# Patient Record
Sex: FEMALE | Race: WHITE | NOT HISPANIC OR LATINO | Employment: OTHER | URBAN - METROPOLITAN AREA
[De-identification: names, ages, dates, MRNs, and addresses within clinical notes are randomized per-mention and may not be internally consistent; named-entity substitution may affect disease eponyms.]

---

## 2017-01-19 ENCOUNTER — PATIENT MESSAGE (OUTPATIENT)
Dept: INTERNAL MEDICINE | Facility: CLINIC | Age: 62
End: 2017-01-19

## 2017-01-19 DIAGNOSIS — E78.5 HYPERLIPIDEMIA, UNSPECIFIED HYPERLIPIDEMIA TYPE: Primary | ICD-10-CM

## 2017-01-20 NOTE — TELEPHONE ENCOUNTER
Help her get the Cardiac calcium CT st [place]. Please double check that this is the [facility] $100 scan I usually order. I am having trouble with Epic at this meeting. Message me if not right.

## 2017-01-24 ENCOUNTER — HOSPITAL ENCOUNTER (OUTPATIENT)
Dept: RADIOLOGY | Facility: HOSPITAL | Age: 62
Discharge: HOME | End: 2017-01-24
Attending: INTERNAL MEDICINE
Payer: COMMERCIAL

## 2017-01-24 ENCOUNTER — HOSPITAL ENCOUNTER (OUTPATIENT)
Dept: RADIOLOGY | Facility: HOSPITAL | Age: 62
Discharge: HOME | End: 2017-01-24
Attending: INTERNAL MEDICINE

## 2017-01-24 ENCOUNTER — TELEPHONE (OUTPATIENT)
Dept: INTERNAL MEDICINE | Facility: CLINIC | Age: 62
End: 2017-01-24

## 2017-01-24 ENCOUNTER — PATIENT MESSAGE (OUTPATIENT)
Dept: INTERNAL MEDICINE | Facility: CLINIC | Age: 62
End: 2017-01-24

## 2017-01-24 DIAGNOSIS — E78.5 HYPERLIPIDEMIA, UNSPECIFIED HYPERLIPIDEMIA TYPE: Primary | ICD-10-CM

## 2017-01-24 DIAGNOSIS — E78.5 HYPERLIPIDEMIA, UNSPECIFIED HYPERLIPIDEMIA TYPE: ICD-10-CM

## 2017-01-24 DIAGNOSIS — Z82.49 FAMILY HISTORY OF EARLY CAD: ICD-10-CM

## 2017-01-24 PROCEDURE — 75571 CT HRT W/O DYE W/CA TEST: CPT | Mod: 26,,, | Performed by: RADIOLOGY

## 2017-01-24 PROCEDURE — 75571 CT HRT W/O DYE W/CA TEST: CPT | Mod: TC

## 2017-03-01 RX ORDER — TRAMADOL HYDROCHLORIDE 50 MG/1
TABLET ORAL
Qty: 120 TABLET | Refills: 0 | Status: SHIPPED | OUTPATIENT
Start: 2017-03-01 | End: 2017-06-20 | Stop reason: SDUPTHER

## 2017-03-13 ENCOUNTER — TELEPHONE (OUTPATIENT)
Dept: INTERNAL MEDICINE | Facility: CLINIC | Age: 62
End: 2017-03-13

## 2017-03-13 DIAGNOSIS — Z00.00 ROUTINE MEDICAL EXAM: Primary | ICD-10-CM

## 2017-03-13 NOTE — TELEPHONE ENCOUNTER
----- Message from Yoly Hightower sent at 3/13/2017  3:10 PM CDT -----  Doctor appointment and lab have been scheduled.  Please link lab orders to the lab appointment.  Date of doctor appointment:  5-5  Physical or EP:  physical  Date of lab appointment:  4-28  Comments:

## 2017-03-14 ENCOUNTER — OFFICE VISIT (OUTPATIENT)
Dept: PHYSICAL MEDICINE AND REHAB | Facility: CLINIC | Age: 62
End: 2017-03-14
Payer: COMMERCIAL

## 2017-03-14 VITALS
DIASTOLIC BLOOD PRESSURE: 87 MMHG | HEART RATE: 79 BPM | WEIGHT: 121.25 LBS | SYSTOLIC BLOOD PRESSURE: 138 MMHG | HEIGHT: 64 IN | BODY MASS INDEX: 20.7 KG/M2

## 2017-03-14 DIAGNOSIS — M19.041 PRIMARY OSTEOARTHRITIS OF BOTH HANDS: ICD-10-CM

## 2017-03-14 DIAGNOSIS — M47.812 SPONDYLOSIS OF CERVICAL REGION WITHOUT MYELOPATHY OR RADICULOPATHY: ICD-10-CM

## 2017-03-14 DIAGNOSIS — G89.29 CHRONIC MIDLINE LOW BACK PAIN WITH BILATERAL SCIATICA: Primary | ICD-10-CM

## 2017-03-14 DIAGNOSIS — M54.42 CHRONIC MIDLINE LOW BACK PAIN WITH BILATERAL SCIATICA: Primary | ICD-10-CM

## 2017-03-14 DIAGNOSIS — M54.2 CHRONIC NECK PAIN: ICD-10-CM

## 2017-03-14 DIAGNOSIS — M54.41 CHRONIC MIDLINE LOW BACK PAIN WITH BILATERAL SCIATICA: Primary | ICD-10-CM

## 2017-03-14 DIAGNOSIS — M54.16 BILATERAL LUMBAR RADICULOPATHY: ICD-10-CM

## 2017-03-14 DIAGNOSIS — M19.042 PRIMARY OSTEOARTHRITIS OF BOTH HANDS: ICD-10-CM

## 2017-03-14 DIAGNOSIS — M47.26 OSTEOARTHRITIS OF SPINE WITH RADICULOPATHY, LUMBAR REGION: ICD-10-CM

## 2017-03-14 DIAGNOSIS — G89.29 CHRONIC NECK PAIN: ICD-10-CM

## 2017-03-14 PROCEDURE — 99213 OFFICE O/P EST LOW 20 MIN: CPT | Mod: S$GLB,,, | Performed by: PHYSICAL MEDICINE & REHABILITATION

## 2017-03-14 PROCEDURE — 3079F DIAST BP 80-89 MM HG: CPT | Mod: S$GLB,,, | Performed by: PHYSICAL MEDICINE & REHABILITATION

## 2017-03-14 PROCEDURE — 99999 PR PBB SHADOW E&M-EST. PATIENT-LVL III: CPT | Mod: PBBFAC,,, | Performed by: PHYSICAL MEDICINE & REHABILITATION

## 2017-03-14 PROCEDURE — 1160F RVW MEDS BY RX/DR IN RCRD: CPT | Mod: S$GLB,,, | Performed by: PHYSICAL MEDICINE & REHABILITATION

## 2017-03-14 PROCEDURE — 3075F SYST BP GE 130 - 139MM HG: CPT | Mod: S$GLB,,, | Performed by: PHYSICAL MEDICINE & REHABILITATION

## 2017-03-14 RX ORDER — GABAPENTIN 300 MG/1
300 CAPSULE ORAL 3 TIMES DAILY
Qty: 270 CAPSULE | Refills: 1 | Status: SHIPPED | OUTPATIENT
Start: 2017-03-14 | End: 2017-09-19 | Stop reason: SDUPTHER

## 2017-03-14 NOTE — PROGRESS NOTES
Subjective:       Patient ID: Jill Marquez is a 61 y.o. female.    Chief Complaint: No chief complaint on file.    HPI    HISTORY OF PRESENT ILLNESS:  Mrs. Marquez is a 61-year-old white female who is   followed up in the Physical Medicine Clinic for chronic low back pain with   bilateral lumbar radiculopathy, chronic neck pain, OA of the hands and right   carpal tunnel syndrome.  Her last visit to the clinic was on 11/15/2016.  She   was maintained on meloxicam, gabapentin, p.r.n. tramadol and p.r.n. metaxalone.    She was referred to the Hand Clinic due to severe OA of the hands.    The patient is coming today to the clinic for followup.  She did not go to the   Hand Clinic due to death of her brother.  She is coming today to the clinic for   followup.  Her neck pain has been stable.  It is an intermittent aching pain in   the cervical spine.  It is usually localized.  It is aggravated by excess   activity and better with rest.  Her maximum pain is 5/10 and minimum 2-3/10.    Today, it is 2-3/10.  The patient complains of intermittent mild right hand   numbness, likely related to carpal tunnel syndrome.  She has been using the   carpal tunnel splint consistently.  She continues to complain of severe pain in   her interphalangeal areas, mostly the index finger bilaterally.    Her back pain has been under good control.  It is an intermittent mild aching   pain in the lumbar spine and across her back.  The pain radiates to both   buttocks and to both knees with throbbing sensations.  Her pain is worse with   activity but can also be problematic at night when she goes to bed.  It is   better with her medications.  Her maximum pain is 3-4/10 and minimum 1-2/10.    Today, it is 1-2/10.  The patient denies any lower extremity weakness.  She   denies any leg numbness.  She denies any bowel or bladder incontinence.    She is currently taking gabapentin 300 mg p.o. twice per day.  She has not taken   meloxicam for a few  weeks ago because she was concerned about the side effects.    She takes tramadol 50 mg p.r.n., usually one tablet at bedtime.  She takes   metaxalone 800 mg p.r.n., usually twice per day.  She is generally staying   active.      MS/HN  dd: 03/14/2017 09:33:49 (CDT)  td: 03/15/2017 05:23:19 (CDT)  Doc ID   #[id number]  Job ID #[id number]    CC:       Review of Systems   Constitutional: Positive for fatigue.   Eyes: Negative for visual disturbance.   Respiratory: Negative for shortness of breath.    Cardiovascular: Negative for chest pain.   Gastrointestinal: Negative for blood in stool, constipation, nausea and vomiting.   Genitourinary: Negative for difficulty urinating.   Musculoskeletal: Positive for arthralgias (hands), back pain and neck pain. Negative for gait problem.   Skin: Negative for rash.   Neurological: Negative for dizziness and headaches.   Psychiatric/Behavioral: Positive for sleep disturbance. Negative for behavioral problems.       Objective:      Physical Exam   Constitutional: She appears well-developed and well-nourished. No distress.   HENT:   Head: Normocephalic and atraumatic.   Neck:   Decreased ROM.  -ve tenderness.     Pulmonary/Chest: Breath sounds normal.   Musculoskeletal:   BUE:  ROM:full.  +ve severe Heberden's & Lizzy's nodes.  Strength:    RUE: 5/5 at shoulder abduction, elbow flexion, wrist extension, elbow extension, hand .   LUE: 5/5 at shoulder abduction, elbow flexion, wrist extension, elbow extension, hand .  Sensation to pinprick:   RUE: intact.   LUE: intact.  DTR:    RUE: +2 biceps, +1 triceps.   LUE:  +2 biceps, +1 triceps.      BLE:  ROM:full.  Mild bilateral knee crepitus.   Strength:      RLE: 5/5 at hip flexion, knee extension, ankle DF/PF.     LLE:  5/5 at hip flexion, knee extension, ankle DF/PF.  Sensation to pinprick:     RLE: intact.      LLE: intact.   SLR (sitting):      RLE: -ve.      LLE: -ve.     -ve tenderness over lumbar spine.     Neurological: She is  alert.   Skin: Skin is warm. No rash noted.   Psychiatric: She has a normal mood and affect.   Vitals reviewed.            Assessment:       1. Chronic midline low back pain with bilateral sciatica    2. Osteoarthritis of spine with radiculopathy, lumbar region    3. Bilateral lumbar radiculopathy    4. Chronic neck pain    5. Spondylosis of cervical region without myelopathy or radiculopathy    6. Primary osteoarthritis of both hands        Plan:     - Restart meloxicam (MOBIC) 7.5 MG tablet; Take 1 tablet (7.5 mg total) by mouth once daily.   - Increase gabapentin (NEURONTIN) 300 MG capsule; Take 1 capsule (300 mg total) by mouth 3 (three) times daily.  - Continue metaxalone (SKELAXIN) 800 MG tablet; Take 1 tablet (800 mg total) by mouth twice daily as needed.  - Continue tramadol 50 mg po daily prn.  - The patient is to call the Hand Clinic (at [facility]) for help with hand pain and possible steroid injections.  - Return in about 4 months (around 3/15/2017).

## 2017-03-28 ENCOUNTER — PATIENT MESSAGE (OUTPATIENT)
Dept: UROGYNECOLOGY | Facility: CLINIC | Age: 62
End: 2017-03-28

## 2017-03-28 DIAGNOSIS — Z12.31 ENCOUNTER FOR SCREENING MAMMOGRAM FOR BREAST CANCER: Primary | ICD-10-CM

## 2017-03-28 NOTE — TELEPHONE ENCOUNTER
Spoke to patient in regards to request for mammogram orders. Patient scheduled for annual mammogram on Monday 4/10 for 9:40am at [facility]. Patient verbalized all understanding. No further questions asked.

## 2017-04-10 ENCOUNTER — HOSPITAL ENCOUNTER (OUTPATIENT)
Dept: RADIOLOGY | Facility: HOSPITAL | Age: 62
Discharge: HOME | End: 2017-04-10
Attending: NURSE PRACTITIONER
Payer: COMMERCIAL

## 2017-04-10 ENCOUNTER — PATIENT MESSAGE (OUTPATIENT)
Dept: UROGYNECOLOGY | Facility: CLINIC | Age: 62
End: 2017-04-10

## 2017-04-10 DIAGNOSIS — Z12.31 ENCOUNTER FOR SCREENING MAMMOGRAM FOR BREAST CANCER: ICD-10-CM

## 2017-04-10 PROCEDURE — 77067 SCR MAMMO BI INCL CAD: CPT | Mod: TC

## 2017-04-10 PROCEDURE — 77063 BREAST TOMOSYNTHESIS BI: CPT | Mod: 26,,, | Performed by: RADIOLOGY

## 2017-04-10 PROCEDURE — 77067 SCR MAMMO BI INCL CAD: CPT | Mod: 26,,, | Performed by: RADIOLOGY

## 2017-04-17 ENCOUNTER — PATIENT MESSAGE (OUTPATIENT)
Dept: PHYSICAL MEDICINE AND REHAB | Facility: CLINIC | Age: 62
End: 2017-04-17

## 2017-04-17 DIAGNOSIS — G89.29 CHRONIC NECK PAIN: ICD-10-CM

## 2017-04-17 DIAGNOSIS — M54.42 CHRONIC MIDLINE LOW BACK PAIN WITH BILATERAL SCIATICA: ICD-10-CM

## 2017-04-17 DIAGNOSIS — M54.2 CHRONIC NECK PAIN: ICD-10-CM

## 2017-04-17 DIAGNOSIS — G89.29 CHRONIC MIDLINE LOW BACK PAIN WITH BILATERAL SCIATICA: ICD-10-CM

## 2017-04-17 DIAGNOSIS — M54.41 CHRONIC MIDLINE LOW BACK PAIN WITH BILATERAL SCIATICA: ICD-10-CM

## 2017-04-17 RX ORDER — METAXALONE 800 MG/1
800 TABLET ORAL 3 TIMES DAILY PRN
Qty: 90 TABLET | Refills: 2 | Status: SHIPPED | OUTPATIENT
Start: 2017-04-17 | End: 2017-08-29 | Stop reason: SDUPTHER

## 2017-04-17 NOTE — TELEPHONE ENCOUNTER
03/14/17 last office visit  11/15/17 last Rx refill  07/18/17 RTC     Jill Tapia MD 54 minutes ago (7:38 AM)         Please send refill for Metaxalone 800 MG   TO On license of [facility]. [phone].     I ran out over the weekend.     Thank you,   Jill Marquez

## 2017-04-26 RX ORDER — AMLODIPINE BESYLATE 10 MG/1
TABLET ORAL
Qty: 90 TABLET | Refills: 1 | Status: SHIPPED | OUTPATIENT
Start: 2017-04-26 | End: 2017-10-23 | Stop reason: SDUPTHER

## 2017-04-28 ENCOUNTER — LAB VISIT (OUTPATIENT)
Dept: LAB | Facility: HOSPITAL | Age: 62
End: 2017-04-28
Attending: INTERNAL MEDICINE
Payer: COMMERCIAL

## 2017-04-28 DIAGNOSIS — Z00.00 ROUTINE MEDICAL EXAM: ICD-10-CM

## 2017-04-28 LAB
25(OH)D3+25(OH)D2 SERPL-MCNC: 112 NG/ML
ALBUMIN SERPL BCP-MCNC: 4.1 G/DL
ALP SERPL-CCNC: 42 U/L
ALT SERPL W/O P-5'-P-CCNC: 24 U/L
ANION GAP SERPL CALC-SCNC: 9 MMOL/L
AST SERPL-CCNC: 34 U/L
BASOPHILS # BLD AUTO: 0.02 K/UL
BASOPHILS NFR BLD: 0.5 %
BILIRUB SERPL-MCNC: 0.7 MG/DL
BUN SERPL-MCNC: 22 MG/DL
CALCIUM SERPL-MCNC: 10 MG/DL
CHLORIDE SERPL-SCNC: 101 MMOL/L
CHOLEST/HDLC SERPL: 2.6 {RATIO}
CO2 SERPL-SCNC: 29 MMOL/L
CREAT SERPL-MCNC: 0.7 MG/DL
DIFFERENTIAL METHOD: ABNORMAL
EOSINOPHIL # BLD AUTO: 0.1 K/UL
EOSINOPHIL NFR BLD: 2.2 %
ERYTHROCYTE [DISTWIDTH] IN BLOOD BY AUTOMATED COUNT: 12.8 %
EST. GFR  (AFRICAN AMERICAN): >60 ML/MIN/1.73 M^2
EST. GFR  (NON AFRICAN AMERICAN): >60 ML/MIN/1.73 M^2
ESTIMATED AVG GLUCOSE: 114 MG/DL
GLUCOSE SERPL-MCNC: 87 MG/DL
HBA1C MFR BLD HPLC: 5.6 %
HCT VFR BLD AUTO: 38.9 %
HDL/CHOLESTEROL RATIO: 38.8 %
HDLC SERPL-MCNC: 227 MG/DL
HDLC SERPL-MCNC: 88 MG/DL
HGB BLD-MCNC: 13.1 G/DL
LDLC SERPL CALC-MCNC: 127.2 MG/DL
LYMPHOCYTES # BLD AUTO: 1.7 K/UL
LYMPHOCYTES NFR BLD: 42.6 %
MCH RBC QN AUTO: 32.7 PG
MCHC RBC AUTO-ENTMCNC: 33.7 %
MCV RBC AUTO: 97 FL
MONOCYTES # BLD AUTO: 0.5 K/UL
MONOCYTES NFR BLD: 12.3 %
NEUTROPHILS # BLD AUTO: 1.7 K/UL
NEUTROPHILS NFR BLD: 42.2 %
NONHDLC SERPL-MCNC: 139 MG/DL
PLATELET # BLD AUTO: 196 K/UL
PMV BLD AUTO: 10.4 FL
POTASSIUM SERPL-SCNC: 4.1 MMOL/L
PROT SERPL-MCNC: 7 G/DL
RBC # BLD AUTO: 4.01 M/UL
SODIUM SERPL-SCNC: 139 MMOL/L
TRIGL SERPL-MCNC: 59 MG/DL
TSH SERPL DL<=0.005 MIU/L-ACNC: 3.34 UIU/ML
WBC # BLD AUTO: 4.06 K/UL

## 2017-04-28 PROCEDURE — 80061 LIPID PANEL: CPT

## 2017-04-28 PROCEDURE — 85025 COMPLETE CBC W/AUTO DIFF WBC: CPT

## 2017-04-28 PROCEDURE — 84443 ASSAY THYROID STIM HORMONE: CPT

## 2017-04-28 PROCEDURE — 36415 COLL VENOUS BLD VENIPUNCTURE: CPT

## 2017-04-28 PROCEDURE — 83036 HEMOGLOBIN GLYCOSYLATED A1C: CPT

## 2017-04-28 PROCEDURE — 82306 VITAMIN D 25 HYDROXY: CPT

## 2017-04-28 PROCEDURE — 80053 COMPREHEN METABOLIC PANEL: CPT

## 2017-05-02 ENCOUNTER — OFFICE VISIT (OUTPATIENT)
Dept: UROGYNECOLOGY | Facility: CLINIC | Age: 62
End: 2017-05-02
Payer: COMMERCIAL

## 2017-05-02 VITALS
WEIGHT: 125 LBS | HEIGHT: 63 IN | SYSTOLIC BLOOD PRESSURE: 110 MMHG | BODY MASS INDEX: 22.15 KG/M2 | DIASTOLIC BLOOD PRESSURE: 70 MMHG

## 2017-05-02 DIAGNOSIS — L02.92 FURUNCLE: Primary | ICD-10-CM

## 2017-05-02 PROCEDURE — 99212 OFFICE O/P EST SF 10 MIN: CPT | Mod: S$GLB,,, | Performed by: NURSE PRACTITIONER

## 2017-05-02 PROCEDURE — 99999 PR PBB SHADOW E&M-EST. PATIENT-LVL III: CPT | Mod: PBBFAC,,, | Performed by: NURSE PRACTITIONER

## 2017-05-02 RX ORDER — PRAVASTATIN SODIUM 40 MG/1
TABLET ORAL
Refills: 0 | COMMUNITY
Start: 2017-04-19 | End: 2017-05-02

## 2017-05-02 RX ORDER — LOSARTAN POTASSIUM AND HYDROCHLOROTHIAZIDE 12.5; 5 MG/1; MG/1
TABLET ORAL
Refills: 2 | COMMUNITY
Start: 2017-03-06 | End: 2017-05-02

## 2017-05-02 RX ORDER — HYDROCODONE BITARTRATE AND ACETAMINOPHEN 7.5; 325 MG/1; MG/1
TABLET ORAL
Refills: 0 | COMMUNITY
Start: 2017-03-21 | End: 2017-05-02

## 2017-05-02 RX ORDER — DIPHENOXYLATE HYDROCHLORIDE AND ATROPINE SULFATE 2.5; .025 MG/1; MG/1
TABLET ORAL
Refills: 3 | COMMUNITY
Start: 2017-04-12 | End: 2017-05-02

## 2017-05-02 RX ORDER — METOPROLOL TARTRATE 25 MG/1
TABLET, FILM COATED ORAL
Refills: 3 | COMMUNITY
Start: 2017-03-21 | End: 2017-05-02

## 2017-05-02 RX ORDER — MELOXICAM 15 MG/1
TABLET ORAL
Refills: 1 | COMMUNITY
Start: 2017-02-14 | End: 2017-07-18 | Stop reason: SINTOL

## 2017-05-02 RX ORDER — GABAPENTIN 600 MG/1
TABLET ORAL
Refills: 6 | COMMUNITY
Start: 2017-04-03 | End: 2017-05-02 | Stop reason: DRUGHIGH

## 2017-05-05 ENCOUNTER — OFFICE VISIT (OUTPATIENT)
Dept: INTERNAL MEDICINE | Facility: CLINIC | Age: 62
End: 2017-05-05
Payer: COMMERCIAL

## 2017-05-05 VITALS
HEIGHT: 64 IN | DIASTOLIC BLOOD PRESSURE: 64 MMHG | BODY MASS INDEX: 21.3 KG/M2 | WEIGHT: 124.75 LBS | SYSTOLIC BLOOD PRESSURE: 112 MMHG

## 2017-05-05 DIAGNOSIS — Z00.00 ROUTINE PHYSICAL EXAMINATION: Primary | ICD-10-CM

## 2017-05-05 DIAGNOSIS — E78.89 ELEVATED HDL: ICD-10-CM

## 2017-05-05 DIAGNOSIS — I10 ESSENTIAL HYPERTENSION: ICD-10-CM

## 2017-05-05 DIAGNOSIS — M54.41 CHRONIC MIDLINE LOW BACK PAIN WITH BILATERAL SCIATICA: ICD-10-CM

## 2017-05-05 DIAGNOSIS — M54.42 CHRONIC MIDLINE LOW BACK PAIN WITH BILATERAL SCIATICA: ICD-10-CM

## 2017-05-05 DIAGNOSIS — E03.9 HYPOTHYROIDISM, UNSPECIFIED TYPE: ICD-10-CM

## 2017-05-05 DIAGNOSIS — M54.16 LUMBAR RADICULOPATHY: ICD-10-CM

## 2017-05-05 DIAGNOSIS — M19.90 ARTHRITIS: ICD-10-CM

## 2017-05-05 DIAGNOSIS — E78.5 HYPERLIPIDEMIA, UNSPECIFIED HYPERLIPIDEMIA TYPE: ICD-10-CM

## 2017-05-05 DIAGNOSIS — G89.29 CHRONIC MIDLINE LOW BACK PAIN WITH BILATERAL SCIATICA: ICD-10-CM

## 2017-05-05 PROCEDURE — 99396 PREV VISIT EST AGE 40-64: CPT | Mod: S$GLB,,, | Performed by: INTERNAL MEDICINE

## 2017-05-05 PROCEDURE — 3078F DIAST BP <80 MM HG: CPT | Mod: S$GLB,,, | Performed by: INTERNAL MEDICINE

## 2017-05-05 PROCEDURE — 3074F SYST BP LT 130 MM HG: CPT | Mod: S$GLB,,, | Performed by: INTERNAL MEDICINE

## 2017-05-05 PROCEDURE — 99999 PR PBB SHADOW E&M-EST. PATIENT-LVL III: CPT | Mod: PBBFAC,,, | Performed by: INTERNAL MEDICINE

## 2017-05-05 NOTE — PROGRESS NOTES
Subjective:       Patient ID: Jill Marquez is a 61 y.o. female.    Chief Complaint: Annual Exam    HPI Comments: Patient here for annual exam.  She says she's feeling very well.  She exercises regularly and has no chest pain shortness of breath fevers or chills.  She had a cardiac calcium scan earlier this year I am score was 0.  We discussed this at length and I am not worried about heart disease even though her total cholesterol is a little high, her HDL is above normal.  I did ask her to reduce the dose or frequency of her vitamin D supplementation since she is above normal     Review of Systems   Constitutional: Negative for chills, fatigue, fever and unexpected weight change.   HENT: Negative for sore throat.    Eyes: Negative for pain and visual disturbance.   Respiratory: Negative for apnea, cough, chest tightness and shortness of breath.    Cardiovascular: Negative for chest pain and leg swelling.   Gastrointestinal: Negative for abdominal pain, constipation, diarrhea, nausea and vomiting.   Genitourinary: Negative for frequency, hematuria and menstrual problem.   Musculoskeletal: Negative for arthralgias, joint swelling, neck pain and neck stiffness.   Skin: Negative for rash and wound.   Neurological: Negative for dizziness and headaches.   Hematological: Negative for adenopathy.   Psychiatric/Behavioral: Negative for dysphoric mood. The patient is not nervous/anxious.        Objective:      Physical Exam   Constitutional: She is oriented to person, place, and time. She appears well-developed and well-nourished.   HENT:   Head: Normocephalic and atraumatic.   Right Ear: Tympanic membrane, external ear and ear canal normal.   Left Ear: Tympanic membrane, external ear and ear canal normal.   Nose: Nose normal.   Mouth/Throat: Oropharynx is clear and moist and mucous membranes are normal. No oropharyngeal exudate.   Eyes: Conjunctivae and EOM are normal. Pupils are equal, round, and reactive to light.  Right eye exhibits no discharge. Left eye exhibits no discharge.   Neck: Normal range of motion. Neck supple. No JVD present. No thyromegaly present.   Cardiovascular: Normal rate, regular rhythm, normal heart sounds and intact distal pulses.    No murmur heard.  Pulmonary/Chest: Effort normal and breath sounds normal. No respiratory distress. She has no wheezes. She has no rales.   Abdominal: Soft. Bowel sounds are normal. She exhibits no mass. There is no tenderness.   Musculoskeletal: Normal range of motion. She exhibits no edema or tenderness.   Lymphadenopathy:     She has no cervical adenopathy.        Right: No supraclavicular adenopathy present.        Left: No supraclavicular adenopathy present.   Neurological: She is alert and oriented to person, place, and time. She has normal reflexes. No cranial nerve deficit.   Skin: No rash noted.   Psychiatric: She has a normal mood and affect. She does not exhibit a depressed mood.       Assessment:       1. Routine physical examination    2. Chronic midline low back pain with bilateral sciatica    3. Lumbar radiculopathy    4. Essential hypertension    5. Hypothyroidism, unspecified type    6. Elevated HDL    7. Hyperlipidemia, unspecified hyperlipidemia type    8. Arthritis        Plan:       Jill was seen today for annual exam.    Diagnoses and all orders for this visit:    Routine physical examination    Chronic midline low back pain with bilateral sciatica    Lumbar radiculopathy    Essential hypertension    Hypothyroidism, unspecified type    Elevated HDL    Hyperlipidemia, unspecified hyperlipidemia type    Arthritis     continue current plan.  Follow-up annually

## 2017-05-19 NOTE — PROGRESS NOTES
Urogyn follow up    [facility]  [address]  [place] [address]    Jill Marquez  [id number]  1955      Jill Marquez is a 61 y.o. here for a urogyn follow-12 month post surgery..    10/19/15  Title of Operation:  1) Robotic-assisted laparoscopic lysis of adhesions  2) Robotic-assisted laparoscopic bilateral salpingo-oophorectomy  3) Robotic-assisted laparoscopic sacral colpopexy with polypropylene mesh  4) Placement of retropubic tension-free midurethral sling, RetroARC (AMS)  5) Cystourethroscopy    Indications for Surgery:  1) UI: (+) RICK > (+) UUI - mornings only. (--) pads:Changes underwear. May wear pads if on a trip or away from home for an extended time. Daytime frequency: Q 3-4 hours. Nocturia: No: (--) dysuria, (--) hematuria, (--) frequent UTIs. (--) complete bladder emptying. Leans forward to void. Has some urgency in the morning.  --SUDS 10/6/15:  3. URETHRAL FUNCTION/STORAGE PHASE:  a. WITH prolapse reduction:  · CLPP (150 mL): Negative at 162 cm H20  · VLPP (150 mL): Negative at 165 cm H20  · CLPP (300 mL): Negative at 150 cm H20  · VLPP (300 mL): Negative at 147 cm H20  · CLPP (409 mL): Negative at 148 cm H20  · VLPP (409 mL): Negative at 171 cm H20  · CLPP (MAX ): Negative at 155 cm H20  · VLPP (MAX): Negative at 145 cm H20  · POS CLPP and VLPP after removal of pves catheter.  These findings are consistent with Positive urodynamic stress incontinence only at max capacity with POP reduction and removal of pves catheter.  Assessment:  UF prolonged but normal. PF prolonged but normal. Compliance normal. Max capacity 600 mL. DO (--). RICK (+).   --cysto 10/6/15: Normal with slight decrease coaptation and minimal trabeculations.  2) POP: Present. below introitus. Symptoms:(--) (--) vaginal bleeding. (--) vaginal discharge. (+) sexually active. (--) dyspareunia. (+) Vaginal dryness. (--) vaginal estrogen use.   --POP-Q- per Dr. Ballesteros: Aa +3; Ba +3; C -4; Ap -2; Bp  -2--standing; Genital hiatus 2 cm, perineal body 1cm total vaginal length 9cm.   3) BM: (+) constipation/straining. (--) chronic diarrhea. (--) hematochezia. (--) fecal incontinence. (--) fecal smearing/urgency. (--) incomplete evacuation. Using metamucil 2 capsules twice daily and correctol twice weekly.    Preoperative Diagnosis:  1) Cystocele  2) Vaginal vault prolapse  3) Mixed urinary incontinence  4) Vaginal atrophy  5) Urodynamic stress incontinence    Postoperative Diagnosis:  1) Cystocele  2) Vaginal vault prolapse  3) Mixed urinary incontinence  4) Vaginal atrophy  5) Urodynamic stress incontinence    Issues include:  Patient Active Problem List   Diagnosis    Hypothyroid    HTN (hypertension)    Displacement of thoracic intervertebral disc without myelopathy    Hyperlipidemia    Lumbar spondylosis    Carpal tunnel syndrome    Lumbar radiculopathy    Sicca    Fatigue    Myalgia and myositis    Osteoarthritis of both hands    Leg pain, bilateral    Bilateral hand pain    Hepatitis B core antibody positive (negative viral load; suspect false positive)    Elevated CPK    Osteopenia    Cystocele    Atrophic vaginitis    Chronic constipation    Screening for colon cancer    Chronic low back pain       Last pap: 12/2005- normal- patient is post hyst  Last mammogram: 03/2016 normal  Colonoscopy: 01/25/2016 one polyp- normal- repeat 2026  DEXA: 10/15:  Elevated BMD of the lumbar spine with significant decreases of 5.3% in the hip BMD and 2% in the lumbar BMD, respectively, compared with the prior study, but different machines.   RECOMMENDATIONS:  1. Adequate calcium and Vitamin D, 1200 mg/day and 600 units/day, respectively.  2. Consider lumbar x-rays given the Z-score of 2.4.  3. Repeat BMD in 2 years.      Current Outpatient Prescriptions   Medication Sig    amlodipine (NORVASC) 10 MG tablet TAKE ONE TABLET BY MOUTH EVERY DAY    cetirizine (ZYRTEC) 10 MG tablet     cholecalciferol,  "vitamin D3, 1,000 unit Chew Take by mouth.    fish oil-omega-3 fatty acids 300-1,000 mg capsule Take 2 g by mouth once daily.    fluticasone (FLONASE) 50 mcg/actuation nasal spray SPRAY TWICE IN EACH NOSTRIL DAILY    gabapentin (NEURONTIN) 300 MG capsule Take 1 capsule (300 mg total) by mouth 3 (three) times daily.    ibuprofen (ADVIL,MOTRIN) 800 MG tablet TK 1 T PO  Q 8 H PRN P.    L. ACIDOPHILUS/BIFID. ANIMALIS (ONE-A-DAY TRUBIOTICS ORAL) Take by mouth.    levothyroxine (SYNTHROID) 75 MCG tablet Take 1 tablet (75 mcg total) by mouth once daily.    meloxicam (MOBIC) 15 MG tablet     metaxalone (SKELAXIN) 800 MG tablet Take 1 tablet (800 mg total) by mouth 3 (three) times daily as needed for Pain.    PREMARIN vaginal cream APPLY 0.5 GRAMS WITH APPLICATOR OR DIME-SIZED AMOUNT TO VAGINA TWICE WEEKLY    PSYLLIUM SEED, WITH SUGAR, (METAMUCIL ORAL) Take by mouth.    tramadol (ULTRAM) 50 mg tablet TAKE ONE TABLET BY MOUTH EVERY SIX HOURS AS NEEDED     No current facility-administered medications for this visit.        ROS:  Feeling well.   No dyspnea or chest pain on exertion.    No abdominal pain, change in bowel habits, black or bloody stools.  Taking colace and metamucil daily  No urinary tract symptoms.   GYN ROS: no breast pain or new or enlarging lumps on self exam, no vaginal bleeding. Complains of "Bump" on labia  No neurological complaints.    OBJECTIVE:   The patient appears well, alert, oriented x 3, in no distress.  Visit Vitals    Ht 5' 3" (1.6 m)    Wt 56.2 kg (123 lb 14.4 oz)    BMI 21.95 kg/m2     ENT normal.  Neck supple. No adenopathy or thyromegaly. MARYAN. .   Abdomen soft without tenderness, guarding, mass or organomegaly.   Extremities show no edema, normal peripheral pulses.   Neurological is normal, no focal findings.    PELVIC EXAM:   VULVA: normal appearing vulva with no masses, tenderness or lesions, furuncle noted on R labia majora      Rest of pelvic deferred    Impression  1. " Ken       We reviewed the above issues and discussed options for short-term versus long-term management of her problems.   Plan:      1.  Always get help lifting 50# or more.     2. Vaginal health:  Vaginal atrophy (dryness):  Use 0.5 gram of estrogen cream in vagina  twice a week    3. H/o constipation:  Continue to avoid straining.  Continue stool softeners/fiber.          4. mammogram is due 04/2017-- please call to schedule   5. RTC 10/2017 for annual.     20 minutes were spent in face to face time with this patient  90 % of this time was spent in counseling and/or coordination of care    STACY Thomson-BC [facility]  Division of Female Pelvic Medicine and Reconstructive Surgery  Department of Obstetrics & Gynecology

## 2017-06-20 RX ORDER — TRAMADOL HYDROCHLORIDE 50 MG/1
TABLET ORAL
Qty: 120 TABLET | Refills: 0 | Status: SHIPPED | OUTPATIENT
Start: 2017-06-20 | End: 2017-10-17 | Stop reason: SDUPTHER

## 2017-07-18 ENCOUNTER — OFFICE VISIT (OUTPATIENT)
Dept: PHYSICAL MEDICINE AND REHAB | Facility: CLINIC | Age: 62
End: 2017-07-18
Payer: COMMERCIAL

## 2017-07-18 VITALS
DIASTOLIC BLOOD PRESSURE: 87 MMHG | SYSTOLIC BLOOD PRESSURE: 134 MMHG | HEIGHT: 64 IN | HEART RATE: 83 BPM | BODY MASS INDEX: 21.07 KG/M2 | WEIGHT: 123.44 LBS

## 2017-07-18 DIAGNOSIS — M54.42 CHRONIC MIDLINE LOW BACK PAIN WITH BILATERAL SCIATICA: Primary | ICD-10-CM

## 2017-07-18 DIAGNOSIS — M54.41 CHRONIC MIDLINE LOW BACK PAIN WITH BILATERAL SCIATICA: Primary | ICD-10-CM

## 2017-07-18 DIAGNOSIS — M54.2 CHRONIC NECK PAIN: ICD-10-CM

## 2017-07-18 DIAGNOSIS — M54.16 BILATERAL LUMBAR RADICULOPATHY: ICD-10-CM

## 2017-07-18 DIAGNOSIS — M19.041 PRIMARY OSTEOARTHRITIS OF BOTH HANDS: ICD-10-CM

## 2017-07-18 DIAGNOSIS — M19.042 PRIMARY OSTEOARTHRITIS OF BOTH HANDS: ICD-10-CM

## 2017-07-18 DIAGNOSIS — G89.29 CHRONIC NECK PAIN: ICD-10-CM

## 2017-07-18 DIAGNOSIS — M47.812 SPONDYLOSIS OF CERVICAL REGION WITHOUT MYELOPATHY OR RADICULOPATHY: ICD-10-CM

## 2017-07-18 DIAGNOSIS — M47.26 OSTEOARTHRITIS OF SPINE WITH RADICULOPATHY, LUMBAR REGION: ICD-10-CM

## 2017-07-18 DIAGNOSIS — G89.29 CHRONIC MIDLINE LOW BACK PAIN WITH BILATERAL SCIATICA: Primary | ICD-10-CM

## 2017-07-18 DIAGNOSIS — G56.01 CARPAL TUNNEL SYNDROME, RIGHT: ICD-10-CM

## 2017-07-18 PROCEDURE — 99214 OFFICE O/P EST MOD 30 MIN: CPT | Mod: S$GLB,,, | Performed by: PHYSICAL MEDICINE & REHABILITATION

## 2017-07-18 PROCEDURE — 99999 PR PBB SHADOW E&M-EST. PATIENT-LVL III: CPT | Mod: PBBFAC,,, | Performed by: PHYSICAL MEDICINE & REHABILITATION

## 2017-07-18 RX ORDER — DICLOFENAC POTASSIUM 50 MG/1
50 TABLET, FILM COATED ORAL 3 TIMES DAILY
Qty: 90 TABLET | Refills: 2 | Status: SHIPPED | OUTPATIENT
Start: 2017-07-18 | End: 2018-05-29

## 2017-07-18 NOTE — PROGRESS NOTES
Subjective:       Patient ID: Jill Marquez is a 61 y.o. female.    Chief Complaint: No chief complaint on file.    HPI    HISTORY OF PRESENT ILLNESS:  Mrs. Marquez is a 61-year-old white female with   osteoarthritis who is followed up in the Physical Medicine Clinic for chronic   low back pain with bilateral lumbar radiculopathy, chronic neck pain, OA of the   hands and right carpal tunnel syndrome.  Her last visit to the clinic was on   03/14/2017.  She was maintained on meloxicam, gabapentin, p.r.n. tramadol and   p.r.n. metaxalone.    The patient comes today to the clinic for followup.  Her back pain has been   under good control until about a week ago.  She had to help her sick brother   transfer, which aggravated her pain to some extent.  Her pain is an intermittent   aching sensation in the lumbar spine and across her back.  She continues to   have occasional shooting pain to both knees with throbbing sensations.  Her pain   is worse with activity and lifting.  It is better with rest.  Her maximum pain   is 5-6/10 and minimum 1-2/10.  Today, it is 3-4/10.  The patient denies any   lower extremity weakness.  She denies any bowel or bladder incontinence.    Her neck pain has been stable.  It is an intermittent aching pain in the   cervical spine.  She denies any radiation to her arms.  It is worse with excess   neck movement and better with rest.  Her maximum pain is 5-6/10 and minimum   3-4/10.  Today, it is 3-4/10.  The patient denies any upper extremity weakness.    She continues to complain of right hand numbness due to carpal tunnel syndrome.    She has not been wearing her carpal tunnel splint consistently.  She has   occasional occipital headaches associated with her neck pain.    She is currently taking meloxicam 15 mg p.o. daily.  She reports occasional leg   rash when she uses it.  She takes gabapentin 300 mg p.o. twice and infrequently   three times per day.  She takes tramadol 50 mg p.r.n., usually  one tablet at   bedtime.  She takes metaxalone 800 mg as needed, usually twice per day.  She has   been exercising, but not regularly.      MS/IN  dd: 07/18/2017 10:21:35 (CDT)  td: 07/18/2017 22:19:13 (CDT)  Doc ID   #[id number]  Job ID #[id number]    CC:         Review of Systems   Constitutional: Negative for fatigue.   Eyes: Negative for visual disturbance.   Respiratory: Negative for shortness of breath.    Cardiovascular: Negative for chest pain.   Gastrointestinal: Positive for constipation. Negative for blood in stool, nausea and vomiting.   Genitourinary: Negative for difficulty urinating.   Musculoskeletal: Positive for arthralgias (hands), back pain and neck pain. Negative for gait problem.   Skin: Negative for rash.   Neurological: Positive for headaches. Negative for dizziness.   Psychiatric/Behavioral: Negative for behavioral problems and sleep disturbance.       Objective:      Physical Exam   Constitutional: She appears well-developed and well-nourished. No distress.   HENT:   Head: Normocephalic and atraumatic.   Neck:   Decreased ROM.  -ve tenderness.     Pulmonary/Chest: Breath sounds normal.   Musculoskeletal:   BUE:  ROM:full.  +ve severe Heberden's & Lizzy's nodes.  Strength:    RUE: 5/5 at shoulder abduction, elbow flexion, wrist extension, elbow extension, hand .   LUE: 5/5 at shoulder abduction, elbow flexion, wrist extension, elbow extension, hand .  Sensation to pinprick:   RUE: mild decrease digit 1.   LUE: intact.  DTR:    RUE: +2 biceps, +1 triceps.   LUE:  +2 biceps, +1 triceps.      BLE:  ROM:full.  Mild bilateral knee crepitus.   Strength:      RLE: 5/5 at hip flexion, knee extension, ankle DF/PF.     LLE:  5/5 at hip flexion, knee extension, ankle DF/PF.  Sensation to pinprick:     RLE: intact.      LLE: intact.   SLR (sitting):      RLE: -ve.      LLE: -ve.     -ve tenderness over lumbar spine.     Neurological: She is alert.   Skin: Skin is warm. No rash noted.   Psychiatric:  She has a normal mood and affect.   Vitals reviewed.            Assessment:       1. Chronic midline low back pain with bilateral sciatica    2. Osteoarthritis of spine with radiculopathy, lumbar region    3. Bilateral lumbar radiculopathy    4. Chronic neck pain    5. Spondylosis of cervical region without myelopathy or radiculopathy    6. Primary osteoarthritis of both hands    7. Carpal tunnel syndrome, right        Plan:     - Discontinue meloxicam (due to reported skin rash in legs).  - Start diclofenac (CATAFLAM) 50 MG tablet; Take 1 tablet (50 mg total) by mouth 3 (three) times daily. If arthritis pain is mild, may take one tablet twice daily.  - Increase gabapentin (NEURONTIN) 300 MG capsule; Take 1 capsule (300 mg total) by mouth 3 (three) times daily.  - Continue metaxalone (SKELAXIN) 800 MG tablet; Take 1 tablet (800 mg total) by mouth twice daily as needed.  - Continue tramadol 50 mg po daily prn.  - She was encouraged to wear Rt Carpal Tunnel Splint consistently every night. If no relief, she may follow up with the Hand Clinic (at [facility]).  - Regular home exercise program was encouraged.  - Return in about 4 months (around 11/18/2017).

## 2017-08-19 ENCOUNTER — PATIENT MESSAGE (OUTPATIENT)
Dept: INTERNAL MEDICINE | Facility: CLINIC | Age: 62
End: 2017-08-19

## 2017-08-19 DIAGNOSIS — H92.09 EAR PAIN, UNSPECIFIED LATERALITY: Primary | ICD-10-CM

## 2017-08-22 ENCOUNTER — OFFICE VISIT (OUTPATIENT)
Dept: OTOLARYNGOLOGY | Facility: CLINIC | Age: 62
End: 2017-08-22
Payer: COMMERCIAL

## 2017-08-22 ENCOUNTER — CLINICAL SUPPORT (OUTPATIENT)
Dept: AUDIOLOGY | Facility: CLINIC | Age: 62
End: 2017-08-22
Payer: COMMERCIAL

## 2017-08-22 VITALS
BODY MASS INDEX: 21.53 KG/M2 | DIASTOLIC BLOOD PRESSURE: 87 MMHG | HEART RATE: 77 BPM | WEIGHT: 123.44 LBS | SYSTOLIC BLOOD PRESSURE: 135 MMHG

## 2017-08-22 DIAGNOSIS — Z87.09 H/O SINUSITIS: ICD-10-CM

## 2017-08-22 DIAGNOSIS — Z01.10 ENCOUNTER FOR HEARING EVALUATION: ICD-10-CM

## 2017-08-22 DIAGNOSIS — Z01.10 ENCOUNTER FOR HEARING EXAMINATION WITHOUT ABNORMAL FINDINGS: Primary | ICD-10-CM

## 2017-08-22 DIAGNOSIS — J30.2 SEASONAL AND PERENNIAL ALLERGIC RHINITIS: ICD-10-CM

## 2017-08-22 DIAGNOSIS — H93.8X2: Primary | ICD-10-CM

## 2017-08-22 DIAGNOSIS — J30.89 SEASONAL AND PERENNIAL ALLERGIC RHINITIS: ICD-10-CM

## 2017-08-22 PROCEDURE — 3008F BODY MASS INDEX DOCD: CPT | Mod: S$GLB,,, | Performed by: NURSE PRACTITIONER

## 2017-08-22 PROCEDURE — 3079F DIAST BP 80-89 MM HG: CPT | Mod: S$GLB,,, | Performed by: NURSE PRACTITIONER

## 2017-08-22 PROCEDURE — 99999 PR PBB SHADOW E&M-EST. PATIENT-LVL I: CPT | Mod: PBBFAC,,,

## 2017-08-22 PROCEDURE — 3075F SYST BP GE 130 - 139MM HG: CPT | Mod: S$GLB,,, | Performed by: NURSE PRACTITIONER

## 2017-08-22 PROCEDURE — 99999 PR PBB SHADOW E&M-EST. PATIENT-LVL IV: CPT | Mod: PBBFAC,,, | Performed by: NURSE PRACTITIONER

## 2017-08-22 PROCEDURE — 92557 COMPREHENSIVE HEARING TEST: CPT | Mod: S$GLB,,, | Performed by: AUDIOLOGIST

## 2017-08-22 PROCEDURE — 99203 OFFICE O/P NEW LOW 30 MIN: CPT | Mod: S$GLB,,, | Performed by: NURSE PRACTITIONER

## 2017-08-22 PROCEDURE — 92567 TYMPANOMETRY: CPT | Mod: S$GLB,,, | Performed by: AUDIOLOGIST

## 2017-08-22 RX ORDER — AZELASTINE 1 MG/ML
1 SPRAY, METERED NASAL 2 TIMES DAILY
Qty: 30 ML | Refills: 2 | Status: SHIPPED | OUTPATIENT
Start: 2017-08-22 | End: 2019-01-17 | Stop reason: SDUPTHER

## 2017-08-22 RX ORDER — DIPHENOXYLATE HYDROCHLORIDE AND ATROPINE SULFATE 2.5; .025 MG/1; MG/1
1 TABLET ORAL 4 TIMES DAILY PRN
Refills: 3 | COMMUNITY
Start: 2017-07-10 | End: 2018-02-20

## 2017-08-22 RX ORDER — ALPRAZOLAM 1 MG/1
1 TABLET ORAL 2 TIMES DAILY
Refills: 1 | COMMUNITY
Start: 2017-06-28 | End: 2018-02-20

## 2017-08-22 RX ORDER — PRAVASTATIN SODIUM 40 MG/1
40 TABLET ORAL DAILY
Refills: 1 | COMMUNITY
Start: 2017-07-17 | End: 2018-02-20

## 2017-08-22 RX ORDER — GABAPENTIN 600 MG/1
600 TABLET ORAL 3 TIMES DAILY
Refills: 11 | COMMUNITY
Start: 2017-07-05 | End: 2017-10-31 | Stop reason: DRUGHIGH

## 2017-08-22 RX ORDER — HYDROCODONE BITARTRATE AND ACETAMINOPHEN 7.5; 325 MG/1; MG/1
1 TABLET ORAL EVERY 6 HOURS PRN
Refills: 0 | COMMUNITY
Start: 2017-06-26 | End: 2018-05-29

## 2017-08-22 RX ORDER — METOPROLOL TARTRATE 25 MG/1
TABLET, FILM COATED ORAL
Refills: 0 | COMMUNITY
Start: 2017-05-30 | End: 2017-10-31

## 2017-08-22 RX ORDER — LOSARTAN POTASSIUM AND HYDROCHLOROTHIAZIDE 12.5; 5 MG/1; MG/1
TABLET ORAL
Refills: 2 | COMMUNITY
Start: 2017-06-05 | End: 2017-10-31

## 2017-08-22 RX ORDER — ESCITALOPRAM OXALATE 20 MG/1
TABLET ORAL
Refills: 11 | COMMUNITY
Start: 2017-05-22 | End: 2018-02-20

## 2017-08-22 NOTE — LETTER
August 22, 2017      Dain Smith MD  [address]  [facility] [address]           [facility]hin[facility]  [address]  [facility] [address]  Phone: [phone]  Fax: [phone]          Patient: Jill Marquez   MR Number: [id number]   YOB: 1955   Date of Visit: 8/22/2017       Dear Dr. Dain Smith:    Thank you for referring Jill Marquez to me for evaluation. Attached you will find relevant portions of my assessment and plan of care.    If you have questions, please do not hesitate to call me. I look forward to following Jill Marquez along with you.    Sincerely,    Ja Van, MERLENE    Enclosure  CC:  No Recipients    If you would like to receive this communication electronically, please contact [email or web address] or [phone] to request more information on [employer] access.    For providers and/or their staff who would like to refer a patient to [facility], please contact us through our one-stop-shop provider referral line, [facility], at [phone].    If you feel you have received this communication in error or would no longer like to receive these types of communications, please e-mail [email or web address]

## 2017-08-22 NOTE — PROGRESS NOTES
"Subjective:       Patient ID: Jill Marquez is a 61 y.o. female.    Chief Complaint: Ear Fullness and Otalgia    HPI     Jill Marquez is a 60 y/o CF who presents with a one week h/o of L ear congestion. Associated symptoms include "muffled" hearing and musical sound to L ear. Symptoms are improving. Treatments tried include Sudafed with moderate relief of symptoms. She denies otologic surgery, loud noise exposure, otalgia, otorrhea, dizziness, or vertigo.  No recent airplane flights. No recent swimming activity. She has a h/o HTN, Hyperlipidemia, and Hypothyroidism.    Past Medical History: Patient has a past medical history of DJD (degenerative joint disease) of lumbar spine (3/10/2014); HTN (hypertension) (7/23/2012); Hyperlipidemia; and Hypothyroid (7/23/2012).    Past Surgical History: Patient has a past surgical history that includes Tonsillectomy; Hysterectomy (2004); eyelid surgery; Eye surgery; Cosmetic surgery; Tonsillectomy; and Colonoscopy (N/A, 1/25/2016).    Social History: Patient reports that she has never smoked. She has never used smokeless tobacco. She reports that she drinks alcohol. She reports that she does not use drugs.    Family History: family history includes Cancer in her brother and brother; Cataracts in her brother, brother, and brother; Diabetes in her brother, brother, and brother; Lupus in her other; No Known Problems in her father, maternal aunt, maternal grandfather, maternal grandmother, maternal uncle, mother, paternal aunt, paternal grandfather, paternal grandmother, paternal uncle, and sister; Retinal detachment in her brother.    Medications:   Current Outpatient Prescriptions   Medication Sig    alprazolam (XANAX) 1 MG tablet Take 1 mg by mouth 2 (two) times daily.    amlodipine (NORVASC) 10 MG tablet TAKE ONE TABLET BY MOUTH EVERY DAY    azelastine (ASTELIN) 137 mcg (0.1 %) nasal spray 1 spray (137 mcg total) by Nasal route 2 (two) times daily.    azithromycin " (Z-NORAH) 250 MG tablet Take 2 tablets by mouth on day 1; Take 1 tablet by mouth on days 2-5    cetirizine (ZYRTEC) 10 MG tablet     cholecalciferol, vitamin D3, 1,000 unit Chew Take by mouth.    diclofenac (CATAFLAM) 50 MG tablet Take 1 tablet (50 mg total) by mouth 3 (three) times daily. If arthritis pain is mild, may take one tablet twice daily.    diphenoxylate-atropine 2.5-0.025 mg (LOMOTIL) 2.5-0.025 mg per tablet Take 1 tablet by mouth 4 (four) times daily as needed.    escitalopram oxalate (LEXAPRO) 20 MG tablet     fish oil-omega-3 fatty acids 300-1,000 mg capsule Take 2 g by mouth once daily.    fluticasone (FLONASE) 50 mcg/actuation nasal spray SPRAY TWICE IN EACH NOSTRIL DAILY    gabapentin (NEURONTIN) 300 MG capsule Take 1 capsule (300 mg total) by mouth 3 (three) times daily.    gabapentin (NEURONTIN) 600 MG tablet Take 600 mg by mouth 3 (three) times daily.    hydrocodone-acetaminophen 7.5-325mg (NORCO) 7.5-325 mg per tablet Take 1 tablet by mouth every 6 (six) hours as needed.    ibuprofen (ADVIL,MOTRIN) 800 MG tablet TK 1 T PO  Q 8 H PRN P.    L. ACIDOPHILUS/BIFID. ANIMALIS (ONE-A-DAY TRUBIOTICS ORAL) Take by mouth.    levothyroxine (SYNTHROID) 75 MCG tablet Take 1 tablet (75 mcg total) by mouth once daily.    losartan-hydrochlorothiazide 50-12.5 mg (HYZAAR) 50-12.5 mg per tablet     metaxalone (SKELAXIN) 800 MG tablet TAKE 1 TABLET (800 MG TOTAL) BY MOUTH 3 (THREE) TIMES DAILY AS NEEDED FOR PAIN.    metoprolol tartrate (LOPRESSOR) 25 MG tablet     pravastatin (PRAVACHOL) 40 MG tablet Take 40 mg by mouth once daily.    PREMARIN vaginal cream APPLY 0.5 GRAMS WITH APPLICATOR OR DIME-SIZED AMOUNT TO VAGINA TWICE WEEKLY    PSYLLIUM SEED, WITH SUGAR, (METAMUCIL ORAL) Take by mouth.    tramadol (ULTRAM) 50 mg tablet TAKE ONE TABLET BY MOUTH EVERY SIX HOURS AS NEEDED     No current facility-administered medications for this visit.        Allergies: Patient is allergic to no known  "allergies.    Review of Systems   Constitutional: Negative for activity change, appetite change, chills, diaphoresis, fatigue, fever and unexpected weight change.   HENT: Positive for postnasal drip, sinus pressure and tinnitus ("musical" sound in ear). Negative for congestion, dental problem, ear discharge, ear pain, facial swelling, hearing loss, nosebleeds, rhinorrhea, sneezing, sore throat, trouble swallowing and voice change.         L ear feels "congested" and hearing is "muffled."   Eyes: Negative for pain and visual disturbance.   Respiratory: Negative for cough, chest tightness, shortness of breath, wheezing and stridor.    Cardiovascular: Negative for chest pain.   Musculoskeletal: Negative for gait problem and neck pain.   Skin: Negative for color change and rash.   Allergic/Immunologic: Negative for environmental allergies.   Neurological: Negative for dizziness, seizures, syncope, facial asymmetry, speech difficulty, weakness, light-headedness, numbness and headaches.   Psychiatric/Behavioral: Negative for agitation and confusion. The patient is not nervous/anxious.        Objective:       /87 (BP Location: Right arm, Patient Position: Sitting, BP Method: Medium (Automatic))   Pulse 77   Wt 56 kg (123 lb 7.3 oz)   BMI 21.53 kg/m²     Physical Exam   Constitutional: She is oriented to person, place, and time. She appears well-developed and well-nourished.   HENT:   Head: Normocephalic and atraumatic. Not macrocephalic and not microcephalic. Head is without raccoon's eyes, without Barr's sign, without abrasion, without contusion, without laceration, without right periorbital erythema and without left periorbital erythema. Hair is normal.   Right Ear: Tympanic membrane, external ear and ear canal normal. No lacerations. No drainage, swelling or tenderness. No foreign bodies. No mastoid tenderness. Tympanic membrane is not injected, not scarred, not perforated, not erythematous, not retracted and " not bulging. Tympanic membrane mobility is normal. No middle ear effusion. No hemotympanum. No decreased hearing is noted.   Left Ear: Tympanic membrane, external ear and ear canal normal. No lacerations. No drainage, swelling or tenderness. No foreign bodies. No mastoid tenderness. Tympanic membrane is not injected, not scarred, not perforated, not erythematous, not retracted and not bulging. Tympanic membrane mobility is normal.  No middle ear effusion. No hemotympanum. No decreased hearing is noted.   Ears:    Nose: Nose normal. No mucosal edema, rhinorrhea, nose lacerations, sinus tenderness, nasal deformity or nasal septal hematoma. No epistaxis.  No foreign bodies. Right sinus exhibits no maxillary sinus tenderness and no frontal sinus tenderness. Left sinus exhibits no maxillary sinus tenderness and no frontal sinus tenderness.   Mouth/Throat: Uvula is midline, oropharynx is clear and moist and mucous membranes are normal.   No AOM or OE.  Facial Nerve Intact.   Eyes: Conjunctivae, EOM and lids are normal. Pupils are equal, round, and reactive to light.   Neck: Trachea normal and normal range of motion. Neck supple. No spinous process tenderness and no muscular tenderness present. No neck rigidity. No edema, no erythema and normal range of motion present. No thyroid mass and no thyromegaly present.   Pulmonary/Chest: Effort normal.   Abdominal: Soft.   Musculoskeletal: Normal range of motion.   Lymphadenopathy:        Head (right side): No submental, no submandibular, no tonsillar, no preauricular and no posterior auricular adenopathy present.        Head (left side): No submental, no submandibular, no tonsillar, no preauricular, no posterior auricular and no occipital adenopathy present.     She has no cervical adenopathy.   Neurological: She is alert and oriented to person, place, and time. No cranial nerve deficit or sensory deficit.   Skin: Skin is warm and dry.   Psychiatric: She has a normal mood and  affect. Her behavior is normal. Judgment and thought content normal.   Nursing note and vitals reviewed.      As a result of this patients history and examination findings, a comprehensive audiogram was ordered to determine the level of hearing/hearing loss.        Assessment:       1. Congested ear, left    2. Encounter for hearing evaluation    3. Seasonal and perennial allergic rhinitis    4. H/O sinusitis        Plan:       Medrol dose pack.  Z-pack.  Audiogram Reviewed: WNL. Type A AU tympanogram.  Hearing conservation strongly recommended.  Trial of amplification is not currently indicated.  Re-check of hearing after 65 years of age or prn sooner if symptoms worsen.  Fernando Med Sinus Rinse daily; distilled water only.  OTC Flonase daily (spray laterally).  Astelin daily (spray laterally).  F/U with PCP as per schedule.  RTC prn.

## 2017-08-22 NOTE — PATIENT INSTRUCTIONS
Medrol dose pack.  Z-pack.  Audiogram Reviewed: WNL. Type A AU tympanogram.  Hearing conservation strongly recommended.  Trial of amplification is not currently indicated.  Re-check of hearing after 65 years of age or prn sooner if symptoms worsen.  Fernando Med Sinus Rinse daily; distilled water only.  OTC Flonase daily (spray laterally).  Astelin daily (spray laterally).  F/U with PCP as per schedule.  RTC prn.

## 2017-08-29 ENCOUNTER — TELEPHONE (OUTPATIENT)
Dept: OTOLARYNGOLOGY | Facility: CLINIC | Age: 62
End: 2017-08-29

## 2017-08-29 DIAGNOSIS — M54.42 CHRONIC MIDLINE LOW BACK PAIN WITH BILATERAL SCIATICA: ICD-10-CM

## 2017-08-29 DIAGNOSIS — M54.2 CHRONIC NECK PAIN: ICD-10-CM

## 2017-08-29 DIAGNOSIS — M54.41 CHRONIC MIDLINE LOW BACK PAIN WITH BILATERAL SCIATICA: ICD-10-CM

## 2017-08-29 DIAGNOSIS — G89.29 CHRONIC NECK PAIN: ICD-10-CM

## 2017-08-29 DIAGNOSIS — G89.29 CHRONIC MIDLINE LOW BACK PAIN WITH BILATERAL SCIATICA: ICD-10-CM

## 2017-08-29 RX ORDER — METAXALONE 800 MG/1
800 TABLET ORAL 3 TIMES DAILY PRN
Qty: 90 TABLET | Refills: 2 | Status: SHIPPED | OUTPATIENT
Start: 2017-08-29 | End: 2017-09-12 | Stop reason: SDUPTHER

## 2017-08-29 RX ORDER — AZITHROMYCIN 250 MG/1
TABLET, FILM COATED ORAL
Qty: 6 TABLET | Refills: 0 | Status: SHIPPED | OUTPATIENT
Start: 2017-08-29 | End: 2017-09-03

## 2017-08-29 NOTE — TELEPHONE ENCOUNTER
No pain ,ears still clogged , she still  has music sound in her ears , she just wanted to informed us of her condition.

## 2017-08-29 NOTE — TELEPHONE ENCOUNTER
----- Message from Sayra Solomon sent at 8/29/2017 10:12 AM CDT -----  Call patient about still having an ear issue. Call .

## 2017-08-30 ENCOUNTER — PATIENT MESSAGE (OUTPATIENT)
Dept: PHYSICAL MEDICINE AND REHAB | Facility: CLINIC | Age: 62
End: 2017-08-30

## 2017-08-30 DIAGNOSIS — G56.01 CARPAL TUNNEL SYNDROME, RIGHT: ICD-10-CM

## 2017-08-30 DIAGNOSIS — M47.26 OSTEOARTHRITIS OF SPINE WITH RADICULOPATHY, LUMBAR REGION: ICD-10-CM

## 2017-08-30 DIAGNOSIS — M54.42 CHRONIC MIDLINE LOW BACK PAIN WITH BILATERAL SCIATICA: Primary | ICD-10-CM

## 2017-08-30 DIAGNOSIS — G89.29 CHRONIC MIDLINE LOW BACK PAIN WITH BILATERAL SCIATICA: Primary | ICD-10-CM

## 2017-08-30 DIAGNOSIS — M54.2 CHRONIC NECK PAIN: ICD-10-CM

## 2017-08-30 DIAGNOSIS — M54.41 CHRONIC MIDLINE LOW BACK PAIN WITH BILATERAL SCIATICA: Primary | ICD-10-CM

## 2017-08-30 DIAGNOSIS — M19.042 PRIMARY OSTEOARTHRITIS OF BOTH HANDS: ICD-10-CM

## 2017-08-30 DIAGNOSIS — M47.812 SPONDYLOSIS OF CERVICAL REGION WITHOUT MYELOPATHY OR RADICULOPATHY: ICD-10-CM

## 2017-08-30 DIAGNOSIS — G89.29 CHRONIC NECK PAIN: ICD-10-CM

## 2017-08-30 DIAGNOSIS — M19.041 PRIMARY OSTEOARTHRITIS OF BOTH HANDS: ICD-10-CM

## 2017-08-30 DIAGNOSIS — M54.16 BILATERAL LUMBAR RADICULOPATHY: ICD-10-CM

## 2017-09-12 DIAGNOSIS — M54.42 CHRONIC MIDLINE LOW BACK PAIN WITH BILATERAL SCIATICA: ICD-10-CM

## 2017-09-12 DIAGNOSIS — M54.2 CHRONIC NECK PAIN: ICD-10-CM

## 2017-09-12 DIAGNOSIS — M54.41 CHRONIC MIDLINE LOW BACK PAIN WITH BILATERAL SCIATICA: ICD-10-CM

## 2017-09-12 DIAGNOSIS — G89.29 CHRONIC MIDLINE LOW BACK PAIN WITH BILATERAL SCIATICA: ICD-10-CM

## 2017-09-12 DIAGNOSIS — G89.29 CHRONIC NECK PAIN: ICD-10-CM

## 2017-09-12 RX ORDER — METAXALONE 800 MG/1
800 TABLET ORAL 3 TIMES DAILY PRN
Qty: 90 TABLET | Refills: 2 | Status: SHIPPED | OUTPATIENT
Start: 2017-09-12 | End: 2018-02-27 | Stop reason: SDUPTHER

## 2017-09-19 DIAGNOSIS — M54.16 BILATERAL LUMBAR RADICULOPATHY: ICD-10-CM

## 2017-09-19 RX ORDER — GABAPENTIN 300 MG/1
300 CAPSULE ORAL 3 TIMES DAILY
Qty: 270 CAPSULE | Refills: 1 | Status: SHIPPED | OUTPATIENT
Start: 2017-09-19 | End: 2018-03-21 | Stop reason: SDUPTHER

## 2017-10-17 RX ORDER — TRAMADOL HYDROCHLORIDE 50 MG/1
TABLET ORAL
Qty: 120 TABLET | Refills: 0 | Status: SHIPPED | OUTPATIENT
Start: 2017-10-17 | End: 2018-02-09 | Stop reason: SDUPTHER

## 2017-10-23 RX ORDER — AMLODIPINE BESYLATE 10 MG/1
TABLET ORAL
Qty: 90 TABLET | Refills: 1 | Status: SHIPPED | OUTPATIENT
Start: 2017-10-23 | End: 2018-04-24 | Stop reason: SDUPTHER

## 2017-10-26 ENCOUNTER — PATIENT MESSAGE (OUTPATIENT)
Dept: INTERNAL MEDICINE | Facility: CLINIC | Age: 62
End: 2017-10-26

## 2017-10-26 RX ORDER — LEVOTHYROXINE SODIUM 75 UG/1
TABLET ORAL
Qty: 90 TABLET | Refills: 0 | OUTPATIENT
Start: 2017-10-26

## 2017-10-26 RX ORDER — LEVOTHYROXINE SODIUM 75 UG/1
75 TABLET ORAL DAILY
Qty: 4 TABLET | Refills: 0 | Status: SHIPPED | OUTPATIENT
Start: 2017-10-26 | End: 2018-10-26

## 2017-10-26 RX ORDER — LEVOTHYROXINE SODIUM 75 UG/1
75 TABLET ORAL DAILY
Qty: 90 TABLET | Refills: 3 | OUTPATIENT
Start: 2017-10-26

## 2017-10-31 ENCOUNTER — OFFICE VISIT (OUTPATIENT)
Dept: UROGYNECOLOGY | Facility: CLINIC | Age: 62
End: 2017-10-31
Payer: COMMERCIAL

## 2017-10-31 VITALS
DIASTOLIC BLOOD PRESSURE: 90 MMHG | HEIGHT: 63 IN | WEIGHT: 127.19 LBS | BODY MASS INDEX: 22.54 KG/M2 | SYSTOLIC BLOOD PRESSURE: 130 MMHG

## 2017-10-31 DIAGNOSIS — M54.9 BACK PAIN, UNSPECIFIED BACK LOCATION, UNSPECIFIED BACK PAIN LATERALITY, UNSPECIFIED CHRONICITY: ICD-10-CM

## 2017-10-31 DIAGNOSIS — M94.9 DISORDER OF BONE AND ARTICULAR CARTILAGE: ICD-10-CM

## 2017-10-31 DIAGNOSIS — N95.2 ATROPHIC VAGINITIS: ICD-10-CM

## 2017-10-31 DIAGNOSIS — M89.9 DISORDER OF BONE AND ARTICULAR CARTILAGE: ICD-10-CM

## 2017-10-31 DIAGNOSIS — Z01.419 WELL WOMAN EXAM: Primary | ICD-10-CM

## 2017-10-31 DIAGNOSIS — N95.2 VAGINAL ATROPHY: ICD-10-CM

## 2017-10-31 DIAGNOSIS — Z87.19 HX OF CONSTIPATION: ICD-10-CM

## 2017-10-31 PROCEDURE — 99999 PR PBB SHADOW E&M-EST. PATIENT-LVL IV: CPT | Mod: PBBFAC,,, | Performed by: NURSE PRACTITIONER

## 2017-10-31 PROCEDURE — 99396 PREV VISIT EST AGE 40-64: CPT | Mod: S$GLB,,, | Performed by: NURSE PRACTITIONER

## 2017-10-31 RX ORDER — IBUPROFEN 800 MG/1
800 TABLET ORAL EVERY 8 HOURS PRN
Qty: 30 TABLET | Refills: 1 | Status: SHIPPED | OUTPATIENT
Start: 2017-10-31 | End: 2018-05-29

## 2017-10-31 NOTE — PROGRESS NOTES
Urogyn follow up    [facility] - GYNECOLOGY  [address]  [place] [address]    Jill Marquez  [id number]  1955      Jill Marquez is a 62 y.o. here for an annual exam    10/19/15  Title of Operation:  1) Robotic-assisted laparoscopic lysis of adhesions  2) Robotic-assisted laparoscopic bilateral salpingo-oophorectomy  3) Robotic-assisted laparoscopic sacral colpopexy with polypropylene mesh  4) Placement of retropubic tension-free midurethral sling, RetroARC (AMS)  5) Cystourethroscopy    Indications for Surgery:  1) UI: (+) RICK > (+) UUI - mornings only. (--) pads:Changes underwear. May wear pads if on a trip or away from home for an extended time. Daytime frequency: Q 3-4 hours. Nocturia: No: (--) dysuria, (--) hematuria, (--) frequent UTIs. (--) complete bladder emptying. Leans forward to void. Has some urgency in the morning.  --SUDS 10/6/15:  3. URETHRAL FUNCTION/STORAGE PHASE:  a. WITH prolapse reduction:  · CLPP (150 mL): Negative at 162 cm H20  · VLPP (150 mL): Negative at 165 cm H20  · CLPP (300 mL): Negative at 150 cm H20  · VLPP (300 mL): Negative at 147 cm H20  · CLPP (409 mL): Negative at 148 cm H20  · VLPP (409 mL): Negative at 171 cm H20  · CLPP (MAX ): Negative at 155 cm H20  · VLPP (MAX): Negative at 145 cm H20  · POS CLPP and VLPP after removal of pves catheter.  These findings are consistent with Positive urodynamic stress incontinence only at max capacity with POP reduction and removal of pves catheter.  Assessment:  UF prolonged but normal. PF prolonged but normal. Compliance normal. Max capacity 600 mL. DO (--). RICK (+).   --cysto 10/6/15: Normal with slight decrease coaptation and minimal trabeculations.  2) POP: Present. below introitus. Symptoms:(--) (--) vaginal bleeding. (--) vaginal discharge. (+) sexually active. (--) dyspareunia. (+) Vaginal dryness. (--) vaginal estrogen use.   --POP-Q- per Dr. Ballesteros: Aa +3; Ba +3; C -4; Ap -2; Bp -2--standing; Genital hiatus 2 cm,  perineal body 1cm total vaginal length 9cm.   3) BM: (+) constipation/straining. (--) chronic diarrhea. (--) hematochezia. (--) fecal incontinence. (--) fecal smearing/urgency. (--) incomplete evacuation. Using metamucil 2 capsules twice daily and correctol twice weekly.    Preoperative Diagnosis:  1) Cystocele  2) Vaginal vault prolapse  3) Mixed urinary incontinence  4) Vaginal atrophy  5) Urodynamic stress incontinence    Postoperative Diagnosis:  1) Cystocele  2) Vaginal vault prolapse  3) Mixed urinary incontinence  4) Vaginal atrophy  5) Urodynamic stress incontinence    Issues include:  Patient Active Problem List   Diagnosis    Hypothyroid    HTN (hypertension)    Displacement of thoracic intervertebral disc without myelopathy    Hyperlipidemia    Lumbar spondylosis    Carpal tunnel syndrome    Lumbar radiculopathy    Sicca    Fatigue    Myalgia and myositis    Osteoarthritis of both hands    Leg pain, bilateral    Bilateral hand pain    Hepatitis B core antibody positive (negative viral load; suspect false positive)    Elevated CPK    Osteopenia    Cystocele    Atrophic vaginitis    Chronic constipation    Screening for colon cancer    Chronic low back pain       Last pap: 12/2005- normal- patient is post hyst  Last mammogram: 04/2017 normal  Colonoscopy: 01/25/2016 one polyp- normal- repeat 2026  DEXA: 10/15:  Elevated BMD of the lumbar spine with significant decreases of 5.3% in the hip BMD and 2% in the lumbar BMD, respectively, compared with the prior study, but different machines.   RECOMMENDATIONS:  1. Adequate calcium and Vitamin D, 1200 mg/day and 600 units/day, respectively.  2. Consider lumbar x-rays given the Z-score of 2.4.  3. Repeat BMD in 2 years.      Current Outpatient Prescriptions   Medication Sig    alprazolam (XANAX) 1 MG tablet Take 1 mg by mouth 2 (two) times daily.    amlodipine (NORVASC) 10 MG tablet TAKE ONE TABLET BY MOUTH EVERY DAY    azelastine (ASTELIN)  137 mcg (0.1 %) nasal spray 1 spray (137 mcg total) by Nasal route 2 (two) times daily.    cetirizine (ZYRTEC) 10 MG tablet     cholecalciferol, vitamin D3, 1,000 unit Chew Take by mouth.    conjugated estrogens (PREMARIN) vaginal cream APPLY 0.5 GRAMS WITH APPLICATOR OR DIME-SIZED AMOUNT TO VAGINA TWICE WEEKLY    diphenoxylate-atropine 2.5-0.025 mg (LOMOTIL) 2.5-0.025 mg per tablet Take 1 tablet by mouth 4 (four) times daily as needed.    escitalopram oxalate (LEXAPRO) 20 MG tablet     fish oil-omega-3 fatty acids 300-1,000 mg capsule Take 2 g by mouth once daily.    fluticasone (FLONASE) 50 mcg/actuation nasal spray SPRAY TWICE IN EACH NOSTRIL DAILY    gabapentin (NEURONTIN) 300 MG capsule TAKE 1 CAPSULE (300 MG TOTAL) BY MOUTH 3 (THREE) TIMES DAILY.    hydrocodone-acetaminophen 7.5-325mg (NORCO) 7.5-325 mg per tablet Take 1 tablet by mouth every 6 (six) hours as needed.    ibuprofen (ADVIL,MOTRIN) 800 MG tablet Take 1 tablet (800 mg total) by mouth every 8 (eight) hours as needed for Pain.    L. ACIDOPHILUS/BIFID. ANIMALIS (ONE-A-DAY TRUBIOTICS ORAL) Take by mouth.    levothyroxine (SYNTHROID) 75 MCG tablet Take 1 tablet (75 mcg total) by mouth once daily.    levothyroxine (SYNTHROID) 75 MCG tablet Take 1 tablet (75 mcg total) by mouth once daily.    pravastatin (PRAVACHOL) 40 MG tablet Take 40 mg by mouth once daily.    PSYLLIUM SEED, WITH SUGAR, (METAMUCIL ORAL) Take by mouth.    tramadol (ULTRAM) 50 mg tablet TAKE ONE TABLET BY MOUTH EVERY SIX HOURS AS NEEDED    diclofenac (CATAFLAM) 50 MG tablet Take 1 tablet (50 mg total) by mouth 3 (three) times daily. If arthritis pain is mild, may take one tablet twice daily.    metaxalone (SKELAXIN) 800 MG tablet Take 1 tablet (800 mg total) by mouth 3 (three) times daily as needed for Pain.     No current facility-administered medications for this visit.        ROS:  Feeling well.   No dyspnea or chest pain on exertion.    No abdominal pain, change in  "bowel habits, black or bloody stools.  Taking colace and metamucil daily  No urinary tract symptoms.   GYN ROS: no breast pain or new or enlarging lumps on self exam, no vaginal bleeding.   No neurological complaints.    OBJECTIVE:   The patient appears well, alert, oriented x 3, in no distress.  Visit Vitals    Ht 5' 3" (1.6 m)    Wt 56.2 kg (123 lb 14.4 oz)    BMI 21.95 kg/m2     ENT normal.  Neck supple. No adenopathy or thyromegaly. MARYAN. .   Abdomen soft without tenderness, guarding, mass or organomegaly.   Extremities show no edema, normal peripheral pulses.   Neurological is normal, no focal findings.    PELVIC EXAM:   VULVA: normal appearing vulva with no masses, tenderness or lesions,   VAGINA: normal appearing vagina with normal color and discharge, no lesions, atrophic,  CERVIX: surgically absent,   UTERUS: absent,   ADNEXA: no masses,   RECTAL: normal rectal, no masses            Impression  1. Well woman exam     2. Disorder of bone and articular cartilage  DXA Bone Density Spine And Hip   3. Atrophic vaginitis  conjugated estrogens (PREMARIN) vaginal cream   4. Back pain, unspecified back location, unspecified back pain laterality, unspecified chronicity  ibuprofen (ADVIL,MOTRIN) 800 MG tablet   5. Vaginal atrophy     6. Hx of constipation       We reviewed the above issues and discussed options for short-term versus long-term management of her problems.   Plan:      1.  Always get help lifting 50# or more.     2. Vaginal health:  Vaginal atrophy (dryness):  Use 0.5 gram of estrogen cream in vagina  twice a week    3. H/o constipation:  Continue to avoid straining.  Continue stool softeners/fiber.          4. mammogram is due 04/2018-- please call to schedule   5. RTC 10/2018 for f/u       20 minutes were spent in face to face time with this patient  90 % of this time was spent in counseling and/or coordination of care    STACY Thomson-BC [facility]  Division of Female Pelvic " Medicine and Reconstructive Surgery  Department of Obstetrics & Gynecology

## 2017-10-31 NOTE — PATIENT INSTRUCTIONS
1.  Always get help lifting 50# or more.     2. Vaginal health:  Vaginal atrophy (dryness):  Use 0.5 gram of estrogen cream in vagina  twice a week    3. H/o constipation:  Continue to avoid straining.  Continue stool softeners/fiber.          4. mammogram is due 04/2018-- please call to schedule   5. RTC 10/2018 for f/u

## 2017-11-02 ENCOUNTER — OFFICE VISIT (OUTPATIENT)
Dept: OPTOMETRY | Facility: CLINIC | Age: 62
End: 2017-11-02
Payer: COMMERCIAL

## 2017-11-02 ENCOUNTER — HOSPITAL ENCOUNTER (OUTPATIENT)
Dept: RADIOLOGY | Facility: CLINIC | Age: 62
Discharge: HOME | End: 2017-11-02
Attending: NURSE PRACTITIONER
Payer: COMMERCIAL

## 2017-11-02 DIAGNOSIS — M94.9 DISORDER OF BONE AND ARTICULAR CARTILAGE: ICD-10-CM

## 2017-11-02 DIAGNOSIS — M89.9 DISORDER OF BONE AND ARTICULAR CARTILAGE: ICD-10-CM

## 2017-11-02 DIAGNOSIS — H25.13 NUCLEAR SCLEROSIS, BILATERAL: Primary | ICD-10-CM

## 2017-11-02 DIAGNOSIS — Z13.5 GLAUCOMA SCREENING: ICD-10-CM

## 2017-11-02 DIAGNOSIS — H02.88B MEIBOMIAN GLAND DYSFUNCTION (MGD), BILATERAL, BOTH UPPER AND LOWER LIDS: ICD-10-CM

## 2017-11-02 DIAGNOSIS — H02.88A MEIBOMIAN GLAND DYSFUNCTION (MGD), BILATERAL, BOTH UPPER AND LOWER LIDS: ICD-10-CM

## 2017-11-02 PROCEDURE — 92014 COMPRE OPH EXAM EST PT 1/>: CPT | Mod: S$GLB,,, | Performed by: OPTOMETRIST

## 2017-11-02 PROCEDURE — 77080 DXA BONE DENSITY AXIAL: CPT | Mod: TC

## 2017-11-02 PROCEDURE — 77080 DXA BONE DENSITY AXIAL: CPT | Mod: 26,,, | Performed by: INTERNAL MEDICINE

## 2017-11-02 PROCEDURE — 99999 PR PBB SHADOW E&M-EST. PATIENT-LVL II: CPT | Mod: PBBFAC,,, | Performed by: OPTOMETRIST

## 2017-11-02 NOTE — PROGRESS NOTES
HPI     62 year old female   Hx of RCE no difficulties since last visit  Systane BID with relief  Pt states that she is doing well   Only wearing OTC readers      Last edited by Nicanor Hess, OD on 11/2/2017  9:20 AM. (History)            Assessment /Plan     For exam results, see Encounter Report.    Nuclear sclerosis, bilateral  -Educated patient on presence of cataracts at today's exam, monitor at annual dilated fundus exam. 8+ years surgical estimate.    Glaucoma screening  -Monitor with annual eye exam and IOP check    Meibomian gland dysfunction (MGD), bilateral, both upper and lower lids  -Sysatne Balance PRN  -Reviewed s/sx of RCE      RTC 1 yr

## 2017-11-07 RX ORDER — LEVOTHYROXINE SODIUM 75 UG/1
75 TABLET ORAL DAILY
Qty: 90 TABLET | Refills: 3 | Status: SHIPPED | OUTPATIENT
Start: 2017-11-07 | End: 2018-02-20 | Stop reason: SDUPTHER

## 2017-11-08 RX ORDER — FLUTICASONE PROPIONATE 50 MCG
2 SPRAY, SUSPENSION (ML) NASAL DAILY
Qty: 48 G | Refills: 2 | Status: SHIPPED | OUTPATIENT
Start: 2017-11-08 | End: 2020-01-31

## 2017-11-10 ENCOUNTER — PATIENT MESSAGE (OUTPATIENT)
Dept: ADMINISTRATIVE | Facility: OTHER | Age: 62
End: 2017-11-10

## 2017-11-10 ENCOUNTER — PATIENT MESSAGE (OUTPATIENT)
Dept: UROGYNECOLOGY | Facility: CLINIC | Age: 62
End: 2017-11-10

## 2017-11-17 ENCOUNTER — OFFICE VISIT (OUTPATIENT)
Dept: PHYSICAL MEDICINE AND REHAB | Facility: CLINIC | Age: 62
End: 2017-11-17
Payer: COMMERCIAL

## 2017-11-17 VITALS
HEART RATE: 75 BPM | DIASTOLIC BLOOD PRESSURE: 86 MMHG | SYSTOLIC BLOOD PRESSURE: 140 MMHG | WEIGHT: 125.69 LBS | HEIGHT: 64 IN | BODY MASS INDEX: 21.46 KG/M2

## 2017-11-17 DIAGNOSIS — G89.29 CHRONIC MIDLINE LOW BACK PAIN WITH BILATERAL SCIATICA: Primary | ICD-10-CM

## 2017-11-17 DIAGNOSIS — M54.42 CHRONIC MIDLINE LOW BACK PAIN WITH BILATERAL SCIATICA: Primary | ICD-10-CM

## 2017-11-17 DIAGNOSIS — M47.26 OSTEOARTHRITIS OF SPINE WITH RADICULOPATHY, LUMBAR REGION: ICD-10-CM

## 2017-11-17 DIAGNOSIS — M54.2 CHRONIC NECK PAIN: ICD-10-CM

## 2017-11-17 DIAGNOSIS — M54.16 BILATERAL LUMBAR RADICULOPATHY: ICD-10-CM

## 2017-11-17 DIAGNOSIS — M19.041 PRIMARY OSTEOARTHRITIS OF BOTH HANDS: ICD-10-CM

## 2017-11-17 DIAGNOSIS — M54.41 CHRONIC MIDLINE LOW BACK PAIN WITH BILATERAL SCIATICA: Primary | ICD-10-CM

## 2017-11-17 DIAGNOSIS — G89.29 CHRONIC NECK PAIN: ICD-10-CM

## 2017-11-17 DIAGNOSIS — M47.812 SPONDYLOSIS OF CERVICAL REGION WITHOUT MYELOPATHY OR RADICULOPATHY: ICD-10-CM

## 2017-11-17 DIAGNOSIS — M19.042 PRIMARY OSTEOARTHRITIS OF BOTH HANDS: ICD-10-CM

## 2017-11-17 DIAGNOSIS — G56.01 CARPAL TUNNEL SYNDROME, RIGHT: ICD-10-CM

## 2017-11-17 PROCEDURE — 99999 PR PBB SHADOW E&M-EST. PATIENT-LVL III: CPT | Mod: PBBFAC,,, | Performed by: PHYSICAL MEDICINE & REHABILITATION

## 2017-11-17 PROCEDURE — 99214 OFFICE O/P EST MOD 30 MIN: CPT | Mod: S$GLB,,, | Performed by: PHYSICAL MEDICINE & REHABILITATION

## 2017-11-17 NOTE — PROGRESS NOTES
Subjective:       Patient ID: Jill Marquez is a 62 y.o. female.    Chief Complaint: No chief complaint on file.    HPI    HISTORY OF PRESENT ILLNESS:  Mrs. Marquez is a 62-year-old white female with   osteoarthritis who is followed up in the [facility] Medicine Clinic for chronic   low back pain with lumbar radiculopathy, chronic neck pain, OA of the hands, and   right carpal tunnel syndrome.  Her last visit was on 07/18/2017.  She was   maintained on diclofenac, gabapentin, p.r.n. metaxalone, and p.r.n. tramadol.    The patient is coming today to the clinic for followup.  She has been getting   physical therapy at [place] for about two months.  She reports improvement   in her back pain and neck pain.    Her back pain is an intermittent aching pain in the low lumbar spine and across   her back.  The pain shoots to both buttocks, but she denies any radiation to her   lower extremities like she used to.  Her pain is worse with excess activity and   better with rest.  Her maximum pain is 5-6/10 and minimum 3-4/10.  Today, it is   3-4/10.  The patient denies any lower extremity weakness or numbness.  She   denies any bowel or bladder incontinence.    Her neck pain has also been better.  It is a mild intermittent, localized aching   pain.  Her pain score usually is 1-2/10.  She used to have headaches associated   with her neck pain, but these have subsided.  She continues to complain of   intermittent right hand numbness.  She has been wearing the carpal tunnel brace   at night.    She is currently taking diclofenac 50 mg, usually once or twice per day couple   of times per week.  She takes gabapentin 300 mg capsules, one in the morning and   two at bedtime.  She takes tramadol 50 mg p.r.n., usually one tablet at   bedtime.  She takes metaxalone 800 mg p.r.n., usually one in the morning and one   in the evening.  As noted above, she is getting physical therapy, but is also   doing a home exercise program.      MS/HN   dd: 11/17/2017 09:49:34 (CST)  td: 11/18/2017 01:37:34 (CST)  Doc ID   #[id number]  Job ID #[id number]    CC:         Review of Systems   Constitutional: Negative for fatigue.   Eyes: Negative for visual disturbance.   Respiratory: Negative for shortness of breath.    Cardiovascular: Negative for chest pain.   Gastrointestinal: Positive for constipation. Negative for blood in stool, nausea and vomiting.   Genitourinary: Negative for difficulty urinating.   Musculoskeletal: Positive for arthralgias (hands), back pain and neck pain. Negative for gait problem.   Skin: Negative for rash.   Neurological: Negative for dizziness and headaches.   Psychiatric/Behavioral: Negative for behavioral problems and sleep disturbance.       Objective:      Physical Exam   Constitutional: She appears well-developed and well-nourished. No distress.   HENT:   Head: Normocephalic and atraumatic.   Neck:   Decreased ROM.  -ve tenderness.     Pulmonary/Chest: Breath sounds normal.   Musculoskeletal:   BUE:  ROM:full.  +ve severe Heberden's & Lizzy's nodes.  Strength:    RUE: 5/5 at shoulder abduction, elbow flexion, wrist extension, elbow extension, hand .   LUE: 5/5 at shoulder abduction, elbow flexion, wrist extension, elbow extension, hand .  Sensation to pinprick:   RUE: intact.   LUE: intact.      BLE:  ROM:full.  Mild bilateral knee crepitus.   Strength:      RLE: 5/5 at hip flexion, knee extension, ankle DF/PF.     LLE:  5/5 at hip flexion, knee extension, ankle DF/PF.  Sensation to pinprick:     RLE: intact.      LLE: intact.   SLR (sitting):      RLE: -ve.      LLE: -ve.     -ve tenderness over lumbar spine.     Neurological: She is alert.   Skin: Skin is warm. No rash noted.   Psychiatric: She has a normal mood and affect.   Vitals reviewed.            Assessment:       1. Chronic midline low back pain with bilateral sciatica    2. Osteoarthritis of spine with radiculopathy, lumbar region    3. Bilateral lumbar radiculopathy     4. Chronic neck pain    5. Spondylosis of cervical region without myelopathy or radiculopathy    6. Primary osteoarthritis of both hands    7. Carpal tunnel syndrome, right        Plan:     - Continue diclofenac (CATAFLAM) 50 MG tablet; Take 1 tablet (50 mg total) by mouth once or twice daily.  - Continue gabapentin (NEURONTIN) 300 MG capsule; Take 1 capsule (300 mg total) by mouth, 1 qam, 2 qhs.  - Continue metaxalone (SKELAXIN) 800 MG tablet; Take 1 tablet (800 mg total) by mouth twice daily as needed.  - Continue tramadol 50 mg po daily prn.  - Continue wearing Rt Carpal Tunnel Splint consistently every night.   - Regular home exercise program was encouraged.  - Return in about 6 months (around 5/17/2018).     This was a 25 minute visit, more than 50% of which was spent counseling the patient about the diagnosis and the treatment plan including medication adjustment and exrecise.

## 2017-11-21 ENCOUNTER — PATIENT MESSAGE (OUTPATIENT)
Dept: UROGYNECOLOGY | Facility: CLINIC | Age: 62
End: 2017-11-21

## 2018-02-09 ENCOUNTER — PATIENT MESSAGE (OUTPATIENT)
Dept: PHYSICAL MEDICINE AND REHAB | Facility: CLINIC | Age: 63
End: 2018-02-09

## 2018-02-09 RX ORDER — TRAMADOL HYDROCHLORIDE 50 MG/1
TABLET ORAL
Qty: 120 TABLET | Refills: 0 | Status: SHIPPED | OUTPATIENT
Start: 2018-02-09 | End: 2018-05-29 | Stop reason: SDUPTHER

## 2018-02-12 ENCOUNTER — PATIENT MESSAGE (OUTPATIENT)
Dept: ORTHOPEDICS | Facility: CLINIC | Age: 63
End: 2018-02-12

## 2018-02-20 ENCOUNTER — OFFICE VISIT (OUTPATIENT)
Dept: PHYSICAL MEDICINE AND REHAB | Facility: CLINIC | Age: 63
End: 2018-02-20
Payer: COMMERCIAL

## 2018-02-20 VITALS
HEART RATE: 70 BPM | WEIGHT: 125 LBS | SYSTOLIC BLOOD PRESSURE: 146 MMHG | HEIGHT: 64 IN | DIASTOLIC BLOOD PRESSURE: 90 MMHG | RESPIRATION RATE: 14 BRPM | BODY MASS INDEX: 21.34 KG/M2

## 2018-02-20 DIAGNOSIS — M19.041 PRIMARY OSTEOARTHRITIS OF BOTH HANDS: ICD-10-CM

## 2018-02-20 DIAGNOSIS — M65.9 TENOSYNOVITIS OF THUMB: Primary | ICD-10-CM

## 2018-02-20 DIAGNOSIS — M19.042 PRIMARY OSTEOARTHRITIS OF BOTH HANDS: ICD-10-CM

## 2018-02-20 PROCEDURE — 3008F BODY MASS INDEX DOCD: CPT | Mod: S$GLB,,, | Performed by: PHYSICAL MEDICINE & REHABILITATION

## 2018-02-20 PROCEDURE — 99999 PR PBB SHADOW E&M-EST. PATIENT-LVL III: CPT | Mod: PBBFAC,,, | Performed by: PHYSICAL MEDICINE & REHABILITATION

## 2018-02-20 PROCEDURE — 99214 OFFICE O/P EST MOD 30 MIN: CPT | Mod: S$GLB,,, | Performed by: PHYSICAL MEDICINE & REHABILITATION

## 2018-02-20 NOTE — PROGRESS NOTES
PM&R NEW PATIENT HISTORY & PHYSICAL :    Referring Physician:    Chief Complaint   Patient presents with    Hand Pain     bilateral       HPI: This is a 62 y.o.  female being seen in clinic today for evaluation of chronic bilateral hand pain due to arthritis.  With increased usage, she has achy pain and throbbing at the base of her thumbs.  She feels some weakness and limited ROM.  Ice, heat, topical agents, and rest have provided some relief.  She denies numbness/tingling     History obtained from patient    Functional History:  Walking: Not limited  Transfers: Independent  Assistive devices: No  Power mobility: No  Falls: None     Needs help with:  Nothing - all ADLS normal    Cooking   Cleaning  Bathing   Dressing   Toileting     Past family, medical, social, and surgical history reviewed in chart    Review of Systems:     General- denies lethargy, weight change, fever, chills  Head/neck- denies swallowing difficulties  ENT- denies hearing changes  Cardiovascular-denies chest pain  Pulmonary- denies shortness of breath  GI- denies constipation or bowel incontinence  - denies bladder incontinence  Skin- denies wounds or rashes  Musculoskeletal- denies weakness, +pain  Neurologic- denies numbness and tingling  Psychiatric- denies depressive or psychotic features, denies anxiety  Lymphatic-denies swelling  Endocrine- denies hypoglycemic symptoms/DM history  All other pertinent systems negative     Physical Examination:  General: Well developed, well nourished female, NAD  HEENT:NCAT EOMI bilaterally   Pulmonary:Normal respirations    Spinal Examination: CERVICAL  Active ROM is within normal limits.  Inspection: No deformity of spinal alignment.  Palpation: No vertebral tenderness to percussion.    Spurling test:     Spinal Examination: LUMBAR or THORACIC  Active ROM is within normal limits.  Inspection: No deformity of spinal alignment.      Musculoskeletal Tests:    Elbow compression (ulnar): neg  Tinels at  wrist: neg  Phalen: neg    Bilateral Upper and Lower Extremities:  Pulses are 2+ at radial, bilaterally.  Shoulder/Elbow/Wrist/Hand ROM wnl except limited at bilateral hands, arthritic deformities noted, ttp at 1st mcp jts, +finkelsteins bilaterally  Hip/Knee/Ankle ROM   Bilateral Extremities show normal capillary refill.  No signs of cyanosis, rubor, edema, skin changes, or dysvascular changes of appendages.  Nails appear intact.    Neurological Exam:  Cranial Nerves:  II-XII grossly intact    Manual Muscle Testing: (Motor 5=normal)    RIGHT Upper extremity: Shoulder abduction 5/5, Biceps 5/5, Triceps 5/5, Wrist extension 5/5, Abductor pollicis brevis 4/5, Ulnar hand intrinsics 4/5,  LEFT Upper extremity: Shoulder abduction 5/5, Biceps 5/5, Triceps 5/5, Wrist extension 5/5, Abductor pollicis brevis 4/5, Ulnar hand intrinsics 4/5,  No focal atrophy is noted of either upper or lower extremity.    Bilateral Reflexes:1+bic tric   Choe's response is absent bilaterally.    Sensation: tested to light touch  - intact in arms  Gait: Narrow base and good arm swing.      IMPRESSION/PLAN: This is a 62 y.o.  female with bilateral hand DJD, tenosynovitis     1. Thumb spica splints  2. Topical compound -PAP  3. Handouts on hand exercise, stretch provided  4. Ice/heat modalities     Vanessa Millard M.D.  Physical Medicine and Rehab

## 2018-02-20 NOTE — PATIENT INSTRUCTIONS
Treating De Quervain Tenosynovitis  The goal of your treatment is to relieve your pain and allow you to use your thumb again. Treatment will depend on how severe the pain is.  Nonsurgical Treatment  Just taking a break from the activities that caused your pain may be enough. Your healthcare provider may also have you take oral nonsteroidal, anti-inflammatory medicine (NSAIDs), such as ibuprofen, or wear a splint for a few weeks to rest the thumb and wrist. To reduce the swelling, your healthcare provider may inject an anti-inflammatory medicine, such as cortisone, around the tendons. You may have more pain at first, but in a few days your thumb should feel better.    Surgery  If other kinds of treatment dont relieve your pain, or if the pain is severe, your healthcare provider may recommend surgery. The sheath that surrounds the tendons is released so the tendons can move more easily. This helps reduce the inflammation, and allows you to straighten your thumb without pain. Usually, surgery takes a few minutes and is done with local anesthetic, so you can go home the same day. You will probably have a splint or dressing on your wrist for a few days while the tissue heals. Your healthcare provider will discuss the risks and possible complications of surgery with you.  Date Last Reviewed: 9/27/2015  © 1401-1321 [employer]. [address] [place] [address], [place] [address]. All rights reserved. This information is not intended as a substitute for professional medical care. Always follow your healthcare professional's instructions.        Understanding De Quervain Tenosynovitis    De Quervain tenosynovitis is a condition that can cause wrist and thumb pain. Tendons connect muscles in your wrist and forearm to the bones in your thumb. The tendons have a protective cover (sheath). The sheaths lining makes a fluid that lets the tendons slide easily when you straighten your wrist and thumb. If any of these tendons  are irritated or injured, they can become swollen and inflamed. This is called de Quervain tenosynovitis.  How to say it  [id number] ten-[id number]-sin-oh-VY-tis   What causes de Quervain tenosynovitis?  This condition is most often caused from overuse. For example, making the same wrist motions over and over can irritate the tendons. This includes doing things like unscrewing jar lids or grasping a tool. Activities such as typing, playing racquet sports, knitting, and texting can also lead to the condition.  Symptoms of de Quervain tenosynovitis  You may have pain, soreness, redness, and swelling along the side of your wrist and the base of your thumb. You may feel pain when you pinch or grasp things, turn or touch your wrist, or make a fist. Your thumb may catch or make a crackling sound when you move it.  Treatment for de Quervain tenosynovitis  Treatments may include:  · Resting the wrist and thumb. This involves limiting movements that make your symptoms worse. You also may need to avoid certain hobbies, sports, and types of work for a time.  · Cold packs. These help reduce pain and swelling.  · Prescription or over-the-counter pain medicines. These help relieve pain and swelling.  · Splint or brace. This helps keep the thumb and wrist from moving and gives time for your tendons to heal.  · Exercises or physical therapy. These help stretch, strengthen, and improve the range of motion in your wrist and thumb.  · Shots of medicine into the area around the tendon. These may help relieve symptoms for a time.  · Surgery. You may need surgery if other treatments dont relieve symptoms. During surgery, the surgeon releases the sheath that surrounds the tendons so the tendons can move more easily.  Possible complications of de Quervain tenosynovitis  Without proper care and treatment, healing may take longer than normal. Also, symptoms may continue or get worse. Over time, the problem may become long-term (chronic). This can  make it hard to use your wrist and thumb for normal activities.  When to call your healthcare provider  Call your healthcare provider right away if you have any of these:  · Fever of 100.4°F (38°C) or higher, or as directed  · Symptoms that dont get better with treatment, or get worse  · Pain, numbness, or coldness in the hand  · New symptoms   Date Last Reviewed: 3/10/2016  © 2860-7387 [employer]. [address] [place] [address], [place] [address]. All rights reserved. This information is not intended as a substitute for professional medical care. Always follow your healthcare professional's instructions.        Understanding Carpometacarpal Osteoarthritis    The base of the thumb where it meets the hand is called the carpometacarpal (CMC) joint. This joint allows the thumb to move freely in many directions. It also provides strength so the hand can grasp and .  A smooth tissue called cartilage lines and cushions the bones of the CMC joint. Using the thumb puts stress on the joint. Over time, this can lead to the breakdown of the cartilage in the joint. This is known as osteoarthritis. With this condition, bones of the joint may be exposed and rub together. They may become irritated and rough. This keeps the joint from moving smoothly and can lead to pain.     How to say it  SOHAM-kristine-met-alber-SOHAM-courtney      What causes CMC joint osteoarthritis?    This type of osteoarthritis is mainly caused by years of using the hand and thumb. The condition may be more likely if you:  · Regularly do things that put great stress on the thumb joint  · Have had previous thumb injuries  · Have weakened or loose structures in the thumb  · Are a woman who is past menopause  Symptoms of CMC joint osteoarthritis  Symptoms commonly include:  · Thumb pain. It may get worse with pinching or gripping.  · Thumb weakness  · Sounds of grinding or popping in the thumb joint  · Base of the thumb is enlarged   Treatment for CMC joint  osteoarthritis  Osteoarthritis is a long-term (chronic) condition. Treatment focuses on managing symptoms. It may include:  · Taking prescription or over-the-counter pain medicines to help reduce pain and swelling  · Using a brace to rest and support the thumb joint  · Using hot or cold therapy to help relieve pain  · Learning and practicing ways to reduce stress on the thumb joint  · Using devices that help protect the joint. These include jar openers, pen , and spring-action scissors.  · Following a plan of physical therapy and exercises. This will help improve the flexibility and strength of the hand and thumb.  · Getting shots of medicine into the joint to help relieve symptoms for a time  If these treatments dont do enough to relieve severe pain, you may need surgery. There are several different types of surgery. In general, the goal is to relieve pain and help you to be able to use the hand.     When to call your healthcare provider  Call your healthcare provider right away if you have any of these:  · Fever of 100.4°F (38°C) or higher, or as directed  · Symptoms that dont get better, or get worse  · New symptoms   Date Last Reviewed: 3/10/2016  © 8657-1467 [employer]. [address] [place] [address]. All rights reserved. This information is not intended as a substitute for professional medical care. Always follow your healthcare professional's instructions.        Hand and Wrist Exercises: Forearm Roll  This exercise is designed to stretch and strengthen your hands and wrists. Before beginning, read through all the instructions. While exercising, breathe normally. If you feel any pain, stop the exercise. If pain persists, inform your healthcare provider.    · Grasp a hammer or hand weight in your _____ hand. Place your wrist, palm down, over the end of your knee or on the edge of a table.  · Keeping your forearm against your thigh or a table, rotate your hand until your palm is  up. Hold for _____ seconds. Then return to starting position.  · Repeat _____ times. Do _____ sets a day.  Date Last Reviewed: 8/16/2015 © 2000-2017 [employer]. [address] [place] [address]. All rights reserved. This information is not intended as a substitute for professional medical care. Always follow your healthcare professional's instructions.        Hand and Wrist Exercises: Wrist Flexion    This exercise is designed to stretch your hands and wrists. Before beginning, read through all the instructions. While exercising, breathe normally. If you feel any pain, stop the exercise. If pain persists, inform your healthcare provider.  · Hold your _____ hand in front of you with your palm down and elbow bent.  · Grasp the back of that hand with your other hand. Pull back so your fingers point down as you straighten your arm. Feel a stretch in your forearm and wrist. Hold for _____ seconds. Then relax.  · Repeat _____ times. Do _____ sets a day.  Date Last Reviewed: 8/16/2015 © 2000-2017 [employer]. [address] [place] [address]. All rights reserved. This information is not intended as a substitute for professional medical care. Always follow your healthcare professional's instructions.        Hand and Wrist Exercises: Finger  and Release      This exercise is designed to stretch and strengthen your hands and wrists. Before beginning, read through all the instructions. While exercising, breathe normally. If you feel any pain, stop the exercise. If pain persists, inform your healthcare provider.  · With your _____ hand, make a tight fist. (Or you can grasp a sponge or ball.) Hold for _____ seconds. Then relax.  · Spread your fingers as far apart as possible. Hold for _____ seconds. Then relax.  · Repeat _____ times. Do _____ sets a day.  Date Last Reviewed: 9/9/2015 © 2000-2017 [employer]. [address] [place] [address]. All rights  reserved. This information is not intended as a substitute for professional medical care. Always follow your healthcare professional's instructions.

## 2018-02-21 ENCOUNTER — PATIENT MESSAGE (OUTPATIENT)
Dept: INTERNAL MEDICINE | Facility: CLINIC | Age: 63
End: 2018-02-21

## 2018-02-21 DIAGNOSIS — M19.90 ARTHRITIS: Primary | ICD-10-CM

## 2018-02-23 ENCOUNTER — PATIENT MESSAGE (OUTPATIENT)
Dept: PHYSICAL MEDICINE AND REHAB | Facility: CLINIC | Age: 63
End: 2018-02-23

## 2018-02-27 DIAGNOSIS — M54.41 CHRONIC MIDLINE LOW BACK PAIN WITH BILATERAL SCIATICA: ICD-10-CM

## 2018-02-27 DIAGNOSIS — M54.42 CHRONIC MIDLINE LOW BACK PAIN WITH BILATERAL SCIATICA: ICD-10-CM

## 2018-02-27 DIAGNOSIS — M54.2 CHRONIC NECK PAIN: ICD-10-CM

## 2018-02-27 DIAGNOSIS — G89.29 CHRONIC NECK PAIN: ICD-10-CM

## 2018-02-27 DIAGNOSIS — G89.29 CHRONIC MIDLINE LOW BACK PAIN WITH BILATERAL SCIATICA: ICD-10-CM

## 2018-02-27 RX ORDER — METAXALONE 800 MG/1
800 TABLET ORAL 3 TIMES DAILY PRN
Qty: 90 TABLET | Refills: 2 | Status: SHIPPED | OUTPATIENT
Start: 2018-02-27 | End: 2018-05-29

## 2018-03-05 ENCOUNTER — HOSPITAL ENCOUNTER (OUTPATIENT)
Dept: RADIOLOGY | Facility: HOSPITAL | Age: 63
Discharge: HOME | End: 2018-03-05
Attending: ORTHOPAEDIC SURGERY
Payer: COMMERCIAL

## 2018-03-05 ENCOUNTER — OFFICE VISIT (OUTPATIENT)
Dept: ORTHOPEDICS | Facility: CLINIC | Age: 63
End: 2018-03-05
Payer: COMMERCIAL

## 2018-03-05 VITALS — HEIGHT: 64 IN | BODY MASS INDEX: 21.34 KG/M2 | WEIGHT: 125 LBS

## 2018-03-05 DIAGNOSIS — M79.642 PAIN IN BOTH HANDS: ICD-10-CM

## 2018-03-05 DIAGNOSIS — M79.641 PAIN IN BOTH HANDS: ICD-10-CM

## 2018-03-05 DIAGNOSIS — M79.641 PAIN IN BOTH HANDS: Primary | ICD-10-CM

## 2018-03-05 DIAGNOSIS — M79.642 PAIN IN BOTH HANDS: Primary | ICD-10-CM

## 2018-03-05 PROCEDURE — 73130 X-RAY EXAM OF HAND: CPT | Mod: 26,50,, | Performed by: RADIOLOGY

## 2018-03-05 PROCEDURE — 99203 OFFICE O/P NEW LOW 30 MIN: CPT | Mod: 25,S$GLB,, | Performed by: ORTHOPAEDIC SURGERY

## 2018-03-05 PROCEDURE — 99999 PR PBB SHADOW E&M-EST. PATIENT-LVL III: CPT | Mod: PBBFAC,,, | Performed by: ORTHOPAEDIC SURGERY

## 2018-03-05 PROCEDURE — 20600 DRAIN/INJ JOINT/BURSA W/O US: CPT | Mod: 50,S$GLB,, | Performed by: ORTHOPAEDIC SURGERY

## 2018-03-05 PROCEDURE — 73130 X-RAY EXAM OF HAND: CPT | Mod: 50,TC,FY

## 2018-03-05 RX ORDER — TRIAMCINOLONE ACETONIDE 40 MG/ML
40 INJECTION, SUSPENSION INTRA-ARTICULAR; INTRAMUSCULAR
Status: COMPLETED | OUTPATIENT
Start: 2018-03-05 | End: 2018-03-05

## 2018-03-05 RX ADMIN — TRIAMCINOLONE ACETONIDE 40 MG: 40 INJECTION, SUSPENSION INTRA-ARTICULAR; INTRAMUSCULAR at 02:03

## 2018-03-05 NOTE — PROGRESS NOTES
HISTORY OF PRESENT ILLNESS:  A 62-year-old female presents for evaluation of   bilateral hand symptoms.  She has had worsening problems in both hands for the   past several years, right hand worse than left.  She did try a brace, but it   really just aggravated her symptoms, did not help much.  No numbness or tingling   reported.  No trauma reported.    PAST MEDICAL HISTORY:  Significant for arthritis of the lumbar spine,   hypertension, hyperlipidemia and hypothyroidism.    PAST SURGICAL HISTORY:  Includes hysterectomy, eye surgery and tonsillectomy.    FAMILY HISTORY:  Positive for cancer, cataracts, diabetes.    SOCIAL HISTORY:  The patient does not smoke.  Drinks occasionally.    REVIEW OF SYSTEMS:  Negative fever, chills, rashes.    CURRENT MEDICATIONS:  Reviewed on chart.    ALLERGIES:  None.    PHYSICAL EXAMINATION:  GENERAL:  Well-developed, well-nourished female in no acute distress, alert and   oriented x3.  MUSCULOSKELETAL:  Examination of the upper extremities significant for hand,   demonstrating arthritic deformities both hands with prominence at the base of   each thumb CMC joint, right worse than left.  Some tenderness.  Limited range of   motion at the CMC.  Positive grind test.  Mild crepitation on the right.    Sensation intact in all digits.  She does have deformities of the index DIP as   well.   strength decreased.  Sensation intact.  Tinel sign negative.    X-RAYS:  AP and lateral, bilateral hands, demonstrates arthritic deformities   notably at the CMC joint bilaterally, right worse than left and also involving   the DIP joints of all digits.    IMPRESSION:  Bilateral hand osteoarthritis.    PLAN:  I explained the nature of the problem to the patient.  We discussed   options for treatment including injection versus surgery.    The patient would like to try an injection today at the base of each thumb.    After pause for timeout, she identified each thumb injected CMC joint   bilaterally  with combination of Kenalog 20 mg, 0.5 mL Xylocaine, sterile   technique, which she tolerated well.    I have also recommended she continue anti-inflammatory medication by mouth and   follow up in 1-2 months for recheck.  If symptoms persist, then I would   recommend that she consider surgical treatment for CMC arthroplasty.      GURPREET/KATIE  dd: 03/05/2018 14:07:56 (CST)  td: 03/06/2018 07:03:34 (CST)  Doc ID   #[id number]  Job ID #[id number]    CC:

## 2018-03-05 NOTE — LETTER
March 5, 2018        Dain Smith MD  [address]  [facility] [address]             [facility]  [address] [place] [address] [address]  [facility] [address]  Phone: [phone]   Patient: Jill Marquez   MR Number: [id number]   YOB: 1955   Date of Visit: 3/5/2018       Dear Dr. Smith:    Thank you for referring Jill Marquez to me for evaluation. Below are the relevant portions of my assessment and plan of care.            If you have questions, please do not hesitate to call me. I look forward to following Jill along with you.    Sincerely,      Jameson Quintero Jr., MD           CC  No Recipients

## 2018-03-06 ENCOUNTER — TELEPHONE (OUTPATIENT)
Dept: RHEUMATOLOGY | Facility: CLINIC | Age: 63
End: 2018-03-06

## 2018-03-13 ENCOUNTER — PATIENT MESSAGE (OUTPATIENT)
Dept: UROGYNECOLOGY | Facility: CLINIC | Age: 63
End: 2018-03-13

## 2018-03-13 ENCOUNTER — TELEPHONE (OUTPATIENT)
Dept: INTERNAL MEDICINE | Facility: CLINIC | Age: 63
End: 2018-03-13

## 2018-03-13 DIAGNOSIS — K59.09 CHRONIC CONSTIPATION: ICD-10-CM

## 2018-03-13 DIAGNOSIS — E55.9 VITAMIN D DEFICIENCY: ICD-10-CM

## 2018-03-13 DIAGNOSIS — Z12.31 VISIT FOR SCREENING MAMMOGRAM: Primary | ICD-10-CM

## 2018-03-13 DIAGNOSIS — E78.5 HYPERLIPIDEMIA, UNSPECIFIED HYPERLIPIDEMIA TYPE: ICD-10-CM

## 2018-03-13 DIAGNOSIS — I10 ESSENTIAL HYPERTENSION: ICD-10-CM

## 2018-03-13 DIAGNOSIS — M85.80 OSTEOPENIA, UNSPECIFIED LOCATION: Primary | ICD-10-CM

## 2018-03-13 NOTE — TELEPHONE ENCOUNTER
----- Message from Celestino Campbell sent at 3/13/2018  8:53 AM CDT -----  The annual physical is on 5/7/18, lab orders needed.    Thank you

## 2018-03-21 DIAGNOSIS — M54.16 BILATERAL LUMBAR RADICULOPATHY: ICD-10-CM

## 2018-03-21 RX ORDER — GABAPENTIN 300 MG/1
300 CAPSULE ORAL 3 TIMES DAILY
Qty: 270 CAPSULE | Refills: 1 | Status: SHIPPED | OUTPATIENT
Start: 2018-03-21 | End: 2018-05-29 | Stop reason: SDUPTHER

## 2018-04-04 ENCOUNTER — PATIENT MESSAGE (OUTPATIENT)
Dept: ORTHOPEDICS | Facility: CLINIC | Age: 63
End: 2018-04-04

## 2018-04-12 ENCOUNTER — HOSPITAL ENCOUNTER (OUTPATIENT)
Dept: RADIOLOGY | Facility: HOSPITAL | Age: 63
Discharge: HOME | End: 2018-04-12
Attending: NURSE PRACTITIONER
Payer: COMMERCIAL

## 2018-04-12 VITALS — WEIGHT: 125 LBS | HEIGHT: 63 IN | BODY MASS INDEX: 22.15 KG/M2

## 2018-04-12 DIAGNOSIS — Z12.31 VISIT FOR SCREENING MAMMOGRAM: ICD-10-CM

## 2018-04-12 PROCEDURE — 77067 SCR MAMMO BI INCL CAD: CPT | Mod: 26,,, | Performed by: RADIOLOGY

## 2018-04-12 PROCEDURE — 77067 SCR MAMMO BI INCL CAD: CPT | Mod: TC,PO

## 2018-04-12 PROCEDURE — 77063 BREAST TOMOSYNTHESIS BI: CPT | Mod: 26,,, | Performed by: RADIOLOGY

## 2018-04-24 RX ORDER — AMLODIPINE BESYLATE 10 MG/1
TABLET ORAL
Qty: 90 TABLET | Refills: 1 | Status: SHIPPED | OUTPATIENT
Start: 2018-04-24 | End: 2018-10-22 | Stop reason: SDUPTHER

## 2018-05-01 ENCOUNTER — PATIENT MESSAGE (OUTPATIENT)
Dept: UROGYNECOLOGY | Facility: CLINIC | Age: 63
End: 2018-05-01

## 2018-05-03 ENCOUNTER — LAB VISIT (OUTPATIENT)
Dept: LAB | Facility: HOSPITAL | Age: 63
End: 2018-05-03
Payer: COMMERCIAL

## 2018-05-03 DIAGNOSIS — I10 ESSENTIAL HYPERTENSION: ICD-10-CM

## 2018-05-03 DIAGNOSIS — E55.9 VITAMIN D DEFICIENCY: ICD-10-CM

## 2018-05-03 DIAGNOSIS — K59.09 CHRONIC CONSTIPATION: ICD-10-CM

## 2018-05-03 DIAGNOSIS — E78.5 HYPERLIPIDEMIA, UNSPECIFIED HYPERLIPIDEMIA TYPE: ICD-10-CM

## 2018-05-03 DIAGNOSIS — M85.80 OSTEOPENIA, UNSPECIFIED LOCATION: ICD-10-CM

## 2018-05-03 LAB
25(OH)D3+25(OH)D2 SERPL-MCNC: 50 NG/ML
ANION GAP SERPL CALC-SCNC: 10 MMOL/L
BUN SERPL-MCNC: 19 MG/DL
CALCIUM SERPL-MCNC: 10.3 MG/DL
CHLORIDE SERPL-SCNC: 105 MMOL/L
CHOLEST SERPL-MCNC: 242 MG/DL
CHOLEST/HDLC SERPL: 3 {RATIO}
CO2 SERPL-SCNC: 27 MMOL/L
CREAT SERPL-MCNC: 0.7 MG/DL
EST. GFR  (AFRICAN AMERICAN): >60 ML/MIN/1.73 M^2
EST. GFR  (NON AFRICAN AMERICAN): >60 ML/MIN/1.73 M^2
GLUCOSE SERPL-MCNC: 92 MG/DL
HDLC SERPL-MCNC: 82 MG/DL
HDLC SERPL: 33.9 %
LDLC SERPL CALC-MCNC: 143.2 MG/DL
NONHDLC SERPL-MCNC: 160 MG/DL
POTASSIUM SERPL-SCNC: 4.9 MMOL/L
SODIUM SERPL-SCNC: 142 MMOL/L
TRIGL SERPL-MCNC: 84 MG/DL
TSH SERPL DL<=0.005 MIU/L-ACNC: 2.25 UIU/ML

## 2018-05-03 PROCEDURE — 80048 BASIC METABOLIC PNL TOTAL CA: CPT

## 2018-05-03 PROCEDURE — 36415 COLL VENOUS BLD VENIPUNCTURE: CPT

## 2018-05-03 PROCEDURE — 84443 ASSAY THYROID STIM HORMONE: CPT

## 2018-05-03 PROCEDURE — 80061 LIPID PANEL: CPT

## 2018-05-03 PROCEDURE — 82306 VITAMIN D 25 HYDROXY: CPT

## 2018-05-07 ENCOUNTER — OFFICE VISIT (OUTPATIENT)
Dept: INTERNAL MEDICINE | Facility: CLINIC | Age: 63
End: 2018-05-07
Payer: COMMERCIAL

## 2018-05-07 VITALS
HEIGHT: 63 IN | HEART RATE: 65 BPM | BODY MASS INDEX: 22.23 KG/M2 | WEIGHT: 125.44 LBS | OXYGEN SATURATION: 98 % | SYSTOLIC BLOOD PRESSURE: 132 MMHG | DIASTOLIC BLOOD PRESSURE: 88 MMHG

## 2018-05-07 DIAGNOSIS — E78.5 HYPERLIPIDEMIA, UNSPECIFIED HYPERLIPIDEMIA TYPE: ICD-10-CM

## 2018-05-07 DIAGNOSIS — Z00.00 ROUTINE PHYSICAL EXAMINATION: Primary | ICD-10-CM

## 2018-05-07 DIAGNOSIS — I10 ESSENTIAL HYPERTENSION: ICD-10-CM

## 2018-05-07 DIAGNOSIS — E55.9 VITAMIN D DEFICIENCY: ICD-10-CM

## 2018-05-07 DIAGNOSIS — M18.11 ARTHRITIS OF CARPOMETACARPAL (CMC) JOINT OF RIGHT THUMB: ICD-10-CM

## 2018-05-07 DIAGNOSIS — E03.9 HYPOTHYROIDISM, UNSPECIFIED TYPE: ICD-10-CM

## 2018-05-07 DIAGNOSIS — M85.80 OSTEOPENIA, UNSPECIFIED LOCATION: ICD-10-CM

## 2018-05-07 DIAGNOSIS — M54.16 LUMBAR RADICULOPATHY: ICD-10-CM

## 2018-05-07 DIAGNOSIS — K59.09 CHRONIC CONSTIPATION: ICD-10-CM

## 2018-05-07 PROCEDURE — 99999 PR PBB SHADOW E&M-EST. PATIENT-LVL IV: CPT | Mod: PBBFAC,,, | Performed by: INTERNAL MEDICINE

## 2018-05-07 PROCEDURE — 99396 PREV VISIT EST AGE 40-64: CPT | Mod: S$GLB,,, | Performed by: INTERNAL MEDICINE

## 2018-05-07 PROCEDURE — 3079F DIAST BP 80-89 MM HG: CPT | Mod: CPTII,S$GLB,, | Performed by: INTERNAL MEDICINE

## 2018-05-07 PROCEDURE — 3075F SYST BP GE 130 - 139MM HG: CPT | Mod: CPTII,S$GLB,, | Performed by: INTERNAL MEDICINE

## 2018-05-07 NOTE — PROGRESS NOTES
Subjective:       Patient ID: Jill Marquez is a 62 y.o. female.    Chief Complaint: Annual Exam    HPI:  Patient here for annual exam.  She received some injections to the right for arthritis.  Both give her troubles.  She denies any numbness or tingling.  She had 1 borderline sugar and has a history of elevated sugar and her family but she has not been diagnosed with diabetes.  She denies any chest pain shortness of breath fevers or chills.  Labs were reviewed.  Cholesterol up slightly but HDL remains above.      Review of Systems   Constitutional: Negative for chills, fatigue, fever and unexpected weight change.   HENT: Negative for nosebleeds, sinus pain and sore throat.    Eyes: Negative for pain and visual disturbance.   Respiratory: Negative for apnea, cough, chest tightness and shortness of breath.    Cardiovascular: Negative for chest pain and leg swelling.   Gastrointestinal: Negative for abdominal pain, constipation, diarrhea, nausea and vomiting.   Genitourinary: Negative for frequency, hematuria and menstrual problem.   Musculoskeletal: Negative for arthralgias, joint swelling, neck pain and neck stiffness.   Skin: Negative for rash and wound.   Neurological: Negative for dizziness and headaches.   Hematological: Negative for adenopathy.   Psychiatric/Behavioral: Negative for dysphoric mood. The patient is not nervous/anxious.        Objective:      Physical Exam   Constitutional: She is oriented to person, place, and time. She appears well-developed and well-nourished.   HENT:   Head: Normocephalic and atraumatic.   Right Ear: Tympanic membrane, external ear and ear canal normal.   Left Ear: Tympanic membrane, external ear and ear canal normal.   Nose: Nose normal.   Mouth/Throat: Oropharynx is clear and moist and mucous membranes are normal. No oropharyngeal exudate.   Eyes: Conjunctivae and EOM are normal. Pupils are equal, round, and reactive to light. Right eye exhibits no discharge. Left eye  exhibits no discharge.   Neck: Normal range of motion. Neck supple. No JVD present. No thyromegaly present.   Cardiovascular: Normal rate, regular rhythm, normal heart sounds and intact distal pulses.    No murmur heard.  Pulmonary/Chest: Effort normal and breath sounds normal. No respiratory distress. She has no wheezes. She has no rales.   Abdominal: Soft. Bowel sounds are normal. She exhibits no mass. There is no tenderness.   Musculoskeletal: Normal range of motion. She exhibits deformity (Several fingers on both hands). She exhibits no edema or tenderness.   Lymphadenopathy:     She has no cervical adenopathy.        Right: No supraclavicular adenopathy present.        Left: No supraclavicular adenopathy present.   Neurological: She is alert and oriented to person, place, and time. She has normal reflexes. No cranial nerve deficit.   Skin: No rash noted.   Psychiatric: She has a normal mood and affect. She does not exhibit a depressed mood.       Assessment:       1. Routine physical examination    2. Essential hypertension    3. Hypothyroidism, unspecified type    4. Chronic constipation    5. Hyperlipidemia, unspecified hyperlipidemia type    6. Lumbar radiculopathy    7. Arthritis of carpometacarpal (CMC) joint of right thumb        Plan:       Jill was seen today for annual exam.    Diagnoses and all orders for this visit:    Routine physical examination  -     TSH; Future  -     Comprehensive metabolic panel; Future  -     Hemoglobin A1c; Future  -     Lipid panel; Future  -     Vitamin D; Future    Essential hypertension  -     TSH; Future  -     Comprehensive metabolic panel; Future  -     Hemoglobin A1c; Future  -     Lipid panel; Future  -     Vitamin D; Future    Hypothyroidism, unspecified type  -     TSH; Future  -     Comprehensive metabolic panel; Future  -     Hemoglobin A1c; Future  -     Lipid panel; Future  -     Vitamin D; Future    Chronic constipation  -     TSH; Future  -      Comprehensive metabolic panel; Future  -     Hemoglobin A1c; Future  -     Lipid panel; Future  -     Vitamin D; Future    Hyperlipidemia, unspecified hyperlipidemia type  -     TSH; Future  -     Comprehensive metabolic panel; Future  -     Hemoglobin A1c; Future  -     Lipid panel; Future  -     Vitamin D; Future    Lumbar radiculopathy  -     TSH; Future  -     Comprehensive metabolic panel; Future  -     Hemoglobin A1c; Future  -     Lipid panel; Future  -     Vitamin D; Future    Arthritis of carpometacarpal (CMC) joint of right thumb  -     TSH; Future  -     Comprehensive metabolic panel; Future  -     Hemoglobin A1c; Future  -     Lipid panel; Future  -     Vitamin D; Future    Osteopenia, unspecified location  -     TSH; Future  -     Comprehensive metabolic panel; Future  -     Hemoglobin A1c; Future  -     Lipid panel; Future  -     Vitamin D; Future    Vitamin D deficiency  -     TSH; Future  -     Comprehensive metabolic panel; Future  -     Hemoglobin A1c; Future  -     Lipid panel; Future  -     Vitamin D; Future        follow-up annually with labs

## 2018-05-29 ENCOUNTER — OFFICE VISIT (OUTPATIENT)
Dept: PHYSICAL MEDICINE AND REHAB | Facility: CLINIC | Age: 63
End: 2018-05-29
Payer: COMMERCIAL

## 2018-05-29 ENCOUNTER — OFFICE VISIT (OUTPATIENT)
Dept: RHEUMATOLOGY | Facility: CLINIC | Age: 63
End: 2018-05-29
Payer: COMMERCIAL

## 2018-05-29 VITALS
BODY MASS INDEX: 21.11 KG/M2 | SYSTOLIC BLOOD PRESSURE: 145 MMHG | DIASTOLIC BLOOD PRESSURE: 90 MMHG | HEIGHT: 65 IN | HEART RATE: 84 BPM | WEIGHT: 126.69 LBS

## 2018-05-29 VITALS
SYSTOLIC BLOOD PRESSURE: 139 MMHG | BODY MASS INDEX: 21.67 KG/M2 | DIASTOLIC BLOOD PRESSURE: 85 MMHG | HEART RATE: 70 BPM | WEIGHT: 126.94 LBS | HEIGHT: 64 IN

## 2018-05-29 DIAGNOSIS — M54.42 CHRONIC MIDLINE LOW BACK PAIN WITH BILATERAL SCIATICA: Primary | ICD-10-CM

## 2018-05-29 DIAGNOSIS — M54.41 CHRONIC MIDLINE LOW BACK PAIN WITH BILATERAL SCIATICA: Primary | ICD-10-CM

## 2018-05-29 DIAGNOSIS — G56.01 CARPAL TUNNEL SYNDROME, RIGHT: ICD-10-CM

## 2018-05-29 DIAGNOSIS — M18.0 PRIMARY OSTEOARTHRITIS OF BOTH FIRST CARPOMETACARPAL JOINTS: ICD-10-CM

## 2018-05-29 DIAGNOSIS — R74.8 ELEVATED CPK: ICD-10-CM

## 2018-05-29 DIAGNOSIS — M19.041 PRIMARY OSTEOARTHRITIS OF BOTH HANDS: ICD-10-CM

## 2018-05-29 DIAGNOSIS — M85.80 OSTEOPENIA, UNSPECIFIED LOCATION: ICD-10-CM

## 2018-05-29 DIAGNOSIS — M19.042 PRIMARY OSTEOARTHRITIS OF BOTH HANDS: ICD-10-CM

## 2018-05-29 DIAGNOSIS — G89.29 CHRONIC NECK PAIN: ICD-10-CM

## 2018-05-29 DIAGNOSIS — M13.0 POLYARTHRITIS: Primary | ICD-10-CM

## 2018-05-29 DIAGNOSIS — G89.29 CHRONIC MIDLINE LOW BACK PAIN WITH BILATERAL SCIATICA: Primary | ICD-10-CM

## 2018-05-29 DIAGNOSIS — M15.4 EROSIVE OSTEOARTHRITIS OF BOTH HANDS: ICD-10-CM

## 2018-05-29 DIAGNOSIS — M35.00 SICCA, UNSPECIFIED TYPE: ICD-10-CM

## 2018-05-29 DIAGNOSIS — M47.812 SPONDYLOSIS OF CERVICAL REGION WITHOUT MYELOPATHY OR RADICULOPATHY: ICD-10-CM

## 2018-05-29 DIAGNOSIS — M54.16 BILATERAL LUMBAR RADICULOPATHY: ICD-10-CM

## 2018-05-29 DIAGNOSIS — M54.2 CHRONIC NECK PAIN: ICD-10-CM

## 2018-05-29 DIAGNOSIS — M47.26 OSTEOARTHRITIS OF SPINE WITH RADICULOPATHY, LUMBAR REGION: ICD-10-CM

## 2018-05-29 PROBLEM — M18.9 CMC ARTHRITIS, THUMB, DEGENERATIVE: Status: ACTIVE | Noted: 2018-05-29

## 2018-05-29 PROCEDURE — 3075F SYST BP GE 130 - 139MM HG: CPT | Mod: CPTII,S$GLB,, | Performed by: PHYSICAL MEDICINE & REHABILITATION

## 2018-05-29 PROCEDURE — 3008F BODY MASS INDEX DOCD: CPT | Mod: CPTII,S$GLB,, | Performed by: INTERNAL MEDICINE

## 2018-05-29 PROCEDURE — 3074F SYST BP LT 130 MM HG: CPT | Mod: CPTII,S$GLB,, | Performed by: INTERNAL MEDICINE

## 2018-05-29 PROCEDURE — 99999 PR PBB SHADOW E&M-EST. PATIENT-LVL IV: CPT | Mod: PBBFAC,,, | Performed by: INTERNAL MEDICINE

## 2018-05-29 PROCEDURE — 99214 OFFICE O/P EST MOD 30 MIN: CPT | Mod: S$GLB,,, | Performed by: PHYSICAL MEDICINE & REHABILITATION

## 2018-05-29 PROCEDURE — 3079F DIAST BP 80-89 MM HG: CPT | Mod: CPTII,S$GLB,, | Performed by: PHYSICAL MEDICINE & REHABILITATION

## 2018-05-29 PROCEDURE — 99215 OFFICE O/P EST HI 40 MIN: CPT | Mod: S$GLB,,, | Performed by: INTERNAL MEDICINE

## 2018-05-29 PROCEDURE — 3008F BODY MASS INDEX DOCD: CPT | Mod: CPTII,S$GLB,, | Performed by: PHYSICAL MEDICINE & REHABILITATION

## 2018-05-29 PROCEDURE — 3078F DIAST BP <80 MM HG: CPT | Mod: CPTII,S$GLB,, | Performed by: INTERNAL MEDICINE

## 2018-05-29 PROCEDURE — 99999 PR PBB SHADOW E&M-EST. PATIENT-LVL III: CPT | Mod: PBBFAC,,, | Performed by: PHYSICAL MEDICINE & REHABILITATION

## 2018-05-29 RX ORDER — GABAPENTIN 300 MG/1
300 CAPSULE ORAL NIGHTLY
Start: 2018-05-29 | End: 2019-04-23 | Stop reason: SDUPTHER

## 2018-05-29 RX ORDER — TRAMADOL HYDROCHLORIDE 50 MG/1
50 TABLET ORAL 3 TIMES DAILY PRN
Qty: 90 TABLET | Refills: 0 | Status: SHIPPED | OUTPATIENT
Start: 2018-05-29 | End: 2018-08-01 | Stop reason: SDUPTHER

## 2018-05-29 RX ORDER — DICLOFENAC SODIUM 10 MG/G
2 GEL TOPICAL 3 TIMES DAILY
Qty: 3 TUBE | Refills: 2 | Status: SHIPPED | OUTPATIENT
Start: 2018-05-29 | End: 2018-10-16

## 2018-05-29 ASSESSMENT — ROUTINE ASSESSMENT OF PATIENT INDEX DATA (RAPID3)
PAIN SCORE: 4.5
PSYCHOLOGICAL DISTRESS SCORE: 3.3
AM STIFFNESS SCORE: 1, YES
FATIGUE SCORE: .5
PATIENT GLOBAL ASSESSMENT SCORE: 2.5
TOTAL RAPID3 SCORE: 2.44
WHEN YOU AWAKENED IN THE MORNING OVER THE LAST WEEK, PLEASE INDICATE THE AMOUNT OF TIME IT TAKES UNTIL YOU ARE AS LIMBER AS YOU WILL BE FOR THE DAY: 3 MINS
MDHAQ FUNCTION SCORE: .1

## 2018-05-29 NOTE — PROGRESS NOTES
"Subjective:       Patient ID: Jill Marquez is a 62 y.o. female.    Chief Complaint:   HPI has severe bilateral thumb cmc OA, both injected 5 wks ago by Dr. Quintero which helped. Has Rx for diclofenac gel by Dr. Tapia hasn't yet started. Also has neck pain with radiation up to occiput. Dr. Tapia obtained x-rays, flares up from time to time. Headaches neck and occiput. Worse lying down and better with movement. Attended PT last year for neck BR Therapy x 3 months, helped. + dry eyes and mouth( using AT, and drinks lots of water) saw ARCELIA Hess November last year: no Rx drops or plugs  Review of Systems   Constitutional: Positive for fatigue. Negative for appetite change, fever and unexpected weight change.   HENT: Negative for mouth sores.         Dry mouth   Eyes: Negative for visual disturbance.        Dry   Respiratory: Negative for cough, shortness of breath and wheezing.    Cardiovascular: Negative for chest pain and palpitations.   Gastrointestinal: Positive for constipation. Negative for abdominal pain, anal bleeding, blood in stool, diarrhea, nausea and vomiting.   Genitourinary: Negative for dysuria, frequency and urgency.   Musculoskeletal: Negative for arthralgias, back pain, gait problem, joint swelling, myalgias, neck pain and neck stiffness.   Skin: Negative for rash.   Neurological: Positive for headaches. Negative for weakness and numbness.   Hematological: Negative for adenopathy. Does not bruise/bleed easily.   Psychiatric/Behavioral: Negative for sleep disturbance. The patient is not nervous/anxious.          Objective:   BP (!) 145/90   Pulse 84   Ht 5' 4.8" (1.646 m)   Wt 57.5 kg (126 lb 11.2 oz)   BMI 21.21 kg/m²      Physical Exam   Constitutional: She is oriented to person, place, and time and well-developed, well-nourished, and in no distress.   HENT:   Head: Normocephalic and atraumatic.   Mouth/Throat: Oropharynx is clear and moist.   Eyes: Conjunctivae and EOM are normal.   Neck: " Normal range of motion. Neck supple. No thyromegaly present.   Cardiovascular: Normal rate, regular rhythm, normal heart sounds and intact distal pulses.  Exam reveals no gallop and no friction rub.    No murmur heard.  Pulmonary/Chest: Breath sounds normal. She has no wheezes. She has no rales. She exhibits no tenderness.   Abdominal: Soft. She exhibits no distension and no mass. There is no tenderness.       Right Side Rheumatological Exam     Examination finds the shoulder, elbow, wrist, knee, 1st MCP, 3rd PIP, 4th PIP, 4th MCP, 5th PIP and 5th MCP normal.    The patient is tender to palpation of the 2nd PIP and 1st CMC    She has swelling of the 2nd PIP, 2nd MCP and 3rd MCP    The patient has an enlarged 1st PIP, 2nd PIP, 2nd MCP, 3rd MCP and 1st CMC    Left Side Rheumatological Exam     Examination finds the shoulder, elbow, wrist, knee, 1st MCP, 3rd PIP, 4th PIP, 4th MCP, 5th PIP and 5th MCP normal.    The patient is tender to palpation of the 2nd PIP and 1st CMC.    She has swelling of the 2nd PIP, 2nd MCP and 3rd MCP    The patient has an enlarged 1st PIP, 2nd PIP, 2nd MCP, 3rd MCP and 1st CMC.      Lymphadenopathy:     She has no cervical adenopathy.   Neurological: She is alert and oriented to person, place, and time. She displays normal reflexes. Gait normal.   Nl motor strength UE and LE bilateral   Skin:     Pitting right index fingernail   Musculoskeletal: She exhibits no edema.           Assessment/Plan         Problem List Items Addressed This Visit     Sicca    Overview     Results for PANKAJ CARTER (MRN [id number]) as of 5/29/2018 11:02   Ref. Range 6/26/2015 11:10   Anti-SSA Antibody Latest Ref Range: 0.00 - 19.99 EU 3.90   Anti-SSA Interpretation Latest Ref Range: Negative  Negative   CCP Antibodies Latest Ref Range: <5.0 U/mL <0.5   Rheumatoid Factor Latest Ref Range: 0.0 - 15.0 IU/mL 10.0   Results for PANKAJ CARTER (MRN [id number]) as of 5/29/2018 11:02   Ref. Range 6/26/2015 11:10   SAMIR  Screen Latest Ref Range: Negative <1:160  Negative <1:160            Relevant Orders    Sjogrens syndrome-B extractable nuclear antibody    Rheumatoid factor    Cyclic citrul peptide antibody, IgG    Ferritin    Iron and TIBC    CK    Aldolase    Erosive osteoarthritis of both hands    Current Assessment & Plan     Non-tender synovitis and bony enlargement and JSN on x-ray 2,3  mcps bilateral  Erosive OA v psoriatic arthritis index dips bilat  CASPAR: 2-3 ? PsA    Proceed with  Diclofenac gel  Ref to OT  Consider trial of hydroxychloroquine for erosive   OA  R/o CPDD, check Fe/TIBC, ferritin  Consider trial of methotrexate for ? PsA         Relevant Orders    Ambulatory Referral to Physical/Occupational Therapy    Elevated CPK    Current Assessment & Plan     Recheck ck aldolase         RESOLVED: Osteopenia    Overview     : 1955 ORDERING PHYSICIAN: CELESTINA Gibson LOCATION: [facility]    HISTORY: 61 y/o female with a hx of fractured left elbow at 48 y/o. She had a hysterectomy at 48 y/o. She is taking Calcium and Vit D supplements. She exercises daily and does not smoke.    TECHNIQUE: Bone Mineral Density performed using Hologic Horizon A (S/N [id number]) reveals good positioning of lumbar spine and hip.    BONE MINERAL DENSITY RESULTS:  Lumbar Spine: Lumbar bone mineral density L1-L4 is 1.155g/cm2, which is a t-score of 1.0. The z-score is 2.6.    Total Hip: The total hip bone mineral density is 0.868g/cm2.  The t-score is -0.6, and the z-score is 0.5.  Femoral neck BMD is 0.759g/cm2 and the t-score is -0.8.      COMPARISONS:  Date Location BMD T-score  10/06/15 L-spine 1.144 0.9  Total Hip 0.885 -0.5   Impression      Elevated BMD of the lumbar spine.  No significant change since prior study.  Adult scoliosis associated with increased lumbar spine BMD.      Recommendations:  1) Adequate calcium and Vitamin D therapy  2) Appropriate exercise  3) Consider repeat BMD in 4 years    EXPLANATION OF  RESULTS:  The t-score compares this results to the bone density of a 25 year old of the same gender. The z-score compares this result to the average bone density to people of the same age and gender. The amounts indicate the number of standard deviations above or below the mean.  * Osteoporosis is generally defined as having a t-score between less than -2.5.  * Osteopenia is generally defined as having a t-score between -1 and -2.5.  * The normal range is generally defined as having a t-score between -1 to 1.      Electronically signed by: CAROLINE RESTREPO MD  Date: 11/15/17  Time: 18:08     Encounter     View Encounter          Signed by     Signed Credentials Date/Time  Phone Pager   CAROLINE RESTREPO                   CMC arthritis, thumb, degenerative    Overview     S/p injections by Dr. Quintero 3/5/18 helping         Relevant Orders    Ambulatory Referral to Physical/Occupational Therapy      Other Visit Diagnoses     Polyarthritis    -  Primary    Relevant Orders    Sjogrens syndrome-B extractable nuclear antibody    Rheumatoid factor    Cyclic citrul peptide antibody, IgG    Ferritin    Iron and TIBC    CK    Aldolase    Ambulatory Referral to Physical/Occupational Therapy

## 2018-05-29 NOTE — ASSESSMENT & PLAN NOTE
Non-tender synovitis and bony enlargement and JSN on x-ray 2,3  mcps bilateral  Erosive OA v psoriatic arthritis index dips bilat  CASPAR: 2-3 ? PsA    Proceed with  Diclofenac gel  Ref to OT  Consider trial of hydroxychloroquine for erosive   OA  R/o CPDD, check Fe/TIBC, ferritin  Consider trial of methotrexate for ? PsA

## 2018-05-29 NOTE — PROGRESS NOTES
Subjective:       Patient ID: Jill Marquez is a 62 y.o. female.    Chief Complaint: No chief complaint on file.    HPI    HISTORY OF PRESENT ILLNESS:  Mrs. Marquze is a 62-year-old white female with   diffuse osteoarthritis who is followed up in the [facility] for   chronic low back pain with history of lumbar radiculopathy, chronic neck pain,   OA of the hands and right carpal tunnel syndrome.  Her last visit to the clinic   was on 11/17/2017.  She was maintained on diclofenac, gabapentin, p.r.n.   metaxalone and p.r.n. tramadol.    The patient is coming today to the clinic for followup.  She has been medically   stable.  Her back pain is under fair control.  It is an intermittent aching pain   in the lumbar spine and across her back.  She has infrequent radiation to the   left foot with sharp sensations.  Her maximum pain is 4-5/10 and minimum 3/10.    Today, it is 3/10.  The patient denies any lower extremity weakness.  She denies   any bowel or bladder incontinence.    Her neck pain has been stable.  It is an intermittent aching pain in the   cervical spine, mostly the upper region.  She denies any radiation to her hands.    She does have, however, occasional radiation to the occipital region with   headaches.  Her pain is worse with excess neck movement.  Her maximum pain is   5/10 and minimum 3/10.  Today, it is 3/10.    She continues to complain of bilateral hand pain due to osteoarthritis.  She is   seen by Orthopedic Surgery and recently received cortisone injections into the   first carpometacarpal joint with some relief.  Her hand numbness due to carpal   tunnel syndrome has been under good control.  She has been wearing the right   carpal tunnel splint at night, but not consistently.    The patient was on diclofenac, but did not start taking it since last visit for   fear of potential side effect.  She lowered her gabapentin dose to 300 mg at   bedtime.  She did not notice any increase in  her radicular symptoms.  She takes   ibuprofen 800 mg p.r.n. infrequently for headaches.  She uses tramadol 50 mg   p.r.n., usually one tablet at bedtime.      MS/HN  dd: 05/29/2018 09:19:04 (CDT)  td: 05/29/2018 21:53:09 (CDT)  Doc ID   #[id number]  Job ID #[id number]    CC:          Review of Systems   Constitutional: Negative for fatigue.   Eyes: Negative for visual disturbance.   Respiratory: Negative for shortness of breath.    Cardiovascular: Negative for chest pain.   Gastrointestinal: Positive for constipation. Negative for blood in stool, nausea and vomiting.   Genitourinary: Negative for difficulty urinating.   Musculoskeletal: Positive for arthralgias (hands), back pain and neck pain. Negative for gait problem.   Skin: Negative for rash.   Neurological: Negative for dizziness and headaches.   Psychiatric/Behavioral: Positive for sleep disturbance. Negative for behavioral problems.       Objective:      Physical Exam   Constitutional: She appears well-developed and well-nourished. No distress.   HENT:   Head: Normocephalic and atraumatic.   Neck:   Decreased ROM.  -ve tenderness.     Pulmonary/Chest: Breath sounds normal.   Musculoskeletal:   BUE:  ROM:full.  +ve severe Heberden's & Lizzy's nodes.  Strength:    RUE: 5/5 at shoulder abduction, elbow flexion, wrist extension, elbow extension, hand .   LUE: 5/5 at shoulder abduction, elbow flexion, wrist extension, elbow extension, hand .  Sensation to pinprick:   RUE: intact.   LUE: intact.      BLE:  ROM:full.  Mild bilateral knee crepitus.   Strength:      RLE: 5/5 at hip flexion, knee extension, ankle DF/PF.     LLE:  5/5 at hip flexion, knee extension, ankle DF/PF.  Sensation to pinprick:     RLE: intact.      LLE: intact.   SLR (sitting):      RLE: -ve.      LLE: -ve.     -ve tenderness over lumbar spine.     Neurological: She is alert.   Skin: Skin is warm. No rash noted.   Psychiatric: She has a normal mood and affect.   Vitals reviewed.             Assessment:       1. Chronic midline low back pain with bilateral sciatica    2. Osteoarthritis of spine with radiculopathy, lumbar region    3. Chronic neck pain    4. Spondylosis of cervical region without myelopathy or radiculopathy    5. Primary osteoarthritis of both hands    6. Carpal tunnel syndrome, right        Plan:     - Restart diclofenac (CATAFLAM) 50 MG tablet; Take 1 tablet (50 mg total) by mouth once or twice daily.  - Start diclofenac sodium 1 % Gel; Apply 2 g topically 3 (three) times daily.  - Continue gabapentin (NEURONTIN) 300 MG capsule; Take 1 capsule (300 mg total) by mouth every evening.  - Continue tramadol 50 mg po daily prn.  - Continue wearing Rt Carpal Tunnel Splint every night.   - Regular home exercise program was encouraged.  - Follow-up in about 6 months (around 11/29/2018).     This was a 25 minute visit, more than 50% of which was spent counseling the patient about the diagnosis and the treatment plan.

## 2018-05-30 ENCOUNTER — PATIENT MESSAGE (OUTPATIENT)
Dept: RHEUMATOLOGY | Facility: CLINIC | Age: 63
End: 2018-05-30

## 2018-07-12 ENCOUNTER — OFFICE VISIT (OUTPATIENT)
Dept: ORTHOPEDICS | Facility: CLINIC | Age: 63
End: 2018-07-12
Payer: COMMERCIAL

## 2018-07-12 VITALS — HEIGHT: 64 IN | BODY MASS INDEX: 21.51 KG/M2 | WEIGHT: 126 LBS

## 2018-07-12 DIAGNOSIS — M67.449 MUCOUS CYST OF FINGER: ICD-10-CM

## 2018-07-12 PROCEDURE — 99999 PR PBB SHADOW E&M-EST. PATIENT-LVL III: CPT | Mod: PBBFAC,,, | Performed by: ORTHOPAEDIC SURGERY

## 2018-07-12 PROCEDURE — 99213 OFFICE O/P EST LOW 20 MIN: CPT | Mod: S$GLB,,, | Performed by: ORTHOPAEDIC SURGERY

## 2018-07-12 PROCEDURE — 3008F BODY MASS INDEX DOCD: CPT | Mod: CPTII,S$GLB,, | Performed by: ORTHOPAEDIC SURGERY

## 2018-07-12 NOTE — PROGRESS NOTES
HISTORY OF PRESENT ILLNESS:  Ms. Marquez seen previously for arthritis of the   fingers, has noticed a small cyst on her right ring finger recently.  She   actually was referred back from the dermatologist to me.  The cyst initially was   large and now it has gone down a bit, but she does have a little bit of a nail   deformity that she has noticed.    No history of infection.    PHYSICAL EXAMINATION:  RIGHT HAND:  There is a small resolving mucous cyst at the base of the nail,   right ring finger, a little bit tender to touch, slight nail ridging noted.  No   evidence of infection or swelling.    IMPRESSION:  Mucous cyst, right ring finger.    PLAN:  I explained the nature of the problem to the patient.  Fortunately, this   seems to be going away, so I have just recommended observation for now.  I would   like her to keep it clean with regular soap and water and Neosporin to the base   of the nail.  Follow up in 1-2 months or as needed.  If it gets larger again,   then we may recommend surgical excision.      ANJALI  dd: 07/12/2018 08:39:17 (CDT)  td: 07/13/2018 05:01:37 (MALCOLM)  Doc ID   #[id number]  Job ID #[id number]    CC:

## 2018-08-01 RX ORDER — TRAMADOL HYDROCHLORIDE 50 MG/1
50 TABLET ORAL 3 TIMES DAILY PRN
Qty: 90 TABLET | Refills: 1 | Status: SHIPPED | OUTPATIENT
Start: 2018-08-01 | End: 2019-03-04 | Stop reason: SDUPTHER

## 2018-09-13 ENCOUNTER — OFFICE VISIT (OUTPATIENT)
Dept: ORTHOPEDICS | Facility: CLINIC | Age: 63
End: 2018-09-13
Payer: COMMERCIAL

## 2018-09-13 VITALS — HEIGHT: 64 IN | WEIGHT: 125 LBS | BODY MASS INDEX: 21.34 KG/M2

## 2018-09-13 DIAGNOSIS — M18.11 ARTHRITIS OF CARPOMETACARPAL (CMC) JOINT OF RIGHT THUMB: Primary | ICD-10-CM

## 2018-09-13 PROCEDURE — 99999 PR PBB SHADOW E&M-EST. PATIENT-LVL III: CPT | Mod: PBBFAC,,, | Performed by: ORTHOPAEDIC SURGERY

## 2018-09-13 PROCEDURE — 99213 OFFICE O/P EST LOW 20 MIN: CPT | Mod: 25,S$GLB,, | Performed by: ORTHOPAEDIC SURGERY

## 2018-09-13 PROCEDURE — 20600 DRAIN/INJ JOINT/BURSA W/O US: CPT | Mod: 50,S$GLB,, | Performed by: ORTHOPAEDIC SURGERY

## 2018-09-13 PROCEDURE — 3008F BODY MASS INDEX DOCD: CPT | Mod: CPTII,S$GLB,, | Performed by: ORTHOPAEDIC SURGERY

## 2018-09-13 RX ORDER — TRIAMCINOLONE ACETONIDE 40 MG/ML
40 INJECTION, SUSPENSION INTRA-ARTICULAR; INTRAMUSCULAR
Status: COMPLETED | OUTPATIENT
Start: 2018-09-13 | End: 2018-09-13

## 2018-09-13 RX ADMIN — TRIAMCINOLONE ACETONIDE 40 MG: 40 INJECTION, SUSPENSION INTRA-ARTICULAR; INTRAMUSCULAR at 09:09

## 2018-09-13 NOTE — PROGRESS NOTES
HISTORY OF PRESENT ILLNESS:  Ms. Marquez in followup of bilateral thumb   arthritis, having a flare-up.  Previously, we had her scheduled for surgery, but   she decided to hold off surgery and would just like to try the injections   today.  She does have pain at the base of each thumb.  Symptoms are worse with   use.  No numbness or tingling reported.  The previous mucous cyst that she had   has resolved.    PHYSICAL EXAMINATION:  Both hands have arthritic deformity at the base of each   thumb, tenderness at the CMC joint bilaterally.  Range of motion CMC decreased   secondary to pain and stiffness.  She has a positive grind test bilateral.     strength slightly decreased in both hands.  Tinel sign negative.    IMPRESSION:  Bilateral thumb CMC arthritis, severe.    PLAN:  After pause for timeout, she identified each thumb base, injected CMC   joints bilaterally with combination of Kenalog 20 mg, 0.5 mL Xylocaine, sterile   technique.  She tolerated the procedure well without complications.    Recommend she continue anti-inflammatory medication by mouth and thumb wraps as   needed and follow up p.r.n.  If symptoms persist, then I would recommend that   she consider surgical treatment as previously discussed.      ANJALI  dd: 09/13/2018 09:05:50 (CDT)  td: 09/13/2018 17:23:30 (CDT)  Doc ID   #[id number]  Job ID #[id number]    CC:

## 2018-10-03 ENCOUNTER — PATIENT MESSAGE (OUTPATIENT)
Dept: INTERNAL MEDICINE | Facility: CLINIC | Age: 63
End: 2018-10-03

## 2018-10-16 ENCOUNTER — PATIENT MESSAGE (OUTPATIENT)
Dept: RHEUMATOLOGY | Facility: CLINIC | Age: 63
End: 2018-10-16

## 2018-10-16 ENCOUNTER — HOSPITAL ENCOUNTER (OUTPATIENT)
Dept: RADIOLOGY | Facility: HOSPITAL | Age: 63
Discharge: HOME | End: 2018-10-16
Attending: INTERNAL MEDICINE
Payer: COMMERCIAL

## 2018-10-16 ENCOUNTER — TELEPHONE (OUTPATIENT)
Dept: RHEUMATOLOGY | Facility: CLINIC | Age: 63
End: 2018-10-16

## 2018-10-16 ENCOUNTER — OFFICE VISIT (OUTPATIENT)
Dept: RHEUMATOLOGY | Facility: CLINIC | Age: 63
End: 2018-10-16
Payer: COMMERCIAL

## 2018-10-16 VITALS
BODY MASS INDEX: 21.01 KG/M2 | WEIGHT: 126.13 LBS | HEIGHT: 65 IN | SYSTOLIC BLOOD PRESSURE: 130 MMHG | DIASTOLIC BLOOD PRESSURE: 85 MMHG | HEART RATE: 81 BPM

## 2018-10-16 DIAGNOSIS — R74.8 ABNORMAL CK: Primary | ICD-10-CM

## 2018-10-16 DIAGNOSIS — M47.819 SPONDYLOARTHRITIS: ICD-10-CM

## 2018-10-16 DIAGNOSIS — M47.819 PERIPHERAL SPONDYLOARTHRITIS: ICD-10-CM

## 2018-10-16 DIAGNOSIS — R74.8 ELEVATED CPK: ICD-10-CM

## 2018-10-16 DIAGNOSIS — M18.0 PRIMARY OSTEOARTHRITIS OF BOTH FIRST CARPOMETACARPAL JOINTS: ICD-10-CM

## 2018-10-16 DIAGNOSIS — M15.9 PRIMARY OSTEOARTHRITIS INVOLVING MULTIPLE JOINTS: ICD-10-CM

## 2018-10-16 DIAGNOSIS — R74.8 ABNORMAL CK: ICD-10-CM

## 2018-10-16 PROCEDURE — 90670 PCV13 VACCINE IM: CPT | Mod: S$GLB,,, | Performed by: INTERNAL MEDICINE

## 2018-10-16 PROCEDURE — 71046 X-RAY EXAM CHEST 2 VIEWS: CPT | Mod: 26,,, | Performed by: RADIOLOGY

## 2018-10-16 PROCEDURE — 3079F DIAST BP 80-89 MM HG: CPT | Mod: CPTII,S$GLB,, | Performed by: INTERNAL MEDICINE

## 2018-10-16 PROCEDURE — 3008F BODY MASS INDEX DOCD: CPT | Mod: CPTII,S$GLB,, | Performed by: INTERNAL MEDICINE

## 2018-10-16 PROCEDURE — 3075F SYST BP GE 130 - 139MM HG: CPT | Mod: CPTII,S$GLB,, | Performed by: INTERNAL MEDICINE

## 2018-10-16 PROCEDURE — 90471 IMMUNIZATION ADMIN: CPT | Mod: S$GLB,,, | Performed by: INTERNAL MEDICINE

## 2018-10-16 PROCEDURE — 71046 X-RAY EXAM CHEST 2 VIEWS: CPT | Mod: TC,FY

## 2018-10-16 PROCEDURE — 99999 PR PBB SHADOW E&M-EST. PATIENT-LVL IV: CPT | Mod: PBBFAC,,, | Performed by: INTERNAL MEDICINE

## 2018-10-16 PROCEDURE — 99214 OFFICE O/P EST MOD 30 MIN: CPT | Mod: 25,S$GLB,, | Performed by: INTERNAL MEDICINE

## 2018-10-16 RX ORDER — DICLOFENAC SODIUM 10 MG/G
2 GEL TOPICAL 4 TIMES DAILY PRN
Qty: 3 TUBE | Refills: 3 | Status: SHIPPED | OUTPATIENT
Start: 2018-10-16 | End: 2018-11-16

## 2018-10-16 ASSESSMENT — ROUTINE ASSESSMENT OF PATIENT INDEX DATA (RAPID3)
PATIENT GLOBAL ASSESSMENT SCORE: 2
TOTAL RAPID3 SCORE: 1.5
WHEN YOU AWAKENED IN THE MORNING OVER THE LAST WEEK, PLEASE INDICATE THE AMOUNT OF TIME IT TAKES UNTIL YOU ARE AS LIMBER AS YOU WILL BE FOR THE DAY: 5 MINS
MDHAQ FUNCTION SCORE: 0
AM STIFFNESS SCORE: 1, YES
PAIN SCORE: 2.5
FATIGUE SCORE: 4
PSYCHOLOGICAL DISTRESS SCORE: 2.2

## 2018-10-16 ASSESSMENT — DISEASE ACTIVITY SCORE (DAS28)
SWOLLEN_JOINTS_COUNT: 3
TENDER_JOINTS_COUNT: 0
GLOBAL_HEALTH_SCORE: 20

## 2018-10-16 NOTE — PROGRESS NOTES
"Subjective:       Patient ID: Jill Marquez is a 63 y.o. female.    Chief Complaint: Erosive OA v. PsA; elevated CK  HPI only 5 min am stiffness. Dr. Quintero injected both 1st cmcs one month ago with improvement. Not using thumb braces. Other joints fine. No muscle weakness or myalgia. Intermttent low back pain.No psoriasis , personal or familial. Is eating red yeast rice.  Review of Systems   Constitutional: Negative for appetite change, fatigue, fever and unexpected weight change.   HENT: Negative for mouth sores.         Dry mouth only at night   Eyes: Negative for visual disturbance.   Respiratory: Negative for cough, shortness of breath and wheezing.    Cardiovascular: Negative for chest pain and palpitations.   Gastrointestinal: Positive for constipation. Negative for abdominal pain, anal bleeding, blood in stool, diarrhea, nausea and vomiting.   Genitourinary: Negative for dysuria, frequency and urgency.   Musculoskeletal: Negative for arthralgias, back pain, gait problem, joint swelling, myalgias, neck pain and neck stiffness.   Skin: Negative for rash.   Neurological: Positive for headaches (sometimes). Negative for weakness and numbness.   Hematological: Negative for adenopathy. Does not bruise/bleed easily.   Psychiatric/Behavioral: Negative for sleep disturbance. The patient is not nervous/anxious.          Objective:   /85   Pulse 81   Ht 5' 4.8" (1.646 m)   Wt 57.2 kg (126 lb 1.6 oz)   BMI 21.11 kg/m²      Physical Exam   Pulmonary/Chest: No respiratory distress. She has no wheezes. She exhibits no tenderness.       Right Side Rheumatological Exam     The patient is tender to palpation of the 2nd DIP and 5th DIP    She has swelling of the 2nd MCP    The patient has an enlarged 2nd MCP, 1st CMC, 2nd DIP and 5th DIP    Left Side Rheumatological Exam     The patient is tender to palpation of the 2nd DIP and 5th DIP.    She has swelling of the 2nd MCP, 3rd MCP, 2nd DIP and 5th DIP    The patient " has an enlarged 2nd MCP, 3rd MCP, 1st CMC, 2nd DIP and 5th DIP.            Assessment/Plan         Problem List Items Addressed This Visit     Elevated CPK    Overview     Results for PANKAJ CARTER (MRN [id number]) as of 10/16/2018 09:33   Ref. Range 6/26/2015 11:10 6/30/2015 12:50 5/29/2018 11:46   CPK Latest Ref Range: 20 - 180 U/L 325 (H) 226 (H) 272 (H)            Current Assessment & Plan     Recheck ck, aldolase  Myomarker 3, and HMGCR  Decide about stopping red yeast rice,          Relevant Orders    CK    Aldolase    HIV-1 and HIV-2 antibodies    Hepatitis B surface antigen    HBcAB    Hepatitis B surface antibody    Hepatitis C antibody    Hepatitis A antibody, IgG    Strongyloides IgG Antibodies    Quantiferon Gold TB    RPR    Ambulatory referral to Infectious Disease    X-Ray Chest PA And Lateral    CMC arthritis, thumb, degenerative    Overview     S/p injections by Dr. 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    TJ 0  SJ  3 Pt global 20 ESR  CRP  HUMPHRIES 28  [id number]  DAPSA: TJ =2 SJ =5  Pt global 2  Pt pain 2.5 CRP =11.5(LDA)    Shingrix  Prevnar 13  Pre-DMARD labs, ESR, CRP, CK, aldolase  Chest x-ray baseline  ACR handout on methotrexate  RTC 4wks to start           Relevant Orders    Sedimentation rate    C-reactive protein    Sedimentation rate    C-reactive protein      Other Visit Diagnoses     Abnormal CK    -  Primary    Relevant Orders    MyoMarker Panel 3    HMGCR (HMG Coenzyme A Reductase) Ab    HIV-1 and HIV-2 antibodies    Hepatitis B surface antigen    HBcAB    Hepatitis B surface antibody    Hepatitis C antibody    Hepatitis A antibody, IgG    Strongyloides IgG Antibodies    Quantiferon Gold TB    RPR    Ambulatory referral to Infectious Disease     X-Ray Chest PA And Lateral    Primary osteoarthritis involving multiple joints        Relevant Medications    diclofenac sodium (VOLTAREN) 1 % Gel    Spondyloarthritis        Relevant Orders    HIV-1 and HIV-2 antibodies    Hepatitis B surface antigen    HBcAB    Hepatitis B surface antibody    Hepatitis C antibody    Hepatitis A antibody, IgG    Strongyloides IgG Antibodies    Quantiferon Gold TB    RPR    Ambulatory referral to Infectious Disease    X-Ray Chest PA And Lateral

## 2018-10-16 NOTE — ASSESSMENT & PLAN NOTE
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

## 2018-10-22 RX ORDER — AMLODIPINE BESYLATE 10 MG/1
TABLET ORAL
Qty: 90 TABLET | Refills: 1 | Status: SHIPPED | OUTPATIENT
Start: 2018-10-22 | End: 2019-04-22 | Stop reason: SDUPTHER

## 2018-11-07 RX ORDER — LEVOTHYROXINE SODIUM 75 UG/1
TABLET ORAL
Qty: 90 TABLET | Refills: 3 | Status: SHIPPED | OUTPATIENT
Start: 2018-11-07 | End: 2019-11-04 | Stop reason: SDUPTHER

## 2018-11-13 ENCOUNTER — OFFICE VISIT (OUTPATIENT)
Dept: UROGYNECOLOGY | Facility: CLINIC | Age: 63
End: 2018-11-13
Payer: COMMERCIAL

## 2018-11-13 VITALS
DIASTOLIC BLOOD PRESSURE: 80 MMHG | SYSTOLIC BLOOD PRESSURE: 122 MMHG | HEIGHT: 65 IN | WEIGHT: 129.19 LBS | BODY MASS INDEX: 21.52 KG/M2

## 2018-11-13 DIAGNOSIS — N95.2 VAGINAL ATROPHY: ICD-10-CM

## 2018-11-13 DIAGNOSIS — Z01.419 WELL WOMAN EXAM: Primary | ICD-10-CM

## 2018-11-13 DIAGNOSIS — Z87.19 HX OF CONSTIPATION: ICD-10-CM

## 2018-11-13 PROCEDURE — 3079F DIAST BP 80-89 MM HG: CPT | Mod: CPTII,S$GLB,, | Performed by: NURSE PRACTITIONER

## 2018-11-13 PROCEDURE — 3074F SYST BP LT 130 MM HG: CPT | Mod: CPTII,S$GLB,, | Performed by: NURSE PRACTITIONER

## 2018-11-13 PROCEDURE — 99396 PREV VISIT EST AGE 40-64: CPT | Mod: S$GLB,,, | Performed by: NURSE PRACTITIONER

## 2018-11-13 PROCEDURE — 99999 PR PBB SHADOW E&M-EST. PATIENT-LVL IV: CPT | Mod: PBBFAC,,, | Performed by: NURSE PRACTITIONER

## 2018-11-13 RX ORDER — DIPHENOXYLATE HYDROCHLORIDE AND ATROPINE SULFATE 2.5; .025 MG/1; MG/1
TABLET ORAL
Refills: 3 | COMMUNITY
Start: 2018-10-19 | End: 2018-11-13

## 2018-11-13 RX ORDER — HYDROCODONE BITARTRATE AND ACETAMINOPHEN 7.5; 325 MG/1; MG/1
1 TABLET ORAL 2 TIMES DAILY PRN
Refills: 0 | COMMUNITY
Start: 2018-10-30 | End: 2018-11-16

## 2018-11-13 RX ORDER — ALPRAZOLAM 1 MG/1
1 TABLET ORAL 2 TIMES DAILY
Refills: 1 | COMMUNITY
Start: 2018-10-09 | End: 2018-11-13

## 2018-11-13 NOTE — PROGRESS NOTES
Urogyn follow up    [facility] - GYNECOLOGY  [address]  [place] [address]    Jill Marquez  [id number]  1955      Jill Marquez is a 63 y.o. here for an annual exam    10/19/15  Title of Operation:  1) Robotic-assisted laparoscopic lysis of adhesions  2) Robotic-assisted laparoscopic bilateral salpingo-oophorectomy  3) Robotic-assisted laparoscopic sacral colpopexy with polypropylene mesh  4) Placement of retropubic tension-free midurethral sling, RetroARC (AMS)  5) Cystourethroscopy    Indications for Surgery:  1) UI: (+) RICK > (+) UUI - mornings only. (--) pads:Changes underwear. May wear pads if on a trip or away from home for an extended time. Daytime frequency: Q 3-4 hours. Nocturia: No: (--) dysuria, (--) hematuria, (--) frequent UTIs. (--) complete bladder emptying. Leans forward to void. Has some urgency in the morning.  --SUDS 10/6/15:  3. URETHRAL FUNCTION/STORAGE PHASE:  a. WITH prolapse reduction:  · CLPP (150 mL): Negative at 162 cm H20  · VLPP (150 mL): Negative at 165 cm H20  · CLPP (300 mL): Negative at 150 cm H20  · VLPP (300 mL): Negative at 147 cm H20  · CLPP (409 mL): Negative at 148 cm H20  · VLPP (409 mL): Negative at 171 cm H20  · CLPP (MAX ): Negative at 155 cm H20  · VLPP (MAX): Negative at 145 cm H20  · POS CLPP and VLPP after removal of pves catheter.  These findings are consistent with Positive urodynamic stress incontinence only at max capacity with POP reduction and removal of pves catheter.  Assessment:  UF prolonged but normal. PF prolonged but normal. Compliance normal. Max capacity 600 mL. DO (--). RICK (+).   --cysto 10/6/15: Normal with slight decrease coaptation and minimal trabeculations.  2) POP: Present. below introitus. Symptoms:(--) (--) vaginal bleeding. (--) vaginal discharge. (+) sexually active. (--) dyspareunia. (+) Vaginal dryness. (--) vaginal estrogen use.   --POP-Q- per Dr. Ballesteros: Aa +3; Ba +3; C -4; Ap -2; Bp -2--standing; Genital hiatus 2 cm,  perineal body 1cm total vaginal length 9cm.   3) BM: (+) constipation/straining. (--) chronic diarrhea. (--) hematochezia. (--) fecal incontinence. (--) fecal smearing/urgency. (--) incomplete evacuation. Using metamucil 2 capsules twice daily and correctol twice weekly.    Preoperative Diagnosis:  1) Cystocele  2) Vaginal vault prolapse  3) Mixed urinary incontinence  4) Vaginal atrophy  5) Urodynamic stress incontinence    Postoperative Diagnosis:  1) Cystocele  2) Vaginal vault prolapse  3) Mixed urinary incontinence  4) Vaginal atrophy  5) Urodynamic stress incontinence    Issues include:  Patient Active Problem List   Diagnosis    Hypothyroid    HTN (hypertension)    Displacement of thoracic intervertebral disc without myelopathy    Hyperlipidemia    Lumbar spondylosis    Carpal tunnel syndrome    Lumbar radiculopathy    Sicca    Fatigue    Myalgia and myositis    Erosive osteoarthritis of both hands    Leg pain, bilateral    Bilateral hand pain    Hepatitis B core antibody positive (negative viral load; suspect false positive)    Elevated CPK    Atrophic vaginitis    Chronic constipation    Screening for colon cancer    Chronic low back pain    CMC arthritis, thumb, degenerative    Mucous cyst of finger    Peripheral spondyloarthritis       Last pap: 12/2005- normal- patient is post hyst  Last mammogram: 04/2018 normal  Colonoscopy: 01/25/2016 one polyp- normal- repeat 2026  DEXA: Elevated BMD of the lumbar spine.  No significant change since prior study.  Adult scoliosis associated with increased lumbar spine BMD.    Recommendations:  1) Adequate calcium and Vitamin D therapy  2) Appropriate exercise  3) Consider repeat BMD in 4 years      Current Outpatient Medications   Medication Sig    amLODIPine (NORVASC) 10 MG tablet TAKE ONE TABLET BY MOUTH EVERY DAY    cholecalciferol, vitamin D3, 1,000 unit Chew Take by mouth.    conjugated estrogens (PREMARIN) vaginal cream APPLY 0.5 GRAMS  "WITH APPLICATOR OR DIME-SIZED AMOUNT TO VAGINA TWICE WEEKLY    fish oil-omega-3 fatty acids 300-1,000 mg capsule Take 2 g by mouth once daily.    fluticasone (FLONASE) 50 mcg/actuation nasal spray 2 sprays by Each Nare route once daily.    gabapentin (NEURONTIN) 300 MG capsule Take 1 capsule (300 mg total) by mouth every evening.    HYDROcodone-acetaminophen (NORCO) 7.5-325 mg per tablet Take 1 tablet by mouth 2 (two) times daily as needed.    L. ACIDOPHILUS/BIFID. ANIMALIS (ONE-A-DAY TRUBIOTICS ORAL) Take by mouth.    PSYLLIUM SEED, WITH SUGAR, (METAMUCIL ORAL) Take by mouth.    SYNTHROID 75 mcg tablet TAKE 1 TABLET (75 MCG TOTAL) BY MOUTH ONCE DAILY.    azelastine (ASTELIN) 137 mcg (0.1 %) nasal spray 1 spray (137 mcg total) by Nasal route 2 (two) times daily.    diclofenac sodium (VOLTAREN) 1 % Gel Apply 2 g topically 4 (four) times daily as needed.    flu vac [id number] 36mos up,PF, 60 mcg (15 mcg x 4)/0.5 mL Syrg inject into the muscle    traMADol (ULTRAM) 50 mg tablet TAKE 1 TABLET (50 MG TOTAL) BY MOUTH 3 (THREE) TIMES DAILY AS NEEDED.     No current facility-administered medications for this visit.        ROS:  Feeling well.   No dyspnea or chest pain on exertion.    No abdominal pain, change in bowel habits, black or bloody stools.  Taking colace and metamucil daily  No urinary tract symptoms. No urgency, frequency or incontinence  GYN ROS: no breast pain or new or enlarging lumps on self exam, no vaginal bleeding.   No neurological complaints.    OBJECTIVE:   /80 (BP Location: Right arm, Patient Position: Sitting, BP Method: Medium (Manual))   Ht 5' 4.8" (1.646 m)   Wt 58.6 kg (129 lb 3 oz)   BMI 21.63 kg/m²    The patient appears well, alert, oriented x 3, in no distress.  ENT normal.  Neck supple. No adenopathy or thyromegaly. MARYAN. .   Abdomen soft without tenderness, guarding, mass or organomegaly.   Extremities show no edema, normal peripheral pulses.   Neurological is normal, no " focal findings.    PELVIC EXAM:   VULVA: normal appearing vulva with no masses, tenderness or lesions,   VAGINA: normal appearing vagina with normal color and discharge, no lesions, atrophic,  CERVIX: surgically absent,   UTERUS: absent,   ADNEXA: no masses,   RECTAL: normal rectal, no masses            Impression  1. Well woman exam     2. Vaginal atrophy     3. Hx of constipation       We reviewed the above issues and discussed options for short-term versus long-term management of her problems.   Plan:      1.  Always get help lifting 50# or more.     2. Vaginal health:  Vaginal atrophy (dryness):  Use 0.5 gram of estrogen cream in vagina  twice a week    3. H/o constipation:  Continue to avoid straining.  Continue stool softeners/fiber.          4. mammogram is due 04/2019-- please call to schedule   5. RTC 10/2019 for annual exam      20 minutes were spent in face to face time with this patient  90 % of this time was spent in counseling and/or coordination of care    STACY Thomson-BC [facility]  Division of Female Pelvic Medicine and Reconstructive Surgery  Department of Obstetrics & Gynecology

## 2018-11-13 NOTE — PATIENT INSTRUCTIONS
1.  Always get help lifting 50# or more.     2. Vaginal health:  Vaginal atrophy (dryness):  Use 0.5 gram of estrogen cream in vagina  twice a week    3. H/o constipation:  Continue to avoid straining.  Continue stool softeners/fiber.          4. mammogram is due 04/2019-- please call to schedule   5. RTC 10/2019 for f/u

## 2018-11-16 ENCOUNTER — CLINICAL SUPPORT (OUTPATIENT)
Dept: INFECTIOUS DISEASES | Facility: CLINIC | Age: 63
End: 2018-11-16
Payer: COMMERCIAL

## 2018-11-16 ENCOUNTER — OFFICE VISIT (OUTPATIENT)
Dept: RHEUMATOLOGY | Facility: CLINIC | Age: 63
End: 2018-11-16
Payer: COMMERCIAL

## 2018-11-16 ENCOUNTER — OFFICE VISIT (OUTPATIENT)
Dept: INFECTIOUS DISEASES | Facility: CLINIC | Age: 63
End: 2018-11-16
Payer: COMMERCIAL

## 2018-11-16 ENCOUNTER — TELEPHONE (OUTPATIENT)
Dept: RHEUMATOLOGY | Facility: CLINIC | Age: 63
End: 2018-11-16

## 2018-11-16 VITALS
DIASTOLIC BLOOD PRESSURE: 98 MMHG | TEMPERATURE: 98 F | HEART RATE: 80 BPM | HEIGHT: 64 IN | WEIGHT: 130.31 LBS | BODY MASS INDEX: 22.25 KG/M2 | SYSTOLIC BLOOD PRESSURE: 144 MMHG

## 2018-11-16 VITALS
DIASTOLIC BLOOD PRESSURE: 92 MMHG | SYSTOLIC BLOOD PRESSURE: 141 MMHG | BODY MASS INDEX: 22.36 KG/M2 | HEART RATE: 83 BPM | RESPIRATION RATE: 20 BRPM | HEIGHT: 64 IN

## 2018-11-16 DIAGNOSIS — Z23 IMMUNIZATION DUE: Primary | ICD-10-CM

## 2018-11-16 DIAGNOSIS — R74.8 ELEVATED CPK: ICD-10-CM

## 2018-11-16 DIAGNOSIS — L40.50 PSA (PSORIATIC ARTHRITIS): Primary | ICD-10-CM

## 2018-11-16 DIAGNOSIS — D84.9 IMMUNOCOMPROMISED, ACQUIRED: ICD-10-CM

## 2018-11-16 DIAGNOSIS — M47.819 PERIPHERAL SPONDYLOARTHRITIS: ICD-10-CM

## 2018-11-16 PROCEDURE — 99213 OFFICE O/P EST LOW 20 MIN: CPT | Mod: S$GLB,,, | Performed by: INTERNAL MEDICINE

## 2018-11-16 PROCEDURE — 3077F SYST BP >= 140 MM HG: CPT | Mod: CPTII,S$GLB,, | Performed by: PHYSICIAN ASSISTANT

## 2018-11-16 PROCEDURE — 99999 PR PBB SHADOW E&M-EST. PATIENT-LVL IV: CPT | Mod: PBBFAC,,, | Performed by: INTERNAL MEDICINE

## 2018-11-16 PROCEDURE — 3008F BODY MASS INDEX DOCD: CPT | Mod: CPTII,S$GLB,, | Performed by: PHYSICIAN ASSISTANT

## 2018-11-16 PROCEDURE — 90746 HEPB VACCINE 3 DOSE ADULT IM: CPT | Mod: S$GLB,,, | Performed by: INTERNAL MEDICINE

## 2018-11-16 PROCEDURE — 3080F DIAST BP >= 90 MM HG: CPT | Mod: CPTII,S$GLB,, | Performed by: INTERNAL MEDICINE

## 2018-11-16 PROCEDURE — 3077F SYST BP >= 140 MM HG: CPT | Mod: CPTII,S$GLB,, | Performed by: INTERNAL MEDICINE

## 2018-11-16 PROCEDURE — 99999 PR PBB SHADOW E&M-EST. PATIENT-LVL III: CPT | Mod: PBBFAC,,, | Performed by: PHYSICIAN ASSISTANT

## 2018-11-16 PROCEDURE — 90632 HEPA VACCINE ADULT IM: CPT | Mod: S$GLB,,, | Performed by: INTERNAL MEDICINE

## 2018-11-16 PROCEDURE — 99204 OFFICE O/P NEW MOD 45 MIN: CPT | Mod: S$GLB,,, | Performed by: PHYSICIAN ASSISTANT

## 2018-11-16 PROCEDURE — 3080F DIAST BP >= 90 MM HG: CPT | Mod: CPTII,S$GLB,, | Performed by: PHYSICIAN ASSISTANT

## 2018-11-16 PROCEDURE — 90472 IMMUNIZATION ADMIN EACH ADD: CPT | Mod: S$GLB,,, | Performed by: INTERNAL MEDICINE

## 2018-11-16 PROCEDURE — 3008F BODY MASS INDEX DOCD: CPT | Mod: CPTII,S$GLB,, | Performed by: INTERNAL MEDICINE

## 2018-11-16 PROCEDURE — 90471 IMMUNIZATION ADMIN: CPT | Mod: S$GLB,,, | Performed by: INTERNAL MEDICINE

## 2018-11-16 PROCEDURE — 99999 PR PBB SHADOW E&M-EST. PATIENT-LVL II: CPT | Mod: PBBFAC,,,

## 2018-11-16 RX ORDER — SULFASALAZINE 500 MG/1
1000 TABLET, DELAYED RELEASE ORAL 2 TIMES DAILY
Qty: 120 TABLET | Refills: 2 | Status: SHIPPED | OUTPATIENT
Start: 2018-11-16 | End: 2018-12-10 | Stop reason: SINTOL

## 2018-11-16 RX ORDER — SULFASALAZINE 500 MG/1
1000 TABLET, DELAYED RELEASE ORAL 2 TIMES DAILY
Qty: 120 TABLET | Refills: 2 | Status: SHIPPED | OUTPATIENT
Start: 2018-11-16 | End: 2018-11-16

## 2018-11-16 ASSESSMENT — ROUTINE ASSESSMENT OF PATIENT INDEX DATA (RAPID3)
AM STIFFNESS SCORE: 0, NO
FATIGUE SCORE: .5
MDHAQ FUNCTION SCORE: 0
PATIENT GLOBAL ASSESSMENT SCORE: .5
TOTAL RAPID3 SCORE: .33
PSYCHOLOGICAL DISTRESS SCORE: 2.2
PAIN SCORE: .5

## 2018-11-16 ASSESSMENT — DISEASE ACTIVITY SCORE (DAS28)
TENDER_JOINTS_COUNT: 0
SWOLLEN_JOINTS_COUNT: 3
ESR_MM_PER_HR: 4
CRP_MG_PER_LITER: 6.4
GLOBAL_HEALTH_SCORE: 5
TOTAL_SCORE_CRP: 2.24
TOTAL_SCORE_ESR: 1.53

## 2018-11-16 NOTE — ASSESSMENT & PLAN NOTE
TJ =0 SJ= 3 Pt global 5  ESR 4  CRP 6.4  DAS28 1.53   [id number] 2.24(both remission)  DAPSA TJ = 1 SJ =4 Pt global 0.5  Pt pain 0.5  CRP 0.64= 6.64(LDA)      CBC, CMP, ESR, CRP today and monthly x 3.  Will not proceed with methotrexate given her enjoying cocktail nightly, and she will feel more comfortable with sulfasalazine-EC 500mg twice daily x 1 wk, then 1000mg twice daily  ACR Handout on sulfasalazine mailed  RTC 3 months if no response, leflunomide and if no response apremilast or other

## 2018-11-16 NOTE — PROGRESS NOTES
Subjective:      Patient ID: Jill Marquez is a 63 y.o. female.    Chief Complaint:Follow-up      History of Present Illness    {ID HPI BLOCKS:14188}    Review of Systems   Constitution: Negative for chills, decreased appetite, fever, weakness, malaise/fatigue, night sweats, weight gain and weight loss.   HENT: Negative for congestion, ear pain, hearing loss, hoarse voice, sore throat and tinnitus.    Eyes: Negative for blurred vision, redness and visual disturbance.   Cardiovascular: Negative for chest pain, leg swelling and palpitations.   Respiratory: Negative for cough, hemoptysis, shortness of breath and sputum production.    Hematologic/Lymphatic: Negative for adenopathy. Does not bruise/bleed easily.   Skin: Negative for dry skin, itching, rash and suspicious lesions.   Musculoskeletal: Positive for joint pain and neck pain. Negative for back pain and myalgias.   Gastrointestinal: Negative for abdominal pain, constipation, diarrhea, heartburn, nausea and vomiting.   Genitourinary: Negative for dysuria, flank pain, frequency, hematuria, hesitancy and urgency.   Neurological: Negative for dizziness, headaches, numbness and paresthesias.   Psychiatric/Behavioral: Negative for depression and memory loss. The patient does not have insomnia and is not nervous/anxious.      Objective:   Physical Exam  Assessment:       No diagnosis found.      Plan:       ***

## 2018-11-16 NOTE — PROGRESS NOTES
Pre Biologic Response Modifier Therapy Consult  BMR Recipient Evaluation    Referring Provider:     Reason for Visit:    Chief Complaint   Patient presents with    Follow-up     History of Present Illness  Jill Marquez is a 63 y.o. year old White female with advanced Rheumatoid Arthritis currently being evaluated for prior to starting biologic response modifer (BRM) therapy.  Patient denies any recent fever, chills, or infective infective illnesses.      1) Do you have a history of:    YES NO   Diabetes                 []       [x]   Diabetic Foot Infection/Bone Infection  []  [x]   Surgical Removal of Spleen    []  [x]       2) Have you had recurrent infections involving:             YES NO  Sinus infections  [x]  []  1x/yr typically allergy related sometimes w/ abx  Sore Throat   []  [x]                             Prostate Infections  []  [x]  .                         Bladder Infections  []  [x]                                 Kidney Infections  []  [x]        Intestinal Infections  []  [x]   Skin Infections   []  [x]                   Reproductive Infections           []  [x]     Periodontal Disease   []  [x]                 3)Have you ever had: YES     NO UNKNOWN      Chicken Pox   [x]  []  []                 Shingles   []  [x]  []                Orolabial Herpes             []  [x]  []       Genital Herpes  []  [x]  []           Cytomegalovirus  []  [x]  []               Jenise-Barr Virus  []  [x]  []              Genital Warts   []  [x]  []                Hepatitis A   []  [x]  []             Hepatitis B   []  [x]  []                Hepatitis C   []  [x]  []                 Syphilis   []  [x]  []                Gonorrhea   []  [x]  []                 Pelvic Inflammatory  []  [x]  []   Disease   []  [x]  []                    Chlamydia Infection  []  [x]  []                Intestinal parasites        or worms   []  [x]  []                 Fungal Infections  []  [x]  []                Blood  Infections  []  [x]  []     Comment:       4) Have you ever been exposed   YES NO  to someone with tuberculosis?  []  [x]   If yes, what treatment did you receive:     5) What states have you lived in?  [place] ([id number])    6) What countries have you visited for more than 2 weeks?          [place] 2 weeks                YES NO  7) Did you have any associated infections?     []  [x]     8) Are you planning to travel outside the           [place] after starting BRMs?    [x]   []           Household                  YES NO  9 Do you have pets living in your house?  []   [x]             If yes, describe:     Do you spend time or live on a farm or                 have livestock or other farm animals?   []  [x]   If yes, which ones:    Do you have a fish tank?     []  [x]                       Do you have a litter box?     []  [x]                        Do you fish or hunt?     []  [x]                       Do you clean or skin fish or animals?   []  [x]                      Do you consume raw or undercooked                meat, fish, or shellfish?     []  [x]         10) What occupations have you had?             11) Patient reports hobby to be exercise, walks, [employer]                           12)Do you garden or otherwise  YES NO   work in the soil?    [x]  []   13)Do you hike, camp, or spend   time in wooded areas?   []  [x]                           14) The patient's immunization history was reviewed.   Have you ever received:  YES NO UNKNOWN DATES  Routine Childhood vaccines  [x]  []  []                Influenza vaccine   [x]  []  []   2018      Pneumonia    [x]  []  []   Prevnar 13 10/16/18      Tetanus-diptheria   [x]  []  []   2010  Hepatitis A vaccine series       []  [x]  []       Hepatitis B vaccine series       []  [x]  []        Meningitis vaccine   []  [x]  []     Varicella vaccine   []  [x]  []                  Based on the patients immunization history and serologies, immunizations were  ordered:         Ordered  Not Ordered  Influenza Vaccine     []    [x]   Hepatitis A series at 0, 6 months   [x]    []   Hepatitis B sries at 0, 1, and 6 months  []    [x]   Hepatitis B High Dose series 0,1, and 6 months [x]    []   Prevnar      []    [x]   Pneumovax      []    [x]      Rx given 12/16/18  TDap       []    [x]   Zoster       []    [x]   Menactra      []    [x]   HiB       []    [x]    Shingrix        []    [x]  Setup by primary care doctor 1st dose taken yesterday     The patient was encouraged to contact us about any problems that may develop after immunization and possible side effects were reviewed.       Allergies: No known allergies  Immunization History   Administered Date(s) Administered    Influenza 10/17/2007, 10/27/2008, 10/01/2012, 10/04/2013    Influenza - Quadrivalent 10/17/2014, 10/01/2015, 10/07/2016    Influenza - Quadrivalent - PF 09/28/2017, 09/28/2018    Influenza Split 10/04/2013    Pneumococcal Conjugate - 13 Valent 10/16/2018    Tdap 04/28/2010    Zoster 10/23/2013     Past Medical History:   Diagnosis Date    DJD (degenerative joint disease) of lumbar spine 3/10/2014    HTN (hypertension) 7/23/2012    Hyperlipidemia     Hypothyroid 7/23/2012     Past Surgical History:   Procedure Laterality Date    COLONOSCOPY N/A 1/25/2016    Procedure: COLONOSCOPY;  Surgeon: DAYNA Simmons MD;  Location: [facility] ([address] [place] [address]);  Service: Endoscopy;  Laterality: N/A;    COLONOSCOPY N/A 1/25/2016    [facility]    COSMETIC SURGERY      EYE SURGERY      eyelid surgery      HYSTERECTOMY  2004    prolapse    OOPHORECTOMY Bilateral 2015    REPAIR-ANTERIOR (COLPORRHAPHY) N/A 10/19/2015    Per[facility] by [facility]    ROBOTIC ASSISTED LAPAROSCOPIC ABDOMINAL-SACRAL COLPOPEXY N/A 10/19/2015    Performed by Marco [facility]os [facility] at [facility]    RYULJBAD-[id number] (BSO) Bilateral 10/19/2015    Performed by Marco PANIAGUA  MD Lesli at [facility] OR    SLING-PUBOVAGINAL W CYSTO N/A 10/19/2015    Performed by [facility]os [facility] at [facility] OR    Tonsillectomy      TONSILLECTOMY        Social History     Socioeconomic History    Marital status:      Spouse name: Not on file    Number of children: 2    Years of education: Not on file    Highest education level: Not on file   Social Needs    Financial resource strain: Not on file    Food insecurity - worry: Not on file    Food insecurity - inability: Not on file    Transportation needs - medical: Not on file    Transportation needs - non-medical: Not on file   Occupational History    Not on file   Tobacco Use    Smoking status: Never Smoker    Smokeless tobacco: Never Used    Tobacco comment: , homemaker, 2 children   Substance and Sexual Activity    Alcohol use: Yes     Alcohol/week: 0.0 oz     Comment: 2 cocktails    Drug use: No    Sexual activity: Yes     Partners: Male     Birth control/protection: Surgical, None   Other Topics Concern    Not on file   Social History Narrative    Not on file       Review of Systems   Constitution: Negative for chills, decreased appetite, diaphoresis, fever, weakness, malaise/fatigue, night sweats, weight gain and weight loss.   HENT: Negative for congestion, ear pain, hearing loss, sore throat, stridor and tinnitus.    Eyes: Negative for blurred vision, double vision and redness.   Cardiovascular: Negative for chest pain, leg swelling and palpitations.   Respiratory: Negative for cough, hemoptysis, shortness of breath, sputum production and wheezing.    Hematologic/Lymphatic: Negative for adenopathy. Does not bruise/bleed easily.   Skin: Negative for dry skin, itching, rash and suspicious lesions.   Musculoskeletal: Positive for arthritis, joint pain and joint swelling. Negative for back pain, myalgias and neck pain.   Gastrointestinal: Negative for abdominal pain, constipation, diarrhea, heartburn, nausea and vomiting.    Genitourinary: Negative for bladder incontinence, dysuria, flank pain, frequency, hesitancy, incomplete emptying and urgency.   Neurological: Negative for dizziness, focal weakness, headaches, loss of balance and numbness.   Psychiatric/Behavioral: Negative for depression and memory loss. The patient does not have insomnia and is not nervous/anxious.      Physical Exam   Constitutional: She is oriented to person, place, and time. She appears well-developed and well-nourished. No distress.   HENT:   Head: Normocephalic and atraumatic.   Mouth/Throat: Uvula is midline, oropharynx is clear and moist and mucous membranes are normal. No oral lesions.   Eyes: EOM are normal. Pupils are equal, round, and reactive to light. No scleral icterus.   Cardiovascular: Normal rate, regular rhythm and normal heart sounds. Exam reveals no gallop and no friction rub.   No murmur heard.  Pulmonary/Chest: Effort normal and breath sounds normal. No respiratory distress. She has no wheezes. She has no rales.   Abdominal: Soft. Bowel sounds are normal. She exhibits no distension and no mass. There is no hepatosplenomegaly. There is no tenderness. There is no rebound and no guarding.   Musculoskeletal: She exhibits deformity. She exhibits no edema.   Neurological: She is alert and oriented to person, place, and time.   Skin: Skin is warm, dry and intact. No rash noted. There is erythema.        Psychiatric: She has a normal mood and affect. Her behavior is normal.     Diagnostics:   RPR   Date Value Ref Range Status   10/16/2018 Non-reactive Non-reactive Final     No results found for: CMVANTIBODIE  No results found for: HIV1X2  No results found for: HTLVIIIANTIB  Hepatitis B Surface Ag   Date Value Ref Range Status   10/16/2018 Negative  Final     Hep B Core Total Ab   Date Value Ref Range Status   10/16/2018 Negative  Final     Hepatitis C Ab   Date Value Ref Range Status   10/16/2018 Negative  Final     No results found for:  TOXOIGG  No components found for: TOXOIGGINTER  No results found for: RRQ0SDR  No results found for: KZM8VMF  No results found for: VARICELLAZOS  No results found for: VARICELLAINT  Strongyloides Ab IgG   Date Value Ref Range Status   10/16/2018 Negative Negative Final     Comment:     No detectable levels of IgG antibodies to Strongyloides.  Repeat testing in 1-2 weeks if clinically indicated.  Test Performed by:  [facility]  [address] [place] [address]       No results found for: EPSTEINBARRV  Hep B S Ab   Date Value Ref Range Status   10/16/2018 Negative  Final     No results found for: QUANTIFERON  Hep A IgM   Date Value Ref Range Status   06/26/2015 Negative  Final     No results found for: PPD  No results found for this or any previous visit.        BRM - Candidacy    Biologic Response Modifier Candidacy: Based on available information, there are no identified significant barriers to BRMs from an infectious disease standpoint pending acceptable serologies.  Final determination of BRM candidacy will be made once evaluation is complete and reviewed.    It is recommended the patient have their teeth treated prior to brm.    - Prevnar 13 given last month followed by Pneumovax rx for 8 weeks after Prevnar 13  - Hepatitis A vaccine today (ordered) and in 6 months rx given  - Hepatitis B vaccine (40 mcg/mL) today, in 1 month and 6 months rx given      Ambrose Arellano PA-C        Counseling:I discussed with Jill the risk for increased susceptibility to infections following BRM therapy including increased risk for infection.  The patients has been counseled on the importance of vaccinations including but not limited to a yearly flu vaccine.Specific guidance has been provided to the patient regarding the patients occupation, hobbies and activities to avoid future infectious complications including but not limited to avoiding undercooked meats and  seafood, proper hygiene, and contact with animals.

## 2018-11-16 NOTE — PROGRESS NOTES
"Subjective:       Patient ID: Jill Marquez is a 63 y.o. female.    Chief Complaint: Polyarthritis(erosive OA v peripheral spondyloarthritis/ PsA) elevated CK  HPI she feels hands are doing well. Some tender synovitis and Heberden's right index finger and some non-tender swelling right index and middle mcps and left middle mcp. No am stiffness. Has "dent" right ring fingernail only. No personal or family history of psoriasis.She likes to have 1 cocktail each evening and therefore concerned with methotrexate. Discussed other options: sulfasalazine, leflunomide.  Review of Systems   Constitutional: Positive for fever (0.5/10). Negative for appetite change, fatigue and unexpected weight change.   HENT: Negative for mouth sores.         Dry mouth   Eyes: Negative for visual disturbance.        Dry   Respiratory: Negative for cough, shortness of breath and wheezing.    Cardiovascular: Negative for chest pain and palpitations.   Gastrointestinal: Positive for constipation. Negative for abdominal pain, anal bleeding, blood in stool, diarrhea, nausea and vomiting.   Genitourinary: Negative for dysuria, frequency and urgency.   Musculoskeletal: Negative for arthralgias, back pain, gait problem, joint swelling, myalgias, neck pain and neck stiffness.   Skin: Negative for rash.   Neurological: Negative for weakness, numbness and headaches.   Hematological: Negative for adenopathy. Does not bruise/bleed easily.   Psychiatric/Behavioral: Negative for sleep disturbance. The patient is not nervous/anxious.          Objective:   BP (!) 141/92   Pulse 83   Resp 20   Ht 5' 4" (1.626 m)   BMI 22.36 kg/m²      Physical Exam       Right Side Rheumatological Exam     Examination finds the shoulder, elbow, wrist, knee, 1st MCP, 4th MCP and 5th MCP normal.    The patient is tender to palpation of the 2nd DIP    She has swelling of the 2nd MCP, 3rd MCP and 2nd DIP    The patient has an enlarged 1st PIP, 2nd PIP, 3rd PIP, 4th PIP, 5th " PIP, 1st CMC, 2nd DIP, 3rd DIP, 4th DIP and 5th DIP    Left Side Rheumatological Exam     Examination finds the shoulder, elbow, wrist, knee, 1st MCP, 2nd MCP, 4th MCP and 5th MCP normal.    She has swelling of the 3rd MCP    The patient has an enlarged 1st PIP, 2nd PIP, 3rd PIP, 4th PIP, 5th PIP, 1st CMC, 2nd DIP, 3rd DIP, 4th DIP and 5th DIP.      Skin: No rash noted. No erythema. No pallor.     Pitting right ring finger nail   Psychiatric: Mood, memory, affect and judgment normal.         Assessment/Plan         Problem List Items Addressed This Visit     Elevated CPK    Overview     Results for PANKAJ CARTER (MRN [id number]) as of 11/16/2018 13:06   Ref. Range 10/16/2018 10:50   HMGCR Antibody Latest Ref Range: 0 - 19 Units <3   Results for PANKAJ CARTER (MRN [id number]) as of 11/16/2018 13:06   Ref. Range 6/26/2015 11:10 6/30/2015 12:50 5/29/2018 11:46 10/16/2018 10:50   CPK Latest Ref Range: 20 - 180 U/L 325 (H) 226 (H) 272 (H) 280 (H)   Results for PANKAJ CARTER (MRN [id number]) as of 11/16/2018 13:06   Ref. Range 10/16/2018 10:50   Anti-Ladonna-1 Antibody Latest Ref Range: <20 Units <20   Anti-PM/Scl Ab Latest Ref Range: <20 Units <20   Anti-SS-A 52 kD Ab, IgG Latest Ref Range: <20 Units <20   Anti-U1-RNP  Ab Latest Ref Range: <20 Units <20   EJ Latest Ref Range: Negative  Negative   Fibrillarin (U3 RNP) Latest Ref Range: Negative  Negative   HMGCR Antibody Latest Ref Range: 0 - 19 Units <3   Ku Latest Ref Range: Negative  Negative   MDA-5 (P140) (CADM-140) Latest Ref Range: <20 Units <20   MI-2 Latest Ref Range: Negative  Negative   NXP-2 (P140) Latest Ref Range: <20 Units <20   OJ Latest Ref Range: Negative  Negative   PL-12 Latest Ref Range: Negative  Negative   PL-7 Latest Ref Range: Negative  Negative   SRP Latest Ref Range: Negative  Negative   TIF1 GAMMA (P155/140) Latest Ref Range: <20 Units <20   U2 snRNP Latest Ref Range: Negative  Negative            Peripheral spondyloarthritis    Overview     Results  for PANKAJ CARTER (MRN [id number]) as of 11/16/2018 13:06   Ref. Range 6/26/2015 11:10 5/29/2018 11:46 10/16/2018 10:50   Anti-SSA Antibody Latest Ref Range: 0.00 - 19.99 EU 3.90     Anti-SSA Interpretation Latest Ref Range: Negative  Negative     Anti-SSB Antibody Latest Ref Range: 0.00 - 19.99 EU  0.09    Anti-SSB Interpretation Latest Ref Range: Negative   Negative    SAMIR Screen Latest Ref Range: Negative <1:160  Negative <1:160     Anti-Ladonna-1 Antibody Latest Ref Range: <20 Units   <20   Anti-PM/Scl Ab Latest Ref Range: <20 Units   <20   Anti-SS-A 52 kD Ab, IgG Latest Ref Range: <20 Units   <20   Anti-U1-RNP  Ab Latest Ref Range: <20 Units   <20   CCP Antibodies Latest Ref Range: <5.0 U/mL <0.5 <0.5    EJ Latest Ref Range: Negative    Negative   Fibrillarin (U3 RNP) Latest Ref Range: Negative    Negative   HMGCR Antibody Latest Ref Range: 0 - 19 Units   <3   Ku Latest Ref Range: Negative    Negative   MDA-5 (P140) (CADM-140) Latest Ref Range: <20 Units   <20   MI-2 Latest Ref Range: Negative    Negative   NXP-2 (P140) Latest Ref Range: <20 Units   <20   OJ Latest Ref Range: Negative    Negative   PL-12 Latest Ref Range: Negative    Negative   PL-7 Latest Ref Range: Negative    Negative   Rheumatoid Factor Latest Ref Range: 0.0 - 15.0 IU/mL 10.0 <10.0    SRP Latest Ref Range: Negative    Negative   TIF1 GAMMA (P155/140) Latest Ref Range: <20 Units   <20   U2 snRNP Latest Ref Range: Negative    Negative     Dale Kerr MD 3/5/2018       Narrative     AP view of bilateral hands.    Indication: Pain    Comparison: 6/26/15    Findings      Impression       No acute fractures. Degenerative changes of the hand are unchanged, most notable at the bilateral basal joints and second and fifth DIP joints bilaterally.      Electronically signed by: DALE KERR MD  Date: 03/05/18  Time: 14:38      Results for PANKAJ CARTER (MRN [id number]) as of 11/16/2018 13:06   Ref. Range 10/16/2018 10:50   Hep B Core  Total Ab Unknown Negative   Hep B S Ab Unknown Negative   Hepatitis B Surface Ag Unknown Negative   Hepatitis C Ab Unknown Negative   Mitogen - Nil Latest Ref Range: See text IU/mL >10.000   NIL Latest Ref Range: See text IU/mL 0.070   TB Gold Plus Unknown Negative   TB1 - Nil Latest Ref Range: See text IU/mL 0.020   TB2 - Nil Latest Ref Range: See text IU/mL 0.010   HIV 1/2 Ag/Ab Latest Ref Range: Negative  Negative   RPR Latest Ref Range: Non-reactive  Non-reactive   Strongyloides Ab IgG Latest Ref Range: Negative  Negative   Result Notes for X-Ray Chest PA And Lateral     Notes recorded by Nicanor Campos MD on 10/16/2018 at 4:27 PM CDT  The chest x-ray is normal. RJQ     Results for PANKAJ CARTER (MRN [id number]) as of 11/16/2018 13:06   Ref. Range 10/16/2018 10:50   CPK Latest Ref Range: 20 - 180 U/L 280 (H)            Current Assessment & Plan     TJ =0 SJ= 3 Pt global 5  ESR  CRP  DAS28   [id number]  DAPSA TJ = 1 SJ =4 Pt global 0.5  Pt pain 0.5  CRP   =  6(LDA)\      CBC, CMP, ESR, CRP today and monthly x 3.  Will not proceed with methotrexate given cocktail nightly, and she will feel more comfortable with sulfasalazine-EC 500mg twice daily x 1 wk, then 1000mg twice daily  ACR Handout on sulfasalazine mailed  RTC 3 months if no response, leflunomide and if no response apremilast or other         Relevant Orders    Sedimentation rate    C-reactive protein    CBC auto differential    Comprehensive metabolic panel      Other Visit Diagnoses     PSA (psoriatic arthritis)    -  Primary    Relevant Medications    sulfaSALAzine (AZULFIDINE) 500 MG TbEC    Other Relevant Orders    Sedimentation rate    C-reactive protein    CBC auto differential    Comprehensive metabolic panel

## 2018-11-16 NOTE — LETTER
November 17, 2018      Nicanor Campos MD  [address]  [facility] [address]           [facility]  [address]  [facility] [address]  Phone: [phone]  Fax: [phone]          Patient: Jill Marquez   MR Number: [id number]   YOB: 1955   Date of Visit: 11/16/2018       Dear Dr. Nicanor Campos:    Thank you for referring Jill Marquez to me for evaluation. Attached you will find relevant portions of my assessment and plan of care.    If you have questions, please do not hesitate to call me. I look forward to following Jill Marquez along with you.    Sincerely,    Ambrose Arellano PA-C    Enclosure  CC:  No Recipients    If you would like to receive this communication electronically, please contact [email or web address] or [phone] to request more information on [facility] access.    For providers and/or their staff who would like to refer a patient to [facility], please contact us through our one-stop-shop provider referral line, [facility], at [phone].    If you feel you have received this communication in error or would no longer like to receive these types of communications, please e-mail [email or web address]

## 2018-11-19 ENCOUNTER — LAB VISIT (OUTPATIENT)
Dept: LAB | Facility: HOSPITAL | Age: 63
End: 2018-11-19
Attending: INTERNAL MEDICINE
Payer: COMMERCIAL

## 2018-11-19 DIAGNOSIS — M47.819 PERIPHERAL SPONDYLOARTHRITIS: ICD-10-CM

## 2018-11-19 DIAGNOSIS — L40.50 PSA (PSORIATIC ARTHRITIS): ICD-10-CM

## 2018-11-19 LAB
ALBUMIN SERPL BCP-MCNC: 4 G/DL
ALP SERPL-CCNC: 53 U/L
ALT SERPL W/O P-5'-P-CCNC: 33 U/L
ANION GAP SERPL CALC-SCNC: 8 MMOL/L
AST SERPL-CCNC: 38 U/L
BASOPHILS # BLD AUTO: 0.04 K/UL
BASOPHILS NFR BLD: 0.9 %
BILIRUB SERPL-MCNC: 0.4 MG/DL
BUN SERPL-MCNC: 20 MG/DL
CALCIUM SERPL-MCNC: 10.4 MG/DL
CHLORIDE SERPL-SCNC: 102 MMOL/L
CO2 SERPL-SCNC: 31 MMOL/L
CREAT SERPL-MCNC: 0.7 MG/DL
CRP SERPL-MCNC: 6.4 MG/L
DIFFERENTIAL METHOD: ABNORMAL
EOSINOPHIL # BLD AUTO: 0.1 K/UL
EOSINOPHIL NFR BLD: 2.6 %
ERYTHROCYTE [DISTWIDTH] IN BLOOD BY AUTOMATED COUNT: 13.4 %
ERYTHROCYTE [SEDIMENTATION RATE] IN BLOOD BY WESTERGREN METHOD: 4 MM/HR
EST. GFR  (AFRICAN AMERICAN): >60 ML/MIN/1.73 M^2
EST. GFR  (NON AFRICAN AMERICAN): >60 ML/MIN/1.73 M^2
GLUCOSE SERPL-MCNC: 105 MG/DL
HCT VFR BLD AUTO: 38.9 %
HGB BLD-MCNC: 12.9 G/DL
IMM GRANULOCYTES # BLD AUTO: 0.01 K/UL
IMM GRANULOCYTES NFR BLD AUTO: 0.2 %
LYMPHOCYTES # BLD AUTO: 1.9 K/UL
LYMPHOCYTES NFR BLD: 43.3 %
MCH RBC QN AUTO: 32.3 PG
MCHC RBC AUTO-ENTMCNC: 33.2 G/DL
MCV RBC AUTO: 98 FL
MONOCYTES # BLD AUTO: 0.6 K/UL
MONOCYTES NFR BLD: 14.2 %
NEUTROPHILS # BLD AUTO: 1.7 K/UL
NEUTROPHILS NFR BLD: 38.8 %
NRBC BLD-RTO: 0 /100 WBC
PLATELET # BLD AUTO: 233 K/UL
PMV BLD AUTO: 10.6 FL
POTASSIUM SERPL-SCNC: 5 MMOL/L
PROT SERPL-MCNC: 7.2 G/DL
RBC # BLD AUTO: 3.99 M/UL
SODIUM SERPL-SCNC: 141 MMOL/L
WBC # BLD AUTO: 4.3 K/UL

## 2018-11-19 PROCEDURE — 36415 COLL VENOUS BLD VENIPUNCTURE: CPT | Mod: PO

## 2018-11-19 PROCEDURE — 86140 C-REACTIVE PROTEIN: CPT

## 2018-11-19 PROCEDURE — 85025 COMPLETE CBC W/AUTO DIFF WBC: CPT

## 2018-11-19 PROCEDURE — 85652 RBC SED RATE AUTOMATED: CPT

## 2018-11-19 PROCEDURE — 80053 COMPREHEN METABOLIC PANEL: CPT

## 2018-11-28 ENCOUNTER — OFFICE VISIT (OUTPATIENT)
Dept: OPTOMETRY | Facility: CLINIC | Age: 63
End: 2018-11-28
Payer: COMMERCIAL

## 2018-11-28 DIAGNOSIS — H25.13 NUCLEAR SCLEROSIS, BILATERAL: Primary | ICD-10-CM

## 2018-11-28 DIAGNOSIS — Z13.5 GLAUCOMA SCREENING: ICD-10-CM

## 2018-11-28 DIAGNOSIS — H02.88B MEIBOMIAN GLAND DYSFUNCTION (MGD), BILATERAL, BOTH UPPER AND LOWER LIDS: ICD-10-CM

## 2018-11-28 DIAGNOSIS — H02.88A MEIBOMIAN GLAND DYSFUNCTION (MGD), BILATERAL, BOTH UPPER AND LOWER LIDS: ICD-10-CM

## 2018-11-28 PROCEDURE — 99999 PR PBB SHADOW E&M-EST. PATIENT-LVL III: CPT | Mod: PBBFAC,,, | Performed by: OPTOMETRIST

## 2018-11-28 PROCEDURE — 92014 COMPRE OPH EXAM EST PT 1/>: CPT | Mod: S$GLB,,, | Performed by: OPTOMETRIST

## 2018-11-28 NOTE — PROGRESS NOTES
[facility]     MsLevi Jill Marquez for annual eye exam.  Patient sts no vision issues or compaints    Would patient like a refraction today? declines    (-)drops  (-)flashes  (-)floaters  (-)diplopia    Diabetic: n/a  Hemoglobin A1C       Date                     Value               Ref Range             Status                04/28/2017               5.6                 4.5 - 6.2 %           Final                OCULAR HISTORY  Last Eye Exam: 2017 with Jimena  (-)eye surgery   (-)diagnosed or treated for any eye conditions or diseases -    FAMILY HISTORY  (-)Glaucoma -        Last edited by Nicanor Hess, OD on 11/28/2018  8:24 AM. (History)            Assessment /Plan     For exam results, see Encounter Report.    Nuclear sclerosis, bilateral  -Educated patient on presence of cataracts at today's exam, monitor at annual dilated fundus exam. 5+ years surgical estimate.    Glaucoma screening  -Monitor with annual eye exam and IOP check    Meibomian gland dysfunction (MGD), bilateral, both upper and lower lids  -Systane Complete PRN      RTC 1 yr

## 2018-12-05 DIAGNOSIS — N95.2 ATROPHIC VAGINITIS: ICD-10-CM

## 2018-12-05 NOTE — TELEPHONE ENCOUNTER
Pt requesting refill of premarin vaginal cream electronically. Pt's last clinic visit was 11/13/18 for [facility].     Jill ERNESTO Marquez would like a refill of the following medications:         conjugated estrogens (PREMARIN) vaginal cream [Naya Gibson NP]       Patient Comment: Please send refill order for this prescription.  Thank you!     Preferred pharmacy: [facility] [address] [address]   Delivery method: Pickup

## 2018-12-08 ENCOUNTER — PATIENT MESSAGE (OUTPATIENT)
Dept: RHEUMATOLOGY | Facility: CLINIC | Age: 63
End: 2018-12-08

## 2018-12-10 ENCOUNTER — TELEPHONE (OUTPATIENT)
Dept: RHEUMATOLOGY | Facility: CLINIC | Age: 63
End: 2018-12-10

## 2018-12-10 NOTE — TELEPHONE ENCOUNTER
Traci, please cancel upcoming labs, and move up her f/u appt to Jan. To discuss other therapeutic options given intolerance to sulfasalazine. Thanks CLAUDIA

## 2019-01-10 ENCOUNTER — CLINICAL SUPPORT (OUTPATIENT)
Dept: INFECTIOUS DISEASES | Facility: CLINIC | Age: 64
End: 2019-01-10
Payer: COMMERCIAL

## 2019-01-10 ENCOUNTER — OFFICE VISIT (OUTPATIENT)
Dept: RHEUMATOLOGY | Facility: CLINIC | Age: 64
End: 2019-01-10
Payer: COMMERCIAL

## 2019-01-10 VITALS
WEIGHT: 130.13 LBS | HEART RATE: 88 BPM | DIASTOLIC BLOOD PRESSURE: 86 MMHG | SYSTOLIC BLOOD PRESSURE: 133 MMHG | HEIGHT: 65 IN | BODY MASS INDEX: 21.68 KG/M2

## 2019-01-10 DIAGNOSIS — M47.819 PERIPHERAL SPONDYLOARTHRITIS: Primary | ICD-10-CM

## 2019-01-10 DIAGNOSIS — Z79.60 LONG-TERM USE OF IMMUNOSUPPRESSANT MEDICATION: ICD-10-CM

## 2019-01-10 DIAGNOSIS — Z79.60 LONG-TERM USE OF IMMUNOSUPPRESSANT MEDICATION: Primary | ICD-10-CM

## 2019-01-10 PROCEDURE — 99213 PR OFFICE/OUTPT VISIT, EST, LEVL III, 20-29 MIN: ICD-10-PCS | Mod: 25,S$GLB,, | Performed by: INTERNAL MEDICINE

## 2019-01-10 PROCEDURE — 90471 PNEUMOCOCCAL POLYSACCHARIDE VACCINE 23-VALENT =>2YO SQ IM: ICD-10-PCS | Mod: 59,S$GLB,, | Performed by: INTERNAL MEDICINE

## 2019-01-10 PROCEDURE — 90471 PR IMMUNIZ ADMIN,1 SINGLE/COMB VAC/TOXOID: ICD-10-PCS | Mod: S$GLB,,, | Performed by: INTERNAL MEDICINE

## 2019-01-10 PROCEDURE — 99999 PR PBB SHADOW E&M-EST. PATIENT-LVL III: ICD-10-PCS | Mod: PBBFAC,,, | Performed by: INTERNAL MEDICINE

## 2019-01-10 PROCEDURE — 90732 PPSV23 VACC 2 YRS+ SUBQ/IM: CPT | Mod: S$GLB,,, | Performed by: INTERNAL MEDICINE

## 2019-01-10 PROCEDURE — 90732 PNEUMOCOCCAL POLYSACCHARIDE VACCINE 23-VALENT =>2YO SQ IM: ICD-10-PCS | Mod: S$GLB,,, | Performed by: INTERNAL MEDICINE

## 2019-01-10 PROCEDURE — 3079F PR MOST RECENT DIASTOLIC BLOOD PRESSURE 80-89 MM HG: ICD-10-PCS | Mod: CPTII,S$GLB,, | Performed by: INTERNAL MEDICINE

## 2019-01-10 PROCEDURE — 90471 IMMUNIZATION ADMIN: CPT | Mod: 59,S$GLB,, | Performed by: INTERNAL MEDICINE

## 2019-01-10 PROCEDURE — 99999 PR PBB SHADOW E&M-EST. PATIENT-LVL III: CPT | Mod: PBBFAC,,, | Performed by: INTERNAL MEDICINE

## 2019-01-10 PROCEDURE — 90739 HEPB VACC 2/4 DOSE ADULT IM: CPT | Mod: S$GLB,,, | Performed by: INTERNAL MEDICINE

## 2019-01-10 PROCEDURE — 3008F PR BODY MASS INDEX (BMI) DOCUMENTED: ICD-10-PCS | Mod: CPTII,S$GLB,, | Performed by: INTERNAL MEDICINE

## 2019-01-10 PROCEDURE — 90739 HEPATITIS B (RECOMBINANT) ADJUVANTED, 2 DOSE: ICD-10-PCS | Mod: S$GLB,,, | Performed by: INTERNAL MEDICINE

## 2019-01-10 PROCEDURE — 90471 IMMUNIZATION ADMIN: CPT | Mod: S$GLB,,, | Performed by: INTERNAL MEDICINE

## 2019-01-10 PROCEDURE — 99213 OFFICE O/P EST LOW 20 MIN: CPT | Mod: 25,S$GLB,, | Performed by: INTERNAL MEDICINE

## 2019-01-10 PROCEDURE — 3075F SYST BP GE 130 - 139MM HG: CPT | Mod: CPTII,S$GLB,, | Performed by: INTERNAL MEDICINE

## 2019-01-10 PROCEDURE — 3079F DIAST BP 80-89 MM HG: CPT | Mod: CPTII,S$GLB,, | Performed by: INTERNAL MEDICINE

## 2019-01-10 PROCEDURE — 3008F BODY MASS INDEX DOCD: CPT | Mod: CPTII,S$GLB,, | Performed by: INTERNAL MEDICINE

## 2019-01-10 PROCEDURE — 3075F PR MOST RECENT SYSTOLIC BLOOD PRESS GE 130-139MM HG: ICD-10-PCS | Mod: CPTII,S$GLB,, | Performed by: INTERNAL MEDICINE

## 2019-01-10 RX ORDER — LEFLUNOMIDE 10 MG/1
20 TABLET ORAL DAILY
Qty: 60 TABLET | Refills: 2 | Status: SHIPPED | OUTPATIENT
Start: 2019-01-10 | End: 2019-03-27

## 2019-01-10 ASSESSMENT — ROUTINE ASSESSMENT OF PATIENT INDEX DATA (RAPID3)
MDHAQ FUNCTION SCORE: 0
PATIENT GLOBAL ASSESSMENT SCORE: 1.5
WHEN YOU AWAKENED IN THE MORNING OVER THE LAST WEEK, PLEASE INDICATE THE AMOUNT OF TIME IT TAKES UNTIL YOU ARE AS LIMBER AS YOU WILL BE FOR THE DAY: 5 MINS
PSYCHOLOGICAL DISTRESS SCORE: 3.3
AM STIFFNESS SCORE: 1, YES
FATIGUE SCORE: 0
PAIN SCORE: .5
TOTAL RAPID3 SCORE: .67

## 2019-01-10 ASSESSMENT — DISEASE ACTIVITY SCORE (DAS28)
TOTAL_SCORE_ESR: 2.23
TOTAL_SCORE_CRP: 3.19
TENDER_JOINTS_COUNT: 1
SWOLLEN_JOINTS_COUNT: 7
CRP_MG_PER_LITER: 6.4
TOTAL_SCORE_ESR: 2.34
CRP_MG_PER_LITER: 6.4
TOTAL_SCORE_ESR: 2.48
GLOBAL_HEALTH_SCORE: 15
ESR_MM_PER_HR: 4
TOTAL_SCORE_CRP: 3.19
TENDER_JOINTS_COUNT: 1
TOTAL_SCORE_ESR: 3.45
TENDER_JOINTS_COUNT: 1
TOTAL_SCORE_CRP: 3.04
ESR_MM_PER_HR: 4
CRP_MG_PER_LITER: 6.1
SWOLLEN_JOINTS_COUNT: 7
CRP_MG_PER_LITER: 6.4
GLOBAL_HEALTH_SCORE: 15
ESR_MM_PER_HR: 4
TENDER_JOINTS_COUNT: 1
TOTAL_SCORE_CRP: 2.94
GLOBAL_HEALTH_SCORE: 5
SWOLLEN_JOINTS_COUNT: 7
SWOLLEN_JOINTS_COUNT: 3
GLOBAL_HEALTH_SCORE: 15
ESR_MM_PER_HR: 16

## 2019-01-10 NOTE — PROGRESS NOTES
"Subjective:       Patient ID: Jill Marquez is a 63 y.o. female.    Chief Complaint: "pretty good, left hand hurts"    Jill Marquez is a 64 yo F with peripheral spondyloarthritis that presents today for follow up.  Since her last visit, sulfasalazine was discontinued on 11/8/18 due to malaise, nausea and heartburn.  She has had the same amount of pain in her hands, but the pain in her left 1st CMC joint is increasing.  The pain is provoked with cold weather. There is also a new "pulling" pain that has started in the left 1st CMC.  She has been wearing her brace but fears surgery.  The swelling in her hands has remained the same, but the stiffness has decreased.  During Thanksgiving, she tripped and caught herself.  She had bilateral shoulder pain and stiffness that was still present this week.  Optometry appointment on 11/28 went well.  She has received part of the Hepatitis A, B, and Pneumovax 13 and wants to know if she continue others.    She has chronic, throbbing cervicogenic headaches 2-3 times per weekly. They are alleviated with massage and Advil.  She endorses pain that shoots down the posterior right lower extremity stopping at the ankle.  Dr. Presley prescribes Tramadol & gabapentin; she asked for refills.      Review of Systems   Constitutional: Negative for chills, fatigue and fever.   HENT:        Dry mouth   Eyes: Negative for pain.   Respiratory: Negative for cough and shortness of breath.    Cardiovascular: Negative for chest pain.   Gastrointestinal: Negative for abdominal pain.   Genitourinary: Negative for dysuria.   Musculoskeletal: Positive for arthralgias, joint swelling and neck pain.   Skin: Negative for rash.   Neurological: Positive for headaches.   Psychiatric/Behavioral: Negative for sleep disturbance.         Objective:     Vitals:    01/10/19 1321   BP: 133/86   Pulse: 88        Physical Exam   Constitutional: She is well-developed, well-nourished, and in no distress.   HENT: "   Head: Normocephalic and atraumatic.   Eyes: Pupils are equal, round, and reactive to light.   Neck: Normal range of motion. Neck supple.   Cardiovascular: Normal rate and regular rhythm.    Pulmonary/Chest: Effort normal and breath sounds normal.   Abdominal: Soft. Bowel sounds are normal.       Right Side Rheumatological Exam     She has swelling of the 2nd PIP, 3rd MCP, 1st CMC, 2nd DIP and 5th DIP    The patient has an enlarged 1st PIP, 2nd PIP, 2nd MCP, 3rd PIP, 4th PIP, 5th PIP, 1st CMC, 2nd DIP, 3rd DIP, 4th DIP and 5th DIP    Muscle Strength (0-5 scale):  Neck Flexion:  5  Neck Extension: 5  Deltoid:  5  Biceps: 5/5   Triceps:  5  : 4.6/5   Iliopsoas: 5  Quadriceps:  5   Distal Lower Extremity: 5    Left Side Rheumatological Exam     She has swelling of the 2nd PIP and 3rd MCP    The patient has an enlarged 1st PIP, 2nd PIP, 2nd MCP, 3rd PIP, 4th PIP, 5th PIP, 2nd DIP, 3rd DIP, 4th DIP and 5th DIP.    Muscle Strength (0-5 scale):  Neck Flexion:  5  Neck Extension: 5  Deltoid:  5  Biceps: 5/5   Triceps:  5  :  5/5   Iliopsoas: 5  Quadriceps:  5   Distal Lower Extremity: 5      Neurological: She is alert.   Right Choe's Sign:  absent  Left Choe's Sign:  Absent  Reflex Scores:       Tricep reflexes are 2+ on the right side and 2+ on the left side.       Bicep reflexes are 2+ on the right side and 2+ on the left side.       Brachioradialis reflexes are 2+ on the right side and 2+ on the left side.       Patellar reflexes are 2+ on the right side and 2+ on the left side.       Achilles reflexes are 2+ on the right side and 2+ on the left side.  Skin: Skin is warm and dry.     Musculoskeletal: Normal range of motion.         Pitting right ring finger nail      Assessment:       1. Peripheral spondyloarthritis with Elevated CPK  2. Right lower extremity radiculopathy    Plan:       1. Peripheral spondyloarthritis with Elevated CPK  -TJ =1 SJ= 7 Pt global 15  ESR 4  CRP 6.4  DAS28 2.48  [id number]  3.19  -TJ = 1 SJ =7 Pt global 1.5  Pt pain 1.5  CRP 6.4  DAPSA 10.64  -Will not proceed with methotrexate given cocktail nightly  -Start Leflunamide 20mg PO Daily  -Advised to monitor blood pressure with weekly home readings   -Standing labs every month  -RTC in 3 months     2. Right lower extremity radiculopathy  -F/u with Dr. Samayoa    HCM:   -Pneumonia vaccine today  -Hepatitis vaccine today   -Shingles vaccine in 2 months at home pharmacy

## 2019-01-10 NOTE — PROGRESS NOTES
Ms. Marquez received Heplisav-B(first dose) vaccine. Tolerated well.  Next appointment. Left unit in [facility].

## 2019-01-10 NOTE — PATIENT INSTRUCTIONS
-Pneumonia vaccine today  -Hepatitis vaccine today downstairs  -Shingles vaccine in 2 months  -Start Leflunamide 20mg PO Daily  -Monitor blood pressure with weekly home readings   -Get bloodwork once per month  -RTC in 3 months

## 2019-01-10 NOTE — PROGRESS NOTES
I have personally taken the history and examined the patient and agree with the resident,s note as stated above. Started sulfasalazine 11/20/18 and when increased to 1000mg twice daily developed malaise, nausea and heartburn. Stopped on 11/8/18      Polyarthritis(erosive OA v peripheral spondyloarthritis/ PsA) :  TJ 1 SJ 3  Pt global  15 ESR  4 CRP 6.4   DAS28 2.23(remission)  [id number] 2.94(LDA)  DAPSA: TJ 1  SJ 7 Pt global 1.5  Pt pain 0.5 CRP 0.64 = 10.64(LDA)    Trial of leflunomide 20mg daily. Disc risks and benefits particularly diarrhea, hypertension, need to monitor cbc and liver  ACR handout provided  Monthly standing labs x 3  *Pneumovax today  Hepatitis A and B today, ID  Shingrix#2 in Feb.  RTC 3 months

## 2019-01-17 ENCOUNTER — PATIENT MESSAGE (OUTPATIENT)
Dept: INTERNAL MEDICINE | Facility: CLINIC | Age: 64
End: 2019-01-17

## 2019-01-17 DIAGNOSIS — J30.89 SEASONAL AND PERENNIAL ALLERGIC RHINITIS: ICD-10-CM

## 2019-01-17 DIAGNOSIS — J30.2 SEASONAL AND PERENNIAL ALLERGIC RHINITIS: ICD-10-CM

## 2019-01-17 RX ORDER — AZELASTINE 1 MG/ML
1 SPRAY, METERED NASAL 2 TIMES DAILY
Qty: 30 ML | Refills: 2 | Status: SHIPPED | OUTPATIENT
Start: 2019-01-17 | End: 2019-10-24 | Stop reason: SDUPTHER

## 2019-02-09 ENCOUNTER — NURSE TRIAGE (OUTPATIENT)
Dept: ADMINISTRATIVE | Facility: CLINIC | Age: 64
End: 2019-02-09

## 2019-02-09 NOTE — TELEPHONE ENCOUNTER
Reason for Disposition   [1] Caller requesting NON-URGENT health information AND [2] PCP's office is the best resource    Protocols used: ST INFORMATION ONLY CALL-A-AH   has a dose of hepatitis scheduled for Monday at 10 am but she has sinus infection and wants to know if she should keep the appointment. Ok to proceed with vaccine. Pt states sibling having procedure - please rescheduled for 2/13. appt made at 1030.

## 2019-02-13 ENCOUNTER — CLINICAL SUPPORT (OUTPATIENT)
Dept: INFECTIOUS DISEASES | Facility: CLINIC | Age: 64
End: 2019-02-13
Payer: COMMERCIAL

## 2019-02-13 ENCOUNTER — OFFICE VISIT (OUTPATIENT)
Dept: INTERNAL MEDICINE | Facility: CLINIC | Age: 64
End: 2019-02-13
Payer: COMMERCIAL

## 2019-02-13 VITALS
OXYGEN SATURATION: 98 % | WEIGHT: 129 LBS | TEMPERATURE: 98 F | BODY MASS INDEX: 22.02 KG/M2 | HEART RATE: 85 BPM | DIASTOLIC BLOOD PRESSURE: 80 MMHG | HEIGHT: 64 IN | SYSTOLIC BLOOD PRESSURE: 140 MMHG

## 2019-02-13 DIAGNOSIS — R76.8 HEPATITIS B ANTIBODY POSITIVE: Primary | ICD-10-CM

## 2019-02-13 DIAGNOSIS — J32.9 RHINOSINUSITIS: Primary | ICD-10-CM

## 2019-02-13 PROCEDURE — 99999 PR PBB SHADOW E&M-EST. PATIENT-LVL IV: CPT | Mod: PBBFAC,,, | Performed by: PHYSICIAN ASSISTANT

## 2019-02-13 PROCEDURE — 3079F DIAST BP 80-89 MM HG: CPT | Mod: CPTII,S$GLB,, | Performed by: PHYSICIAN ASSISTANT

## 2019-02-13 PROCEDURE — 3077F PR MOST RECENT SYSTOLIC BLOOD PRESSURE >= 140 MM HG: ICD-10-PCS | Mod: CPTII,S$GLB,, | Performed by: PHYSICIAN ASSISTANT

## 2019-02-13 PROCEDURE — 90471 IMMUNIZATION ADMIN: CPT | Mod: S$GLB,,, | Performed by: INTERNAL MEDICINE

## 2019-02-13 PROCEDURE — 90471 PR IMMUNIZ ADMIN,1 SINGLE/COMB VAC/TOXOID: ICD-10-PCS | Mod: S$GLB,,, | Performed by: INTERNAL MEDICINE

## 2019-02-13 PROCEDURE — 90739 HEPB VACC 2/4 DOSE ADULT IM: CPT | Mod: S$GLB,,, | Performed by: INTERNAL MEDICINE

## 2019-02-13 PROCEDURE — 3008F BODY MASS INDEX DOCD: CPT | Mod: CPTII,S$GLB,, | Performed by: PHYSICIAN ASSISTANT

## 2019-02-13 PROCEDURE — 3008F PR BODY MASS INDEX (BMI) DOCUMENTED: ICD-10-PCS | Mod: CPTII,S$GLB,, | Performed by: PHYSICIAN ASSISTANT

## 2019-02-13 PROCEDURE — 90739 HEPATITIS B (RECOMBINANT) ADJUVANTED, 2 DOSE: ICD-10-PCS | Mod: S$GLB,,, | Performed by: INTERNAL MEDICINE

## 2019-02-13 PROCEDURE — 3079F PR MOST RECENT DIASTOLIC BLOOD PRESSURE 80-89 MM HG: ICD-10-PCS | Mod: CPTII,S$GLB,, | Performed by: PHYSICIAN ASSISTANT

## 2019-02-13 PROCEDURE — 99999 PR PBB SHADOW E&M-EST. PATIENT-LVL IV: ICD-10-PCS | Mod: PBBFAC,,, | Performed by: PHYSICIAN ASSISTANT

## 2019-02-13 PROCEDURE — 3077F SYST BP >= 140 MM HG: CPT | Mod: CPTII,S$GLB,, | Performed by: PHYSICIAN ASSISTANT

## 2019-02-13 PROCEDURE — 99213 PR OFFICE/OUTPT VISIT, EST, LEVL III, 20-29 MIN: ICD-10-PCS | Mod: S$GLB,,, | Performed by: PHYSICIAN ASSISTANT

## 2019-02-13 PROCEDURE — 99213 OFFICE O/P EST LOW 20 MIN: CPT | Mod: S$GLB,,, | Performed by: PHYSICIAN ASSISTANT

## 2019-02-13 RX ORDER — AMOXICILLIN AND CLAVULANATE POTASSIUM 875; 125 MG/1; MG/1
1 TABLET, FILM COATED ORAL EVERY 12 HOURS
Qty: 20 TABLET | Refills: 0 | Status: SHIPPED | OUTPATIENT
Start: 2019-02-13 | End: 2019-02-23

## 2019-02-13 NOTE — PATIENT INSTRUCTIONS
Acute Sinusitis    Acute sinusitis is irritation and swelling of the sinuses. It is usually caused by a viral infection after a common cold. Your doctor can help you find relief.  What is acute sinusitis?  Sinuses are air-filled spaces in the skull behind the face. They are kept moist and clean by a lining of mucosa. Things such as pollen, smoke, and chemical fumes can irritate the mucosa. It can then swell up. As a response to irritation, the mucosa makes more mucus and other fluids. Tiny hairlike cilia cover the mucosa. Cilia help carry mucus toward the opening of the sinus. Too much mucus may cause the cilia to stop working. This blocks the sinus opening. A buildup of fluid in the sinuses then causes pain and pressure. It can also encourage bacteria to grow in the sinuses.  Common symptoms of acute sinusitis  You may have:  · Facial soreness pain  · Headache  · Fever  · Fluid draining in the back of the throat (postnasal drip)  · Congestion  · Drainage that is thick and colored, instead of clear  · Cough  Diagnosing acute sinusitis  Your doctor will ask about your symptoms and health history. He or she will look at your ear, nose, and throat. You usually won't need to have X-rays taken.    The doctor may take a sample of mucus to check for bacteria. If you have sinusitis that keeps coming back, you may need imaging tests such as X-rays or CAT scans. This will help your doctor check for a structural problem that may be causing the infection.  Treating acute sinusitis  Treatment is aimed at unblocking the sinus opening and helping the cilia work again. You may need to take antihistamine and decongestant medicine. These can reduce inflammation and decrease the amount of fluid your sinuses make. If you have a bacterial infection, you will need to take antibiotic medicine for 10 to 14 days. Take this medicine until it is gone, even if you feel better.  Date Last Reviewed: 10/1/2016  © 4568-1487 [employer] [address] [place] [address]. All rights reserved. This information is not intended as a substitute for professional medical care. Always follow your healthcare professional's instructions.

## 2019-02-13 NOTE — PROGRESS NOTES
"Subjective:       Patient ID: Jill Marquez is a 63 y.o. female.    Chief Complaint: Sinus Problem    Established pt of Dain Smith MD, new to me.     C/o nasal/sinus congestion, facial pressure and mild cough. Onset about 10 days ago. Had some green nasal discharge that started this morning. Using flonase, astelin, claritin and benadryl with mild improvement. Denies fevers or sore throat.       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   HENT: Positive for congestion, postnasal drip, sinus pressure and sneezing. Negative for ear pain, sore throat and voice change.    Respiratory: Negative for cough and shortness of breath.    Cardiovascular: Negative for chest pain and leg swelling.   Gastrointestinal: Negative for abdominal pain, nausea and vomiting.   Skin: Negative for rash.   Neurological: Negative for weakness and headaches.       Objective: BP (!) 140/80   Pulse 85   Temp 97.6 °F (36.4 °C)   Ht 5' 4" (1.626 m)   Wt 58.5 kg (128 lb 15.5 oz)   SpO2 98%   BMI 22.14 kg/m²         Physical Exam   Constitutional: She appears well-developed and well-nourished. No distress.   HENT:   Head: Normocephalic and atraumatic.   Nose: Mucosal edema (mild) present. Right sinus exhibits maxillary sinus tenderness. Left sinus exhibits maxillary sinus tenderness.   Mouth/Throat: Oropharynx is clear and moist.   Eyes: Pupils are equal, round, and reactive to light.   Cardiovascular: Normal rate and regular rhythm. Exam reveals no friction rub.   No murmur heard.  Pulmonary/Chest: Effort normal and breath sounds normal. She has no wheezes. She has no rales. "   Abdominal: Soft. Bowel sounds are normal. There is no tenderness.   Neurological: She is alert.   Skin: Skin is warm and dry. Capillary refill takes less than 2 seconds. No rash noted.   Psychiatric: She has a normal mood and affect.   Vitals reviewed.      Assessment:       1. Rhinosinusitis        Plan:         Jill was seen today for sinus problem.    Diagnoses and all orders for this visit:    Rhinosinusitis       - Continue nasal sprays and antihistamine  -     amoxicillin-clavulanate 875-125mg (AUGMENTIN) 875-125 mg per tablet; Take 1 tablet by mouth every 12 (twelve) hours. for 10 days    Delilah Garibay PA-C

## 2019-02-17 ENCOUNTER — PATIENT MESSAGE (OUTPATIENT)
Dept: RHEUMATOLOGY | Facility: CLINIC | Age: 64
End: 2019-02-17

## 2019-02-18 ENCOUNTER — LAB VISIT (OUTPATIENT)
Dept: LAB | Facility: HOSPITAL | Age: 64
End: 2019-02-18
Attending: INTERNAL MEDICINE
Payer: COMMERCIAL

## 2019-02-18 DIAGNOSIS — L40.50 PSA (PSORIATIC ARTHRITIS): ICD-10-CM

## 2019-02-18 DIAGNOSIS — M47.819 PERIPHERAL SPONDYLOARTHRITIS: ICD-10-CM

## 2019-02-18 LAB
ALBUMIN SERPL BCP-MCNC: 4.3 G/DL
ALP SERPL-CCNC: 57 U/L
ALT SERPL W/O P-5'-P-CCNC: 39 U/L
ANION GAP SERPL CALC-SCNC: 7 MMOL/L
AST SERPL-CCNC: 39 U/L
BASOPHILS # BLD AUTO: 0.04 K/UL
BASOPHILS NFR BLD: 1 %
BILIRUB SERPL-MCNC: 0.8 MG/DL
BUN SERPL-MCNC: 23 MG/DL
CALCIUM SERPL-MCNC: 10.4 MG/DL
CHLORIDE SERPL-SCNC: 103 MMOL/L
CO2 SERPL-SCNC: 29 MMOL/L
CREAT SERPL-MCNC: 0.7 MG/DL
CRP SERPL-MCNC: 0.4 MG/L
DIFFERENTIAL METHOD: ABNORMAL
EOSINOPHIL # BLD AUTO: 0.1 K/UL
EOSINOPHIL NFR BLD: 2.7 %
ERYTHROCYTE [DISTWIDTH] IN BLOOD BY AUTOMATED COUNT: 13.1 %
ERYTHROCYTE [SEDIMENTATION RATE] IN BLOOD BY WESTERGREN METHOD: <2 MM/HR
EST. GFR  (AFRICAN AMERICAN): >60 ML/MIN/1.73 M^2
EST. GFR  (NON AFRICAN AMERICAN): >60 ML/MIN/1.73 M^2
GLUCOSE SERPL-MCNC: 90 MG/DL
HCT VFR BLD AUTO: 40.6 %
HGB BLD-MCNC: 13.2 G/DL
IMM GRANULOCYTES # BLD AUTO: 0.01 K/UL
IMM GRANULOCYTES NFR BLD AUTO: 0.2 %
LYMPHOCYTES # BLD AUTO: 1.6 K/UL
LYMPHOCYTES NFR BLD: 38.8 %
MCH RBC QN AUTO: 32.2 PG
MCHC RBC AUTO-ENTMCNC: 32.5 G/DL
MCV RBC AUTO: 99 FL
MONOCYTES # BLD AUTO: 0.5 K/UL
MONOCYTES NFR BLD: 12.9 %
NEUTROPHILS # BLD AUTO: 1.8 K/UL
NEUTROPHILS NFR BLD: 44.4 %
NRBC BLD-RTO: 0 /100 WBC
PLATELET # BLD AUTO: 216 K/UL
PMV BLD AUTO: 10.5 FL
POTASSIUM SERPL-SCNC: 5 MMOL/L
PROT SERPL-MCNC: 7.2 G/DL
RBC # BLD AUTO: 4.1 M/UL
SODIUM SERPL-SCNC: 139 MMOL/L
WBC # BLD AUTO: 4.1 K/UL

## 2019-02-18 PROCEDURE — 80053 COMPREHEN METABOLIC PANEL: CPT

## 2019-02-18 PROCEDURE — 86140 C-REACTIVE PROTEIN: CPT

## 2019-02-18 PROCEDURE — 36415 COLL VENOUS BLD VENIPUNCTURE: CPT | Mod: PO

## 2019-02-18 PROCEDURE — 85025 COMPLETE CBC W/AUTO DIFF WBC: CPT

## 2019-02-18 PROCEDURE — 85652 RBC SED RATE AUTOMATED: CPT

## 2019-02-28 ENCOUNTER — PATIENT MESSAGE (OUTPATIENT)
Dept: PHYSICAL MEDICINE AND REHAB | Facility: CLINIC | Age: 64
End: 2019-02-28

## 2019-02-28 RX ORDER — TRAMADOL HYDROCHLORIDE 50 MG/1
50 TABLET ORAL 3 TIMES DAILY PRN
Qty: 90 TABLET | Refills: 1 | OUTPATIENT
Start: 2019-02-28 | End: 2019-03-30

## 2019-03-04 RX ORDER — TRAMADOL HYDROCHLORIDE 50 MG/1
50 TABLET ORAL 3 TIMES DAILY PRN
Qty: 90 TABLET | Refills: 1 | Status: SHIPPED | OUTPATIENT
Start: 2019-03-04 | End: 2020-10-09 | Stop reason: SDUPTHER

## 2019-03-07 ENCOUNTER — TELEPHONE (OUTPATIENT)
Dept: INTERNAL MEDICINE | Facility: CLINIC | Age: 64
End: 2019-03-07

## 2019-03-18 ENCOUNTER — LAB VISIT (OUTPATIENT)
Dept: LAB | Facility: HOSPITAL | Age: 64
End: 2019-03-18
Attending: INTERNAL MEDICINE
Payer: COMMERCIAL

## 2019-03-18 ENCOUNTER — TELEPHONE (OUTPATIENT)
Dept: INTERNAL MEDICINE | Facility: CLINIC | Age: 64
End: 2019-03-18

## 2019-03-18 DIAGNOSIS — M47.819 PERIPHERAL SPONDYLOARTHRITIS: ICD-10-CM

## 2019-03-18 DIAGNOSIS — L40.50 PSA (PSORIATIC ARTHRITIS): ICD-10-CM

## 2019-03-18 LAB
ALBUMIN SERPL BCP-MCNC: 4 G/DL
ALP SERPL-CCNC: 53 U/L
ALT SERPL W/O P-5'-P-CCNC: 31 U/L
ANION GAP SERPL CALC-SCNC: 7 MMOL/L
AST SERPL-CCNC: 32 U/L
BASOPHILS # BLD AUTO: 0.04 K/UL
BASOPHILS NFR BLD: 1 %
BILIRUB SERPL-MCNC: 0.9 MG/DL
BUN SERPL-MCNC: 22 MG/DL
CALCIUM SERPL-MCNC: 10.1 MG/DL
CHLORIDE SERPL-SCNC: 104 MMOL/L
CO2 SERPL-SCNC: 30 MMOL/L
CREAT SERPL-MCNC: 0.7 MG/DL
CRP SERPL-MCNC: 0.5 MG/L
DIFFERENTIAL METHOD: ABNORMAL
EOSINOPHIL # BLD AUTO: 0.1 K/UL
EOSINOPHIL NFR BLD: 3.2 %
ERYTHROCYTE [DISTWIDTH] IN BLOOD BY AUTOMATED COUNT: 13.3 %
ERYTHROCYTE [SEDIMENTATION RATE] IN BLOOD BY WESTERGREN METHOD: <2 MM/HR
EST. GFR  (AFRICAN AMERICAN): >60 ML/MIN/1.73 M^2
EST. GFR  (NON AFRICAN AMERICAN): >60 ML/MIN/1.73 M^2
GLUCOSE SERPL-MCNC: 89 MG/DL
HCT VFR BLD AUTO: 39.4 %
HGB BLD-MCNC: 12.6 G/DL
IMM GRANULOCYTES # BLD AUTO: 0.01 K/UL
IMM GRANULOCYTES NFR BLD AUTO: 0.2 %
LYMPHOCYTES # BLD AUTO: 1.4 K/UL
LYMPHOCYTES NFR BLD: 33.3 %
MCH RBC QN AUTO: 31.9 PG
MCHC RBC AUTO-ENTMCNC: 32 G/DL
MCV RBC AUTO: 100 FL
MONOCYTES # BLD AUTO: 0.5 K/UL
MONOCYTES NFR BLD: 11.9 %
NEUTROPHILS # BLD AUTO: 2.1 K/UL
NEUTROPHILS NFR BLD: 50.4 %
NRBC BLD-RTO: 0 /100 WBC
PLATELET # BLD AUTO: 239 K/UL
PMV BLD AUTO: 10.1 FL
POTASSIUM SERPL-SCNC: 5.1 MMOL/L
PROT SERPL-MCNC: 6.9 G/DL
RBC # BLD AUTO: 3.95 M/UL
SODIUM SERPL-SCNC: 141 MMOL/L
WBC # BLD AUTO: 4.11 K/UL

## 2019-03-18 PROCEDURE — 85025 COMPLETE CBC W/AUTO DIFF WBC: CPT

## 2019-03-18 PROCEDURE — 36415 COLL VENOUS BLD VENIPUNCTURE: CPT | Mod: PO

## 2019-03-18 PROCEDURE — 86140 C-REACTIVE PROTEIN: CPT

## 2019-03-18 PROCEDURE — 85652 RBC SED RATE AUTOMATED: CPT

## 2019-03-18 PROCEDURE — 80053 COMPREHEN METABOLIC PANEL: CPT

## 2019-03-18 NOTE — TELEPHONE ENCOUNTER
----- Message from Inessa Reyez sent at 3/18/2019  3:29 PM CDT -----  Contact: self/[phone]  What orders is pt asking for?: Annual labs    Is there a future appointment with the provider?:yes     When?: 05/09/19    Comments?:

## 2019-03-19 ENCOUNTER — PATIENT MESSAGE (OUTPATIENT)
Dept: INTERNAL MEDICINE | Facility: CLINIC | Age: 64
End: 2019-03-19

## 2019-03-25 ENCOUNTER — PATIENT MESSAGE (OUTPATIENT)
Dept: INTERNAL MEDICINE | Facility: CLINIC | Age: 64
End: 2019-03-25

## 2019-03-26 ENCOUNTER — PATIENT MESSAGE (OUTPATIENT)
Dept: INTERNAL MEDICINE | Facility: CLINIC | Age: 64
End: 2019-03-26

## 2019-03-27 ENCOUNTER — TELEPHONE (OUTPATIENT)
Dept: RHEUMATOLOGY | Facility: CLINIC | Age: 64
End: 2019-03-27

## 2019-03-27 DIAGNOSIS — M47.819 PERIPHERAL SPONDYLOARTHRITIS: Primary | ICD-10-CM

## 2019-03-27 RX ORDER — LEFLUNOMIDE 20 MG/1
20 TABLET ORAL DAILY
Qty: 30 TABLET | Refills: 0 | Status: SHIPPED | OUTPATIENT
Start: 2019-03-27 | End: 2019-04-23 | Stop reason: SDUPTHER

## 2019-03-28 NOTE — TELEPHONE ENCOUNTER
----- Message from Ulises Rosales sent at 3/27/2019  1:02 PM CDT -----  Contact: Umair/[facility] pharmacy  Please call Umair     Need clarification for leflunomide (ARAVA) 10 MG Tab     Use [facility] at [address] [address] [phone] (Phone)  [phone] (Fax)    Thank you

## 2019-04-04 ENCOUNTER — PATIENT MESSAGE (OUTPATIENT)
Dept: UROGYNECOLOGY | Facility: CLINIC | Age: 64
End: 2019-04-04

## 2019-04-04 DIAGNOSIS — Z12.31 BREAST CANCER SCREENING BY MAMMOGRAM: Primary | ICD-10-CM

## 2019-04-04 NOTE — TELEPHONE ENCOUNTER
FYI,  Mammogram order has been placed.    Good Afternoon,      Please send in order for me to have my annual mammogram.  My last mammogram was on 4/12/2018.    Thank you,  Jill Marquez  [phone]

## 2019-04-18 ENCOUNTER — LAB VISIT (OUTPATIENT)
Dept: LAB | Facility: HOSPITAL | Age: 64
End: 2019-04-18
Attending: INTERNAL MEDICINE
Payer: COMMERCIAL

## 2019-04-18 DIAGNOSIS — M47.819 PERIPHERAL SPONDYLOARTHRITIS: ICD-10-CM

## 2019-04-18 DIAGNOSIS — L40.50 PSA (PSORIATIC ARTHRITIS): ICD-10-CM

## 2019-04-18 LAB
ALBUMIN SERPL BCP-MCNC: 4.1 G/DL (ref 3.5–5.2)
ALP SERPL-CCNC: 51 U/L (ref 55–135)
ALT SERPL W/O P-5'-P-CCNC: 34 U/L (ref 10–44)
ANION GAP SERPL CALC-SCNC: 8 MMOL/L (ref 8–16)
AST SERPL-CCNC: 38 U/L (ref 10–40)
BASOPHILS # BLD AUTO: 0.05 K/UL (ref 0–0.2)
BASOPHILS NFR BLD: 1.1 % (ref 0–1.9)
BILIRUB SERPL-MCNC: 0.9 MG/DL (ref 0.1–1)
BUN SERPL-MCNC: 21 MG/DL (ref 8–23)
CALCIUM SERPL-MCNC: 10.2 MG/DL (ref 8.7–10.5)
CHLORIDE SERPL-SCNC: 103 MMOL/L (ref 95–110)
CO2 SERPL-SCNC: 29 MMOL/L (ref 23–29)
CREAT SERPL-MCNC: 0.7 MG/DL (ref 0.5–1.4)
CRP SERPL-MCNC: 0.4 MG/L (ref 0–8.2)
DIFFERENTIAL METHOD: ABNORMAL
EOSINOPHIL # BLD AUTO: 0.2 K/UL (ref 0–0.5)
EOSINOPHIL NFR BLD: 3.5 % (ref 0–8)
ERYTHROCYTE [DISTWIDTH] IN BLOOD BY AUTOMATED COUNT: 13.3 % (ref 11.5–14.5)
ERYTHROCYTE [SEDIMENTATION RATE] IN BLOOD BY WESTERGREN METHOD: 3 MM/HR (ref 0–36)
EST. GFR  (AFRICAN AMERICAN): >60 ML/MIN/1.73 M^2
EST. GFR  (NON AFRICAN AMERICAN): >60 ML/MIN/1.73 M^2
GLUCOSE SERPL-MCNC: 97 MG/DL (ref 70–110)
HCT VFR BLD AUTO: 39.9 % (ref 37–48.5)
HGB BLD-MCNC: 12.9 G/DL (ref 12–16)
IMM GRANULOCYTES # BLD AUTO: 0.01 K/UL (ref 0–0.04)
IMM GRANULOCYTES NFR BLD AUTO: 0.2 % (ref 0–0.5)
LYMPHOCYTES # BLD AUTO: 1.8 K/UL (ref 1–4.8)
LYMPHOCYTES NFR BLD: 39.4 % (ref 18–48)
MCH RBC QN AUTO: 31.3 PG (ref 27–31)
MCHC RBC AUTO-ENTMCNC: 32.3 G/DL (ref 32–36)
MCV RBC AUTO: 97 FL (ref 82–98)
MONOCYTES # BLD AUTO: 0.6 K/UL (ref 0.3–1)
MONOCYTES NFR BLD: 13.6 % (ref 4–15)
NEUTROPHILS # BLD AUTO: 2 K/UL (ref 1.8–7.7)
NEUTROPHILS NFR BLD: 42.2 % (ref 38–73)
NRBC BLD-RTO: 0 /100 WBC
PLATELET # BLD AUTO: 223 K/UL (ref 150–350)
PMV BLD AUTO: 10.4 FL (ref 9.2–12.9)
POTASSIUM SERPL-SCNC: 5.4 MMOL/L (ref 3.5–5.1)
PROT SERPL-MCNC: 6.9 G/DL (ref 6–8.4)
RBC # BLD AUTO: 4.12 M/UL (ref 4–5.4)
SODIUM SERPL-SCNC: 140 MMOL/L (ref 136–145)
WBC # BLD AUTO: 4.62 K/UL (ref 3.9–12.7)

## 2019-04-18 PROCEDURE — 85652 RBC SED RATE AUTOMATED: CPT

## 2019-04-18 PROCEDURE — 36415 COLL VENOUS BLD VENIPUNCTURE: CPT | Mod: PO

## 2019-04-18 PROCEDURE — 80053 COMPREHEN METABOLIC PANEL: CPT

## 2019-04-18 PROCEDURE — 85025 COMPLETE CBC W/AUTO DIFF WBC: CPT

## 2019-04-18 PROCEDURE — 86140 C-REACTIVE PROTEIN: CPT

## 2019-04-19 ENCOUNTER — PATIENT MESSAGE (OUTPATIENT)
Dept: RHEUMATOLOGY | Facility: CLINIC | Age: 64
End: 2019-04-19

## 2019-04-19 ENCOUNTER — TELEPHONE (OUTPATIENT)
Dept: RHEUMATOLOGY | Facility: CLINIC | Age: 64
End: 2019-04-19

## 2019-04-19 DIAGNOSIS — E87.5 HYPERKALEMIA: Primary | ICD-10-CM

## 2019-04-22 ENCOUNTER — LAB VISIT (OUTPATIENT)
Dept: LAB | Facility: HOSPITAL | Age: 64
End: 2019-04-22
Attending: INTERNAL MEDICINE
Payer: COMMERCIAL

## 2019-04-22 DIAGNOSIS — M54.16 BILATERAL LUMBAR RADICULOPATHY: ICD-10-CM

## 2019-04-22 DIAGNOSIS — E87.5 HYPERKALEMIA: ICD-10-CM

## 2019-04-22 LAB — POTASSIUM SERPL-SCNC: 4.5 MMOL/L (ref 3.5–5.1)

## 2019-04-22 PROCEDURE — 36415 COLL VENOUS BLD VENIPUNCTURE: CPT | Mod: PO

## 2019-04-22 PROCEDURE — 84132 ASSAY OF SERUM POTASSIUM: CPT

## 2019-04-22 RX ORDER — AMLODIPINE BESYLATE 10 MG/1
TABLET ORAL
Qty: 90 TABLET | Refills: 1 | Status: SHIPPED | OUTPATIENT
Start: 2019-04-22 | End: 2019-10-22 | Stop reason: SDUPTHER

## 2019-04-23 ENCOUNTER — PATIENT MESSAGE (OUTPATIENT)
Dept: RHEUMATOLOGY | Facility: CLINIC | Age: 64
End: 2019-04-23

## 2019-04-23 ENCOUNTER — PATIENT MESSAGE (OUTPATIENT)
Dept: PHYSICAL MEDICINE AND REHAB | Facility: CLINIC | Age: 64
End: 2019-04-23

## 2019-04-23 DIAGNOSIS — M47.819 PERIPHERAL SPONDYLOARTHRITIS: ICD-10-CM

## 2019-04-23 RX ORDER — LEFLUNOMIDE 20 MG/1
20 TABLET ORAL DAILY
Qty: 30 TABLET | Refills: 0 | Status: SHIPPED | OUTPATIENT
Start: 2019-04-23 | End: 2019-04-30

## 2019-04-24 RX ORDER — GABAPENTIN 300 MG/1
300 CAPSULE ORAL NIGHTLY
Qty: 30 CAPSULE | Refills: 1 | Status: SHIPPED | OUTPATIENT
Start: 2019-04-24 | End: 2019-06-10 | Stop reason: SDUPTHER

## 2019-04-24 RX ORDER — GABAPENTIN 300 MG/1
CAPSULE ORAL
Qty: 270 CAPSULE | Refills: 1 | OUTPATIENT
Start: 2019-04-24

## 2019-04-30 ENCOUNTER — TELEPHONE (OUTPATIENT)
Dept: PHARMACY | Facility: CLINIC | Age: 64
End: 2019-04-30

## 2019-04-30 ENCOUNTER — HOSPITAL ENCOUNTER (OUTPATIENT)
Dept: RADIOLOGY | Facility: HOSPITAL | Age: 64
Discharge: HOME | End: 2019-04-30
Attending: NURSE PRACTITIONER
Payer: COMMERCIAL

## 2019-04-30 ENCOUNTER — OFFICE VISIT (OUTPATIENT)
Dept: RHEUMATOLOGY | Facility: CLINIC | Age: 64
End: 2019-04-30
Payer: COMMERCIAL

## 2019-04-30 VITALS
HEART RATE: 76 BPM | SYSTOLIC BLOOD PRESSURE: 145 MMHG | DIASTOLIC BLOOD PRESSURE: 87 MMHG | BODY MASS INDEX: 21.98 KG/M2 | WEIGHT: 128.75 LBS | HEIGHT: 64 IN

## 2019-04-30 DIAGNOSIS — M47.819 PERIPHERAL SPONDYLOARTHRITIS: ICD-10-CM

## 2019-04-30 DIAGNOSIS — Z12.31 BREAST CANCER SCREENING BY MAMMOGRAM: ICD-10-CM

## 2019-04-30 DIAGNOSIS — M25.551 RIGHT HIP PAIN: Primary | ICD-10-CM

## 2019-04-30 PROCEDURE — 3008F BODY MASS INDEX DOCD: CPT | Mod: CPTII,S$GLB,, | Performed by: INTERNAL MEDICINE

## 2019-04-30 PROCEDURE — 77063 MAMMO DIGITAL SCREENING BILAT WITH TOMOSYNTHESIS_CAD: ICD-10-PCS | Mod: 26,,, | Performed by: RADIOLOGY

## 2019-04-30 PROCEDURE — 3079F DIAST BP 80-89 MM HG: CPT | Mod: CPTII,S$GLB,, | Performed by: INTERNAL MEDICINE

## 2019-04-30 PROCEDURE — 77067 SCR MAMMO BI INCL CAD: CPT | Mod: TC,PO

## 2019-04-30 PROCEDURE — 99999 PR PBB SHADOW E&M-EST. PATIENT-LVL IV: CPT | Mod: PBBFAC,,, | Performed by: INTERNAL MEDICINE

## 2019-04-30 PROCEDURE — 77067 MAMMO DIGITAL SCREENING BILAT WITH TOMOSYNTHESIS_CAD: ICD-10-PCS | Mod: 26,,, | Performed by: RADIOLOGY

## 2019-04-30 PROCEDURE — 77067 SCR MAMMO BI INCL CAD: CPT | Mod: 26,,, | Performed by: RADIOLOGY

## 2019-04-30 PROCEDURE — 3077F PR MOST RECENT SYSTOLIC BLOOD PRESSURE >= 140 MM HG: ICD-10-PCS | Mod: CPTII,S$GLB,, | Performed by: INTERNAL MEDICINE

## 2019-04-30 PROCEDURE — 99214 PR OFFICE/OUTPT VISIT, EST, LEVL IV, 30-39 MIN: ICD-10-PCS | Mod: S$GLB,,, | Performed by: INTERNAL MEDICINE

## 2019-04-30 PROCEDURE — 3008F PR BODY MASS INDEX (BMI) DOCUMENTED: ICD-10-PCS | Mod: CPTII,S$GLB,, | Performed by: INTERNAL MEDICINE

## 2019-04-30 PROCEDURE — 77063 BREAST TOMOSYNTHESIS BI: CPT | Mod: 26,,, | Performed by: RADIOLOGY

## 2019-04-30 PROCEDURE — 3077F SYST BP >= 140 MM HG: CPT | Mod: CPTII,S$GLB,, | Performed by: INTERNAL MEDICINE

## 2019-04-30 PROCEDURE — 3079F PR MOST RECENT DIASTOLIC BLOOD PRESSURE 80-89 MM HG: ICD-10-PCS | Mod: CPTII,S$GLB,, | Performed by: INTERNAL MEDICINE

## 2019-04-30 PROCEDURE — 99214 OFFICE O/P EST MOD 30 MIN: CPT | Mod: S$GLB,,, | Performed by: INTERNAL MEDICINE

## 2019-04-30 PROCEDURE — 99999 PR PBB SHADOW E&M-EST. PATIENT-LVL IV: ICD-10-PCS | Mod: PBBFAC,,, | Performed by: INTERNAL MEDICINE

## 2019-04-30 ASSESSMENT — DISEASE ACTIVITY SCORE (DAS28)
ESR_MM_PER_HR: 3
TENDER_JOINTS_COUNT: 3
CRP_MG_PER_LITER: .4
TOTAL_SCORE_CRP: 2.89
SWOLLEN_JOINTS_COUNT: 5
GLOBAL_HEALTH_SCORE: 15
TOTAL_SCORE_ESR: 2.58

## 2019-04-30 ASSESSMENT — ROUTINE ASSESSMENT OF PATIENT INDEX DATA (RAPID3)
TOTAL RAPID3 SCORE: .83
PATIENT GLOBAL ASSESSMENT SCORE: 1.5
PSYCHOLOGICAL DISTRESS SCORE: 3.3
AM STIFFNESS SCORE: 0, NO
MDHAQ FUNCTION SCORE: 0
FATIGUE SCORE: .5
PAIN SCORE: 1

## 2019-04-30 NOTE — TELEPHONE ENCOUNTER
Informed patient that [facility] received prescription for Enbrel and prior authorization is required.  [facility] will be back in touch once insurance determination is received.

## 2019-04-30 NOTE — PROGRESS NOTES
"Subjective:       Patient ID: Jill Marquez is a 63 y.o. female.    Chief Complaint: peripheral spondyloarthritis(RF- ACPA- SAMIR- B27-) v. Erosive OA   HPI has been on leflunomide 20mg daily nearly 3 months with some decreased pain thumb cmcs. But pain and swelling right little pip, dip with dactylitis. No other joint pain. + brittle nails. No rash/psoriasis Some pain right lateral hip radiating lateral thigh an calf.   Review of Systems   Constitutional: Positive for fatigue. Negative for appetite change, fever and unexpected weight change. Diaphoresis: 0.5/10.   HENT: Negative for mouth sores and trouble swallowing.         Dry mouth     Eyes: Negative for visual disturbance.        Dry   Respiratory: Negative for cough, shortness of breath and wheezing.    Cardiovascular: Negative for chest pain and palpitations.   Gastrointestinal: Negative for abdominal pain, anal bleeding, blood in stool, constipation, diarrhea, nausea and vomiting.   Genitourinary: Negative for dysuria, frequency, genital sores and urgency.   Musculoskeletal: Negative for arthralgias, back pain, gait problem, joint swelling, myalgias, neck pain and neck stiffness.   Skin: Negative for rash.   Neurological: Negative for weakness, numbness and headaches.   Hematological: Negative for adenopathy. Does not bruise/bleed easily.   Psychiatric/Behavioral: Negative for sleep disturbance. The patient is not nervous/anxious.          Objective:   BP (!) 145/87   Pulse 76   Ht 5' 3.5" (1.613 m)   Wt 58.4 kg (128 lb 12 oz)   BMI 22.45 kg/m²      Physical Exam       Right Side Rheumatological Exam     The patient is tender to palpation of the 3rd MCP, 1st CMC and 5th DIP    She has swelling of the 2nd MCP, 3rd MCP, 5th PIP, 2nd DIP, 3rd DIP and 5th DIP    The patient has an enlarged 2nd MCP, 3rd MCP, 5th PIP, 1st CMC, 2nd DIP, 3rd DIP and 5th DIP    Hip Exam   Tenderness Location: greater trochanter    Range of Motion   External rotation: abnormal "   Internal rotation: abnormal     Left Side Rheumatological Exam     The patient is tender to palpation of the 2nd MCP and 3rd MCP.    She has swelling of the 2nd MCP, 3rd MCP, 2nd DIP and 5th DIP    The patient has an enlarged 2nd MCP, 3rd MCP, 2nd DIP and 5th DIP.      Skin:     Ridged nails, discoloration, onycholysis       Results for PANKAJ CARTER (MRN [id number]) as of 4/30/2019 11:48   Ref. Range 4/18/2019 10:21 4/22/2019 14:46 4/30/2019 09:13   WBC Latest Ref Range: 3.90 - 12.70 K/uL 4.62     RBC Latest Ref Range: 4.00 - 5.40 M/uL 4.12     Hemoglobin Latest Ref Range: 12.0 - 16.0 g/dL 12.9     Hematocrit Latest Ref Range: 37.0 - 48.5 % 39.9     MCV Latest Ref Range: 82 - 98 fL 97     MCH Latest Ref Range: 27.0 - 31.0 pg 31.3 (H)     MCHC Latest Ref Range: 32.0 - 36.0 g/dL 32.3     RDW Latest Ref Range: 11.5 - 14.5 % 13.3     Platelets Latest Ref Range: 150 - 350 K/uL 223     MPV Latest Ref Range: 9.2 - 12.9 fL 10.4     Gran% Latest Ref Range: 38.0 - 73.0 % 42.2     Gran # (ANC) Latest Ref Range: 1.8 - 7.7 K/uL 2.0     Lymph% Latest Ref Range: 18.0 - 48.0 % 39.4     Lymph # Latest Ref Range: 1.0 - 4.8 K/uL 1.8     Mono% Latest Ref Range: 4.0 - 15.0 % 13.6     Mono # Latest Ref Range: 0.3 - 1.0 K/uL 0.6     Eosinophil% Latest Ref Range: 0.0 - 8.0 % 3.5     Eos # Latest Ref Range: 0.0 - 0.5 K/uL 0.2     Basophil% Latest Ref Range: 0.0 - 1.9 % 1.1     Baso # Latest Ref Range: 0.00 - 0.20 K/uL 0.05     nRBC Latest Ref Range: 0 /100 WBC 0     Differential Method Unknown Automated     Immature Grans (Abs) Latest Ref Range: 0.00 - 0.04 K/uL 0.01     Immature Granulocytes Latest Ref Range: 0.0 - 0.5 % 0.2     Sed Rate Latest Ref Range: 0 - 36 mm/Hr 3     Sodium Latest Ref Range: 136 - 145 mmol/L 140     Potassium Latest Ref Range: 3.5 - 5.1 mmol/L 5.4 (H) 4.5    Chloride Latest Ref Range: 95 - 110 mmol/L 103     CO2 Latest Ref Range: 23 - 29 mmol/L 29     Anion Gap Latest Ref Range: 8 - 16 mmol/L 8     BUN, Bld  Latest Ref Range: 8 - 23 mg/dL 21     Creatinine Latest Ref Range: 0.5 - 1.4 mg/dL 0.7     eGFR if non African American Latest Ref Range: >60 mL/min/1.73 m^2 >60.0     eGFR if African American Latest Ref Range: >60 mL/min/1.73 m^2 >60.0     Glucose Latest Ref Range: 70 - 110 mg/dL 97     Calcium Latest Ref Range: 8.7 - 10.5 mg/dL 10.2     Alkaline Phosphatase Latest Ref Range: 55 - 135 U/L 51 (L)     PROTEIN TOTAL Latest Ref Range: 6.0 - 8.4 g/dL 6.9     Albumin Latest Ref Range: 3.5 - 5.2 g/dL 4.1     BILIRUBIN TOTAL Latest Ref Range: 0.1 - 1.0 mg/dL 0.9     AST Latest Ref Range: 10 - 40 U/L 38     ALT Latest Ref Range: 10 - 44 U/L 34     CRP Latest Ref Range: 0.0 - 8.2 mg/L 0.4     MAMMO DIGITAL SCREENING BILAT WITH FACUNDO Unknown   Rpt     Assessment/Plan         Problem List Items Addressed This Visit     Peripheral spondyloarthritis    Overview     Results for PANKAJ CARTER (MRN [id number]) as of 11/16/2018 13:06   Ref. Range 6/26/2015 11:10 5/29/2018 11:46 10/16/2018 10:50   Anti-SSA Antibody Latest Ref Range: 0.00 - 19.99 EU 3.90     Anti-SSA Interpretation Latest Ref Range: Negative  Negative     Anti-SSB Antibody Latest Ref Range: 0.00 - 19.99 EU  0.09    Anti-SSB Interpretation Latest Ref Range: Negative   Negative    SAMIR Screen Latest Ref Range: Negative <1:160  Negative <1:160     Anti-Ladonna-1 Antibody Latest Ref Range: <20 Units   <20   Anti-PM/Scl Ab Latest Ref Range: <20 Units   <20   Anti-SS-A 52 kD Ab, IgG Latest Ref Range: <20 Units   <20   Anti-U1-RNP  Ab Latest Ref Range: <20 Units   <20   CCP Antibodies Latest Ref Range: <5.0 U/mL <0.5 <0.5    EJ Latest Ref Range: Negative    Negative   Fibrillarin (U3 RNP) Latest Ref Range: Negative    Negative   HMGCR Antibody Latest Ref Range: 0 - 19 Units   <3   Ku Latest Ref Range: Negative    Negative   MDA-5 (P140) (CADM-140) Latest Ref Range: <20 Units   <20   MI-2 Latest Ref Range: Negative    Negative   NXP-2 (P140) Latest Ref Range: <20 Units   <20   OJ  Latest Ref Range: Negative    Negative   PL-12 Latest Ref Range: Negative    Negative   PL-7 Latest Ref Range: Negative    Negative   Rheumatoid Factor Latest Ref Range: 0.0 - 15.0 IU/mL 10.0 <10.0    SRP Latest Ref Range: Negative    Negative   TIF1 GAMMA (P155/140) Latest Ref Range: <20 Units   <20   U2 snRNP Latest Ref Range: Negative    Negative     Armando Kerr MD 3/5/2018       Narrative     AP view of bilateral hands.    Indication: Pain    Comparison: 6/26/15    Findings      Impression       No acute fractures. Degenerative changes of the hand are unchanged, most notable at the bilateral basal joints and second and fifth DIP joints bilaterally.      Electronically signed by: ARMANDO KERR MD  Date: 03/05/18  Time: 14:38      Results for PANKAJ CARTER (MRN [id number]) as of 11/16/2018 13:06   Ref. Range 10/16/2018 10:50   Hep B Core Total Ab Unknown Negative   Hep B S Ab Unknown Negative   Hepatitis B Surface Ag Unknown Negative   Hepatitis C Ab Unknown Negative   Mitogen - Nil Latest Ref Range: See text IU/mL >10.000   NIL Latest Ref Range: See text IU/mL 0.070   TB Gold Plus Unknown Negative   TB1 - Nil Latest Ref Range: See text IU/mL 0.020   TB2 - Nil Latest Ref Range: See text IU/mL 0.010   HIV 1/2 Ag/Ab Latest Ref Range: Negative  Negative   RPR Latest Ref Range: Non-reactive  Non-reactive   Strongyloides Ab IgG Latest Ref Range: Negative  Negative   Result Notes for X-Ray Chest PA And Lateral     Notes recorded by Nicanor Campos MD on 10/16/2018 at 4:27 PM CDT  The chest x-ray is normal. RJQ     Results for PANKAJ CARTER (MRN [id number]) as of 11/16/2018 13:06   Ref. Range 10/16/2018 10:50   CPK Latest Ref Range: 20 - 180 U/L 280 (H)            Current Assessment & Plan     TJ 3 SJ 5 Pt [id number] ESR 3  CRP 0.4 DAS28 2.58(remission)  TCD98-QPT 2.89(LDA)  DAPSA  TJ 5  SJ 10 Pt global 1.5 Pt pain 1 CRP 0.04= 17.54(MDA)      Stop leflunomide(ineffective); intolerant sulfasalazine,  cocktail daily so no methotrexate  Etanercept 50mg sc once a week, Risks and benefits discussed including risk of serious infections, reactivation of TB, MS, CHF, cancer lymphoma etc. . ACR handout provided  Rx sent to [facility]  RTC 3 months with standing labs.           Other Visit Diagnoses     Right hip pain    -  Primary    Relevant Orders    X-Ray Hip 2 View Right

## 2019-04-30 NOTE — ASSESSMENT & PLAN NOTE
TJ 3 SJ 5 Pt [id number] ESR 3  CRP 0.4 DAS28 2.58(remission)  DUU57-HQK 2.89(LDA)  DAPSA  TJ 5  SJ 10 Pt global 1.5 Pt pain 1 CRP 0.04= 17.54(MDA)      Stop leflunomide(ineffective); intolerant sulfasalazine, cocktail daily so no methotrexate  Etanercept 50mg sc once a week, Risks and benefits discussed including risk of serious infections, reactivation of TB, MS, CHF, cancer lymphoma etc. . ACR handout provided  Rx sent to [facility]  RTC 3 months with standing labs.

## 2019-05-02 ENCOUNTER — PATIENT MESSAGE (OUTPATIENT)
Dept: UROGYNECOLOGY | Facility: CLINIC | Age: 64
End: 2019-05-02

## 2019-05-07 ENCOUNTER — HOSPITAL ENCOUNTER (OUTPATIENT)
Dept: RADIOLOGY | Facility: HOSPITAL | Age: 64
Discharge: HOME | End: 2019-05-07
Attending: INTERNAL MEDICINE
Payer: COMMERCIAL

## 2019-05-07 DIAGNOSIS — M25.551 RIGHT HIP PAIN: ICD-10-CM

## 2019-05-07 PROCEDURE — 73502 XR HIP 2 VIEW RIGHT: ICD-10-PCS | Mod: 26,RT,, | Performed by: RADIOLOGY

## 2019-05-07 PROCEDURE — 73502 X-RAY EXAM HIP UNI 2-3 VIEWS: CPT | Mod: 26,RT,, | Performed by: RADIOLOGY

## 2019-05-07 PROCEDURE — 73502 X-RAY EXAM HIP UNI 2-3 VIEWS: CPT | Mod: TC,RT

## 2019-05-07 NOTE — TELEPHONE ENCOUNTER
DOCUMENTATION ONLY:  Enbrel prior authorization approved x 1 year  Dates: 5/3/19 through 5/3/20  Case ID: [id number]  Co pay: $2,333.78  Co pay assistance IS required  Forwarded to [facility] Financial assistance team for review.     Patient must use [facility]  [phone] (Phone)  [phone] (Fax)

## 2019-05-08 ENCOUNTER — TELEPHONE (OUTPATIENT)
Dept: PHYSICAL MEDICINE AND REHAB | Facility: CLINIC | Age: 64
End: 2019-05-08

## 2019-05-08 ENCOUNTER — OFFICE VISIT (OUTPATIENT)
Dept: INTERNAL MEDICINE | Facility: CLINIC | Age: 64
End: 2019-05-08
Payer: COMMERCIAL

## 2019-05-08 VITALS
SYSTOLIC BLOOD PRESSURE: 126 MMHG | HEART RATE: 90 BPM | OXYGEN SATURATION: 98 % | WEIGHT: 126.31 LBS | BODY MASS INDEX: 21.56 KG/M2 | HEIGHT: 64 IN | DIASTOLIC BLOOD PRESSURE: 84 MMHG

## 2019-05-08 DIAGNOSIS — M47.819 PERIPHERAL SPONDYLOARTHRITIS: ICD-10-CM

## 2019-05-08 DIAGNOSIS — E78.5 HYPERLIPIDEMIA, UNSPECIFIED HYPERLIPIDEMIA TYPE: ICD-10-CM

## 2019-05-08 DIAGNOSIS — M54.16 LUMBAR RADICULOPATHY: ICD-10-CM

## 2019-05-08 DIAGNOSIS — E03.9 HYPOTHYROIDISM, UNSPECIFIED TYPE: ICD-10-CM

## 2019-05-08 DIAGNOSIS — Z00.00 ROUTINE PHYSICAL EXAMINATION: Primary | ICD-10-CM

## 2019-05-08 DIAGNOSIS — M51.24 DISPLACEMENT OF THORACIC INTERVERTEBRAL DISC WITHOUT MYELOPATHY: ICD-10-CM

## 2019-05-08 DIAGNOSIS — M18.0 PRIMARY OSTEOARTHRITIS OF BOTH FIRST CARPOMETACARPAL JOINTS: ICD-10-CM

## 2019-05-08 DIAGNOSIS — E55.9 VITAMIN D DEFICIENCY: ICD-10-CM

## 2019-05-08 DIAGNOSIS — R73.09 ELEVATED GLUCOSE: ICD-10-CM

## 2019-05-08 DIAGNOSIS — I10 ESSENTIAL HYPERTENSION: ICD-10-CM

## 2019-05-08 PROCEDURE — 99999 PR PBB SHADOW E&M-EST. PATIENT-LVL IV: CPT | Mod: PBBFAC,,, | Performed by: INTERNAL MEDICINE

## 2019-05-08 PROCEDURE — 99999 PR PBB SHADOW E&M-EST. PATIENT-LVL IV: ICD-10-PCS | Mod: PBBFAC,,, | Performed by: INTERNAL MEDICINE

## 2019-05-08 PROCEDURE — 3079F DIAST BP 80-89 MM HG: CPT | Mod: CPTII,S$GLB,, | Performed by: INTERNAL MEDICINE

## 2019-05-08 PROCEDURE — 3079F PR MOST RECENT DIASTOLIC BLOOD PRESSURE 80-89 MM HG: ICD-10-PCS | Mod: CPTII,S$GLB,, | Performed by: INTERNAL MEDICINE

## 2019-05-08 PROCEDURE — 3074F SYST BP LT 130 MM HG: CPT | Mod: CPTII,S$GLB,, | Performed by: INTERNAL MEDICINE

## 2019-05-08 PROCEDURE — 99396 PREV VISIT EST AGE 40-64: CPT | Mod: S$GLB,,, | Performed by: INTERNAL MEDICINE

## 2019-05-08 PROCEDURE — 99396 PR PREVENTIVE VISIT,EST,40-64: ICD-10-PCS | Mod: S$GLB,,, | Performed by: INTERNAL MEDICINE

## 2019-05-08 PROCEDURE — 3074F PR MOST RECENT SYSTOLIC BLOOD PRESSURE < 130 MM HG: ICD-10-PCS | Mod: CPTII,S$GLB,, | Performed by: INTERNAL MEDICINE

## 2019-05-08 NOTE — PROGRESS NOTES
Subjective:       Patient ID: Jill Marquez is a 63 y.o. female.    Chief Complaint: Annual Exam    HPI:  Patient comes in for annual exam.  She has been seeing Rheumatology a lot lately for arthritis.  She says the current working diagnosis is psoriatic arthritis because of small pitting of her nails.  She has never had psoriasis nor does not run in her family but she says that is the working diagnosis and they are trying to get medication that will help.  She says her fingers give her the most trouble she otherwise has done well and denies any problems such as chest pain shortness breath fevers or chills.  She is up-to-date with other screening.  We reviewed her labs and she has continue elevated HDL cholesterol with good total cholesterol.  Her vitamin-D is low again and I explained that her level is almost below the normal range.  She was 112 that has fallen for 2 years.  I would like her to restart over-the-counter vitamin D at 3 days a week and we will reassess next visit.    Review of Systems   Constitutional: Negative for activity change, chills, fatigue, fever and unexpected weight change.   HENT: Positive for trouble swallowing. Negative for hearing loss, nosebleeds, rhinorrhea, sinus pain and sore throat.    Eyes: Negative for pain, discharge and visual disturbance.   Respiratory: Negative for apnea, cough, chest tightness, shortness of breath and wheezing.    Cardiovascular: Negative for chest pain, palpitations and leg swelling.   Gastrointestinal: Negative for abdominal pain, blood in stool, constipation, diarrhea, nausea and vomiting.   Endocrine: Negative for polydipsia and polyuria.   Genitourinary: Negative for difficulty urinating, dysuria, frequency, hematuria and menstrual problem.   Musculoskeletal: Positive for arthralgias and joint swelling. Negative for neck pain and neck stiffness.   Skin: Negative for rash and wound.   Neurological: Negative for dizziness, weakness and headaches.    Hematological: Negative for adenopathy.   Psychiatric/Behavioral: Positive for dysphoric mood (clinically stable, mostly joint related). Negative for confusion. The patient is not nervous/anxious.        Objective:      Physical Exam   Constitutional: She is oriented to person, place, and time. She appears well-developed and well-nourished.   HENT:   Head: Normocephalic and atraumatic.   Right Ear: Tympanic membrane, external ear and ear canal normal.   Left Ear: Tympanic membrane, external ear and ear canal normal.   Nose: Nose normal.   Mouth/Throat: Oropharynx is clear and moist and mucous membranes are normal. No oropharyngeal exudate.   Eyes: Pupils are equal, round, and reactive to light. Conjunctivae and EOM are normal. Right eye exhibits no discharge. Left eye exhibits no discharge.   Neck: Normal range of motion. Neck supple. No JVD present. No thyromegaly present.   Cardiovascular: Normal rate, regular rhythm, normal heart sounds and intact distal pulses.   No murmur heard.  Pulmonary/Chest: Effort normal and breath sounds normal. No respiratory distress. She has no wheezes. She has no rales.   Abdominal: Soft. Bowel sounds are normal. She exhibits no mass. There is no tenderness.   Musculoskeletal: Normal range of motion. She exhibits tenderness (fingers) and deformity (fingers have curvature). She exhibits no edema.   Lymphadenopathy:     She has no cervical adenopathy.        Right: No supraclavicular adenopathy present.        Left: No supraclavicular adenopathy present.   Neurological: She is alert and oriented to person, place, and time. She has normal reflexes. No cranial nerve deficit.   Skin: No rash noted.   Psychiatric: She has a normal mood and affect. She does not exhibit a depressed mood.       Assessment:       1. Routine physical examination    2. Primary osteoarthritis of both first carpometacarpal joints    3. Displacement of thoracic intervertebral disc without myelopathy    4. Lumbar  radiculopathy    5. Essential hypertension    6. Hyperlipidemia, unspecified hyperlipidemia type    7. Peripheral spondyloarthritis    8. Hypothyroidism, unspecified type    9. Vitamin D deficiency    10. Elevated glucose        Plan:       Jill was seen today for annual exam.    Diagnoses and all orders for this visit:    Routine physical examination  -     Vitamin D; Future  -     TSH; Future  -     Comprehensive metabolic panel; Future  -     CBC auto differential; Future  -     Lipid panel; Future  -     Hemoglobin A1c; Future    Primary osteoarthritis of both first carpometacarpal joints  -     Vitamin D; Future  -     TSH; Future  -     Comprehensive metabolic panel; Future  -     CBC auto differential; Future  -     Lipid panel; Future  -     Hemoglobin A1c; Future    Displacement of thoracic intervertebral disc without myelopathy  -     Vitamin D; Future  -     TSH; Future  -     Comprehensive metabolic panel; Future  -     CBC auto differential; Future  -     Lipid panel; Future  -     Hemoglobin A1c; Future    Lumbar radiculopathy  -     Vitamin D; Future  -     TSH; Future  -     Comprehensive metabolic panel; Future  -     CBC auto differential; Future  -     Lipid panel; Future  -     Hemoglobin A1c; Future    Essential hypertension  -     Vitamin D; Future  -     TSH; Future  -     Comprehensive metabolic panel; Future  -     CBC auto differential; Future  -     Lipid panel; Future  -     Hemoglobin A1c; Future    Hyperlipidemia, unspecified hyperlipidemia type  -     Vitamin D; Future  -     TSH; Future  -     Comprehensive metabolic panel; Future  -     CBC auto differential; Future  -     Lipid panel; Future  -     Hemoglobin A1c; Future    Peripheral spondyloarthritis  -     Vitamin D; Future  -     TSH; Future  -     Comprehensive metabolic panel; Future  -     CBC auto differential; Future  -     Lipid panel; Future  -     Hemoglobin A1c; Future    Hypothyroidism, unspecified type  -      Vitamin D; Future  -     TSH; Future  -     Comprehensive metabolic panel; Future  -     CBC auto differential; Future  -     Lipid panel; Future  -     Hemoglobin A1c; Future    Vitamin D deficiency  Comments:  Pt had stopped. I asked her to restart.   Orders:  -     Vitamin D; Future  -     TSH; Future  -     Comprehensive metabolic panel; Future  -     CBC auto differential; Future  -     Lipid panel; Future  -     Hemoglobin A1c; Future    Elevated glucose  -     Vitamin D; Future  -     TSH; Future  -     Comprehensive metabolic panel; Future  -     CBC auto differential; Future  -     Lipid panel; Future  -     Hemoglobin A1c; Future

## 2019-05-08 NOTE — TELEPHONE ENCOUNTER
New appointment July 9,2019 mailed to patient        ----- Message from Priya Dumont sent at 5/8/2019  2:22 PM CDT -----  Contact: pt @ [phone]  Calling to schedule an appt with Dr. Tapia sooner than 9/10 (earliest appt available in the system. Please call.

## 2019-05-21 ENCOUNTER — PATIENT MESSAGE (OUTPATIENT)
Dept: RHEUMATOLOGY | Facility: CLINIC | Age: 64
End: 2019-05-21

## 2019-06-10 RX ORDER — GABAPENTIN 300 MG/1
300 CAPSULE ORAL NIGHTLY
Qty: 30 CAPSULE | Refills: 1 | Status: SHIPPED | OUTPATIENT
Start: 2019-06-10 | End: 2019-06-11 | Stop reason: SDUPTHER

## 2019-06-10 NOTE — TELEPHONE ENCOUNTER
Last Rx refill-----04/24/19  Last office visit--05/29/19  Next office visit--07/09/19        ----- Message from Chinedu Barajas sent at 6/10/2019  8:35 AM CDT -----  Rx Refill/Request     Is this a Refill or New Rx: Refill  Rx Name and Strength: gabapentin (NEURONTIN) 300 MG capsule    Preferred Pharmacy with phone number: see below  Communication Preference: [phone]  Additional Information: Pt is asking to please give her a fill for one month to hold her until she sees the doctor in July    [facility] - [address]  [place] [address]  Phone: [phone] Fax: [phone]

## 2019-06-11 ENCOUNTER — TELEPHONE (OUTPATIENT)
Dept: PHYSICAL MEDICINE AND REHAB | Facility: CLINIC | Age: 64
End: 2019-06-11

## 2019-06-11 ENCOUNTER — PATIENT MESSAGE (OUTPATIENT)
Dept: PHYSICAL MEDICINE AND REHAB | Facility: CLINIC | Age: 64
End: 2019-06-11

## 2019-06-11 RX ORDER — GABAPENTIN 300 MG/1
300 CAPSULE ORAL 2 TIMES DAILY
Qty: 60 CAPSULE | Refills: 1 | Status: SHIPPED | OUTPATIENT
Start: 2019-06-11 | End: 2019-08-08 | Stop reason: SDUPTHER

## 2019-06-11 NOTE — TELEPHONE ENCOUNTER
I called the patient.  Her radicular pain got worse so she increased her gabapentin to 300 mg twice per day.  She has an appointment on 07/09/2019 for follow-up.

## 2019-06-11 NOTE — TELEPHONE ENCOUNTER
Patient Sig: Take 1 capsule (300 mg total) by mouth every evening.  Med Comments: Pt needs to get an appt from prescribing MD for more refills    05/08/19 last office visit  07/09/19 RTC        ----- Message from Chinedu Barajas sent at 6/11/2019 10:16 AM CDT -----  Pharmacy Calling    Reason for call: Phil is calling to confirm why script has changed to 1 cap every evening, due to pt taking 3x daily w/ 90 supply. Phil states pt is out of the medication due to directions/quantity change.     Pharmacy Name: [facility]    Prescription Name: gabapentin (NEURONTIN) 300 MG capsule    Phone Number: [phone]    Additional Information:

## 2019-07-17 ENCOUNTER — PATIENT OUTREACH (OUTPATIENT)
Dept: ADMINISTRATIVE | Facility: HOSPITAL | Age: 64
End: 2019-07-17

## 2019-07-23 RX ORDER — ETANERCEPT 50 MG/ML
SOLUTION SUBCUTANEOUS
Qty: 4 ML | Refills: 0 | Status: SHIPPED | OUTPATIENT
Start: 2019-07-23 | End: 2019-08-02 | Stop reason: SDUPTHER

## 2019-08-02 ENCOUNTER — OFFICE VISIT (OUTPATIENT)
Dept: RHEUMATOLOGY | Facility: CLINIC | Age: 64
End: 2019-08-02
Payer: COMMERCIAL

## 2019-08-02 VITALS
DIASTOLIC BLOOD PRESSURE: 74 MMHG | SYSTOLIC BLOOD PRESSURE: 115 MMHG | BODY MASS INDEX: 21.91 KG/M2 | HEART RATE: 76 BPM | HEIGHT: 64 IN | WEIGHT: 128.31 LBS

## 2019-08-02 DIAGNOSIS — L28.2 PRURITIC RASH: ICD-10-CM

## 2019-08-02 DIAGNOSIS — I10 ESSENTIAL HYPERTENSION: ICD-10-CM

## 2019-08-02 DIAGNOSIS — B18.1 HEPATITIS B CARRIER: Primary | ICD-10-CM

## 2019-08-02 DIAGNOSIS — M47.819 SPONDYLOARTHRITIS: ICD-10-CM

## 2019-08-02 DIAGNOSIS — R21 RASH OF BACK: ICD-10-CM

## 2019-08-02 DIAGNOSIS — R76.8 HEPATITIS B CORE ANTIBODY POSITIVE: ICD-10-CM

## 2019-08-02 DIAGNOSIS — M47.819 PERIPHERAL SPONDYLOARTHRITIS: ICD-10-CM

## 2019-08-02 PROCEDURE — 3008F PR BODY MASS INDEX (BMI) DOCUMENTED: ICD-10-PCS | Mod: CPTII,S$GLB,, | Performed by: INTERNAL MEDICINE

## 2019-08-02 PROCEDURE — 3008F BODY MASS INDEX DOCD: CPT | Mod: CPTII,S$GLB,, | Performed by: INTERNAL MEDICINE

## 2019-08-02 PROCEDURE — 3078F PR MOST RECENT DIASTOLIC BLOOD PRESSURE < 80 MM HG: ICD-10-PCS | Mod: CPTII,S$GLB,, | Performed by: INTERNAL MEDICINE

## 2019-08-02 PROCEDURE — 3074F SYST BP LT 130 MM HG: CPT | Mod: CPTII,S$GLB,, | Performed by: INTERNAL MEDICINE

## 2019-08-02 PROCEDURE — 99999 PR PBB SHADOW E&M-EST. PATIENT-LVL IV: CPT | Mod: PBBFAC,,, | Performed by: INTERNAL MEDICINE

## 2019-08-02 PROCEDURE — 3078F DIAST BP <80 MM HG: CPT | Mod: CPTII,S$GLB,, | Performed by: INTERNAL MEDICINE

## 2019-08-02 PROCEDURE — 99999 PR PBB SHADOW E&M-EST. PATIENT-LVL IV: ICD-10-PCS | Mod: PBBFAC,,, | Performed by: INTERNAL MEDICINE

## 2019-08-02 PROCEDURE — 3074F PR MOST RECENT SYSTOLIC BLOOD PRESSURE < 130 MM HG: ICD-10-PCS | Mod: CPTII,S$GLB,, | Performed by: INTERNAL MEDICINE

## 2019-08-02 PROCEDURE — 99214 PR OFFICE/OUTPT VISIT, EST, LEVL IV, 30-39 MIN: ICD-10-PCS | Mod: S$GLB,,, | Performed by: INTERNAL MEDICINE

## 2019-08-02 PROCEDURE — 99214 OFFICE O/P EST MOD 30 MIN: CPT | Mod: S$GLB,,, | Performed by: INTERNAL MEDICINE

## 2019-08-02 ASSESSMENT — DISEASE ACTIVITY SCORE (DAS28)
CRP_MG_PER_LITER: .3
ESR_MM_PER_HR: 1
SWOLLEN_JOINTS_COUNT: 7
TENDER_JOINTS_COUNT: 0
GLOBAL_HEALTH_SCORE: 5
TOTAL_SCORE_CRP: 1.87
TOTAL_SCORE_ESR: .81

## 2019-08-02 ASSESSMENT — ROUTINE ASSESSMENT OF PATIENT INDEX DATA (RAPID3)
MDHAQ FUNCTION SCORE: 0
FATIGUE SCORE: .5
AM STIFFNESS SCORE: 0, NO
PSYCHOLOGICAL DISTRESS SCORE: 2.2
PAIN SCORE: .5
TOTAL RAPID3 SCORE: .33
PATIENT GLOBAL ASSESSMENT SCORE: .5

## 2019-08-02 NOTE — ASSESSMENT & PLAN NOTE
TJ 0 SJ 5  Pt global 5 ESR <2    CRP 0.3 DAS28 0.81   OCM36-RRO 1.87(both remission)  DAPSA  TJ 0  SJ 7 Pt global 0.5 Pt pain 0.5 CRP 0.03 = 8.03(LDA)   *MDA=5 achieved       Standing labs today including HBV DNA  Etanercept 50mg sc once a week, refill Rx sent to [facility]  Cont gym 5 days/wk  RTC 3 months with standing labs.

## 2019-08-02 NOTE — PROGRESS NOTES
"Subjective:       Patient ID: Jill Marquez is a 63 y.o. female.    Chief Complaint: peripheral spondyloarthritis/PsA v. Erosive OA(RF- ACPA- SAMIR- B27 -)  HPI  Has just completed 3 months on Enbrel. Tolerating well without toxicity. No infections. Notes the dips, pips, mcps no longer painful or tender, and less swollen. Has persistent pruritic pigmented patch right upper scapular area  x 1 year diagnosed by dermatologist as notalgia paresthetica and neurology consult recommended and to consider Botox. See photos below  Review of Systems   Constitutional: Positive for fatigue (0.5/10). Negative for appetite change, fever and unexpected weight change.   HENT: Negative for mouth sores.         Dry mouth   Eyes: Negative for visual disturbance.        Dry   Respiratory: Negative for cough, shortness of breath and wheezing.    Cardiovascular: Negative for chest pain and palpitations.   Gastrointestinal: Negative for abdominal pain, anal bleeding, blood in stool, constipation, diarrhea, nausea and vomiting.   Genitourinary: Negative for dysuria, frequency and urgency.   Musculoskeletal: Negative for arthralgias, back pain, gait problem, joint swelling, myalgias, neck pain and neck stiffness.   Skin: Negative for rash.   Neurological: Negative for weakness, numbness and headaches.   Hematological: Negative for adenopathy. Does not bruise/bleed easily.   Psychiatric/Behavioral: Negative for sleep disturbance. The patient is not nervous/anxious.          Objective:   /74   Pulse 76   Ht 5' 3.5" (1.613 m)   Wt 58.2 kg (128 lb 4.9 oz)   BMI 22.37 kg/m²      Physical Exam   Constitutional: She is oriented to person, place, and time and well-developed, well-nourished, and in no distress.   HENT:   Head: Normocephalic and atraumatic.   Mouth/Throat: Oropharynx is clear and moist.   Eyes: Conjunctivae and EOM are normal.   Neck: Normal range of motion. Neck supple. No thyromegaly present.   Cardiovascular: Normal rate, " regular rhythm, normal heart sounds and intact distal pulses.  Exam reveals no gallop and no friction rub.    No murmur heard.  Pulmonary/Chest: Breath sounds normal. She has no wheezes. She has no rales. She exhibits no tenderness.   Abdominal: Soft. She exhibits no distension and no mass. There is no tenderness.       Right Side Rheumatological Exam     Examination finds the shoulder, elbow, wrist, knee, 1st PIP, 1st MCP, 2nd PIP, 3rd PIP, 4th PIP, 4th MCP and 5th MCP normal.    She has swelling of the 2nd MCP, 3rd MCP, 5th PIP, 2nd DIP and 5th DIP    The patient has an enlarged 5th PIP, 2nd DIP and 5th DIP    Left Side Rheumatological Exam     Examination finds the shoulder, elbow, wrist, knee, 1st PIP, 1st MCP, 2nd PIP, 3rd PIP, 4th PIP, 4th MCP, 5th PIP and 5th MCP normal.    She has swelling of the 2nd MCP and 3rd MCP      Lymphadenopathy:     She has no cervical adenopathy.   Neurological: She is alert and oriented to person, place, and time. She displays normal reflexes. Gait normal.   Nl motor strength UE and LE bilateral   Skin:     Pigmented rash right scapular area, se   Musculoskeletal: She exhibits no edema.                   Assessment/Plan         Problem List Items Addressed This Visit     Peripheral spondyloarthritis    Overview     Results for PANKAJ CARTER (MRN [id number]) as of 11/16/2018 13:06   Ref. Range 6/26/2015 11:10 5/29/2018 11:46 10/16/2018 10:50   Anti-SSA Antibody Latest Ref Range: 0.00 - 19.99 EU 3.90     Anti-SSA Interpretation Latest Ref Range: Negative  Negative     Anti-SSB Antibody Latest Ref Range: 0.00 - 19.99 EU  0.09    Anti-SSB Interpretation Latest Ref Range: Negative   Negative    SAMIR Screen Latest Ref Range: Negative <1:160  Negative <1:160     Anti-Ladonna-1 Antibody Latest Ref Range: <20 Units   <20   Anti-PM/Scl Ab Latest Ref Range: <20 Units   <20   Anti-SS-A 52 kD Ab, IgG Latest Ref Range: <20 Units   <20   Anti-U1-RNP  Ab Latest Ref Range: <20 Units   <20   CCP  Antibodies Latest Ref Range: <5.0 U/mL <0.5 <0.5    EJ Latest Ref Range: Negative    Negative   Fibrillarin (U3 RNP) Latest Ref Range: Negative    Negative   HMGCR Antibody Latest Ref Range: 0 - 19 Units   <3   Ku Latest Ref Range: Negative    Negative   MDA-5 (P140) (CADM-140) Latest Ref Range: <20 Units   <20   MI-2 Latest Ref Range: Negative    Negative   NXP-2 (P140) Latest Ref Range: <20 Units   <20   OJ Latest Ref Range: Negative    Negative   PL-12 Latest Ref Range: Negative    Negative   PL-7 Latest Ref Range: Negative    Negative   Rheumatoid Factor Latest Ref Range: 0.0 - 15.0 IU/mL 10.0 <10.0    SRP Latest Ref Range: Negative    Negative   TIF1 GAMMA (P155/140) Latest Ref Range: <20 Units   <20   U2 snRNP Latest Ref Range: Negative    Negative     Armando Kerr MD 3/5/2018       Narrative     AP view of bilateral hands.    Indication: Pain    Comparison: 6/26/15    Findings      Impression       No acute fractures. Degenerative changes of the hand are unchanged, most notable at the bilateral basal joints and second and fifth DIP joints bilaterally.      Electronically signed by: ARMANDO KERR MD  Date: 03/05/18  Time: 14:38      Results for PANKAJ CARTER (MRN [id number]) as of 11/16/2018 13:06   Ref. Range 10/16/2018 10:50   Hep B Core Total Ab Unknown Negative   Hep B S Ab Unknown Negative   Hepatitis B Surface Ag Unknown Negative   Hepatitis C Ab Unknown Negative   Mitogen - Nil Latest Ref Range: See text IU/mL >10.000   NIL Latest Ref Range: See text IU/mL 0.070   TB Gold Plus Unknown Negative   TB1 - Nil Latest Ref Range: See text IU/mL 0.020   TB2 - Nil Latest Ref Range: See text IU/mL 0.010   HIV 1/2 Ag/Ab Latest Ref Range: Negative  Negative   RPR Latest Ref Range: Non-reactive  Non-reactive   Strongyloides Ab IgG Latest Ref Range: Negative  Negative   Result Notes for X-Ray Chest PA And Lateral     Notes recorded by Nicanor Campos MD on 10/16/2018 at 4:27 PM CDT  The chest x-ray  is normal. RJQ     Results for PANKAJ CARTER (MRN [id number]) as of 11/16/2018 13:06   Ref. Range 10/16/2018 10:50   CPK Latest Ref Range: 20 - 180 U/L 280 (H)            Current Assessment & Plan     TJ 0 SJ 5  Pt global 5 ESR   CRP  DAS28   [id number]   DAPSA  TJ  0  SJ  7 Pt global 0.5 Pt pain 0.5 CRP =           Standing labs today including HBV DNA  Etanercept 50mg sc once a week, refill Rx sent to [facility]  Cont gym 5 days/wk  RTC 3 months with standing labs.                            HTN (hypertension)    Current Assessment & Plan     115/74         Hepatitis B core antibody positive (negative viral load; suspect false positive)    Current Assessment & Plan     HBV DNA today and monitor on Enbrel         Rash of back    Current Assessment & Plan     Ref to Derm for second opinion diagnosed as notalgia paresthetica           Other Visit Diagnoses     Hepatitis B carrier    -  Primary    Relevant Orders    Hepatitis B Dna, Quant    Sedimentation rate    C-reactive protein    CBC auto differential    Comprehensive metabolic panel    HEPATITIS B VIRAL DNA, QUANTITATIVE    Spondyloarthritis        Relevant Orders    Sedimentation rate    C-reactive protein    CBC auto differential    Comprehensive metabolic panel    HEPATITIS B VIRAL DNA, QUANTITATIVE    Pruritic rash        Relevant Orders    Ambulatory Referral to Dermatology

## 2019-08-03 ENCOUNTER — PATIENT MESSAGE (OUTPATIENT)
Dept: RHEUMATOLOGY | Facility: CLINIC | Age: 64
End: 2019-08-03

## 2019-08-03 DIAGNOSIS — M47.819 PERIPHERAL SPONDYLOARTHRITIS: ICD-10-CM

## 2019-08-03 DIAGNOSIS — L40.50 PSA (PSORIATIC ARTHRITIS): Primary | ICD-10-CM

## 2019-08-05 ENCOUNTER — OFFICE VISIT (OUTPATIENT)
Dept: PHYSICAL MEDICINE AND REHAB | Facility: CLINIC | Age: 64
End: 2019-08-05
Payer: COMMERCIAL

## 2019-08-05 ENCOUNTER — TELEPHONE (OUTPATIENT)
Dept: RHEUMATOLOGY | Facility: CLINIC | Age: 64
End: 2019-08-05

## 2019-08-05 VITALS
WEIGHT: 123.44 LBS | HEART RATE: 92 BPM | DIASTOLIC BLOOD PRESSURE: 89 MMHG | BODY MASS INDEX: 21.07 KG/M2 | HEIGHT: 64 IN | SYSTOLIC BLOOD PRESSURE: 143 MMHG

## 2019-08-05 DIAGNOSIS — M54.41 CHRONIC MIDLINE LOW BACK PAIN WITH BILATERAL SCIATICA: Primary | ICD-10-CM

## 2019-08-05 DIAGNOSIS — M47.26 OSTEOARTHRITIS OF SPINE WITH RADICULOPATHY, LUMBAR REGION: ICD-10-CM

## 2019-08-05 DIAGNOSIS — R73.9 HYPERGLYCEMIA: Primary | ICD-10-CM

## 2019-08-05 DIAGNOSIS — M54.42 CHRONIC MIDLINE LOW BACK PAIN WITH BILATERAL SCIATICA: Primary | ICD-10-CM

## 2019-08-05 DIAGNOSIS — G89.29 CHRONIC NECK PAIN: ICD-10-CM

## 2019-08-05 DIAGNOSIS — G89.29 CHRONIC MIDLINE LOW BACK PAIN WITH BILATERAL SCIATICA: Primary | ICD-10-CM

## 2019-08-05 DIAGNOSIS — M47.812 SPONDYLOSIS OF CERVICAL REGION WITHOUT MYELOPATHY OR RADICULOPATHY: ICD-10-CM

## 2019-08-05 DIAGNOSIS — M19.041 PRIMARY OSTEOARTHRITIS OF BOTH HANDS: ICD-10-CM

## 2019-08-05 DIAGNOSIS — G56.01 CARPAL TUNNEL SYNDROME, RIGHT: ICD-10-CM

## 2019-08-05 DIAGNOSIS — M19.042 PRIMARY OSTEOARTHRITIS OF BOTH HANDS: ICD-10-CM

## 2019-08-05 DIAGNOSIS — M54.2 CHRONIC NECK PAIN: ICD-10-CM

## 2019-08-05 PROCEDURE — 3079F PR MOST RECENT DIASTOLIC BLOOD PRESSURE 80-89 MM HG: ICD-10-PCS | Mod: CPTII,S$GLB,, | Performed by: PHYSICAL MEDICINE & REHABILITATION

## 2019-08-05 PROCEDURE — 99999 PR PBB SHADOW E&M-EST. PATIENT-LVL II: CPT | Mod: PBBFAC,,, | Performed by: PHYSICAL MEDICINE & REHABILITATION

## 2019-08-05 PROCEDURE — 99214 PR OFFICE/OUTPT VISIT, EST, LEVL IV, 30-39 MIN: ICD-10-PCS | Mod: S$GLB,,, | Performed by: PHYSICAL MEDICINE & REHABILITATION

## 2019-08-05 PROCEDURE — 99214 OFFICE O/P EST MOD 30 MIN: CPT | Mod: S$GLB,,, | Performed by: PHYSICAL MEDICINE & REHABILITATION

## 2019-08-05 PROCEDURE — 3077F SYST BP >= 140 MM HG: CPT | Mod: CPTII,S$GLB,, | Performed by: PHYSICAL MEDICINE & REHABILITATION

## 2019-08-05 PROCEDURE — 3077F PR MOST RECENT SYSTOLIC BLOOD PRESSURE >= 140 MM HG: ICD-10-PCS | Mod: CPTII,S$GLB,, | Performed by: PHYSICAL MEDICINE & REHABILITATION

## 2019-08-05 PROCEDURE — 3008F PR BODY MASS INDEX (BMI) DOCUMENTED: ICD-10-PCS | Mod: CPTII,S$GLB,, | Performed by: PHYSICAL MEDICINE & REHABILITATION

## 2019-08-05 PROCEDURE — 3008F BODY MASS INDEX DOCD: CPT | Mod: CPTII,S$GLB,, | Performed by: PHYSICAL MEDICINE & REHABILITATION

## 2019-08-05 PROCEDURE — 99999 PR PBB SHADOW E&M-EST. PATIENT-LVL II: ICD-10-PCS | Mod: PBBFAC,,, | Performed by: PHYSICAL MEDICINE & REHABILITATION

## 2019-08-05 PROCEDURE — 3079F DIAST BP 80-89 MM HG: CPT | Mod: CPTII,S$GLB,, | Performed by: PHYSICAL MEDICINE & REHABILITATION

## 2019-08-05 NOTE — PROGRESS NOTES
Subjective:       Patient ID: Jill Marquez is a 63 y.o. female.    Chief Complaint: No chief complaint on file.    HPI    HISTORY OF PRESENT ILLNESS:  Mrs. Marquez is a 63-year-old white female with diffuse osteoarthritis who is followed up in the [facility] for chronic low back pain with history of lumbar radiculopathy, chronic neck pain, OA of the hands and right carpal tunnel syndrome.  Her last visit to the clinic was on 5/29/18.  She was maintained on diclofenac, gabapentin, p.r.n. metaxalone and p.r.n. tramadol.    The patient was lost to follow-up..  Since her last visit, the patient has been followed up by Rheumatology for spondyloarthropathy and psoriatic arthritis.  She is maintained on an Enbrel with good response.    The patient is coming today to the clinic for followup.  Her low back pain is under good control.  It is an intermittent aching pain in the lumbar spine and across her back.  She denies any radiation to her legs.  Her maximum pain is 3-4/10 and minimum 1-2/10.  Today, it is 1-2/10.  The patient denies any lower extremity weakness.  She denies any bowel or bladder incontinence.    Her neck pain has been under good control.  It is an intermittent aching pain in the cervical spine. Her maximum pain is 1-2/10 and minimum 0/10.  Today, it is 0/10.    She continues to complain of bilateral hand pain due to osteoarthritis.  In the past, she was followed up by Orthopedic Surgery and received cortisone injections into the first carpometacarpal joint with some relief.      Her hand numbness due to carpal tunnel syndrome has been slightly worse.  She has not been wearing carpal tunnel splints consistently.    She is currently taking gabapentin 300 mg p.o. twice per day.  She takes tramadol 50 mg p.r.n., usually once at bedtime.  Voltaren gel did not help.         Review of Systems   Constitutional: Negative for fatigue.   Eyes: Negative for visual disturbance.   Respiratory: Negative  for shortness of breath.    Cardiovascular: Negative for chest pain.   Gastrointestinal: Negative for blood in stool, constipation, nausea and vomiting.   Genitourinary: Negative for difficulty urinating.   Musculoskeletal: Positive for arthralgias (hands), back pain and neck pain. Negative for gait problem.   Skin: Negative for rash.   Neurological: Negative for dizziness and headaches.   Psychiatric/Behavioral: Positive for sleep disturbance. Negative for behavioral problems.       Objective:      Physical Exam   Constitutional: She appears well-developed and well-nourished. No distress.   HENT:   Head: Normocephalic and atraumatic.   Neck:   Decreased ROM.  -ve tenderness.     Pulmonary/Chest: Breath sounds normal.   Musculoskeletal:   BUE:  +ve severe Heberden's & Lizzy's nodes.  +ve MCP swelling.  Strength:    RUE: 5/5 at shoulder abduction, 5 elbow flexion, 5 elbow extension, 5 hand .   LUE: 5/5 at shoulder abduction, 5 elbow flexion, 5 elbow extension, 5 hand .  Sensation to pinprick:   RUE: intact.   LUE: intact.  DTR:    RUE: +1 biceps, +1 triceps.   LUE:  +1 biceps, +1 triceps.        BLE:  ROM:full.  Mild bilateral knee crepitus.   Strength:    RLE: 5/5 at hip flexion, 5 knee extension, 5 ankle DF, 5 PF.   LLE: 5/5 at hip flexion, 5 knee extension, 5 ankle DF, 5 PF.  Sensation to pinprick:     RLE: intact.      LLE: intact.   DTR:     RLE: +1 knee, +1 ankle.    LLE: +1 knee, +1 ankle.  SLR (sitting):      RLE: -ve.      LLE: -ve.     Gait: WNL.     Neurological: She is alert.   Skin: Skin is warm. No rash noted.   Psychiatric: She has a normal mood and affect.   Vitals reviewed.            Assessment:       1. Chronic midline low back pain with bilateral sciatica    2. Osteoarthritis of spine with radiculopathy, lumbar region    3. Chronic neck pain    4. Spondylosis of cervical region without myelopathy or radiculopathy    5. Primary osteoarthritis of both hands    6. Carpal tunnel syndrome,  right        Plan:       - Continue gabapentin (NEURONTIN) 300 MG capsule; Take 1 capsule (300 mg total) by mouth twice daily.  - Continue tramadol 50 mg po daily prn.  - Continue wearing Carpal Tunnel Splint consistently at nightt.   - Regular home exercise program was encouraged.  - No follow-ups on file.     This was a 25 minute visit, more than 50% of which was spent counseling the patient about the diagnosis and the treatment plan.

## 2019-08-07 ENCOUNTER — PATIENT MESSAGE (OUTPATIENT)
Dept: RHEUMATOLOGY | Facility: CLINIC | Age: 64
End: 2019-08-07

## 2019-08-08 RX ORDER — GABAPENTIN 300 MG/1
300 CAPSULE ORAL 2 TIMES DAILY
Qty: 60 CAPSULE | Refills: 1 | Status: SHIPPED | OUTPATIENT
Start: 2019-08-08 | End: 2019-10-09 | Stop reason: SDUPTHER

## 2019-08-13 ENCOUNTER — LAB VISIT (OUTPATIENT)
Dept: LAB | Facility: HOSPITAL | Age: 64
End: 2019-08-13
Attending: INTERNAL MEDICINE
Payer: COMMERCIAL

## 2019-08-13 DIAGNOSIS — R73.9 HYPERGLYCEMIA: ICD-10-CM

## 2019-08-13 LAB
ESTIMATED AVG GLUCOSE: 100 MG/DL (ref 68–131)
GLUCOSE SERPL-MCNC: 92 MG/DL (ref 70–110)
HBA1C MFR BLD HPLC: 5.1 % (ref 4–5.6)

## 2019-08-13 PROCEDURE — 82947 ASSAY GLUCOSE BLOOD QUANT: CPT

## 2019-08-13 PROCEDURE — 36415 COLL VENOUS BLD VENIPUNCTURE: CPT

## 2019-08-13 PROCEDURE — 83036 HEMOGLOBIN GLYCOSYLATED A1C: CPT

## 2019-09-20 ENCOUNTER — IMMUNIZATION (OUTPATIENT)
Dept: INTERNAL MEDICINE | Facility: CLINIC | Age: 64
End: 2019-09-20
Payer: COMMERCIAL

## 2019-09-20 PROCEDURE — 90471 IMMUNIZATION ADMIN: CPT | Mod: S$GLB,,, | Performed by: FAMILY MEDICINE

## 2019-09-20 PROCEDURE — 90662 IIV NO PRSV INCREASED AG IM: CPT | Mod: S$GLB,,, | Performed by: FAMILY MEDICINE

## 2019-09-20 PROCEDURE — 90471 FLU VACCINE - HIGH DOSE (65+) PRESERVATIVE FREE IM: ICD-10-PCS | Mod: S$GLB,,, | Performed by: FAMILY MEDICINE

## 2019-09-20 PROCEDURE — 90662 FLU VACCINE - HIGH DOSE (65+) PRESERVATIVE FREE IM: ICD-10-PCS | Mod: S$GLB,,, | Performed by: FAMILY MEDICINE

## 2019-10-09 RX ORDER — GABAPENTIN 300 MG/1
300 CAPSULE ORAL 2 TIMES DAILY
Qty: 60 CAPSULE | Refills: 1 | Status: SHIPPED | OUTPATIENT
Start: 2019-10-09 | End: 2019-12-09 | Stop reason: SDUPTHER

## 2019-10-09 NOTE — TELEPHONE ENCOUNTER
Last Rx refill-----08/08/19  Last office visit--08/05/19  Next office visit--02/13/19          ----- Message from Priya Dumont sent at 10/9/2019 10:16 AM CDT -----  Rx Refill/Request     Is this a Refill or New Rx:  Refill    Rx Name and Strength:  gabapentin (NEURONTIN) 300 MG capsule    Preferred Pharmacy with phone number:     [facility] - [place] [address] [address]  Phone: [phone] Fax: [phone]    Communication Preference:pt @ [phone]  Additional Information: asking to have sent to pharmacy today, says she is traveling this weekend. Please call.

## 2019-10-22 ENCOUNTER — PATIENT MESSAGE (OUTPATIENT)
Dept: INTERNAL MEDICINE | Facility: CLINIC | Age: 64
End: 2019-10-22

## 2019-10-22 RX ORDER — AMLODIPINE BESYLATE 10 MG/1
10 TABLET ORAL DAILY
Qty: 90 TABLET | Refills: 1 | Status: SHIPPED | OUTPATIENT
Start: 2019-10-22 | End: 2020-04-17 | Stop reason: SDUPTHER

## 2019-10-24 DIAGNOSIS — J30.2 SEASONAL AND PERENNIAL ALLERGIC RHINITIS: ICD-10-CM

## 2019-10-24 DIAGNOSIS — J30.89 SEASONAL AND PERENNIAL ALLERGIC RHINITIS: ICD-10-CM

## 2019-10-24 RX ORDER — AZELASTINE 1 MG/ML
1 SPRAY, METERED NASAL 2 TIMES DAILY
Qty: 30 ML | Refills: 2 | Status: SHIPPED | OUTPATIENT
Start: 2019-10-24 | End: 2020-06-10 | Stop reason: SDUPTHER

## 2019-11-04 RX ORDER — LEVOTHYROXINE SODIUM 75 UG/1
75 TABLET ORAL
Qty: 90 TABLET | Refills: 3 | Status: SHIPPED | OUTPATIENT
Start: 2019-11-04 | End: 2020-11-03

## 2019-11-06 DIAGNOSIS — L40.50 PSA (PSORIATIC ARTHRITIS): ICD-10-CM

## 2019-11-06 DIAGNOSIS — M47.819 PERIPHERAL SPONDYLOARTHRITIS: ICD-10-CM

## 2019-11-07 RX ORDER — ETANERCEPT 50 MG/ML
SOLUTION SUBCUTANEOUS
Qty: 4 ML | Refills: 2 | Status: SHIPPED | OUTPATIENT
Start: 2019-11-07 | End: 2019-12-06 | Stop reason: SDUPTHER

## 2019-11-19 ENCOUNTER — OFFICE VISIT (OUTPATIENT)
Dept: UROGYNECOLOGY | Facility: CLINIC | Age: 64
End: 2019-11-19
Payer: COMMERCIAL

## 2019-11-19 VITALS
DIASTOLIC BLOOD PRESSURE: 80 MMHG | SYSTOLIC BLOOD PRESSURE: 120 MMHG | BODY MASS INDEX: 22.86 KG/M2 | HEIGHT: 63 IN | WEIGHT: 129 LBS

## 2019-11-19 DIAGNOSIS — Z01.419 WELL WOMAN EXAM: Primary | ICD-10-CM

## 2019-11-19 DIAGNOSIS — N95.2 ATROPHIC VAGINITIS: ICD-10-CM

## 2019-11-19 PROCEDURE — 99397 PR PREVENTIVE VISIT,EST,65 & OVER: ICD-10-PCS | Mod: S$GLB,,, | Performed by: NURSE PRACTITIONER

## 2019-11-19 PROCEDURE — 99397 PER PM REEVAL EST PAT 65+ YR: CPT | Mod: S$GLB,,, | Performed by: NURSE PRACTITIONER

## 2019-11-19 PROCEDURE — 99999 PR PBB SHADOW E&M-EST. PATIENT-LVL III: ICD-10-PCS | Mod: PBBFAC,,, | Performed by: NURSE PRACTITIONER

## 2019-11-19 PROCEDURE — 99999 PR PBB SHADOW E&M-EST. PATIENT-LVL III: CPT | Mod: PBBFAC,,, | Performed by: NURSE PRACTITIONER

## 2019-11-19 NOTE — PATIENT INSTRUCTIONS
1.  Always get help lifting 50# or more.     2. Vaginal health:  Vaginal atrophy (dryness):  Use 0.5 gram of estrogen cream in vagina  twice a week    3. H/o constipation:  Continue to avoid straining.  Continue stool softeners/fiber.          4. mammogram is due 04/2020   5. RTC 10/2020 for follow up exam

## 2019-11-19 NOTE — PROGRESS NOTES
Urogyn follow up    [facility] - GYNECOLOGY  [address]  [place] [address]    Jill Marquez  [id number]  1955      Jill Marquez is a 64 y.o. here for an annual exam    10/19/15  Title of Operation:  1) Robotic-assisted laparoscopic lysis of adhesions  2) Robotic-assisted laparoscopic bilateral salpingo-oophorectomy  3) Robotic-assisted laparoscopic sacral colpopexy with polypropylene mesh  4) Placement of retropubic tension-free midurethral sling, RetroARC (AMS)  5) Cystourethroscopy    Indications for Surgery:  1) UI: (+) RICK > (+) UUI - mornings only. (--) pads:Changes underwear. May wear pads if on a trip or away from home for an extended time. Daytime frequency: Q 3-4 hours. Nocturia: No: (--) dysuria, (--) hematuria, (--) frequent UTIs. (--) complete bladder emptying. Leans forward to void. Has some urgency in the morning.  --SUDS 10/6/15:  3. URETHRAL FUNCTION/STORAGE PHASE:  a. WITH prolapse reduction:  · CLPP (150 mL): Negative at 162 cm H20  · VLPP (150 mL): Negative at 165 cm H20  · CLPP (300 mL): Negative at 150 cm H20  · VLPP (300 mL): Negative at 147 cm H20  · CLPP (409 mL): Negative at 148 cm H20  · VLPP (409 mL): Negative at 171 cm H20  · CLPP (MAX ): Negative at 155 cm H20  · VLPP (MAX): Negative at 145 cm H20  · POS CLPP and VLPP after removal of pves catheter.  These findings are consistent with Positive urodynamic stress incontinence only at max capacity with POP reduction and removal of pves catheter.  Assessment:  UF prolonged but normal. PF prolonged but normal. Compliance normal. Max capacity 600 mL. DO (--). RICK (+).   --cysto 10/6/15: Normal with slight decrease coaptation and minimal trabeculations.  2) POP: Present. below introitus. Symptoms:(--) (--) vaginal bleeding. (--) vaginal discharge. (+) sexually active. (--) dyspareunia. (+) Vaginal dryness. (--) vaginal estrogen use.   --POP-Q- per Dr. Ballesteros: Aa +3; Ba +3; C -4; Ap -2; Bp -2--standing; Genital hiatus 2 cm,  perineal body 1cm total vaginal length 9cm.   3) BM: (+) constipation/straining. (--) chronic diarrhea. (--) hematochezia. (--) fecal incontinence. (--) fecal smearing/urgency. (--) incomplete evacuation. Using metamucil 2 capsules twice daily and correctol twice weekly.    Preoperative Diagnosis:  1) Cystocele  2) Vaginal vault prolapse  3) Mixed urinary incontinence  4) Vaginal atrophy  5) Urodynamic stress incontinence    Postoperative Diagnosis:  1) Cystocele  2) Vaginal vault prolapse  3) Mixed urinary incontinence  4) Vaginal atrophy  5) Urodynamic stress incontinence    Issues include:  Patient Active Problem List   Diagnosis    Hypothyroid    HTN (hypertension)    Displacement of thoracic intervertebral disc without myelopathy    Hyperlipidemia    Lumbar spondylosis    Carpal tunnel syndrome    Lumbar radiculopathy    Sicca    Fatigue    Myalgia and myositis    Erosive osteoarthritis of both hands    Leg pain, bilateral    Bilateral hand pain    Hepatitis B core antibody positive (negative viral load; suspect false positive)    Elevated CPK    Atrophic vaginitis    Chronic constipation    Screening for colon cancer    Chronic low back pain    CMC arthritis, thumb, degenerative    Mucous cyst of finger    Peripheral spondyloarthritis    Rash of back       Last pap: 12/2005- normal- patient is post hyst  Last mammogram: 04/2019 normal  Colonoscopy: 01/25/2016 one polyp- normal- repeat 2026  DEXA: Elevated BMD of the lumbar spine.  No significant change since prior study.  Adult scoliosis associated with increased lumbar spine BMD.    Recommendations:  1) Adequate calcium and Vitamin D therapy  2) Appropriate exercise  3) Consider repeat BMD in 4 years      Current Outpatient Medications   Medication Sig    amLODIPine (NORVASC) 10 MG tablet Take 1 tablet (10 mg total) by mouth once daily.    azelastine (ASTELIN) 137 mcg (0.1 %) nasal spray 1 spray (137 mcg total) by Nasal route 2 (two)  "times daily.    conjugated estrogens (PREMARIN) vaginal cream APPLY 0.5 GRAMS WITH APPLICATOR OR DIME-SIZED AMOUNT TO VAGINA TWICE WEEKLY    ENBREL SURECLICK 50 mg/mL (1 mL) PnIj INJECT 1 PEN UNDER THE SKIN (SUBCUTANEOUS INJECTION) EVERY WEEK    fish oil-omega-3 fatty acids 300-1,000 mg capsule Take 2 g by mouth once daily.    fluticasone (FLONASE) 50 mcg/actuation nasal spray 2 sprays by Each Nare route once daily.    gabapentin (NEURONTIN) 300 MG capsule TAKE 1 CAPSULE (300 MG TOTAL) BY MOUTH 2 (TWO) TIMES DAILY.    L. ACIDOPHILUS/BIFID. ANIMALIS (ONE-A-DAY TRUBIOTICS ORAL) Take by mouth.    levothyroxine (SYNTHROID) 75 MCG tablet Take 1 tablet (75 mcg total) by mouth before breakfast.    PSYLLIUM SEED, WITH SUGAR, (METAMUCIL ORAL) Take by mouth.    cholecalciferol, vitamin D3, 1,000 unit Chew Take by mouth.    conjugated estrogens (PREMARIN) vaginal cream APPLY 0.5 GRAMS WITH APPLICATOR OR DIME-SIZED AMOUNT TO VAGINA TWICE WEEKLY    traMADol (ULTRAM) 50 mg tablet Take 1 tablet (50 mg total) by mouth 3 (three) times daily as needed.     No current facility-administered medications for this visit.        ROS:  Feeling well.   No dyspnea or chest pain on exertion.    No abdominal pain, change in bowel habits, black or bloody stools.  Taking colace and metamucil daily  No urinary tract symptoms. No urgency, frequency or incontinence  GYN ROS: no breast pain or new or enlarging lumps on self exam, no vaginal bleeding.   No neurological complaints.    OBJECTIVE:   /80 (BP Location: Right arm, Patient Position: Sitting, BP Method: Medium (Manual))   Ht 5' 4.8" (1.646 m)   Wt 58.6 kg (129 lb 3 oz)   BMI 21.63 kg/m²    The patient appears well, alert, oriented x 3, in no distress.  ENT normal.  Neck supple. No adenopathy or thyromegaly. MARYAN. .   Abdomen soft without tenderness, guarding, mass or organomegaly.   Extremities show no edema, normal peripheral pulses.   Neurological is normal, no focal " findings.    PELVIC EXAM:   VULVA: normal appearing vulva with no masses, tenderness or lesions,   VAGINA: normal appearing vagina with normal color and discharge, no lesions, atrophic, no mesh visible/ palpable. Sling path nontender.   CERVIX: surgically absent,   UTERUS: absent,   ADNEXA: no masses,   RECTAL: normal rectal, no masses, small nonthrombosed external hemorrhoid      Impression  1. Well woman exam     2. Vaginal atrophy     3. Atrophic vaginitis  conjugated estrogens (PREMARIN) vaginal cream     We reviewed the above issues and discussed options for short-term versus long-term management of her problems.   Plan:      1.  Always get help lifting 50# or more.     2. Vaginal health:  Vaginal atrophy (dryness):  Use 0.5 gram of estrogen cream in vagina  twice a week    3. H/o constipation:  Continue to avoid straining.  Continue stool softeners/fiber.          4. mammogram is due 04/2019-- please call to schedule   5. RTC 10/2019 for annual exam      20 minutes were spent in face to face time with this patient  90 % of this time was spent in counseling and/or coordination of care    STACY Thomson-BC [facility]  Division of Female Pelvic Medicine and Reconstructive Surgery  Department of Obstetrics & Gynecology

## 2019-12-03 ENCOUNTER — LAB VISIT (OUTPATIENT)
Dept: LAB | Facility: HOSPITAL | Age: 64
End: 2019-12-03
Attending: INTERNAL MEDICINE
Payer: COMMERCIAL

## 2019-12-03 DIAGNOSIS — L40.50 PSA (PSORIATIC ARTHRITIS): ICD-10-CM

## 2019-12-03 DIAGNOSIS — M47.819 PERIPHERAL SPONDYLOARTHRITIS: ICD-10-CM

## 2019-12-03 DIAGNOSIS — B18.1 HEPATITIS B CARRIER: ICD-10-CM

## 2019-12-03 DIAGNOSIS — M47.819 SPONDYLOARTHRITIS: ICD-10-CM

## 2019-12-03 LAB
CRP SERPL-MCNC: 0.4 MG/L (ref 0–8.2)
CRP SERPL-MCNC: 0.4 MG/L (ref 0–8.2)
ERYTHROCYTE [SEDIMENTATION RATE] IN BLOOD BY WESTERGREN METHOD: <2 MM/HR (ref 0–36)
ERYTHROCYTE [SEDIMENTATION RATE] IN BLOOD BY WESTERGREN METHOD: <2 MM/HR (ref 0–36)

## 2019-12-03 PROCEDURE — 86140 C-REACTIVE PROTEIN: CPT

## 2019-12-03 PROCEDURE — 85652 RBC SED RATE AUTOMATED: CPT

## 2019-12-03 PROCEDURE — 36415 COLL VENOUS BLD VENIPUNCTURE: CPT | Mod: PO

## 2019-12-06 ENCOUNTER — OFFICE VISIT (OUTPATIENT)
Dept: RHEUMATOLOGY | Facility: CLINIC | Age: 64
End: 2019-12-06
Attending: INTERNAL MEDICINE
Payer: COMMERCIAL

## 2019-12-06 ENCOUNTER — TELEPHONE (OUTPATIENT)
Dept: RHEUMATOLOGY | Facility: CLINIC | Age: 64
End: 2019-12-06

## 2019-12-06 VITALS
DIASTOLIC BLOOD PRESSURE: 85 MMHG | BODY MASS INDEX: 22.22 KG/M2 | SYSTOLIC BLOOD PRESSURE: 134 MMHG | HEIGHT: 64 IN | WEIGHT: 130.13 LBS | HEART RATE: 85 BPM

## 2019-12-06 DIAGNOSIS — R74.8 ABNORMAL CPK: ICD-10-CM

## 2019-12-06 DIAGNOSIS — M18.0 PRIMARY OSTEOARTHRITIS OF BOTH FIRST CARPOMETACARPAL JOINTS: ICD-10-CM

## 2019-12-06 DIAGNOSIS — Z78.0 MENOPAUSE: Primary | ICD-10-CM

## 2019-12-06 DIAGNOSIS — I10 ESSENTIAL HYPERTENSION: ICD-10-CM

## 2019-12-06 DIAGNOSIS — E78.5 HYPERLIPIDEMIA, UNSPECIFIED HYPERLIPIDEMIA TYPE: ICD-10-CM

## 2019-12-06 DIAGNOSIS — D75.89 MACROCYTOSIS: Primary | ICD-10-CM

## 2019-12-06 DIAGNOSIS — M85.80 OSTEOPENIA, UNSPECIFIED LOCATION: ICD-10-CM

## 2019-12-06 DIAGNOSIS — M47.819 PERIPHERAL SPONDYLOARTHRITIS: ICD-10-CM

## 2019-12-06 DIAGNOSIS — L40.50 PSA (PSORIATIC ARTHRITIS): ICD-10-CM

## 2019-12-06 PROCEDURE — 1101F PR PT FALLS ASSESS DOC 0-1 FALLS W/OUT INJ PAST YR: ICD-10-PCS | Mod: CPTII,S$GLB,, | Performed by: INTERNAL MEDICINE

## 2019-12-06 PROCEDURE — 3079F PR MOST RECENT DIASTOLIC BLOOD PRESSURE 80-89 MM HG: ICD-10-PCS | Mod: CPTII,S$GLB,, | Performed by: INTERNAL MEDICINE

## 2019-12-06 PROCEDURE — 99999 PR PBB SHADOW E&M-EST. PATIENT-LVL IV: ICD-10-PCS | Mod: PBBFAC,,, | Performed by: INTERNAL MEDICINE

## 2019-12-06 PROCEDURE — 99999 PR PBB SHADOW E&M-EST. PATIENT-LVL IV: CPT | Mod: PBBFAC,,, | Performed by: INTERNAL MEDICINE

## 2019-12-06 PROCEDURE — 99213 OFFICE O/P EST LOW 20 MIN: CPT | Mod: S$GLB,,, | Performed by: INTERNAL MEDICINE

## 2019-12-06 PROCEDURE — 99213 PR OFFICE/OUTPT VISIT, EST, LEVL III, 20-29 MIN: ICD-10-PCS | Mod: S$GLB,,, | Performed by: INTERNAL MEDICINE

## 2019-12-06 PROCEDURE — 3008F PR BODY MASS INDEX (BMI) DOCUMENTED: ICD-10-PCS | Mod: CPTII,S$GLB,, | Performed by: INTERNAL MEDICINE

## 2019-12-06 PROCEDURE — 3075F SYST BP GE 130 - 139MM HG: CPT | Mod: CPTII,S$GLB,, | Performed by: INTERNAL MEDICINE

## 2019-12-06 PROCEDURE — 1101F PT FALLS ASSESS-DOCD LE1/YR: CPT | Mod: CPTII,S$GLB,, | Performed by: INTERNAL MEDICINE

## 2019-12-06 PROCEDURE — 3075F PR MOST RECENT SYSTOLIC BLOOD PRESS GE 130-139MM HG: ICD-10-PCS | Mod: CPTII,S$GLB,, | Performed by: INTERNAL MEDICINE

## 2019-12-06 PROCEDURE — 3008F BODY MASS INDEX DOCD: CPT | Mod: CPTII,S$GLB,, | Performed by: INTERNAL MEDICINE

## 2019-12-06 PROCEDURE — 3079F DIAST BP 80-89 MM HG: CPT | Mod: CPTII,S$GLB,, | Performed by: INTERNAL MEDICINE

## 2019-12-06 ASSESSMENT — ROUTINE ASSESSMENT OF PATIENT INDEX DATA (RAPID3)
FATIGUE SCORE: .5
MDHAQ FUNCTION SCORE: 0
AM STIFFNESS SCORE: 0, NO
PATIENT GLOBAL ASSESSMENT SCORE: 9
PAIN SCORE: 1.5
PSYCHOLOGICAL DISTRESS SCORE: 3.3
TOTAL RAPID3 SCORE: 3.5

## 2019-12-06 NOTE — PROGRESS NOTES
"Subjective:       Patient ID: Jill Marquez is a 64 y.o. female.    Chief Complaint: Peripheral spondyloarthritis/PsA v. Erosive OA(RF- ACPA- SAMIR- B27-)  HPI   diagnosed with myeloma, undergoing chemotherapy under care of Kenneth Jimenez MD. She has obviously been very stressed and now only able to exercise twice a week. She feels the Enbrel is definitely helping with the right little pipj the only painful joint, but still swelling 2,3 mcps right>left and right index dip. No rash. No ISR. No infections. Is now off tramadol!  Review of Systems   Constitutional: Negative for fever and unexpected weight change.   HENT: Negative for trouble swallowing.    Eyes: Negative for redness.   Respiratory: Negative for cough and shortness of breath.    Cardiovascular: Negative for chest pain.   Gastrointestinal: Negative for constipation and diarrhea.   Genitourinary: Negative for dysuria and genital sores.   Skin: Negative for rash.   Neurological: Negative for headaches.   Hematological: Does not bruise/bleed easily.         Objective:   /85   Pulse 85   Ht 5' 3.6" (1.615 m)   Wt 59 kg (130 lb 1.6 oz)   BMI 22.61 kg/m²      Physical Exam   Constitutional: She is oriented to person, place, and time and well-developed, well-nourished, and in no distress.   HENT:   Head: Normocephalic and atraumatic.   Mouth/Throat: Oropharynx is clear and moist.   Eyes: Conjunctivae and EOM are normal.   Neck: Normal range of motion. Neck supple. No thyromegaly present.   Cardiovascular: Normal rate, regular rhythm, normal heart sounds and intact distal pulses.  Exam reveals no gallop and no friction rub.    No murmur heard.  Pulmonary/Chest: Breath sounds normal. She has no wheezes. She has no rales. She exhibits no tenderness.   Abdominal: Soft. She exhibits no distension and no mass. There is no tenderness.       Right Side Rheumatological Exam     Examination finds the shoulder, elbow, wrist, knee, 1st PIP, 1st MCP, 4th MCP " and 5th MCP normal.    The patient is tender to palpation of the 5th PIP    She has swelling of the 2nd MCP, 3rd MCP, 5th PIP and 2nd DIP    The patient has an enlarged 2nd PIP, 3rd PIP, 4th PIP, 5th PIP, 1st CMC, 2nd DIP, 3rd DIP, 4th DIP and 5th DIP    Left Side Rheumatological Exam     Examination finds the shoulder, elbow, wrist, knee, 1st PIP, 1st MCP, 2nd PIP, 3rd PIP, 4th PIP, 4th MCP, 5th PIP and 5th MCP normal.    She has swelling of the 2nd MCP and 3rd MCP    The patient has an enlarged 1st CMC, 2nd DIP, 3rd DIP, 4th DIP and 5th DIP.      Lymphadenopathy:     She has no cervical adenopathy.   Neurological: She is alert and oriented to person, place, and time. She displays normal reflexes. Gait normal.   Nl motor strength UE and LE bilateral   Musculoskeletal: She exhibits no edema.           Assessment/Plan         Menopause  -     DXA Bone Density Spine And Hip; Future; Expected date: 12/06/2019    Peripheral spondyloarthritis  -     entanercept (ENBREL) 50 mg/mL injection; INJECT 1 PEN UNDER THE SKIN (SUBCUTANEOUS INJECTION) EVERY WEEK  Dispense: 4 mL; Refill: 2    Abnormal CPK  -     CK; Future; Expected date: 12/06/2019    PSA (psoriatic arthritis)  -     entanercept (ENBREL) 50 mg/mL injection; INJECT 1 PEN UNDER THE SKIN (SUBCUTANEOUS INJECTION) EVERY WEEK  Dispense: 4 mL; Refill: 2    Essential hypertension    Hyperlipidemia, unspecified hyperlipidemia type    Primary osteoarthritis of both first carpometacarpal joints    Osteopenia, unspecified location       Problem List Items Addressed This Visit     CMC arthritis, thumb, degenerative    Overview     S/p injections by Dr. Quintero 3/5/18 and repeated 9/18 both helped         Current Assessment & Plan     Much improved since starting Enbrel !         HTN (hypertension)    Current Assessment & Plan     134/85         Hyperlipidemia    Overview     Results for PANKAJ CARTER (MRN [id number]) as of 12/6/2019 13:55   Ref. Range 5/7/2019 07:21    Cholesterol Latest Ref Range: 120 - 199 mg/dL 236 (H)   HDL Latest Ref Range: 40 - 75 mg/dL 74   Hdl/Cholesterol Ratio Latest Ref Range: 20.0 - 50.0 % 31.4   LDL Cholesterol External Latest Ref Range: 63.0 - 159.0 mg/dL 134.8   Non-HDL Cholesterol Latest Units: mg/dL 162   Total Cholesterol/HDL Ratio Latest Ref Range: 2.0 - 5.0  3.2   Triglycerides Latest Ref Range: 30 - 150 mg/dL 136            RESOLVED: Osteopenia    Overview     : 1955 ORDERING PHYSICIAN: CELESTINA Gibson LOCATION: [facility]    HISTORY: 63 y/o female with a hx of fractured left elbow at 48 y/o. She had a hysterectomy at 48 y/o. She is taking Calcium and Vit D supplements. She exercises daily and does not smoke.    TECHNIQUE: Bone Mineral Density performed using Hologic Horizon A (S/N [id number]) reveals good positioning of lumbar spine and hip.    BONE MINERAL DENSITY RESULTS:  Lumbar Spine: Lumbar bone mineral density L1-L4 is 1.155g/cm2, which is a t-score of 1.0. The z-score is 2.6.    Total Hip: The total hip bone mineral density is 0.868g/cm2.  The t-score is -0.6, and the z-score is 0.5.  Femoral neck BMD is 0.759g/cm2 and the t-score is -0.8.      COMPARISONS:  Date Location BMD T-score  10/06/15 L-spine 1.144 0.9  Total Hip 0.885 -0.5   Impression      Elevated BMD of the lumbar spine.  No significant change since prior study.  Adult scoliosis associated with increased lumbar spine BMD.      Recommendations:  1) Adequate calcium and Vitamin D therapy  2) Appropriate exercise  3) Consider repeat BMD in 4 years    EXPLANATION OF RESULTS:  The t-score compares this results to the bone density of a 25 year old of the same gender. The z-score compares this result to the average bone density to people of the same age and gender. The amounts indicate the number of standard deviations above or below the mean.  * Osteoporosis is generally defined as having a t-score between less than -2.5.  * Osteopenia is generally defined as having a  t-score between -1 and -2.5.  * The normal range is generally defined as having a t-score between -1 to 1.      Electronically signed by: CAROLINE RESTREPO MD  Date: 11/15/17  Time: 18:08     Encounter     View Encounter          Signed by     Signed Credentials Date/Time  Phone Pager   CAROLINE RESTREPO                   Current Assessment & Plan     DXA ordered         Peripheral spondyloarthritis    Overview     Results for PANKAJ CARTER (MRN [id number]) as of 11/16/2018 13:06   Ref. Range 6/26/2015 11:10 5/29/2018 11:46 10/16/2018 10:50   Anti-SSA Antibody Latest Ref Range: 0.00 - 19.99 EU 3.90     Anti-SSA Interpretation Latest Ref Range: Negative  Negative     Anti-SSB Antibody Latest Ref Range: 0.00 - 19.99 EU  0.09    Anti-SSB Interpretation Latest Ref Range: Negative   Negative    SAMIR Screen Latest Ref Range: Negative <1:160  Negative <1:160     Anti-Ladonna-1 Antibody Latest Ref Range: <20 Units   <20   Anti-PM/Scl Ab Latest Ref Range: <20 Units   <20   Anti-SS-A 52 kD Ab, IgG Latest Ref Range: <20 Units   <20   Anti-U1-RNP  Ab Latest Ref Range: <20 Units   <20   CCP Antibodies Latest Ref Range: <5.0 U/mL <0.5 <0.5    EJ Latest Ref Range: Negative    Negative   Fibrillarin (U3 RNP) Latest Ref Range: Negative    Negative   HMGCR Antibody Latest Ref Range: 0 - 19 Units   <3   Ku Latest Ref Range: Negative    Negative   MDA-5 (P140) (CADM-140) Latest Ref Range: <20 Units   <20   MI-2 Latest Ref Range: Negative    Negative   NXP-2 (P140) Latest Ref Range: <20 Units   <20   OJ Latest Ref Range: Negative    Negative   PL-12 Latest Ref Range: Negative    Negative   PL-7 Latest Ref Range: Negative    Negative   Rheumatoid Factor Latest Ref Range: 0.0 - 15.0 IU/mL 10.0 <10.0    SRP Latest Ref Range: Negative    Negative   TIF1 GAMMA (P155/140) Latest Ref Range: <20 Units   <20   U2 snRNP Latest Ref Range: Negative    Negative     Armando Kerr MD 3/5/2018       Narrative     AP view of bilateral  hands.    Indication: Pain    Comparison: 6/26/15    Findings      Impression       No acute fractures. Degenerative changes of the hand are unchanged, most notable at the bilateral basal joints and second and fifth DIP joints bilaterally.      Electronically signed by: DALE WORKMAN MD  Date: 03/05/18  Time: 14:38      Results for PANKAJ CARTER (MRN [id number]) as of 11/16/2018 13:06   Ref. Range 10/16/2018 10:50   Hep B Core Total Ab Unknown Negative   Hep B S Ab Unknown Negative   Hepatitis B Surface Ag Unknown Negative   Hepatitis C Ab Unknown Negative   Mitogen - Nil Latest Ref Range: See text IU/mL >10.000   NIL Latest Ref Range: See text IU/mL 0.070   TB Gold Plus Unknown Negative   TB1 - Nil Latest Ref Range: See text IU/mL 0.020   TB2 - Nil Latest Ref Range: See text IU/mL 0.010   HIV 1/2 Ag/Ab Latest Ref Range: Negative  Negative   RPR Latest Ref Range: Non-reactive  Non-reactive   Strongyloides Ab IgG Latest Ref Range: Negative  Negative   Result Notes for X-Ray Chest PA And Lateral     Notes recorded by Nicanor Campos MD on 10/16/2018 at 4:27 PM CDT  The chest x-ray is normal. RJQ     Results for PANKAJ CARTER (MRN [id number]) as of 11/16/2018 13:06   Ref. Range 10/16/2018 10:50   CPK Latest Ref Range: 20 - 180 U/L 280 (H)            Current Assessment & Plan     TJ 1 SJ 5  Pt global 90 ESR  <2  CRP 0.4  DAS28  2.45(remission)   KHY16-XJK 3.53(MDA)  DAPSA  TJ 1  0  SJ 5 Pt global 9 Pt pain 1.5  CRP0.04  = 16.54(MDA)           Rest of Standing labs today including HBV DNA  Etanercept 50mg sc once a week, refill Rx sent to [facility] may need to change to Simponi Aria, abatacept or tofacitinib depending on insurance coverage  Try to increase  Gym back to  5 days/wk  RTC 3 months with standing labs.            Relevant Medications    entanercept (ENBREL) 50 mg/mL injection      Other Visit Diagnoses     Menopause    -  Primary    Relevant Orders    DXA Bone Density Spine And Hip    Abnormal CPK         Relevant Orders    CK    PSA (psoriatic arthritis)        Relevant Medications    entanercept (ENBREL) 50 mg/mL injection

## 2019-12-06 NOTE — ASSESSMENT & PLAN NOTE
TJ 1 SJ 5  Pt global 90 ESR <2  CRP 0.4  DAS28 2.45(remission)   CKY12-GNY 3.53(MDA)  DAPSA  TJ 1  0  SJ 5 Pt global 9 Pt pain 1.5  CRP0.04  = 16.54(MDA)           Rest of Standing labs today including HBV DNA  Etanercept 50mg sc once a week, refill Rx sent to [facility] may need to change to Simponi Aria, abatacept or tofacitinib depending on insurance coverage  Try to increase  Gym back to  5 days/wk  RTC 3 months with standing labs.

## 2019-12-06 NOTE — PATIENT INSTRUCTIONS
Rest of blood work today 2nd floor  Bone density to be scheduled  Enbrel sent to [facility] to check on insurance coverage going forward

## 2019-12-09 RX ORDER — GABAPENTIN 300 MG/1
300 CAPSULE ORAL 2 TIMES DAILY
Qty: 60 CAPSULE | Refills: 1 | Status: SHIPPED | OUTPATIENT
Start: 2019-12-09 | End: 2020-02-07 | Stop reason: SDUPTHER

## 2019-12-23 ENCOUNTER — PATIENT MESSAGE (OUTPATIENT)
Dept: RHEUMATOLOGY | Facility: CLINIC | Age: 64
End: 2019-12-23

## 2019-12-30 ENCOUNTER — TELEPHONE (OUTPATIENT)
Dept: RHEUMATOLOGY | Facility: CLINIC | Age: 64
End: 2019-12-30

## 2019-12-30 ENCOUNTER — APPOINTMENT (OUTPATIENT)
Dept: RADIOLOGY | Facility: HOSPITAL | Age: 64
End: 2019-12-30
Attending: INTERNAL MEDICINE
Payer: MEDICARE

## 2019-12-30 DIAGNOSIS — Z78.0 MENOPAUSE: ICD-10-CM

## 2019-12-30 PROCEDURE — 77080 DEXA BONE DENSITY SPINE HIP: ICD-10-PCS | Mod: 26,,, | Performed by: RADIOLOGY

## 2019-12-30 PROCEDURE — 77080 DXA BONE DENSITY AXIAL: CPT | Mod: 26,,, | Performed by: RADIOLOGY

## 2019-12-30 PROCEDURE — 77080 DXA BONE DENSITY AXIAL: CPT | Mod: TC

## 2019-12-30 NOTE — TELEPHONE ENCOUNTER
----- Message from Traci Chi MA sent at 12/30/2019  8:10 AM CST -----  Dr hewitt, could you please put a order in for a bone density

## 2020-01-06 ENCOUNTER — OFFICE VISIT (OUTPATIENT)
Dept: OPTOMETRY | Facility: CLINIC | Age: 65
End: 2020-01-06
Payer: MEDICARE

## 2020-01-06 DIAGNOSIS — Z13.5 GLAUCOMA SCREENING: ICD-10-CM

## 2020-01-06 DIAGNOSIS — H25.13 NUCLEAR SCLEROSIS, BILATERAL: Primary | ICD-10-CM

## 2020-01-06 DIAGNOSIS — H02.88B MEIBOMIAN GLAND DYSFUNCTION (MGD), BILATERAL, BOTH UPPER AND LOWER LIDS: ICD-10-CM

## 2020-01-06 DIAGNOSIS — H02.88A MEIBOMIAN GLAND DYSFUNCTION (MGD), BILATERAL, BOTH UPPER AND LOWER LIDS: ICD-10-CM

## 2020-01-06 PROCEDURE — 92014 COMPRE OPH EXAM EST PT 1/>: CPT | Mod: S$PBB,,, | Performed by: OPTOMETRIST

## 2020-01-06 PROCEDURE — 99999 PR PBB SHADOW E&M-EST. PATIENT-LVL III: CPT | Mod: PBBFAC,,, | Performed by: OPTOMETRIST

## 2020-01-06 PROCEDURE — 99999 PR PBB SHADOW E&M-EST. PATIENT-LVL III: ICD-10-PCS | Mod: PBBFAC,,, | Performed by: OPTOMETRIST

## 2020-01-06 PROCEDURE — 92014 PR EYE EXAM, EST PATIENT,COMPREHESV: ICD-10-PCS | Mod: S$PBB,,, | Performed by: OPTOMETRIST

## 2020-01-06 PROCEDURE — 99213 OFFICE O/P EST LOW 20 MIN: CPT | Mod: PBBFAC | Performed by: OPTOMETRIST

## 2020-01-06 NOTE — PROGRESS NOTES
HPI     64 year old female presents for routine eye exam OU.    Pt states that vision seems to be stable OU for distance and near since   last exam. Pt denies any flashes or floaters OU at this time. No pain or   discomfort OU.     Pt confirms using  Systane BID OU    Last edited by Izzy Galeas on 1/6/2020  1:22 PM. (History)            Assessment /Plan     For exam results, see Encounter Report.    Nuclear sclerosis, bilateral  -Educated patient on presence of cataracts at today's exam, monitor at annual dilated fundus exam. 5+ years surgical estimate.    Meibomian gland dysfunction (MGD), bilateral, both upper and lower lids  -Systane PRN    Glaucoma screening  -Monitor with annual eye exam and IOP check      RTC 1 yr

## 2020-01-29 ENCOUNTER — PATIENT MESSAGE (OUTPATIENT)
Dept: INTERNAL MEDICINE | Facility: CLINIC | Age: 65
End: 2020-01-29

## 2020-01-29 DIAGNOSIS — M79.673 PAIN OF FOOT, UNSPECIFIED LATERALITY: Primary | ICD-10-CM

## 2020-01-29 DIAGNOSIS — M47.819 PERIPHERAL SPONDYLOARTHRITIS: ICD-10-CM

## 2020-01-29 DIAGNOSIS — L40.50 PSA (PSORIATIC ARTHRITIS): ICD-10-CM

## 2020-01-31 RX ORDER — FLUTICASONE PROPIONATE 50 MCG
SPRAY, SUSPENSION (ML) NASAL
Qty: 48 G | Refills: 1 | Status: SHIPPED | OUTPATIENT
Start: 2020-01-31 | End: 2021-03-22 | Stop reason: SDUPTHER

## 2020-02-05 DIAGNOSIS — M47.819 PERIPHERAL SPONDYLOARTHRITIS: Primary | ICD-10-CM

## 2020-02-07 ENCOUNTER — OFFICE VISIT (OUTPATIENT)
Dept: PODIATRY | Facility: CLINIC | Age: 65
End: 2020-02-07
Payer: MEDICARE

## 2020-02-07 ENCOUNTER — PATIENT MESSAGE (OUTPATIENT)
Dept: RHEUMATOLOGY | Facility: CLINIC | Age: 65
End: 2020-02-07

## 2020-02-07 VITALS
BODY MASS INDEX: 23.2 KG/M2 | SYSTOLIC BLOOD PRESSURE: 138 MMHG | HEIGHT: 63 IN | WEIGHT: 130.94 LBS | HEART RATE: 82 BPM | DIASTOLIC BLOOD PRESSURE: 91 MMHG

## 2020-02-07 DIAGNOSIS — L84 CORN OR CALLUS: ICD-10-CM

## 2020-02-07 DIAGNOSIS — Q82.8 POROKERATOSIS: Primary | ICD-10-CM

## 2020-02-07 PROCEDURE — 99214 OFFICE O/P EST MOD 30 MIN: CPT | Mod: PBBFAC | Performed by: PODIATRIST

## 2020-02-07 PROCEDURE — 99203 PR OFFICE/OUTPT VISIT, NEW, LEVL III, 30-44 MIN: ICD-10-PCS | Mod: S$PBB,,, | Performed by: PODIATRIST

## 2020-02-07 PROCEDURE — 99999 PR PBB SHADOW E&M-EST. PATIENT-LVL IV: ICD-10-PCS | Mod: PBBFAC,,, | Performed by: PODIATRIST

## 2020-02-07 PROCEDURE — 99999 PR PBB SHADOW E&M-EST. PATIENT-LVL IV: CPT | Mod: PBBFAC,,, | Performed by: PODIATRIST

## 2020-02-07 PROCEDURE — 99203 OFFICE O/P NEW LOW 30 MIN: CPT | Mod: S$PBB,,, | Performed by: PODIATRIST

## 2020-02-07 RX ORDER — GABAPENTIN 300 MG/1
300 CAPSULE ORAL 2 TIMES DAILY
Qty: 180 CAPSULE | Refills: 1 | Status: SHIPPED | OUTPATIENT
Start: 2020-02-07 | End: 2020-06-24 | Stop reason: SDUPTHER

## 2020-02-07 NOTE — PATIENT INSTRUCTIONS
You can purchase UREA 40% cream online     [email or web address]     [employer]Sources   Urea 40% Foot Cream 4 oz - Best Callus Remover - Moisturizes & Rehydrates Thick, Cracked, Rough, Dead & Dry Skin - For Feet, Elbows and Hands + Free Pumice Stone - 100% Money Back Guarantee   4.6 out of 5 stars 1,181   $14.99 Prime    You can also purchase Flexitol heel balm cream. This can be found at [facility] or online [email or web address]

## 2020-02-07 NOTE — PROGRESS NOTES
PODIATRIC MEDICINE AND SURGERY        CHIEF COMPLAINT   Chief Complaint   Patient presents with    Foot Problem     possible wart at the left plantar arch; pt reports pain upon palpation at 3/10.         HPI:    Jill Marquez is a 64 y.o. female presenting to podiatry clinic with complaint of painful lesion on bottom of LEFT foot. She states lesion has been present for 1 week. She has not tried any treatment. She denies puncture.Symptoms are aggravated with pressure and/or ambulation or certain shoewear.  Patient inquires about available treatment options. No further pedal complaints.      PMH  Past Medical History:   Diagnosis Date    DJD (degenerative joint disease) of lumbar spine 3/10/2014    HTN (hypertension) 7/23/2012    Hyperlipidemia     Hypothyroid 7/23/2012       PROBLEM LIST  Patient Active Problem List    Diagnosis Date Noted    Rash of back 08/02/2019    Peripheral spondyloarthritis 10/16/2018    Mucous cyst of finger 07/12/2018    CMC arthritis, thumb, degenerative 05/29/2018    Chronic low back pain 02/22/2016    Screening for colon cancer 01/25/2016    Atrophic vaginitis 12/01/2015    Chronic constipation 12/01/2015    Hepatitis B core antibody positive (negative viral load; suspect false positive) 06/30/2015    Elevated CPK 06/30/2015    Sicca 06/26/2015    Fatigue 06/26/2015    Myalgia and myositis 06/26/2015    Erosive osteoarthritis of both hands 06/26/2015    Leg pain, bilateral 06/26/2015    Bilateral hand pain 06/26/2015    Lumbar radiculopathy 06/03/2014    Lumbar spondylosis 03/10/2014    Carpal tunnel syndrome 03/10/2014    Hyperlipidemia 10/14/2013    Hypothyroid 07/23/2012    HTN (hypertension) 07/23/2012    Displacement of thoracic intervertebral disc without myelopathy 05/22/2012       MEDS  Current Outpatient Medications on File Prior to Visit   Medication Sig Dispense Refill    amLODIPine (NORVASC) 10 MG tablet Take 1 tablet (10 mg total) by mouth  once daily. 90 tablet 1    azelastine (ASTELIN) 137 mcg (0.1 %) nasal spray 1 spray (137 mcg total) by Nasal route 2 (two) times daily. 30 mL 2    conjugated estrogens (PREMARIN) vaginal cream APPLY 0.5 GRAMS WITH APPLICATOR OR DIME-SIZED AMOUNT TO VAGINA TWICE WEEKLY 30 g 3    entanercept (ENBREL) 50 mg/mL injection Inject 1 mL (50 mg total) into the skin once a week. 4 mL 2    fish oil-omega-3 fatty acids 300-1,000 mg capsule Take 2 g by mouth once daily.      fluticasone propionate (FLONASE) 50 mcg/actuation nasal spray INHALE 2 SPRAYS EN QD 48 g 1    gabapentin (NEURONTIN) 300 MG capsule Take 1 capsule (300 mg total) by mouth 2 (two) times daily. 60 capsule 1    L. ACIDOPHILUS/BIFID. ANIMALIS (ONE-A-DAY TRUBIOTICS ORAL) Take by mouth.      levothyroxine (SYNTHROID) 75 MCG tablet Take 1 tablet (75 mcg total) by mouth before breakfast. 90 tablet 3    PSYLLIUM SEED, WITH SUGAR, (METAMUCIL ORAL) Take by mouth.      cholecalciferol, vitamin D3, 1,000 unit Chew Take by mouth.      conjugated estrogens (PREMARIN) vaginal cream APPLY 0.5 GRAMS WITH APPLICATOR OR DIME-SIZED AMOUNT TO VAGINA TWICE WEEKLY 30 g 9    traMADol (ULTRAM) 50 mg tablet Take 1 tablet (50 mg total) by mouth 3 (three) times daily as needed. 90 tablet 1     No current facility-administered medications on file prior to visit.        Psychiatric     Past Surgical History:   Procedure Laterality Date    COLONOSCOPY N/A 1/25/2016    Procedure: COLONOSCOPY;  Surgeon: DAYNA Simmons MD;  Location: [address];  Service: Endoscopy;  Laterality: N/A;    COSMETIC SURGERY      EYE SURGERY      eyelid surgery      HYSTERECTOMY  2004    prolapse    OOPHORECTOMY Bilateral 2015    Tonsillectomy      TONSILLECTOMY          ALL  Review of patient's allergies indicates:   Allergen Reactions    Sulfasalazine Nausea Only     Malaise, nausea,       SOC     Social History     Tobacco Use    Smoking status: Former Smoker     Years: 12.00     Types:  Cigarettes     Last attempt to quit: 1982     Years since quittin.8    Smokeless tobacco: Never Used    Tobacco comment: , homemaker, 2 children   Substance Use Topics    Alcohol use: Yes     Alcohol/week: 0.0 standard drinks     Frequency: 4 or more times a week     Drinks per session: 1 or 2     Binge frequency: Never     Comment: 2 cocktails    Drug use: No         Family HX    Family History   Problem Relation Age of Onset    Diabetes Brother     Retinal detachment Brother     Cancer Brother         kidney    Cataracts Brother     Diabetes Brother     Cancer Brother         throat    Cataracts Brother     Diabetes Brother     Cataracts Brother     No Known Problems Mother     No Known Problems Father     Lupus Other         niece    No Known Problems Sister     No Known Problems Maternal Aunt     No Known Problems Maternal Uncle     No Known Problems Paternal Aunt     No Known Problems Paternal Uncle     No Known Problems Maternal Grandmother     No Known Problems Maternal Grandfather     No Known Problems Paternal Grandmother     No Known Problems Paternal Grandfather     Breast cancer Neg Hx     Colon cancer Neg Hx     Ovarian cancer Neg Hx     Blindness Neg Hx     Glaucoma Neg Hx     Hypertension Neg Hx     Macular degeneration Neg Hx     Strabismus Neg Hx     Stroke Neg Hx     Thyroid disease Neg Hx     Amblyopia Neg Hx     Rheum arthritis Neg Hx     Psoriasis Neg Hx     Inflammatory bowel disease Neg Hx     Cervical cancer Neg Hx     Endometrial cancer Neg Hx     Vaginal cancer Neg Hx     Uterine cancer Neg Hx             REVIEW OF SYSTEMS  General: Denies any fever or chills  Chest: Denies shortness of breath, wheezing, coughing, or sputum production  Heart: Denies chest pain, cold extremities, orthopenia, or reduced exercise tolerance  As noted above and per history of current illness above, otherwise negative in the remainder of the 14  "systems.      PHYSICAL EXAM:      Vitals:    02/07/20 0852   BP: (!) 138/91   Pulse: 82   Weight: 59.4 kg (130 lb 15.3 oz)   Height: 5' 3" (1.6 m)   PainSc:   3       General: This patient is well-developed, well-nourished and appears stated age, well-oriented to person, place and time, and cooperative and pleasant on today's visit    LOWER EXTREMITY PHYSICAL EXAM  VASCULAR  Dorsalis pedis and posterior tibial pulses palpable 2/4 bilaterally.   Capillary refill time immediate to the toes.   Feet are warm to the touch. Skin temperature warm to warm from proximally to distally   There are no varicosities, telangiectasias noted to bilateral foot and ankle regions.   There are no ecchymoses noted to bilateral foot and ankle regions.     DERMATOLOGIC  Skin moist with healthy texture and turgor.  There are no open ulcerations, lacerations, or fissures to bilateral foot and ankle regions. There are no signs of infection as there is no erythema, no proximal-extending lymphangiitis, no fluctuance, or crepitus noted on palpation to bilateral foot and ankle regions.   There is no interdigital maceration.   There are hyperkeratotic lesions noted to plantar foot central LEFT.    NEUROLOGIC  Epicritic sensation is intact as the patient is able to sense light touch to bilateral foot and ankle regions.   Achilles and patellar deep tendon reflexes intact  Babinski reflex absent    ORTHOPEDIC/BIOMECHANICAL  Pain with palpation of above noted lesion   Muscle strength AT/EHL/EDL/PT: 5/5; Achilles/Gastroc/Soleus: 5/5; PB/PL: 5/5 Muscle tone is normal.  Ankle joint ROM INTACT DF/PF, non-crepitus  Maximally pronated foot       ASSESSMENT   Porokeratosis    Corn or callus        PLAN    1. Patient was educated about clinical and imaging findings, and verbalizes understanding of above.      -With patient's permission via verbal consent, the involved area was cleansed with an alcohol swab. Trimming of hyperkeratotic lesions deep to epidermal " layer x 1 on left  was performed with a #15 blade without incident. Patient relates relief following the procedure. Patient will continue to monitor the areas daily, inspect feet, wear protective shoe gear when ambulatory, moisturizer to maintain skin integrity.    -Rx for Urea 40 /Amlactin for calluses, metatarsal pad dispensed and applied for offloading callus. Shoe recommendations provided for accomodative foot wear.          Report Electronically Signed By:     Charity Burr DPM   Podiatry  [facility]  2/7/2020

## 2020-02-07 NOTE — LETTER
February 7, 2020      Dain Smith MD  [address]  [facility] [address]           [facility]  [address] [facility] [address]  [place] [address]  Phone: [phone]  Fax: [phone]          Patient: Jill Marquez   MR Number: [id number]   YOB: 1955   Date of Visit: 2/7/2020       Dear Dr. Dain Smith:    Thank you for referring Jill Marquez to me for evaluation. Attached you will find relevant portions of my assessment and plan of care.    If you have questions, please do not hesitate to call me. I look forward to following Jill Marquez along with you.    Sincerely,    Charity Burr, JONATHAN    Enclosure  CC:  No Recipients    If you would like to receive this communication electronically, please contact [email or web address] or [phone] to request more information on [employer] access.    For providers and/or their staff who would like to refer a patient to [facility], please contact us through our one-stop-shop provider referral line, [facility] , at [phone].    If you feel you have received this communication in error or would no longer like to receive these types of communications, please e-mail [email or web address]

## 2020-02-08 ENCOUNTER — PATIENT MESSAGE (OUTPATIENT)
Dept: RHEUMATOLOGY | Facility: CLINIC | Age: 65
End: 2020-02-08

## 2020-02-10 ENCOUNTER — TELEPHONE (OUTPATIENT)
Dept: PHARMACY | Facility: CLINIC | Age: 65
End: 2020-02-10

## 2020-02-10 NOTE — TELEPHONE ENCOUNTER
DOCUMENTATION ONLY:   Enbrel prior authorization approved until 2/5/21  CASE ID # [id number]  Estimated Co-Pay: $1011.06  Forwarded to Financial Assistance for co-pay card

## 2020-02-13 ENCOUNTER — TELEPHONE (OUTPATIENT)
Dept: RHEUMATOLOGY | Facility: CLINIC | Age: 65
End: 2020-02-13

## 2020-02-13 ENCOUNTER — PATIENT MESSAGE (OUTPATIENT)
Dept: RHEUMATOLOGY | Facility: CLINIC | Age: 65
End: 2020-02-13

## 2020-03-05 ENCOUNTER — PATIENT MESSAGE (OUTPATIENT)
Dept: RHEUMATOLOGY | Facility: CLINIC | Age: 65
End: 2020-03-05

## 2020-03-09 ENCOUNTER — LAB VISIT (OUTPATIENT)
Dept: LAB | Facility: HOSPITAL | Age: 65
End: 2020-03-09
Attending: INTERNAL MEDICINE
Payer: MEDICARE

## 2020-03-09 ENCOUNTER — PATIENT MESSAGE (OUTPATIENT)
Dept: INTERNAL MEDICINE | Facility: CLINIC | Age: 65
End: 2020-03-09

## 2020-03-09 DIAGNOSIS — B18.1 HEPATITIS B CARRIER: ICD-10-CM

## 2020-03-09 DIAGNOSIS — M47.819 SPONDYLOARTHRITIS: ICD-10-CM

## 2020-03-09 LAB
BASOPHILS # BLD AUTO: 0.04 K/UL (ref 0–0.2)
BASOPHILS NFR BLD: 0.8 % (ref 0–1.9)
DIFFERENTIAL METHOD: ABNORMAL
EOSINOPHIL # BLD AUTO: 0.1 K/UL (ref 0–0.5)
EOSINOPHIL NFR BLD: 2.3 % (ref 0–8)
ERYTHROCYTE [DISTWIDTH] IN BLOOD BY AUTOMATED COUNT: 13 % (ref 11.5–14.5)
ERYTHROCYTE [SEDIMENTATION RATE] IN BLOOD BY WESTERGREN METHOD: <2 MM/HR (ref 0–36)
HCT VFR BLD AUTO: 41.7 % (ref 37–48.5)
HGB BLD-MCNC: 13.2 G/DL (ref 12–16)
IMM GRANULOCYTES # BLD AUTO: 0.01 K/UL (ref 0–0.04)
IMM GRANULOCYTES NFR BLD AUTO: 0.2 % (ref 0–0.5)
LYMPHOCYTES # BLD AUTO: 2.2 K/UL (ref 1–4.8)
LYMPHOCYTES NFR BLD: 42.9 % (ref 18–48)
MCH RBC QN AUTO: 32.1 PG (ref 27–31)
MCHC RBC AUTO-ENTMCNC: 31.7 G/DL (ref 32–36)
MCV RBC AUTO: 102 FL (ref 82–98)
MONOCYTES # BLD AUTO: 0.5 K/UL (ref 0.3–1)
MONOCYTES NFR BLD: 10.6 % (ref 4–15)
NEUTROPHILS # BLD AUTO: 2.2 K/UL (ref 1.8–7.7)
NEUTROPHILS NFR BLD: 43.2 % (ref 38–73)
NRBC BLD-RTO: 0 /100 WBC
PLATELET # BLD AUTO: 204 K/UL (ref 150–350)
PMV BLD AUTO: 10.7 FL (ref 9.2–12.9)
RBC # BLD AUTO: 4.11 M/UL (ref 4–5.4)
WBC # BLD AUTO: 5.11 K/UL (ref 3.9–12.7)

## 2020-03-09 PROCEDURE — 86140 C-REACTIVE PROTEIN: CPT

## 2020-03-09 PROCEDURE — 87517 HEPATITIS B DNA QUANT: CPT

## 2020-03-09 PROCEDURE — 85025 COMPLETE CBC W/AUTO DIFF WBC: CPT

## 2020-03-09 PROCEDURE — 80053 COMPREHEN METABOLIC PANEL: CPT

## 2020-03-09 PROCEDURE — 85652 RBC SED RATE AUTOMATED: CPT

## 2020-03-09 PROCEDURE — 36415 COLL VENOUS BLD VENIPUNCTURE: CPT | Mod: PO

## 2020-03-09 RX ORDER — CITALOPRAM 10 MG/1
10 TABLET ORAL DAILY
Qty: 30 TABLET | Refills: 11 | Status: SHIPPED | OUTPATIENT
Start: 2020-03-09 | End: 2021-04-04 | Stop reason: SDUPTHER

## 2020-03-10 ENCOUNTER — OFFICE VISIT (OUTPATIENT)
Dept: RHEUMATOLOGY | Facility: CLINIC | Age: 65
End: 2020-03-10
Payer: MEDICARE

## 2020-03-10 ENCOUNTER — LAB VISIT (OUTPATIENT)
Dept: LAB | Facility: HOSPITAL | Age: 65
End: 2020-03-10
Attending: INTERNAL MEDICINE
Payer: MEDICARE

## 2020-03-10 VITALS
SYSTOLIC BLOOD PRESSURE: 143 MMHG | BODY MASS INDEX: 21.34 KG/M2 | DIASTOLIC BLOOD PRESSURE: 85 MMHG | HEART RATE: 82 BPM | WEIGHT: 125 LBS | HEIGHT: 64 IN

## 2020-03-10 DIAGNOSIS — M47.819 PERIPHERAL SPONDYLOARTHRITIS: ICD-10-CM

## 2020-03-10 DIAGNOSIS — Z11.4 ENCOUNTER FOR SCREENING FOR HUMAN IMMUNODEFICIENCY VIRUS (HIV): ICD-10-CM

## 2020-03-10 DIAGNOSIS — L40.50 PSA (PSORIATIC ARTHRITIS): ICD-10-CM

## 2020-03-10 LAB
ALBUMIN SERPL BCP-MCNC: 4.5 G/DL (ref 3.5–5.2)
ALP SERPL-CCNC: 49 U/L (ref 55–135)
ALT SERPL W/O P-5'-P-CCNC: 43 U/L (ref 10–44)
ANION GAP SERPL CALC-SCNC: 9 MMOL/L (ref 8–16)
AST SERPL-CCNC: 32 U/L (ref 10–40)
BILIRUB SERPL-MCNC: 0.5 MG/DL (ref 0.1–1)
BUN SERPL-MCNC: 24 MG/DL (ref 8–23)
CALCIUM SERPL-MCNC: 10.1 MG/DL (ref 8.7–10.5)
CHLORIDE SERPL-SCNC: 106 MMOL/L (ref 95–110)
CO2 SERPL-SCNC: 27 MMOL/L (ref 23–29)
CREAT SERPL-MCNC: 0.8 MG/DL (ref 0.5–1.4)
CRP SERPL-MCNC: 0.5 MG/L (ref 0–8.2)
EST. GFR  (AFRICAN AMERICAN): >60 ML/MIN/1.73 M^2
EST. GFR  (NON AFRICAN AMERICAN): >60 ML/MIN/1.73 M^2
GLUCOSE SERPL-MCNC: 102 MG/DL (ref 70–110)
POTASSIUM SERPL-SCNC: 5.2 MMOL/L (ref 3.5–5.1)
PROT SERPL-MCNC: 7.4 G/DL (ref 6–8.4)
SODIUM SERPL-SCNC: 142 MMOL/L (ref 136–145)

## 2020-03-10 PROCEDURE — 99999 PR PBB SHADOW E&M-EST. PATIENT-LVL IV: ICD-10-PCS | Mod: PBBFAC,,, | Performed by: INTERNAL MEDICINE

## 2020-03-10 PROCEDURE — 86703 HIV-1/HIV-2 1 RESULT ANTBDY: CPT

## 2020-03-10 PROCEDURE — 86803 HEPATITIS C AB TEST: CPT

## 2020-03-10 PROCEDURE — 99213 PR OFFICE/OUTPT VISIT, EST, LEVL III, 20-29 MIN: ICD-10-PCS | Mod: S$PBB,,, | Performed by: INTERNAL MEDICINE

## 2020-03-10 PROCEDURE — 99213 OFFICE O/P EST LOW 20 MIN: CPT | Mod: S$PBB,,, | Performed by: INTERNAL MEDICINE

## 2020-03-10 PROCEDURE — 86790 VIRUS ANTIBODY NOS: CPT

## 2020-03-10 PROCEDURE — 86706 HEP B SURFACE ANTIBODY: CPT

## 2020-03-10 PROCEDURE — 86592 SYPHILIS TEST NON-TREP QUAL: CPT

## 2020-03-10 PROCEDURE — 99999 PR PBB SHADOW E&M-EST. PATIENT-LVL IV: CPT | Mod: PBBFAC,,, | Performed by: INTERNAL MEDICINE

## 2020-03-10 PROCEDURE — 86704 HEP B CORE ANTIBODY TOTAL: CPT

## 2020-03-10 PROCEDURE — 99214 OFFICE O/P EST MOD 30 MIN: CPT | Mod: PBBFAC | Performed by: INTERNAL MEDICINE

## 2020-03-10 PROCEDURE — 87340 HEPATITIS B SURFACE AG IA: CPT

## 2020-03-10 RX ORDER — DIPHENHYDRAMINE HYDROCHLORIDE 50 MG/ML
50 INJECTION INTRAMUSCULAR; INTRAVENOUS ONCE AS NEEDED
Status: CANCELLED | OUTPATIENT
Start: 2020-03-17

## 2020-03-10 RX ORDER — HEPARIN 100 UNIT/ML
500 SYRINGE INTRAVENOUS
Status: CANCELLED | OUTPATIENT
Start: 2020-03-17

## 2020-03-10 RX ORDER — DIPHENHYDRAMINE HYDROCHLORIDE 50 MG/ML
50 INJECTION INTRAMUSCULAR; INTRAVENOUS
Status: CANCELLED | OUTPATIENT
Start: 2020-03-17

## 2020-03-10 RX ORDER — SODIUM CHLORIDE 0.9 % (FLUSH) 0.9 %
10 SYRINGE (ML) INJECTION
Status: CANCELLED | OUTPATIENT
Start: 2020-03-17

## 2020-03-10 RX ORDER — EPINEPHRINE 0.3 MG/.3ML
0.3 INJECTION SUBCUTANEOUS ONCE AS NEEDED
Status: CANCELLED | OUTPATIENT
Start: 2020-03-17

## 2020-03-10 RX ORDER — ACETAMINOPHEN 325 MG/1
650 TABLET ORAL
Status: CANCELLED | OUTPATIENT
Start: 2020-03-17

## 2020-03-10 ASSESSMENT — ROUTINE ASSESSMENT OF PATIENT INDEX DATA (RAPID3)
PATIENT GLOBAL ASSESSMENT SCORE: 1.5
PAIN SCORE: 2.5
FATIGUE SCORE: 1.5
WHEN YOU AWAKENED IN THE MORNING OVER THE LAST WEEK, PLEASE INDICATE THE AMOUNT OF TIME IT TAKES UNTIL YOU ARE AS LIMBER AS YOU WILL BE FOR THE DAY: 2 HRS
MDHAQ FUNCTION SCORE: 0
PSYCHOLOGICAL DISTRESS SCORE: 3.3
AM STIFFNESS SCORE: 1, YES
TOTAL RAPID3 SCORE: 1.33

## 2020-03-10 NOTE — PROGRESS NOTES
"Subjective:       Patient ID: Pankaj Marquez is a 64 y.o. female.    Chief Complaint:   HPI  Now Medicare, turned 64, Enbrel no longer covered. No co-pay assistance. Has been out of Enbrel x 4 wks. Notes increase pain and swelling 1st cmcs, index dips and right little dips. Has pigmented patch, pruritic non scaly not red, sees Derm injected with Botox.  with MM to have SCT, Dr. Jimenez.  Review of Systems   Constitutional: Negative for fever and unexpected weight change.   HENT: Negative for trouble swallowing.    Eyes: Negative for redness.   Respiratory: Negative for cough and shortness of breath.    Cardiovascular: Negative for chest pain.   Gastrointestinal: Negative for constipation and diarrhea.   Genitourinary: Negative for dysuria and genital sores.   Skin: Negative for rash.   Neurological: Negative for headaches.   Hematological: Does not bruise/bleed easily.         Objective:   BP (!) 143/85   Pulse 82   Ht 5' 3.6" (1.615 m)   Wt 56.7 kg (125 lb)   BMI 21.73 kg/m²      Physical Exam           Results for PANKAJ MARQUEZ (MRN [id number]) as of 3/10/2020 13:56   Ref. Range 3/9/2020 15:23   WBC Latest Ref Range: 3.90 - 12.70 K/uL 5.11   RBC Latest Ref Range: 4.00 - 5.40 M/uL 4.11   Hemoglobin Latest Ref Range: 12.0 - 16.0 g/dL 13.2   Hematocrit Latest Ref Range: 37.0 - 48.5 % 41.7   MCV Latest Ref Range: 82 - 98 fL 102 (H)   MCH Latest Ref Range: 27.0 - 31.0 pg 32.1 (H)   MCHC Latest Ref Range: 32.0 - 36.0 g/dL 31.7 (L)   RDW Latest Ref Range: 11.5 - 14.5 % 13.0   Platelets Latest Ref Range: 150 - 350 K/uL 204   MPV Latest Ref Range: 9.2 - 12.9 fL 10.7   Gran% Latest Ref Range: 38.0 - 73.0 % 43.2   Gran # (ANC) Latest Ref Range: 1.8 - 7.7 K/uL 2.2   Lymph% Latest Ref Range: 18.0 - 48.0 % 42.9   Lymph # Latest Ref Range: 1.0 - 4.8 K/uL 2.2   Mono% Latest Ref Range: 4.0 - 15.0 % 10.6   Mono # Latest Ref Range: 0.3 - 1.0 K/uL 0.5   Eosinophil% Latest Ref Range: 0.0 - 8.0 % 2.3   Eos # Latest Ref Range: " 0.0 - 0.5 K/uL 0.1   Basophil% Latest Ref Range: 0.0 - 1.9 % 0.8   Baso # Latest Ref Range: 0.00 - 0.20 K/uL 0.04   nRBC Latest Ref Range: 0 /100 WBC 0   Differential Method Unknown Automated   Immature Grans (Abs) Latest Ref Range: 0.00 - 0.04 K/uL 0.01   Immature Granulocytes Latest Ref Range: 0.0 - 0.5 % 0.2   Sed Rate Latest Ref Range: 0 - 36 mm/Hr <2   Sodium Latest Ref Range: 136 - 145 mmol/L 142   Potassium Latest Ref Range: 3.5 - 5.1 mmol/L 5.2 (H)   Chloride Latest Ref Range: 95 - 110 mmol/L 106   CO2 Latest Ref Range: 23 - 29 mmol/L 27   Anion Gap Latest Ref Range: 8 - 16 mmol/L 9   BUN, Bld Latest Ref Range: 8 - 23 mg/dL 24 (H)   Creatinine Latest Ref Range: 0.5 - 1.4 mg/dL 0.8   eGFR if non African American Latest Ref Range: >60 mL/min/1.73 m^2 >60.0   eGFR if African American Latest Ref Range: >60 mL/min/1.73 m^2 >60.0   Glucose Latest Ref Range: 70 - 110 mg/dL 102   Calcium Latest Ref Range: 8.7 - 10.5 mg/dL 10.1   Alkaline Phosphatase Latest Ref Range: 55 - 135 U/L 49 (L)   PROTEIN TOTAL Latest Ref Range: 6.0 - 8.4 g/dL 7.4   Albumin Latest Ref Range: 3.5 - 5.2 g/dL 4.5   BILIRUBIN TOTAL Latest Ref Range: 0.1 - 1.0 mg/dL 0.5   AST Latest Ref Range: 10 - 40 U/L 32   ALT Latest Ref Range: 10 - 44 U/L 43   CRP Latest Ref Range: 0.0 - 8.2 mg/L 0.5     Assessment/Plan         PSA (psoriatic arthritis)  -     HIV-1 and HIV-2 antibodies; Future; Expected date: 03/10/2020  -     Hepatitis B surface antigen; Future; Expected date: 03/10/2020  -     HBcAB; Future; Expected date: 03/10/2020  -     Hepatitis B surface antibody; Future; Expected date: 03/10/2020  -     Hepatitis C antibody; Future; Expected date: 03/10/2020  -     Hepatitis A antibody, IgG; Future; Expected date: 03/10/2020  -     Strongyloides IgG Antibodies; Future; Expected date: 03/10/2020  -     Quantiferon Gold TB; Future; Expected date: 03/10/2020  -     RPR; Future; Expected date: 03/10/2020    Encounter for screening for human  immunodeficiency virus (HIV)   -     HIV-1 and HIV-2 antibodies; Future; Expected date: 03/10/2020    Peripheral spondyloarthritis       Problem List Items Addressed This Visit     Peripheral spondyloarthritis    Overview     Results for PANKAJ CARTER (MRN [id number]) as of 11/16/2018 13:06   Ref. Range 6/26/2015 11:10 5/29/2018 11:46 10/16/2018 10:50   Anti-SSA Antibody Latest Ref Range: 0.00 - 19.99 EU 3.90     Anti-SSA Interpretation Latest Ref Range: Negative  Negative     Anti-SSB Antibody Latest Ref Range: 0.00 - 19.99 EU  0.09    Anti-SSB Interpretation Latest Ref Range: Negative   Negative    SAMIR Screen Latest Ref Range: Negative <1:160  Negative <1:160     Anti-Ladonna-1 Antibody Latest Ref Range: <20 Units   <20   Anti-PM/Scl Ab Latest Ref Range: <20 Units   <20   Anti-SS-A 52 kD Ab, IgG Latest Ref Range: <20 Units   <20   Anti-U1-RNP  Ab Latest Ref Range: <20 Units   <20   CCP Antibodies Latest Ref Range: <5.0 U/mL <0.5 <0.5    EJ Latest Ref Range: Negative    Negative   Fibrillarin (U3 RNP) Latest Ref Range: Negative    Negative   HMGCR Antibody Latest Ref Range: 0 - 19 Units   <3   Ku Latest Ref Range: Negative    Negative   MDA-5 (P140) (CADM-140) Latest Ref Range: <20 Units   <20   MI-2 Latest Ref Range: Negative    Negative   NXP-2 (P140) Latest Ref Range: <20 Units   <20   OJ Latest Ref Range: Negative    Negative   PL-12 Latest Ref Range: Negative    Negative   PL-7 Latest Ref Range: Negative    Negative   Rheumatoid Factor Latest Ref Range: 0.0 - 15.0 IU/mL 10.0 <10.0    SRP Latest Ref Range: Negative    Negative   TIF1 GAMMA (P155/140) Latest Ref Range: <20 Units   <20   U2 snRNP Latest Ref Range: Negative    Negative     Armando Kerr MD 3/5/2018       Narrative     AP view of bilateral hands.    Indication: Pain    Comparison: 6/26/15    Findings      Impression       No acute fractures. Degenerative changes of the hand are unchanged, most notable at the bilateral basal joints and second and  fifth DIP joints bilaterally.      Electronically signed by: DALE WORKMAN MD  Date: 03/05/18  Time: 14:38      Results for PANKAJ CARTER (MRN [id number]) as of 11/16/2018 13:06   Ref. Range 10/16/2018 10:50   Hep B Core Total Ab Unknown Negative   Hep B S Ab Unknown Negative   Hepatitis B Surface Ag Unknown Negative   Hepatitis C Ab Unknown Negative   Mitogen - Nil Latest Ref Range: See text IU/mL >10.000   NIL Latest Ref Range: See text IU/mL 0.070   TB Gold Plus Unknown Negative   TB1 - Nil Latest Ref Range: See text IU/mL 0.020   TB2 - Nil Latest Ref Range: See text IU/mL 0.010   HIV 1/2 Ag/Ab Latest Ref Range: Negative  Negative   RPR Latest Ref Range: Non-reactive  Non-reactive   Strongyloides Ab IgG Latest Ref Range: Negative  Negative   Result Notes for X-Ray Chest PA And Lateral     Notes recorded by Nicanor Campos MD on 10/16/2018 at 4:27 PM CDT  The chest x-ray is normal. RJQ     Results for PANKAJ CARTER (MRN [id number]) as of 11/16/2018 13:06   Ref. Range 10/16/2018 10:50   CPK Latest Ref Range: 20 - 180 U/L 280 (H)            Current Assessment & Plan     TJ 0 SJ 5  Pt global 15 ESR <2 CRP 0.5  DAS28 0.84  MBN00-VZE 1.94(both remission)  CDAI 9.5(LDA)  DAPSA TJ 6  SJ 11 Pt global 1.5 Pt pain 2.5 CRP 0.05=  21.05(MDA)    Enbrel stopped as now Medicare  Simponi-Aria 2mg/kg IV wk 0, 4 then q 8 wks  Pre-DMARD labs  RTC 3 months with standing labs         PSA (psoriatic arthritis)    Relevant Orders    HIV-1 and HIV-2 antibodies    Hepatitis B surface antigen    HBcAB    Hepatitis B surface antibody    Hepatitis C antibody    Hepatitis A antibody, IgG    Strongyloides IgG Antibodies    Quantiferon Gold TB    RPR      Other Visit Diagnoses     Encounter for screening for human immunodeficiency virus (HIV)         Relevant Orders    HIV-1 and HIV-2 antibodies

## 2020-03-10 NOTE — ASSESSMENT & PLAN NOTE
TJ 0 SJ 5  Pt global 15 ESR <2 CRP 0.5  DAS28 0.84  MCF79-LIJ 1.94(both remission)  CDAI 9.5(LDA)  DAPSA TJ 6  SJ 11 Pt global 1.5 Pt pain 2.5 CRP 0.05=  21.05(MDA)    Enbrel stopped as now Medicare  Simponi-Aria 2mg/kg IV wk 0, 4 then q 8 wks  Pre-DMARD labs  RTC 3 months with standing labs

## 2020-03-11 LAB
HBV CORE AB SERPL QL IA: NEGATIVE
HBV DNA SERPL NAA+PROBE-ACNC: <10 IU/ML
HBV DNA SERPL NAA+PROBE-LOG IU: <1 LOG (10) IU/ML
HBV DNA SERPL QL NAA+PROBE: NOT DETECTED
HBV SURFACE AB SER-ACNC: POSITIVE M[IU]/ML
HBV SURFACE AG SERPL QL IA: NEGATIVE
HCV AB SERPL QL IA: NEGATIVE
HEPATITIS A ANTIBODY, IGG: POSITIVE
HIV 1+2 AB+HIV1 P24 AG SERPL QL IA: NEGATIVE
RPR SER QL: NORMAL
STRONGYLOIDES ANTIBODY IGG: NEGATIVE

## 2020-03-17 ENCOUNTER — PATIENT MESSAGE (OUTPATIENT)
Dept: RHEUMATOLOGY | Facility: CLINIC | Age: 65
End: 2020-03-17

## 2020-03-18 ENCOUNTER — PATIENT MESSAGE (OUTPATIENT)
Dept: RHEUMATOLOGY | Facility: CLINIC | Age: 65
End: 2020-03-18

## 2020-03-20 ENCOUNTER — PATIENT MESSAGE (OUTPATIENT)
Dept: RHEUMATOLOGY | Facility: CLINIC | Age: 65
End: 2020-03-20

## 2020-03-20 ENCOUNTER — PATIENT MESSAGE (OUTPATIENT)
Dept: PHARMACY | Facility: CLINIC | Age: 65
End: 2020-03-20

## 2020-03-20 NOTE — TELEPHONE ENCOUNTER
FOR DOCUMENTATION ONLY: Financial Assistance for Enbrel is approved from 3/17/20 to 12/31/20  Source [employer] Safety Net Program. Sending a staff message to Dr Nicanor Campos regarding assistance approval.

## 2020-04-16 ENCOUNTER — PATIENT MESSAGE (OUTPATIENT)
Dept: INTERNAL MEDICINE | Facility: CLINIC | Age: 65
End: 2020-04-16

## 2020-04-17 ENCOUNTER — PATIENT MESSAGE (OUTPATIENT)
Dept: INTERNAL MEDICINE | Facility: CLINIC | Age: 65
End: 2020-04-17

## 2020-04-17 RX ORDER — AMLODIPINE BESYLATE 10 MG/1
10 TABLET ORAL DAILY
Qty: 90 TABLET | Refills: 1 | Status: SHIPPED | OUTPATIENT
Start: 2020-04-17 | End: 2020-10-16

## 2020-04-28 ENCOUNTER — PATIENT MESSAGE (OUTPATIENT)
Dept: UROGYNECOLOGY | Facility: CLINIC | Age: 65
End: 2020-04-28

## 2020-04-28 DIAGNOSIS — Z12.31 BREAST CANCER SCREENING BY MAMMOGRAM: Primary | ICD-10-CM

## 2020-04-30 ENCOUNTER — LAB VISIT (OUTPATIENT)
Dept: LAB | Facility: HOSPITAL | Age: 65
End: 2020-04-30
Attending: INTERNAL MEDICINE
Payer: MEDICARE

## 2020-04-30 DIAGNOSIS — E55.9 VITAMIN D DEFICIENCY: ICD-10-CM

## 2020-04-30 DIAGNOSIS — R73.09 ELEVATED GLUCOSE: ICD-10-CM

## 2020-04-30 DIAGNOSIS — M51.24 DISPLACEMENT OF THORACIC INTERVERTEBRAL DISC WITHOUT MYELOPATHY: ICD-10-CM

## 2020-04-30 DIAGNOSIS — M18.0 PRIMARY OSTEOARTHRITIS OF BOTH FIRST CARPOMETACARPAL JOINTS: ICD-10-CM

## 2020-04-30 DIAGNOSIS — I10 ESSENTIAL HYPERTENSION: ICD-10-CM

## 2020-04-30 DIAGNOSIS — E03.9 HYPOTHYROIDISM, UNSPECIFIED TYPE: ICD-10-CM

## 2020-04-30 DIAGNOSIS — M54.16 LUMBAR RADICULOPATHY: ICD-10-CM

## 2020-04-30 DIAGNOSIS — M47.819 PERIPHERAL SPONDYLOARTHRITIS: ICD-10-CM

## 2020-04-30 DIAGNOSIS — E78.5 HYPERLIPIDEMIA, UNSPECIFIED HYPERLIPIDEMIA TYPE: ICD-10-CM

## 2020-04-30 DIAGNOSIS — Z00.00 ROUTINE PHYSICAL EXAMINATION: ICD-10-CM

## 2020-04-30 LAB
25(OH)D3+25(OH)D2 SERPL-MCNC: 45 NG/ML (ref 30–96)
ALBUMIN SERPL BCP-MCNC: 4.5 G/DL (ref 3.5–5.2)
ALP SERPL-CCNC: 44 U/L (ref 55–135)
ALT SERPL W/O P-5'-P-CCNC: 34 U/L (ref 10–44)
ANION GAP SERPL CALC-SCNC: 8 MMOL/L (ref 8–16)
AST SERPL-CCNC: 31 U/L (ref 10–40)
BASOPHILS # BLD AUTO: 0.03 K/UL (ref 0–0.2)
BASOPHILS NFR BLD: 0.7 % (ref 0–1.9)
BILIRUB SERPL-MCNC: 0.8 MG/DL (ref 0.1–1)
BUN SERPL-MCNC: 25 MG/DL (ref 8–23)
CALCIUM SERPL-MCNC: 10.3 MG/DL (ref 8.7–10.5)
CHLORIDE SERPL-SCNC: 105 MMOL/L (ref 95–110)
CHOLEST SERPL-MCNC: 222 MG/DL (ref 120–199)
CHOLEST/HDLC SERPL: 2.6 {RATIO} (ref 2–5)
CO2 SERPL-SCNC: 30 MMOL/L (ref 23–29)
CREAT SERPL-MCNC: 0.8 MG/DL (ref 0.5–1.4)
DIFFERENTIAL METHOD: ABNORMAL
EOSINOPHIL # BLD AUTO: 0.1 K/UL (ref 0–0.5)
EOSINOPHIL NFR BLD: 3 % (ref 0–8)
ERYTHROCYTE [DISTWIDTH] IN BLOOD BY AUTOMATED COUNT: 13.5 % (ref 11.5–14.5)
EST. GFR  (AFRICAN AMERICAN): >60 ML/MIN/1.73 M^2
EST. GFR  (NON AFRICAN AMERICAN): >60 ML/MIN/1.73 M^2
ESTIMATED AVG GLUCOSE: 103 MG/DL (ref 68–131)
GLUCOSE SERPL-MCNC: 94 MG/DL (ref 70–110)
HBA1C MFR BLD HPLC: 5.2 % (ref 4–5.6)
HCT VFR BLD AUTO: 39.7 % (ref 37–48.5)
HDLC SERPL-MCNC: 84 MG/DL (ref 40–75)
HDLC SERPL: 37.8 % (ref 20–50)
HGB BLD-MCNC: 13.3 G/DL (ref 12–16)
LDLC SERPL CALC-MCNC: 123 MG/DL (ref 63–159)
LYMPHOCYTES # BLD AUTO: 2.2 K/UL (ref 1–4.8)
LYMPHOCYTES NFR BLD: 51.3 % (ref 18–48)
MCH RBC QN AUTO: 32.2 PG (ref 27–31)
MCHC RBC AUTO-ENTMCNC: 33.5 G/DL (ref 32–36)
MCV RBC AUTO: 96 FL (ref 82–98)
MONOCYTES # BLD AUTO: 0.5 K/UL (ref 0.3–1)
MONOCYTES NFR BLD: 10.8 % (ref 4–15)
NEUTROPHILS # BLD AUTO: 1.5 K/UL (ref 1.8–7.7)
NEUTROPHILS NFR BLD: 34.2 % (ref 38–73)
NONHDLC SERPL-MCNC: 138 MG/DL
PLATELET # BLD AUTO: 222 K/UL (ref 150–350)
PMV BLD AUTO: 10.5 FL (ref 9.2–12.9)
POTASSIUM SERPL-SCNC: 5.1 MMOL/L (ref 3.5–5.1)
PROT SERPL-MCNC: 7.4 G/DL (ref 6–8.4)
RBC # BLD AUTO: 4.13 M/UL (ref 4–5.4)
SODIUM SERPL-SCNC: 143 MMOL/L (ref 136–145)
TRIGL SERPL-MCNC: 75 MG/DL (ref 30–150)
TSH SERPL DL<=0.005 MIU/L-ACNC: 2.79 UIU/ML (ref 0.4–4)
WBC # BLD AUTO: 4.35 K/UL (ref 3.9–12.7)

## 2020-04-30 PROCEDURE — 84443 ASSAY THYROID STIM HORMONE: CPT

## 2020-04-30 PROCEDURE — 80061 LIPID PANEL: CPT

## 2020-04-30 PROCEDURE — 80053 COMPREHEN METABOLIC PANEL: CPT

## 2020-04-30 PROCEDURE — 83036 HEMOGLOBIN GLYCOSYLATED A1C: CPT

## 2020-04-30 PROCEDURE — 36415 COLL VENOUS BLD VENIPUNCTURE: CPT

## 2020-04-30 PROCEDURE — 82306 VITAMIN D 25 HYDROXY: CPT

## 2020-04-30 PROCEDURE — 85025 COMPLETE CBC W/AUTO DIFF WBC: CPT

## 2020-05-08 ENCOUNTER — PATIENT MESSAGE (OUTPATIENT)
Dept: INTERNAL MEDICINE | Facility: CLINIC | Age: 65
End: 2020-05-08

## 2020-05-08 ENCOUNTER — OFFICE VISIT (OUTPATIENT)
Dept: INTERNAL MEDICINE | Facility: CLINIC | Age: 65
End: 2020-05-08
Payer: MEDICARE

## 2020-05-08 DIAGNOSIS — M47.819 PERIPHERAL SPONDYLOARTHRITIS: ICD-10-CM

## 2020-05-08 DIAGNOSIS — E78.5 HYPERLIPIDEMIA, UNSPECIFIED HYPERLIPIDEMIA TYPE: ICD-10-CM

## 2020-05-08 DIAGNOSIS — E03.9 HYPOTHYROIDISM, UNSPECIFIED TYPE: ICD-10-CM

## 2020-05-08 DIAGNOSIS — I10 ESSENTIAL HYPERTENSION: Primary | ICD-10-CM

## 2020-05-08 DIAGNOSIS — M47.816 LUMBAR SPONDYLOSIS: ICD-10-CM

## 2020-05-08 DIAGNOSIS — M51.24 DISPLACEMENT OF THORACIC INTERVERTEBRAL DISC WITHOUT MYELOPATHY: ICD-10-CM

## 2020-05-08 PROCEDURE — 99214 PR OFFICE/OUTPT VISIT, EST, LEVL IV, 30-39 MIN: ICD-10-PCS | Mod: 95,,, | Performed by: INTERNAL MEDICINE

## 2020-05-08 PROCEDURE — 99214 OFFICE O/P EST MOD 30 MIN: CPT | Mod: 95,,, | Performed by: INTERNAL MEDICINE

## 2020-05-08 PROCEDURE — G0463 HOSPITAL OUTPT CLINIC VISIT: HCPCS

## 2020-05-08 PROCEDURE — 99211 OFF/OP EST MAY X REQ PHY/QHP: CPT

## 2020-05-08 NOTE — PROGRESS NOTES
Subjective:       Patient ID: Jill Marquez is a 64 y.o. female.    Chief Complaint: Follow-up    The patient location is: Home  The chief complaint leading to consultation is: Routine Check up  Visit type: audiovisual  Total time spent with patient: 20 minutes  Each patient to whom he or she provides medical services by telemedicine is:  (1) informed of the relationship between the physician and patient and the respective role of any other health care provider with respect to management of the patient; and (2) notified that he or she may decline to receive medical services by telemedicine and may withdraw from such care at any time.    Notes: HPI: Pt see for her Annual follow-up of medical problems.  She says she is doing well.  She continues on Enbrel injections for her peripheral spondyloarthritis.  She denies any chest pain shortness of breath fevers or chills.  No cough or wheeze.  Her  was to go through a stem cell transplant but that was postponed due to the COVID pandemic.  There otherwise isolating at home.    We did review her health maintenance and I suggested a Tdap booster.  She also has her mammogram scheduled.  Labs looked very stable    Review of Systems   Constitutional: Negative for activity change and unexpected weight change.   HENT: Negative for hearing loss, rhinorrhea and trouble swallowing.    Eyes: Negative for discharge and visual disturbance.   Respiratory: Negative for chest tightness and wheezing.    Cardiovascular: Negative for chest pain and palpitations.   Gastrointestinal: Negative for blood in stool, constipation, diarrhea and vomiting.   Endocrine: Negative for polydipsia and polyuria.   Genitourinary: Negative for difficulty urinating, dysuria, hematuria and menstrual problem.   Musculoskeletal: Positive for arthralgias and joint swelling. Negative for neck pain.   Neurological: Negative for weakness and headaches.   Psychiatric/Behavioral: Negative for confusion and  dysphoric mood.       Objective:      Physical Exam   Constitutional: She is oriented to person, place, and time. She appears well-developed and well-nourished.   HENT:   Head: Normocephalic and atraumatic.   Pulmonary/Chest: No respiratory distress.   Neurological: She is alert and oriented to person, place, and time.   Psychiatric: She has a normal mood and affect. Her behavior is normal.       Assessment:       1. Essential hypertension    2. Displacement of thoracic intervertebral disc without myelopathy    3. Lumbar spondylosis    4. Hyperlipidemia, unspecified hyperlipidemia type    5. Hypothyroidism, unspecified type    6. Peripheral spondyloarthritis        Plan:       Jill was seen today for follow-up.    Diagnoses and all orders for this visit:    Essential hypertension    Displacement of thoracic intervertebral disc without myelopathy    Lumbar spondylosis    Hyperlipidemia, unspecified hyperlipidemia type    Hypothyroidism, unspecified type    Peripheral spondyloarthritis        p.r.n. incidentally the patient states she took citalopram for a few weeks and felt better so stopped.  She thinks it was just stress associated with coronavirus and her 's illness.  follow-up in 6-12 months sooner

## 2020-05-22 ENCOUNTER — PATIENT MESSAGE (OUTPATIENT)
Dept: RHEUMATOLOGY | Facility: CLINIC | Age: 65
End: 2020-05-22

## 2020-05-22 ENCOUNTER — IMMUNIZATION (OUTPATIENT)
Dept: PHARMACY | Facility: CLINIC | Age: 65
End: 2020-05-22
Payer: MEDICARE

## 2020-05-22 ENCOUNTER — HOSPITAL ENCOUNTER (OUTPATIENT)
Dept: RADIOLOGY | Facility: HOSPITAL | Age: 65
Discharge: HOME | End: 2020-05-22
Attending: NURSE PRACTITIONER
Payer: MEDICARE

## 2020-05-22 DIAGNOSIS — Z12.31 BREAST CANCER SCREENING BY MAMMOGRAM: ICD-10-CM

## 2020-05-22 PROCEDURE — 77067 MAMMO DIGITAL SCREENING BILAT WITH TOMOSYNTHESIS_CAD: ICD-10-PCS | Mod: 26,,, | Performed by: RADIOLOGY

## 2020-05-22 PROCEDURE — 77067 SCR MAMMO BI INCL CAD: CPT | Mod: 26,,, | Performed by: RADIOLOGY

## 2020-05-22 PROCEDURE — 77063 MAMMO DIGITAL SCREENING BILAT WITH TOMOSYNTHESIS_CAD: ICD-10-PCS | Mod: 26,,, | Performed by: RADIOLOGY

## 2020-05-22 PROCEDURE — 77063 BREAST TOMOSYNTHESIS BI: CPT | Mod: 26,,, | Performed by: RADIOLOGY

## 2020-05-22 PROCEDURE — 77067 SCR MAMMO BI INCL CAD: CPT | Mod: TC

## 2020-05-24 ENCOUNTER — PATIENT MESSAGE (OUTPATIENT)
Dept: UROGYNECOLOGY | Facility: CLINIC | Age: 65
End: 2020-05-24

## 2020-06-18 ENCOUNTER — LAB VISIT (OUTPATIENT)
Dept: LAB | Facility: HOSPITAL | Age: 65
End: 2020-06-18
Attending: INTERNAL MEDICINE
Payer: MEDICARE

## 2020-06-18 DIAGNOSIS — B18.1 HEPATITIS B CARRIER: ICD-10-CM

## 2020-06-18 DIAGNOSIS — M47.819 SPONDYLOARTHRITIS: ICD-10-CM

## 2020-06-18 LAB
ALBUMIN SERPL BCP-MCNC: 4.6 G/DL (ref 3.5–5.2)
ALP SERPL-CCNC: 47 U/L (ref 55–135)
ALT SERPL W/O P-5'-P-CCNC: 37 U/L (ref 10–44)
ANION GAP SERPL CALC-SCNC: 7 MMOL/L (ref 8–16)
AST SERPL-CCNC: 34 U/L (ref 10–40)
BASOPHILS # BLD AUTO: 0.03 K/UL (ref 0–0.2)
BASOPHILS NFR BLD: 0.6 % (ref 0–1.9)
BILIRUB SERPL-MCNC: 1 MG/DL (ref 0.1–1)
BUN SERPL-MCNC: 23 MG/DL (ref 8–23)
CALCIUM SERPL-MCNC: 10.3 MG/DL (ref 8.7–10.5)
CHLORIDE SERPL-SCNC: 103 MMOL/L (ref 95–110)
CO2 SERPL-SCNC: 30 MMOL/L (ref 23–29)
CREAT SERPL-MCNC: 0.8 MG/DL (ref 0.5–1.4)
CRP SERPL-MCNC: 0.3 MG/L (ref 0–8.2)
DIFFERENTIAL METHOD: ABNORMAL
EOSINOPHIL # BLD AUTO: 0.1 K/UL (ref 0–0.5)
EOSINOPHIL NFR BLD: 1.9 % (ref 0–8)
ERYTHROCYTE [DISTWIDTH] IN BLOOD BY AUTOMATED COUNT: 13.2 % (ref 11.5–14.5)
ERYTHROCYTE [SEDIMENTATION RATE] IN BLOOD BY WESTERGREN METHOD: <2 MM/HR (ref 0–36)
EST. GFR  (AFRICAN AMERICAN): >60 ML/MIN/1.73 M^2
EST. GFR  (NON AFRICAN AMERICAN): >60 ML/MIN/1.73 M^2
GLUCOSE SERPL-MCNC: 103 MG/DL (ref 70–110)
HCT VFR BLD AUTO: 42.6 % (ref 37–48.5)
HGB BLD-MCNC: 13.7 G/DL (ref 12–16)
IMM GRANULOCYTES # BLD AUTO: 0.01 K/UL (ref 0–0.04)
IMM GRANULOCYTES NFR BLD AUTO: 0.2 % (ref 0–0.5)
LYMPHOCYTES # BLD AUTO: 2.4 K/UL (ref 1–4.8)
LYMPHOCYTES NFR BLD: 46.1 % (ref 18–48)
MCH RBC QN AUTO: 32.4 PG (ref 27–31)
MCHC RBC AUTO-ENTMCNC: 32.2 G/DL (ref 32–36)
MCV RBC AUTO: 101 FL (ref 82–98)
MONOCYTES # BLD AUTO: 0.5 K/UL (ref 0.3–1)
MONOCYTES NFR BLD: 10 % (ref 4–15)
NEUTROPHILS # BLD AUTO: 2.1 K/UL (ref 1.8–7.7)
NEUTROPHILS NFR BLD: 41.2 % (ref 38–73)
NRBC BLD-RTO: 0 /100 WBC
PLATELET # BLD AUTO: 180 K/UL (ref 150–350)
PMV BLD AUTO: 9.6 FL (ref 9.2–12.9)
POTASSIUM SERPL-SCNC: 5.1 MMOL/L (ref 3.5–5.1)
PROT SERPL-MCNC: 7.6 G/DL (ref 6–8.4)
RBC # BLD AUTO: 4.23 M/UL (ref 4–5.4)
SODIUM SERPL-SCNC: 140 MMOL/L (ref 136–145)
WBC # BLD AUTO: 5.19 K/UL (ref 3.9–12.7)

## 2020-06-18 PROCEDURE — 85025 COMPLETE CBC W/AUTO DIFF WBC: CPT

## 2020-06-18 PROCEDURE — 86140 C-REACTIVE PROTEIN: CPT

## 2020-06-18 PROCEDURE — 85652 RBC SED RATE AUTOMATED: CPT

## 2020-06-18 PROCEDURE — 36415 COLL VENOUS BLD VENIPUNCTURE: CPT

## 2020-06-18 PROCEDURE — 80053 COMPREHEN METABOLIC PANEL: CPT

## 2020-06-22 ENCOUNTER — TELEPHONE (OUTPATIENT)
Dept: RHEUMATOLOGY | Facility: CLINIC | Age: 65
End: 2020-06-22

## 2020-06-24 ENCOUNTER — OFFICE VISIT (OUTPATIENT)
Dept: RHEUMATOLOGY | Facility: CLINIC | Age: 65
End: 2020-06-24
Payer: MEDICARE

## 2020-06-24 VITALS
BODY MASS INDEX: 22.53 KG/M2 | DIASTOLIC BLOOD PRESSURE: 86 MMHG | WEIGHT: 132 LBS | SYSTOLIC BLOOD PRESSURE: 143 MMHG | HEIGHT: 64 IN | HEART RATE: 71 BPM

## 2020-06-24 DIAGNOSIS — M47.819 PERIPHERAL SPONDYLOARTHRITIS: ICD-10-CM

## 2020-06-24 PROCEDURE — 99999 PR PBB SHADOW E&M-EST. PATIENT-LVL III: ICD-10-PCS | Mod: PBBFAC,,, | Performed by: INTERNAL MEDICINE

## 2020-06-24 PROCEDURE — 99214 OFFICE O/P EST MOD 30 MIN: CPT | Mod: S$PBB,,, | Performed by: INTERNAL MEDICINE

## 2020-06-24 PROCEDURE — 99214 PR OFFICE/OUTPT VISIT, EST, LEVL IV, 30-39 MIN: ICD-10-PCS | Mod: S$PBB,,, | Performed by: INTERNAL MEDICINE

## 2020-06-24 PROCEDURE — 99999 PR PBB SHADOW E&M-EST. PATIENT-LVL III: CPT | Mod: PBBFAC,,, | Performed by: INTERNAL MEDICINE

## 2020-06-24 PROCEDURE — 99213 OFFICE O/P EST LOW 20 MIN: CPT | Mod: PBBFAC | Performed by: INTERNAL MEDICINE

## 2020-06-24 RX ORDER — ETANERCEPT 50 MG/ML
50 SOLUTION SUBCUTANEOUS WEEKLY
Qty: 12 ML | Refills: 1 | Status: SHIPPED | OUTPATIENT
Start: 2020-06-24 | End: 2020-10-27 | Stop reason: SDUPTHER

## 2020-06-24 RX ORDER — ETANERCEPT 50 MG/ML
50 SOLUTION SUBCUTANEOUS WEEKLY
Qty: 12 ML | Refills: 1 | Status: SHIPPED | OUTPATIENT
Start: 2020-06-24 | End: 2020-06-24 | Stop reason: SDUPTHER

## 2020-06-24 RX ORDER — GABAPENTIN 300 MG/1
300 CAPSULE ORAL 2 TIMES DAILY
Qty: 180 CAPSULE | Refills: 1 | Status: SHIPPED | OUTPATIENT
Start: 2020-06-24 | End: 2020-09-17 | Stop reason: SDUPTHER

## 2020-06-24 NOTE — PROGRESS NOTES
I have personally taken the history and examined the patient and agree with the resident,s note as stated above     The 10-year ASCVD risk score (La WRIGHT Jr., et al., 2013) is: 8.2%    Values used to calculate the score:      Age: 64 years      Sex: Female      Is Non- : No      Diabetic: No      Tobacco smoker: No      Systolic Blood Pressure: 143 mmHg      Is BP treated: Yes      HDL Cholesterol: 84 mg/dL      Total Cholesterol: 222 mg/dL       TJ 0 SJ 4 Pt global 10 CRP 3   [id number] 1.75( remission)  CDAI 6  (LDA) decreased from 9.5  DAPSA TJ 2  SJ 7 Pt global 1 Pt pain 1.5 CRP 0.3= 11.53(LDA) decreased from  21.05  Clinically improved since resumption of Enbrel approved for foundation assistance several weeks after last office visit 3/10/20 so did not need to change to Simponi Aria as originally planned  Hyperlipidemia with intermediate risk (8.2%) would favor moderate dose statin codey given inflammatory arthritis enhancing this risk     Cont Enbrel SureClick 50mg sc q 7days  Discuss starting moderate dose statin with Dr. Smith her internist  RTC 3 months with standing labs            Answers for HPI/ROS submitted by the patient on 6/22/2020   fever: No  eye redness: No  headaches: No  shortness of breath: No  chest pain: No  trouble swallowing: No  diarrhea: No  constipation: No  unexpected weight change: No  genital sore: No  dysuria: No  During the last 3 days, have you had a skin rash?: No  Bruises or bleeds easily: No  cough: No

## 2020-06-24 NOTE — PROGRESS NOTES
"Subjective:       Patient ID: Jill Marquez is a 64 y.o. female.    Chief Complaint: PsA  HPI   64 year-old female with psoriatic arthritis here for 3 month follow-up. At last visit we discussed switching to Simponi as she lost coverage of Enbrel when switching to medicare. She since got reapproved for Enbrel and has been back on it since around 3/20/20. She has noticed improvement since getting back on it especially with pain but still notices some redness and swelling of the 2nd right DIP. She has otherwise been doing well and goes on daily runs and bike rides. She denies noticing any psoriasis or rash    Review of Systems   Constitutional: Negative for fever and unexpected weight change.   HENT: Negative for trouble swallowing.    Eyes: Negative for redness.   Respiratory: Negative for cough and shortness of breath.    Cardiovascular: Negative for chest pain.   Gastrointestinal: Negative for constipation and diarrhea.   Genitourinary: Negative for dysuria and genital sores.   Skin: Negative for rash.   Neurological: Negative for headaches.   Hematological: Does not bruise/bleed easily.         Objective:   BP (!) 143/86   Pulse 71   Ht 5' 3.6" (1.615 m)   Wt 59.9 kg (132 lb)   BMI 22.94 kg/m²      Physical Exam   Constitutional: She is well-developed, well-nourished, and in no distress.   HENT:   Head: Atraumatic.   Eyes: Conjunctivae and EOM are normal.   Cardiovascular: Normal rate and regular rhythm.    No murmur heard.  Pulmonary/Chest: Effort normal. No respiratory distress. She has no wheezes.   Skin: She is not diaphoretic.              12/6/2019 3/10/2020 6/24/2020   HUMPHRIES-28 (ESR) 2.45 (Remission) 0.84 (Remission) --   HUMPHRIES-28 (CRP) 3.53 (Moderate disease activity) 1.94 (Remission) 1.75 (Remission)   Tender (HUMPHRIES-28) 1 / 28  0 / 28  0 / 28    Swollen (HUMPHRIES-28) 5 / 28 5 / 28 4 / 28    Provider Global 20 mm 30 mm 10 mm   Patient Global 90 mm 15 mm 10 mm   ESR 1 mm/hr 1 mm/hr --   CRP 0.4 mg/L 0.5 mg/L " 0.3 mg/L      The 10-year ASCVD risk score (La RWIGHT Jr., et al., 2013) is: 8.2%    Values used to calculate the score:      Age: 64 years      Sex: Female      Is Non- : No      Diabetic: No      Tobacco smoker: No      Systolic Blood Pressure: 143 mmHg      Is BP treated: Yes      HDL Cholesterol: 84 mg/dL      Total Cholesterol: 222 mg/dL    Assessment:       1. Peripheral spondyloarthritis        2. Hyperlipidemia     Plan:       Problem List Items Addressed This Visit        Other    Peripheral spondyloarthritis    Relevant Medications    etanercept (ENBREL) 50 mg/mL (1 mL) injection        PsA  -SJ 7, TJ 2, Pain 1.5, PGA 1, CRP 0.3, DAPSA 11.53 (LDA)  -Continue Enbrel 50 mg sc weekly    Hyperlipidemia  -Intermediate risk ASCVD score  -Currently takes fish oil 2 g daily  -Recommended discussing starting a statin with her PCP    RTC in 3 months with standing labs     Pt was seen and discussed with Dr. Campos who is in agreement with the plan.  Dylan Patton MD   PM&R PGY1

## 2020-06-24 NOTE — PROGRESS NOTES
Pre chart    Answers for HPI/ROS submitted by the patient on 6/22/2020   fever: No  eye redness: No  headaches: No  shortness of breath: No  chest pain: No  trouble swallowing: No  diarrhea: No  constipation: No  unexpected weight change: No  genital sore: No  dysuria: No  During the last 3 days, have you had a skin rash?: No  Bruises or bleeds easily: No  cough: No

## 2020-07-09 ENCOUNTER — TELEPHONE (OUTPATIENT)
Dept: OPHTHALMOLOGY | Facility: CLINIC | Age: 65
End: 2020-07-09

## 2020-08-23 ENCOUNTER — TELEPHONE (OUTPATIENT)
Dept: INTERNAL MEDICINE | Facility: CLINIC | Age: 65
End: 2020-08-23

## 2020-08-23 DIAGNOSIS — M79.605 PAIN IN BOTH LOWER EXTREMITIES: Primary | ICD-10-CM

## 2020-08-23 DIAGNOSIS — M79.604 PAIN IN BOTH LOWER EXTREMITIES: Primary | ICD-10-CM

## 2020-08-24 ENCOUNTER — TELEPHONE (OUTPATIENT)
Dept: ADMINISTRATIVE | Facility: OTHER | Age: 65
End: 2020-08-24

## 2020-09-08 ENCOUNTER — PATIENT OUTREACH (OUTPATIENT)
Dept: ADMINISTRATIVE | Facility: OTHER | Age: 65
End: 2020-09-08

## 2020-09-09 ENCOUNTER — HOSPITAL ENCOUNTER (OUTPATIENT)
Dept: RADIOLOGY | Facility: HOSPITAL | Age: 65
Discharge: HOME | End: 2020-09-09
Attending: PHYSICIAN ASSISTANT
Payer: MEDICARE

## 2020-09-09 ENCOUNTER — OFFICE VISIT (OUTPATIENT)
Dept: SPORTS MEDICINE | Facility: CLINIC | Age: 65
End: 2020-09-09
Payer: MEDICARE

## 2020-09-09 ENCOUNTER — CLINICAL SUPPORT (OUTPATIENT)
Dept: INTERNAL MEDICINE | Facility: CLINIC | Age: 65
End: 2020-09-09
Payer: MEDICARE

## 2020-09-09 VITALS
HEIGHT: 64 IN | BODY MASS INDEX: 22.2 KG/M2 | SYSTOLIC BLOOD PRESSURE: 127 MMHG | WEIGHT: 130 LBS | HEART RATE: 101 BPM | DIASTOLIC BLOOD PRESSURE: 80 MMHG

## 2020-09-09 DIAGNOSIS — M41.9 SCOLIOSIS, UNSPECIFIED SCOLIOSIS TYPE, UNSPECIFIED SPINAL REGION: Primary | ICD-10-CM

## 2020-09-09 DIAGNOSIS — M25.552 BILATERAL HIP PAIN: ICD-10-CM

## 2020-09-09 DIAGNOSIS — M54.50 LOW BACK PAIN: ICD-10-CM

## 2020-09-09 DIAGNOSIS — G89.29 CHRONIC LOW BACK PAIN WITH SCIATICA, SCIATICA LATERALITY UNSPECIFIED, UNSPECIFIED BACK PAIN LATERALITY: ICD-10-CM

## 2020-09-09 DIAGNOSIS — M25.551 BILATERAL HIP PAIN: ICD-10-CM

## 2020-09-09 DIAGNOSIS — M54.40 CHRONIC LOW BACK PAIN WITH SCIATICA, SCIATICA LATERALITY UNSPECIFIED, UNSPECIFIED BACK PAIN LATERALITY: ICD-10-CM

## 2020-09-09 PROCEDURE — 73521 X-RAY EXAM HIPS BI 2 VIEWS: CPT | Mod: TC

## 2020-09-09 PROCEDURE — 99204 PR OFFICE/OUTPT VISIT, NEW, LEVL IV, 45-59 MIN: ICD-10-PCS | Mod: S$PBB,,, | Performed by: PHYSICIAN ASSISTANT

## 2020-09-09 PROCEDURE — 99204 OFFICE O/P NEW MOD 45 MIN: CPT | Mod: S$PBB,,, | Performed by: PHYSICIAN ASSISTANT

## 2020-09-09 PROCEDURE — 90694 VACC AIIV4 NO PRSRV 0.5ML IM: CPT | Mod: PBBFAC

## 2020-09-09 PROCEDURE — 99999 PR PBB SHADOW E&M-EST. PATIENT-LVL V: ICD-10-PCS | Mod: PBBFAC,,, | Performed by: PHYSICIAN ASSISTANT

## 2020-09-09 PROCEDURE — 73521 XR HIPS BILATERAL 2 VIEW INCL AP PELVIS: ICD-10-PCS | Mod: 26,,, | Performed by: RADIOLOGY

## 2020-09-09 PROCEDURE — 73521 X-RAY EXAM HIPS BI 2 VIEWS: CPT | Mod: 26,,, | Performed by: RADIOLOGY

## 2020-09-09 PROCEDURE — 72110 XR LUMBAR SPINE 5 VIEW WITH FLEX AND EXT: ICD-10-PCS | Mod: 26,,, | Performed by: RADIOLOGY

## 2020-09-09 PROCEDURE — 72110 X-RAY EXAM L-2 SPINE 4/>VWS: CPT | Mod: TC

## 2020-09-09 PROCEDURE — 72110 X-RAY EXAM L-2 SPINE 4/>VWS: CPT | Mod: 26,,, | Performed by: RADIOLOGY

## 2020-09-09 PROCEDURE — 99215 OFFICE O/P EST HI 40 MIN: CPT | Mod: PBBFAC,25 | Performed by: PHYSICIAN ASSISTANT

## 2020-09-09 PROCEDURE — 99999 PR PBB SHADOW E&M-EST. PATIENT-LVL V: CPT | Mod: PBBFAC,,, | Performed by: PHYSICIAN ASSISTANT

## 2020-09-09 NOTE — PROGRESS NOTES
Patient 2 identifier use. Patient tolerated injection well. Advised patient to wait 15minute post injection.

## 2020-09-09 NOTE — PROGRESS NOTES
Subjective:          Chief Complaint: Jill Marquez is a 64 y.o. female who had concerns including Pain of the Left Hip and Pain of the Right Hip.    Jill Marquez is a retiree with a significant PMHx of lumbar radiculopathy and followed by Rheumatology and Dr. Tapia. The pain started 3-4 weeks ago Due to change in activity from the pandemic.  She reports she was going to the gym multiple times a week and very active.   Since the pandemic she has been working out at home and her exercise regimen his change. and is becoming progressively worse. Pain is located over (points to) Lower back and lateral hip with radiation distally. She reports that the pain is a 5 /10 aching and throbbing pain today and not responding adequately to conservative measures which have included activity modifications, rest, and oral medication. Is affecting ADLs and limiting desired level of activity. Baseline  numbness, tingling, radiation. Denies inability to bear weight.  Pain is 7 /10 at its worst    Mechanical symptoms: none  Subjective instability: (--)   Worse with decreased activity  Better with rest.   Nocturnal symptoms: (+)    No previous surgeries or trauma on hips  Several TOMASA with moderate relief.             Review of Systems   Constitution: Negative for chills and fever.   HENT: Negative for congestion and sore throat.    Eyes: Negative for discharge and double vision.   Cardiovascular: Negative for chest pain, palpitations and syncope.   Respiratory: Negative for cough and shortness of breath.    Endocrine: Negative for cold intolerance and heat intolerance.   Skin: Negative for dry skin and rash.   Musculoskeletal: Positive for back pain, joint pain and myalgias. Negative for falls.   Gastrointestinal: Negative for abdominal pain, nausea and vomiting.   Neurological: Negative for focal weakness, numbness and paresthesias.       Pain Related Questions  Over the past 3 days, what was your average pain during activity?  (I.e. running, jogging, walking, climbing stairs, getting dressed, ect.): 7  Over the past 3 days, what was your highest pain level?: 7  Over the past 3 days, what was your lowest pain level? : 7    Other  How many nights a week are you awakened by your affected body part?: 2  Was the patient's HEIGHT measured or patient reported?: Patient Reported  Was the patient's WEIGHT measured or patient reported?: Measured      Objective:        General: Jill is well-developed, well-nourished, appears stated age, in no acute distress, alert and oriented to time, place and person.     General    Constitutional: She is oriented to person, place, and time. She appears well-developed and well-nourished. No distress.   HENT:   Head: Normocephalic and atraumatic.   Nose: Nose normal.   Eyes: Conjunctivae and EOM are normal. Pupils are equal, round, and reactive to light.   Neck: No JVD present.   Cardiovascular: Normal rate, regular rhythm and normal heart sounds.    Pulmonary/Chest: Effort normal and breath sounds normal. No respiratory distress.   Abdominal: Soft. Bowel sounds are normal. She exhibits no distension. There is no abdominal tenderness.   Neurological: She is alert and oriented to person, place, and time. She has normal reflexes. Coordination normal.   Psychiatric: She has a normal mood and affect. Her behavior is normal. Judgment and thought content normal.     General Musculoskeletal Exam   Gait: abnormal       Right Knee Exam     Inspection   Effusion: absent    Left Knee Exam     Inspection   Effusion: absent    Right Hip Exam     Inspection   Swelling: absent  Bruising: absent  No deformity of hip.  Erythema: absent    Range of Motion   Abduction: 45   Adduction: 30   Extension: 10   Flexion: 120   External rotation: 60   Internal rotation: 30     Tests   Pain w/ forced internal rotation (RAYSA): absent  Pain w/ forced external rotation (FADIR): absent  Guillermo: negative  Log Roll: negative  Step-down test:  negative  Left Hip Exam     Inspection   Swelling: absent  No deformity of hip.  Erythema: absent  Bruising: absent    Range of Motion   Abduction: 45   Adduction: 30   Extension: 10   Flexion: 120   External rotation: 60   Internal rotation: 30     Tests   Pain w/ forced internal rotation (RAYSA): absent  Pain w/ forced external rotation (FADIR): absent  Stinchfield test: negative  Log Roll: negative  Step-down test: negative      Back (L-Spine & T-Spine) / Neck (C-Spine) Exam     Tenderness Posterior midline palpation reveals tenderness of the Sacrum.     Other She has scoliosis .      Muscle Strength   Right Lower Extremity   Hip Abduction: 5/5   Hip Adduction: 5/5   Hip Flexion: 5/5   Ankle Dorsiflexion:  5/5   Left Lower Extremity   Hip Abduction: 5/5   Hip Adduction: 5/5   Hip Flexion: 5/5   Ankle Dorsiflexion:  5/5     Radiographic Findings 09/09/2020:    There is again a rotatory levoscoliosis of the mid lumbar spine.  Multilevel severe disc degeneration throughout the lumbar spine more pronounced at the L2-3, L3-4 and L4-5 disc spaces.  This finding is worsened when compared to the previous study.  No acute fractures.  No osteoblastic or lytic lesions.     There is multilevel degenerative facet arthropathy throughout the lumbar spine.     On flexion and extension radiographs no instability at all intervertebral disc spaces.     There is moderate amount of retained stool throughout the colon.     Impression:     1. Rotatory levoscoliosis of the lumbar spine associated with multilevel degenerative disc disease, worsened when compared to the study of March 2014.  2. No acute fractures or osteoblastic or lytic lesions    Bilateral hips: Femoral heads are well located with respect to the acetabula.  Femoral heads maintain a normal rounded contour.  No acute fracture seen.  Mild joint space narrowing.  Stable calcifications in the pelvis.    Xrays of the lumbar spine/bilateral hips/pelvis were ordered and  reviewed by me today. These findings were discussed and reviewed with the patient.          Assessment:       Encounter Diagnoses   Name Primary?    Bilateral hip pain Yes    Low back pain           Plan:       We have discussed a variety of treatment options including medications, injections, physical therapy and other alternative treatments.  Hip examination was benign.  Given the patient's history and physical exam, I believe most of her pain is caused by her lower back.      1. OTC Tylenol as needed  2. Ambulatory referral to Healthy Back Program to initiate back/core strengthening and conditioning.  3. Ice compress to the affected area 2-3x a day for 15-20 minutes as needed for pain management.  4. Suggest follow-up with Dr. Tapia. Consider referral to Ortho Spine if refractory to conservative management.    5. RTC to see Demetrio Gupta PA-C as needed for follow-up.      I made the decision to obtain old records of the patient including previous notes and imaging. I independently reviewed and interpreted lab results today as well as prior imaging.     All of the patient's questions were answered and the patient will contact us if they have any questions or concerns in the interim.                      Patient questionnaires may have been collected.

## 2020-09-09 NOTE — LETTER
September 11, 2020      Dain Smith MD  [address]  [facility] [address]           [facility]  [address]  [facility] [address]  Phone: [phone]          Patient: Jill Marquez   MR Number: [id number]   YOB: 1955   Date of Visit: 9/9/2020       Dear Dr. Dain Smith:    Thank you for referring Jill Marquez to me for evaluation. Attached you will find relevant portions of my assessment and plan of care.    If you have questions, please do not hesitate to call me. I look forward to following Jill Marquez along with you.    Sincerely,    Ambrose Gupta PA-C    Enclosure  CC:  No Recipients    If you would like to receive this communication electronically, please contact [email or web address] or [phone] to request more information on [facility] access.    For providers and/or their staff who would like to refer a patient to [facility], please contact us through our one-stop-shop provider referral line, [facility] , at [phone].    If you feel you have received this communication in error or would no longer like to receive these types of communications, please e-mail [email or web address]

## 2020-09-17 ENCOUNTER — CLINICAL SUPPORT (OUTPATIENT)
Dept: REHABILITATION | Facility: HOSPITAL | Age: 65
End: 2020-09-17
Attending: PHYSICIAN ASSISTANT
Payer: MEDICARE

## 2020-09-17 ENCOUNTER — PATIENT MESSAGE (OUTPATIENT)
Dept: PHYSICAL MEDICINE AND REHAB | Facility: CLINIC | Age: 65
End: 2020-09-17

## 2020-09-17 ENCOUNTER — TELEPHONE (OUTPATIENT)
Dept: PHYSICAL MEDICINE AND REHAB | Facility: CLINIC | Age: 65
End: 2020-09-17

## 2020-09-17 DIAGNOSIS — R26.2 DIFFICULTY WALKING: ICD-10-CM

## 2020-09-17 DIAGNOSIS — M54.40 CHRONIC LOW BACK PAIN WITH SCIATICA, SCIATICA LATERALITY UNSPECIFIED, UNSPECIFIED BACK PAIN LATERALITY: ICD-10-CM

## 2020-09-17 DIAGNOSIS — G89.29 CHRONIC LOW BACK PAIN WITH SCIATICA, SCIATICA LATERALITY UNSPECIFIED, UNSPECIFIED BACK PAIN LATERALITY: ICD-10-CM

## 2020-09-17 DIAGNOSIS — G89.29 CHRONIC LEFT-SIDED LOW BACK PAIN WITH LEFT-SIDED SCIATICA: ICD-10-CM

## 2020-09-17 DIAGNOSIS — M25.552 BILATERAL HIP PAIN: ICD-10-CM

## 2020-09-17 DIAGNOSIS — M25.551 BILATERAL HIP PAIN: ICD-10-CM

## 2020-09-17 DIAGNOSIS — M54.42 CHRONIC LEFT-SIDED LOW BACK PAIN WITH LEFT-SIDED SCIATICA: ICD-10-CM

## 2020-09-17 DIAGNOSIS — R53.1 GENERALIZED WEAKNESS: ICD-10-CM

## 2020-09-17 PROCEDURE — 97140 MANUAL THERAPY 1/> REGIONS: CPT | Mod: PN

## 2020-09-17 PROCEDURE — 97110 THERAPEUTIC EXERCISES: CPT | Mod: PN

## 2020-09-17 PROCEDURE — 97161 PT EVAL LOW COMPLEX 20 MIN: CPT | Mod: PN

## 2020-09-17 RX ORDER — GABAPENTIN 300 MG/1
300 CAPSULE ORAL 3 TIMES DAILY
Start: 2020-09-17 | End: 2020-10-09 | Stop reason: SDUPTHER

## 2020-09-17 RX ORDER — METHYLPREDNISOLONE 4 MG/1
TABLET ORAL
Qty: 1 PACKAGE | Refills: 0 | Status: SHIPPED | OUTPATIENT
Start: 2020-09-17 | End: 2020-11-24

## 2020-09-17 NOTE — TELEPHONE ENCOUNTER
I called the patient.  She started to have more back pain with sciatica, mostly on the left side.  She denies any preceding trauma but admits to staying at the hospital for about a month due to her  being treated for cancer.  She was staying in uncomfortable positions for too long.  I told her I can send a prescription for a Medrol Dosepak.  She is on gabapentin 300 mg p.o. twice per day.  I asked her to increase it to 3 times per day.  If no relief, I can try to see her within couple weeks time.

## 2020-09-17 NOTE — TELEPHONE ENCOUNTER
----- Message from Randy Dangelo sent at 9/17/2020 10:47 AM CDT -----  Contact: Pt @ [phone]  Patient calling to schedule a f/u appt with new concerns ( results of the current X-ray ) and she needs to be seen before February, pls call

## 2020-09-17 NOTE — TELEPHONE ENCOUNTER
Patient to RTC for PM&R 02/2020  X-ray referral from Dain Smith MD .  Patient to schedule her 6 mo f/u  Appointment generated for patient, 6 mo f/u

## 2020-09-18 NOTE — PLAN OF CARE
[facility]  Physical Therapy Initial Evaluation    Date: 9/17/2020   Name: Jill Marquez  Clinic Number: [id number]    Therapy Diagnosis:   Encounter Diagnoses   Name Primary?    Bilateral hip pain     Chronic low back pain with sciatica, sciatica laterality unspecified, unspecified back pain laterality     Chronic left-sided low back pain with left-sided sciatica     Generalized weakness     Difficulty walking      Physician: Ambrose Gupta, *    Physician Orders: PT Eval and Treat   Medical Diagnosis from Referral:   M25.551,M25.552 (ICD-10-CM) - Bilateral hip pain   M54.40,G89.29 (ICD-10-CM) - Chronic low back pain with sciatica, sciatica laterality unspecified, unspecified back pain laterality   Evaluation Date: 9/17/2020  Authorization Period Expiration: 9/9/21  Plan of Care Expiration: 12/17/2020  Visit # / Visits authorized: 1/ 1    Time In: 4:00 pm   Time Out: 4:45 pm   Total Appointment Time (timed & untimed codes): 45 minutes    Precautions: Standard and Fall    Subjective   Date of onset: pt reports that she had a flare up about 2 weeks ago   History of current condition - Jill reports: to PT with complaints of L LE pain and numbness and tingling as well as low back pain that began about 2 weeks ago. Pt states that her  had a stem cell transplant and was in the hospital for 20 days. She reports that during this time she stayed with him in the hospital and had to do a lot of sitting and sleeping in awkward positions. She states that she has had sciatica off and on for many years, however, usually is able to deal with and combat it by remaining active. She states however that with the pandemic and her  being sick she has not been as active as she should have been.      Medical History:   Past Medical History:   Diagnosis Date    DJD (degenerative joint disease) of lumbar spine 3/10/2014    HTN (hypertension) 7/23/2012    Hyperlipidemia      "Hypothyroid 7/23/2012       Surgical History:   Jill Marquez  has a past surgical history that includes Tonsillectomy; eyelid surgery; Eye surgery; Cosmetic surgery; Tonsillectomy; Colonoscopy (N/A, 1/25/2016); Oophorectomy (Bilateral, 2015); and Hysterectomy (2004).    Medications:   Jill has a current medication list which includes the following prescription(s): amlodipine, azelastine, cholecalciferol (vitamin d3), citalopram, conjugated estrogens, conjugated estrogens, enbrel, fish oil-omega-3 fatty acids, fluad quad 2020-21(65y up)(pf), fluad quad 2020-21(65y up)(pf), fluticasone propionate, gabapentin, l. acidophilus/bifid. animalis, levothyroxine, methylprednisolone, psyllium husk, and tramadol.    Allergies:   Review of patient's allergies indicates:   Allergen Reactions    Sulfasalazine Nausea Only     Malaise, nausea,        Imaging, x:rays: 1. Rotatory levoscoliosis of the lumbar spine associated with multilevel degenerative disc disease, worsened when compared to the study of March 2014.  2. No acute fractures or osteoblastic or lytic lesions    Prior Therapy: no prior PT for this condition   Social History: pt lives with  who is currently undergoing cancer treatment   Occupation: pt is retired   Prior Level of Function: pt reports that prior to this recent onset she was very active and that she and her  walked 3 miles each day   Current Level of Function: pt reports pain with sitting, cannot stand for more than about 10 minutes, unable to go on daily walks with her .     Pain:  Current 6/10, worst 8/10, best 3/10   Location:   L LE and L sciatic nerve  Description: Burning, Sharp, Electric and Shooting  Aggravating Factors: Bending, Walking and Flexing  Easing Factors: massage, hot bath and rest    Patients goals: "I just want to be able to walk and be active again"     Objective     Observation: pt pleasant and appropriate throughout evaluation; A&O x 4       Posture:  Mild " "forward head and rounded shoulder; mild flat back     Lumbar Range of Motion:    % of normal motion Pain   Flexion 75   No         Extension 25   yes        Left Side Bending 25 Yes         Right Side Bending 25 yes        Left rotation   100 no        Right Rotation   100 No              Lower Extremity Strength  Right LE  Left LE    Knee extension: 4-/5 Knee extension: 4-/5   Knee flexion: 4-/5 Knee flexion: 4-/5   Hip flexion: 3+/5 Hip flexion: 3+/5   Hip extension:  3+/5 Hip extension: 3+/5   Hip abduction: 3+/5 Hip abduction: 3+/5   Hip adduction: 3+/5 Hip adduction 3+/5   Ankle dorsiflexion: 4/5 Ankle dorsiflexion: 4/5   Ankle plantarflexion: 4/5 Ankle plantarflexion: 4/5         Special Tests:  -Repeated Flexion: increased pain   -Repeated Ext: increased pain   -Piriformis Test: + for pain on L   -Bridge Test: + for pain on L       DTR:   Right Left Comment   Patellar (L3-4) 2+ 2+    Achilles (S1) 2+ 2+        Neuro Dynamic Testing:    Sciatic nerve:      SLR: R = negative     L = positive        Femoral Nerve:    Femoral nerve test: negative       Joint Mobility: hypomobile thoracic and lumbar spine    Palpation: tenderness to palpation of L piriformis and L sciatic nerve    Sensation: intact     Flexibility: impaired flexibility of L piriformis     SIJ Assessment:   Pt found to have L AI rotational fault when assessed in supine        BRENDA: 16/50 (32% disabiltiy)     TREATMENT   Treatment Time In: 4:20 pm   Treatment Time Out: 4:45 pm   Total Treatment time (time-based codes) separate from Evaluation: 25 minutes    Jill received therapeutic exercises to develop strength, endurance, ROM, flexibility, posture and core stabilization for 15 minutes including:    Piriformis stretch 4 x 30"  Single knee to stretch 10" holds x 5 reps   Sciatic nerve glides 5 pumps while up x 5 reps (performed twice)  Hip adduction squeeze 3 x 10  Hip abduction manually resisted 2 x 10       Jill received the following manual " therapy techniques:for 10 minutes, including:    MET for L AI rotational correction  shotgunning for L AI rotation  Manual stretching of L piriformis  Manual stretching of L glutes  STM to L piriformis and gluteal musculature       Home Exercises and Patient Education Provided    Education provided:   - - PT POC  - HEP  - PT prognosis and diagnosis  - all questions and concerns answered       Written Home Exercises Provided: yes.  Exercises were reviewed and Jill was able to demonstrate them prior to the end of the session.  Jill demonstrated good  understanding of the education provided.     See EMR under Patient Instructions for exercises provided 9/17/2020.    Assessment   Jill is a 64 y.o. female referred to outpatient Physical Therapy with a medical diagnosis of   M25.551,M25.552 (ICD-10-CM) - Bilateral hip pain   M54.40,G89.29 (ICD-10-CM) - Chronic low back pain with sciatica, sciatica laterality unspecified, unspecified back pain laterality      Patient presents with complaints of low back pain and radiating pain in LLE. Findings indicative of L sciatic irritation likely 2/2 L AI rotational fault. Pt with good response to rotational correction today as well as to manual therapy and education on sciatic nerve glides. Pt also presents with generalized weakness and impaired postural control documented above and will benefit from skilled PT intervention in order to address these deficits and facilitate return to PLOF and improve her overall QOL.     Patient prognosis is Good.   Patientt will benefit from skilled outpatient Physical Therapy to address the deficits stated above and in the chart below, provide patient /family education, and to maximize patientt's level of independence.     Plan of care discussed with patient: Yes  Patient's spiritual, cultural and educational needs considered and patient is agreeable to the plan of care and goals as stated below:     Anticipated Barriers for therapy: none  anticipated     Medical Necessity is demonstrated by the following  History  Co-morbidities and personal factors that may impact the plan of care Co-morbidities:   advanced age and hx of back pain, HTN    Personal Factors:   age     moderate   Examination  Body Structures and Functions, activity limitations and participation restrictions that may impact the plan of care Body Regions:   lower extremities  trunk  SIJ    Body Systems:    gross symmetry  ROM  strength  gross coordinated movement  balance  transitions  motor control    Participation Restrictions:   Unable to walk or stand more than 10 minutes, pain with sitting     Activity limitations:   Learning and applying knowledge  no deficits    General Tasks and Commands  no deficits    Communication  no deficits    Mobility  walking    Self care  washing oneself (bathing, drying, washing hands)  dressing    Domestic Life  shopping  cooking  doing house work (cleaning house, washing dishes, laundry)  assisting others    Interactions/Relationships  no deficits    Life Areas  no deficits    Community and Social Life  no deficits         moderate   Clinical Presentation stable and uncomplicated low   Decision Making/ Complexity Score: low     Goals:  Short Term Goals: 4 weeks   1.Pt will be independent with HEP performance in order to continue PT progress at home.   2. Pt will present 3 consecutive sessions with no rotational fault.   3. Pt will report pain at worst of 5/10 or less  4. Pt will demonstrate ability to stand for up to 30 minutes without increased pain in low back   5. Pt will demonstrate centralization of sciatica on L       Long Term Goals: 8 weeks   1. Pt will demonstrate 5/5 strength in B LEs in order to improve her ability to perform gait and functional mobility  2. Pt will demonstrate ability to walk up to 3 miles as part of exercise routine pain free in low back   3. Pt will report no sciatica pain down her LLE  4. Pt will improve BRENDA disability  score to 20% disability or less in order to demonstrate improved overall QOL.   5. Pt will demonstrate good knowledge of self management of sciatica should sciatica re-occur.     Plan   Plan of care Certification: 9/17/2020 to 12/17/2020.    Outpatient Physical Therapy 2 times weekly for 8 weeks to include the following interventions: Electrical Stimulation PRN, Gait Training, Manual Therapy, Moist Heat/ Ice, Neuromuscular Re-ed, Patient Education, Therapeutic Activites and Therapeutic Exercise. And any other therapies and modalities as clinically indicated and appropriate, including but not limited to FDN and telehealth. Pt may be seen by PTA as a part of pt's care team.       Kami Maguire, PT, DPT  9/17/2020

## 2020-09-23 ENCOUNTER — PATIENT MESSAGE (OUTPATIENT)
Dept: PHYSICAL MEDICINE AND REHAB | Facility: CLINIC | Age: 65
End: 2020-09-23

## 2020-09-23 ENCOUNTER — PATIENT MESSAGE (OUTPATIENT)
Dept: INFECTIOUS DISEASES | Facility: CLINIC | Age: 65
End: 2020-09-23

## 2020-09-30 ENCOUNTER — PATIENT MESSAGE (OUTPATIENT)
Dept: PHYSICAL MEDICINE AND REHAB | Facility: CLINIC | Age: 65
End: 2020-09-30

## 2020-10-01 ENCOUNTER — PATIENT MESSAGE (OUTPATIENT)
Dept: OTHER | Facility: OTHER | Age: 65
End: 2020-10-01

## 2020-10-01 ENCOUNTER — CLINICAL SUPPORT (OUTPATIENT)
Dept: REHABILITATION | Facility: HOSPITAL | Age: 65
End: 2020-10-01
Payer: MEDICARE

## 2020-10-01 DIAGNOSIS — R26.2 DIFFICULTY WALKING: ICD-10-CM

## 2020-10-01 DIAGNOSIS — M54.42 CHRONIC LEFT-SIDED LOW BACK PAIN WITH LEFT-SIDED SCIATICA: ICD-10-CM

## 2020-10-01 DIAGNOSIS — G89.29 CHRONIC LEFT-SIDED LOW BACK PAIN WITH LEFT-SIDED SCIATICA: ICD-10-CM

## 2020-10-01 DIAGNOSIS — R53.1 GENERALIZED WEAKNESS: ICD-10-CM

## 2020-10-01 PROCEDURE — 97140 MANUAL THERAPY 1/> REGIONS: CPT | Mod: PN

## 2020-10-01 PROCEDURE — 97110 THERAPEUTIC EXERCISES: CPT | Mod: PN

## 2020-10-01 PROCEDURE — 97014 ELECTRIC STIMULATION THERAPY: CPT | Mod: PN

## 2020-10-01 NOTE — PROGRESS NOTES
"  Physical Therapy Treatment Note     Name: Jill Marquez  Clinic Number: [id number]    Therapy Diagnosis:   Encounter Diagnoses   Name Primary?    Chronic left-sided low back pain with left-sided sciatica     Generalized weakness     Difficulty walking      Physician: Ambrose Gupta, *    Visit Date: 10/5/2020    Physician Orders: PT Eval and Treat   Medical Diagnosis from Referral:   M25.551,M25.552 (ICD-10-CM) - Bilateral hip pain   M54.40,G89.29 (ICD-10-CM) - Chronic low back pain with sciatica, sciatica laterality unspecified, unspecified back pain laterality   Evaluation Date: 9/17/2020  Authorization Period Expiration: 9/9/21  Plan of Care Expiration: 12/17/2020  Visit # / Visits authorized:  2/ 20 (+eval)       Time In: 1:30 pm    Time Out: 2:25 pm   Total Billable Time: 55 minutes    Precautions: Standard    Subjective     Pt reports: that she is feeling ok today. She states that she felt pretty good after her last session, but that she continues to have pain in her LLE. She states that she has an appointment with Dr. Tapia on Friday afternoon.   She was compliant with home exercise program.  Response to previous treatment: reports that she felt ok  Functional change: none reported at this time     Pain: 6/10  Location:  L LE and low back      Objective     Jill received therapeutic exercises (exercises performed today are bolded) to develop strength, endurance, ROM, flexibility, posture and core stabilization for 30 minutes including:     Piriformis stretch 4 x 30"  Single knee to stretch 10" holds x 5 reps   Sciatic nerve glides 5 pumps while up x 5 reps   Hip adduction squeeze 3 x 10  ball and belt 3" hold x 2 minutes  hooklying clamshells RTB 2 x 15  PPT 3" hold 2 x 10   +supine ITB stretch with strap 3 x 30" each   +Bridge with PPT 2 x 10   +SLR x 10 reps each LE        Jill received the following manual therapy techniques (exercises performed today are bolded)for 15 minutes, " including:     MET for L AI rotational correction  shotgunning for L AI rotation   Manual stretching of L piriformis  Manual stretching of L glutes  STM to L piriformis and gluteal musculature    gentle lumbar traction over swiss ball      Pt received unattended IFC to L piriformis and sciatic nerve with moist heat for 10 minutes at conclusion of session.       Home Exercises Provided and Patient Education Provided     Education provided:   - pt educated to continue with previously issued HEP    Written Home Exercises Provided: Patient instructed to cont prior HEP.  Exercises were reviewed and Jill was able to demonstrate them prior to the end of the session.  Jill demonstrated good  understanding of the education provided.     See EMR under Patient Instructions for exercises provided initial evaluation.    Assessment     Pt tolerated session well with minimal complaints. She tolerated progression of exercise as well as progressed resistance of exercise today. Pt with continued good response of manual therapy as well as IFC performed today. Pt educated on continuing with HEP at home as well as home TENS unit. PT will continue to benefit from skilled PT intervention in order to further progress core stability and postural control needed for return to PLOF.   Jill is progressing well towards her goals.   Pt prognosis is Good.     Pt will continue to benefit from skilled outpatient physical therapy to address the deficits listed in the problem list box on initial evaluation, provide pt/family education and to maximize pt's level of independence in the home and community environment.     Pt's spiritual, cultural and educational needs considered and pt agreeable to plan of care and goals.     Anticipated Barriers for therapy: none anticipated    Goals:  Short Term Goals: 4 weeks   1.Pt will be independent with HEP performance in order to continue PT progress at home. -progressing, not met   2. Pt will present 3  consecutive sessions with no rotational fault. -progressing, not met   3. Pt will report pain at worst of 5/10 or less -progressing, not met   4. Pt will demonstrate ability to stand for up to 30 minutes without increased pain in low back -progressing, not met   5. Pt will demonstrate centralization of sciatica on L -progressing, not met         Long Term Goals: 8 weeks   1. Pt will demonstrate 5/5 strength in B LEs in order to improve her ability to perform gait and functional mobility -progressing, not met   2. Pt will demonstrate ability to walk up to 3 miles as part of exercise routine pain free in low back -progressing, not met   3. Pt will report no sciatica pain down her LLE -progressing, not met   4. Pt will improve BRENDA disability score to 20% disability or less in order to demonstrate improved overall QOL.   5. Pt will demonstrate good knowledge of self management of sciatica should sciatica re-occur.     Plan     Continue PT per POC, assess tolerance to today's session,  progress exercise as tolerated and appropriate      Plan of care Certification: 9/17/2020 to 12/17/2020.     Outpatient Physical Therapy 2 times weekly for 8 weeks to include the following interventions: Electrical Stimulation PRN, Gait Training, Manual Therapy, Moist Heat/ Ice, Neuromuscular Re-ed, Patient Education, Therapeutic Activites and Therapeutic Exercise. And any other therapies and modalities as clinically indicated and appropriate, including but not limited to FDN and telehealth. Pt may be seen by PTA as a part of pt's care team.     Kami Maguire, PT , DPT  10/5/2020

## 2020-10-02 ENCOUNTER — PATIENT MESSAGE (OUTPATIENT)
Dept: PHYSICAL MEDICINE AND REHAB | Facility: CLINIC | Age: 65
End: 2020-10-02

## 2020-10-05 ENCOUNTER — CLINICAL SUPPORT (OUTPATIENT)
Dept: REHABILITATION | Facility: HOSPITAL | Age: 65
End: 2020-10-05
Payer: MEDICARE

## 2020-10-05 DIAGNOSIS — G89.29 CHRONIC LEFT-SIDED LOW BACK PAIN WITH LEFT-SIDED SCIATICA: ICD-10-CM

## 2020-10-05 DIAGNOSIS — M54.42 CHRONIC LEFT-SIDED LOW BACK PAIN WITH LEFT-SIDED SCIATICA: ICD-10-CM

## 2020-10-05 DIAGNOSIS — R26.2 DIFFICULTY WALKING: ICD-10-CM

## 2020-10-05 DIAGNOSIS — R53.1 GENERALIZED WEAKNESS: ICD-10-CM

## 2020-10-05 PROCEDURE — 97014 ELECTRIC STIMULATION THERAPY: CPT | Mod: PN

## 2020-10-05 PROCEDURE — 97140 MANUAL THERAPY 1/> REGIONS: CPT | Mod: PN

## 2020-10-05 PROCEDURE — 97110 THERAPEUTIC EXERCISES: CPT | Mod: PN

## 2020-10-07 ENCOUNTER — CLINICAL SUPPORT (OUTPATIENT)
Dept: REHABILITATION | Facility: HOSPITAL | Age: 65
End: 2020-10-07
Payer: MEDICARE

## 2020-10-07 DIAGNOSIS — R26.2 DIFFICULTY WALKING: ICD-10-CM

## 2020-10-07 DIAGNOSIS — R53.1 GENERALIZED WEAKNESS: ICD-10-CM

## 2020-10-07 PROCEDURE — 97110 THERAPEUTIC EXERCISES: CPT | Mod: PN,CQ

## 2020-10-07 PROCEDURE — 97140 MANUAL THERAPY 1/> REGIONS: CPT | Mod: PN,CQ

## 2020-10-07 PROCEDURE — 97014 ELECTRIC STIMULATION THERAPY: CPT | Mod: PN,CQ
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
## 2020-10-07 NOTE — PROGRESS NOTES
"  Physical Therapy Assistant Treatment Note     Name: Jill Marquez  Clinic Number: [id number]    Therapy Diagnosis:   Encounter Diagnoses   Name Primary?    Chronic bilateral low back pain with left-sided sciatica     Generalized weakness     Difficulty walking      Physician: Ambrose Gupta, *    Visit Date: 10/12/2020    Physician Orders: PT Eval and Treat   Medical Diagnosis from Referral:   M25.551,M25.552 (ICD-10-CM) - Bilateral hip pain   M54.40,G89.29 (ICD-10-CM) - Chronic low back pain with sciatica, sciatica laterality unspecified, unspecified back pain laterality   Evaluation Date: 9/17/2020  Authorization Period Expiration: 9/9/21  Plan of Care Expiration: 12/17/2020  Visit # / Visits authorized:  4/ 20 (+eval)     Time In: 12:45 pm   Time Out: 1:45 pm (including unattended e-stim)  Total session time: 60 minutes   Total Billable Time: 39 minutes    Precautions: Standard    Subjective     Pt reports: that she is feeling ok today. She states that she saw Dr. Tapia on Friday who encouraged her to continue with therapy and also increased her dose of Gabapentin. She states that since her medication increase she has been sleeping better. Pt reports that she also got a home TENS unit and has been using it daily.   She was compliant with home exercise program.  Response to previous treatment: reports that she felt sore  Functional change: none reported at this time     Pain: 4/10  Location:  L LE and low back      Objective     Jill received therapeutic exercises (exercises performed today are bolded) to develop strength, endurance, ROM, flexibility, posture and core stabilization for 40 minutes including:     Piriformis stretch 4 x 30"  Single knee to stretch 10" holds x 5 reps   Sciatic nerve glides 5 pumps while up x 5 reps   Hip adduction squeeze 3 x 10  ball and belt 3" hold x 2 minutes  +TA activation x 1' with 3"   Side-lying hip abduction 2 x 15  PPT 3" hold 2 x 10   supine ITB stretch with " "strap 3 x 30" each   Bridge with PPT 2 x 10   SLR 2 x 10 reps each LE added PPT  Supine IR/ER 10x 5" holds  Bent knee fall out x 1'      Jill received the following manual therapy techniques (exercises performed today are bolded) for 10 minutes, including:     MET for L AI rotational correction  shotgunning for L AI rotation  Manual stretching of L piriformis  Manual stretching of L glutes  STM to L piriformis and gluteal musculature    gentle lumbar traction over swiss ball      Pt received unattended IFC to L piriformis and sciatic nerve with moist heat for 10 minutes at conclusion of session.       Home Exercises Provided and Patient Education Provided     Education provided:   - pt educated to continue with previously issued HEP    Written Home Exercises Provided: Patient instructed to cont prior HEP.  Exercises were reviewed and Jill was able to demonstrate them prior to the end of the session.  Jill demonstrated good  understanding of the education provided.     See EMR under Patient Instructions for exercises provided initial evaluation.    Assessment     Pt tolerated session well with minimal complaints. She reports a mild decrease in pain today and reports that she has continued to be compliant with HEP performance at home. Pt with good tolerance of progressed exercise today and also has improved tolerance of more progressed stretching performed today. Pt will continue to benefit from skilled PT intervention in order to further progress core stability and postural control needed for return to PLOF and improve her overall QOL.       Jill is progressing well towards her goals.   Pt prognosis is Good.     Pt will continue to benefit from skilled outpatient physical therapy to address the deficits listed in the problem list box on initial evaluation, provide pt/family education and to maximize pt's level of independence in the home and community environment.     Pt's spiritual, cultural and educational " needs considered and pt agreeable to plan of care and goals.     Anticipated Barriers for therapy: none anticipated    Goals:   Short Term Goals: 4 weeks   1.Pt will be independent with HEP performance in order to continue PT progress at home. -progressing, not met   2. Pt will present 3 consecutive sessions with no rotational fault. -progressing, not met   3. Pt will report pain at worst of 5/10 or less -progressing, not met   4. Pt will demonstrate ability to stand for up to 30 minutes without increased pain in low back -progressing, not met   5. Pt will demonstrate centralization of sciatica on L -progressing, not met         Long Term Goals: 8 weeks   1. Pt will demonstrate 5/5 strength in B LEs in order to improve her ability to perform gait and functional mobility -progressing, not met   2. Pt will demonstrate ability to walk up to 3 miles as part of exercise routine pain free in low back -progressing, not met   3. Pt will report no sciatica pain down her LLE -progressing, not met   4. Pt will improve BRENDA disability score to 20% disability or less in order to demonstrate improved overall QOL.   5. Pt will demonstrate good knowledge of self management of sciatica should sciatica re-occur.     Plan     Continue PT per POC, assess tolerance to today's session,  progress exercise as tolerated and appropriate      Plan of care Certification: 9/17/2020 to 12/17/2020.     Outpatient Physical Therapy 2 times weekly for 8 weeks to include the following interventions: Electrical Stimulation PRN, Gait Training, Manual Therapy, Moist Heat/ Ice, Neuromuscular Re-ed, Patient Education, Therapeutic Activites and Therapeutic Exercise. And any other therapies and modalities as clinically indicated and appropriate, including but not limited to FDN and telehealth. Pt may be seen by PTA as a part of pt's care team.     Kami Maguire, PT, DPT   10/12/2020

## 2020-10-09 ENCOUNTER — OFFICE VISIT (OUTPATIENT)
Dept: PHYSICAL MEDICINE AND REHAB | Facility: CLINIC | Age: 65
End: 2020-10-09
Payer: MEDICARE

## 2020-10-09 VITALS
HEIGHT: 60 IN | HEART RATE: 82 BPM | DIASTOLIC BLOOD PRESSURE: 87 MMHG | WEIGHT: 125.69 LBS | BODY MASS INDEX: 24.68 KG/M2 | SYSTOLIC BLOOD PRESSURE: 137 MMHG

## 2020-10-09 DIAGNOSIS — G89.29 CHRONIC MIDLINE LOW BACK PAIN WITH BILATERAL SCIATICA: Primary | ICD-10-CM

## 2020-10-09 DIAGNOSIS — M47.26 OSTEOARTHRITIS OF SPINE WITH RADICULOPATHY, LUMBAR REGION: ICD-10-CM

## 2020-10-09 DIAGNOSIS — M19.042 PRIMARY OSTEOARTHRITIS OF BOTH HANDS: ICD-10-CM

## 2020-10-09 DIAGNOSIS — M19.041 PRIMARY OSTEOARTHRITIS OF BOTH HANDS: ICD-10-CM

## 2020-10-09 DIAGNOSIS — M54.42 CHRONIC MIDLINE LOW BACK PAIN WITH BILATERAL SCIATICA: Primary | ICD-10-CM

## 2020-10-09 DIAGNOSIS — G56.01 CARPAL TUNNEL SYNDROME, RIGHT: ICD-10-CM

## 2020-10-09 DIAGNOSIS — M47.812 SPONDYLOSIS OF CERVICAL REGION WITHOUT MYELOPATHY OR RADICULOPATHY: ICD-10-CM

## 2020-10-09 DIAGNOSIS — G89.29 CHRONIC NECK PAIN: ICD-10-CM

## 2020-10-09 DIAGNOSIS — M54.2 CHRONIC NECK PAIN: ICD-10-CM

## 2020-10-09 DIAGNOSIS — M54.41 CHRONIC MIDLINE LOW BACK PAIN WITH BILATERAL SCIATICA: Primary | ICD-10-CM

## 2020-10-09 PROCEDURE — 99214 OFFICE O/P EST MOD 30 MIN: CPT | Mod: S$PBB,,, | Performed by: PHYSICAL MEDICINE & REHABILITATION

## 2020-10-09 PROCEDURE — 99999 PR PBB SHADOW E&M-EST. PATIENT-LVL III: CPT | Mod: PBBFAC,,, | Performed by: PHYSICAL MEDICINE & REHABILITATION

## 2020-10-09 PROCEDURE — 99213 OFFICE O/P EST LOW 20 MIN: CPT | Mod: PBBFAC | Performed by: PHYSICAL MEDICINE & REHABILITATION

## 2020-10-09 PROCEDURE — 99999 PR PBB SHADOW E&M-EST. PATIENT-LVL III: ICD-10-PCS | Mod: PBBFAC,,, | Performed by: PHYSICAL MEDICINE & REHABILITATION

## 2020-10-09 PROCEDURE — 99214 PR OFFICE/OUTPT VISIT, EST, LEVL IV, 30-39 MIN: ICD-10-PCS | Mod: S$PBB,,, | Performed by: PHYSICAL MEDICINE & REHABILITATION

## 2020-10-09 RX ORDER — TRAMADOL HYDROCHLORIDE 50 MG/1
50 TABLET ORAL DAILY PRN
Qty: 30 TABLET | Refills: 2 | Status: SHIPPED | OUTPATIENT
Start: 2020-10-09 | End: 2020-11-24

## 2020-10-09 RX ORDER — ACETAMINOPHEN 500 MG
500-1000 TABLET ORAL 3 TIMES DAILY PRN
Refills: 0 | COMMUNITY
Start: 2020-10-09 | End: 2020-11-24

## 2020-10-09 RX ORDER — GABAPENTIN 300 MG/1
300 CAPSULE ORAL SEE ADMIN INSTRUCTIONS
Start: 2020-10-09 | End: 2021-01-10 | Stop reason: SDUPTHER

## 2020-10-09 NOTE — PROGRESS NOTES
Subjective:       Patient ID: Jill Marquez is a 64 y.o. female.    Chief Complaint: No chief complaint on file.    HPI    HISTORY OF PRESENT ILLNESS:  Mrs. Marquez is a 64-year-old white female with diffuse osteoarthritis,, spondyloarthropathy and psoriatic arthritis. who is followed up in the [facility] for chronic low back pain with history of lumbar radiculopathy, chronic neck pain, OA of the hands and right carpal tunnel syndrome.  Her last visit to the clinic was on 8/5/2019.  She was maintained on  gabapentin and p.r.n. tramadol.    The patient has been followed up at the [facility].  He was seen on 09/11/2020 for back and hip pain.  X-rays of the lumbar spine and bilateral hips were ordered.  Her lumbar spine x-rays showed significant arthritis.  Her hip x-rays showed only mild arthritis.  She was referred to physical therapy and had 4 sessions so far.    The patient called the clinic on 09/17/2020 with exacerbation of low back pain and left-sided sciatica.  A prescription for Medrol Dosepak was sent.  Her gabapentin 300 mg capsules were increased from twice to 3 times per day.    The patient is coming today to the clinic for followup.  Her low back pain has been worse although it improved to some extent after the Medrol Dosepak.  It is an intermittent aching pain in the lumbar spine and across her back.  She has occasional pain in her left buttock radiating to the left knee with burning sensations.  She is not clear if this is associated with her back pain..  Her maximum pain is 7-8/10 and minimum 5-6/10.  Today, it is 5-6/10.  The patient denies any lower extremity weakness.  She denies any bowel or bladder incontinence.  She denies any saddle anesthesia.  She denies any leg claudications.    Her neck pain has been under good control.  It is an intermittent localized aching pain in the cervical spine. Her maximum pain is 1-2/10 and minimum 0/10.  Today, it is 0/10.    She  continues to complain of bilateral hand pain due to osteoarthritis.  However, this improved after her Rheumatology started her on Enbrel    She is currently taking gabapentin 300 mg p.o. 3 times per day.  She was previously on tramadol but has been out of it for couple months.       Past Medical History:   Diagnosis Date    DJD (degenerative joint disease) of lumbar spine 3/10/2014    HTN (hypertension) 7/23/2012    Hyperlipidemia     Hypothyroid 7/23/2012         Review of Systems   Constitutional: Negative for chills, fatigue and fever.   Eyes: Negative for visual disturbance.   Respiratory: Negative for shortness of breath.    Cardiovascular: Negative for chest pain.   Gastrointestinal: Negative for blood in stool, constipation, nausea and vomiting.   Genitourinary: Negative for difficulty urinating.   Musculoskeletal: Positive for arthralgias (hands), back pain and neck pain. Negative for gait problem.   Neurological: Negative for dizziness and headaches.   Psychiatric/Behavioral: Positive for sleep disturbance. Negative for behavioral problems.       Objective:      Physical Exam   Constitutional: She appears well-developed and well-nourished. No distress.   HENT:   Head: Normocephalic and atraumatic.   Neck:   Decreased ROM.  -ve tenderness.     Pulmonary/Chest: Breath sounds normal.   Musculoskeletal:      Comments: BUE:  +ve severe Heberden's & Lizzy's nodes.  +ve MCP swelling.  Strength:    RUE: 5/5 at shoulder abduction, 5 elbow flexion, 5 elbow extension, 5 hand .   LUE: 5/5 at shoulder abduction, 5 elbow flexion, 5 elbow extension, 5 hand .  Sensation to pinprick:   RUE: intact.   LUE: intact.  DTR:    RUE: +1 biceps, +1 triceps.   LUE:  +1 biceps, +1 triceps.  Choe:   RUE: -ve.   LUE: -ve.      BLE:  ROM:full.  Mild bilateral knee crepitus.   Strength:    RLE: 5/5 at hip flexion, 5 knee extension, 5 ankle DF, 5 PF.   LLE: 4/5 at hip flexion, 5 knee extension, 5 ankle DF, 5  PF.  Sensation to pinprick:     RLE: intact.      LLE: intact.   DTR:     RLE: +2 knee, +1 ankle.    LLE: +2 knee, +1 ankle.  Clonus:    Rt ankle: -ve.    Lt ankle: -ve.  SLR:      RLE: -ve at 70 deg.      LLE: -ve at 70 deg.   RAYSA:      RLE: -ve.      LLE: +ve  Pain with resisted abduction:     RLE: -ve.      LLE: -ve    Mild tenderness lumbar spine, left ischial area, left trochanteric area.    Gait: WNL   Neurological: She is alert.   Skin: Skin is warm. No rash noted.   Psychiatric: She has a normal mood and affect.   Vitals reviewed.            Assessment:       1. Chronic midline low back pain with bilateral sciatica    2. Osteoarthritis of spine with radiculopathy, lumbar region    3. Chronic neck pain    4. Spondylosis of cervical region without myelopathy or radiculopathy    5. Primary osteoarthritis of both hands    6. Carpal tunnel syndrome, right        Plan:     - Increase gabapentin (NEURONTIN) 300 MG capsule; Take 1 capsule (300 mg total) by mouth As instructed. Take one capsule every morning and after noon, and two at bedtime (4 capsules total daily)  - Start acetaminophen (TYLENOL) 500 MG tablet; Take 1-2 tablets (500-1,000 mg total) by mouth 3 (three) times daily as needed for mild pain.  - Restart traMADoL (ULTRAM) 50 mg tablet; Take 1 tablet (50 mg total) by mouth daily as needed (moderate to severe pain).  - Continue Physical therapy, followed by a regular home exercise program.   - Follow up in about 4 months (around 2/9/2021).     This was a 25 minute visit, more than 50% of which was spent counseling the patient about the diagnosis and the treatment plan.    This note was partly generated with [employer] voice recognition software. I apologize for any possible typographical errors.

## 2020-10-12 ENCOUNTER — CLINICAL SUPPORT (OUTPATIENT)
Dept: REHABILITATION | Facility: HOSPITAL | Age: 65
End: 2020-10-12
Payer: MEDICARE

## 2020-10-12 DIAGNOSIS — G89.29 CHRONIC BILATERAL LOW BACK PAIN WITH LEFT-SIDED SCIATICA: ICD-10-CM

## 2020-10-12 DIAGNOSIS — R26.2 DIFFICULTY WALKING: ICD-10-CM

## 2020-10-12 DIAGNOSIS — R53.1 GENERALIZED WEAKNESS: ICD-10-CM

## 2020-10-12 DIAGNOSIS — M54.42 CHRONIC BILATERAL LOW BACK PAIN WITH LEFT-SIDED SCIATICA: ICD-10-CM

## 2020-10-12 PROCEDURE — 97110 THERAPEUTIC EXERCISES: CPT | Mod: PN

## 2020-10-12 PROCEDURE — 97014 ELECTRIC STIMULATION THERAPY: CPT | Mod: PN

## 2020-10-15 NOTE — PROGRESS NOTES
"  Physical Therapy Assistant Treatment Note     Name: Jill Marquez  Clinic Number: [id number]    Therapy Diagnosis:   Encounter Diagnoses   Name Primary?    Chronic bilateral low back pain with left-sided sciatica     Generalized weakness     Difficulty walking      Physician: Ambrose Gupta, *    Visit Date: 10/19/2020    Physician Orders: PT Eval and Treat   Medical Diagnosis from Referral:   M25.551,M25.552 (ICD-10-CM) - Bilateral hip pain   M54.40,G89.29 (ICD-10-CM) - Chronic low back pain with sciatica, sciatica laterality unspecified, unspecified back pain laterality   Evaluation Date: 9/17/2020  Authorization Period Expiration: 9/9/21  Plan of Care Expiration: 12/17/2020  Visit # / Visits authorized:  5/ 20 (+eval)     Time In: 10:00 am   Time Out: 10:55 am   Total Billable Time: 45 minutes    Precautions: Standard    Subjective     Pt reports: that she is feeling a little better. She states that she has been walking 3 miles everyday with her .  She was compliant with home exercise program.  Response to previous treatment: reports that she felt sore  Functional change: none reported at this time     Pain: 4/10  Location:  L LE and low back      Objective     Jill received therapeutic exercises (exercises performed today are bolded) to develop strength, endurance, ROM, flexibility, posture and core stabilization for 40 minutes including:     Piriformis stretch 4 x 30"  Single knee to stretch 10" holds x 5 reps   Sciatic nerve glides 5 pumps while up x 5 reps   +hooklying hip abduction RTB x 1  +TA marches RTB 2 x 1'  Hip adduction squeeze 3 x 10  ball and belt 3" hold x 2 minutes  TA activation x 1' with 3"   +PPT to APT 2 x 10   PPT 3" hold 2 x 10   supine ITB stretch with strap 3 x 30" each   Bridge with PPT 2 x 10   SLR 2 x 10 reps each LE added PPT  Side-lying hip abduction 2 x 15         Jill received the following manual therapy techniques (exercises performed today are bolded) for 5 " minutes, including:     MET for L AI rotational correction  shotgunning for L AI rotation  Manual stretching of L piriformis  Manual stretching of L glutes  STM to L piriformis and gluteal musculature    gentle lumbar traction over swiss ball      Pt received unattended IFC to L piriformis and sciatic nerve with moist heat for 10 minutes at conclusion of session.       Home Exercises Provided and Patient Education Provided     Education provided:   - pt educated to continue with previously issued HEP    Written Home Exercises Provided: Patient instructed to cont prior HEP.  Exercises were reviewed and Jill was able to demonstrate them prior to the end of the session.  Jill demonstrated good  understanding of the education provided.     See EMR under Patient Instructions for exercises provided initial evaluation.    Assessment     Pt tolerated session well with no complaints. She demonstrates decreased reports of pain today as well as improved tolerance of exercise and functional activities. Pt will continue to benefit from skilled PT intervention in order to further progress core stability and postural control needed for improved QOL and return to PLOF.       Jill is progressing well towards her goals.   Pt prognosis is Good.     Pt will continue to benefit from skilled outpatient physical therapy to address the deficits listed in the problem list box on initial evaluation, provide pt/family education and to maximize pt's level of independence in the home and community environment.     Pt's spiritual, cultural and educational needs considered and pt agreeable to plan of care and goals.     Anticipated Barriers for therapy: none anticipated    Goals:   Short Term Goals: 4 weeks   1.Pt will be independent with HEP performance in order to continue PT progress at home. -progressing, not met   2. Pt will present 3 consecutive sessions with no rotational fault. -progressing, not met   3. Pt will report pain at worst  of 5/10 or less -progressing, not met   4. Pt will demonstrate ability to stand for up to 30 minutes without increased pain in low back -progressing, not met   5. Pt will demonstrate centralization of sciatica on L -progressing, not met         Long Term Goals: 8 weeks   1. Pt will demonstrate 5/5 strength in B LEs in order to improve her ability to perform gait and functional mobility -progressing, not met   2. Pt will demonstrate ability to walk up to 3 miles as part of exercise routine pain free in low back -progressing, not met   3. Pt will report no sciatica pain down her LLE -progressing, not met   4. Pt will improve BRENDA disability score to 20% disability or less in order to demonstrate improved overall QOL.   5. Pt will demonstrate good knowledge of self management of sciatica should sciatica re-occur.     Plan     Continue PT per POC, assess tolerance to today's session,  progress exercise as tolerated and appropriate      Plan of care Certification: 9/17/2020 to 12/17/2020.     Outpatient Physical Therapy 2 times weekly for 8 weeks to include the following interventions: Electrical Stimulation PRN, Gait Training, Manual Therapy, Moist Heat/ Ice, Neuromuscular Re-ed, Patient Education, Therapeutic Activites and Therapeutic Exercise. And any other therapies and modalities as clinically indicated and appropriate, including but not limited to FDN and telehealth. Pt may be seen by PTA as a part of pt's care team.     Kami Maguire, PT, DPT   10/19/2020

## 2020-10-19 ENCOUNTER — CLINICAL SUPPORT (OUTPATIENT)
Dept: REHABILITATION | Facility: HOSPITAL | Age: 65
End: 2020-10-19
Payer: MEDICARE

## 2020-10-19 DIAGNOSIS — R53.1 GENERALIZED WEAKNESS: ICD-10-CM

## 2020-10-19 DIAGNOSIS — G89.29 CHRONIC BILATERAL LOW BACK PAIN WITH LEFT-SIDED SCIATICA: ICD-10-CM

## 2020-10-19 DIAGNOSIS — R26.2 DIFFICULTY WALKING: ICD-10-CM

## 2020-10-19 DIAGNOSIS — M54.42 CHRONIC BILATERAL LOW BACK PAIN WITH LEFT-SIDED SCIATICA: ICD-10-CM

## 2020-10-19 PROCEDURE — 97110 THERAPEUTIC EXERCISES: CPT | Mod: PN

## 2020-10-22 ENCOUNTER — CLINICAL SUPPORT (OUTPATIENT)
Dept: REHABILITATION | Facility: HOSPITAL | Age: 65
End: 2020-10-22
Payer: MEDICARE

## 2020-10-22 ENCOUNTER — LAB VISIT (OUTPATIENT)
Dept: LAB | Facility: HOSPITAL | Age: 65
End: 2020-10-22
Attending: INTERNAL MEDICINE
Payer: MEDICARE

## 2020-10-22 DIAGNOSIS — R26.2 DIFFICULTY WALKING: ICD-10-CM

## 2020-10-22 DIAGNOSIS — B18.1 HEPATITIS B CARRIER: ICD-10-CM

## 2020-10-22 DIAGNOSIS — R53.1 GENERALIZED WEAKNESS: ICD-10-CM

## 2020-10-22 DIAGNOSIS — G89.29 CHRONIC BILATERAL LOW BACK PAIN WITH LEFT-SIDED SCIATICA: ICD-10-CM

## 2020-10-22 DIAGNOSIS — M54.42 CHRONIC BILATERAL LOW BACK PAIN WITH LEFT-SIDED SCIATICA: ICD-10-CM

## 2020-10-22 DIAGNOSIS — M47.819 SPONDYLOARTHRITIS: ICD-10-CM

## 2020-10-22 LAB
ALBUMIN SERPL BCP-MCNC: 4.4 G/DL (ref 3.5–5.2)
ALP SERPL-CCNC: 41 U/L (ref 55–135)
ALT SERPL W/O P-5'-P-CCNC: 27 U/L (ref 10–44)
ANION GAP SERPL CALC-SCNC: 9 MMOL/L (ref 8–16)
AST SERPL-CCNC: 31 U/L (ref 10–40)
BASOPHILS # BLD AUTO: 0.05 K/UL (ref 0–0.2)
BASOPHILS NFR BLD: 1.1 % (ref 0–1.9)
BILIRUB SERPL-MCNC: 0.8 MG/DL (ref 0.1–1)
BUN SERPL-MCNC: 26 MG/DL (ref 8–23)
CALCIUM SERPL-MCNC: 9.7 MG/DL (ref 8.7–10.5)
CHLORIDE SERPL-SCNC: 101 MMOL/L (ref 95–110)
CO2 SERPL-SCNC: 30 MMOL/L (ref 23–29)
CREAT SERPL-MCNC: 0.7 MG/DL (ref 0.5–1.4)
CRP SERPL-MCNC: 0.4 MG/L (ref 0–8.2)
DIFFERENTIAL METHOD: ABNORMAL
EOSINOPHIL # BLD AUTO: 0.1 K/UL (ref 0–0.5)
EOSINOPHIL NFR BLD: 3 % (ref 0–8)
ERYTHROCYTE [DISTWIDTH] IN BLOOD BY AUTOMATED COUNT: 13.8 % (ref 11.5–14.5)
ERYTHROCYTE [SEDIMENTATION RATE] IN BLOOD BY WESTERGREN METHOD: 2 MM/HR (ref 0–20)
EST. GFR  (AFRICAN AMERICAN): >60 ML/MIN/1.73 M^2
EST. GFR  (NON AFRICAN AMERICAN): >60 ML/MIN/1.73 M^2
GLUCOSE SERPL-MCNC: 86 MG/DL (ref 70–110)
HCT VFR BLD AUTO: 39.8 % (ref 37–48.5)
HGB BLD-MCNC: 12.8 G/DL (ref 12–16)
IMM GRANULOCYTES # BLD AUTO: 0 K/UL (ref 0–0.04)
IMM GRANULOCYTES NFR BLD AUTO: 0 % (ref 0–0.5)
LYMPHOCYTES # BLD AUTO: 2.2 K/UL (ref 1–4.8)
LYMPHOCYTES NFR BLD: 46.4 % (ref 18–48)
MCH RBC QN AUTO: 32.9 PG (ref 27–31)
MCHC RBC AUTO-ENTMCNC: 32.2 G/DL (ref 32–36)
MCV RBC AUTO: 102 FL (ref 82–98)
MONOCYTES # BLD AUTO: 0.4 K/UL (ref 0.3–1)
MONOCYTES NFR BLD: 8.8 % (ref 4–15)
NEUTROPHILS # BLD AUTO: 1.9 K/UL (ref 1.8–7.7)
NEUTROPHILS NFR BLD: 40.7 % (ref 38–73)
NRBC BLD-RTO: 0 /100 WBC
PLATELET # BLD AUTO: 235 K/UL (ref 150–350)
PMV BLD AUTO: 10.6 FL (ref 9.2–12.9)
POTASSIUM SERPL-SCNC: 4.3 MMOL/L (ref 3.5–5.1)
PROT SERPL-MCNC: 7 G/DL (ref 6–8.4)
RBC # BLD AUTO: 3.89 M/UL (ref 4–5.4)
SODIUM SERPL-SCNC: 140 MMOL/L (ref 136–145)
WBC # BLD AUTO: 4.66 K/UL (ref 3.9–12.7)

## 2020-10-22 PROCEDURE — 80053 COMPREHEN METABOLIC PANEL: CPT

## 2020-10-22 PROCEDURE — 85025 COMPLETE CBC W/AUTO DIFF WBC: CPT

## 2020-10-22 PROCEDURE — 85651 RBC SED RATE NONAUTOMATED: CPT

## 2020-10-22 PROCEDURE — 97140 MANUAL THERAPY 1/> REGIONS: CPT | Mod: PN | Performed by: PHYSICAL THERAPIST

## 2020-10-22 PROCEDURE — 97014 ELECTRIC STIMULATION THERAPY: CPT | Mod: PN | Performed by: PHYSICAL THERAPIST

## 2020-10-22 PROCEDURE — 87517 HEPATITIS B DNA QUANT: CPT

## 2020-10-22 PROCEDURE — 36415 COLL VENOUS BLD VENIPUNCTURE: CPT | Mod: PO

## 2020-10-22 PROCEDURE — 86140 C-REACTIVE PROTEIN: CPT

## 2020-10-22 PROCEDURE — 97110 THERAPEUTIC EXERCISES: CPT | Mod: PN | Performed by: PHYSICAL THERAPIST

## 2020-10-22 NOTE — PROGRESS NOTES
Physical Therapy Assistant Treatment Note     Name: Jill Marquez  Clinic Number: [id number]    Therapy Diagnosis:   Encounter Diagnoses   Name Primary?    Chronic bilateral low back pain with left-sided sciatica     Generalized weakness     Difficulty walking      Physician: Ambrose Gupta, *    Visit Date: 10/26/2020    Physician Orders: PT Eval and Treat   Medical Diagnosis from Referral:   M25.551,M25.552 (ICD-10-CM) - Bilateral hip pain   M54.40,G89.29 (ICD-10-CM) - Chronic low back pain with sciatica, sciatica laterality unspecified, unspecified back pain laterality   Evaluation Date: 9/17/2020   PN DUE: 11/26/2020  Authorization Period Expiration: 9/9/21  Plan of Care Expiration: 12/17/2020  Visit # / Visits authorized:   7/ 20 (+eval)     Time In: 9:15 am    Time Out: 10:00 am    Total Billable Time: 45 minutes    Precautions: Standard    Subjective     Pt reports: that she has been feeling better. States that overall she feels that she is getting stronger and that her L piriformis is getting more flexible.   She was compliant with home exercise program.  Response to previous treatment: reports that she felt sore  Functional change: states that she is standing and walking better     Pain: 4/10   Location:  L LE and low back      Objective     MEASURES:  Lumbar Range of Motion:     % of normal motion Pain   Flexion 75    No          Extension 50    yes         Left Side Bending 50 No         Right Side Bending 50 No          Left rotation    100 no         Right Rotation    100 No                Lower Extremity Strength  Right LE   Left LE     Knee extension: 4-/5 Knee extension: 4-/5   Knee flexion: 4-/5 Knee flexion: 4-/5   Hip flexion: 4-/5 Hip flexion: 4-/5   Hip extension:  3+/5 Hip extension: 3+/5   Hip abduction: 4-/5 Hip abduction: 4-/5   Hip adduction: 4-/5 Hip adduction 4-/5   Ankle dorsiflexion: 4/5 Ankle dorsiflexion: 4/5   Ankle plantarflexion: 4/5 Ankle plantarflexion: 4/5  "        BRENDA: 11/50 (22% disabiltiy)       TREATMENT:  Jill received therapeutic exercises (exercises performed today are bolded) to develop strength, endurance, ROM, flexibility, posture and core stabilization for 40 minutes including:     Piriformis stretch 4 x 30"  Single knee to stretch 10" holds x 5 reps   Sciatic nerve glides 5 pumps while up x 5 reps   hooklying hip abduction RTB x 1  TA marches RTB 2 x 1'  Hip adduction squeeze 3 x 10  ball and belt 3" hold x 2 minutes  TA activation x 1' with 3" hold   PPT to APT 2 x 10   PPT 3" hold 2 x 10   supine ITB stretch with strap 3 x 30" each   Bridge with PPT x 10  + bridge with RTB at knees x 10   + bridge with GTB at hips x 10   SLR 2 x 10 reps each LE added PPT  Side-lying hip abduction 2 x 15  Prone triple extension x 10 each LE    Seated EOM:  +marches x 1'  + TA activation x 1'  +hip adduction squeeze x1'  +LAQ x 1'     Hip hikes in // bars on 4" x 8 each   Steamboats on 2" x 8 each          Jill received the following manual therapy techniques (exercises performed today are bolded) for 5 minutes, including:     MET for L AI rotational correction  shotgunning for L AI rotation  Manual stretching of L piriformis  Manual stretching of L glutes  STM to L piriformis and gluteal musculature         Pt received unattended IFC to L piriformis and sciatic nerve with moist heat for 10 minutes at conclusion of session.       Home Exercises Provided and Patient Education Provided     Education provided:   - pt educated to continue with previously issued HEP    Written Home Exercises Provided: Patient instructed to cont prior HEP.  Exercises were reviewed and Jill was able to demonstrate them prior to the end of the session.  Jill demonstrated good  understanding of the education provided.     See EMR under Patient Instructions for exercises provided initial evaluation.    Assessment     Pt has progressed well with skilled PT thus far. She reports a decrease in " low back pain as well as demonstrates improved overall posturing and improved exercise tolerance. Pt is progressing well toward all LTGs and will continue to benefit from skilled PT intervention in order to further progress core stability and postural control needed for improved overall QOL and return to PLOF.     Jill is progressing well towards her goals.   Pt prognosis is Good.     Pt will continue to benefit from skilled outpatient physical therapy to address the deficits listed in the problem list box on initial evaluation, provide pt/family education and to maximize pt's level of independence in the home and community environment.     Pt's spiritual, cultural and educational needs considered and pt agreeable to plan of care and goals.     Anticipated Barriers for therapy: none anticipated    Goals:   Short Term Goals: 4 weeks   1.Pt will be independent with HEP performance in order to continue PT progress at home. -goal met, 10/26/2020  2. Pt will present 3 consecutive sessions with no rotational fault. -goal met, 10/26/2020  3. Pt will report pain at worst of 5/10 or less -goal met, 10/26/2020  4. Pt will demonstrate ability to stand for up to 30 minutes without increased pain in low back -goal met, 10/26/2020   5. Pt will demonstrate centralization of sciatica on L -progressing, not met         Long Term Goals: 8 weeks   1. Pt will demonstrate 5/5 strength in B LEs in order to improve her ability to perform gait and functional mobility -progressing, not met   2. Pt will demonstrate ability to walk up to 3 miles as part of exercise routine pain free in low back -progressing, not met   3. Pt will report no sciatica pain down her LLE -progressing, not met   4. Pt will improve BRENDA disability score to 20% disability or less in order to demonstrate improved overall QOL. -progressing, not met   5. Pt will demonstrate good knowledge of self management of sciatica should sciatica re-occur.     Plan     Continue PT  per POC, assess tolerance to today's session,  progress exercise as tolerated and appropriate    Plan of care Certification: 9/17/2020 to 12/17/2020.     Continue per established PT POC 2x/wk for additional 4 weeks     Kami Maguire, PT, DPT  10/26/2020

## 2020-10-22 NOTE — PROGRESS NOTES
"  Physical Therapy Assistant Treatment Note     Name: Jill Marquez  Clinic Number: [id number]    Therapy Diagnosis:   Encounter Diagnoses   Name Primary?    Chronic bilateral low back pain with left-sided sciatica     Generalized weakness     Difficulty walking      Physician: Ambrose Gupta, *    Visit Date: 10/22/2020    Physician Orders: PT Eval and Treat   Medical Diagnosis from Referral:   M25.551,M25.552 (ICD-10-CM) - Bilateral hip pain   M54.40,G89.29 (ICD-10-CM) - Chronic low back pain with sciatica, sciatica laterality unspecified, unspecified back pain laterality   Evaluation Date: 9/17/2020  Authorization Period Expiration: 9/9/21  Plan of Care Expiration: 12/17/2020  Visit # / Visits authorized:  6/ 20 (+eval)     Time In: 0808   Time Out: 0912   Total Billable Time: 54 minutes    Precautions: Standard    Subjective     Pt reports: that she mostly has pain symptoms with walking after prolonged sitting with legs crossed.  She was compliant with home exercise program.  Response to previous treatment: reports that she felt sore  Functional change: none reported at this time     Pain: 4/10   Location:  L LE and low back      Objective     Jill received therapeutic exercises (exercises performed today are bolded) to develop strength, endurance, ROM, flexibility, posture and core stabilization for 39 minutes including:     Piriformis stretch 4 x 30"  Single knee to stretch 10" holds x 5 reps   Sciatic nerve glides 5 pumps while up x 5 reps   hooklying hip abduction RTB x 1  TA marches RTB 2 x 1'  Hip adduction squeeze 3 x 10  ball and belt 3" hold x 2 minutes  TA activation x 1' with 3"   PPT to APT 2 x 10   PPT 3" hold 2 x 10   supine ITB stretch with strap 3 x 30" each   Bridge with PPT 2 x 10   SLR 2 x 10 reps each LE added PPT  Side-lying hip abduction 2 x 15  +Prone triple extension x 10 each LE         Jill received the following manual therapy techniques (exercises performed today are " bolded) for 15 minutes, including:     MET for L AI rotational correction  shotgunning for L AI rotation  Manual stretching of L piriformis  Manual stretching of L glutes  STM to L piriformis and gluteal musculature    gentle lumbar traction over swiss ball deferred today     Pt received unattended IFC to L piriformis and sciatic nerve with moist heat for 10 minutes at conclusion of session.       Home Exercises Provided and Patient Education Provided     Education provided:   - pt educated to continue with previously issued HEP    Written Home Exercises Provided: Patient instructed to cont prior HEP.  Exercises were reviewed and Jill was able to demonstrate them prior to the end of the session.  Jill demonstrated good  understanding of the education provided.     See EMR under Patient Instructions for exercises provided initial evaluation.    Assessment     Patient required verbal and tactile cues to facilitate gluteal muscle contractions with low level strengthening activities. Minimal tension noted over the deep rotators and piriformis muscle, which improved with manual therapy.      Jill is progressing well towards her goals.   Pt prognosis is Good.     Pt will continue to benefit from skilled outpatient physical therapy to address the deficits listed in the problem list box on initial evaluation, provide pt/family education and to maximize pt's level of independence in the home and community environment.     Pt's spiritual, cultural and educational needs considered and pt agreeable to plan of care and goals.     Anticipated Barriers for therapy: none anticipated    Goals:   Short Term Goals: 4 weeks   1.Pt will be independent with HEP performance in order to continue PT progress at home. -progressing, not met   2. Pt will present 3 consecutive sessions with no rotational fault. -progressing, not met   3. Pt will report pain at worst of 5/10 or less -progressing, not met   4. Pt will demonstrate ability to  stand for up to 30 minutes without increased pain in low back -progressing, not met   5. Pt will demonstrate centralization of sciatica on L -progressing, not met         Long Term Goals: 8 weeks   1. Pt will demonstrate 5/5 strength in B LEs in order to improve her ability to perform gait and functional mobility -progressing, not met   2. Pt will demonstrate ability to walk up to 3 miles as part of exercise routine pain free in low back -progressing, not met   3. Pt will report no sciatica pain down her LLE -progressing, not met   4. Pt will improve BRENDA disability score to 20% disability or less in order to demonstrate improved overall QOL.   5. Pt will demonstrate good knowledge of self management of sciatica should sciatica re-occur.     Plan     Continue PT per POC, assess tolerance to today's session,  progress exercise as tolerated and appropriate    Plan of care Certification: 9/17/2020 to 12/17/2020.     Outpatient Physical Therapy 2 times weekly for 8 weeks to include the following interventions: Electrical Stimulation PRN, Gait Training, Manual Therapy, Moist Heat/ Ice, Neuromuscular Re-ed, Patient Education, Therapeutic Activites and Therapeutic Exercise. And any other therapies and modalities as clinically indicated and appropriate, including but not limited to FDN and telehealth. Pt may be seen by PTA as a part of pt's care team.     Magali Plaza, PT, DPT, CSCS, PPCES   10/22/2020

## 2020-10-26 ENCOUNTER — CLINICAL SUPPORT (OUTPATIENT)
Dept: REHABILITATION | Facility: HOSPITAL | Age: 65
End: 2020-10-26
Payer: MEDICARE

## 2020-10-26 DIAGNOSIS — R26.2 DIFFICULTY WALKING: ICD-10-CM

## 2020-10-26 DIAGNOSIS — G89.29 CHRONIC BILATERAL LOW BACK PAIN WITH LEFT-SIDED SCIATICA: ICD-10-CM

## 2020-10-26 DIAGNOSIS — R53.1 GENERALIZED WEAKNESS: ICD-10-CM

## 2020-10-26 DIAGNOSIS — M54.42 CHRONIC BILATERAL LOW BACK PAIN WITH LEFT-SIDED SCIATICA: ICD-10-CM

## 2020-10-26 PROCEDURE — 97110 THERAPEUTIC EXERCISES: CPT | Mod: PN

## 2020-10-27 ENCOUNTER — PATIENT MESSAGE (OUTPATIENT)
Dept: RHEUMATOLOGY | Facility: CLINIC | Age: 65
End: 2020-10-27

## 2020-10-27 ENCOUNTER — OFFICE VISIT (OUTPATIENT)
Dept: RHEUMATOLOGY | Facility: CLINIC | Age: 65
End: 2020-10-27
Payer: MEDICARE

## 2020-10-27 VITALS
DIASTOLIC BLOOD PRESSURE: 87 MMHG | HEIGHT: 64 IN | HEART RATE: 71 BPM | SYSTOLIC BLOOD PRESSURE: 128 MMHG | BODY MASS INDEX: 22.22 KG/M2 | WEIGHT: 130.13 LBS

## 2020-10-27 DIAGNOSIS — M47.819 PERIPHERAL SPONDYLOARTHRITIS: ICD-10-CM

## 2020-10-27 DIAGNOSIS — M54.32 LEFT SIDED SCIATICA: Primary | ICD-10-CM

## 2020-10-27 DIAGNOSIS — M70.62 GREATER TROCHANTERIC BURSITIS OF LEFT HIP: ICD-10-CM

## 2020-10-27 LAB
HBV DNA SERPL NAA+PROBE-ACNC: <10 IU/ML
HBV DNA SERPL NAA+PROBE-LOG IU: <1 LOG (10) IU/ML
HBV DNA SERPL QL NAA+PROBE: NOT DETECTED

## 2020-10-27 PROCEDURE — 99213 OFFICE O/P EST LOW 20 MIN: CPT | Mod: 25,S$PBB,, | Performed by: INTERNAL MEDICINE

## 2020-10-27 PROCEDURE — 20610 LARGE JOINT ASPIRATION/INJECTION: L GREATER TROCHANTERIC BURSA: ICD-10-PCS | Mod: S$PBB,LT,, | Performed by: INTERNAL MEDICINE

## 2020-10-27 PROCEDURE — 99213 PR OFFICE/OUTPT VISIT, EST, LEVL III, 20-29 MIN: ICD-10-PCS | Mod: 25,S$PBB,, | Performed by: INTERNAL MEDICINE

## 2020-10-27 PROCEDURE — 99999 PR PBB SHADOW E&M-EST. PATIENT-LVL IV: ICD-10-PCS | Mod: PBBFAC,,, | Performed by: INTERNAL MEDICINE

## 2020-10-27 PROCEDURE — 99214 OFFICE O/P EST MOD 30 MIN: CPT | Mod: PBBFAC | Performed by: INTERNAL MEDICINE

## 2020-10-27 PROCEDURE — 99999 PR PBB SHADOW E&M-EST. PATIENT-LVL IV: CPT | Mod: PBBFAC,,, | Performed by: INTERNAL MEDICINE

## 2020-10-27 PROCEDURE — 20610 DRAIN/INJ JOINT/BURSA W/O US: CPT | Mod: PBBFAC | Performed by: INTERNAL MEDICINE

## 2020-10-27 RX ORDER — ETANERCEPT 50 MG/ML
50 SOLUTION SUBCUTANEOUS WEEKLY
Qty: 12 ML | Refills: 1 | Status: SHIPPED | OUTPATIENT
Start: 2020-10-27 | End: 2020-11-12 | Stop reason: SDUPTHER

## 2020-10-27 RX ORDER — TRIAMCINOLONE ACETONIDE 40 MG/ML
40 INJECTION, SUSPENSION INTRA-ARTICULAR; INTRAMUSCULAR
Status: DISCONTINUED | OUTPATIENT
Start: 2020-10-27 | End: 2020-10-27 | Stop reason: HOSPADM

## 2020-10-27 RX ADMIN — TRIAMCINOLONE ACETONIDE 40 MG: 40 INJECTION, SUSPENSION INTRA-ARTICULAR; INTRAMUSCULAR at 02:10

## 2020-10-27 NOTE — ASSESSMENT & PLAN NOTE
TJ 1  SJ 3 Pt global 20 ESR 2 CRP 0.4 DAS28 1.81 [id number] 2.41(both remission)    Cont etanercept 50mg sc q 7 days  RTC 3 months with standing labs

## 2020-10-27 NOTE — ASSESSMENT & PLAN NOTE
*After verbal consent and cleansing with Chloraprep the left gr. Trochanteric bursa injected with Kenalog 40mg with 1 ml 1 % lidocaine. Patient tolerated procedure well.  Cont PT

## 2020-10-27 NOTE — PROCEDURES
Large Joint Aspiration/Injection: L greater trochanteric bursa    Date/Time: 10/27/2020 2:30 PM  Performed by: Nicanor Campos MD  Authorized by: Nicanor Campos MD     Consent Done?:  Yes (Verbal)  Indications:  Pain  Site marked: the procedure site was marked    Timeout: prior to procedure the correct patient, procedure, and site was verified    Prep: patient was prepped and draped in usual sterile fashion      Local anesthesia used?: Yes    Local anesthetic:  Lidocaine 1% without epinephrine and topical anesthetic  Anesthetic total (ml):  1      Details:  Needle Size:  25 G  Ultrasonic Guidance for needle placement?: No    Approach:  Lateral  Location:  Hip  Site:  L greater trochanteric bursa  Medications:  40 mg triamcinolone acetonide 40 mg/mL  Patient tolerance:  Patient tolerated the procedure well with no immediate complications

## 2020-10-27 NOTE — PROGRESS NOTES
Pre chart    Answers for HPI/ROS submitted by the patient on 10/26/2020   fever: No  eye redness: No  mouth sores: No  headaches: No  shortness of breath: No  chest pain: No  trouble swallowing: No  diarrhea: No  constipation: No  unexpected weight change: No  genital sore: No  dysuria: No  During the last 3 days, have you had a skin rash?: No  Bruises or bleeds easily: No  cough: No

## 2020-10-27 NOTE — PROGRESS NOTES
Subjective:       Patient ID: Jill Marquez is a 65 y.o. female.    Chief Complaint:   HPI hips are painful. Saw Orthopedist, diagnosed piriformis syndrome, rec PT which she started twice weekly 4 wks ago. Also sciatica left leg, some(50%) improvement.    Comprehensive Initial Assessment  Pain level: see [facility]  Functional status: see [facility]  Patient has:     Previous history malignancy: No   Weight loss: No   Previous longstanding steroid use or immunosuppression: yes    Recent significant infection: No   Fracture or suspected fracture: No   Bowel or bladder incontinence: No    Prior treatment and response: PT 50% improvement  Employment status: none  Are radicular symptoms present? Yes left leg    Physical examination  Straight leg raise test b/l : negative  Ankle and knee reflexes: normal  Quadriceps; ankle and great toe dorsiflexion and plantar flexion strength: Normal  Pulses in lower extremities: Normal      Treatment Plan  Advised against bed rest: Yes  Advised normal activity as tolerated: Yes  Review of Systems   Constitutional: Negative for appetite change, fatigue, fever and unexpected weight change.   HENT: Negative for mouth sores and trouble swallowing.    Eyes: Negative for redness and visual disturbance.   Respiratory: Negative for cough, shortness of breath and wheezing.    Cardiovascular: Negative for chest pain and palpitations.   Gastrointestinal: Negative for abdominal pain, anal bleeding, blood in stool, constipation, diarrhea, nausea and vomiting.   Genitourinary: Negative for dysuria, frequency, genital sores and urgency.   Musculoskeletal: Negative for arthralgias, back pain, gait problem, joint swelling, myalgias, neck pain and neck stiffness.   Skin: Negative for rash.   Neurological: Negative for weakness, numbness and headaches.   Hematological: Negative for adenopathy. Does not bruise/bleed easily.   Psychiatric/Behavioral: Negative for sleep disturbance. The patient is not  "nervous/anxious.          Objective:   /87   Pulse 71   Ht 5' 3.6" (1.615 m)   Wt 59 kg (130 lb 1.6 oz)   BMI 22.61 kg/m²      Physical Exam        Assessment/Plan         Left sided sciatica  -     Ambulatory referral[facility]consult [facility]; Future; Expected date: 11/03/2020    Greater trochanteric bursitis of left hip  -     Large Joint Aspiration/Injection: L greater trochanteric bursa  -     triamcinolone acetonide injection 40 mg    Peripheral spondyloarthritis  -     etanercept (ENBREL) 50 mg/mL (1 mL) injection; Inject 1 mL (50 mg total) into the skin once a week.  Dispense: 12 mL; Refill: 1       Problem List Items Addressed This Visit     Greater trochanteric bursitis of left hip    Current Assessment & Plan      *After verbal consent and cleansing with Chloraprep the left gr. Trochanteric bursa injected with Kenalog 40mg with 1 ml 1 % lidocaine. Patient tolerated procedure well.  Cont PT         Relevant Medications    triamcinolone acetonide injection 40 mg    Other Relevant Orders    Large Joint Aspiration/Injection: L greater trochanteric bursa (Completed)    Left sided sciatica - Primary    Current Assessment & Plan     Cont PT  Ref to [facility]         Relevant Orders    Ambulatory referral/consult to [facility]    Peripheral spondyloarthritis    Overview     Results for PANKAJ CARTRE (MRN [id number]) as of 11/16/2018 13:06   Ref. Range 6/26/2015 11:10 5/29/2018 11:46 10/16/2018 10:50   Anti-SSA Antibody Latest Ref Range: 0.00 - 19.99 EU 3.90     Anti-SSA Interpretation Latest Ref Range: Negative  Negative     Anti-SSB Antibody Latest Ref Range: 0.00 - 19.99 EU  0.09    Anti-SSB Interpretation Latest Ref Range: Negative   Negative    SAMIR Screen Latest Ref Range: Negative <1:160  Negative <1:160     Anti-Ladonna-1 Antibody Latest Ref Range: <20 Units   <20   Anti-PM/Scl Ab Latest Ref Range: <20 Units   <20   Anti-SS-A 52 kD Ab, IgG Latest Ref Range: <20 Units   <20 "   Anti-U1-RNP  Ab Latest Ref Range: <20 Units   <20   CCP Antibodies Latest Ref Range: <5.0 U/mL <0.5 <0.5    EJ Latest Ref Range: Negative    Negative   Fibrillarin (U3 RNP) Latest Ref Range: Negative    Negative   HMGCR Antibody Latest Ref Range: 0 - 19 Units   <3   Ku Latest Ref Range: Negative    Negative   MDA-5 (P140) (CADM-140) Latest Ref Range: <20 Units   <20   MI-2 Latest Ref Range: Negative    Negative   NXP-2 (P140) Latest Ref Range: <20 Units   <20   OJ Latest Ref Range: Negative    Negative   PL-12 Latest Ref Range: Negative    Negative   PL-7 Latest Ref Range: Negative    Negative   Rheumatoid Factor Latest Ref Range: 0.0 - 15.0 IU/mL 10.0 <10.0    SRP Latest Ref Range: Negative    Negative   TIF1 GAMMA (P155/140) Latest Ref Range: <20 Units   <20   U2 snRNP Latest Ref Range: Negative    Negative     Armando Kerr MD 3/5/2018       Narrative     AP view of bilateral hands.    Indication: Pain    Comparison: 6/26/15    Findings      Impression       No acute fractures. Degenerative changes of the hand are unchanged, most notable at the bilateral basal joints and second and fifth DIP joints bilaterally.      Electronically signed by: ARMANDO KERR MD  Date: 03/05/18  Time: 14:38      Results for PANKAJ CARTER (MRN [id number]) as of 11/16/2018 13:06   Ref. Range 10/16/2018 10:50   Hep B Core Total Ab Unknown Negative   Hep B S Ab Unknown Negative   Hepatitis B Surface Ag Unknown Negative   Hepatitis C Ab Unknown Negative   Mitogen - Nil Latest Ref Range: See text IU/mL >10.000   NIL Latest Ref Range: See text IU/mL 0.070   TB Gold Plus Unknown Negative   TB1 - Nil Latest Ref Range: See text IU/mL 0.020   TB2 - Nil Latest Ref Range: See text IU/mL 0.010   HIV 1/2 Ag/Ab Latest Ref Range: Negative  Negative   RPR Latest Ref Range: Non-reactive  Non-reactive   Strongyloides Ab IgG Latest Ref Range: Negative  Negative   Result Notes for X-Ray Chest PA And Lateral     Notes recorded by Nicanor  JAZZ Campos MD on 10/16/2018 at 4:27 PM CDT  The chest x-ray is normal. RJQ     Results for PANKAJ CARTER (MRN [id number]) as of 11/16/2018 13:06   Ref. Range 10/16/2018 10:50   CPK Latest Ref Range: 20 - 180 U/L 280 (H)            Current Assessment & Plan     TJ 1  SJ 3 Pt global 20 ESR 2 CRP 0.4 DAS28 1.81 [id number] 2.41(both remission)    Cont etanercept 50mg sc q 7 days  RTC 3 months with standing labs         Relevant Medications    etanercept (ENBREL) 50 mg/mL (1 mL) injection

## 2020-10-29 ENCOUNTER — CLINICAL SUPPORT (OUTPATIENT)
Dept: REHABILITATION | Facility: HOSPITAL | Age: 65
End: 2020-10-29
Payer: MEDICARE

## 2020-10-29 DIAGNOSIS — G89.29 CHRONIC BILATERAL LOW BACK PAIN WITH LEFT-SIDED SCIATICA: ICD-10-CM

## 2020-10-29 DIAGNOSIS — M54.42 CHRONIC BILATERAL LOW BACK PAIN WITH LEFT-SIDED SCIATICA: ICD-10-CM

## 2020-10-29 DIAGNOSIS — R26.2 DIFFICULTY WALKING: ICD-10-CM

## 2020-10-29 DIAGNOSIS — R53.1 GENERALIZED WEAKNESS: ICD-10-CM

## 2020-10-29 PROCEDURE — 97140 MANUAL THERAPY 1/> REGIONS: CPT | Mod: PN | Performed by: PHYSICAL THERAPIST

## 2020-10-29 PROCEDURE — 97110 THERAPEUTIC EXERCISES: CPT | Mod: PN | Performed by: PHYSICAL THERAPIST

## 2020-10-29 NOTE — PROGRESS NOTES
"  Physical Therapy Treatment Note     Name: Jill Marquez  Clinic Number: [id number]    Therapy Diagnosis:   Encounter Diagnoses   Name Primary?    Chronic bilateral low back pain with left-sided sciatica     Generalized weakness     Difficulty walking      Physician: Ambrose Gupta, *    Visit Date: 11/2/2020    Physician Orders: PT Eval and Treat   Medical Diagnosis from Referral:   M25.551,M25.552 (ICD-10-CM) - Bilateral hip pain   M54.40,G89.29 (ICD-10-CM) - Chronic low back pain with sciatica, sciatica laterality unspecified, unspecified back pain laterality   Evaluation Date: 9/17/2020   PN DUE: 11/26/2020  Authorization Period Expiration: 9/9/21  Plan of Care Expiration: 12/17/2020  Visit # / Visits authorized:  9/ 20 (+eval)     Time In: 8:30 am    Time Out: 9:15 am     Total Billable Time: 45 minutes    Precautions: Standard    Subjective     Pt reports that she is feeling better. States that her hip is still feeling better after her L hip injection.    She was compliant with home exercise program.  Response to previous treatment: reports that she felt sore  Functional change: states that she is standing and walking better     Pain: 2/10   Location:  L LE and low back      Objective      TREATMENT:  Jill received therapeutic exercises (exercises performed today are bolded) to develop strength, endurance, ROM, flexibility, posture and core stabilization for 35 minutes including:     Piriformis stretch 4 x 30"  Single knee to stretch 10" holds x 5 reps   Sciatic nerve glides 5 pumps while up x 5 reps   hooklying hip abduction RTB x 1  TA marches RTB 2 x 1'  Hip adduction squeeze 3 x 10  ball and belt 3" hold x 2 minutes  TA activation x 1' with 3" hold   PPT to APT 2 x 10   PPT 3" hold 2 x 10   supine ITB stretch with strap 3 x 30" each   Bridge with PPT x 10  Bridge with GTB at knees x 10   Bridge with GTB at hips x 10   SLR 2 x 10 reps each LE added PPT with #1   +clamshells GTB 2 x 10 each LE " "  Side-lying hip abduction 2 x 15  Prone triple extension x 10 each LE    Seated EOM:  marches x 1'  TA activation x 1'  +seated hip abduction GTB 2 x 10   hip adduction squeeze x1'  LAQ x 1'     LSU on 6" step   Steamboats on 2" x 10 each          Jill received the following manual therapy techniques (exercises performed today are bolded) for 10 minutes, including:     MET for L AI rotational correction deferred  shotgunning for L AI rotation deferred  Manual stretching of L piriformis  Manual stretching of L glutes  STM to L tensor fascia sheryl, piriformis and gluteal musculature         Pt received unattended IFC to L piriformis and sciatic nerve with moist heat for 0 minutes.       Home Exercises Provided and Patient Education Provided     Education provided:   - pt educated to continue with previously issued HEP    Written Home Exercises Provided: yes.  Exercises were reviewed and Jill was able to demonstrate them prior to the end of the session.  Jill demonstrated good  understanding of the education provided.     See EMR under Patient Instructions for exercises provided 11/2/2020.    Assessment     Pt tolerated session well with no complaints. Pt with good progression of exercise today with good levels of fatigue noted at the conclusion of session. Pt with improved seated and standing exercise tolerance today as well. She will continue to benefit from skilled PT intervention in order to further progress core stability and postural control needed for return to PLOF and improve her overall QOL.     Jill is progressing well towards her goals.   Pt prognosis is Good.     Pt will continue to benefit from skilled outpatient physical therapy to address the deficits listed in the problem list box on initial evaluation, provide pt/family education and to maximize pt's level of independence in the home and community environment.     Pt's spiritual, cultural and educational needs considered and pt agreeable to " plan of care and goals.     Anticipated Barriers for therapy: none anticipated    Goals:   Short Term Goals: 4 weeks   1.Pt will be independent with HEP performance in order to continue PT progress at home. -goal met, 10/26/2020  2. Pt will present 3 consecutive sessions with no rotational fault. -goal met, 10/26/2020  3. Pt will report pain at worst of 5/10 or less -goal met, 10/26/2020  4. Pt will demonstrate ability to stand for up to 30 minutes without increased pain in low back -goal met, 10/26/2020   5. Pt will demonstrate centralization of sciatica on L -progressing, not met         Long Term Goals: 8 weeks   1. Pt will demonstrate 5/5 strength in B LEs in order to improve her ability to perform gait and functional mobility -progressing, not met   2. Pt will demonstrate ability to walk up to 3 miles as part of exercise routine pain free in low back -progressing, not met   3. Pt will report no sciatica pain down her LLE -progressing, not met   4. Pt will improve BRENDA disability score to 20% disability or less in order to demonstrate improved overall QOL. -progressing, not met   5. Pt will demonstrate good knowledge of self management of sciatica should sciatica re-occur.     Plan     Continue PT per POC, assess tolerance to today's session,  progress exercise as tolerated and appropriate    Plan of care Certification: 9/17/2020 to 12/17/2020.       Kami Maguire, PT, DPT  11/2/2020

## 2020-10-29 NOTE — PROGRESS NOTES
"  Physical Therapy Treatment Note     Name: Jill Marquez  Clinic Number: [id number]    Therapy Diagnosis:   Encounter Diagnoses   Name Primary?    Chronic bilateral low back pain with left-sided sciatica     Generalized weakness     Difficulty walking      Physician: Ambrose Gupta, *    Visit Date: 10/29/2020    Physician Orders: PT Eval and Treat   Medical Diagnosis from Referral:   M25.551,M25.552 (ICD-10-CM) - Bilateral hip pain   M54.40,G89.29 (ICD-10-CM) - Chronic low back pain with sciatica, sciatica laterality unspecified, unspecified back pain laterality   Evaluation Date: 9/17/2020   PN DUE: 11/26/2020  Authorization Period Expiration: 9/9/21  Plan of Care Expiration: 12/17/2020  Visit # / Visits authorized:   8/ 20 (+eval)     Time In: 1101   Time Out: 1258    Total Billable Time: 57 minutes    Precautions: Standard    Subjective     Pt reports that she had an injection in her hip and is feeling much better.   She was compliant with home exercise program.  Response to previous treatment: reports that she felt sore  Functional change: states that she is standing and walking better     Pain: 2/10   Location:  L LE and low back      Objective      TREATMENT:  Jill received therapeutic exercises (exercises performed today are bolded) to develop strength, endurance, ROM, flexibility, posture and core stabilization for 40 minutes including:     Piriformis stretch 4 x 30"  Single knee to stretch 10" holds x 5 reps   Sciatic nerve glides 5 pumps while up x 5 reps   hooklying hip abduction RTB x 1  TA marches RTB 2 x 1'  Hip adduction squeeze 3 x 10  ball and belt 3" hold x 2 minutes  TA activation x 1' with 3" hold   PPT to APT 2 x 10   PPT 3" hold 2 x 10   supine ITB stretch with strap 3 x 30" each - Passive today  Bridge with PPT x 10  Bridge with GTB at knees x 10   Bridge with GTB at hips x 10   SLR 2 x 10 reps each LE added PPT deferred today  Side-lying hip abduction 2 x 15  Prone triple " "extension x 10 each LE    Seated EOM:  marches x 1'  TA activation x 1'  hip adduction squeeze x1'  LAQ x 1'     Hip hikes in // bars on 4" x 10 each   Steamboats on 2" x 10 each          Jill received the following manual therapy techniques (exercises performed today are bolded) for 15 minutes, including:     MET for L AI rotational correction deferred  shotgunning for L AI rotation deferred  Manual stretching of L piriformis  Manual stretching of L glutes  STM to L tensor fascia sheryl, piriformis and gluteal musculature         Pt received unattended IFC to L piriformis and sciatic nerve with moist heat for 10 minutes deferred today.       Home Exercises Provided and Patient Education Provided     Education provided:   - pt educated to continue with previously issued HEP    Written Home Exercises Provided: Patient instructed to cont prior HEP.  Exercises were reviewed and Jill was able to demonstrate them prior to the end of the session.  Jill demonstrated good  understanding of the education provided.     See EMR under Patient Instructions for exercises provided initial evaluation.    Assessment     Patient had tenderness and hypertonicity of the left tensor fascia sheryl, which normalized with manual therapy.  Patient fatigued with strengthening activities, but had no complaint of pain.  Patient required tactile cues to facilitate correct muscle firing with hip dips.    Jill is progressing well towards her goals.   Pt prognosis is Good.     Pt will continue to benefit from skilled outpatient physical therapy to address the deficits listed in the problem list box on initial evaluation, provide pt/family education and to maximize pt's level of independence in the home and community environment.     Pt's spiritual, cultural and educational needs considered and pt agreeable to plan of care and goals.     Anticipated Barriers for therapy: none anticipated    Goals:   Short Term Goals: 4 weeks   1.Pt will be " independent with HEP performance in order to continue PT progress at home. -goal met, 10/26/2020  2. Pt will present 3 consecutive sessions with no rotational fault. -goal met, 10/26/2020  3. Pt will report pain at worst of 5/10 or less -goal met, 10/26/2020  4. Pt will demonstrate ability to stand for up to 30 minutes without increased pain in low back -goal met, 10/26/2020   5. Pt will demonstrate centralization of sciatica on L -progressing, not met         Long Term Goals: 8 weeks   1. Pt will demonstrate 5/5 strength in B LEs in order to improve her ability to perform gait and functional mobility -progressing, not met   2. Pt will demonstrate ability to walk up to 3 miles as part of exercise routine pain free in low back -progressing, not met   3. Pt will report no sciatica pain down her LLE -progressing, not met   4. Pt will improve BRENDA disability score to 20% disability or less in order to demonstrate improved overall QOL. -progressing, not met   5. Pt will demonstrate good knowledge of self management of sciatica should sciatica re-occur.     Plan     Continue PT per POC, assess tolerance to today's session,  progress exercise as tolerated and appropriate    Plan of care Certification: 9/17/2020 to 12/17/2020.     Continue per established PT POC 2x/wk for additional 4 weeks     Magali Plaza, PT, DPT, CSCS, PPCES  10/29/2020

## 2020-11-02 ENCOUNTER — SPECIALTY PHARMACY (OUTPATIENT)
Dept: PHARMACY | Facility: CLINIC | Age: 65
End: 2020-11-02

## 2020-11-02 ENCOUNTER — CLINICAL SUPPORT (OUTPATIENT)
Dept: REHABILITATION | Facility: HOSPITAL | Age: 65
End: 2020-11-02
Payer: MEDICARE

## 2020-11-02 DIAGNOSIS — G89.29 CHRONIC BILATERAL LOW BACK PAIN WITH LEFT-SIDED SCIATICA: ICD-10-CM

## 2020-11-02 DIAGNOSIS — M54.42 CHRONIC BILATERAL LOW BACK PAIN WITH LEFT-SIDED SCIATICA: ICD-10-CM

## 2020-11-02 DIAGNOSIS — R26.2 DIFFICULTY WALKING: ICD-10-CM

## 2020-11-02 DIAGNOSIS — R53.1 GENERALIZED WEAKNESS: ICD-10-CM

## 2020-11-02 PROCEDURE — 97110 THERAPEUTIC EXERCISES: CPT | Mod: PN

## 2020-11-02 PROCEDURE — 97140 MANUAL THERAPY 1/> REGIONS: CPT | Mod: PN

## 2020-11-05 ENCOUNTER — CLINICAL SUPPORT (OUTPATIENT)
Dept: REHABILITATION | Facility: HOSPITAL | Age: 65
End: 2020-11-05
Payer: MEDICARE

## 2020-11-05 DIAGNOSIS — R53.1 GENERALIZED WEAKNESS: ICD-10-CM

## 2020-11-05 DIAGNOSIS — M54.42 CHRONIC BILATERAL LOW BACK PAIN WITH LEFT-SIDED SCIATICA: ICD-10-CM

## 2020-11-05 DIAGNOSIS — G89.29 CHRONIC BILATERAL LOW BACK PAIN WITH LEFT-SIDED SCIATICA: ICD-10-CM

## 2020-11-05 DIAGNOSIS — R26.2 DIFFICULTY WALKING: ICD-10-CM

## 2020-11-05 PROCEDURE — 97110 THERAPEUTIC EXERCISES: CPT | Mod: PN

## 2020-11-05 NOTE — PROGRESS NOTES
"  Physical Therapy Treatment Note     Name: Jill Marquez  Clinic Number: [id number]    Therapy Diagnosis:   Encounter Diagnoses   Name Primary?    Chronic bilateral low back pain with left-sided sciatica     Generalized weakness     Difficulty walking      Physician: Ambrose Gupta, *    Visit Date: 11/5/2020    Physician Orders: PT Eval and Treat   Medical Diagnosis from Referral:   M25.551,M25.552 (ICD-10-CM) - Bilateral hip pain   M54.40,G89.29 (ICD-10-CM) - Chronic low back pain with sciatica, sciatica laterality unspecified, unspecified back pain laterality   Evaluation Date: 9/17/2020   PN DUE: 11/26/2020  Authorization Period Expiration: 9/9/21  Plan of Care Expiration: 12/17/2020  Visit # / Visits authorized:  10/ 20 (+eval)     Time In: 9:30 am    Time Out: 10:15 am     Total Billable Time: 45 minutes    Precautions: Standard    Subjective     Pt reports no new complaints. Reports that she was a little sore after her last session, but that she is feeling ok today.   She was compliant with home exercise program.  Response to previous treatment: reports that she felt sore  Functional change: states that she is standing and walking better     Pain: 2/10   Location:  L LE and low back      Objective      TREATMENT:  Jill received therapeutic exercises (exercises performed today are bolded) to develop strength, endurance, ROM, flexibility, posture and core stabilization for 45 minutes including:     Piriformis stretch 4 x 30"  Single knee to stretch 10" holds x 5 reps   Sciatic nerve glides 5 pumps while up x 5 reps   hooklying hip abduction RTB x 1  TA marches RTB 2 x 1'  Hip adduction squeeze 3 x 10  ball and belt 3" hold x 2 minutes  TA activation x 1' with 3" hold   PPT 3" hold 2 x 10   supine ITB stretch with strap 3 x 30" each   Bridge with PPT x 10  Bridge with GTB at knees x 10   Bridge with GTB at hips x 10   SLR 2 x 10 reps each LE added PPT with #1   clamshells GTB 2 x 10 each LE " "  +reverse clamshells 2 x 10   Side-lying hip abduction 2 x 15  Prone triple extension x 10 each LE    Seated EOM:  marches x 1'  TA activation x 1'  seated hip abduction GTB 2 x 10   hip adduction squeeze x1'   LAQ x 1'      LSU on 6" step   Steamboats on 2" x 10 each   +standing hip abduction GTB 2 x 10 each          Jill received the following manual therapy techniques (exercises performed today are bolded) for 0 minutes, including:     MET for L AI rotational correction deferred  shotgunning for L AI rotation deferred  Manual stretching of L piriformis  Manual stretching of L glutes  STM to L tensor fascia sheryl, piriformis and gluteal musculature         Pt received unattended IFC to L piriformis and sciatic nerve with moist heat for 0 minutes.       Home Exercises Provided and Patient Education Provided     Education provided:   - pt educated to continue with previously issued HEP    Written Home Exercises Provided: yes.  Exercises were reviewed and Jill was able to demonstrate them prior to the end of the session.  Jill demonstrated good  understanding of the education provided.     See EMR under Patient Instructions for exercises provided 11/2/2020.    Assessment     Pt tolerated session well with no complaints. She continues to tolerate progression of exercises today with no complaints, but good levels of fatigue, especially with progressed standing exercises. Pt will continue to benefit from skilled PT intervention in order to further progress core stability, postural control,and LLE strength and stability needed for improved QOL and return to PLOF.     Jill is progressing well towards her goals.   Pt prognosis is Good.     Pt will continue to benefit from skilled outpatient physical therapy to address the deficits listed in the problem list box on initial evaluation, provide pt/family education and to maximize pt's level of independence in the home and community environment.     Pt's spiritual, " cultural and educational needs considered and pt agreeable to plan of care and goals.     Anticipated Barriers for therapy: none anticipated    Goals:   Short Term Goals: 4 weeks   1.Pt will be independent with HEP performance in order to continue PT progress at home. -goal met, 10/26/2020  2. Pt will present 3 consecutive sessions with no rotational fault. -goal met, 10/26/2020  3. Pt will report pain at worst of 5/10 or less -goal met, 10/26/2020  4. Pt will demonstrate ability to stand for up to 30 minutes without increased pain in low back -goal met, 10/26/2020   5. Pt will demonstrate centralization of sciatica on L -goal met, 11/5/2020        Long Term Goals: 8 weeks   1. Pt will demonstrate 5/5 strength in B LEs in order to improve her ability to perform gait and functional mobility -progressing, not met   2. Pt will demonstrate ability to walk up to 3 miles as part of exercise routine pain free in low back -progressing, not met   3. Pt will report no sciatica pain down her LLE -progressing, not met   4. Pt will improve BRENDA disability score to 20% disability or less in order to demonstrate improved overall QOL. -progressing, not met   5. Pt will demonstrate good knowledge of self management of sciatica should sciatica re-occur.     Plan     Continue PT per POC, assess tolerance to today's session,  progress exercise as tolerated and appropriate    Plan of care Certification: 9/17/2020 to 12/17/2020.       Kami Maguire, PT, DPT  11/5/2020

## 2020-11-09 NOTE — PROGRESS NOTES
"  Physical Therapy Treatment Note     Name: Jill Marquez  Clinic Number: [id number]    Therapy Diagnosis:   Encounter Diagnoses   Name Primary?    Chronic bilateral low back pain with left-sided sciatica     Generalized weakness     Difficulty walking      Physician: Ambrose Gupta, *    Visit Date: 11/12/2020    Physician Orders: PT Eval and Treat   Medical Diagnosis from Referral:   M25.551,M25.552 (ICD-10-CM) - Bilateral hip pain   M54.40,G89.29 (ICD-10-CM) - Chronic low back pain with sciatica, sciatica laterality unspecified, unspecified back pain laterality   Evaluation Date: 9/17/2020   PN DUE: 11/26/2020  Authorization Period Expiration: 9/9/21  Plan of Care Expiration: 12/17/2020  Visit # / Visits authorized:  11/ 20 (+eval)     Time In: 2:30 pm    Time Out: 3:15 pm      Total Billable Time: 45 minutes    Precautions: Standard    Subjective     Pt reports that she is overall feeling much better. She states that her hip has been feeling better and she feels that she is getting stronger.   She was compliant with home exercise program.  Response to previous treatment: reports that she felt sore  Functional change: states that she is standing and walking better     Pain: 2/10   Location:  L LE and low back      Objective      TREATMENT:  Jill received therapeutic exercises (exercises performed today are bolded) to develop strength, endurance, ROM, flexibility, posture and core stabilization for 45 minutes including:     Piriformis stretch 4 x 30"  Single knee to stretch 10" holds x 5 reps   Sciatic nerve glides 5 pumps while up x 5 reps   hooklying hip abduction GTB x 1'  TA marches GTB 2 x 1'  Hip adduction squeeze 3 x 10  ball and belt 3" hold x 15   PPT to APT 3" hold 2 x 10   supine ITB stretch with strap 3 x 30" each   Bridge with PPT x 10  Bridge with GTB at knees x 10   Bridge with GTB at hips x 10   +bridge with ball squeeze x 10   SLR 2 x 10 reps each LE added PPT with #1   clamshells GTB 2 " "x 10 each LE   reverse clamshells 2 x 10   Side-lying hip abduction 2 x 15  Prone hip extension x 10 reps each   +prone hip flexion stretch 2 x 30"     Lunge hip flexion stretch 2 x 30"     Seated EOM:  marches x 1'  TA activation x 1'  seated hip abduction GTB 2 x 10   hip adduction squeeze x1'   LAQ x 1'      LSU on 6" step   Steamboats on 2" x 10 each   +standing glute med isolation x 10 reps each   standing hip abduction GTB 2 x 10 each          Jill received the following manual therapy techniques (exercises performed today are bolded) for 0 minutes, including:     MET for L AI rotational correction deferred  shotgunning for L AI rotation deferred  Manual stretching of L piriformis  Manual stretching of L glutes  STM to L tensor fascia sheryl, piriformis and gluteal musculature         Pt received unattended IFC to L piriformis and sciatic nerve with moist heat for 0 minutes.       Home Exercises Provided and Patient Education Provided     Education provided:   - pt educated to continue with previously issued HEP    Written Home Exercises Provided: yes.  Exercises were reviewed and Jill was able to demonstrate them prior to the end of the session.  Jill demonstrated good  understanding of the education provided.     See EMR under Patient Instructions for exercises provided 11/2/2020.    Assessment     Pt tolerated session well with no complaints. She continues to demonstrate improved low back and core stability as well as improved L hip stability and glute med and max strength. Pt will continue to benefit from skilled PT intervention in order to further progress Low back and LLE stability and functional strength needed for return to PLOF and improve her overall QOL.     Jill is progressing well towards her goals.   Pt prognosis is Good.     Pt will continue to benefit from skilled outpatient physical therapy to address the deficits listed in the problem list box on initial evaluation, provide pt/family " education and to maximize pt's level of independence in the home and community environment.     Pt's spiritual, cultural and educational needs considered and pt agreeable to plan of care and goals.     Anticipated Barriers for therapy: none anticipated    Goals:   Short Term Goals: 4 weeks   1.Pt will be independent with HEP performance in order to continue PT progress at home. -goal met, 10/26/2020  2. Pt will present 3 consecutive sessions with no rotational fault. -goal met, 10/26/2020  3. Pt will report pain at worst of 5/10 or less -goal met, 10/26/2020  4. Pt will demonstrate ability to stand for up to 30 minutes without increased pain in low back -goal met, 10/26/2020   5. Pt will demonstrate centralization of sciatica on L -goal met, 11/5/2020        Long Term Goals: 8 weeks   1. Pt will demonstrate 5/5 strength in B LEs in order to improve her ability to perform gait and functional mobility -progressing, not met   2. Pt will demonstrate ability to walk up to 3 miles as part of exercise routine pain free in low back -progressing, not met   3. Pt will report no sciatica pain down her LLE -progressing, not met   4. Pt will improve BRENDA disability score to 20% disability or less in order to demonstrate improved overall QOL. -progressing, not met   5. Pt will demonstrate good knowledge of self management of sciatica should sciatica re-occur.     Plan     Continue PT per POC, assess tolerance to today's session,  progress exercise as tolerated and appropriate    Plan of care Certification: 9/17/2020 to 12/17/2020.       Kami Maguire, PT, DPT  11/12/2020

## 2020-11-10 NOTE — TELEPHONE ENCOUNTER
Patient reached and would like help reapplying to [employer] for [employer] financial assistance. Will email patient her portion and she will return to [facility].

## 2020-11-12 ENCOUNTER — CLINICAL SUPPORT (OUTPATIENT)
Dept: REHABILITATION | Facility: HOSPITAL | Age: 65
End: 2020-11-12
Payer: MEDICARE

## 2020-11-12 DIAGNOSIS — M47.819 PERIPHERAL SPONDYLOARTHRITIS: ICD-10-CM

## 2020-11-12 DIAGNOSIS — M54.42 CHRONIC BILATERAL LOW BACK PAIN WITH LEFT-SIDED SCIATICA: ICD-10-CM

## 2020-11-12 DIAGNOSIS — R26.2 DIFFICULTY WALKING: ICD-10-CM

## 2020-11-12 DIAGNOSIS — R53.1 GENERALIZED WEAKNESS: ICD-10-CM

## 2020-11-12 DIAGNOSIS — G89.29 CHRONIC BILATERAL LOW BACK PAIN WITH LEFT-SIDED SCIATICA: ICD-10-CM

## 2020-11-12 PROCEDURE — 97110 THERAPEUTIC EXERCISES: CPT | Mod: PN

## 2020-11-12 RX ORDER — ETANERCEPT 50 MG/ML
50 SOLUTION SUBCUTANEOUS WEEKLY
Qty: 12 ML | Refills: 0 | Status: SHIPPED | OUTPATIENT
Start: 2020-11-12 | End: 2021-06-15 | Stop reason: SDUPTHER

## 2020-11-12 NOTE — PROGRESS NOTES
"  Physical Therapy Treatment Note     Name: Jill Marquez  Clinic Number: [id number]    Therapy Diagnosis:   Encounter Diagnoses   Name Primary?    Chronic bilateral low back pain with left-sided sciatica     Generalized weakness     Difficulty walking      Physician: Ambrose Gupta, *    Visit Date: 11/18/2020    Physician Orders: PT Eval and Treat   Medical Diagnosis from Referral:   M25.551,M25.552 (ICD-10-CM) - Bilateral hip pain   M54.40,G89.29 (ICD-10-CM) - Chronic low back pain with sciatica, sciatica laterality unspecified, unspecified back pain laterality   Evaluation Date: 9/17/2020   PN DUE: 11/26/2020  Authorization Period Expiration: 9/9/21  Plan of Care Expiration: 12/17/2020  Visit # / Visits authorized:  12/ 20 (+eval)     Time In: 12:45 pm     Time Out: 1:30 pm       Total Billable Time: 45 minutes    Precautions: Standard    Subjective     Pt reports that she is still feeling better and that her L hip is feeling better. She states that she still has problems with the bursitis in her L hip at times, especially at night.    She was compliant with home exercise program.  Response to previous treatment: reports that she felt sore  Functional change: states that she is standing and walking better     Pain: 1/10   Location:  L LE and low back      Objective      TREATMENT:  Jill received therapeutic exercises (exercises performed today are bolded) to develop strength, endurance, ROM, flexibility, posture and core stabilization for 45 minutes including:     Piriformis stretch 4 x 30"  Single knee to stretch 10" holds x 5 reps   Sciatic nerve glides 5 pumps while up x 5 reps   hooklying hip abduction GTB x 1'  TA marches GTB 2 x 1'  Hip adduction squeeze 3 x 10  ball and belt 3" hold x 15   PPT to APT 3" hold 2 x 10   supine ITB stretch with strap 3 x 30" each   Bridge with PPT x 10  Bridge with GTB at knees x 10   Bridge with GTB at hips x 10   bridge with ball squeeze x 10   SLR 2 x 10 reps " "each LE added PPT with #1.5   clamshells GTB 2 x 10 each LE   reverse clamshells 2 x 10   Side-lying hip abduction 2 x 15  Prone hip extension x 10 reps each   prone hip flexion stretch 2 x 30"     Lunge hip flexion stretch 2 x 30"     Seated EOM:  marches x 1'  TA activation x 1'  seated hip abduction GTB 2 x 10   hip adduction squeeze x1'   LAQ x 1'      LSU on 6" step   Steamboats on 2" x 10 each   standing glute med isolation x 10 reps each   standing hip abduction GTB 2 x 10 each          Jill received the following manual therapy techniques (exercises performed today are bolded) for 0 minutes, including:     MET for L AI rotational correction deferred  shotgunning for L AI rotation deferred  Manual stretching of L piriformis  Manual stretching of L glutes  STM to L tensor fascia sheryl, piriformis and gluteal musculature         Pt received unattended IFC to L piriformis and sciatic nerve with moist heat for 0 minutes.       Home Exercises Provided and Patient Education Provided     Education provided:   - pt educated to continue with previously issued HEP    Written Home Exercises Provided: yes.  Exercises were reviewed and Jill was able to demonstrate them prior to the end of the session.  Jill demonstrated good  understanding of the education provided.     See EMR under Patient Instructions for exercises provided 11/2/2020.    Assessment     Pt tolerated session well with no complaints. She continues to demonstrate improved LLE strength as well as improved core stability and postural control. Pt will continue to benefit from skilled PT intervention in order to further progress core stability and postural control needed for improved QOL and return to PLOF.     Jill is progressing well towards her goals.   Pt prognosis is Good.     Pt will continue to benefit from skilled outpatient physical therapy to address the deficits listed in the problem list box on initial evaluation, provide pt/family education " and to maximize pt's level of independence in the home and community environment.     Pt's spiritual, cultural and educational needs considered and pt agreeable to plan of care and goals.     Anticipated Barriers for therapy: none anticipated    Goals:   Short Term Goals: 4 weeks   1.Pt will be independent with HEP performance in order to continue PT progress at home. -goal met, 10/26/2020  2. Pt will present 3 consecutive sessions with no rotational fault. -goal met, 10/26/2020  3. Pt will report pain at worst of 5/10 or less -goal met, 10/26/2020  4. Pt will demonstrate ability to stand for up to 30 minutes without increased pain in low back -goal met, 10/26/2020   5. Pt will demonstrate centralization of sciatica on L -goal met, 11/5/2020        Long Term Goals: 8 weeks   1. Pt will demonstrate 5/5 strength in B LEs in order to improve her ability to perform gait and functional mobility -progressing, not met   2. Pt will demonstrate ability to walk up to 3 miles as part of exercise routine pain free in low back -progressing, not met   3. Pt will report no sciatica pain down her LLE -progressing, not met   4. Pt will improve BRENDA disability score to 20% disability or less in order to demonstrate improved overall QOL. -progressing, not met   5. Pt will demonstrate good knowledge of self management of sciatica should sciatica re-occur.     Plan     Continue PT per POC, assess tolerance to today's session,  progress exercise as tolerated and appropriate    Plan of care Certification: 9/17/2020 to 12/17/2020.       Kami Maguire, PT, DPT  11/18/2020

## 2020-11-18 ENCOUNTER — CLINICAL SUPPORT (OUTPATIENT)
Dept: REHABILITATION | Facility: HOSPITAL | Age: 65
End: 2020-11-18
Payer: MEDICARE

## 2020-11-18 DIAGNOSIS — R26.2 DIFFICULTY WALKING: ICD-10-CM

## 2020-11-18 DIAGNOSIS — G89.29 CHRONIC BILATERAL LOW BACK PAIN WITH LEFT-SIDED SCIATICA: ICD-10-CM

## 2020-11-18 DIAGNOSIS — M54.42 CHRONIC BILATERAL LOW BACK PAIN WITH LEFT-SIDED SCIATICA: ICD-10-CM

## 2020-11-18 DIAGNOSIS — R53.1 GENERALIZED WEAKNESS: ICD-10-CM

## 2020-11-18 PROCEDURE — 97110 THERAPEUTIC EXERCISES: CPT | Mod: PN

## 2020-11-20 NOTE — TELEPHONE ENCOUNTER
Contacted [employer] to check status of patient's Enbrel renewal. Rep Rodriguez reports that patient was approved 11/19 for 2021. She will fax approval letter to [facility].

## 2020-11-20 NOTE — TELEPHONE ENCOUNTER
Patient notified of Enbrel approval through [employer] for 2021. She will notify [facility] if she has any questions or concerns.

## 2020-11-20 NOTE — PROGRESS NOTES
"  Physical Therapy Treatment Note     Name: Jill Marquez  Clinic Number: [id number]    Therapy Diagnosis:   Encounter Diagnoses   Name Primary?    Chronic bilateral low back pain with left-sided sciatica     Generalized weakness     Difficulty walking      Physician: Ambrose Gupta, *    Visit Date: 11/23/2020    Physician Orders: PT Eval and Treat   Medical Diagnosis from Referral:   M25.551,M25.552 (ICD-10-CM) - Bilateral hip pain   M54.40,G89.29 (ICD-10-CM) - Chronic low back pain with sciatica, sciatica laterality unspecified, unspecified back pain laterality   Evaluation Date: 9/17/2020   PN DUE: 11/26/2020  Authorization Period Expiration: 9/9/21  Plan of Care Expiration: 12/17/2020  Visit # / Visits authorized:  13/ 20 (+eval)     Time In:  10:55     Time Out: 11:40    Total Billable Time: 45 minutes    Precautions: Standard    Subjective     Pt reports Feeling great, no problems and she has been doing her home exercises.  She was compliant with home exercise program.  Response to previous treatment: reports that she felt sore  Functional change: states that she is standing and walking better     Pain: 1/10   Location:  L LE and low back      Objective      TREATMENT:  Jill received therapeutic exercises (exercises performed today are bolded) to develop strength, endurance, ROM, flexibility, posture and core stabilization for 30 minutes including:     Piriformis stretch 4 x 30"  Single knee to stretch 10" holds x 5 reps   Sciatic nerve glides 5 pumps while up x 5 reps   hooklying hip abduction GTB x 1'  TA marches GTB 2 x 1'  Hip adduction squeeze 3 x 10  ball and belt 3" hold x 15   PPT to APT 3" hold 2 x 10   supine ITB stretch with strap 3 x 30" each   Bridge with PPT x 10  Bridge with GTB at knees x 10   Bridge with GTB at hips x 10   bridge with ball squeeze x 10    SLR 2 x 10 reps each LE added PPT with #1.5   clamshells GTB 2 x 10 each LE   reverse clamshells 2 x 10   Side-lying hip " "abduction 2 x 15  Prone hip extension x 10 reps each   prone hip flexion stretch 2 x 30"     Lunge hip flexion stretch 2 x 30"     Seated EOM:  marches x 1'  TA activation x 1'  seated hip abduction GTB 2 x 10   hip adduction squeeze x1'   LAQ x 1'      LSU on 6" step   Steamboats on 2" x 10 each   standing glute med isolation x 10 reps each   standing hip abduction GTB 2 x 10 each          Jill received the following manual therapy techniques (exercises performed today are bolded) for 15 minutes, including:     MET for L AI rotational correction deferred  shotgunning for L AI rotation deferred  Manual PNF contract-relax stretching of L piriformis  Manual PNF contract-relax stretching of L glutes  STM to L tensor fascia sheryl, piriformis, and gluteal musculature    Myofascial release under active stretch to berna IT band and L tensor fascia sheryl  Positional release to L TFL at 90, 100, and 110 degrees of hip flexion  STM to L IT band prior to stretching.       Pt received unattended IFC to L piriformis and sciatic nerve with moist heat for 0 minutes.       Home Exercises Provided and Patient Education Provided     Education provided:   - pt educated to continue with previously issued HEP    Written Home Exercises Provided: yes.  Exercises were reviewed and Jill was able to demonstrate them prior to the end of the session.  Jill demonstrated good  understanding of the education provided.     See EMR under Patient Instructions for exercises provided 11/2/2020.    Assessment     Pt presents today with reduced pain and improved mobility and gait. Noted significant tension in L TFL, glutes, bilateral IT band relieved by manual therapy. Pt L TFL responded well to positional release, with reports of reduced tension and increased hip extension ROM. Pt tolerated exercises well today with no reproduction of pain and appropriate fatigue. Pt will benefit from cont skilled PT and progression of strengthening and flexibility " exercises.    Jill is progressing well towards her goals.   Pt prognosis is Good.     Pt will continue to benefit from skilled outpatient physical therapy to address the deficits listed in the problem list box on initial evaluation, provide pt/family education and to maximize pt's level of independence in the home and community environment.     Pt's spiritual, cultural and educational needs considered and pt agreeable to plan of care and goals.     Anticipated Barriers for therapy: none anticipated    Goals:   Short Term Goals: 4 weeks   1.Pt will be independent with HEP performance in order to continue PT progress at home. -goal met, 10/26/2020  2. Pt will present 3 consecutive sessions with no rotational fault. -goal met, 10/26/2020  3. Pt will report pain at worst of 5/10 or less -goal met, 10/26/2020  4. Pt will demonstrate ability to stand for up to 30 minutes without increased pain in low back -goal met, 10/26/2020   5. Pt will demonstrate centralization of sciatica on L -goal met, 11/5/2020        Long Term Goals: 8 weeks   1. Pt will demonstrate 5/5 strength in B LEs in order to improve her ability to perform gait and functional mobility -progressing, not met   2. Pt will demonstrate ability to walk up to 3 miles as part of exercise routine pain free in low back -progressing, not met   3. Pt will report no sciatica pain down her LLE -progressing, not met   4. Pt will improve BRENDA disability score to 20% disability or less in order to demonstrate improved overall QOL. -progressing, not met   5. Pt will demonstrate good knowledge of self management of sciatica should sciatica re-occur.     Plan     Continue PT per POC, assess tolerance to today's session,  progress exercise as tolerated and appropriate     Plan of care Certification: 9/17/2020 to 12/17/2020.       Amandeep Bowen, PTA  11/23/2020

## 2020-11-23 ENCOUNTER — CLINICAL SUPPORT (OUTPATIENT)
Dept: REHABILITATION | Facility: HOSPITAL | Age: 65
End: 2020-11-23
Payer: MEDICARE

## 2020-11-23 DIAGNOSIS — M54.42 CHRONIC BILATERAL LOW BACK PAIN WITH LEFT-SIDED SCIATICA: ICD-10-CM

## 2020-11-23 DIAGNOSIS — R53.1 GENERALIZED WEAKNESS: ICD-10-CM

## 2020-11-23 DIAGNOSIS — R26.2 DIFFICULTY WALKING: ICD-10-CM

## 2020-11-23 DIAGNOSIS — G89.29 CHRONIC BILATERAL LOW BACK PAIN WITH LEFT-SIDED SCIATICA: ICD-10-CM

## 2020-11-23 PROCEDURE — 97140 MANUAL THERAPY 1/> REGIONS: CPT | Mod: PN,CQ

## 2020-11-23 PROCEDURE — 97110 THERAPEUTIC EXERCISES: CPT | Mod: PN,CQ

## 2020-11-24 ENCOUNTER — OFFICE VISIT (OUTPATIENT)
Dept: UROGYNECOLOGY | Facility: CLINIC | Age: 65
End: 2020-11-24
Payer: MEDICARE

## 2020-11-24 VITALS
SYSTOLIC BLOOD PRESSURE: 110 MMHG | BODY MASS INDEX: 21.53 KG/M2 | HEIGHT: 64 IN | DIASTOLIC BLOOD PRESSURE: 60 MMHG | WEIGHT: 126.13 LBS

## 2020-11-24 DIAGNOSIS — N95.2 ATROPHIC VAGINITIS: ICD-10-CM

## 2020-11-24 DIAGNOSIS — Z01.419 WELL WOMAN EXAM: Primary | ICD-10-CM

## 2020-11-24 PROCEDURE — 99999 PR PBB SHADOW E&M-EST. PATIENT-LVL IV: CPT | Mod: PBBFAC,,, | Performed by: NURSE PRACTITIONER

## 2020-11-24 PROCEDURE — 99214 OFFICE O/P EST MOD 30 MIN: CPT | Mod: PBBFAC | Performed by: NURSE PRACTITIONER

## 2020-11-24 PROCEDURE — G0101 PR CA SCREEN;PELVIC/BREAST EXAM: ICD-10-PCS | Mod: S$PBB,,, | Performed by: NURSE PRACTITIONER

## 2020-11-24 PROCEDURE — G0101 CA SCREEN;PELVIC/BREAST EXAM: HCPCS | Mod: PBBFAC | Performed by: NURSE PRACTITIONER

## 2020-11-24 PROCEDURE — 99999 PR PBB SHADOW E&M-EST. PATIENT-LVL IV: ICD-10-PCS | Mod: PBBFAC,,, | Performed by: NURSE PRACTITIONER

## 2020-11-24 PROCEDURE — G0101 CA SCREEN;PELVIC/BREAST EXAM: HCPCS | Mod: S$PBB,,, | Performed by: NURSE PRACTITIONER

## 2020-11-24 NOTE — PROGRESS NOTES
MARK GONZALE[facility] [address]  [address]  [facility] [id number]    Jill Marquez  [id number]  1955      Jill Marquez is a 65 y.o. here for an annual exam    10/19/15  Title of Operation:  1) Robotic-assisted laparoscopic lysis of adhesions  2) Robotic-assisted laparoscopic bilateral salpingo-oophorectomy  3) Robotic-assisted laparoscopic sacral colpopexy with polypropylene mesh  4) Placement of retropubic tension-free midurethral sling, RetroARC (AMS)  5) Cystourethroscopy    Indications for Surgery:  1) UI: (+) RICK > (+) UUI - mornings only. (--) pads:Changes underwear. May wear pads if on a trip or away from home for an extended time. Daytime frequency: Q 3-4 hours. Nocturia: No: (--) dysuria, (--) hematuria, (--) frequent UTIs. (--) complete bladder emptying. Leans forward to void. Has some urgency in the morning.  --SUDS 10/6/15:  3. URETHRAL FUNCTION/STORAGE PHASE:  a. WITH prolapse reduction:  · CLPP (150 mL): Negative at 162 cm H20  · VLPP (150 mL): Negative at 165 cm H20  · CLPP (300 mL): Negative at 150 cm H20  · VLPP (300 mL): Negative at 147 cm H20  · CLPP (409 mL): Negative at 148 cm H20  · VLPP (409 mL): Negative at 171 cm H20  · CLPP (MAX ): Negative at 155 cm H20  · VLPP (MAX): Negative at 145 cm H20  · POS CLPP and VLPP after removal of pves catheter.  These findings are consistent with Positive urodynamic stress incontinence only at max capacity with POP reduction and removal of pves catheter.  Assessment:  UF prolonged but normal. PF prolonged but normal. Compliance normal. Max capacity 600 mL. DO (--). RICK (+).   --cysto 10/6/15: Normal with slight decrease coaptation and minimal trabeculations.  2) POP: Present. below introitus. Symptoms:(--) (--) vaginal bleeding. (--) vaginal discharge. (+) sexually active. (--) dyspareunia. (+) Vaginal dryness. (--) vaginal estrogen use.   --POP-Q- per Dr. Ballesteros: Aa +3; Ba +3; C -4; Ap -2; Bp -2--standing; Genital hiatus 2 cm, perineal body 1cm  total vaginal length 9cm.   3) BM: (+) constipation/straining. (--) chronic diarrhea. (--) hematochezia. (--) fecal incontinence. (--) fecal smearing/urgency. (--) incomplete evacuation. Using metamucil 2 capsules twice daily and correctol twice weekly.    Preoperative Diagnosis:  1) Cystocele  2) Vaginal vault prolapse  3) Mixed urinary incontinence  4) Vaginal atrophy  5) Urodynamic stress incontinence    Postoperative Diagnosis:  1) Cystocele  2) Vaginal vault prolapse  3) Mixed urinary incontinence  4) Vaginal atrophy  5) Urodynamic stress incontinence       Past Medical History:   Diagnosis Date    DJD (degenerative joint disease) of lumbar spine 3/10/2014    HTN (hypertension) 7/23/2012    Hyperlipidemia     Hypothyroid 7/23/2012       Past Surgical History:   Procedure Laterality Date    COLONOSCOPY N/A 1/25/2016    Procedure: COLONOSCOPY;  Surgeon: DAYNA Simmons MD;  Location: [address]);  Service: Endoscopy;  Laterality: N/A;    COSMETIC SURGERY      EYE SURGERY      eyelid surgery      HYSTERECTOMY  2004    prolapse    OOPHORECTOMY Bilateral 2015    Tonsillectomy      TONSILLECTOMY         Family History   Problem Relation Age of Onset    Diabetes Brother     Retinal detachment Brother     Cancer Brother         kidney    Cataracts Brother     Diabetes Brother     Cancer Brother         throat    Cataracts Brother     Diabetes Brother     Cataracts Brother     No Known Problems Mother     No Known Problems Father     Lupus Other         niece    No Known Problems Sister     No Known Problems Maternal Aunt     No Known Problems Maternal Uncle     No Known Problems Paternal Aunt     No Known Problems Paternal Uncle     No Known Problems Maternal Grandmother     No Known Problems Maternal Grandfather     No Known Problems Paternal Grandmother     No Known Problems Paternal Grandfather     Breast cancer Neg Hx     Colon cancer Neg Hx     Ovarian cancer Neg Hx      Blindness Neg Hx     Glaucoma Neg Hx     Hypertension Neg Hx     Macular degeneration Neg Hx     Strabismus Neg Hx     Stroke Neg Hx     Thyroid disease Neg Hx     Amblyopia Neg Hx     Rheum arthritis Neg Hx     Psoriasis Neg Hx     Inflammatory bowel disease Neg Hx     Cervical cancer Neg Hx     Endometrial cancer Neg Hx     Vaginal cancer Neg Hx     Uterine cancer Neg Hx        Social History     Socioeconomic History    Marital status:      Spouse name: Not on file    Number of children: 2    Years of education: Not on file    Highest education level: Not on file   Occupational History    Not on file   Social Needs    Financial resource strain: Not hard at all    Food insecurity     Worry: Never true     Inability: Never true    Transportation needs     Medical: No     Non-medical: No   Tobacco Use    Smoking status: Former Smoker     Years: 12.00     Types: Cigarettes     Quit date: 1982     Years since quittin.5    Smokeless tobacco: Never Used    Tobacco comment: , homemaker, 2 children   Substance and Sexual Activity    Alcohol use: Yes     Alcohol/week: 0.0 standard drinks     Frequency: 4 or more times a week     Drinks per session: 1 or 2     Binge frequency: Never     Comment: 2 cocktails    Drug use: No    Sexual activity: Yes     Partners: Male     Birth control/protection: Surgical, None   Lifestyle    Physical activity     Days per week: 5 days     Minutes per session: 60 min    Stress: Rather much   Relationships    Social connections     Talks on phone: More than three times a week     Gets together: More than three times a week     Attends Yazidism service: Not on file     Active member of club or organization: Yes     Attends meetings of clubs or organizations: 1 to 4 times per year     Relationship status:    Other Topics Concern    Not on file   Social History Narrative    Not on file       Current Outpatient Medications    Medication Sig Dispense Refill    amLODIPine (NORVASC) 10 MG tablet TAKE 1 TABLET (10 MG TOTAL) BY MOUTH ONCE DAILY. 90 tablet 0    azelastine (ASTELIN) 137 mcg (0.1 %) nasal spray 1 spray (137 mcg total) by Nasal route 2 (two) times daily. 30 mL 2    conjugated estrogens (PREMARIN) vaginal cream APPLY 0.5 GRAMS WITH APPLICATOR OR DIME-SIZED AMOUNT TO VAGINA TWICE WEEKLY 30 g 3    ergocalciferol, vitamin D2, (VITAMIN D ORAL)       etanercept (ENBREL) 50 mg/mL (1 mL) injection Inject 1 mL (50 mg total) into the skin once a week. 12 mL 0    fish oil-omega-3 fatty acids 300-1,000 mg capsule Take 2 g by mouth once daily.      fluticasone propionate (FLONASE) 50 mcg/actuation nasal spray INHALE 2 SPRAYS EN QD 48 g 1    gabapentin (NEURONTIN) 300 MG capsule Take 1 capsule (300 mg total) by mouth As instructed. Take one capsule every morning and after noon, and two at bedtime (4 capsules total daily)      L. ACIDOPHILUS/BIFID. ANIMALIS (ONE-A-DAY TRUBIOTICS ORAL) Take by mouth.      PSYLLIUM SEED, WITH SUGAR, (METAMUCIL ORAL) Take by mouth.      SYNTHROID 75 mcg tablet TAKE 1 TABLET (75 MCG TOTAL) BY MOUTH BEFORE BREAKFAST. 90 tablet 2    citalopram (CELEXA) 10 MG tablet Take 1 tablet (10 mg total) by mouth once daily. (Patient not taking: Reported on 3/10/2020) 30 tablet 11     No current facility-administered medications for this visit.        Review of patient's allergies indicates:   Allergen Reactions    Sulfasalazine Nausea Only     Malaise, nausea,     Last pap: 12/2005- normal- patient is post hyst  Last mammogram: 05/2020normal  Colonoscopy: 01/25/2016 one polyp- normal- repeat 2026  DEXA: 12/2019 No evidence of significant bone density loss    ROS:  Feeling well.   No dyspnea or chest pain on exertion.    No abdominal pain, change in bowel habits, black or bloody stools.  Taking colace and metamucil daily  No urinary tract symptoms. No urgency, frequency or incontinence  GYN ROS: no breast pain or  new or enlarging lumps on self exam, no vaginal bleeding. Using vaginal estrogen cream  No neurological complaints.    OBJECTIVE:   Vitals:    11/24/20 1053   BP: 110/60        The patient appears well, alert, oriented x 3, in no distress.  ENT normal.  Neck supple. No adenopathy or thyromegaly. MARYAN. .   Abdomen soft without tenderness, guarding, mass or organomegaly.   Extremities show no edema, normal peripheral pulses.   Neurological is normal, no focal findings.    PELVIC EXAM:   VULVA: normal appearing vulva with no masses, tenderness or lesions,   VAGINA: normal appearing vagina with normal color and discharge, no lesions, atrophic, no mesh visible/ palpable. Sling path nontender.   CERVIX: surgically absent,   UTERUS: absent,   ADNEXA: no masses,   RECTAL: normal rectal, no masses, small nonthrombosed external hemorrhoid      Impression  1. Well woman exam     2. Atrophic vaginitis       We reviewed the above issues and discussed options for short-term versus long-term management of her problems.   Plan:      1.  Always get help lifting 50# or more.     2. Vaginal health:  Vaginal atrophy (dryness):  Use 0.5 gram of estrogen cream in vagina  twice a week    3. H/o constipation:  Continue to avoid straining.  Continue stool softeners/fiber.          4. mammogram is due 05/2021   5. RTC 1year for follow up      30 minutes were spent in face to face time with this patient  90 % of this time was spent in counseling and/or coordination of care    STACY Thomson-BC [facility]  Division of Female Pelvic Medicine and Reconstructive Surgery  Department of Obstetrics & Gynecology

## 2020-11-24 NOTE — PROGRESS NOTES
"  Physical Therapy Treatment Note     Name: Jill Marquez  Clinic Number: [id number]    Therapy Diagnosis:   No diagnosis found.  Physician: Ambrose Gupta, *    Visit Date: 11/25/2020    Physician Orders: PT Eval and Treat   Medical Diagnosis from Referral:   M25.551,M25.552 (ICD-10-CM) - Bilateral hip pain   M54.40,G89.29 (ICD-10-CM) - Chronic low back pain with sciatica, sciatica laterality unspecified, unspecified back pain laterality   Evaluation Date: 9/17/2020   PN DUE: 11/26/2020  Authorization Period Expiration: 9/9/21  Plan of Care Expiration: 12/17/2020  Visit # / Visits authorized:  14/ 20 (+eval)     Time In:  10:00     Time Out: 10:45     Total Billable Time: 45 minutes    Precautions: Standard    Subjective     Pt reports no new complaints. She states that overall she has been feeling much better and able to be more active with her .   She was compliant with home exercise program.  Response to previous treatment: reports that she felt sore  Functional change: states that she is standing and walking better     Pain: 1/10   Location:  L LE and low back      Objective      TREATMENT:  Jill received therapeutic exercises (exercises performed today are bolded) to develop strength, endurance, ROM, flexibility, posture and core stabilization for 30 minutes including:     Piriformis stretch 4 x 30"  Single knee to stretch 10" holds x 5 reps   Seated Sciatic nerve glides 5 pumps while up x 5 reps   hooklying hip abduction GTB x 1'  TA marches GTB 2 x 1'  Hip adduction squeeze 3 x 10  ball and belt 3" hold x 15   PPT to APT 3" hold 2 x 10   supine ITB stretch with strap 3 x 30" each   Bridge with PPT x 10  +single leg bridge x 10 each LE  +straight leg bridges on ball x 10 reps   Bridge with GTB at knees x 10   Bridge with GTB at hips x 10   bridge with ball squeeze x 10    SLR 2 x 10 reps each LE added PPT with #1.5   clamshells GTB 2 x 10 each LE   reverse clamshells 2 x 10   Side-lying hip " "abduction 2 x 15  Prone hip extension with knee bent 2 x 10 reps each          LSU on 6" step   Steamboats on 2" x 10 each   standing glute med isolation x 10 reps each   +suitcase carry handoffs #7 x 2 laps   +contraweighted farmer carries #7 and #5 x 2 laps  standing hip abduction GTB 2 x 10 each          Jill received the following manual therapy techniques (exercises performed today are bolded) for 0 minutes, including:     MET for L AI rotational correction deferred  shotgunning for L AI rotation deferred  Manual PNF contract-relax stretching of L piriformis  Manual PNF contract-relax stretching of L glutes  STM to L tensor fascia sheryl, piriformis, and gluteal musculature    Myofascial release under active stretch to berna IT band and L tensor fascia sheryl  Positional release to L TFL at 90, 100, and 110 degrees of hip flexion  STM to L IT band prior to stretching.       Home Exercises Provided and Patient Education Provided     Education provided:   - pt educated to continue with previously issued HEP    Written Home Exercises Provided: yes.  Exercises were reviewed and Jill was able to demonstrate them prior to the end of the session.  Jill demonstrated good  understanding of the education provided.     See EMR under Patient Instructions for exercises provided 11/2/2020.    Assessment     Pt tolerated session well with no complaints. She continues to demonstrate improved core and pelvic stability and improved exercise tolerance and tolerance of progressed exercise. Pt will continue to benefit from skilled PT intervention in order to further progress core stability and postural control  Jill is progressing well towards her goals.   Pt prognosis is Good.     Pt will continue to benefit from skilled outpatient physical therapy to address the deficits listed in the problem list box on initial evaluation, provide pt/family education and to maximize pt's level of independence in the home and community " environment.     Pt's spiritual, cultural and educational needs considered and pt agreeable to plan of care and goals.     Anticipated Barriers for therapy: none anticipated    Goals:   Short Term Goals: 4 weeks   1.Pt will be independent with HEP performance in order to continue PT progress at home. -goal met, 10/26/2020  2. Pt will present 3 consecutive sessions with no rotational fault. -goal met, 10/26/2020  3. Pt will report pain at worst of 5/10 or less -goal met, 10/26/2020  4. Pt will demonstrate ability to stand for up to 30 minutes without increased pain in low back -goal met, 10/26/2020   5. Pt will demonstrate centralization of sciatica on L -goal met, 11/5/2020        Long Term Goals: 8 weeks   1. Pt will demonstrate 5/5 strength in B LEs in order to improve her ability to perform gait and functional mobility -progressing, not met   2. Pt will demonstrate ability to walk up to 3 miles as part of exercise routine pain free in low back -progressing, not met   3. Pt will report no sciatica pain down her LLE -progressing, not met   4. Pt will improve BRENDA disability score to 20% disability or less in order to demonstrate improved overall QOL. -progressing, not met   5. Pt will demonstrate good knowledge of self management of sciatica should sciatica re-occur.     Plan     Continue PT per POC, assess tolerance to today's session,  progress exercise as tolerated and appropriate     Plan of care Certification: 9/17/2020 to 12/17/2020.       Kami Maguire, PT, DPT  11/25/2020

## 2020-11-25 ENCOUNTER — CLINICAL SUPPORT (OUTPATIENT)
Dept: REHABILITATION | Facility: HOSPITAL | Age: 65
End: 2020-11-25
Payer: MEDICARE

## 2020-11-25 DIAGNOSIS — G89.29 CHRONIC BILATERAL LOW BACK PAIN WITH LEFT-SIDED SCIATICA: ICD-10-CM

## 2020-11-25 DIAGNOSIS — R53.1 GENERALIZED WEAKNESS: ICD-10-CM

## 2020-11-25 DIAGNOSIS — M54.42 CHRONIC BILATERAL LOW BACK PAIN WITH LEFT-SIDED SCIATICA: ICD-10-CM

## 2020-11-25 DIAGNOSIS — R26.2 DIFFICULTY WALKING: ICD-10-CM

## 2020-11-25 PROCEDURE — 97110 THERAPEUTIC EXERCISES: CPT | Mod: PN

## 2020-11-30 ENCOUNTER — CLINICAL SUPPORT (OUTPATIENT)
Dept: REHABILITATION | Facility: HOSPITAL | Age: 65
End: 2020-11-30
Payer: MEDICARE

## 2020-11-30 DIAGNOSIS — R26.2 DIFFICULTY WALKING: ICD-10-CM

## 2020-11-30 DIAGNOSIS — G89.29 CHRONIC BILATERAL LOW BACK PAIN WITH LEFT-SIDED SCIATICA: ICD-10-CM

## 2020-11-30 DIAGNOSIS — R53.1 GENERALIZED WEAKNESS: ICD-10-CM

## 2020-11-30 DIAGNOSIS — M54.42 CHRONIC BILATERAL LOW BACK PAIN WITH LEFT-SIDED SCIATICA: ICD-10-CM

## 2020-11-30 PROCEDURE — 97140 MANUAL THERAPY 1/> REGIONS: CPT | Mod: PN,CQ

## 2020-11-30 PROCEDURE — 97110 THERAPEUTIC EXERCISES: CPT | Mod: PN,CQ

## 2020-11-30 NOTE — PROGRESS NOTES
"  Physical Therapy Treatment Note     Name: Jill Marquez  Clinic Number: [id number]    Therapy Diagnosis:   Encounter Diagnoses   Name Primary?    Chronic bilateral low back pain with left-sided sciatica     Generalized weakness     Difficulty walking      Physician: Ambrose Gupta, *    Visit Date: 11/30/2020    Physician Orders: PT Eval and Treat   Medical Diagnosis from Referral:   M25.551,M25.552 (ICD-10-CM) - Bilateral hip pain   M54.40,G89.29 (ICD-10-CM) - Chronic low back pain with sciatica, sciatica laterality unspecified, unspecified back pain laterality   Evaluation Date: 9/17/2020   PN DUE: 11/26/2020  Authorization Period Expiration: 9/9/21  Plan of Care Expiration: 12/17/2020  Visit # / Visits authorized:  15/ 20 (+eval)   PTA Visit #: 1    Time In:  10:01    Time Out: 10:45  Total Billable Time: 44 minutes    Precautions: Standard    Subjective     Pt reports no new complaints. She has been feeling much better lately  She was compliant with home exercise program.  Response to previous treatment: reports that she felt sore  Functional change: states that she is standing and walking better     Pain: 1/10    Location:  L LE and low back      Objective      TREATMENT:  Jill received therapeutic exercises (exercises performed today are bolded) to develop strength, endurance, ROM, flexibility, posture and core stabilization for 35 minutes including:     Piriformis stretch 4 x 30"  Single knee to stretch 10" holds x 5 reps   Seated Sciatic nerve glides 5 pumps while up x 5 reps   hooklying hip abduction GTB x 1'  TA marches GTB 2 x 1'  Hip adduction squeeze 3 x 10  ball and belt 3" hold x 15   PPT to APT 3" hold 2 x 10   supine ITB stretch with strap 3 x 30" each *After STM  Bridge with PPT x 10  single leg bridge x 10 each LE  straight leg bridges on ball x 10 reps   Bridge with GTB at knees x 10   Bridge with GTB at hips x 10   bridge with ball squeeze x 20    SLR 2 x 10 reps each LE added PPT " "with #1.5   clamshells GTB 2 x 10 each LE   reverse clamshells 2 x 10   Side-lying hip abduction 2 x 15  Prone hip extension with knee bent 2 x 10 reps each          LSU on 6" step   Steamboats on 2" x 10 each   standing glute med isolation x 10 reps each   suitcase carry handoffs #7 x 2 laps   contraweighted farmer carries #7 and #5 x 2 laps  +B Heel taps from stairs fwd/lateral x10 ea.  +Tandem walking with 's carry 5# 2 laps  standing hip abduction GTB 2 x 10 each          Jill received the following manual therapy techniques (exercises performed today are bolded) for 10 minutes, including:     MET for L AI rotational correction deferred  shotgunning for L AI rotation deferred  Manual PNF contract-relax stretching of L piriformis  Manual PNF contract-relax stretching of L glutes  STM to L tensor fascia sheryl, piriformis, and gluteal musculature    Myofascial release under active stretch to berna IT band and L tensor fascia sheryl  Positional release to L TFL at 90, 100, and 110 degrees of hip flexion  STM to L IT band prior to stretching.       Home Exercises Provided and Patient Education Provided     Education provided:   - pt educated to continue with previously issued HEP  - pt educated to roll IT band with tennis ball at home, with return demo    Written Home Exercises Provided: yes.  Exercises were reviewed and Jill was able to demonstrate them prior to the end of the session.  Jill demonstrated good  understanding of the education provided.     See EMR under Patient Instructions for exercises provided 11/2/2020.    Assessment     Pt tolerated tx session well today with demo of improved ROM, strength and balance with no complaints. Pt with increased tolerance and improved form during SL hip abduction exercises. Pt maintaining good form during exercises and requires little correction, and demo of increased strength through tolerance of increased difficulty. Pt is progressing with balance exercises " well with demo of increased stability, and tolerance to progression. Pt responds well to positional release and STM to her IT band before stretching. Pt will benefit from cont skilled PT.    Jill is progressing well towards her goals.   Pt prognosis is Good.     Pt will continue to benefit from skilled outpatient physical therapy to address the deficits listed in the problem list box on initial evaluation, provide pt/family education and to maximize pt's level of independence in the home and community environment.     Pt's spiritual, cultural and educational needs considered and pt agreeable to plan of care and goals.     Anticipated Barriers for therapy: none anticipated    Goals:   Short Term Goals: 4 weeks   1.Pt will be independent with HEP performance in order to continue PT progress at home. -goal met, 10/26/2020  2. Pt will present 3 consecutive sessions with no rotational fault. -goal met, 10/26/2020  3. Pt will report pain at worst of 5/10 or less -goal met, 10/26/2020  4. Pt will demonstrate ability to stand for up to 30 minutes without increased pain in low back -goal met, 10/26/2020   5. Pt will demonstrate centralization of sciatica on L -goal met, 11/5/2020        Long Term Goals: 8 weeks   1. Pt will demonstrate 5/5 strength in B LEs in order to improve her ability to perform gait and functional mobility -progressing, not met   2. Pt will demonstrate ability to walk up to 3 miles as part of exercise routine pain free in low back -progressing, not met   3. Pt will report no sciatica pain down her LLE -progressing, not met   4. Pt will improve BRENDA disability score to 20% disability or less in order to demonstrate improved overall QOL. -progressing, not met   5. Pt will demonstrate good knowledge of self management of sciatica should sciatica re-occur.     Plan     Continue PT per POC, assess tolerance to today's session,  progress exercise as tolerated and appropriate      Plan of care Certification:  9/17/2020 to 12/17/2020.       Amandeep Bowen, PTA  11/30/2020

## 2020-12-01 NOTE — PROGRESS NOTES
"  Physical Therapy Treatment Note     Name: Jill Marquez  Clinic Number: [id number]    Therapy Diagnosis:   Encounter Diagnoses   Name Primary?    Chronic bilateral low back pain with left-sided sciatica     Generalized weakness     Difficulty walking      Physician: Ambrose Gupta, *    Visit Date: 12/3/2020    Physician Orders: PT Eval and Treat   Medical Diagnosis from Referral:   M25.551,M25.552 (ICD-10-CM) - Bilateral hip pain   M54.40,G89.29 (ICD-10-CM) - Chronic low back pain with sciatica, sciatica laterality unspecified, unspecified back pain laterality   Evaluation Date: 9/17/2020   PN DUE: 11/26/2020  Authorization Period Expiration: 9/9/21  Plan of Care Expiration: 12/17/2020  Visit # / Visits authorized:  16/ 20 (+eval)   PTA Visit #: 2    Time In:  11:00    Time Out: 11:45  Total Billable Time: 45 minutes    Precautions: Standard    Subjective     Pt reports She's really feeling a lot better lately, like it is almost better.  She was compliant with home exercise program.  Response to previous treatment: reports that she felt sore  Functional change: states that she is standing and walking better     Pain: 1/10    Location:  L LE and low back      Objective      TREATMENT:  Jill received therapeutic exercises (exercises performed today are bolded) to develop strength, endurance, ROM, flexibility, posture and core stabilization for 30 minutes including:     Piriformis stretch 4 x 30"  Single knee to stretch 30" holds x 3 reps   Seated Sciatic nerve glides 5 pumps while up x 5 reps   hooklying hip abduction GTB x 1'  TA marches GTB 2 x 1'  Hip adduction squeeze 3 x 10  ball and belt 3" hold x 15   PPT to APT 3" hold 2 x 10   supine ITB stretch with strap 3 x 30" each *After STM  Bridge with PPT x 10  single leg bridge x 10 each LE  straight leg bridges on ball 2 x 10 reps   Bent knee bridges on ball x 10  Bridge with GTB at knees x 10   Bridge with GTB at hips x 10   bridge with ball squeeze x " "20    SLR 2 x 10 reps each LE added PPT with #1.5   clamshells GTB 2 x 10 each LE   reverse clamshells 2 x 10   Side-lying hip abduction 2 x 15  Prone hip extension with knee bent 2 x 10 reps each          LSU on 6" step   Steamboats on 2" x 10 each   standing glute med isolation x 10 reps each   suitcase carry handoffs #7 x 2 laps   contraweighted farmer carries #7 and #5 x 2 laps  +B Heel taps from stairs fwd/lateral x10 ea.  +Tandem walking with 's carry 5# 2 laps    standing hip abduction GTB 2 x 10 each          Jill received the following manual therapy techniques (exercises performed today are bolded) for 15 minutes, including:     MET for L AI rotational correction deferred  shotgunning for L AI rotation deferred  Manual PNF contract-relax stretching of L piriformis  Manual PNF contract-relax stretching of L glutes  STM to L tensor fascia sheryl, piriformis, and gluteal musculature    Myofascial release under active stretch to berna IT band and L tensor fascia sheryl  Positional release to L TFL at 90, 100, and 110 degrees of hip flexion  STM to L IT band prior to stretching.       Home Exercises Provided and Patient Education Provided     Education provided:   - pt educated to continue with previously issued HEP  - pt educated to roll IT band with tennis ball at home, with return demo    Written Home Exercises Provided: yes.  Exercises were reviewed and Jill was able to demonstrate them prior to the end of the session.  Jill demonstrated good  understanding of the education provided.     See EMR under Patient Instructions for exercises provided 11/2/2020.    Assessment     Pt presents today with improved balance and posture, with demo of increased tolerance to exercise progression and improved form during exercises. Pt improved balance through demo of decreased sway and reduced need for step reactions during tandem walking exercises as well as increased tolerance to progression. Pt posture and gait " are showing improvement 2/2 reduced pain and increased ROM, stride length has increased on the L. Noted hypertonic L TFL which responded well to positional and myofascial release with reduction in tone, reports of decreased pain, and increased mobility during IT band stretches in supine. Noted reduced tension in left side IT band and piriformis compared to previous visit but still requires mobilization and stretching to further reduce pain. Pt will benefit from cont skilled PT and progression of balance and weightbearing exercises.    Jill is progressing well towards her goals.   Pt prognosis is Good.     Pt will continue to benefit from skilled outpatient physical therapy to address the deficits listed in the problem list box on initial evaluation, provide pt/family education and to maximize pt's level of independence in the home and community environment.     Pt's spiritual, cultural and educational needs considered and pt agreeable to plan of care and goals.     Anticipated Barriers for therapy: none anticipated    Goals:   Short Term Goals: 4 weeks   1.Pt will be independent with HEP performance in order to continue PT progress at home. -goal met, 10/26/2020  2. Pt will present 3 consecutive sessions with no rotational fault. -goal met, 10/26/2020  3. Pt will report pain at worst of 5/10 or less -goal met, 10/26/2020  4. Pt will demonstrate ability to stand for up to 30 minutes without increased pain in low back -goal met, 10/26/2020   5. Pt will demonstrate centralization of sciatica on L -goal met, 11/5/2020        Long Term Goals: 8 weeks   1. Pt will demonstrate 5/5 strength in B LEs in order to improve her ability to perform gait and functional mobility -progressing, not met   2. Pt will demonstrate ability to walk up to 3 miles as part of exercise routine pain free in low back -progressing, not met   3. Pt will report no sciatica pain down her LLE -progressing, not met   4. Pt will improve BRENDA disability  score to 20% disability or less in order to demonstrate improved overall QOL. -progressing, not met   5. Pt will demonstrate good knowledge of self management of sciatica should sciatica re-occur.     Plan     Continue PT per POC, assess tolerance to today's session,  progress exercise as tolerated and appropriate      Plan of care Certification: 9/17/2020 to 12/17/2020.       Amandeep Bowen, PANCHO  12/3/2020

## 2020-12-03 ENCOUNTER — CLINICAL SUPPORT (OUTPATIENT)
Dept: REHABILITATION | Facility: HOSPITAL | Age: 65
End: 2020-12-03
Payer: MEDICARE

## 2020-12-03 DIAGNOSIS — G89.29 CHRONIC BILATERAL LOW BACK PAIN WITH LEFT-SIDED SCIATICA: ICD-10-CM

## 2020-12-03 DIAGNOSIS — R26.2 DIFFICULTY WALKING: ICD-10-CM

## 2020-12-03 DIAGNOSIS — M54.42 CHRONIC BILATERAL LOW BACK PAIN WITH LEFT-SIDED SCIATICA: ICD-10-CM

## 2020-12-03 DIAGNOSIS — R53.1 GENERALIZED WEAKNESS: ICD-10-CM

## 2020-12-03 PROCEDURE — 97110 THERAPEUTIC EXERCISES: CPT | Mod: PN,CQ

## 2020-12-03 PROCEDURE — 97140 MANUAL THERAPY 1/> REGIONS: CPT | Mod: PN,CQ

## 2020-12-04 NOTE — PROGRESS NOTES
Physical Therapy Treatment Note     Name: Jill Marquez  Clinic Number: [id number]    Therapy Diagnosis:   Encounter Diagnoses   Name Primary?    Chronic bilateral low back pain with left-sided sciatica     Generalized weakness     Difficulty walking      Physician: Ambrose Gupta, *    Visit Date: 12/7/2020    Physician Orders: PT Eval and Treat   Medical Diagnosis from Referral:   M25.551,M25.552 (ICD-10-CM) - Bilateral hip pain   M54.40,G89.29 (ICD-10-CM) - Chronic low back pain with sciatica, sciatica laterality unspecified, unspecified back pain laterality   Evaluation Date: 9/17/2020   PN DUE: 1/7//2020  Authorization Period Expiration: 12/31/2020  Plan of Care Expiration: 2/17/21  Visit # / Visits authorized:  17/ 20 (+eval)   PTA Visit #: 0    Time In:  8:30 am     Time Out: 9:15 am   Total Billable Time: 45 minutes    Precautions: Standard    Subjective     Pt reports no new complaints. She states that overall she feels that PT has been helping and that she is feeling a lot better than when she first began therapy. Pt reports that she feels that she is getting more independent with HEP and pain management.   She was compliant with home exercise program.  Response to previous treatment: reports that she felt sore  Functional change: states that she is standing and walking better     Pain: 1/10    Location:  L LE and low back      Objective     MEASURES:  Lumbar Range of Motion:     % of normal motion Pain   Flexion 75    No          Extension 75    no         Left Side Bending 75 No         Right Side Bending 75 No          Left rotation    100 no         Right Rotation    100 No                Lower Extremity Strength  Right LE   Left LE     Knee extension: 4/5 Knee extension: 4/5   Knee flexion: 45 Knee flexion: 4/5   Hip flexion: 4/5 Hip flexion: 4/5   Hip extension:  4-/5 Hip extension: 4-/5   Hip abduction: 4-/5 Hip abduction: 4-/5   Hip adduction: 4-/5 Hip adduction 4-/5   Ankle  "dorsiflexion: 4/5 Ankle dorsiflexion: 4/5   Ankle plantarflexion: 4/5 Ankle plantarflexion: 4/5         BRENDA: 13/50 (26% disabiltiy)        TREATMENT:  Jill received therapeutic exercises (exercises performed today are bolded) to develop strength, endurance, ROM, flexibility, posture and core stabilization for 45 minutes including:     Piriformis stretch 4 x 30"  Single knee to stretch 30" holds x 3 reps   Seated Sciatic nerve glides 5 pumps while up x 5 reps   hooklying hip abduction GTB x 1'  TA marches GTB 2 x 1'  Hip adduction squeeze 3 x 10  ball and belt 3" hold x 15      supine ITB stretch with strap 3 x 30" each *After STM  single leg bridge x 10 each LE  straight leg bridges on ball 2 x 10 reps   Bent knee bridges on ball x 10  Bridge with GTB at knees x 10   Bridge with GTB at hips x 10   bridge with ball squeeze x 20    SLR 2 x 10 reps each LE added PPT with #1.5   clamshells GTB 2 x 10 each LE   reverse clamshells 2 x 10   Side-lying hip abduction 2 x 15  Prone hip extension with knee bent 2 x 10 reps each          LSU on 6" step x 10 reps   +ASU with alternate knee drive 2 x 10 each   Steamboats on 2" x 10 each with #2   standing glute med isolation x 10 reps each   suitcase carry handoffs #7 x 2 laps   contraweighted farmer carries #7 and #5 x 2 laps  B Heel taps from stairs fwd/lateral x10 ea.  Tandem walking with 's carry 5# 2 laps switching hands at the end of each lap   +standing fire hydrants GTB x 15 reps each side     standing hip abduction #2 2 x 10 each          Jill received the following manual therapy techniques (exercises performed today are bolded) for 0 minutes, including:     MET for L AI rotational correction   shotgunning for L AI rotation deferred  Manual PNF contract-relax stretching of L piriformis  Manual PNF contract-relax stretching of L glutes  STM to L tensor fascia sheryl, piriformis, and gluteal musculature    Myofascial release under active stretch to berna IT band and " L tensor fascia sheryl  Positional release to L TFL at 90, 100, and 110 degrees of hip flexion  STM to L IT band prior to stretching.       Home Exercises Provided and Patient Education Provided     Education provided:   - pt educated to continue with previously issued HEP  - pt educated to roll IT band with tennis ball at home, with return demo    Written Home Exercises Provided: yes.  Exercises were reviewed and Jill was able to demonstrate them prior to the end of the session.  Jill demonstrated good  understanding of the education provided.     See EMR under Patient Instructions for exercises provided 11/2/2020.    Assessment     Pt has progressed well with skilled PT intervention. Her pain has significantly decreased and she now tolerates increased exercise performance and more of her ADL tasks. Pt with significant reduction in pain and improved overall levels of functional mobility and ADL performance. Pt is progressing well toward LTGs with good potential to meet all goals with continued skilled PT. Pt with good tolerance of progressed exercise this session with no complaints and good levels of fatigue noted at conclusion of session. Pt will continue to benefit from skilled PT intervention in order to further progress LLE strength and extensibility as well as core control needed for improved overall QOL and return to PLOF.     Jill is progressing well towards her goals.   Pt prognosis is Good.     Pt will continue to benefit from skilled outpatient physical therapy to address the deficits listed in the problem list box on initial evaluation, provide pt/family education and to maximize pt's level of independence in the home and community environment.     Pt's spiritual, cultural and educational needs considered and pt agreeable to plan of care and goals.     Anticipated Barriers for therapy: none anticipated    Goals:   Short Term Goals: 4 weeks   1.Pt will be independent with HEP performance in order to  continue PT progress at home. -goal met, 10/26/2020  2. Pt will present 3 consecutive sessions with no rotational fault. -goal met, 10/26/2020  3. Pt will report pain at worst of 5/10 or less -goal met, 10/26/2020  4. Pt will demonstrate ability to stand for up to 30 minutes without increased pain in low back -goal met, 10/26/2020   5. Pt will demonstrate centralization of sciatica on L -goal met, 11/5/2020        Long Term Goals: 8 weeks   1. Pt will demonstrate 5/5 strength in B LEs in order to improve her ability to perform gait and functional mobility -progressing, not met   2. Pt will demonstrate ability to walk up to 3 miles as part of exercise routine pain free in low back -goal met, 12/7/2020  3. Pt will report no sciatica pain down her LLE -goal met, 12/7/2020   4. Pt will improve BRENDA disability score to 20% disability or less in order to demonstrate improved overall QOL. -progressing, not met   5. Pt will demonstrate good knowledge of self management of sciatica should sciatica re-occur. Goal met, 12/7/2020    Plan     Continue PT per POC, assess tolerance to today's session,  progress exercise as tolerated and appropriate      Plan of care Certification: 12/7/2020 to 2/7/21.       Kami Maguire, PT  12/7/2020

## 2020-12-07 ENCOUNTER — CLINICAL SUPPORT (OUTPATIENT)
Dept: REHABILITATION | Facility: HOSPITAL | Age: 65
End: 2020-12-07
Payer: MEDICARE

## 2020-12-07 ENCOUNTER — TELEPHONE (OUTPATIENT)
Dept: INTERNAL MEDICINE | Facility: CLINIC | Age: 65
End: 2020-12-07

## 2020-12-07 DIAGNOSIS — E03.9 HYPOTHYROIDISM, UNSPECIFIED TYPE: ICD-10-CM

## 2020-12-07 DIAGNOSIS — R53.1 GENERALIZED WEAKNESS: ICD-10-CM

## 2020-12-07 DIAGNOSIS — M54.42 CHRONIC BILATERAL LOW BACK PAIN WITH LEFT-SIDED SCIATICA: ICD-10-CM

## 2020-12-07 DIAGNOSIS — R73.09 ELEVATED GLUCOSE: ICD-10-CM

## 2020-12-07 DIAGNOSIS — E78.5 HYPERLIPIDEMIA, UNSPECIFIED HYPERLIPIDEMIA TYPE: Primary | ICD-10-CM

## 2020-12-07 DIAGNOSIS — G89.29 CHRONIC BILATERAL LOW BACK PAIN WITH LEFT-SIDED SCIATICA: ICD-10-CM

## 2020-12-07 DIAGNOSIS — R26.2 DIFFICULTY WALKING: ICD-10-CM

## 2020-12-07 PROCEDURE — 97110 THERAPEUTIC EXERCISES: CPT | Mod: PN

## 2020-12-07 NOTE — PROGRESS NOTES
"  Physical Therapy Treatment Note     Name: Jill Marquez  Clinic Number: [id number]    Therapy Diagnosis:   No diagnosis found.  Physician: Ambrose Gupta, *    Visit Date: 12/10/2020    Physician Orders: PT Eval and Treat   Medical Diagnosis from Referral:   M25.551,M25.552 (ICD-10-CM) - Bilateral hip pain   M54.40,G89.29 (ICD-10-CM) - Chronic low back pain with sciatica, sciatica laterality unspecified, unspecified back pain laterality   Evaluation Date: 9/17/2020   PN DUE: 1/7//2020  Authorization Period Expiration: 12/31/2020  Plan of Care Expiration: 2/17/21  Visit # / Visits authorized:  ***17/ 20 (+eval)   PTA Visit #: 0    Time In:  ***    Time Out: ***  Total Billable Time: 45 minutes    Precautions: Standard    Subjective     Pt reports  ***  She was compliant with home exercise program.  Response to previous treatment: reports that she felt sore  Functional change: states that she is standing and walking better     Pain: 1/10    Location:  L LE and low back      Objective      TREATMENT:  Jill received therapeutic exercises (exercises performed today are bolded) to develop strength, endurance, ROM, flexibility, posture and core stabilization for 45 minutes including:     Piriformis stretch 4 x 30"  Single knee to stretch 30" holds x 3 reps   Seated Sciatic nerve glides 5 pumps while up x 5 reps   hooklying hip abduction GTB x 1'  TA marches GTB 2 x 1'  Hip adduction squeeze 3 x 10  ball and belt 3" hold x 15      supine ITB stretch with strap 3 x 30" each *After STM  single leg bridge x 10 each LE  straight leg bridges on ball 2 x 10 reps   Bent knee bridges on ball x 10  Bridge with GTB at knees x 10   Bridge with GTB at hips x 10   bridge with ball squeeze x 20    SLR 2 x 10 reps each LE added PPT with #1.5   clamshells GTB 2 x 10 each LE   reverse clamshells 2 x 10   Side-lying hip abduction 2 x 15  Prone hip extension with knee bent 2 x 10 reps each          LSU on 6" step x 10 reps   ASU " "with alternate knee drive 2 x 10 each   Steamboats on 2" x 10 each with #2   standing glute med isolation x 10 reps each   suitcase carry handoffs #7 x 2 laps   contraweighted farmer carries #7 and #5 x 2 laps  B Heel taps from stairs fwd/lateral x10 ea.  Tandem walking with 's carry 5# 2 laps switching hands at the end of each lap   +standing fire hydrants GTB x 15 reps each side     standing hip abduction #2 2 x 10 each          Jill received the following manual therapy techniques (exercises performed today are bolded) for 0 minutes, including:     MET for L AI rotational correction   shotgunning for L AI rotation deferred  Manual PNF contract-relax stretching of L piriformis  Manual PNF contract-relax stretching of L glutes  STM to L tensor fascia sheryl, piriformis, and gluteal musculature    Myofascial release under active stretch to berna IT band and L tensor fascia sheryl  Positional release to L TFL at 90, 100, and 110 degrees of hip flexion  STM to L IT band prior to stretching.       Home Exercises Provided and Patient Education Provided     Education provided:   - pt educated to continue with previously issued HEP  - pt educated to roll IT band with tennis ball at home, with return demo    Written Home Exercises Provided: yes.  Exercises were reviewed and Jill was able to demonstrate them prior to the end of the session.  Jill demonstrated good  understanding of the education provided.     See EMR under Patient Instructions for exercises provided 11/2/2020.    Assessment     ***    Jill is progressing well towards her goals.   Pt prognosis is Good.     Pt will continue to benefit from skilled outpatient physical therapy to address the deficits listed in the problem list box on initial evaluation, provide pt/family education and to maximize pt's level of independence in the home and community environment.     Pt's spiritual, cultural and educational needs considered and pt agreeable to plan of " care and goals.     Anticipated Barriers for therapy: none anticipated    Goals:   Short Term Goals: 4 weeks   1.Pt will be independent with HEP performance in order to continue PT progress at home. -goal met, 10/26/2020  2. Pt will present 3 consecutive sessions with no rotational fault. -goal met, 10/26/2020  3. Pt will report pain at worst of 5/10 or less -goal met, 10/26/2020  4. Pt will demonstrate ability to stand for up to 30 minutes without increased pain in low back -goal met, 10/26/2020   5. Pt will demonstrate centralization of sciatica on L -goal met, 11/5/2020        Long Term Goals: 8 weeks   1. Pt will demonstrate 5/5 strength in B LEs in order to improve her ability to perform gait and functional mobility -progressing, not met   2. Pt will demonstrate ability to walk up to 3 miles as part of exercise routine pain free in low back -goal met, 12/7/2020  3. Pt will report no sciatica pain down her LLE -goal met, 12/7/2020   4. Pt will improve BRENDA disability score to 20% disability or less in order to demonstrate improved overall QOL. -progressing, not met   5. Pt will demonstrate good knowledge of self management of sciatica should sciatica re-occur. Goal met, 12/7/2020    Plan     Continue PT per POC, assess tolerance to today's session,  progress exercise as tolerated and appropriate      Plan of care Certification: 12/7/2020 to 2/7/21.       Kami Maguire, PT  12/7/2020

## 2020-12-07 NOTE — PLAN OF CARE
Physical Therapy Treatment Note     Name: Jill Marquez  Clinic Number: [id number]    Therapy Diagnosis:   Encounter Diagnoses   Name Primary?    Chronic bilateral low back pain with left-sided sciatica     Generalized weakness     Difficulty walking      Physician: Ambrose Gupta, *    Visit Date: 12/7/2020    Physician Orders: PT Eval and Treat   Medical Diagnosis from Referral:   M25.551,M25.552 (ICD-10-CM) - Bilateral hip pain   M54.40,G89.29 (ICD-10-CM) - Chronic low back pain with sciatica, sciatica laterality unspecified, unspecified back pain laterality   Evaluation Date: 9/17/2020   PN DUE: 1/7//2020  Authorization Period Expiration: 12/31/2020  Plan of Care Expiration: 2/17/21  Visit # / Visits authorized:  17/ 20 (+eval)   PTA Visit #: 0    Time In:  8:30 am     Time Out: 9:15 am   Total Billable Time: 45 minutes    Precautions: Standard    Subjective     Pt reports no new complaints. She states that overall she feels that PT has been helping and that she is feeling a lot better than when she first began therapy. Pt reports that she feels that she is getting more independent with HEP and pain management.   She was compliant with home exercise program.  Response to previous treatment: reports that she felt sore  Functional change: states that she is standing and walking better     Pain: 1/10    Location:  L LE and low back      Objective     MEASURES:  Lumbar Range of Motion:     % of normal motion Pain   Flexion 75    No          Extension 75    no         Left Side Bending 75 No         Right Side Bending 75 No          Left rotation    100 no         Right Rotation    100 No                Lower Extremity Strength  Right LE   Left LE     Knee extension: 4/5 Knee extension: 4/5   Knee flexion: 45 Knee flexion: 4/5   Hip flexion: 4/5 Hip flexion: 4/5   Hip extension:  4-/5 Hip extension: 4-/5   Hip abduction: 4-/5 Hip abduction: 4-/5   Hip adduction: 4-/5 Hip adduction 4-/5   Ankle  "dorsiflexion: 4/5 Ankle dorsiflexion: 4/5   Ankle plantarflexion: 4/5 Ankle plantarflexion: 4/5         BRENDA: 13/50 (26% disabiltiy)        TREATMENT:  Jill received therapeutic exercises (exercises performed today are bolded) to develop strength, endurance, ROM, flexibility, posture and core stabilization for 45 minutes including:     Piriformis stretch 4 x 30"  Single knee to stretch 30" holds x 3 reps   Seated Sciatic nerve glides 5 pumps while up x 5 reps   hooklying hip abduction GTB x 1'  TA marches GTB 2 x 1'  Hip adduction squeeze 3 x 10  ball and belt 3" hold x 15      supine ITB stretch with strap 3 x 30" each *After STM  single leg bridge x 10 each LE  straight leg bridges on ball 2 x 10 reps   Bent knee bridges on ball x 10  Bridge with GTB at knees x 10   Bridge with GTB at hips x 10   bridge with ball squeeze x 20    SLR 2 x 10 reps each LE added PPT with #1.5   clamshells GTB 2 x 10 each LE   reverse clamshells 2 x 10   Side-lying hip abduction 2 x 15  Prone hip extension with knee bent 2 x 10 reps each          LSU on 6" step x 10 reps   +ASU with alternate knee drive 2 x 10 each   Steamboats on 2" x 10 each with #2   standing glute med isolation x 10 reps each   suitcase carry handoffs #7 x 2 laps   contraweighted farmer carries #7 and #5 x 2 laps  B Heel taps from stairs fwd/lateral x10 ea.  Tandem walking with 's carry 5# 2 laps switching hands at the end of each lap   +standing fire hydrants GTB x 15 reps each side     standing hip abduction #2 2 x 10 each          Jill received the following manual therapy techniques (exercises performed today are bolded) for 0 minutes, including:     MET for L AI rotational correction   shotgunning for L AI rotation deferred  Manual PNF contract-relax stretching of L piriformis  Manual PNF contract-relax stretching of L glutes  STM to L tensor fascia sheryl, piriformis, and gluteal musculature    Myofascial release under active stretch to berna IT band and " L tensor fascia sheryl  Positional release to L TFL at 90, 100, and 110 degrees of hip flexion  STM to L IT band prior to stretching.       Home Exercises Provided and Patient Education Provided     Education provided:   - pt educated to continue with previously issued HEP  - pt educated to roll IT band with tennis ball at home, with return demo    Written Home Exercises Provided: yes.  Exercises were reviewed and Jill was able to demonstrate them prior to the end of the session.  Jill demonstrated good  understanding of the education provided.     See EMR under Patient Instructions for exercises provided 11/2/2020.    Assessment     Pt has progressed well with skilled PT intervention. Her pain has significantly decreased and she now tolerates increased exercise performance and more of her ADL tasks. Pt with significant reduction in pain and improved overall levels of functional mobility and ADL performance. Pt is progressing well toward LTGs with good potential to meet all goals with continued skilled PT. Pt with good tolerance of progressed exercise this session with no complaints and good levels of fatigue noted at conclusion of session. Pt will continue to benefit from skilled PT intervention in order to further progress LLE strength and extensibility as well as core control needed for improved overall QOL and return to PLOF.     Jill is progressing well towards her goals.   Pt prognosis is Good.     Pt will continue to benefit from skilled outpatient physical therapy to address the deficits listed in the problem list box on initial evaluation, provide pt/family education and to maximize pt's level of independence in the home and community environment.     Pt's spiritual, cultural and educational needs considered and pt agreeable to plan of care and goals.     Anticipated Barriers for therapy: none anticipated    Goals:   Short Term Goals: 4 weeks   1.Pt will be independent with HEP performance in order to  continue PT progress at home. -goal met, 10/26/2020  2. Pt will present 3 consecutive sessions with no rotational fault. -goal met, 10/26/2020  3. Pt will report pain at worst of 5/10 or less -goal met, 10/26/2020  4. Pt will demonstrate ability to stand for up to 30 minutes without increased pain in low back -goal met, 10/26/2020   5. Pt will demonstrate centralization of sciatica on L -goal met, 11/5/2020        Long Term Goals: 8 weeks   1. Pt will demonstrate 5/5 strength in B LEs in order to improve her ability to perform gait and functional mobility -progressing, not met   2. Pt will demonstrate ability to walk up to 3 miles as part of exercise routine pain free in low back -goal met, 12/7/2020  3. Pt will report no sciatica pain down her LLE -goal met, 12/7/2020   4. Pt will improve BRENDA disability score to 20% disability or less in order to demonstrate improved overall QOL. -progressing, not met   5. Pt will demonstrate good knowledge of self management of sciatica should sciatica re-occur. Goal met, 12/7/2020    Plan     Continue PT per POC, assess tolerance to today's session,  progress exercise as tolerated and appropriate      Plan of care Certification: 12/7/2020 to 2/7/21.       Kami Maguire, PT, DPT  12/7/2020

## 2020-12-09 NOTE — PROGRESS NOTES
"  Physical Therapy Treatment Note     Name: Jill Marquez  Clinic Number: [id number]    Therapy Diagnosis:   Encounter Diagnoses   Name Primary?    Chronic bilateral low back pain with left-sided sciatica     Generalized weakness     Difficulty walking      Physician: Ambrose Gupta, *    Visit Date: 12/10/2020    Physician Orders: PT Eval and Treat   Medical Diagnosis from Referral:   M25.551,M25.552 (ICD-10-CM) - Bilateral hip pain   M54.40,G89.29 (ICD-10-CM) - Chronic low back pain with sciatica, sciatica laterality unspecified, unspecified back pain laterality   Evaluation Date: 9/17/2020   PN DUE: 1/7//2020  Authorization Period Expiration: 12/31/2020  Plan of Care Expiration: 2/17/21  Visit # / Visits authorized:  18/ 20 (+eval)   PTA Visit #: 1    Time In:  8:00 am     Time Out: 8:45 am   Total Billable Time: 45 minutes    Precautions: Standard    Subjective     Pt reports feeling cramps last night in her hips which hurt her sleep, but she is continuing to feel improvement in her pain.  She was compliant with home exercise program.  Response to previous treatment: reports that she felt sore  Functional change: states that she is standing and walking better     Pain: 1/10    Location:  L LE and low back      Objective     Jill received therapeutic exercises (exercises performed today are bolded) to develop strength, endurance, ROM, flexibility, posture and core stabilization for 35 minutes including:     Piriformis stretch 4 x 30"  Single knee to stretch 30" holds x 3 reps   Seated Sciatic nerve glides 5 pumps while up x 5 reps   hooklying hip abduction GTB x 1'  TA marches GTB 2 x 1'  Hip adduction squeeze 3 x 10  ball and belt 3" hold x 15      supine ITB stretch with strap 3 x 30" each *After STM  single leg bridge x 10 each LE  straight leg bridges on ball 2 x 10 reps   Bent knee bridges on ball x 10  Bridge with GTB at knees x 10   Bridge with GTB at hips x 10   bridge with ball squeeze x 20  " "  SLR 2 x 10 reps each LE added PPT with #1.5   clamshells GTB 2 x 10 each LE   reverse clamshells 2 x 10   Side-lying hip abduction 2 x 15  Prone hip extension with knee bent 2 x 10 reps each          LSU on 6" step x 10 reps   ASU with alternate knee drive 2 x 10 each   Steamboats on 2" x 10 each with #2   standing glute med isolation x 10 reps each   suitcase carry handoffs #7 x 2 laps   contraweighted farmer carries #7 and #5 x 2 laps  B Heel taps from stairs fwd/lateral x10 ea.  Tandem walking with 's carry 5# 2 laps switching hands at the end of each lap   standing fire hydrants GTB x 15 reps  each side     standing hip abduction #2 2 x 10 each          Jill received the following manual therapy techniques (exercises performed today are bolded) for 10 minutes, including:     MET for L AI rotational correction   shotgunning for L AI rotation deferred  Manual PNF contract-relax stretching of L piriformis  Manual PNF contract-relax stretching of L glutes  STM to L tensor fascia sheryl, piriformis, and gluteal musculature  STM to bilateral lumbar paraspinals    Myofascial release under active stretch to L QL  Positional release to L TFL at 90, 100, and 110 degrees of hip flexion  STM to L IT band prior to stretching.       Home Exercises Provided and Patient Education Provided     Education provided:   - pt educated to continue with previously issued HEP  - pt educated to roll IT band with tennis ball at home, with return demo    Written Home Exercises Provided: yes.  Exercises were reviewed and Jill was able to demonstrate them prior to the end of the session.  Jill demonstrated good  understanding of the education provided.     See EMR under Patient Instructions for exercises provided 11/2/2020.    Assessment     Pt tolerated tx session well today with no complaints and demo of improved strength and balance. Pt with less sway and no need for step reaction today during balance exercises. Pt reports her " pain is going away after IT band mobilization during previous tx session. She cont to show increased strength through better tolerance of exercise progression and reduced muscular fatigue during each exercise. Pt demo increased ROM during stretching today as well, piriformis length has improved, along with hip flexors. Pt will benefit from cont skilled PT and progression of exercises.    Jill is progressing well towards her goals.   Pt prognosis is Good.     Pt will continue to benefit from skilled outpatient physical therapy to address the deficits listed in the problem list box on initial evaluation, provide pt/family education and to maximize pt's level of independence in the home and community environment.     Pt's spiritual, cultural and educational needs considered and pt agreeable to plan of care and goals.     Anticipated Barriers for therapy: none anticipated    Goals:   Short Term Goals: 4 weeks   1.Pt will be independent with HEP performance in order to continue PT progress at home. -goal met, 10/26/2020  2. Pt will present 3 consecutive sessions with no rotational fault. -goal met, 10/26/2020  3. Pt will report pain at worst of 5/10 or less -goal met, 10/26/2020  4. Pt will demonstrate ability to stand for up to 30 minutes without increased pain in low back -goal met, 10/26/2020   5. Pt will demonstrate centralization of sciatica on L -goal met, 11/5/2020        Long Term Goals: 8 weeks   1. Pt will demonstrate 5/5 strength in B LEs in order to improve her ability to perform gait and functional mobility -progressing, not met   2. Pt will demonstrate ability to walk up to 3 miles as part of exercise routine pain free in low back -goal met, 12/7/2020  3. Pt will report no sciatica pain down her LLE -goal met, 12/7/2020   4. Pt will improve BRENDA disability score to 20% disability or less in order to demonstrate improved overall QOL. -progressing, not met   5. Pt will demonstrate good knowledge of self  management of sciatica should sciatica re-occur. Goal met, 12/7/2020    Plan     Continue PT per POC, assess tolerance to today's session,  progress exercise as tolerated and appropriate      Plan of care Certification: 12/7/2020 to 2/7/21.       Amandeep Bowen, PANCHO  12/10/2020

## 2020-12-10 ENCOUNTER — CLINICAL SUPPORT (OUTPATIENT)
Dept: REHABILITATION | Facility: HOSPITAL | Age: 65
End: 2020-12-10
Payer: MEDICARE

## 2020-12-10 DIAGNOSIS — M54.42 CHRONIC BILATERAL LOW BACK PAIN WITH LEFT-SIDED SCIATICA: ICD-10-CM

## 2020-12-10 DIAGNOSIS — R53.1 GENERALIZED WEAKNESS: ICD-10-CM

## 2020-12-10 DIAGNOSIS — R26.2 DIFFICULTY WALKING: ICD-10-CM

## 2020-12-10 DIAGNOSIS — G89.29 CHRONIC BILATERAL LOW BACK PAIN WITH LEFT-SIDED SCIATICA: ICD-10-CM

## 2020-12-10 PROCEDURE — 97110 THERAPEUTIC EXERCISES: CPT | Mod: PN,CQ

## 2020-12-10 PROCEDURE — 97140 MANUAL THERAPY 1/> REGIONS: CPT | Mod: PN,CQ

## 2020-12-10 NOTE — PROGRESS NOTES
"  Physical Therapy Treatment Note     Name: Jill Marquez  Clinic Number: [id number]    Therapy Diagnosis:   Encounter Diagnoses   Name Primary?    Chronic bilateral low back pain with left-sided sciatica     Generalized weakness     Difficulty walking      Physician: Ambrose Gupta, *    Visit Date: 12/14/2020    Physician Orders: PT Eval and Treat   Medical Diagnosis from Referral:   M25.551,M25.552 (ICD-10-CM) - Bilateral hip pain   M54.40,G89.29 (ICD-10-CM) - Chronic low back pain with sciatica, sciatica laterality unspecified, unspecified back pain laterality   Evaluation Date: 9/17/2020   PN DUE: 1/7//2020  Authorization Period Expiration: 12/31/2020  Plan of Care Expiration: 2/17/21  Visit # / Visits authorized:  19/ 20 (+eval)   PTA Visit #: 2    Time In:  10:45 am     Time Out: 11:30 am   Total Billable Time: 45 minutes    Precautions: Standard    Subjective     Pt reports Her hip is feeling much better and she is able to sleep on her L side for about 2 hours.  She was compliant with home exercise program.  Response to previous treatment: reports that she felt sore  Functional change: states that she is standing and walking better. Sleeping better     Pain: 1/10    Location:  L LE and low back      Objective     Jill received therapeutic exercises (exercises performed today are bolded) to develop strength, endurance, ROM, flexibility, posture and core stabilization for 30 minutes including:     Piriformis stretch 4 x 30"  Single knee to stretch 30" holds x 3 reps   Seated Sciatic nerve glides 5 pumps while up x 5 reps  Standing QL stretch berna 3 x 30"   hooklying hip abduction GTB x 1'  TA marches GTB 2 x 1'  Hip adduction squeeze 3 x 10  ball and belt 3" hold x 15      supine ITB stretch with strap 3 x 30" each *After STM  single leg bridge x 10 each LE   straight leg bridges on ball 2 x 10 reps   Bent knee bridges on ball x 10  Bridge with GTB at knees x 10   Bridge with GTB at hips x 10 " "  bridge with ball squeeze x 20    SLR 2 x 10 reps each LE added PPT with #1.5   clamshells GTB 2 x 10 each LE   reverse clamshells GTB 2 x 10   Side-lying hip abduction 2 x 15  Prone hip extension with knee bent 2 x 10 reps each          LSU on 6" step x 10 reps   ASU with alternate knee drive 2 x 10 each   Steamboats on 2" x 10 each with #2   standing glute med isolation x 10 reps each   suitcase carry handoffs #7 x 2 laps   contraweighted farmer carries #7 and #5 x 2 laps  B Heel taps from stairs fwd/lateral x10 ea.  Tandem walking with 's carry 5# 2 laps switching hands at the end of each lap  +Lateral walks GTB 1 lap   standing fire hydrants GTB x 15 reps  each side     standing hip abduction #2 2 x 10 each          Jill received the following manual therapy techniques (exercises performed today are bolded) for 15 minutes, including:     MET for L AI rotational correction   shotgunning for L AI rotation deferred  Manual PNF contract-relax stretching of L piriformis  Manual PNF contract-relax stretching of L glutes  STM to L tensor fascia sheryl, piriformis, and gluteal musculature  STM to bilateral lumbar paraspinals    Myofascial release under active stretch to L QL  Positional release to L TFL at 90, 100, and 110 degrees of hip flexion  STM to L IT band prior to stretching.       Home Exercises Provided and Patient Education Provided     Education provided:   - pt educated to continue with previously issued HEP  - pt educated to roll IT band with tennis ball at home, with return demo    Written Home Exercises Provided: yes.  Exercises were reviewed and Jill was able to demonstrate them prior to the end of the session.  Jill demonstrated good  understanding of the education provided.     See EMR under Patient Instructions for exercises provided 11/2/2020.    Assessment     Pt tolerated tx session well today with no complaints. Noted decreased tension and tone along her L IT band, bilateral lumbar " paraspinals and QL musculature, relieved by manual therapy. Pt performed exercises well today with no complaints and handled progression well with appropriate fatigue. Pt is progressing well and showing improvement in all motions 2/2 reduced pain and will benefit from further progression in her standing and balance based exercises. Pt will benefit from cont skilled PT.    Jill is progressing well towards her goals.   Pt prognosis is Good.     Pt will continue to benefit from skilled outpatient physical therapy to address the deficits listed in the problem list box on initial evaluation, provide pt/family education and to maximize pt's level of independence in the home and community environment.     Pt's spiritual, cultural and educational needs considered and pt agreeable to plan of care and goals.     Anticipated Barriers for therapy: none anticipated    Goals:   Short Term Goals: 4 weeks   1.Pt will be independent with HEP performance in order to continue PT progress at home. -goal met, 10/26/2020  2. Pt will present 3 consecutive sessions with no rotational fault. -goal met, 10/26/2020  3. Pt will report pain at worst of 5/10 or less -goal met, 10/26/2020  4. Pt will demonstrate ability to stand for up to 30 minutes without increased pain in low back -goal met, 10/26/2020   5. Pt will demonstrate centralization of sciatica on L -goal met, 11/5/2020        Long Term Goals: 8 weeks   1. Pt will demonstrate 5/5 strength in B LEs in order to improve her ability to perform gait and functional mobility -progressing, not met   2. Pt will demonstrate ability to walk up to 3 miles as part of exercise routine pain free in low back -goal met, 12/7/2020  3. Pt will report no sciatica pain down her LLE -goal met, 12/7/2020   4. Pt will improve BRENDA disability score to 20% disability or less in order to demonstrate improved overall QOL. -progressing, not met   5. Pt will demonstrate good knowledge of self management of  sciatica should sciatica re-occur. Goal met, 12/7/2020    Plan     Continue PT per POC, assess tolerance to today's session,  progress exercise as tolerated and appropriate      Plan of care Certification: 12/7/2020 to 2/7/21.       Amandeep Bowen, PANCHO  12/14/2020

## 2020-12-11 ENCOUNTER — PATIENT MESSAGE (OUTPATIENT)
Dept: OTHER | Facility: OTHER | Age: 65
End: 2020-12-11

## 2020-12-14 ENCOUNTER — CLINICAL SUPPORT (OUTPATIENT)
Dept: REHABILITATION | Facility: HOSPITAL | Age: 65
End: 2020-12-14
Payer: MEDICARE

## 2020-12-14 DIAGNOSIS — R26.2 DIFFICULTY WALKING: ICD-10-CM

## 2020-12-14 DIAGNOSIS — M54.42 CHRONIC BILATERAL LOW BACK PAIN WITH LEFT-SIDED SCIATICA: ICD-10-CM

## 2020-12-14 DIAGNOSIS — R53.1 GENERALIZED WEAKNESS: ICD-10-CM

## 2020-12-14 DIAGNOSIS — G89.29 CHRONIC BILATERAL LOW BACK PAIN WITH LEFT-SIDED SCIATICA: ICD-10-CM

## 2020-12-14 PROCEDURE — 97140 MANUAL THERAPY 1/> REGIONS: CPT | Mod: PN,CQ

## 2020-12-14 PROCEDURE — 97110 THERAPEUTIC EXERCISES: CPT | Mod: PN,CQ

## 2020-12-14 NOTE — PROGRESS NOTES
"  Physical Therapy Treatment Note     Name: Jill Marquez  Clinic Number: [id number]    Therapy Diagnosis:   Encounter Diagnoses   Name Primary?    Chronic bilateral low back pain with left-sided sciatica     Generalized weakness     Difficulty walking      Physician: Ambrose Gupta, *    Visit Date: 12/17/2020    Physician Orders: PT Eval and Treat   Medical Diagnosis from Referral:   M25.551,M25.552 (ICD-10-CM) - Bilateral hip pain   M54.40,G89.29 (ICD-10-CM) - Chronic low back pain with sciatica, sciatica laterality unspecified, unspecified back pain laterality   Evaluation Date: 9/17/2020   PN DUE: 1/7//2020  Authorization Period Expiration: 12/31/2020  Plan of Care Expiration: 2/17/21  Visit # / Visits authorized:  20/ 30 (+eval)   PTA Visit #: 3    Time In:  11:00 am     Time Out: 11:45 am   Total Billable Time: 45 minutes    Precautions: Standard    Subjective     Pt reports Sleeping a lot better on her L, and her pain is pretty much gone. She didn't get to do her daily walk the past 2 days due to cold weather.  She was compliant with home exercise program.  Response to previous treatment: reports that she felt sore  Functional change: states that she is standing and walking better. Sleeping better     Pain: 1/10    Location:  L LE and low back      Objective     Jill received therapeutic exercises (exercises performed today are bolded) to develop strength, endurance, ROM, flexibility, posture and core stabilization for 30 minutes including:     Piriformis stretch 4 x 30"  Single knee to stretch 30" holds x 3 reps   Seated Sciatic nerve glides 5 pumps while up x 5 reps  Standing QL stretch berna 3 x 30"   hooklying hip abduction GTB x 1'  TA marches GTB 2 x 1'  Hip adduction squeeze 3 x 10  ball and belt 3" hold x 15      supine ITB stretch with strap 3 x 30" each *After STM  single leg bridge x 10 each LE   straight leg bridges on ball 2 x 10 reps   Bent knee bridges on ball x 10  Bridge with GTB at " "knees x 10   Bridge with GTB at hips x 10   bridge with ball squeeze x 20    SLR 2 x 10 reps each LE added PPT with #1.5   clamshells GTB 2 x 10 each LE   reverse clamshells GTB 2 x 10   Side-lying hip abduction 2 x 15  Prone hip extension with knee bent 2 x 10 reps each          LSU on 6" step x 10 reps   ASU with alternate knee drive 2 x 10 each   Steamboats on 2" x 10 each with #2   standing glute med isolation x 10 reps each   suitcase carry handoffs #7 x 2 laps   contraweighted farmer carries #7 and #5 x 2 laps  B Heel taps from stairs fwd/lateral x10 ea.  Tandem walking with 's carry 5# 2 laps switching hands at the end of each lap  Lateral walks GTB 2 lap   Sit<>Stand taps GTB at knees for cue 2 x 10  standing fire hydrants GTB x 15 reps  each side     standing hip abduction #2 2 x 10 each          Jill received the following manual therapy techniques (exercises performed today are bolded) for 15 minutes, including:     MET for L AI rotational correction   shotgunning for L AI rotation deferred  Manual PNF contract-relax stretching of L piriformis  Manual PNF contract-relax stretching of L glutes  STM to L tensor fascia sheryl, piriformis, and gluteal musculature  STM to bilateral lumbar paraspinals    Myofascial release under active stretch to L QL  Positional release to L TFL at 90, 100, and 110 degrees of hip flexion  STM to L IT band prior to stretching.       Home Exercises Provided and Patient Education Provided     Education provided:   - pt educated to continue with previously issued HEP  - pt educated to roll IT band with tennis ball at home, with return demo    Written Home Exercises Provided: yes.  Exercises were reviewed and Jill was able to demonstrate them prior to the end of the session.  Jill demonstrated good  understanding of the education provided.     See EMR under Patient Instructions for exercises provided 11/2/2020.    Assessment     Pt tolerated tx session well today with no " complaints. She demo'd improved balance and strength today during exercises through tolerance to progression, reduced fatigue, and reduced need to for step reactions during balance exercises. Pt with knee valgus during sit <> stand exercises and required vc and GTB cue for proper form. Noted decreased tension along her L IT band with improved ROM during stretch and reports of ability to sleep on that side without waking up due to pain. Pt will benefit from cont skilled PT and progression of quad and glute strengthening exercises.    Jill is progressing well towards her goals.   Pt prognosis is Good.     Pt will continue to benefit from skilled outpatient physical therapy to address the deficits listed in the problem list box on initial evaluation, provide pt/family education and to maximize pt's level of independence in the home and community environment.     Pt's spiritual, cultural and educational needs considered and pt agreeable to plan of care and goals.     Anticipated Barriers for therapy: none anticipated    Goals:   Short Term Goals: 4 weeks   1.Pt will be independent with HEP performance in order to continue PT progress at home. -goal met, 10/26/2020  2. Pt will present 3 consecutive sessions with no rotational fault. -goal met, 10/26/2020  3. Pt will report pain at worst of 5/10 or less -goal met, 10/26/2020  4. Pt will demonstrate ability to stand for up to 30 minutes without increased pain in low back -goal met, 10/26/2020   5. Pt will demonstrate centralization of sciatica on L -goal met, 11/5/2020        Long Term Goals: 8 weeks   1. Pt will demonstrate 5/5 strength in B LEs in order to improve her ability to perform gait and functional mobility -progressing, not met   2. Pt will demonstrate ability to walk up to 3 miles as part of exercise routine pain free in low back -goal met, 12/7/2020  3. Pt will report no sciatica pain down her LLE -goal met, 12/7/2020   4. Pt will improve BRENDA disability  score to 20% disability or less in order to demonstrate improved overall QOL. -progressing, not met   5. Pt will demonstrate good knowledge of self management of sciatica should sciatica re-occur. Goal met, 12/7/2020    Plan     Continue PT per POC, assess tolerance to today's session,  progress exercise as tolerated and appropriate. Quad and glute strength, sit <> stand t/f training for knee valgus.      Plan of care Certification: 12/7/2020 to 2/7/21.       Amandeep Bowen PTA  12/17/2020

## 2020-12-17 ENCOUNTER — CLINICAL SUPPORT (OUTPATIENT)
Dept: REHABILITATION | Facility: HOSPITAL | Age: 65
End: 2020-12-17
Payer: MEDICARE

## 2020-12-17 DIAGNOSIS — R26.2 DIFFICULTY WALKING: ICD-10-CM

## 2020-12-17 DIAGNOSIS — M54.42 CHRONIC BILATERAL LOW BACK PAIN WITH LEFT-SIDED SCIATICA: ICD-10-CM

## 2020-12-17 DIAGNOSIS — R53.1 GENERALIZED WEAKNESS: ICD-10-CM

## 2020-12-17 DIAGNOSIS — G89.29 CHRONIC BILATERAL LOW BACK PAIN WITH LEFT-SIDED SCIATICA: ICD-10-CM

## 2020-12-17 PROCEDURE — 97110 THERAPEUTIC EXERCISES: CPT | Mod: PN,CQ

## 2020-12-17 PROCEDURE — 97140 MANUAL THERAPY 1/> REGIONS: CPT | Mod: PN,CQ

## 2020-12-18 NOTE — PROGRESS NOTES
"  Physical Therapy Treatment Note     Name: Jill Marquez  Clinic Number: [id number]    Therapy Diagnosis:   Encounter Diagnoses   Name Primary?    Chronic bilateral low back pain with left-sided sciatica     Generalized weakness     Difficulty walking      Physician: Ambrose Gupta, *    Visit Date: 12/21/2020    Physician Orders: PT Eval and Treat   Medical Diagnosis from Referral:   M25.551,M25.552 (ICD-10-CM) - Bilateral hip pain   M54.40,G89.29 (ICD-10-CM) - Chronic low back pain with sciatica, sciatica laterality unspecified, unspecified back pain laterality   Evaluation Date: 9/17/2020   PN DUE: 1/7//2020  Authorization Period Expiration: 12/31/2020  Plan of Care Expiration: 2/17/21  Visit # / Visits authorized:  21/ 30 (+eval)   PTA Visit #: 0    Time In:  12:00 pm      Time Out: 12:45 pm    Total Billable Time: 45 minutes    Precautions: Standard    Subjective     Pt reports no new complaints. She states that she had a little flare up of her R piriformis when she woke up today.   She was compliant with home exercise program.  Response to previous treatment: reports that she felt sore  Functional change: states that she is standing and walking better. Sleeping better     Pain: 1/10    Location:  L LE and low back      Objective     Jill received therapeutic exercises (exercises performed today are bolded) to develop strength, endurance, ROM, flexibility, posture and core stabilization for 35 minutes including:     Piriformis stretch 4 x 30"  Single knee to stretch 30" holds x 3 reps   Seated Sciatic nerve glides 5 pumps while up x 5 reps  Standing QL stretch berna 3 x 30"   hooklying hip abduction GTB x 1'  Hip adduction squeeze 3 x 10  ball and belt 3" hold x 15      supine ITB stretch with strap 3 x 30" each *After STM  single leg bridge x 10 each LE   straight leg bridges on ball 2 x 10 reps   Bent knee bridges on ball x 10  Bridge with GTB at knees x 10   Bridge with GTB at hips x 10   bridge " "with ball squeeze x 20    SLR 2 x 10 reps each LE added PPT with #1.5   clamshells GTB 2 x 10 each LE   reverse clamshells GTB 2 x 10   Side-lying hip abduction 2 x 15  Prone hip extension with knee bent 2 x 10 reps each          LSU on 6" step x 10 reps   ASU with alternate knee drive 2 x 10 each   Steamboats on 2" x 10 each with #2   standing glute med isolation x 10 reps each   suitcase carry handoffs #7 x 2 laps   contraweighted farmer carries #7 and #5 x 2 laps  B Heel taps from stairs fwd/lateral x10 ea.  Tandem walking with 's carry 5# 2 laps switching hands at the end of each lap  Lateral walks GTB 2 lap   Sit<>Stand taps GTB at knees for cue 2 x 10  standing fire hydrants GTB x 15 reps  each side     standing hip abduction #2 2 x 10 each          Jill received the following manual therapy techniques (exercises performed today are bolded) for 10 minutes, including:     MET for L AI rotational correction   shotgunning for L AI rotation deferred  Manual PNF contract-relax stretching of L piriformis  Manual PNF contract-relax stretching of L glutes  STM to L tensor fascia sheryl, piriformis, and gluteal musculature  STM to bilateral lumbar paraspinals    Myofascial release under active stretch to L QL  Positional release to L TFL at 90, 100, and 110 degrees of hip flexion  STM to L and R IT band prior to stretching.       Home Exercises Provided and Patient Education Provided     Education provided:   - pt educated to continue with previously issued HEP  - pt educated to roll IT band with tennis ball at home, with return demo    Written Home Exercises Provided: yes.  Exercises were reviewed and Jill was able to demonstrate them prior to the end of the session.  Jill demonstrated good  understanding of the education provided.     See EMR under Patient Instructions for exercises provided 11/2/2020.    Assessment     Pt tolerated session well with no complaints. Pt with good response to manual therapy " today with no reports of pain at end of session. Pt will continue to benefit from skilled PT intervention in order to further progress core extensibility and postural control as well as improve hip and pelvic control and stability needed for improved overall QOL and return to her PLOF.     Jill is progressing well towards her goals.   Pt prognosis is Good.     Pt will continue to benefit from skilled outpatient physical therapy to address the deficits listed in the problem list box on initial evaluation, provide pt/family education and to maximize pt's level of independence in the home and community environment.     Pt's spiritual, cultural and educational needs considered and pt agreeable to plan of care and goals.     Anticipated Barriers for therapy: none anticipated    Goals:   Short Term Goals: 4 weeks   1.Pt will be independent with HEP performance in order to continue PT progress at home. -goal met, 10/26/2020  2. Pt will present 3 consecutive sessions with no rotational fault. -goal met, 10/26/2020  3. Pt will report pain at worst of 5/10 or less -goal met, 10/26/2020  4. Pt will demonstrate ability to stand for up to 30 minutes without increased pain in low back -goal met, 10/26/2020   5. Pt will demonstrate centralization of sciatica on L -goal met, 11/5/2020        Long Term Goals: 8 weeks   1. Pt will demonstrate 5/5 strength in B LEs in order to improve her ability to perform gait and functional mobility -progressing, not met   2. Pt will demonstrate ability to walk up to 3 miles as part of exercise routine pain free in low back -goal met, 12/7/2020  3. Pt will report no sciatica pain down her LLE -goal met, 12/7/2020   4. Pt will improve BRENDA disability score to 20% disability or less in order to demonstrate improved overall QOL. -progressing, not met   5. Pt will demonstrate good knowledge of self management of sciatica should sciatica re-occur. Goal met, 12/7/2020    Plan     Continue PT per POC,  assess tolerance to today's session,  progress exercise as tolerated and appropriate. Quad and glute strength, sit <> stand t/f training for knee valgus.      Plan of care Certification: 12/7/2020 to 2/7/21.       Kami Maguire, PT, DPT  12/21/2020

## 2020-12-21 ENCOUNTER — CLINICAL SUPPORT (OUTPATIENT)
Dept: REHABILITATION | Facility: HOSPITAL | Age: 65
End: 2020-12-21
Payer: MEDICARE

## 2020-12-21 DIAGNOSIS — M54.42 CHRONIC BILATERAL LOW BACK PAIN WITH LEFT-SIDED SCIATICA: ICD-10-CM

## 2020-12-21 DIAGNOSIS — R26.2 DIFFICULTY WALKING: ICD-10-CM

## 2020-12-21 DIAGNOSIS — R53.1 GENERALIZED WEAKNESS: ICD-10-CM

## 2020-12-21 DIAGNOSIS — G89.29 CHRONIC BILATERAL LOW BACK PAIN WITH LEFT-SIDED SCIATICA: ICD-10-CM

## 2020-12-21 PROCEDURE — 97140 MANUAL THERAPY 1/> REGIONS: CPT | Mod: PN

## 2020-12-21 PROCEDURE — 97110 THERAPEUTIC EXERCISES: CPT | Mod: PN

## 2020-12-28 ENCOUNTER — PATIENT MESSAGE (OUTPATIENT)
Dept: REHABILITATION | Facility: HOSPITAL | Age: 65
End: 2020-12-28

## 2020-12-28 NOTE — PROGRESS NOTES
"  Physical Therapy Treatment Note     Name: Jill Marquez  Clinic Number: [id number]    Therapy Diagnosis:   Encounter Diagnoses   Name Primary?    Chronic bilateral low back pain with left-sided sciatica     Generalized weakness     Difficulty walking      Physician: Ambrose Gupta, *    Visit Date: 12/30/2020    Physician Orders: PT Eval and Treat   Medical Diagnosis from Referral:   M25.551,M25.552 (ICD-10-CM) - Bilateral hip pain   M54.40,G89.29 (ICD-10-CM) - Chronic low back pain with sciatica, sciatica laterality unspecified, unspecified back pain laterality   Evaluation Date: 9/17/2020   PN DUE: 1/7//2020  Authorization Period Expiration: 12/31/2020  Plan of Care Expiration: 2/17/21  Visit # / Visits authorized:  22/ 30 (+eval)   PTA Visit #: 1    Time In:  12:30 pm      Time Out: 1:15 pm    Total Billable Time: 45 minutes    Precautions: Standard    Subjective     Pt reports Her R leg has been hurting.   She was compliant with home exercise program.  Response to previous treatment: reports that she felt sore  Functional change: states that she is standing and walking better. Sleeping better     Pain: 1/10    Location:  L LE and low back      Objective     Jill received therapeutic exercises (exercises performed today are bolded) to develop strength, endurance, ROM, flexibility, posture and core stabilization for 20 minutes including:     Piriformis stretch 4 x 30"  Single knee to stretch 30" holds x 3 reps   Seated Sciatic nerve glides 5 pumps while up x 5 reps  Standing QL stretch berna 3 x 30"   hooklying hip abduction GTB x 1'  Hip adduction squeeze 3 x 10  ball and belt 3" hold x 15      supine ITB stretch with strap 3 x 30" each *After STM  single leg bridge x 10 each LE   straight leg bridges on ball 2 x 10 reps   Bent knee bridges on ball x 10  Bridge with GTB at knees x 10   Bridge with GTB at hips x 10   bridge with ball squeeze x 20    SLR 2 x 10 reps each LE added PPT with #1.5 " "  clamshells GTB 2 x 10 each LE   reverse clamshells GTB 2 x 10   Side-lying hip abduction 2 x 15  Prone hip extension with knee bent 2 x 10 reps each      LSU on 6" step x 10 reps   ASU with alternate knee drive 2 x 10 each   Steamboats on 2" x 10 each with #2   standing glute med isolation x 10 reps each   suitcase carry handoffs #7 x 2 laps   contraweighted farmer carries #7 and #5 x 2 laps  B Heel taps from stairs fwd/lateral x10 ea.  Tandem walking with 's carry 5# 2 laps switching hands at the end of each lap  Lateral walks GTB 2 lap   Sit<>Stand taps GTB at knees and in front of mirror 2 x 10  standing fire hydrants GTB x 15 reps  each side     standing hip abduction #2 2 x 10 each          Jill received the following manual therapy techniques (exercises performed today are bolded) for 25 minutes, including:     MET for L AI rotational correction   shotgunning for L AI rotation deferred  Manual PNF contract-relax stretching of L piriformis  Manual PNF contract-relax stretching of L glutes  STM to L tensor fascia sheryl, piriformis, and gluteal musculature  STM to bilateral lumbar paraspinals    Myofascial release under active stretch to L QL  Positional release to L TFL at 90, 100, and 110 degrees of hip flexion  STM to L and R IT band prior to stretching.       Home Exercises Provided and Patient Education Provided     Education provided:   - pt educated to continue with previously issued HEP  - pt educated to roll IT band with tennis ball at home, with return demo    Written Home Exercises Provided: yes.  Exercises were reviewed and Jill was able to demonstrate them prior to the end of the session.  Jill demonstrated good  understanding of the education provided.     See EMR under Patient Instructions for exercises provided 11/2/2020.    Assessment     Pt tolerated tx session well today with no complaints. She presents with increased pain and tension on the R side piriformis and IT band. Noted " hypertonic R piriformis, relieved by manual therapy today. Her L piriformis presents with reduced tone over previous visits and reduced pain on that side. Pt cont to present with knee valgus during sit-stand exercises with early concentric valgus and late eccentric valgus. Pt responded well to tactile cues from theraband and visual cues from mirror to correct the exercise. Pt will benefit from cont skilled PT and progression of exercises.    Jill is progressing well towards her goals.   Pt prognosis is Good.     Pt will continue to benefit from skilled outpatient physical therapy to address the deficits listed in the problem list box on initial evaluation, provide pt/family education and to maximize pt's level of independence in the home and community environment.     Pt's spiritual, cultural and educational needs considered and pt agreeable to plan of care and goals.     Anticipated Barriers for therapy: none anticipated    Goals:   Short Term Goals: 4 weeks   1.Pt will be independent with HEP performance in order to continue PT progress at home. -goal met, 10/26/2020  2. Pt will present 3 consecutive sessions with no rotational fault. -goal met, 10/26/2020  3. Pt will report pain at worst of 5/10 or less -goal met, 10/26/2020  4. Pt will demonstrate ability to stand for up to 30 minutes without increased pain in low back -goal met, 10/26/2020   5. Pt will demonstrate centralization of sciatica on L -goal met, 11/5/2020        Long Term Goals: 8 weeks   1. Pt will demonstrate 5/5 strength in B LEs in order to improve her ability to perform gait and functional mobility -progressing, not met   2. Pt will demonstrate ability to walk up to 3 miles as part of exercise routine pain free in low back -goal met, 12/7/2020  3. Pt will report no sciatica pain down her LLE -goal met, 12/7/2020   4. Pt will improve BRENDA disability score to 20% disability or less in order to demonstrate improved overall QOL. -progressing, not  met   5. Pt will demonstrate good knowledge of self management of sciatica should sciatica re-occur. Goal met, 12/7/2020    Plan     Continue PT per POC, assess tolerance to today's session,  progress exercise as tolerated and appropriate.      Plan of care Certification: 12/7/2020 to 2/7/21.       Amandeep Bowen, PANCHO  12/30/2020

## 2020-12-28 NOTE — PROGRESS NOTES
"  Physical Therapy Treatment Note     Name: Jill Marquez  Clinic Number: [id number]    Therapy Diagnosis:   No diagnosis found.  Physician: Ambrose Gupta, *    Visit Date: 12/30/2020    Physician Orders: PT Eval and Treat   Medical Diagnosis from Referral:   M25.551,M25.552 (ICD-10-CM) - Bilateral hip pain   M54.40,G89.29 (ICD-10-CM) - Chronic low back pain with sciatica, sciatica laterality unspecified, unspecified back pain laterality   Evaluation Date: 9/17/2020   PN DUE: 1/7//2020  Authorization Period Expiration: 12/31/2020  Plan of Care Expiration: 2/17/21  Visit # / Visits authorized: *** 21/ 30 (+eval)   PTA Visit #: 0    Time In:  ***      Time Out: ***    Total Billable Time: 45 minutes    Precautions: Standard    Subjective     Pt reports  ***  She was compliant with home exercise program.  Response to previous treatment: reports that she felt sore  Functional change: states that she is standing and walking better. Sleeping better     Pain: 1/10    Location:  L LE and low back      Objective     Jill received therapeutic exercises (exercises performed today are bolded) to develop strength, endurance, ROM, flexibility, posture and core stabilization for 35 minutes including:     Piriformis stretch 4 x 30"  Single knee to stretch 30" holds x 3 reps   Seated Sciatic nerve glides 5 pumps while up x 5 reps  Standing QL stretch berna 3 x 30"   hooklying hip abduction GTB x 1'  Hip adduction squeeze 3 x 10  ball and belt 3" hold x 15      supine ITB stretch with strap 3 x 30" each *After STM  single leg bridge x 10 each LE   straight leg bridges on ball 2 x 10 reps   Bent knee bridges on ball x 10  Bridge with GTB at knees x 10   Bridge with GTB at hips x 10   bridge with ball squeeze x 20    SLR 2 x 10 reps each LE added PPT with #1.5   clamshells GTB 2 x 10 each LE   reverse clamshells GTB 2 x 10   Side-lying hip abduction 2 x 15  Prone hip extension with knee bent 2 x 10 reps each          LSU on 6" " "step x 10 reps   ASU with alternate knee drive 2 x 10 each   Steamboats on 2" x 10 each with #2   standing glute med isolation x 10 reps each   suitcase carry handoffs #7 x 2 laps   contraweighted farmer carries #7 and #5 x 2 laps  B Heel taps from stairs fwd/lateral x10 ea.  Tandem walking with 's carry 5# 2 laps switching hands at the end of each lap  Lateral walks GTB 2 lap   Sit<>Stand taps GTB at knees for cue 2 x 10  standing fire hydrants GTB x 15 reps  each side     standing hip abduction #2 2 x 10 each          Jill received the following manual therapy techniques (exercises performed today are bolded) for 10 minutes, including:     MET for L AI rotational correction   shotgunning for L AI rotation deferred  Manual PNF contract-relax stretching of L piriformis  Manual PNF contract-relax stretching of L glutes  STM to L tensor fascia sheryl, piriformis, and gluteal musculature  STM to bilateral lumbar paraspinals    Myofascial release under active stretch to L QL  Positional release to L TFL at 90, 100, and 110 degrees of hip flexion  STM to L and R IT band prior to stretching.       Home Exercises Provided and Patient Education Provided     Education provided:   - pt educated to continue with previously issued HEP  - pt educated to roll IT band with tennis ball at home, with return demo    Written Home Exercises Provided: yes.  Exercises were reviewed and Jill was able to demonstrate them prior to the end of the session.  Jill demonstrated good  understanding of the education provided.     See EMR under Patient Instructions for exercises provided 11/2/2020.    Assessment     ***     Jill is progressing well towards her goals.   Pt prognosis is Good.     Pt will continue to benefit from skilled outpatient physical therapy to address the deficits listed in the problem list box on initial evaluation, provide pt/family education and to maximize pt's level of independence in the home and community " environment.     Pt's spiritual, cultural and educational needs considered and pt agreeable to plan of care and goals.     Anticipated Barriers for therapy: none anticipated    Goals:   Short Term Goals: 4 weeks   1.Pt will be independent with HEP performance in order to continue PT progress at home. -goal met, 10/26/2020  2. Pt will present 3 consecutive sessions with no rotational fault. -goal met, 10/26/2020  3. Pt will report pain at worst of 5/10 or less -goal met, 10/26/2020  4. Pt will demonstrate ability to stand for up to 30 minutes without increased pain in low back -goal met, 10/26/2020   5. Pt will demonstrate centralization of sciatica on L -goal met, 11/5/2020        Long Term Goals: 8 weeks   1. Pt will demonstrate 5/5 strength in B LEs in order to improve her ability to perform gait and functional mobility -progressing, not met   2. Pt will demonstrate ability to walk up to 3 miles as part of exercise routine pain free in low back -goal met, 12/7/2020  3. Pt will report no sciatica pain down her LLE -goal met, 12/7/2020   4. Pt will improve BRENDA disability score to 20% disability or less in order to demonstrate improved overall QOL. -progressing, not met   5. Pt will demonstrate good knowledge of self management of sciatica should sciatica re-occur. Goal met, 12/7/2020    Plan     Continue PT per POC, assess tolerance to today's session,  progress exercise as tolerated and appropriate. Quad and glute strength, sit <> stand t/f training for knee valgus.      Plan of care Certification: 12/7/2020 to 2/7/21.       Kami Maguire, PT, DPT  12/28/2020

## 2020-12-30 ENCOUNTER — CLINICAL SUPPORT (OUTPATIENT)
Dept: REHABILITATION | Facility: HOSPITAL | Age: 65
End: 2020-12-30
Payer: MEDICARE

## 2020-12-30 DIAGNOSIS — R26.2 DIFFICULTY WALKING: ICD-10-CM

## 2020-12-30 DIAGNOSIS — M54.42 CHRONIC BILATERAL LOW BACK PAIN WITH LEFT-SIDED SCIATICA: ICD-10-CM

## 2020-12-30 DIAGNOSIS — R53.1 GENERALIZED WEAKNESS: ICD-10-CM

## 2020-12-30 DIAGNOSIS — G89.29 CHRONIC BILATERAL LOW BACK PAIN WITH LEFT-SIDED SCIATICA: ICD-10-CM

## 2020-12-30 PROCEDURE — 97110 THERAPEUTIC EXERCISES: CPT | Mod: PN,CQ

## 2020-12-30 PROCEDURE — 97140 MANUAL THERAPY 1/> REGIONS: CPT | Mod: PN,CQ

## 2021-01-04 ENCOUNTER — CLINICAL SUPPORT (OUTPATIENT)
Dept: REHABILITATION | Facility: HOSPITAL | Age: 66
End: 2021-01-04
Payer: MEDICARE

## 2021-01-04 DIAGNOSIS — R53.1 GENERALIZED WEAKNESS: ICD-10-CM

## 2021-01-04 DIAGNOSIS — M54.42 CHRONIC BILATERAL LOW BACK PAIN WITH LEFT-SIDED SCIATICA: ICD-10-CM

## 2021-01-04 DIAGNOSIS — G89.29 CHRONIC BILATERAL LOW BACK PAIN WITH LEFT-SIDED SCIATICA: ICD-10-CM

## 2021-01-04 DIAGNOSIS — R26.2 DIFFICULTY WALKING: ICD-10-CM

## 2021-01-04 PROCEDURE — 97140 MANUAL THERAPY 1/> REGIONS: CPT | Mod: PN

## 2021-01-04 PROCEDURE — 97110 THERAPEUTIC EXERCISES: CPT | Mod: PN

## 2021-01-05 ENCOUNTER — OFFICE VISIT (OUTPATIENT)
Dept: INTERNAL MEDICINE | Facility: CLINIC | Age: 66
End: 2021-01-05
Payer: MEDICARE

## 2021-01-05 VITALS
HEIGHT: 63 IN | OXYGEN SATURATION: 95 % | HEART RATE: 76 BPM | SYSTOLIC BLOOD PRESSURE: 118 MMHG | WEIGHT: 127.88 LBS | BODY MASS INDEX: 22.66 KG/M2 | DIASTOLIC BLOOD PRESSURE: 70 MMHG

## 2021-01-05 DIAGNOSIS — E78.5 HYPERLIPIDEMIA, UNSPECIFIED HYPERLIPIDEMIA TYPE: ICD-10-CM

## 2021-01-05 DIAGNOSIS — M15.4 EROSIVE OSTEOARTHRITIS OF BOTH HANDS: ICD-10-CM

## 2021-01-05 DIAGNOSIS — I10 ESSENTIAL HYPERTENSION: Primary | ICD-10-CM

## 2021-01-05 DIAGNOSIS — G89.29 CHRONIC BILATERAL LOW BACK PAIN WITH LEFT-SIDED SCIATICA: ICD-10-CM

## 2021-01-05 DIAGNOSIS — M54.42 CHRONIC BILATERAL LOW BACK PAIN WITH LEFT-SIDED SCIATICA: ICD-10-CM

## 2021-01-05 DIAGNOSIS — M54.16 LUMBAR RADICULOPATHY: ICD-10-CM

## 2021-01-05 DIAGNOSIS — E03.9 HYPOTHYROIDISM, UNSPECIFIED TYPE: ICD-10-CM

## 2021-01-05 DIAGNOSIS — R53.83 FATIGUE, UNSPECIFIED TYPE: ICD-10-CM

## 2021-01-05 DIAGNOSIS — L40.50 PSA (PSORIATIC ARTHRITIS): ICD-10-CM

## 2021-01-05 DIAGNOSIS — M35.00 SICCA, UNSPECIFIED TYPE: ICD-10-CM

## 2021-01-05 PROCEDURE — 99999 PR PBB SHADOW E&M-EST. PATIENT-LVL IV: CPT | Mod: PBBFAC,,, | Performed by: INTERNAL MEDICINE

## 2021-01-05 PROCEDURE — 99214 OFFICE O/P EST MOD 30 MIN: CPT | Mod: S$PBB,,, | Performed by: INTERNAL MEDICINE

## 2021-01-05 PROCEDURE — 99214 OFFICE O/P EST MOD 30 MIN: CPT | Mod: PBBFAC | Performed by: INTERNAL MEDICINE

## 2021-01-05 PROCEDURE — 99214 PR OFFICE/OUTPT VISIT, EST, LEVL IV, 30-39 MIN: ICD-10-PCS | Mod: S$PBB,,, | Performed by: INTERNAL MEDICINE

## 2021-01-05 PROCEDURE — 99999 PR PBB SHADOW E&M-EST. PATIENT-LVL IV: ICD-10-PCS | Mod: PBBFAC,,, | Performed by: INTERNAL MEDICINE

## 2021-01-05 RX ORDER — AMLODIPINE BESYLATE 10 MG/1
10 TABLET ORAL DAILY
Qty: 90 TABLET | Refills: 3 | Status: SHIPPED | OUTPATIENT
Start: 2021-01-05 | End: 2022-01-14 | Stop reason: SDUPTHER

## 2021-01-11 ENCOUNTER — PATIENT MESSAGE (OUTPATIENT)
Dept: PHYSICAL MEDICINE AND REHAB | Facility: CLINIC | Age: 66
End: 2021-01-11

## 2021-01-11 ENCOUNTER — CLINICAL SUPPORT (OUTPATIENT)
Dept: REHABILITATION | Facility: HOSPITAL | Age: 66
End: 2021-01-11
Payer: MEDICARE

## 2021-01-11 DIAGNOSIS — R53.1 GENERALIZED WEAKNESS: ICD-10-CM

## 2021-01-11 DIAGNOSIS — M54.42 CHRONIC BILATERAL LOW BACK PAIN WITH LEFT-SIDED SCIATICA: ICD-10-CM

## 2021-01-11 DIAGNOSIS — R26.2 DIFFICULTY WALKING: ICD-10-CM

## 2021-01-11 DIAGNOSIS — G89.29 CHRONIC BILATERAL LOW BACK PAIN WITH LEFT-SIDED SCIATICA: ICD-10-CM

## 2021-01-11 PROCEDURE — 97140 MANUAL THERAPY 1/> REGIONS: CPT | Mod: PN

## 2021-01-11 PROCEDURE — 97110 THERAPEUTIC EXERCISES: CPT | Mod: PN

## 2021-01-14 ENCOUNTER — OFFICE VISIT (OUTPATIENT)
Dept: PHYSICAL MEDICINE AND REHAB | Facility: CLINIC | Age: 66
End: 2021-01-14
Payer: MEDICARE

## 2021-01-14 ENCOUNTER — CLINICAL SUPPORT (OUTPATIENT)
Dept: REHABILITATION | Facility: HOSPITAL | Age: 66
End: 2021-01-14
Payer: MEDICARE

## 2021-01-14 VITALS
SYSTOLIC BLOOD PRESSURE: 135 MMHG | HEART RATE: 79 BPM | DIASTOLIC BLOOD PRESSURE: 85 MMHG | WEIGHT: 129.44 LBS | HEIGHT: 64 IN | BODY MASS INDEX: 22.1 KG/M2

## 2021-01-14 DIAGNOSIS — R53.1 GENERALIZED WEAKNESS: ICD-10-CM

## 2021-01-14 DIAGNOSIS — G89.29 CHRONIC BILATERAL LOW BACK PAIN WITH LEFT-SIDED SCIATICA: ICD-10-CM

## 2021-01-14 DIAGNOSIS — G89.29 CHRONIC NECK PAIN: ICD-10-CM

## 2021-01-14 DIAGNOSIS — M47.26 OSTEOARTHRITIS OF SPINE WITH RADICULOPATHY, LUMBAR REGION: ICD-10-CM

## 2021-01-14 DIAGNOSIS — G56.01 CARPAL TUNNEL SYNDROME, RIGHT: ICD-10-CM

## 2021-01-14 DIAGNOSIS — M54.42 CHRONIC MIDLINE LOW BACK PAIN WITH BILATERAL SCIATICA: Primary | ICD-10-CM

## 2021-01-14 DIAGNOSIS — M19.041 PRIMARY OSTEOARTHRITIS OF BOTH HANDS: ICD-10-CM

## 2021-01-14 DIAGNOSIS — G89.29 CHRONIC MIDLINE LOW BACK PAIN WITH BILATERAL SCIATICA: Primary | ICD-10-CM

## 2021-01-14 DIAGNOSIS — M54.2 CHRONIC NECK PAIN: ICD-10-CM

## 2021-01-14 DIAGNOSIS — M54.41 CHRONIC MIDLINE LOW BACK PAIN WITH BILATERAL SCIATICA: Primary | ICD-10-CM

## 2021-01-14 DIAGNOSIS — R26.2 DIFFICULTY WALKING: ICD-10-CM

## 2021-01-14 DIAGNOSIS — M54.42 CHRONIC BILATERAL LOW BACK PAIN WITH LEFT-SIDED SCIATICA: ICD-10-CM

## 2021-01-14 DIAGNOSIS — M47.812 SPONDYLOSIS OF CERVICAL REGION WITHOUT MYELOPATHY OR RADICULOPATHY: ICD-10-CM

## 2021-01-14 DIAGNOSIS — M19.042 PRIMARY OSTEOARTHRITIS OF BOTH HANDS: ICD-10-CM

## 2021-01-14 PROCEDURE — 99214 PR OFFICE/OUTPT VISIT, EST, LEVL IV, 30-39 MIN: ICD-10-PCS | Mod: S$PBB,,, | Performed by: PHYSICAL MEDICINE & REHABILITATION

## 2021-01-14 PROCEDURE — 99214 OFFICE O/P EST MOD 30 MIN: CPT | Mod: S$PBB,,, | Performed by: PHYSICAL MEDICINE & REHABILITATION

## 2021-01-14 PROCEDURE — 97110 THERAPEUTIC EXERCISES: CPT | Mod: PN

## 2021-01-14 PROCEDURE — 99999 PR PBB SHADOW E&M-EST. PATIENT-LVL II: ICD-10-PCS | Mod: PBBFAC,,, | Performed by: PHYSICAL MEDICINE & REHABILITATION

## 2021-01-14 PROCEDURE — 99212 OFFICE O/P EST SF 10 MIN: CPT | Mod: PBBFAC | Performed by: PHYSICAL MEDICINE & REHABILITATION

## 2021-01-14 PROCEDURE — 99999 PR PBB SHADOW E&M-EST. PATIENT-LVL II: CPT | Mod: PBBFAC,,, | Performed by: PHYSICAL MEDICINE & REHABILITATION

## 2021-01-15 ENCOUNTER — PATIENT MESSAGE (OUTPATIENT)
Dept: INTERNAL MEDICINE | Facility: CLINIC | Age: 66
End: 2021-01-15

## 2021-01-15 ENCOUNTER — PATIENT MESSAGE (OUTPATIENT)
Dept: RHEUMATOLOGY | Facility: CLINIC | Age: 66
End: 2021-01-15

## 2021-01-20 ENCOUNTER — CLINICAL SUPPORT (OUTPATIENT)
Dept: REHABILITATION | Facility: HOSPITAL | Age: 66
End: 2021-01-20
Payer: MEDICARE

## 2021-01-20 DIAGNOSIS — M54.42 CHRONIC BILATERAL LOW BACK PAIN WITH LEFT-SIDED SCIATICA: ICD-10-CM

## 2021-01-20 DIAGNOSIS — G89.29 CHRONIC BILATERAL LOW BACK PAIN WITH LEFT-SIDED SCIATICA: ICD-10-CM

## 2021-01-20 DIAGNOSIS — R26.2 DIFFICULTY WALKING: ICD-10-CM

## 2021-01-20 DIAGNOSIS — R53.1 GENERALIZED WEAKNESS: ICD-10-CM

## 2021-01-20 PROCEDURE — 97110 THERAPEUTIC EXERCISES: CPT | Mod: PN

## 2021-01-22 ENCOUNTER — CLINICAL SUPPORT (OUTPATIENT)
Dept: REHABILITATION | Facility: HOSPITAL | Age: 66
End: 2021-01-22
Payer: MEDICARE

## 2021-01-22 DIAGNOSIS — G89.29 CHRONIC BILATERAL LOW BACK PAIN WITH LEFT-SIDED SCIATICA: ICD-10-CM

## 2021-01-22 DIAGNOSIS — M54.42 CHRONIC BILATERAL LOW BACK PAIN WITH LEFT-SIDED SCIATICA: ICD-10-CM

## 2021-01-22 DIAGNOSIS — R53.1 GENERALIZED WEAKNESS: ICD-10-CM

## 2021-01-22 DIAGNOSIS — R26.2 DIFFICULTY WALKING: ICD-10-CM

## 2021-01-22 PROCEDURE — 97530 THERAPEUTIC ACTIVITIES: CPT | Mod: PN,CQ

## 2021-01-22 PROCEDURE — 97140 MANUAL THERAPY 1/> REGIONS: CPT | Mod: PN,CQ

## 2021-02-02 ENCOUNTER — LAB VISIT (OUTPATIENT)
Dept: LAB | Facility: HOSPITAL | Age: 66
End: 2021-02-02
Attending: INTERNAL MEDICINE
Payer: MEDICARE

## 2021-02-02 DIAGNOSIS — B18.1 HEPATITIS B CARRIER: ICD-10-CM

## 2021-02-02 DIAGNOSIS — M47.819 SPONDYLOARTHRITIS: ICD-10-CM

## 2021-02-02 LAB
BASOPHILS # BLD AUTO: 0.03 K/UL (ref 0–0.2)
BASOPHILS NFR BLD: 0.7 % (ref 0–1.9)
DIFFERENTIAL METHOD: ABNORMAL
EOSINOPHIL # BLD AUTO: 0.1 K/UL (ref 0–0.5)
EOSINOPHIL NFR BLD: 2.5 % (ref 0–8)
ERYTHROCYTE [DISTWIDTH] IN BLOOD BY AUTOMATED COUNT: 13.2 % (ref 11.5–14.5)
ERYTHROCYTE [SEDIMENTATION RATE] IN BLOOD BY WESTERGREN METHOD: 0 MM/HR (ref 0–20)
HCT VFR BLD AUTO: 42.4 % (ref 37–48.5)
HGB BLD-MCNC: 13.7 G/DL (ref 12–16)
IMM GRANULOCYTES # BLD AUTO: 0.01 K/UL (ref 0–0.04)
IMM GRANULOCYTES NFR BLD AUTO: 0.2 % (ref 0–0.5)
LYMPHOCYTES # BLD AUTO: 2.2 K/UL (ref 1–4.8)
LYMPHOCYTES NFR BLD: 48.4 % (ref 18–48)
MCH RBC QN AUTO: 32.5 PG (ref 27–31)
MCHC RBC AUTO-ENTMCNC: 32.3 G/DL (ref 32–36)
MCV RBC AUTO: 101 FL (ref 82–98)
MONOCYTES # BLD AUTO: 0.5 K/UL (ref 0.3–1)
MONOCYTES NFR BLD: 10.4 % (ref 4–15)
NEUTROPHILS # BLD AUTO: 1.7 K/UL (ref 1.8–7.7)
NEUTROPHILS NFR BLD: 37.8 % (ref 38–73)
NRBC BLD-RTO: 0 /100 WBC
PLATELET # BLD AUTO: 200 K/UL (ref 150–350)
PMV BLD AUTO: 11.2 FL (ref 9.2–12.9)
RBC # BLD AUTO: 4.21 M/UL (ref 4–5.4)
WBC # BLD AUTO: 4.44 K/UL (ref 3.9–12.7)

## 2021-02-02 PROCEDURE — 36415 COLL VENOUS BLD VENIPUNCTURE: CPT | Mod: PO

## 2021-02-02 PROCEDURE — 85025 COMPLETE CBC W/AUTO DIFF WBC: CPT

## 2021-02-02 PROCEDURE — 85651 RBC SED RATE NONAUTOMATED: CPT

## 2021-02-02 PROCEDURE — 87517 HEPATITIS B DNA QUANT: CPT

## 2021-02-02 PROCEDURE — 86140 C-REACTIVE PROTEIN: CPT

## 2021-02-02 PROCEDURE — 80053 COMPREHEN METABOLIC PANEL: CPT

## 2021-02-03 DIAGNOSIS — E87.5 HYPERKALEMIA: Primary | ICD-10-CM

## 2021-02-03 LAB
ALBUMIN SERPL BCP-MCNC: 4.4 G/DL (ref 3.5–5.2)
ALP SERPL-CCNC: 42 U/L (ref 55–135)
ALT SERPL W/O P-5'-P-CCNC: 29 U/L (ref 10–44)
ANION GAP SERPL CALC-SCNC: 16 MMOL/L (ref 8–16)
AST SERPL-CCNC: 31 U/L (ref 10–40)
BILIRUB SERPL-MCNC: 1.1 MG/DL (ref 0.1–1)
BUN SERPL-MCNC: 19 MG/DL (ref 8–23)
CALCIUM SERPL-MCNC: 9.5 MG/DL (ref 8.7–10.5)
CHLORIDE SERPL-SCNC: 102 MMOL/L (ref 95–110)
CO2 SERPL-SCNC: 23 MMOL/L (ref 23–29)
CREAT SERPL-MCNC: 0.7 MG/DL (ref 0.5–1.4)
CRP SERPL-MCNC: 0.2 MG/L (ref 0–8.2)
EST. GFR  (AFRICAN AMERICAN): >60 ML/MIN/1.73 M^2
EST. GFR  (NON AFRICAN AMERICAN): >60 ML/MIN/1.73 M^2
GLUCOSE SERPL-MCNC: 88 MG/DL (ref 70–110)
POTASSIUM SERPL-SCNC: 5.7 MMOL/L (ref 3.5–5.1)
PROT SERPL-MCNC: 7.2 G/DL (ref 6–8.4)
SODIUM SERPL-SCNC: 141 MMOL/L (ref 136–145)

## 2021-02-04 ENCOUNTER — PATIENT MESSAGE (OUTPATIENT)
Dept: INTERNAL MEDICINE | Facility: CLINIC | Age: 66
End: 2021-02-04

## 2021-02-04 ENCOUNTER — LAB VISIT (OUTPATIENT)
Dept: LAB | Facility: HOSPITAL | Age: 66
End: 2021-02-04
Attending: INTERNAL MEDICINE
Payer: MEDICARE

## 2021-02-04 DIAGNOSIS — B18.1 HEPATITIS B CARRIER: ICD-10-CM

## 2021-02-04 DIAGNOSIS — M47.819 SPONDYLOARTHRITIS: ICD-10-CM

## 2021-02-04 PROCEDURE — 36415 COLL VENOUS BLD VENIPUNCTURE: CPT | Mod: PO

## 2021-02-04 PROCEDURE — 80053 COMPREHEN METABOLIC PANEL: CPT

## 2021-02-05 LAB
ALBUMIN SERPL BCP-MCNC: 4.2 G/DL (ref 3.5–5.2)
ALP SERPL-CCNC: 44 U/L (ref 55–135)
ALT SERPL W/O P-5'-P-CCNC: 33 U/L (ref 10–44)
ANION GAP SERPL CALC-SCNC: 10 MMOL/L (ref 8–16)
AST SERPL-CCNC: 33 U/L (ref 10–40)
BILIRUB SERPL-MCNC: 0.6 MG/DL (ref 0.1–1)
BUN SERPL-MCNC: 26 MG/DL (ref 8–23)
CALCIUM SERPL-MCNC: 9.9 MG/DL (ref 8.7–10.5)
CHLORIDE SERPL-SCNC: 105 MMOL/L (ref 95–110)
CO2 SERPL-SCNC: 26 MMOL/L (ref 23–29)
CREAT SERPL-MCNC: 0.9 MG/DL (ref 0.5–1.4)
EST. GFR  (AFRICAN AMERICAN): >60 ML/MIN/1.73 M^2
EST. GFR  (NON AFRICAN AMERICAN): >60 ML/MIN/1.73 M^2
GLUCOSE SERPL-MCNC: 108 MG/DL (ref 70–110)
HBV DNA SERPL NAA+PROBE-ACNC: <10 IU/ML
HBV DNA SERPL NAA+PROBE-LOG IU: <1 LOG (10) IU/ML
HBV DNA SERPL QL NAA+PROBE: NOT DETECTED
POTASSIUM SERPL-SCNC: 4.6 MMOL/L (ref 3.5–5.1)
PROT SERPL-MCNC: 7 G/DL (ref 6–8.4)
SODIUM SERPL-SCNC: 141 MMOL/L (ref 136–145)

## 2021-02-22 ENCOUNTER — DOCUMENTATION ONLY (OUTPATIENT)
Dept: REHABILITATION | Facility: HOSPITAL | Age: 66
End: 2021-02-22

## 2021-02-23 ENCOUNTER — PATIENT MESSAGE (OUTPATIENT)
Dept: RHEUMATOLOGY | Facility: CLINIC | Age: 66
End: 2021-02-23

## 2021-03-08 ENCOUNTER — PATIENT OUTREACH (OUTPATIENT)
Dept: ADMINISTRATIVE | Facility: OTHER | Age: 66
End: 2021-03-08

## 2021-03-08 DIAGNOSIS — Z12.11 ENCOUNTER FOR COLORECTAL CANCER SCREENING: Primary | ICD-10-CM

## 2021-03-08 DIAGNOSIS — Z12.12 ENCOUNTER FOR COLORECTAL CANCER SCREENING: Primary | ICD-10-CM

## 2021-03-09 ENCOUNTER — LAB VISIT (OUTPATIENT)
Dept: LAB | Facility: HOSPITAL | Age: 66
End: 2021-03-09
Payer: MEDICARE

## 2021-03-09 ENCOUNTER — OFFICE VISIT (OUTPATIENT)
Dept: RHEUMATOLOGY | Facility: CLINIC | Age: 66
End: 2021-03-09
Payer: MEDICARE

## 2021-03-09 VITALS
HEIGHT: 64 IN | DIASTOLIC BLOOD PRESSURE: 83 MMHG | HEART RATE: 70 BPM | SYSTOLIC BLOOD PRESSURE: 132 MMHG | WEIGHT: 130 LBS | BODY MASS INDEX: 22.2 KG/M2

## 2021-03-09 DIAGNOSIS — M77.02 MEDIAL EPICONDYLITIS OF BOTH ELBOWS: ICD-10-CM

## 2021-03-09 DIAGNOSIS — E61.1 IRON DEFICIENCY: ICD-10-CM

## 2021-03-09 DIAGNOSIS — G25.81 RESTLESS LEGS: ICD-10-CM

## 2021-03-09 DIAGNOSIS — M47.819 PERIPHERAL SPONDYLOARTHRITIS: Primary | ICD-10-CM

## 2021-03-09 DIAGNOSIS — M77.01 MEDIAL EPICONDYLITIS OF BOTH ELBOWS: ICD-10-CM

## 2021-03-09 LAB
FERRITIN SERPL-MCNC: 137 NG/ML (ref 20–300)
IRON SERPL-MCNC: 111 UG/DL (ref 30–160)
MAGNESIUM SERPL-MCNC: 1.9 MG/DL (ref 1.6–2.6)
SATURATED IRON: 25 % (ref 20–50)
TOTAL IRON BINDING CAPACITY: 437 UG/DL (ref 250–450)
TRANSFERRIN SERPL-MCNC: 295 MG/DL (ref 200–375)

## 2021-03-09 PROCEDURE — 99214 PR OFFICE/OUTPT VISIT, EST, LEVL IV, 30-39 MIN: ICD-10-PCS | Mod: S$PBB,,, | Performed by: INTERNAL MEDICINE

## 2021-03-09 PROCEDURE — 99999 PR PBB SHADOW E&M-EST. PATIENT-LVL IV: ICD-10-PCS | Mod: PBBFAC,,, | Performed by: INTERNAL MEDICINE

## 2021-03-09 PROCEDURE — 83735 ASSAY OF MAGNESIUM: CPT | Performed by: STUDENT IN AN ORGANIZED HEALTH CARE EDUCATION/TRAINING PROGRAM

## 2021-03-09 PROCEDURE — 99214 OFFICE O/P EST MOD 30 MIN: CPT | Mod: PBBFAC | Performed by: INTERNAL MEDICINE

## 2021-03-09 PROCEDURE — 99214 OFFICE O/P EST MOD 30 MIN: CPT | Mod: S$PBB,,, | Performed by: INTERNAL MEDICINE

## 2021-03-09 PROCEDURE — 99999 PR PBB SHADOW E&M-EST. PATIENT-LVL IV: CPT | Mod: PBBFAC,,, | Performed by: INTERNAL MEDICINE

## 2021-03-09 PROCEDURE — 84466 ASSAY OF TRANSFERRIN: CPT | Performed by: STUDENT IN AN ORGANIZED HEALTH CARE EDUCATION/TRAINING PROGRAM

## 2021-03-09 PROCEDURE — 36415 COLL VENOUS BLD VENIPUNCTURE: CPT | Performed by: STUDENT IN AN ORGANIZED HEALTH CARE EDUCATION/TRAINING PROGRAM

## 2021-03-09 PROCEDURE — 82728 ASSAY OF FERRITIN: CPT | Performed by: STUDENT IN AN ORGANIZED HEALTH CARE EDUCATION/TRAINING PROGRAM

## 2021-03-15 ENCOUNTER — PATIENT MESSAGE (OUTPATIENT)
Dept: RHEUMATOLOGY | Facility: CLINIC | Age: 66
End: 2021-03-15

## 2021-03-15 RX ORDER — GABAPENTIN 300 MG/1
300 CAPSULE ORAL 3 TIMES DAILY
Qty: 270 CAPSULE | Refills: 1 | Status: SHIPPED | OUTPATIENT
Start: 2021-03-15 | End: 2021-09-10

## 2021-03-15 RX ORDER — GABAPENTIN 300 MG/1
CAPSULE ORAL
Qty: 180 CAPSULE | Refills: 1 | Status: SHIPPED | OUTPATIENT
Start: 2021-03-15 | End: 2021-03-15

## 2021-03-17 ENCOUNTER — OFFICE VISIT (OUTPATIENT)
Dept: OPTOMETRY | Facility: CLINIC | Age: 66
End: 2021-03-17
Payer: MEDICARE

## 2021-03-17 DIAGNOSIS — H25.13 NUCLEAR SCLEROSIS, BILATERAL: Primary | ICD-10-CM

## 2021-03-17 DIAGNOSIS — Z13.5 GLAUCOMA SCREENING: ICD-10-CM

## 2021-03-17 PROCEDURE — 92014 PR EYE EXAM, EST PATIENT,COMPREHESV: ICD-10-PCS | Mod: S$PBB,,, | Performed by: OPTOMETRIST

## 2021-03-17 PROCEDURE — 99999 PR PBB SHADOW E&M-EST. PATIENT-LVL III: ICD-10-PCS | Mod: PBBFAC,,, | Performed by: OPTOMETRIST

## 2021-03-17 PROCEDURE — 99999 PR PBB SHADOW E&M-EST. PATIENT-LVL III: CPT | Mod: PBBFAC,,, | Performed by: OPTOMETRIST

## 2021-03-17 PROCEDURE — 92014 COMPRE OPH EXAM EST PT 1/>: CPT | Mod: S$PBB,,, | Performed by: OPTOMETRIST

## 2021-03-17 PROCEDURE — 99213 OFFICE O/P EST LOW 20 MIN: CPT | Mod: PBBFAC | Performed by: OPTOMETRIST

## 2021-03-22 ENCOUNTER — PATIENT MESSAGE (OUTPATIENT)
Dept: INTERNAL MEDICINE | Facility: CLINIC | Age: 66
End: 2021-03-22

## 2021-03-22 RX ORDER — FLUTICASONE PROPIONATE 50 MCG
SPRAY, SUSPENSION (ML) NASAL
Qty: 48 G | Refills: 1 | Status: SHIPPED | OUTPATIENT
Start: 2021-03-22 | End: 2023-01-10

## 2021-04-05 ENCOUNTER — PATIENT MESSAGE (OUTPATIENT)
Dept: ADMINISTRATIVE | Facility: HOSPITAL | Age: 66
End: 2021-04-05

## 2021-04-23 ENCOUNTER — PATIENT MESSAGE (OUTPATIENT)
Dept: ADMINISTRATIVE | Facility: HOSPITAL | Age: 66
End: 2021-04-23

## 2021-04-23 ENCOUNTER — PATIENT OUTREACH (OUTPATIENT)
Dept: ADMINISTRATIVE | Facility: HOSPITAL | Age: 66
End: 2021-04-23

## 2021-04-23 DIAGNOSIS — Z12.39 ENCOUNTER FOR SCREENING FOR MALIGNANT NEOPLASM OF BREAST, UNSPECIFIED SCREENING MODALITY: Primary | ICD-10-CM

## 2021-04-23 DIAGNOSIS — Z12.31 ENCOUNTER FOR SCREENING MAMMOGRAM FOR MALIGNANT NEOPLASM OF BREAST: ICD-10-CM

## 2021-04-23 DIAGNOSIS — Z12.11 COLON CANCER SCREENING: Primary | ICD-10-CM

## 2021-06-02 ENCOUNTER — HOSPITAL ENCOUNTER (OUTPATIENT)
Dept: RADIOLOGY | Facility: HOSPITAL | Age: 66
Discharge: HOME | End: 2021-06-02
Attending: INTERNAL MEDICINE
Payer: MEDICARE

## 2021-06-02 VITALS — WEIGHT: 125 LBS | HEIGHT: 64 IN | BODY MASS INDEX: 21.34 KG/M2

## 2021-06-02 DIAGNOSIS — Z12.31 ENCOUNTER FOR SCREENING MAMMOGRAM FOR MALIGNANT NEOPLASM OF BREAST: ICD-10-CM

## 2021-06-02 DIAGNOSIS — Z12.39 ENCOUNTER FOR SCREENING FOR MALIGNANT NEOPLASM OF BREAST, UNSPECIFIED SCREENING MODALITY: ICD-10-CM

## 2021-06-02 PROCEDURE — 77067 MAMMO DIGITAL SCREENING BILAT WITH TOMO: ICD-10-PCS | Mod: 26,,, | Performed by: RADIOLOGY

## 2021-06-02 PROCEDURE — 77063 MAMMO DIGITAL SCREENING BILAT WITH TOMO: ICD-10-PCS | Mod: 26,,, | Performed by: RADIOLOGY

## 2021-06-02 PROCEDURE — 77067 SCR MAMMO BI INCL CAD: CPT | Mod: TC

## 2021-06-02 PROCEDURE — 77067 SCR MAMMO BI INCL CAD: CPT | Mod: 26,,, | Performed by: RADIOLOGY

## 2021-06-02 PROCEDURE — 77063 BREAST TOMOSYNTHESIS BI: CPT | Mod: 26,,, | Performed by: RADIOLOGY

## 2021-06-06 ENCOUNTER — PATIENT MESSAGE (OUTPATIENT)
Dept: INTERNAL MEDICINE | Facility: CLINIC | Age: 66
End: 2021-06-06

## 2021-06-08 ENCOUNTER — PATIENT MESSAGE (OUTPATIENT)
Dept: RHEUMATOLOGY | Facility: CLINIC | Age: 66
End: 2021-06-08

## 2021-06-08 ENCOUNTER — LAB VISIT (OUTPATIENT)
Dept: LAB | Facility: HOSPITAL | Age: 66
End: 2021-06-08
Attending: INTERNAL MEDICINE
Payer: MEDICARE

## 2021-06-08 ENCOUNTER — TELEPHONE (OUTPATIENT)
Dept: RHEUMATOLOGY | Facility: CLINIC | Age: 66
End: 2021-06-08

## 2021-06-08 ENCOUNTER — PATIENT OUTREACH (OUTPATIENT)
Dept: ADMINISTRATIVE | Facility: OTHER | Age: 66
End: 2021-06-08

## 2021-06-08 ENCOUNTER — PATIENT MESSAGE (OUTPATIENT)
Dept: INTERNAL MEDICINE | Facility: CLINIC | Age: 66
End: 2021-06-08

## 2021-06-08 DIAGNOSIS — E83.52 HYPERCALCEMIA: ICD-10-CM

## 2021-06-08 DIAGNOSIS — M47.819 PERIPHERAL SPONDYLOARTHRITIS: ICD-10-CM

## 2021-06-08 DIAGNOSIS — L40.50 PSA (PSORIATIC ARTHRITIS): ICD-10-CM

## 2021-06-08 DIAGNOSIS — E87.5 HYPERKALEMIA: ICD-10-CM

## 2021-06-08 DIAGNOSIS — M47.819 SPONDYLOARTHRITIS: ICD-10-CM

## 2021-06-08 DIAGNOSIS — B18.1 HEPATITIS B CARRIER: ICD-10-CM

## 2021-06-08 DIAGNOSIS — E87.5 HYPERKALEMIA: Primary | ICD-10-CM

## 2021-06-08 LAB
ALBUMIN SERPL BCP-MCNC: 4.1 G/DL (ref 3.5–5.2)
ALP SERPL-CCNC: 43 U/L (ref 55–135)
ALT SERPL W/O P-5'-P-CCNC: 29 U/L (ref 10–44)
ANION GAP SERPL CALC-SCNC: 9 MMOL/L (ref 8–16)
AST SERPL-CCNC: 36 U/L (ref 10–40)
BASOPHILS # BLD AUTO: 0.05 K/UL (ref 0–0.2)
BASOPHILS NFR BLD: 1.1 % (ref 0–1.9)
BILIRUB SERPL-MCNC: 0.8 MG/DL (ref 0.1–1)
BUN SERPL-MCNC: 20 MG/DL (ref 8–23)
CALCIUM SERPL-MCNC: 10.7 MG/DL (ref 8.7–10.5)
CALCIUM SERPL-MCNC: 9.3 MG/DL (ref 8.7–10.5)
CHLORIDE SERPL-SCNC: 103 MMOL/L (ref 95–110)
CO2 SERPL-SCNC: 28 MMOL/L (ref 23–29)
CREAT SERPL-MCNC: 0.7 MG/DL (ref 0.5–1.4)
CRP SERPL-MCNC: 0.2 MG/L (ref 0–8.2)
DIFFERENTIAL METHOD: ABNORMAL
EOSINOPHIL # BLD AUTO: 0.2 K/UL (ref 0–0.5)
EOSINOPHIL NFR BLD: 3.4 % (ref 0–8)
ERYTHROCYTE [DISTWIDTH] IN BLOOD BY AUTOMATED COUNT: 13.5 % (ref 11.5–14.5)
ERYTHROCYTE [SEDIMENTATION RATE] IN BLOOD BY WESTERGREN METHOD: <2 MM/HR (ref 0–36)
EST. GFR  (AFRICAN AMERICAN): >60 ML/MIN/1.73 M^2
EST. GFR  (NON AFRICAN AMERICAN): >60 ML/MIN/1.73 M^2
GLUCOSE SERPL-MCNC: 94 MG/DL (ref 70–110)
HCT VFR BLD AUTO: 40.6 % (ref 37–48.5)
HGB BLD-MCNC: 13.1 G/DL (ref 12–16)
IMM GRANULOCYTES # BLD AUTO: 0.02 K/UL (ref 0–0.04)
IMM GRANULOCYTES NFR BLD AUTO: 0.5 % (ref 0–0.5)
LYMPHOCYTES # BLD AUTO: 2.2 K/UL (ref 1–4.8)
LYMPHOCYTES NFR BLD: 48.4 % (ref 18–48)
MCH RBC QN AUTO: 31.6 PG (ref 27–31)
MCHC RBC AUTO-ENTMCNC: 32.3 G/DL (ref 32–36)
MCV RBC AUTO: 98 FL (ref 82–98)
MONOCYTES # BLD AUTO: 0.5 K/UL (ref 0.3–1)
MONOCYTES NFR BLD: 11 % (ref 4–15)
NEUTROPHILS # BLD AUTO: 1.6 K/UL (ref 1.8–7.7)
NEUTROPHILS NFR BLD: 35.6 % (ref 38–73)
NRBC BLD-RTO: 0 /100 WBC
PLATELET # BLD AUTO: 194 K/UL (ref 150–450)
PMV BLD AUTO: 10.2 FL (ref 9.2–12.9)
POTASSIUM SERPL-SCNC: 4.1 MMOL/L (ref 3.5–5.1)
POTASSIUM SERPL-SCNC: 5.9 MMOL/L (ref 3.5–5.1)
PROT SERPL-MCNC: 7.1 G/DL (ref 6–8.4)
RBC # BLD AUTO: 4.14 M/UL (ref 4–5.4)
SODIUM SERPL-SCNC: 140 MMOL/L (ref 136–145)
WBC # BLD AUTO: 4.44 K/UL (ref 3.9–12.7)

## 2021-06-08 PROCEDURE — 82310 ASSAY OF CALCIUM: CPT | Performed by: INTERNAL MEDICINE

## 2021-06-08 PROCEDURE — 80053 COMPREHEN METABOLIC PANEL: CPT | Performed by: INTERNAL MEDICINE

## 2021-06-08 PROCEDURE — 36415 COLL VENOUS BLD VENIPUNCTURE: CPT | Mod: PO | Performed by: INTERNAL MEDICINE

## 2021-06-08 PROCEDURE — 36415 COLL VENOUS BLD VENIPUNCTURE: CPT | Performed by: INTERNAL MEDICINE

## 2021-06-08 PROCEDURE — 85652 RBC SED RATE AUTOMATED: CPT | Performed by: INTERNAL MEDICINE

## 2021-06-08 PROCEDURE — 82330 ASSAY OF CALCIUM: CPT | Performed by: INTERNAL MEDICINE

## 2021-06-08 PROCEDURE — 85025 COMPLETE CBC W/AUTO DIFF WBC: CPT | Performed by: INTERNAL MEDICINE

## 2021-06-08 PROCEDURE — 86140 C-REACTIVE PROTEIN: CPT | Performed by: INTERNAL MEDICINE

## 2021-06-08 PROCEDURE — 84132 ASSAY OF SERUM POTASSIUM: CPT | Performed by: INTERNAL MEDICINE

## 2021-06-09 LAB — CA-I BLDV-SCNC: 1.31 MMOL/L (ref 1.06–1.42)

## 2021-06-15 ENCOUNTER — OFFICE VISIT (OUTPATIENT)
Dept: RHEUMATOLOGY | Facility: CLINIC | Age: 66
End: 2021-06-15
Payer: MEDICARE

## 2021-06-15 VITALS
WEIGHT: 130 LBS | BODY MASS INDEX: 22.2 KG/M2 | DIASTOLIC BLOOD PRESSURE: 89 MMHG | HEART RATE: 67 BPM | SYSTOLIC BLOOD PRESSURE: 140 MMHG | HEIGHT: 64 IN

## 2021-06-15 DIAGNOSIS — M77.02 MEDIAL EPICONDYLITIS, LEFT: Primary | ICD-10-CM

## 2021-06-15 DIAGNOSIS — M77.02 MEDIAL EPICONDYLITIS OF ELBOW, LEFT: ICD-10-CM

## 2021-06-15 DIAGNOSIS — M47.819 PERIPHERAL SPONDYLOARTHRITIS: ICD-10-CM

## 2021-06-15 PROCEDURE — 99999 PR PBB SHADOW E&M-EST. PATIENT-LVL III: CPT | Mod: PBBFAC,,, | Performed by: INTERNAL MEDICINE

## 2021-06-15 PROCEDURE — 99999 PR PBB SHADOW E&M-EST. PATIENT-LVL III: ICD-10-PCS | Mod: PBBFAC,,, | Performed by: INTERNAL MEDICINE

## 2021-06-15 PROCEDURE — 99213 PR OFFICE/OUTPT VISIT, EST, LEVL III, 20-29 MIN: ICD-10-PCS | Mod: S$PBB,,, | Performed by: INTERNAL MEDICINE

## 2021-06-15 PROCEDURE — 99213 OFFICE O/P EST LOW 20 MIN: CPT | Mod: PBBFAC | Performed by: INTERNAL MEDICINE

## 2021-06-15 PROCEDURE — 99213 OFFICE O/P EST LOW 20 MIN: CPT | Mod: S$PBB,,, | Performed by: INTERNAL MEDICINE

## 2021-06-15 RX ORDER — LEFLUNOMIDE 20 MG/1
20 TABLET ORAL DAILY
Qty: 30 TABLET | Refills: 2 | Status: SHIPPED | OUTPATIENT
Start: 2021-06-15 | End: 2021-07-06

## 2021-06-15 RX ORDER — ETANERCEPT 50 MG/ML
50 SOLUTION SUBCUTANEOUS WEEKLY
Qty: 12 ML | Refills: 0 | Status: SHIPPED | OUTPATIENT
Start: 2021-06-15 | End: 2021-10-19 | Stop reason: SDUPTHER

## 2021-06-22 ENCOUNTER — CLINICAL SUPPORT (OUTPATIENT)
Dept: REHABILITATION | Facility: HOSPITAL | Age: 66
End: 2021-06-22
Attending: INTERNAL MEDICINE
Payer: MEDICARE

## 2021-06-22 DIAGNOSIS — R53.1 DECREASED STRENGTH: ICD-10-CM

## 2021-06-22 DIAGNOSIS — R52 PAIN: ICD-10-CM

## 2021-06-22 DIAGNOSIS — R29.898 DECREASED GRIP STRENGTH: ICD-10-CM

## 2021-06-22 PROBLEM — R60.9 EDEMA: Status: ACTIVE | Noted: 2021-06-22

## 2021-06-22 PROCEDURE — 97165 OT EVAL LOW COMPLEX 30 MIN: CPT | Mod: PN

## 2021-06-23 DIAGNOSIS — L40.50 PSA (PSORIATIC ARTHRITIS): Primary | ICD-10-CM

## 2021-06-28 ENCOUNTER — PATIENT MESSAGE (OUTPATIENT)
Dept: RHEUMATOLOGY | Facility: CLINIC | Age: 66
End: 2021-06-28

## 2021-06-29 ENCOUNTER — LAB VISIT (OUTPATIENT)
Dept: LAB | Facility: HOSPITAL | Age: 66
End: 2021-06-29
Attending: INTERNAL MEDICINE
Payer: MEDICARE

## 2021-06-29 DIAGNOSIS — I10 ESSENTIAL HYPERTENSION: ICD-10-CM

## 2021-06-29 DIAGNOSIS — E03.9 HYPOTHYROIDISM, UNSPECIFIED TYPE: ICD-10-CM

## 2021-06-29 DIAGNOSIS — E78.5 HYPERLIPIDEMIA, UNSPECIFIED HYPERLIPIDEMIA TYPE: ICD-10-CM

## 2021-06-29 DIAGNOSIS — R73.09 ELEVATED GLUCOSE: ICD-10-CM

## 2021-06-29 LAB
CHOLEST SERPL-MCNC: 197 MG/DL (ref 120–199)
CHOLEST/HDLC SERPL: 2.7 {RATIO} (ref 2–5)
ESTIMATED AVG GLUCOSE: 100 MG/DL (ref 68–131)
HBA1C MFR BLD: 5.1 % (ref 4–5.6)
HDLC SERPL-MCNC: 73 MG/DL (ref 40–75)
HDLC SERPL: 37.1 % (ref 20–50)
LDLC SERPL CALC-MCNC: 98.8 MG/DL (ref 63–159)
NONHDLC SERPL-MCNC: 124 MG/DL
T4 FREE SERPL-MCNC: 0.9 NG/DL (ref 0.71–1.51)
TRIGL SERPL-MCNC: 126 MG/DL (ref 30–150)
TSH SERPL DL<=0.005 MIU/L-ACNC: 4.24 UIU/ML (ref 0.4–4)

## 2021-06-29 PROCEDURE — 84443 ASSAY THYROID STIM HORMONE: CPT | Performed by: INTERNAL MEDICINE

## 2021-06-29 PROCEDURE — 84439 ASSAY OF FREE THYROXINE: CPT | Performed by: INTERNAL MEDICINE

## 2021-06-29 PROCEDURE — 83036 HEMOGLOBIN GLYCOSYLATED A1C: CPT | Performed by: INTERNAL MEDICINE

## 2021-06-29 PROCEDURE — 80061 LIPID PANEL: CPT | Performed by: INTERNAL MEDICINE

## 2021-06-29 PROCEDURE — 36415 COLL VENOUS BLD VENIPUNCTURE: CPT | Performed by: INTERNAL MEDICINE

## 2021-07-06 ENCOUNTER — OFFICE VISIT (OUTPATIENT)
Dept: INTERNAL MEDICINE | Facility: CLINIC | Age: 66
End: 2021-07-06
Payer: MEDICARE

## 2021-07-06 VITALS
OXYGEN SATURATION: 96 % | DIASTOLIC BLOOD PRESSURE: 80 MMHG | HEIGHT: 63 IN | SYSTOLIC BLOOD PRESSURE: 116 MMHG | HEART RATE: 77 BPM | BODY MASS INDEX: 22.15 KG/M2 | WEIGHT: 125 LBS

## 2021-07-06 DIAGNOSIS — M47.816 LUMBAR SPONDYLOSIS: ICD-10-CM

## 2021-07-06 DIAGNOSIS — M54.42 CHRONIC BILATERAL LOW BACK PAIN WITH LEFT-SIDED SCIATICA: ICD-10-CM

## 2021-07-06 DIAGNOSIS — G89.29 CHRONIC BILATERAL LOW BACK PAIN WITH LEFT-SIDED SCIATICA: ICD-10-CM

## 2021-07-06 DIAGNOSIS — I10 ESSENTIAL HYPERTENSION: ICD-10-CM

## 2021-07-06 DIAGNOSIS — L40.50 PSA (PSORIATIC ARTHRITIS): ICD-10-CM

## 2021-07-06 DIAGNOSIS — M47.819 PERIPHERAL SPONDYLOARTHRITIS: ICD-10-CM

## 2021-07-06 DIAGNOSIS — E03.9 HYPOTHYROIDISM, UNSPECIFIED TYPE: Primary | ICD-10-CM

## 2021-07-06 DIAGNOSIS — E78.5 HYPERLIPIDEMIA, UNSPECIFIED HYPERLIPIDEMIA TYPE: ICD-10-CM

## 2021-07-06 DIAGNOSIS — D84.9 IMMUNOCOMPROMISED, ACQUIRED: ICD-10-CM

## 2021-07-06 PROCEDURE — 99214 OFFICE O/P EST MOD 30 MIN: CPT | Mod: PBBFAC | Performed by: INTERNAL MEDICINE

## 2021-07-06 PROCEDURE — 99999 PR PBB SHADOW E&M-EST. PATIENT-LVL IV: ICD-10-PCS | Mod: PBBFAC,,, | Performed by: INTERNAL MEDICINE

## 2021-07-06 PROCEDURE — 99214 PR OFFICE/OUTPT VISIT, EST, LEVL IV, 30-39 MIN: ICD-10-PCS | Mod: S$PBB,,, | Performed by: INTERNAL MEDICINE

## 2021-07-06 PROCEDURE — 99999 PR PBB SHADOW E&M-EST. PATIENT-LVL IV: CPT | Mod: PBBFAC,,, | Performed by: INTERNAL MEDICINE

## 2021-07-06 PROCEDURE — 99214 OFFICE O/P EST MOD 30 MIN: CPT | Mod: S$PBB,,, | Performed by: INTERNAL MEDICINE

## 2021-07-06 RX ORDER — LEVOTHYROXINE SODIUM 88 UG/1
88 TABLET ORAL
Qty: 30 TABLET | Refills: 12 | Status: SHIPPED | OUTPATIENT
Start: 2021-07-06 | End: 2022-08-02

## 2021-08-03 ENCOUNTER — HOSPITAL ENCOUNTER (OUTPATIENT)
Dept: RADIOLOGY | Facility: HOSPITAL | Age: 66
Discharge: HOME | End: 2021-08-03
Attending: INTERNAL MEDICINE
Payer: MEDICARE

## 2021-08-03 ENCOUNTER — PATIENT MESSAGE (OUTPATIENT)
Dept: INTERNAL MEDICINE | Facility: CLINIC | Age: 66
End: 2021-08-03

## 2021-08-03 DIAGNOSIS — E78.5 HYPERLIPIDEMIA, UNSPECIFIED HYPERLIPIDEMIA TYPE: ICD-10-CM

## 2021-08-03 DIAGNOSIS — L40.50 PSA (PSORIATIC ARTHRITIS): ICD-10-CM

## 2021-08-03 DIAGNOSIS — M47.819 PERIPHERAL SPONDYLOARTHRITIS: ICD-10-CM

## 2021-08-03 PROCEDURE — 75571 CT HRT W/O DYE W/CA TEST: CPT | Mod: 26,,, | Performed by: RADIOLOGY

## 2021-08-03 PROCEDURE — 75571 CT HRT W/O DYE W/CA TEST: CPT | Mod: TC

## 2021-08-03 PROCEDURE — 75571 CT CALCIUM SCORING CARDIAC: ICD-10-PCS | Mod: 26,,, | Performed by: RADIOLOGY

## 2021-08-04 ENCOUNTER — PATIENT MESSAGE (OUTPATIENT)
Dept: INTERNAL MEDICINE | Facility: CLINIC | Age: 66
End: 2021-08-04

## 2021-09-03 ENCOUNTER — PATIENT MESSAGE (OUTPATIENT)
Dept: RHEUMATOLOGY | Facility: CLINIC | Age: 66
End: 2021-09-03

## 2021-09-07 ENCOUNTER — IMMUNIZATION (OUTPATIENT)
Dept: INTERNAL MEDICINE | Facility: CLINIC | Age: 66
End: 2021-09-07
Payer: MEDICARE

## 2021-09-07 DIAGNOSIS — Z23 NEED FOR VACCINATION: Primary | ICD-10-CM

## 2021-09-07 PROCEDURE — 0003A COVID-19, MRNA, LNP-S, PF, 30 MCG/0.3 ML DOSE VACCINE: ICD-10-PCS | Mod: CV19,,, | Performed by: INTERNAL MEDICINE

## 2021-09-07 PROCEDURE — 91300 COVID-19, MRNA, LNP-S, PF, 30 MCG/0.3 ML DOSE VACCINE: CPT | Mod: ,,, | Performed by: INTERNAL MEDICINE

## 2021-09-07 PROCEDURE — 91300 COVID-19, MRNA, LNP-S, PF, 30 MCG/0.3 ML DOSE VACCINE: ICD-10-PCS | Mod: ,,, | Performed by: INTERNAL MEDICINE

## 2021-09-07 PROCEDURE — 0003A COVID-19, MRNA, LNP-S, PF, 30 MCG/0.3 ML DOSE VACCINE: CPT | Mod: CV19,,, | Performed by: INTERNAL MEDICINE

## 2021-10-04 DIAGNOSIS — J30.2 SEASONAL AND PERENNIAL ALLERGIC RHINITIS: ICD-10-CM

## 2021-10-04 DIAGNOSIS — J30.89 SEASONAL AND PERENNIAL ALLERGIC RHINITIS: ICD-10-CM

## 2021-10-05 ENCOUNTER — CLINICAL SUPPORT (OUTPATIENT)
Dept: INTERNAL MEDICINE | Facility: CLINIC | Age: 66
End: 2021-10-05
Payer: MEDICARE

## 2021-10-05 PROCEDURE — G0008 ADMIN INFLUENZA VIRUS VAC: HCPCS | Mod: PBBFAC

## 2021-10-05 PROCEDURE — 90694 VACC AIIV4 NO PRSRV 0.5ML IM: CPT | Mod: PBBFAC

## 2021-10-05 RX ORDER — AZELASTINE 1 MG/ML
1 SPRAY, METERED NASAL 2 TIMES DAILY
Qty: 90 ML | Refills: 2 | Status: SHIPPED | OUTPATIENT
Start: 2021-10-05 | End: 2023-01-10

## 2021-10-13 ENCOUNTER — PATIENT MESSAGE (OUTPATIENT)
Dept: RHEUMATOLOGY | Facility: CLINIC | Age: 66
End: 2021-10-13
Payer: MEDICARE

## 2021-10-13 ENCOUNTER — TELEPHONE (OUTPATIENT)
Dept: RHEUMATOLOGY | Facility: CLINIC | Age: 66
End: 2021-10-13

## 2021-10-13 ENCOUNTER — LAB VISIT (OUTPATIENT)
Dept: LAB | Facility: HOSPITAL | Age: 66
End: 2021-10-13
Attending: INTERNAL MEDICINE
Payer: MEDICARE

## 2021-10-13 DIAGNOSIS — M47.819 SPONDYLOARTHRITIS: ICD-10-CM

## 2021-10-13 DIAGNOSIS — B18.1 HEPATITIS B CARRIER: ICD-10-CM

## 2021-10-13 DIAGNOSIS — E87.5 HYPERKALEMIA: Primary | ICD-10-CM

## 2021-10-13 LAB
ALBUMIN SERPL BCP-MCNC: 4.4 G/DL (ref 3.5–5.2)
ALP SERPL-CCNC: 47 U/L (ref 55–135)
ALT SERPL W/O P-5'-P-CCNC: 33 U/L (ref 10–44)
ANION GAP SERPL CALC-SCNC: 11 MMOL/L (ref 8–16)
AST SERPL-CCNC: 34 U/L (ref 10–40)
BASOPHILS # BLD AUTO: 0.05 K/UL (ref 0–0.2)
BASOPHILS NFR BLD: 1.1 % (ref 0–1.9)
BILIRUB SERPL-MCNC: 0.9 MG/DL (ref 0.1–1)
BUN SERPL-MCNC: 21 MG/DL (ref 8–23)
CALCIUM SERPL-MCNC: 10.5 MG/DL (ref 8.7–10.5)
CHLORIDE SERPL-SCNC: 103 MMOL/L (ref 95–110)
CO2 SERPL-SCNC: 26 MMOL/L (ref 23–29)
CREAT SERPL-MCNC: 0.6 MG/DL (ref 0.5–1.4)
CRP SERPL-MCNC: <0.3 MG/L (ref 0–8.2)
DIFFERENTIAL METHOD: ABNORMAL
EOSINOPHIL # BLD AUTO: 0.2 K/UL (ref 0–0.5)
EOSINOPHIL NFR BLD: 3.2 % (ref 0–8)
ERYTHROCYTE [DISTWIDTH] IN BLOOD BY AUTOMATED COUNT: 13.7 % (ref 11.5–14.5)
ERYTHROCYTE [SEDIMENTATION RATE] IN BLOOD BY WESTERGREN METHOD: <2 MM/HR (ref 0–36)
EST. GFR  (AFRICAN AMERICAN): >60 ML/MIN/1.73 M^2
EST. GFR  (NON AFRICAN AMERICAN): >60 ML/MIN/1.73 M^2
GLUCOSE SERPL-MCNC: 94 MG/DL (ref 70–110)
HCT VFR BLD AUTO: 41.5 % (ref 37–48.5)
HGB BLD-MCNC: 14 G/DL (ref 12–16)
IMM GRANULOCYTES # BLD AUTO: 0.01 K/UL (ref 0–0.04)
IMM GRANULOCYTES NFR BLD AUTO: 0.2 % (ref 0–0.5)
LYMPHOCYTES # BLD AUTO: 2.1 K/UL (ref 1–4.8)
LYMPHOCYTES NFR BLD: 44.4 % (ref 18–48)
MCH RBC QN AUTO: 32.9 PG (ref 27–31)
MCHC RBC AUTO-ENTMCNC: 33.7 G/DL (ref 32–36)
MCV RBC AUTO: 98 FL (ref 82–98)
MONOCYTES # BLD AUTO: 0.4 K/UL (ref 0.3–1)
MONOCYTES NFR BLD: 9.1 % (ref 4–15)
NEUTROPHILS # BLD AUTO: 2 K/UL (ref 1.8–7.7)
NEUTROPHILS NFR BLD: 42 % (ref 38–73)
NRBC BLD-RTO: 0 /100 WBC
PLATELET # BLD AUTO: 201 K/UL (ref 150–450)
PMV BLD AUTO: 10.2 FL (ref 9.2–12.9)
POTASSIUM SERPL-SCNC: 5.5 MMOL/L (ref 3.5–5.1)
PROT SERPL-MCNC: 7.5 G/DL (ref 6–8.4)
RBC # BLD AUTO: 4.25 M/UL (ref 4–5.4)
SODIUM SERPL-SCNC: 140 MMOL/L (ref 136–145)
WBC # BLD AUTO: 4.64 K/UL (ref 3.9–12.7)

## 2021-10-13 PROCEDURE — 87517 HEPATITIS B DNA QUANT: CPT | Performed by: INTERNAL MEDICINE

## 2021-10-13 PROCEDURE — 85025 COMPLETE CBC W/AUTO DIFF WBC: CPT | Performed by: INTERNAL MEDICINE

## 2021-10-13 PROCEDURE — 36415 COLL VENOUS BLD VENIPUNCTURE: CPT | Performed by: INTERNAL MEDICINE

## 2021-10-13 PROCEDURE — 86140 C-REACTIVE PROTEIN: CPT | Performed by: INTERNAL MEDICINE

## 2021-10-13 PROCEDURE — 80053 COMPREHEN METABOLIC PANEL: CPT | Performed by: INTERNAL MEDICINE

## 2021-10-13 PROCEDURE — 85652 RBC SED RATE AUTOMATED: CPT | Performed by: INTERNAL MEDICINE

## 2021-10-14 ENCOUNTER — LAB VISIT (OUTPATIENT)
Dept: LAB | Facility: HOSPITAL | Age: 66
End: 2021-10-14
Attending: INTERNAL MEDICINE
Payer: MEDICARE

## 2021-10-14 DIAGNOSIS — E87.5 HYPERKALEMIA: ICD-10-CM

## 2021-10-14 LAB — POTASSIUM SERPL-SCNC: 5.3 MMOL/L (ref 3.5–5.1)

## 2021-10-14 PROCEDURE — 84132 ASSAY OF SERUM POTASSIUM: CPT | Performed by: INTERNAL MEDICINE

## 2021-10-14 PROCEDURE — 36415 COLL VENOUS BLD VENIPUNCTURE: CPT | Performed by: INTERNAL MEDICINE

## 2021-10-15 ENCOUNTER — PATIENT MESSAGE (OUTPATIENT)
Dept: INTERNAL MEDICINE | Facility: CLINIC | Age: 66
End: 2021-10-15

## 2021-10-15 DIAGNOSIS — E87.5 HYPERKALEMIA: Primary | ICD-10-CM

## 2021-10-18 ENCOUNTER — PATIENT OUTREACH (OUTPATIENT)
Dept: ADMINISTRATIVE | Facility: OTHER | Age: 66
End: 2021-10-18

## 2021-10-19 ENCOUNTER — PATIENT MESSAGE (OUTPATIENT)
Dept: INTERNAL MEDICINE | Facility: CLINIC | Age: 66
End: 2021-10-19

## 2021-10-19 ENCOUNTER — PATIENT MESSAGE (OUTPATIENT)
Dept: INTERNAL MEDICINE | Facility: CLINIC | Age: 66
End: 2021-10-19
Payer: MEDICARE

## 2021-10-19 ENCOUNTER — LAB VISIT (OUTPATIENT)
Dept: LAB | Facility: HOSPITAL | Age: 66
End: 2021-10-19
Attending: INTERNAL MEDICINE
Payer: MEDICARE

## 2021-10-19 ENCOUNTER — OFFICE VISIT (OUTPATIENT)
Dept: RHEUMATOLOGY | Facility: CLINIC | Age: 66
End: 2021-10-19
Payer: MEDICARE

## 2021-10-19 VITALS
DIASTOLIC BLOOD PRESSURE: 84 MMHG | HEIGHT: 64 IN | WEIGHT: 125 LBS | SYSTOLIC BLOOD PRESSURE: 143 MMHG | HEART RATE: 69 BPM | BODY MASS INDEX: 21.34 KG/M2

## 2021-10-19 DIAGNOSIS — I10 PRIMARY HYPERTENSION: ICD-10-CM

## 2021-10-19 DIAGNOSIS — M70.61 GREATER TROCHANTERIC BURSITIS OF RIGHT HIP: Primary | ICD-10-CM

## 2021-10-19 DIAGNOSIS — E87.5 HYPERKALEMIA: ICD-10-CM

## 2021-10-19 DIAGNOSIS — M47.819 PERIPHERAL SPONDYLOARTHRITIS: ICD-10-CM

## 2021-10-19 LAB
ANION GAP SERPL CALC-SCNC: 11 MMOL/L (ref 8–16)
BUN SERPL-MCNC: 18 MG/DL (ref 8–23)
CALCIUM SERPL-MCNC: 10.4 MG/DL (ref 8.7–10.5)
CHLORIDE SERPL-SCNC: 102 MMOL/L (ref 95–110)
CO2 SERPL-SCNC: 27 MMOL/L (ref 23–29)
CREAT SERPL-MCNC: 0.6 MG/DL (ref 0.5–1.4)
EST. GFR  (AFRICAN AMERICAN): >60 ML/MIN/1.73 M^2
EST. GFR  (NON AFRICAN AMERICAN): >60 ML/MIN/1.73 M^2
GLUCOSE SERPL-MCNC: 81 MG/DL (ref 70–110)
POTASSIUM SERPL-SCNC: 5 MMOL/L (ref 3.5–5.1)
SODIUM SERPL-SCNC: 140 MMOL/L (ref 136–145)

## 2021-10-19 PROCEDURE — 36415 COLL VENOUS BLD VENIPUNCTURE: CPT | Performed by: INTERNAL MEDICINE

## 2021-10-19 PROCEDURE — 20610 DRAIN/INJ JOINT/BURSA W/O US: CPT | Mod: PBBFAC | Performed by: INTERNAL MEDICINE

## 2021-10-19 PROCEDURE — 99213 OFFICE O/P EST LOW 20 MIN: CPT | Mod: PBBFAC,25 | Performed by: INTERNAL MEDICINE

## 2021-10-19 PROCEDURE — 80048 BASIC METABOLIC PNL TOTAL CA: CPT | Performed by: INTERNAL MEDICINE

## 2021-10-19 PROCEDURE — 99999 PR PBB SHADOW E&M-EST. PATIENT-LVL III: CPT | Mod: PBBFAC,,, | Performed by: INTERNAL MEDICINE

## 2021-10-19 PROCEDURE — 99999 PR PBB SHADOW E&M-EST. PATIENT-LVL III: ICD-10-PCS | Mod: PBBFAC,,, | Performed by: INTERNAL MEDICINE

## 2021-10-19 PROCEDURE — 20610 LARGE JOINT ASPIRATION/INJECTION: R GREATER TROCHANTERIC BURSA: ICD-10-PCS | Mod: S$PBB,RT,, | Performed by: INTERNAL MEDICINE

## 2021-10-19 PROCEDURE — 99213 PR OFFICE/OUTPT VISIT, EST, LEVL III, 20-29 MIN: ICD-10-PCS | Mod: 25,S$PBB,, | Performed by: INTERNAL MEDICINE

## 2021-10-19 PROCEDURE — 99213 OFFICE O/P EST LOW 20 MIN: CPT | Mod: 25,S$PBB,, | Performed by: INTERNAL MEDICINE

## 2021-10-19 RX ORDER — TRIAMCINOLONE ACETONIDE 40 MG/ML
40 INJECTION, SUSPENSION INTRA-ARTICULAR; INTRAMUSCULAR
Status: DISCONTINUED | OUTPATIENT
Start: 2021-10-19 | End: 2021-10-19 | Stop reason: HOSPADM

## 2021-10-19 RX ORDER — ETANERCEPT 50 MG/ML
50 SOLUTION SUBCUTANEOUS WEEKLY
Qty: 12 ML | Refills: 0 | OUTPATIENT
Start: 2021-10-19 | End: 2021-10-20 | Stop reason: SDUPTHER

## 2021-10-19 RX ADMIN — TRIAMCINOLONE ACETONIDE 40 MG: 40 INJECTION, SUSPENSION INTRA-ARTICULAR; INTRAMUSCULAR at 11:10

## 2021-10-20 ENCOUNTER — TELEPHONE (OUTPATIENT)
Dept: RHEUMATOLOGY | Facility: CLINIC | Age: 66
End: 2021-10-20

## 2021-10-20 ENCOUNTER — PATIENT MESSAGE (OUTPATIENT)
Dept: RHEUMATOLOGY | Facility: CLINIC | Age: 66
End: 2021-10-20
Payer: MEDICARE

## 2021-10-20 DIAGNOSIS — M70.62 TROCHANTERIC BURSITIS OF BOTH HIPS: Primary | ICD-10-CM

## 2021-10-20 DIAGNOSIS — M47.819 PERIPHERAL SPONDYLOARTHRITIS: ICD-10-CM

## 2021-10-20 DIAGNOSIS — M70.61 TROCHANTERIC BURSITIS OF BOTH HIPS: Primary | ICD-10-CM

## 2021-10-20 RX ORDER — ETANERCEPT 50 MG/ML
50 SOLUTION SUBCUTANEOUS WEEKLY
Qty: 12 ML | Refills: 0 | Status: SHIPPED | OUTPATIENT
Start: 2021-10-20 | End: 2022-01-25 | Stop reason: SDUPTHER

## 2021-10-20 RX ORDER — ETANERCEPT 50 MG/ML
50 SOLUTION SUBCUTANEOUS WEEKLY
Qty: 12 ML | Refills: 0 | OUTPATIENT
Start: 2021-10-20 | End: 2021-10-20 | Stop reason: SDUPTHER

## 2021-11-05 ENCOUNTER — PATIENT MESSAGE (OUTPATIENT)
Dept: ADMINISTRATIVE | Facility: OTHER | Age: 66
End: 2021-11-05
Payer: MEDICARE

## 2021-11-12 ENCOUNTER — OFFICE VISIT (OUTPATIENT)
Dept: DERMATOLOGY | Facility: CLINIC | Age: 66
End: 2021-11-12
Payer: MEDICARE

## 2021-11-12 ENCOUNTER — PATIENT MESSAGE (OUTPATIENT)
Dept: DERMATOLOGY | Facility: CLINIC | Age: 66
End: 2021-11-12

## 2021-11-12 DIAGNOSIS — R20.2 NOTALGIA PARESTHETICA: Primary | ICD-10-CM

## 2021-11-12 PROCEDURE — 99204 PR OFFICE/OUTPT VISIT, NEW, LEVL IV, 45-59 MIN: ICD-10-PCS | Mod: S$PBB,,, | Performed by: STUDENT IN AN ORGANIZED HEALTH CARE EDUCATION/TRAINING PROGRAM

## 2021-11-12 PROCEDURE — 99213 OFFICE O/P EST LOW 20 MIN: CPT | Mod: PBBFAC | Performed by: STUDENT IN AN ORGANIZED HEALTH CARE EDUCATION/TRAINING PROGRAM

## 2021-11-12 PROCEDURE — 99999 PR PBB SHADOW E&M-EST. PATIENT-LVL III: ICD-10-PCS | Mod: PBBFAC,,, | Performed by: STUDENT IN AN ORGANIZED HEALTH CARE EDUCATION/TRAINING PROGRAM

## 2021-11-12 PROCEDURE — 99999 PR PBB SHADOW E&M-EST. PATIENT-LVL III: CPT | Mod: PBBFAC,,, | Performed by: STUDENT IN AN ORGANIZED HEALTH CARE EDUCATION/TRAINING PROGRAM

## 2021-11-12 PROCEDURE — 99204 OFFICE O/P NEW MOD 45 MIN: CPT | Mod: S$PBB,,, | Performed by: STUDENT IN AN ORGANIZED HEALTH CARE EDUCATION/TRAINING PROGRAM

## 2021-11-12 RX ORDER — TRIAMCINOLONE ACETONIDE 1 MG/G
CREAM TOPICAL 2 TIMES DAILY
Qty: 454 G | Refills: 1 | Status: SHIPPED | OUTPATIENT
Start: 2021-11-12

## 2021-12-19 ENCOUNTER — PATIENT OUTREACH (OUTPATIENT)
Dept: ADMINISTRATIVE | Facility: OTHER | Age: 66
End: 2021-12-19
Payer: MEDICARE

## 2021-12-21 ENCOUNTER — OFFICE VISIT (OUTPATIENT)
Dept: UROGYNECOLOGY | Facility: CLINIC | Age: 66
End: 2021-12-21
Payer: MEDICARE

## 2021-12-21 VITALS
WEIGHT: 126.31 LBS | BODY MASS INDEX: 21.56 KG/M2 | DIASTOLIC BLOOD PRESSURE: 80 MMHG | SYSTOLIC BLOOD PRESSURE: 132 MMHG | HEIGHT: 64 IN

## 2021-12-21 DIAGNOSIS — M62.89 PELVIC FLOOR TENSION: ICD-10-CM

## 2021-12-21 DIAGNOSIS — N95.2 ATROPHIC VAGINITIS: Primary | ICD-10-CM

## 2021-12-21 PROCEDURE — 99213 PR OFFICE/OUTPT VISIT, EST, LEVL III, 20-29 MIN: ICD-10-PCS | Mod: S$PBB,,, | Performed by: NURSE PRACTITIONER

## 2021-12-21 PROCEDURE — 99999 PR PBB SHADOW E&M-EST. PATIENT-LVL IV: ICD-10-PCS | Mod: PBBFAC,,, | Performed by: NURSE PRACTITIONER

## 2021-12-21 PROCEDURE — 99213 OFFICE O/P EST LOW 20 MIN: CPT | Mod: S$PBB,,, | Performed by: NURSE PRACTITIONER

## 2021-12-21 PROCEDURE — 99999 PR PBB SHADOW E&M-EST. PATIENT-LVL IV: CPT | Mod: PBBFAC,,, | Performed by: NURSE PRACTITIONER

## 2021-12-21 PROCEDURE — 99214 OFFICE O/P EST MOD 30 MIN: CPT | Mod: PBBFAC | Performed by: NURSE PRACTITIONER

## 2021-12-21 RX ORDER — CONJUGATED ESTROGENS 0.62 MG/G
0.5 CREAM VAGINAL
Qty: 30 G | Refills: 2 | Status: SHIPPED | OUTPATIENT
Start: 2021-12-23 | End: 2022-04-13 | Stop reason: SDUPTHER

## 2022-01-07 ENCOUNTER — PATIENT MESSAGE (OUTPATIENT)
Dept: ADMINISTRATIVE | Facility: OTHER | Age: 67
End: 2022-01-07
Payer: MEDICARE

## 2022-01-11 ENCOUNTER — PATIENT MESSAGE (OUTPATIENT)
Dept: RHEUMATOLOGY | Facility: CLINIC | Age: 67
End: 2022-01-11
Payer: MEDICARE

## 2022-01-14 ENCOUNTER — PATIENT MESSAGE (OUTPATIENT)
Dept: INTERNAL MEDICINE | Facility: CLINIC | Age: 67
End: 2022-01-14
Payer: MEDICARE

## 2022-01-14 RX ORDER — AMLODIPINE BESYLATE 10 MG/1
10 TABLET ORAL DAILY
Qty: 90 TABLET | Refills: 3 | Status: SHIPPED | OUTPATIENT
Start: 2022-01-14 | End: 2023-01-05

## 2022-01-14 NOTE — TELEPHONE ENCOUNTER
No new care gaps identified.  Powered by [employer]. Reference number: [id number].   1/14/2022 1:34:04 PM CST

## 2022-01-19 ENCOUNTER — TELEPHONE (OUTPATIENT)
Dept: RHEUMATOLOGY | Facility: CLINIC | Age: 67
End: 2022-01-19
Payer: MEDICARE

## 2022-01-19 DIAGNOSIS — D84.9 IMMUNOSUPPRESSION: ICD-10-CM

## 2022-01-19 DIAGNOSIS — M47.819 PERIPHERAL SPONDYLOARTHRITIS: Primary | ICD-10-CM

## 2022-01-19 RX ORDER — ETANERCEPT 50 MG/ML
50 SOLUTION SUBCUTANEOUS WEEKLY
Qty: 12 ML | Refills: 0 | OUTPATIENT
Start: 2022-01-19

## 2022-01-20 PROCEDURE — 99453 REM MNTR PHYSIOL PARAM SETUP: CPT | Mod: PBBFAC | Performed by: INTERNAL MEDICINE

## 2022-01-24 ENCOUNTER — TELEPHONE (OUTPATIENT)
Dept: INTERNAL MEDICINE | Facility: CLINIC | Age: 67
End: 2022-01-24
Payer: MEDICARE

## 2022-01-24 RX ORDER — AMLODIPINE BESYLATE 10 MG/1
10 TABLET ORAL DAILY
Qty: 90 TABLET | Refills: 2 | OUTPATIENT
Start: 2022-01-24

## 2022-01-24 NOTE — TELEPHONE ENCOUNTER
----- Message from Shala Perez sent at 2022  3:54 PM CST -----  Contact: dulce mcgill[facility]   Office is need PA for levothyroxine (SYNTHROID) 88 MCG tablet. PA will  today.   Case#: [id number]  Please advise

## 2022-01-25 ENCOUNTER — TELEPHONE (OUTPATIENT)
Dept: RHEUMATOLOGY | Facility: CLINIC | Age: 67
End: 2022-01-25
Payer: MEDICARE

## 2022-01-25 ENCOUNTER — PATIENT MESSAGE (OUTPATIENT)
Dept: RHEUMATOLOGY | Facility: CLINIC | Age: 67
End: 2022-01-25
Payer: MEDICARE

## 2022-01-25 DIAGNOSIS — M47.819 PERIPHERAL SPONDYLOARTHRITIS: ICD-10-CM

## 2022-01-25 RX ORDER — ETANERCEPT 50 MG/ML
50 SOLUTION SUBCUTANEOUS WEEKLY
Qty: 12 ML | Refills: 0 | OUTPATIENT
Start: 2022-01-25 | End: 2022-01-27 | Stop reason: SDUPTHER

## 2022-01-25 NOTE — TELEPHONE ENCOUNTER
Quick DC. Request already responded to by other means (e.g. phone or fax)   Ordering Encounter Report    Associated Reports   View Encounter

## 2022-01-25 NOTE — TELEPHONE ENCOUNTER
Please schedule overdue standing labs this week so Enbrel can be refilled. Also need sf/u appt. Thank you CLAUDIA

## 2022-01-26 ENCOUNTER — PATIENT MESSAGE (OUTPATIENT)
Dept: INTERNAL MEDICINE | Facility: CLINIC | Age: 67
End: 2022-01-26
Payer: MEDICARE

## 2022-01-27 ENCOUNTER — TELEPHONE (OUTPATIENT)
Dept: RHEUMATOLOGY | Facility: CLINIC | Age: 67
End: 2022-01-27
Payer: MEDICARE

## 2022-01-27 ENCOUNTER — LAB VISIT (OUTPATIENT)
Dept: LAB | Facility: HOSPITAL | Age: 67
End: 2022-01-27
Attending: INTERNAL MEDICINE
Payer: MEDICARE

## 2022-01-27 DIAGNOSIS — M47.819 SPONDYLARTHRITIS: Primary | ICD-10-CM

## 2022-01-27 DIAGNOSIS — M47.819 SPONDYLOARTHRITIS: ICD-10-CM

## 2022-01-27 DIAGNOSIS — L40.50 PSA (PSORIATIC ARTHRITIS): ICD-10-CM

## 2022-01-27 DIAGNOSIS — B18.1 HEPATITIS B CARRIER: ICD-10-CM

## 2022-01-27 DIAGNOSIS — M47.819 PERIPHERAL SPONDYLOARTHRITIS: ICD-10-CM

## 2022-01-27 LAB
BASOPHILS # BLD AUTO: 0.05 K/UL (ref 0–0.2)
BASOPHILS NFR BLD: 1 % (ref 0–1.9)
CRP SERPL-MCNC: 0.3 MG/L (ref 0–8.2)
CRP SERPL-MCNC: 0.3 MG/L (ref 0–8.2)
DIFFERENTIAL METHOD: ABNORMAL
EOSINOPHIL # BLD AUTO: 0.2 K/UL (ref 0–0.5)
EOSINOPHIL NFR BLD: 3.2 % (ref 0–8)
ERYTHROCYTE [DISTWIDTH] IN BLOOD BY AUTOMATED COUNT: 12.5 % (ref 11.5–14.5)
ERYTHROCYTE [SEDIMENTATION RATE] IN BLOOD BY WESTERGREN METHOD: 4 MM/HR (ref 0–36)
HCT VFR BLD AUTO: 41.9 % (ref 37–48.5)
HGB BLD-MCNC: 13.8 G/DL (ref 12–16)
IMM GRANULOCYTES # BLD AUTO: 0.01 K/UL (ref 0–0.04)
IMM GRANULOCYTES NFR BLD AUTO: 0.2 % (ref 0–0.5)
LYMPHOCYTES # BLD AUTO: 2.6 K/UL (ref 1–4.8)
LYMPHOCYTES NFR BLD: 52.2 % (ref 18–48)
MCH RBC QN AUTO: 31.8 PG (ref 27–31)
MCHC RBC AUTO-ENTMCNC: 32.9 G/DL (ref 32–36)
MCV RBC AUTO: 97 FL (ref 82–98)
MONOCYTES # BLD AUTO: 0.5 K/UL (ref 0.3–1)
MONOCYTES NFR BLD: 10.4 % (ref 4–15)
NEUTROPHILS # BLD AUTO: 1.6 K/UL (ref 1.8–7.7)
NEUTROPHILS NFR BLD: 33 % (ref 38–73)
NRBC BLD-RTO: 0 /100 WBC
PLATELET # BLD AUTO: 202 K/UL (ref 150–450)
PMV BLD AUTO: 9.8 FL (ref 9.2–12.9)
RBC # BLD AUTO: 4.34 M/UL (ref 4–5.4)
WBC # BLD AUTO: 4.98 K/UL (ref 3.9–12.7)

## 2022-01-27 PROCEDURE — 86140 C-REACTIVE PROTEIN: CPT | Performed by: INTERNAL MEDICINE

## 2022-01-27 PROCEDURE — 87517 HEPATITIS B DNA QUANT: CPT | Performed by: INTERNAL MEDICINE

## 2022-01-27 PROCEDURE — 85652 RBC SED RATE AUTOMATED: CPT | Performed by: INTERNAL MEDICINE

## 2022-01-27 PROCEDURE — 85025 COMPLETE CBC W/AUTO DIFF WBC: CPT | Performed by: INTERNAL MEDICINE

## 2022-01-27 RX ORDER — ETANERCEPT 50 MG/ML
50 SOLUTION SUBCUTANEOUS WEEKLY
Qty: 12 ML | Refills: 0 | Status: SHIPPED | OUTPATIENT
Start: 2022-01-27 | End: 2022-04-04 | Stop reason: SDUPTHER

## 2022-01-27 RX ORDER — ETANERCEPT 50 MG/ML
50 SOLUTION SUBCUTANEOUS WEEKLY
Qty: 2 ML | Refills: 0
Start: 2022-01-27 | End: 2022-04-04

## 2022-01-31 PROCEDURE — 99457 RPM TX MGMT 1ST 20 MIN: CPT | Mod: S$PBB,,, | Performed by: INTERNAL MEDICINE

## 2022-01-31 PROCEDURE — 99457 PR MONITORING, PHYSIOL PARAM, REMOTE, 1ST 20 MINS, PER MONTH: ICD-10-PCS | Mod: S$PBB,,, | Performed by: INTERNAL MEDICINE

## 2022-02-07 ENCOUNTER — SPECIALTY PHARMACY (OUTPATIENT)
Dept: PHARMACY | Facility: CLINIC | Age: 67
End: 2022-02-07
Payer: MEDICARE

## 2022-02-07 NOTE — TELEPHONE ENCOUNTER
Incoming call for status check on Enbrel from Traci at Dr. Campos's office. Rx on 1/27/22 was sent to [facility] so we may help with PAP enrollment. [facility] requested expedite process as pt has 1 dose on-hand for next week only. Forwarding to rheum team for follow-up.

## 2022-02-08 NOTE — TELEPHONE ENCOUNTER
PA submitted via [facility]  (Key: [id number])    DOCUMENTATION ONLY:  Prior authorization for Enbrel approved from 1/9/2022 to 2/8/2023    Case Id: [id number]    Co-pay: $1948.97    Patient informed. States she has already completed renewal application for PAP, was only waiting on DELL kearney. Faxing approval letter to [employer] [[phone]]. Forwarded prescription to [facility].

## 2022-02-11 NOTE — TELEPHONE ENCOUNTER
Patient states the [employer] informed her that something is still missing from her application, but she was not able to get an info as to what it is. Informed her that PA has been approved and faxed, so that shouldn't be the issue. [employer] hold times are hours currently, and patient isn't able to get through. Nayla ZUNIGA, is scheduled to follow up with a supervisor at [facility] on Monday, 2/14. She will check status of patient's application to determine what is needed to proceed with reauthorization. Patient voiced understanding, and is aware [facility] will call back with an update.

## 2022-02-15 NOTE — TELEPHONE ENCOUNTER
Incoming call from patient to check on the status of her [employer] application. Informed patient that I will escalate this to PCA team member who was scheduled to discuss with the [employer] supervisor on 2/14, and she should expect a call back from [facility] with an update today.

## 2022-02-18 NOTE — TELEPHONE ENCOUNTER
Incoming call from patient to check the status of her medication from [facility]. Notified patient that we are still working on getting an update from the supervisor at [facility] today and will follow up with patient.

## 2022-03-04 ENCOUNTER — PATIENT MESSAGE (OUTPATIENT)
Dept: INTERNAL MEDICINE | Facility: CLINIC | Age: 67
End: 2022-03-04
Payer: MEDICARE

## 2022-03-04 RX ORDER — CITALOPRAM 10 MG/1
10 TABLET ORAL DAILY
Qty: 30 TABLET | Refills: 10 | Status: SHIPPED | OUTPATIENT
Start: 2022-03-04 | End: 2023-01-30

## 2022-03-04 NOTE — TELEPHONE ENCOUNTER
No new care gaps identified.  Powered by [employer]. Reference number: [id number].   3/04/2022 7:35:40 AM CST

## 2022-03-04 NOTE — TELEPHONE ENCOUNTER
No new care gaps identified.  Powered by [employer]. Reference number: [id number].   3/04/2022 9:24:40 AM CST

## 2022-03-11 ENCOUNTER — PATIENT MESSAGE (OUTPATIENT)
Dept: RHEUMATOLOGY | Facility: CLINIC | Age: 67
End: 2022-03-11
Payer: MEDICARE

## 2022-03-11 RX ORDER — GABAPENTIN 300 MG/1
300 CAPSULE ORAL 3 TIMES DAILY
Qty: 270 CAPSULE | Refills: 1 | Status: SHIPPED | OUTPATIENT
Start: 2022-03-11 | End: 2022-07-21 | Stop reason: SDUPTHER

## 2022-03-11 RX ORDER — CITALOPRAM 10 MG/1
10 TABLET ORAL DAILY
Qty: 30 TABLET | Refills: 9 | OUTPATIENT
Start: 2022-03-11 | End: 2022-04-10

## 2022-03-25 ENCOUNTER — IMMUNIZATION (OUTPATIENT)
Dept: PHARMACY | Facility: CLINIC | Age: 67
End: 2022-03-25
Payer: MEDICARE

## 2022-03-25 DIAGNOSIS — Z23 NEED FOR VACCINATION: Primary | ICD-10-CM

## 2022-04-01 ENCOUNTER — TELEPHONE (OUTPATIENT)
Dept: RHEUMATOLOGY | Facility: CLINIC | Age: 67
End: 2022-04-01
Payer: MEDICARE

## 2022-04-01 ENCOUNTER — PATIENT MESSAGE (OUTPATIENT)
Dept: RHEUMATOLOGY | Facility: CLINIC | Age: 67
End: 2022-04-01
Payer: MEDICARE

## 2022-04-01 ENCOUNTER — PATIENT OUTREACH (OUTPATIENT)
Dept: ADMINISTRATIVE | Facility: OTHER | Age: 67
End: 2022-04-01
Payer: MEDICARE

## 2022-04-01 NOTE — PROGRESS NOTES
Health Maintenance Due   Topic Date Due    Colorectal Cancer Screening  01/25/2021     Updates were requested from care everywhere.  Chart was reviewed for overdue Proactive [facility] Encounters (CRISTOPHER) topics (CRS, Breast Cancer Screening, Eye exam)  Health Maintenance has been updated.  LINKS immunization registry triggered.  Immunizations were reconciled.]

## 2022-04-04 ENCOUNTER — LAB VISIT (OUTPATIENT)
Dept: LAB | Facility: HOSPITAL | Age: 67
End: 2022-04-04
Attending: INTERNAL MEDICINE
Payer: MEDICARE

## 2022-04-04 ENCOUNTER — TELEPHONE (OUTPATIENT)
Dept: RHEUMATOLOGY | Facility: CLINIC | Age: 67
End: 2022-04-04

## 2022-04-04 ENCOUNTER — OFFICE VISIT (OUTPATIENT)
Dept: RHEUMATOLOGY | Facility: CLINIC | Age: 67
End: 2022-04-04
Payer: MEDICARE

## 2022-04-04 VITALS
WEIGHT: 127 LBS | DIASTOLIC BLOOD PRESSURE: 83 MMHG | HEIGHT: 64 IN | BODY MASS INDEX: 21.68 KG/M2 | HEART RATE: 81 BPM | SYSTOLIC BLOOD PRESSURE: 134 MMHG

## 2022-04-04 DIAGNOSIS — M47.819 SPONDYLARTHRITIS: ICD-10-CM

## 2022-04-04 DIAGNOSIS — M47.819 PERIPHERAL SPONDYLOARTHRITIS: ICD-10-CM

## 2022-04-04 DIAGNOSIS — Z78.0 MENOPAUSE: ICD-10-CM

## 2022-04-04 DIAGNOSIS — Z79.620 ENCOUNTER FOR MONITORING OF ETANERCEPT THERAPY: ICD-10-CM

## 2022-04-04 DIAGNOSIS — E87.5 HYPERKALEMIA: Primary | ICD-10-CM

## 2022-04-04 DIAGNOSIS — Z51.81 ENCOUNTER FOR MONITORING OF ETANERCEPT THERAPY: ICD-10-CM

## 2022-04-04 DIAGNOSIS — M47.819 SPONDYLARTHRITIS: Primary | ICD-10-CM

## 2022-04-04 LAB
ALBUMIN SERPL BCP-MCNC: 4.2 G/DL (ref 3.5–5.2)
ALP SERPL-CCNC: 49 U/L (ref 55–135)
ALT SERPL W/O P-5'-P-CCNC: 36 U/L (ref 10–44)
ANION GAP SERPL CALC-SCNC: 9 MMOL/L (ref 8–16)
AST SERPL-CCNC: 38 U/L (ref 10–40)
BASOPHILS # BLD AUTO: 0.06 K/UL (ref 0–0.2)
BASOPHILS NFR BLD: 1.2 % (ref 0–1.9)
BILIRUB SERPL-MCNC: 0.8 MG/DL (ref 0.1–1)
BUN SERPL-MCNC: 20 MG/DL (ref 8–23)
CALCIUM SERPL-MCNC: 9.9 MG/DL (ref 8.7–10.5)
CHLORIDE SERPL-SCNC: 103 MMOL/L (ref 95–110)
CO2 SERPL-SCNC: 29 MMOL/L (ref 23–29)
CREAT SERPL-MCNC: 0.7 MG/DL (ref 0.5–1.4)
CRP SERPL-MCNC: 0.4 MG/L (ref 0–8.2)
DIFFERENTIAL METHOD: ABNORMAL
EOSINOPHIL # BLD AUTO: 0.1 K/UL (ref 0–0.5)
EOSINOPHIL NFR BLD: 2.9 % (ref 0–8)
ERYTHROCYTE [DISTWIDTH] IN BLOOD BY AUTOMATED COUNT: 13.1 % (ref 11.5–14.5)
ERYTHROCYTE [SEDIMENTATION RATE] IN BLOOD BY WESTERGREN METHOD: <2 MM/HR (ref 0–36)
EST. GFR  (AFRICAN AMERICAN): >60 ML/MIN/1.73 M^2
EST. GFR  (NON AFRICAN AMERICAN): >60 ML/MIN/1.73 M^2
GLUCOSE SERPL-MCNC: 91 MG/DL (ref 70–110)
HCT VFR BLD AUTO: 40.5 % (ref 37–48.5)
HGB BLD-MCNC: 13.1 G/DL (ref 12–16)
IMM GRANULOCYTES # BLD AUTO: 0.01 K/UL (ref 0–0.04)
IMM GRANULOCYTES NFR BLD AUTO: 0.2 % (ref 0–0.5)
LYMPHOCYTES # BLD AUTO: 2.3 K/UL (ref 1–4.8)
LYMPHOCYTES NFR BLD: 47.9 % (ref 18–48)
MCH RBC QN AUTO: 32.2 PG (ref 27–31)
MCHC RBC AUTO-ENTMCNC: 32.3 G/DL (ref 32–36)
MCV RBC AUTO: 100 FL (ref 82–98)
MONOCYTES # BLD AUTO: 0.5 K/UL (ref 0.3–1)
MONOCYTES NFR BLD: 9.3 % (ref 4–15)
NEUTROPHILS # BLD AUTO: 1.9 K/UL (ref 1.8–7.7)
NEUTROPHILS NFR BLD: 38.5 % (ref 38–73)
NRBC BLD-RTO: 0 /100 WBC
PLATELET # BLD AUTO: 219 K/UL (ref 150–450)
PMV BLD AUTO: 10.4 FL (ref 9.2–12.9)
POTASSIUM SERPL-SCNC: 5.5 MMOL/L (ref 3.5–5.1)
PROT SERPL-MCNC: 7.1 G/DL (ref 6–8.4)
RBC # BLD AUTO: 4.07 M/UL (ref 4–5.4)
SODIUM SERPL-SCNC: 141 MMOL/L (ref 136–145)
WBC # BLD AUTO: 4.82 K/UL (ref 3.9–12.7)

## 2022-04-04 PROCEDURE — 36415 COLL VENOUS BLD VENIPUNCTURE: CPT | Performed by: INTERNAL MEDICINE

## 2022-04-04 PROCEDURE — 86140 C-REACTIVE PROTEIN: CPT | Performed by: INTERNAL MEDICINE

## 2022-04-04 PROCEDURE — 85025 COMPLETE CBC W/AUTO DIFF WBC: CPT | Performed by: INTERNAL MEDICINE

## 2022-04-04 PROCEDURE — 87516 HEPATITIS B DNA AMP PROBE: CPT | Performed by: INTERNAL MEDICINE

## 2022-04-04 PROCEDURE — 99214 OFFICE O/P EST MOD 30 MIN: CPT | Mod: PBBFAC | Performed by: INTERNAL MEDICINE

## 2022-04-04 PROCEDURE — 99213 OFFICE O/P EST LOW 20 MIN: CPT | Mod: S$PBB,,, | Performed by: INTERNAL MEDICINE

## 2022-04-04 PROCEDURE — 99213 PR OFFICE/OUTPT VISIT, EST, LEVL III, 20-29 MIN: ICD-10-PCS | Mod: S$PBB,,, | Performed by: INTERNAL MEDICINE

## 2022-04-04 PROCEDURE — 80053 COMPREHEN METABOLIC PANEL: CPT | Performed by: INTERNAL MEDICINE

## 2022-04-04 PROCEDURE — 85652 RBC SED RATE AUTOMATED: CPT | Performed by: INTERNAL MEDICINE

## 2022-04-04 PROCEDURE — 99999 PR PBB SHADOW E&M-EST. PATIENT-LVL IV: CPT | Mod: PBBFAC,,, | Performed by: INTERNAL MEDICINE

## 2022-04-04 PROCEDURE — 99999 PR PBB SHADOW E&M-EST. PATIENT-LVL IV: ICD-10-PCS | Mod: PBBFAC,,, | Performed by: INTERNAL MEDICINE

## 2022-04-04 RX ORDER — ETANERCEPT 50 MG/ML
50 SOLUTION SUBCUTANEOUS WEEKLY
Qty: 12 ML | Refills: 1 | Status: SHIPPED | OUTPATIENT
Start: 2022-04-04 | End: 2022-05-02 | Stop reason: SDUPTHER

## 2022-04-04 NOTE — PROGRESS NOTES
"Subjective:       Patient ID: Jill Marquez is a 66 y.o. female.    Chief Complaint:  Peripheral spondyloarthritis/PsA; erosive OA hands  HPI missed 2 weeks of Enbrel in March and tomorrow will be 3rd dose back on. No infections. Saw Dr. Glass in Dermat[place]gy for rash on back, diagnosed as notalgia paresthetica. The right trochanteric bursal pain fine.  Review of Systems   Constitutional: Negative for fever and unexpected weight change.   HENT: Negative for mouth sores and trouble swallowing.    Eyes: Negative for redness.   Respiratory: Negative for cough and shortness of breath.    Cardiovascular: Negative for chest pain.   Gastrointestinal: Negative for constipation and diarrhea.   Genitourinary: Negative for dysuria and genital sores.   Skin: Negative for rash.   Neurological: Positive for headaches.   Hematological: Does not bruise/bleed easily.         Objective:   Ht 5' 3.6" (1.615 m)   BMI 21.96 kg/m²      Physical Exam             Latest Reference Range & Units 01/27/22 08:17   WBC 3.90 - 12.70 K/uL 4.98   RBC 4.00 - 5.40 M/uL 4.34   Hemoglobin 12.0 - 16.0 g/dL 13.8   Hematocrit 37.0 - 48.5 % 41.9   MCV 82 - 98 fL 97   MCH 27.0 - 31.0 pg 31.8 (H)   MCHC 32.0 - 36.0 g/dL 32.9   RDW 11.5 - 14.5 % 12.5   Platelets 150 - 450 K/uL 202   MPV 9.2 - 12.9 fL 9.8   Gran % 38.0 - 73.0 % 33.0 (L)   Lymph % 18.0 - 48.0 % 52.2 (H)   Mono % 4.0 - 15.0 % 10.4   Eosinophil % 0.0 - 8.0 % 3.2   Basophil % 0.0 - 1.9 % 1.0   Immature Granulocytes 0.0 - 0.5 % 0.2   Gran # (ANC) 1.8 - 7.7 K/uL 1.6 (L)   Lymph # 1.0 - 4.8 K/uL 2.6   Mono # 0.3 - 1.0 K/uL 0.5   Eos # 0.0 - 0.5 K/uL 0.2   Baso # 0.00 - 0.20 K/uL 0.05   Immature Grans (Abs) 0.00 - 0.04 K/uL 0.01 [1]   NRBC 0 /100 WBC 0   Differential Method  Automated   Sed Rate 0 - 36 mm/Hr 4   CRP 0.0 - 8.2 mg/L  0.0 - 8.2 mg/L 0.3  0.3   (   10/27/2020 3/9/2021 6/15/2021 10/19/2021   Tender (HUMPHRIES-28) 1 / 28 0 / 28 1 / 28 1 / 28    Swollen (HUMPHRIES-28) 3 / 28  3 / 28  6 / 28  6 / 28  "   Provider Global 20 mm 10 mm 20 mm 10 mm   Patient Global 20 mm 10 mm 5 mm 15 mm   ESR 2 mm/hr 0 mm/hr 1 mm/hr 1 mm/hr   CRP 0.4 mg/L 0.2 mg/L 0.2 mg/L 0.3 mg/L   HUMPHRIES-28 (ESR) 1.81 (Remission) -- 1.32 (Remission) 1.46 (Remission)   HUMPHRIES-28 (CRP) 2.41 (Remission) 1.65 (Remission) 2.34 (Remission) 2.51 (Remission)   CDAI Score 4  3  9.5  9.5         Assessment:       TJ 1 SJ 3  Pt global 15 ESR < 2 CRP <0.3  DAS28 0.77  RHC68-DZG 1.82  CDAI 6(LDA)  Rheumatoid Arthritis Treatment Goals:  -Disease Activity Measure: CDAI  -Target: LDA  -Patient Goal:  -Therapy/Change: none  -Shared Decision Making: Discussed goals of care and therapy plan together with patient as outlined above.          Plan:   CBC, CMP, ESR, CRP,  HBV DNA today  Continue Enbrel Sureclick 50mg sc q 7 days  RTC 3 months with standing labs, DXA

## 2022-04-04 NOTE — PROGRESS NOTES
Pre chart    Answers for HPI/ROS submitted by the patient on 4/1/2022  fever: No  eye redness: No  mouth sores: No  headaches: Yes  shortness of breath: No  chest pain: No  trouble swallowing: No  diarrhea: No  constipation: No  unexpected weight change: No  genital sore: No  dysuria: No  During the last 3 days, have you had a skin rash?: No  Bruises or bleeds easily: No  cough: No

## 2022-04-06 ENCOUNTER — LAB VISIT (OUTPATIENT)
Dept: LAB | Facility: HOSPITAL | Age: 67
End: 2022-04-06
Attending: INTERNAL MEDICINE
Payer: MEDICARE

## 2022-04-06 ENCOUNTER — PATIENT MESSAGE (OUTPATIENT)
Dept: RHEUMATOLOGY | Facility: CLINIC | Age: 67
End: 2022-04-06
Payer: MEDICARE

## 2022-04-06 DIAGNOSIS — E87.5 HYPERKALEMIA: ICD-10-CM

## 2022-04-06 LAB — POTASSIUM SERPL-SCNC: 4 MMOL/L (ref 3.5–5.1)

## 2022-04-06 PROCEDURE — 36415 COLL VENOUS BLD VENIPUNCTURE: CPT | Mod: PO | Performed by: INTERNAL MEDICINE

## 2022-04-06 PROCEDURE — 84132 ASSAY OF SERUM POTASSIUM: CPT | Performed by: INTERNAL MEDICINE

## 2022-04-07 LAB — HBV DNA SERPL QL NAA+PROBE: NOT DETECTED

## 2022-04-13 ENCOUNTER — PATIENT MESSAGE (OUTPATIENT)
Dept: UROGYNECOLOGY | Facility: CLINIC | Age: 67
End: 2022-04-13
Payer: MEDICARE

## 2022-04-13 DIAGNOSIS — N95.2 ATROPHIC VAGINITIS: ICD-10-CM

## 2022-04-13 RX ORDER — CONJUGATED ESTROGENS 0.62 MG/G
0.5 CREAM VAGINAL
Qty: 30 G | Refills: 5 | Status: SHIPPED | OUTPATIENT
Start: 2022-04-14 | End: 2023-01-25 | Stop reason: SDUPTHER

## 2022-04-13 RX ORDER — CONJUGATED ESTROGENS 0.62 MG/G
0.5 CREAM VAGINAL
Qty: 30 G | Refills: 2 | Status: SHIPPED | OUTPATIENT
Start: 2022-04-14 | End: 2022-04-13 | Stop reason: SDUPTHER

## 2022-05-02 ENCOUNTER — PATIENT MESSAGE (OUTPATIENT)
Dept: RHEUMATOLOGY | Facility: CLINIC | Age: 67
End: 2022-05-02
Payer: MEDICARE

## 2022-05-02 DIAGNOSIS — M47.819 PERIPHERAL SPONDYLOARTHRITIS: ICD-10-CM

## 2022-05-02 RX ORDER — ETANERCEPT 50 MG/ML
50 SOLUTION SUBCUTANEOUS WEEKLY
Qty: 12 ML | Refills: 0 | Status: SHIPPED | OUTPATIENT
Start: 2022-05-02 | End: 2022-07-21 | Stop reason: SDUPTHER

## 2022-05-23 ENCOUNTER — TELEPHONE (OUTPATIENT)
Dept: UROGYNECOLOGY | Facility: CLINIC | Age: 67
End: 2022-05-23
Payer: MEDICARE

## 2022-05-23 DIAGNOSIS — Z12.31 ENCOUNTER FOR SCREENING MAMMOGRAM FOR BREAST CANCER: Primary | ICD-10-CM

## 2022-05-23 NOTE — TELEPHONE ENCOUNTER
----- Message from Lynn Bustamante sent at 5/23/2022 12:19 PM CDT -----  Jill Marquez calling regarding Orders (message) for annual mammogram.  Call back [phone]

## 2022-05-23 NOTE — TELEPHONE ENCOUNTER
Fyi,  Mammogram order placed.    Informed pt her mammogram order has been placed so that she can schedule her appt. Pt voiced understanding and call ended.

## 2022-06-06 ENCOUNTER — PATIENT MESSAGE (OUTPATIENT)
Dept: RHEUMATOLOGY | Facility: CLINIC | Age: 67
End: 2022-06-06
Payer: MEDICARE

## 2022-06-07 ENCOUNTER — OFFICE VISIT (OUTPATIENT)
Dept: DERMATOLOGY | Facility: CLINIC | Age: 67
End: 2022-06-07
Payer: MEDICARE

## 2022-06-07 ENCOUNTER — HOSPITAL ENCOUNTER (OUTPATIENT)
Dept: RADIOLOGY | Facility: HOSPITAL | Age: 67
Discharge: HOME | End: 2022-06-07
Attending: STUDENT IN AN ORGANIZED HEALTH CARE EDUCATION/TRAINING PROGRAM
Payer: MEDICARE

## 2022-06-07 DIAGNOSIS — R20.2 NOTALGIA PARESTHETICA: ICD-10-CM

## 2022-06-07 DIAGNOSIS — M25.473 ANKLE EDEMA: ICD-10-CM

## 2022-06-07 DIAGNOSIS — M25.473 ANKLE EDEMA: Primary | ICD-10-CM

## 2022-06-07 PROCEDURE — 99499 NO LOS: ICD-10-PCS | Mod: CSM,S$GLB,, | Performed by: STUDENT IN AN ORGANIZED HEALTH CARE EDUCATION/TRAINING PROGRAM

## 2022-06-07 PROCEDURE — 73610 XR ANKLE COMPLETE 3 VIEW LEFT: ICD-10-PCS | Mod: 26,LT,, | Performed by: RADIOLOGY

## 2022-06-07 PROCEDURE — 73610 X-RAY EXAM OF ANKLE: CPT | Mod: 26,LT,, | Performed by: RADIOLOGY

## 2022-06-07 PROCEDURE — 99999 PR PBB SHADOW E&M-EST. PATIENT-LVL III: ICD-10-PCS | Mod: PBBFAC,,, | Performed by: STUDENT IN AN ORGANIZED HEALTH CARE EDUCATION/TRAINING PROGRAM

## 2022-06-07 PROCEDURE — 99999 PR PBB SHADOW E&M-EST. PATIENT-LVL III: CPT | Mod: PBBFAC,,, | Performed by: STUDENT IN AN ORGANIZED HEALTH CARE EDUCATION/TRAINING PROGRAM

## 2022-06-07 PROCEDURE — 99499 UNLISTED E&M SERVICE: CPT | Mod: CSM,S$GLB,, | Performed by: STUDENT IN AN ORGANIZED HEALTH CARE EDUCATION/TRAINING PROGRAM

## 2022-06-07 PROCEDURE — 99213 OFFICE O/P EST LOW 20 MIN: CPT | Mod: PBBFAC | Performed by: STUDENT IN AN ORGANIZED HEALTH CARE EDUCATION/TRAINING PROGRAM

## 2022-06-07 PROCEDURE — 73610 X-RAY EXAM OF ANKLE: CPT | Mod: TC,LT

## 2022-06-07 NOTE — PROGRESS NOTES
Pt with hx of notalgia paresthetica, here for follow up. Previously had neurotoxin at outside dermatologist with improvement. Interested in retreatment today. Verbal consent obtained today.   Pt denies any history  of adverse reaction to bdysport, history of neuromuscular disease, and current pregnancy.   Risks reviewed including headache, pain, infection, bleeding, bruising, eyebrow droop, eyelid droop, asymmetry, inability to close eye temporarily. Pt understands and would like to have treatment.  5 units into affected area on back  No complications.    Post procedure discussed    Lot number - [id number]  Expiration date- 1/31/2023

## 2022-06-08 ENCOUNTER — PATIENT MESSAGE (OUTPATIENT)
Dept: RHEUMATOLOGY | Facility: CLINIC | Age: 67
End: 2022-06-08
Payer: MEDICARE

## 2022-06-08 ENCOUNTER — TELEPHONE (OUTPATIENT)
Dept: RHEUMATOLOGY | Facility: CLINIC | Age: 67
End: 2022-06-08
Payer: MEDICARE

## 2022-06-08 DIAGNOSIS — M25.572 ACUTE LEFT ANKLE PAIN: Primary | ICD-10-CM

## 2022-06-13 ENCOUNTER — TELEPHONE (OUTPATIENT)
Dept: RHEUMATOLOGY | Facility: CLINIC | Age: 67
End: 2022-06-13
Payer: MEDICARE

## 2022-06-14 ENCOUNTER — OFFICE VISIT (OUTPATIENT)
Dept: RHEUMATOLOGY | Facility: CLINIC | Age: 67
End: 2022-06-14
Payer: MEDICARE

## 2022-06-14 VITALS
WEIGHT: 130 LBS | SYSTOLIC BLOOD PRESSURE: 138 MMHG | BODY MASS INDEX: 22.2 KG/M2 | HEIGHT: 64 IN | DIASTOLIC BLOOD PRESSURE: 87 MMHG | HEART RATE: 89 BPM

## 2022-06-14 DIAGNOSIS — M70.61 GREATER TROCHANTERIC BURSITIS OF RIGHT HIP: Primary | ICD-10-CM

## 2022-06-14 PROCEDURE — 99499 UNLISTED E&M SERVICE: CPT | Mod: S$PBB,,, | Performed by: INTERNAL MEDICINE

## 2022-06-14 PROCEDURE — 99999 PR PBB SHADOW E&M-EST. PATIENT-LVL III: CPT | Mod: PBBFAC,,, | Performed by: INTERNAL MEDICINE

## 2022-06-14 PROCEDURE — 99213 OFFICE O/P EST LOW 20 MIN: CPT | Mod: PBBFAC | Performed by: INTERNAL MEDICINE

## 2022-06-14 PROCEDURE — 20610 DRAIN/INJ JOINT/BURSA W/O US: CPT | Mod: PBBFAC | Performed by: INTERNAL MEDICINE

## 2022-06-14 PROCEDURE — 20610 LARGE JOINT ASPIRATION/INJECTION: R GREATER TROCHANTERIC BURSA: ICD-10-PCS | Mod: S$PBB,RT,, | Performed by: INTERNAL MEDICINE

## 2022-06-14 PROCEDURE — 99499 NO LOS: ICD-10-PCS | Mod: S$PBB,,, | Performed by: INTERNAL MEDICINE

## 2022-06-14 PROCEDURE — 99999 PR PBB SHADOW E&M-EST. PATIENT-LVL III: ICD-10-PCS | Mod: PBBFAC,,, | Performed by: INTERNAL MEDICINE

## 2022-06-14 RX ORDER — TRIAMCINOLONE ACETONIDE 40 MG/ML
40 INJECTION, SUSPENSION INTRA-ARTICULAR; INTRAMUSCULAR
Status: DISCONTINUED | OUTPATIENT
Start: 2022-06-14 | End: 2022-06-14 | Stop reason: HOSPADM

## 2022-06-14 RX ADMIN — TRIAMCINOLONE ACETONIDE 40 MG: 40 INJECTION, SUSPENSION INTRA-ARTICULAR; INTRAMUSCULAR at 03:06

## 2022-06-14 NOTE — PROGRESS NOTES
Here for right trochanteric bursal injection only. Regular appt in July 138/87    * After verbal consent and cleansing with Chloraprep the right gr. Trochanteric bursa injected with Kenalog 40mg with 1 ml 1 % lidocaine. Patient tolerated procedure well.  Rest x 72 hrs  Then resume hip exercises and walking program  Keep reg scheduled appt next  month        Answers for HPI/ROS submitted by the patient on 6/8/2022  fever: No  eye redness: No  mouth sores: No  headaches: Yes  shortness of breath: No  chest pain: No  trouble swallowing: No  diarrhea: No  constipation: No  unexpected weight change: No  genital sore: No  dysuria: No  During the last 3 days, have you had a skin rash?: No  Bruises or bleeds easily: No  cough: No

## 2022-06-14 NOTE — PROCEDURES
Large Joint Aspiration/Injection: R greater trochanteric bursa    Date/Time: 6/14/2022 3:00 PM  Performed by: Nicanor Campos MD  Authorized by: Nicanor Campos MD     Indications:  Pain  Site marked: the procedure site was marked    Timeout: prior to procedure the correct patient, procedure, and site was verified    Prep: patient was prepped and draped in usual sterile fashion      Local anesthesia used?: Yes    Local anesthetic:  Lidocaine 1% without epinephrine and topical anesthetic  Anesthetic total (ml):  1      Details:  Needle Size:  25 G  Ultrasonic Guidance for needle placement?: No    Approach:  Lateral  Location:  Hip  Site:  R greater trochanteric bursa  Medications:  40 mg triamcinolone acetonide 40 mg/mL  Patient tolerance:  Patient tolerated the procedure well with no immediate complications

## 2022-06-14 NOTE — PROGRESS NOTES
Pre chart    Answers for HPI/ROS submitted by the patient on 6/8/2022  fever: No  eye redness: No  mouth sores: No  headaches: Yes  shortness of breath: No  chest pain: No  trouble swallowing: No  diarrhea: No  constipation: No  unexpected weight change: No  genital sore: No  dysuria: No  During the last 3 days, have you had a skin rash?: No  Bruises or bleeds easily: No  cough: No

## 2022-06-17 ENCOUNTER — OFFICE VISIT (OUTPATIENT)
Dept: PODIATRY | Facility: CLINIC | Age: 67
End: 2022-06-17
Payer: MEDICARE

## 2022-06-17 VITALS — HEIGHT: 64 IN | WEIGHT: 130 LBS | BODY MASS INDEX: 22.2 KG/M2

## 2022-06-17 DIAGNOSIS — M65.28 CALCIFIC ACHILLES TENDINITIS OF LEFT LOWER EXTREMITY: Primary | ICD-10-CM

## 2022-06-17 DIAGNOSIS — M25.572 ACUTE LEFT ANKLE PAIN: ICD-10-CM

## 2022-06-17 PROCEDURE — 99999 PR PBB SHADOW E&M-EST. PATIENT-LVL IV: CPT | Mod: PBBFAC,,, | Performed by: PODIATRIST

## 2022-06-17 PROCEDURE — 99999 PR PBB SHADOW E&M-EST. PATIENT-LVL IV: ICD-10-PCS | Mod: PBBFAC,,, | Performed by: PODIATRIST

## 2022-06-17 PROCEDURE — 99213 PR OFFICE/OUTPT VISIT, EST, LEVL III, 20-29 MIN: ICD-10-PCS | Mod: S$PBB,,, | Performed by: PODIATRIST

## 2022-06-17 PROCEDURE — 99213 OFFICE O/P EST LOW 20 MIN: CPT | Mod: S$PBB,,, | Performed by: PODIATRIST

## 2022-06-17 PROCEDURE — 99214 OFFICE O/P EST MOD 30 MIN: CPT | Mod: PBBFAC,PO | Performed by: PODIATRIST

## 2022-06-17 NOTE — PROGRESS NOTES
Subjective:       Patient ID: Jill Marquez is a 66 y.o. female.    Chief Complaint: Ankle Pain (C/o left ankle swelling and heel pain, 0 pain today, 0 injury, x-rays on 06/07/22, non-diabetic, wears sandals, last seen PCP Dr. Smith on 01/31/22)      HPI: Jill Marquez presents to clinic today at the referral of Dr. Campos, with complaints of occasional moderate pains to the left posterior aspect of the ankle/lower leg. States pains are sharp and stabbing-like in nature. Pains are to the posterior aspect of the ankle joint, mostly with walking and standing. Rates the pains at approx. 0/10 today but does increase with ambulation and standing.. States post-static dyskinesia to this area. Denies any recent identifiable trauma. Does limp with gait.  Pains have been present for the past several weeks to months and the pains have worsened over the past couple of weeks. States walking and standing causes and/or exacerbates the symptoms. Patient's Primary Care Provider is Dain Smith MD.     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    HTN (hypertension) 7/23/2012    Hyperlipidemia     Hypothyroid 7/23/2012       Family History   Problem Relation Age of Onset    Diabetes Brother     Retinal detachment Brother     Cancer Brother         kidney    Cataracts Brother     Diabetes Brother     Cancer Brother         throat    Cataracts Brother     Diabetes Brother     Cataracts Brother     No Known Problems Mother     No Known Problems Father     Lupus Other         niece    No Known Problems Sister     No Known Problems Maternal Aunt     No Known Problems Maternal Uncle     No Known Problems Paternal Aunt     No Known Problems Paternal Uncle     No Known Problems Maternal  Grandmother     No Known Problems Maternal Grandfather     No Known Problems Paternal Grandmother     No Known Problems Paternal Grandfather     Breast cancer Neg Hx     Colon cancer Neg Hx     Ovarian cancer Neg Hx     Blindness Neg Hx     Glaucoma Neg Hx     Hypertension Neg Hx     Macular degeneration Neg Hx     Strabismus Neg Hx     Stroke Neg Hx     Thyroid disease Neg Hx     Amblyopia Neg Hx     Rheum arthritis Neg Hx     Psoriasis Neg Hx     Inflammatory bowel disease Neg Hx     Cervical cancer Neg Hx     Endometrial cancer Neg Hx     Vaginal cancer Neg Hx     Uterine cancer Neg Hx        Social History     Socioeconomic History    Marital status:     Number of children: 2   Tobacco Use    Smoking status: Former Smoker     Years: 12.00     Types: Cigarettes     Quit date: 1982     Years since quittin.1    Smokeless tobacco: Former User    Tobacco comment: , homemaker, 2 children   Substance and Sexual Activity    Alcohol use: Yes     Alcohol/week: 0.0 standard drinks     Comment: 2 cocktails    Drug use: No    Sexual activity: Yes     Partners: Male     Birth control/protection: Surgical, None     Social Determinants of Health     Financial Resource Strain: Low Risk     Difficulty of Paying Living Expenses: Not hard at all   Food Insecurity: No Food Insecurity    Worried About Running Out of Food in the Last Year: Never true    Ran Out of Food in the Last Year: Never true   Transportation Needs: No Transportation Needs    Lack of Transportation (Medical): No    Lack of Transportation (Non-Medical): No   Physical Activity: Sufficiently Active    Days of Exercise per Week: 6 days    Minutes of Exercise per Session: 60 min   Stress: Stress Concern Present    Feeling of Stress : To some extent   Social Connections: Unknown    Frequency of Communication with Friends and Family: More than three times a week    Frequency of Social Gatherings with Friends  "and Family: More than three times a week    Active Member of Clubs or Organizations: Yes    Attends Club or Organization Meetings: 1 to 4 times per year    Marital Status:        Past Surgical History:   Procedure Laterality Date    COLONOSCOPY N/A 1/25/2016    Procedure: COLONOSCOPY;  Surgeon: DAYNA Simmons MD;  Location: [facility] ([address] [place] [address]);  Service: Endoscopy;  Laterality: N/A;    COSMETIC SURGERY      EYE SURGERY      eyelid surgery      HYSTERECTOMY  2004    prolapse    OOPHORECTOMY Bilateral 2015    Tonsillectomy      TONSILLECTOMY         Review of Systems       Objective:   Ht 5' 3.58" (1.615 m)   Wt 59 kg (130 lb)   BMI 22.61 kg/m²     X-Ray Ankle Complete 3 View Left  Narrative: EXAMINATION:  XR ANKLE COMPLETE 3 VIEW LEFT    CLINICAL HISTORY:  Effusion, unspecified ankle    TECHNIQUE:  AP, lateral and oblique views of the left ankle were performed.    COMPARISON:  None    FINDINGS:  Ankle mortise is congruent.  Talar dome is intact.  No fracture or dislocation.  Mild soft tissue swelling about the ankle is noted.  Small plantar calcaneal bone spur.  Higden area of calcification along the anterior aspect of the Achilles tendon near its insertion site is noted.  Pes planus appearance is present.  Impression: Please see above    Electronically signed by: Jose Carlos Christianson MD  Date:    06/07/2022  Time:    14:58       Physical Exam    LOWER EXTREMITY PHYSICAL EXAMINATION    ORTHOPEDIC: There is moderate to severe tenderness to palpation along the course of the Achilles tendon on the left lower extremity.  There is no discomfort to palpation of the Achilles tendon upon its insertion onto the calcaneus at the superior border. Upon medial to lateral compression of the heel bone at the distal most insertion of the achilles tendon, there is moderate discomfort.  There is no fusiform edema noted along the course of the Achilles tendon. There are no defects noted along the course of the Achilles " tendon. Equinus is noted. Gait pattern is antalgic at present.     DERMATOLOGY: There is no noted erythema or cellulitis noted. No ecchymosis is noted. Skin is supple, dry and intact. There is no palpable bursa noted at the achilles tendon insertion.  Skin is moist.  No ulcerations.  No calluses.     NEUROLOGY: Proprioception is intact, bilateral. Sensation to light touch is intact. Negative Tinel's Sign and negative Valleix sign. No neurological sensations with compression of the area of Mcclelland's Nerve in the area of the Abductor Hallucis muscle belly.    VASCULAR:  On the left foot, the dorsalis pedis pulse is 2/4 and the posterior tibial pulse is 2/4. Capillary refill time is less than 3 seconds. Hair growth is present on the dorsum of the foot and at the digits. Proximal to distal temperature is warm to warm.      Assessment:     1. Calcific Achilles tendinitis of left lower extremity    2. Acute left ankle pain          Plan:     Calcific Achilles tendinitis of left lower extremity    Acute left ankle pain  -     Ambulatory referral/consult to Podiatry        Thorough discussion is had with the patient today concerning the diagnosis, its etiology, and the treatment algorithm at present.    Discussed pathology and etiology of achilles tendonitis.  Discussed importance of appropriate treatment options regarding stretching exercises, icing, resting, protective weight-bearing in Cam boot, heel lifts, and physical therapy    Discussed the importance of stretching to the posterior muscle groups of the gastrocnemius and the soleus.  A stretching sheet was provided to the patient in conjunction with a Thera-Band.  I do recommend patient perform stretching exercises 4-6 times per day and holding the stretches for approximately 15-30 seconds apiece.  We discussed importance of stretching as relates to lengthening the posterior muscle group which can decrease drain on the posterior aspect of the heel as well as the  plantar aspect of the heel.  This will also decrease pain associated with post static dyskinesia.  Teach back mechanism was performed with the patient demonstrating the stretching exercises.              Future Appointments   Date Time Provider Department Center   6/21/2022 10:45 AM [facility] OIC-MAMMO2 [facility] M[facility]MO[facility]   7/12/2022 10:00 AM Mariana Townsend OD [facility] OPTOMTY [facility]   7/14/2022  9:50 AM LAB, APPOINTMENT [facility] LAB VNP [facility]   7/14/2022 10:00 AM [facility], DEXA1 [facility] BMD [facility]   7/14/2022 10:30 AM LAB, APPOINTMENT [facility] LAB [facility]   7/21/2022 10:00 AM Nicanor Campos MD [facility] RHEUM [facility]

## 2022-06-21 ENCOUNTER — HOSPITAL ENCOUNTER (OUTPATIENT)
Dept: RADIOLOGY | Facility: HOSPITAL | Age: 67
Discharge: HOME | End: 2022-06-21
Attending: NURSE PRACTITIONER
Payer: MEDICARE

## 2022-06-21 VITALS — WEIGHT: 130 LBS | HEIGHT: 63 IN | BODY MASS INDEX: 23.04 KG/M2

## 2022-06-21 DIAGNOSIS — Z12.31 ENCOUNTER FOR SCREENING MAMMOGRAM FOR BREAST CANCER: ICD-10-CM

## 2022-06-21 PROCEDURE — 77067 SCR MAMMO BI INCL CAD: CPT | Mod: TC

## 2022-06-21 PROCEDURE — 77063 BREAST TOMOSYNTHESIS BI: CPT | Mod: 26,,, | Performed by: RADIOLOGY

## 2022-06-21 PROCEDURE — 77063 BREAST TOMOSYNTHESIS BI: CPT | Mod: TC

## 2022-06-21 PROCEDURE — 77067 MAMMO DIGITAL SCREENING BILAT WITH TOMO: ICD-10-PCS | Mod: 26,,, | Performed by: RADIOLOGY

## 2022-06-21 PROCEDURE — 77063 MAMMO DIGITAL SCREENING BILAT WITH TOMO: ICD-10-PCS | Mod: 26,,, | Performed by: RADIOLOGY

## 2022-06-21 PROCEDURE — 77067 SCR MAMMO BI INCL CAD: CPT | Mod: 26,,, | Performed by: RADIOLOGY

## 2022-06-23 ENCOUNTER — PATIENT MESSAGE (OUTPATIENT)
Dept: UROGYNECOLOGY | Facility: CLINIC | Age: 67
End: 2022-06-23
Payer: MEDICARE

## 2022-07-12 ENCOUNTER — OFFICE VISIT (OUTPATIENT)
Dept: OPTOMETRY | Facility: CLINIC | Age: 67
End: 2022-07-12
Payer: MEDICARE

## 2022-07-12 DIAGNOSIS — H52.4 HYPEROPIA WITH PRESBYOPIA, BILATERAL: ICD-10-CM

## 2022-07-12 DIAGNOSIS — H25.13 NUCLEAR SCLEROSIS, BILATERAL: ICD-10-CM

## 2022-07-12 DIAGNOSIS — H52.03 HYPEROPIA WITH PRESBYOPIA, BILATERAL: ICD-10-CM

## 2022-07-12 DIAGNOSIS — Z01.00 COMPLETE EYE EXAM, ENCOUNTER FOR: Primary | ICD-10-CM

## 2022-07-12 PROCEDURE — 99999 PR PBB SHADOW E&M-EST. PATIENT-LVL III: ICD-10-PCS | Mod: PBBFAC,,, | Performed by: OPTOMETRIST

## 2022-07-12 PROCEDURE — 99213 OFFICE O/P EST LOW 20 MIN: CPT | Mod: PBBFAC | Performed by: OPTOMETRIST

## 2022-07-12 PROCEDURE — 92015 DETERMINE REFRACTIVE STATE: CPT | Mod: ,,, | Performed by: OPTOMETRIST

## 2022-07-12 PROCEDURE — 99999 PR PBB SHADOW E&M-EST. PATIENT-LVL III: CPT | Mod: PBBFAC,,, | Performed by: OPTOMETRIST

## 2022-07-12 PROCEDURE — 92015 PR REFRACTION: ICD-10-PCS | Mod: ,,, | Performed by: OPTOMETRIST

## 2022-07-12 PROCEDURE — 92014 COMPRE OPH EXAM EST PT 1/>: CPT | Mod: S$PBB,,, | Performed by: OPTOMETRIST

## 2022-07-12 PROCEDURE — 92014 PR EYE EXAM, EST PATIENT,COMPREHESV: ICD-10-PCS | Mod: S$PBB,,, | Performed by: OPTOMETRIST

## 2022-07-12 NOTE — PROGRESS NOTES
HPI     KEEGAN: 2021  Chief complaint (CC): annual eye exam  Glasses? Yes, otc readers +2.00  Contacts? no  H/o eye surgery, injections or laser: none  H/o eye injury: none  Known eye conditions? none  Family h/o eye conditions? none  Eye gtts? systane       (-) Flashes (-)  Floaters (-) Mucous   (-)  Tearing (-) Itching (-) Burning   (--) Headaches (-) Eye Pain/discomfort (-) Irritation   (-)  Redness (-) Double vision (-) Blurry vision    Diabetic? -  A1c? -      Last edited by Carole Edmondson on 7/12/2022  9:57 AM. (History)        ROS     Negative for: Constitutional, Gastrointestinal, Neurological, Skin,   Genitourinary, Musculoskeletal, HENT, Endocrine, Cardiovascular, Eyes,   Respiratory, Psychiatric, Allergic/Imm, Heme/Lymph    Last edited by Mariana Townsend, OD on 7/12/2022  2:42 PM. (History)        Assessment /Plan     For exam results, see Encounter Report.    Complete eye exam, encounter for    Nuclear sclerosis, bilateral    Hyperopia with presbyopia, bilateral      MONITOR. ED PT ON ALL EXAM FINDINGS  RX FINAL SPECS; OK TO CONTINUE WITH OTC READERS   RTC 1 YR//PRN FOR REE/DFE   MILD NS OU; UV PROTECTION; UV PROTECTION

## 2022-07-14 ENCOUNTER — PATIENT MESSAGE (OUTPATIENT)
Dept: RHEUMATOLOGY | Facility: CLINIC | Age: 67
End: 2022-07-14
Payer: MEDICARE

## 2022-07-14 ENCOUNTER — TELEPHONE (OUTPATIENT)
Dept: RHEUMATOLOGY | Facility: CLINIC | Age: 67
End: 2022-07-14
Payer: MEDICARE

## 2022-07-14 ENCOUNTER — HOSPITAL ENCOUNTER (OUTPATIENT)
Dept: RADIOLOGY | Facility: CLINIC | Age: 67
Discharge: HOME | End: 2022-07-14
Attending: INTERNAL MEDICINE
Payer: MEDICARE

## 2022-07-14 DIAGNOSIS — Z78.0 MENOPAUSE: ICD-10-CM

## 2022-07-14 DIAGNOSIS — E87.5 HYPERKALEMIA: Primary | ICD-10-CM

## 2022-07-14 PROCEDURE — 77080 DXA BONE DENSITY AXIAL: CPT | Mod: 26,,, | Performed by: INTERNAL MEDICINE

## 2022-07-14 PROCEDURE — 77080 DEXA BONE DENSITY SPINE HIP: ICD-10-PCS | Mod: 26,,, | Performed by: INTERNAL MEDICINE

## 2022-07-14 PROCEDURE — 77080 DXA BONE DENSITY AXIAL: CPT | Mod: TC

## 2022-07-15 ENCOUNTER — LAB VISIT (OUTPATIENT)
Dept: LAB | Facility: HOSPITAL | Age: 67
End: 2022-07-15
Attending: INTERNAL MEDICINE
Payer: MEDICARE

## 2022-07-15 ENCOUNTER — PATIENT MESSAGE (OUTPATIENT)
Dept: OTHER | Facility: OTHER | Age: 67
End: 2022-07-15
Payer: MEDICARE

## 2022-07-15 DIAGNOSIS — E87.5 HYPERKALEMIA: ICD-10-CM

## 2022-07-15 LAB — POTASSIUM SERPL-SCNC: 5 MMOL/L (ref 3.5–5.1)

## 2022-07-15 PROCEDURE — 36415 COLL VENOUS BLD VENIPUNCTURE: CPT | Performed by: INTERNAL MEDICINE

## 2022-07-15 PROCEDURE — 84132 ASSAY OF SERUM POTASSIUM: CPT | Performed by: INTERNAL MEDICINE

## 2022-07-18 ENCOUNTER — PATIENT MESSAGE (OUTPATIENT)
Dept: ADMINISTRATIVE | Facility: OTHER | Age: 67
End: 2022-07-18
Payer: MEDICARE

## 2022-07-18 ENCOUNTER — TELEPHONE (OUTPATIENT)
Dept: INTERNAL MEDICINE | Facility: CLINIC | Age: 67
End: 2022-07-18
Payer: MEDICARE

## 2022-07-18 ENCOUNTER — PATIENT MESSAGE (OUTPATIENT)
Dept: INTERNAL MEDICINE | Facility: CLINIC | Age: 67
End: 2022-07-18
Payer: MEDICARE

## 2022-07-18 DIAGNOSIS — E78.5 HYPERLIPIDEMIA, UNSPECIFIED HYPERLIPIDEMIA TYPE: Primary | ICD-10-CM

## 2022-07-18 DIAGNOSIS — I10 ESSENTIAL HYPERTENSION: ICD-10-CM

## 2022-07-18 DIAGNOSIS — E03.9 HYPOTHYROIDISM, UNSPECIFIED TYPE: ICD-10-CM

## 2022-07-18 NOTE — TELEPHONE ENCOUNTER
----- Message from Sowmya Guallpa, [place] sent at 7/18/2022 12:33 PM CDT -----  Regarding: Nutritionist Referral  Good morning Dr. Smith,    I contacted Ms. Marquez with her climb in her lipid panel and she suggested having a nutritionist assess her current diet. She exercises regularly and is opposed to STATIN therapy at this point. She would like to work on her lifestyle before adding a STATIN. Would you consider adding a nutritionist referral?    Thank you!    Sowmya Guallpa, PharmD.  [phone]

## 2022-07-18 NOTE — TELEPHONE ENCOUNTER
Problem is I am getting a note saying the that her insurance, Medicare, does not cover dietitian or nutritional consultation for hyperlipidemia.  I can place the order but she may have to pay for it out-of-pocket.  I just do not know how much that would be.  Can you chat with her and see if she is still interested or if we could come up with some other alternative plan?

## 2022-07-19 ENCOUNTER — PATIENT MESSAGE (OUTPATIENT)
Dept: INTERNAL MEDICINE | Facility: CLINIC | Age: 67
End: 2022-07-19
Payer: MEDICARE

## 2022-07-21 ENCOUNTER — OFFICE VISIT (OUTPATIENT)
Dept: RHEUMATOLOGY | Facility: CLINIC | Age: 67
End: 2022-07-21
Payer: MEDICARE

## 2022-07-21 ENCOUNTER — PATIENT MESSAGE (OUTPATIENT)
Dept: RHEUMATOLOGY | Facility: CLINIC | Age: 67
End: 2022-07-21

## 2022-07-21 VITALS
SYSTOLIC BLOOD PRESSURE: 150 MMHG | BODY MASS INDEX: 21.68 KG/M2 | WEIGHT: 127 LBS | HEART RATE: 77 BPM | HEIGHT: 64 IN | DIASTOLIC BLOOD PRESSURE: 89 MMHG

## 2022-07-21 DIAGNOSIS — M79.672 FOOT PAIN, LEFT: Primary | ICD-10-CM

## 2022-07-21 DIAGNOSIS — M47.819 PERIPHERAL SPONDYLOARTHRITIS: ICD-10-CM

## 2022-07-21 PROCEDURE — 99215 OFFICE O/P EST HI 40 MIN: CPT | Mod: PBBFAC | Performed by: INTERNAL MEDICINE

## 2022-07-21 PROCEDURE — 99213 OFFICE O/P EST LOW 20 MIN: CPT | Mod: S$PBB,,, | Performed by: INTERNAL MEDICINE

## 2022-07-21 PROCEDURE — 99213 PR OFFICE/OUTPT VISIT, EST, LEVL III, 20-29 MIN: ICD-10-PCS | Mod: S$PBB,,, | Performed by: INTERNAL MEDICINE

## 2022-07-21 PROCEDURE — 99999 PR PBB SHADOW E&M-EST. PATIENT-LVL V: CPT | Mod: PBBFAC,,, | Performed by: INTERNAL MEDICINE

## 2022-07-21 PROCEDURE — 99999 PR PBB SHADOW E&M-EST. PATIENT-LVL V: ICD-10-PCS | Mod: PBBFAC,,, | Performed by: INTERNAL MEDICINE

## 2022-07-21 RX ORDER — ETANERCEPT 50 MG/ML
50 SOLUTION SUBCUTANEOUS WEEKLY
Qty: 12 ML | Refills: 0 | Status: SHIPPED | OUTPATIENT
Start: 2022-07-21 | End: 2022-10-20

## 2022-07-21 RX ORDER — GABAPENTIN 300 MG/1
300 CAPSULE ORAL 3 TIMES DAILY
Qty: 270 CAPSULE | Refills: 1 | Status: SHIPPED | OUTPATIENT
Start: 2022-07-21 | End: 2022-11-03

## 2022-07-21 RX ORDER — DICLOFENAC SODIUM 10 MG/G
4 GEL TOPICAL 4 TIMES DAILY PRN
Qty: 3 EACH | Refills: 2 | Status: SHIPPED | OUTPATIENT
Start: 2022-07-21 | End: 2023-03-20 | Stop reason: SDUPTHER

## 2022-07-21 NOTE — PROGRESS NOTES
I have personally taken the history and examined the patient and agree with the resident,s note as stated above       DXA 6/14/22: normal    Right trochanteric bursitis, resolved post injection 6/14/22  Peripheral spondyloarthritis v. Pseudo-RA CPPD v seronegative RA: TJ 0 SJ 5 Pt global 5  ESR < 2  CRP < 0.3 HUMPHRIES 28: 0.7  PCS83-FWF 1.75(both remission  CDAI 7.5(LDA)  DAPSA  TJ 2  SJ 6 Pt global 0.5  Pt pain 0.5  CRP < 0.3= 9.3(LDA)    Left pes planovalgus, posterior tibial tendinitis/probabl tear, plantar fasciitis, and calcific Achilles tendinitis.    Ref to PT  Diclofenac gel four times daily to left ankle  Goodfeet orthoses  RTC 3 months with standing labs, and appt Dr. Wu in Foot/Ankle Ortho  Answers for HPI/ROS submitted by the patient on 7/15/2022  fever: No  eye redness: No  mouth sores: No  headaches: No  shortness of breath: No  chest pain: No  trouble swallowing: No  diarrhea: No  constipation: No  unexpected weight change: No  genital sore: No  dysuria: No  During the last 3 days, have you had a skin rash?: No  Bruises or bleeds easily: No  cough: No

## 2022-07-21 NOTE — PATIENT INSTRUCTIONS
-use the voltaren on the ankle 4x daily as needed  -physical therapy ordered  -try leaving off morning dose of gabapentin, if you do OK try going to one pm dose. If symptoms increase, go back to lowest effective dose.

## 2022-07-21 NOTE — PROGRESS NOTES
Answers for HPI/ROS submitted by the patient on 7/15/2022  fever: No  eye redness: No  mouth sores: No  headaches: No  shortness of breath: No  chest pain: No  trouble swallowing: No  diarrhea: No  constipation: No  unexpected weight change: No  genital sore: No  dysuria: No  During the last 3 days, have you had a skin rash?: No  Bruises or bleeds easily: No  cough: No

## 2022-07-21 NOTE — PROGRESS NOTES
"Progress Notes    Subjective:       Patient ID: Jill Marquez is a 66 y.o. female.     Chief Complaint:  Peripheral spondyloarthritis/PsA; erosive OA hands    HPI: LCV 4/4/22 although did get right trochanteric bursa injection on 6/14/22. Today she reports her hands are doing OK, about the same. Reports her right hip is much better after the injection in June. Reports her main problem is the left ankle/foot which she has seen podiatry about but it is still not resolved. Reports walking 3 miles and riding her bike 3 miles 5 days per week along with weights 2 days per week.       Review of Systems   Constitutional: Negative for fever and unexpected weight change.   HENT: Negative for mouth sores and trouble swallowing.    Eyes: Negative for redness or visual disturbance  Respiratory: Negative for cough and shortness of breath.    Cardiovascular: Negative for chest pain.   Gastrointestinal: Negative for constipation and diarrhea.   Genitourinary: Negative for dysuria and genital sores.   Skin: Negative for rash.   Neurological: Positive for headaches.   Hematological: Does not bruise/bleed easily.          Objective:   Blood pressure (!) 150/89, pulse 77, height 5' 3.6" (1.615 m), weight 57.6 kg (127 lb).    Body mass index is 22.07 kg/m².      Physical Exam    Constitutional: Negative for fever and unexpected weight change.   HENT: Negative for mouth sores and trouble swallowing.     Respiratory: Negative for cough and shortness of breath.    Cardiovascular: Negative for chest pain.   Musculoskeletal: Left plantar fascia TTP, left achilles TTP, Left glut MMT 4-/5, other MMT grossly 5/5  Abdomen: No TTP  Skin: Negative for rash.   Neurological: Positive for headaches.               Assessment:     TJ 1 SJ 3  Pt global 15 ESR < 2 CRP <0.3  DAS28 0.77  HEY38-DNL 1.82  CDAI 6(LDA)  Rheumatoid Arthritis Treatment Goals:  -Disease Activity Measure: CDAI  -Target: LDA  -Patient Goal:  -Therapy/Change: none  -Shared " Decision Making: Discussed goals of care and therapy plan together with patient as outlined above.        Left plantar fasciitis, left pes planus, calcific tendonitis     Plan:   CBC, CMP, ESR, CRP,  HBV DNA today  Continue Enbrel Sureclick 50mg sc q 7 days  Try stopping am gabapentin dose, if does well try only 300mg at night. If symptoms increase go to lowest effective dose  Refer to PT for foot  Refer to Dr. Wu  RTC 3 months with standing labs, DXA           Answers for HPI/ROS submitted by the patient on 7/15/2022  fever: No  eye redness: No  mouth sores: No  headaches: No  shortness of breath: No  chest pain: No  trouble swallowing: No  diarrhea: No  constipation: No  unexpected weight change: No  genital sore: No  dysuria: No  During the last 3 days, have you had a skin rash?: No  Bruises or bleeds easily: No  cough: No

## 2022-07-21 NOTE — PROGRESS NOTES
Pre chart    Answers for HPI/ROS submitted by the patient on 7/15/2022  fever: No  eye redness: No  mouth sores: No  headaches: No  shortness of breath: No  chest pain: No  trouble swallowing: No  diarrhea: No  constipation: No  unexpected weight change: No  genital sore: No  dysuria: No  During the last 3 days, have you had a skin rash?: No  Bruises or bleeds easily: No  cough: No

## 2022-08-01 ENCOUNTER — PATIENT MESSAGE (OUTPATIENT)
Dept: INTERNAL MEDICINE | Facility: CLINIC | Age: 67
End: 2022-08-01
Payer: MEDICARE

## 2022-08-01 RX ORDER — LEVOTHYROXINE SODIUM 88 UG/1
88 TABLET ORAL
Qty: 30 TABLET | Refills: 12 | OUTPATIENT
Start: 2022-08-01

## 2022-08-01 NOTE — TELEPHONE ENCOUNTER
No new care gaps identified.  [facility] Embedded Care Gaps. Reference number: [id number]. 8/01/2022   11:38:45 AM NIKOLAST

## 2022-08-02 ENCOUNTER — CLINICAL SUPPORT (OUTPATIENT)
Dept: REHABILITATION | Facility: HOSPITAL | Age: 67
End: 2022-08-02
Payer: MEDICARE

## 2022-08-02 DIAGNOSIS — M25.572 ACUTE LEFT ANKLE PAIN: ICD-10-CM

## 2022-08-02 DIAGNOSIS — M79.672 FOOT PAIN, LEFT: ICD-10-CM

## 2022-08-02 DIAGNOSIS — M79.672 LEFT FOOT PAIN: ICD-10-CM

## 2022-08-02 PROCEDURE — 97140 MANUAL THERAPY 1/> REGIONS: CPT | Mod: PN

## 2022-08-02 PROCEDURE — 97161 PT EVAL LOW COMPLEX 20 MIN: CPT | Mod: PN

## 2022-08-02 PROCEDURE — 97110 THERAPEUTIC EXERCISES: CPT | Mod: PN

## 2022-08-02 NOTE — TELEPHONE ENCOUNTER
Refill Decision Note   Jill Marquez  is requesting a refill authorization.  Brief Assessment and Rationale for Refill:  Quick Discontinue     Medication Therapy Plan:  Pended in another encounter    Medication Reconciliation Completed: No   Comments:     No Care Gaps recommended.     Note composed:11:00 PM 08/01/2022

## 2022-08-02 NOTE — PLAN OF CARE
[facility]  Physical Therapy Initial Evaluation    Date: 8/2/2022   Name: Jill Marquez  Clinic Number: [id number]    Therapy Diagnosis:   Encounter Diagnosis   Name Primary?    Foot pain, left      Physician: Fred Alves MD    Physician Orders: PT Eval and Treat   Medical Diagnosis from Referral: M79.672 (ICD-10-CM) - Foot pain, left     Evaluation Date: 8/2/2022  Authorization Period Expiration: 7/21/2023  Plan of Care Expiration: 11/2/2022  Visit # / Visits authorized: 1/ 1    PN DUE: 9/2/2022    Time In: 1:15  Time Out: 2:00  Total Appointment Time (timed & untimed codes): 45 minutes    Precautions: Standard      Subjective   Date of onset: 3 months ago  History of current condition - Jill reports: Left foot swells and has pain medial ankle into foot. Also has pain in bottom of her foot when first takes a step. Pt walks 3 miles a day.      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    Medications:   Jill has a current medication list which includes the following prescription(s): amlodipine, azelastine, citalopram, premarin, pfizer covid-19 fidel vaccn(pf), diclofenac sodium, ergocalciferol (vitamin d2), enbrel, fish oil-omega-3 fatty acids, fluticasone propionate, gabapentin, l. acidophilus/bifid. animalis, psyllium husk, red yeast rice, sennosides, synthroid, and triamcinolone acetonide 0.1%.    Allergies:   Review of patient's allergies indicates:   Allergen Reactions    Sulfasalazine Nausea Only     Malaise, nausea,    Leflunomide Rash         Imaging, x-ray: Ankle mortise is congruent.  Talar dome is intact.  No fracture or dislocation.  Mild soft tissue swelling about the ankle is noted.  Small plantar calcaneal bone spur.  [place] area of calcification along the anterior aspect of the Achilles tendon near its insertion site is noted.  Pes planus appearance is present.         Prior Therapy: not for her feet  Social History:  lives with their spouse  Occupation: retired  Prior Level of Function: unrestricted and painfree  Current Level of Function: pain in left foot with walking or prolonged standing    Pain:  Current 2/10, worst 7/10, best 0/10   Location: left feet   Description: Burning and Sharp  Aggravating Factors: Standing and Walking  Easing Factors: rest    Pts goals: to alleviate pain and be able to walk without pain    Objective     Sensation:  Sensation is intact to light touch   Observation:  Pronates, valgus of calcaneous    AROM   Right (degrees) Left (degrees)   Plantar Flexion (50) 60 60   Dorsiflexion (20) 0 0   Ankle Inversion (35) WNL WNL   Ankle Eversion (25) WNL WML         Joint Mobility   Right    Left   Posterior Talar Glide Normal Hypermobile   Anterior Talar Glide Normal Hypermobile   Medial Talar Glide  Normal Hypermobile   Lateral Talar Glide  Normal Hypermobile   Calcaneal IR  Normal Normal   Calcaneal ER  Normal Normal   Posterior Tibial Glide  Normal Normal   Anterior Tibial Glide  Normal Normal       Strength   Right    Left   Anterior Tibialis 5/5 4-/5   Posterior Tibialis 5/5 4-/5   Gastrocnemius 5/5 5/5   Peroneals 5/5 5/5   Flexor Hallucis Longus  5/5 5/5   Flexor Digitorum Longus 5/5 5/5   Hip flexors 5/5 5/5   Hip Abductors 5/5 5/5     Special Tests:   Test Right Left   Child Test negative negative   Anterior Drawer negative  negative   Posterior Drawer negative negative   Windlass Test negative positive       Palpation:  TTP posterior tibialis insertion, gastrocs, achilles insertion at  calcaneous    Movement Analysis:   Squat: L > right pronation  Heel Raise: painful and unable to maintain arch    Gait Analysis: antalgic left     Other: Pt presents with postural abnormalities which include: ankle/foot pronation                 CMS Impairment/Limitation/Restriction for FOTO Foot/Ankle Survey    Therapist reviewed FOTO scores for Jill Marquez on 8/2/2022.   FOTO documents entered into [employer] - see Media section.    Limitation Score: 35%         TREATMENT   Treatment Time In: 1:35  Treatment Time Out: 2:00  Total Treatment time (time-based codes) separate from Evaluation: 25 minutes    Jill received therapeutic exercises to develop strength, ROM, flexibility and posture for 15 minutes including:  Pt education in POC, importance of wearing some shoes and having good arch support, epsom soaks, ice massage  Towel crunches  Calf stretch    Jill received the following manual therapy techniques: Joint mobilizations, Myofacial release and Soft tissue Mobilization were applied to the: left foot/ankle for 10 minutes, including:  TP release Gastroc, med/lat head  Arch kinesiotape    Planned:  Cont with TP releases gastroc and post tib      Home Exercises and Patient Education Provided    Education provided:   - - PT POC  - HEP  - PT prognosis and diagnosis  - all questions and concerns answered       Written Home Exercises Provided: yes.  Exercises were reviewed and Jill was able to demonstrate them prior to the end of the session.  Jill demonstrated good  understanding of the education provided.     See EMR under Patient Instructions for exercises provided 8/2/2022.    Assessment   Jill is a 66 y.o. female referred to outpatient Physical Therapy with a medical diagnosis of M79.672 (ICD-10-CM) - Foot pain, left   .  The patient presents with impairments which include decreased ROM, decreased strength, joint hypermobility , postural abnormalities and gait abnormalities.  These impairments are  limiting patient's ability to stand or walk for prolonged. Pt prognosis is Good due to personal factors and co-morbidities listed below. Pt will benefit from skilled outpatient Physical Therapy to address the deficits stated above and in the chart below, provide pt/family education, and to maximize pt's level of independence.   Pt presents with SIGNS AND SYMPTOMS of posterior tibialis tendonitis, left pes planovalgus, plantar fascitis, gastroc tightness.     Plan of care discussed with patient: Yes  Pt's spiritual, cultural and educational needs considered and patient is agreeable to the plan of care and goals as stated below:     Anticipated Barriers for therapy: age    Medical Necessity is demonstrated by the following  History  Co-morbidities and personal factors that may impact the plan of care Co-morbidities:   See above    Personal Factors:   age     low   Examination  Body Structures and Functions, activity limitations and participation restrictions that may impact the plan of care Body Regions:   lower extremities    Body Systems:    ROM  strength  gait    Participation Restrictions:   none    Activity limitations:   Learning and applying knowledge  no deficits    General Tasks and Commands  no deficits    Communication  no deficits    Mobility  walking    Self care  no deficits    Domestic Life  no deficits    Interactions/Relationships  no deficits    Life Areas  no deficits    Community and Social Life  recreation and leisure         low   Clinical Presentation stable and uncomplicated low   Decision Making/ Complexity Score: low     Goals:  Short Term Goals: 5 weeks   1. Recent signs and systems trend is improving in order to progress towards LTG's.  2. Patient will improve ankle df to 3 degrees in order to normalize gait  3. Patient will be independent with HEP in order to further progress and return to maximal function.  4. Pain rating at Worst: 5/10 in order to progress towards increased independence  with activity.    Long Term Goals: 10 weeks   1. Patient will return to normal ADL, recreational, and work related activities with less pain and limitation.   2. Patient will improve ankle strength to 5/5   3. Patient will improve ankle df to 5 degrees in order to normalize gait.   4. Patient will demonstrate normal gait mechanics in order to minimize any compensation and return to PLOF.   5. Patient will self report improvement to 26% limitation on the FOTO Ankle Survey.     Plan   Plan of care Certification: 8/2/2022 to 11/2/2022.    Outpatient Physical Therapy 2 times weekly for 10 weeks to include the following interventions: Manual Therapy, Patient Education, Self Care and Therapeutic Exercise. And any other therapies and modalities as clinically indicated and appropriate, including but not limited to FDN and telehealth. Pt may be seen by PTA as a part of pt's care team.       Jill Borja, PT  8/2/2022

## 2022-08-04 NOTE — PROGRESS NOTES
[facility]   Physical Therapy Treatment Note     Name: Jill Marquez  Clinic Number: [id number]    Therapy Diagnosis:   Encounter Diagnoses   Name Primary?    Left foot pain Yes    Acute left ankle pain      Physician: Fred Alves MD    Visit Date: 8/9/2022    Physician Orders: PT Eval and Treat   Medical Diagnosis from Referral: M79.672 (ICD-10-CM) - Foot pain, left      Evaluation Date: 8/2/2022  Authorization Period Expiration: 7/21/2023  Plan of Care Expiration: 11/2/2022  Visit # / Visits authorized: 2/ 20     PN DUE: 9/2/2022    PTA Visit #: 0/5     Time In: 9:45  Time Out: 10:15  Total Billable Time: 45 minutes    X-rays:  Ankle mortise is congruent.  Talar dome is intact.  No fracture or dislocation.  Mild soft tissue swelling about the ankle is noted.  Small plantar calcaneal bone spur.  [place] area of calcification along the anterior aspect of the Achilles tendon near its insertion site is noted.  Pes planus appearance is present.       SUBJECTIVE   History of current condition - Jill reports: Left foot swells and has pain medial ankle into foot. Also has pain in bottom of her foot when first takes a step. Pt walks 3 miles a day.        Today Pt reports: Left tape on until last night. Feels like it really helped.    She was compliant with home exercise program.  Response to previous treatment: improved with less pain  Functional change: none reported    Pain: 2/10  Location: left feet      OBJECTIVE     Objective Measures updated at progress report unless specified.     Treatment     Jill received the treatments listed below:     (Exercises and Techniques performed today are bolded)    Jill received therapeutic exercises to develop strength, ROM, flexibility and posture for 25 minutes including:    Recumbant bike seat 5 X 7 min L3  Towel crunches X 3 min  Towel sweeps inversion/Eversion X 2 min ea  Calf stretch 30 sec X 2 on slant board  +marble pick ups     Jill  received the following manual therapy techniques: Joint mobilizations, Myofacial release and Soft tissue Mobilization were applied to the: left foot/ankle for 15 minutes, including:    TP release Gastroc, med/lat head  Arch kinesiotape     Cont with TP releases gastroc and post tib  FDN left gastroc, Soleus with pistoning           Patient Education and Home Exercises     Home Exercises Provided and Patient Education Provided     Education provided:   - HEP, proper arch supports    Written Home Exercises Provided: Patient instructed to cont prior HEP. Exercises were reviewed and Jill was able to demonstrate them prior to the end of the session.  Jill demonstrated good  understanding of the education provided. See EMR under Patient Instructions for exercises provided during therapy sessions    ASSESSMENT   Pt presents with SIGNS AND SYMPTOMS of posterior tibialis tendonitis, left pes planovalgus, plantar fascitis, gastroc tightness. Good twitch response with FDN med/lat heads of gastroc        Jill Is progressing well towards her goals.   Pt prognosis is Excellent.     Pt will continue to benefit from skilled outpatient physical therapy to address the deficits listed in the problem list box on initial evaluation, provide pt/family education and to maximize pt's level of independence in the home and community environment.     Pt's spiritual, cultural and educational needs considered and pt agreeable to plan of care and goals.     Anticipated barriers to physical therapy: none    Goals:  Short Term Goals: 5 weeks   1. Recent signs and systems trend is improving in order to progress towards LTG's.  2. Patient will improve ankle df to 3 degrees in order to normalize gait  3. Patient will be independent with HEP in order to further progress and return to maximal function.  4. Pain rating at Worst: 5/10 in order to progress towards increased independence with activity.     Long Term Goals: 10 weeks   1. Patient will  return to normal ADL, recreational, and work related activities with less pain and limitation.   2. Patient will improve ankle strength to 5/5   3. Patient will improve ankle df to 5 degrees in order to normalize gait.   4. Patient will demonstrate normal gait mechanics in order to minimize any compensation and return to PLOF.   5. Patient will self report improvement to 26% limitation on the FOTO Ankle Survey.       PLAN   Plan of care Certification: 8/2/2022 to 11/2/2022.     Cont PT per POC for LE and foot strengthening, joint and soft tissue mobilization, FDN as tolerated and appropriate. Assess affects of FDN    Jill Borja, PT

## 2022-08-09 ENCOUNTER — CLINICAL SUPPORT (OUTPATIENT)
Dept: REHABILITATION | Facility: HOSPITAL | Age: 67
End: 2022-08-09
Payer: MEDICARE

## 2022-08-09 DIAGNOSIS — M25.572 ACUTE LEFT ANKLE PAIN: ICD-10-CM

## 2022-08-09 DIAGNOSIS — M79.672 LEFT FOOT PAIN: Primary | ICD-10-CM

## 2022-08-09 PROCEDURE — 97140 MANUAL THERAPY 1/> REGIONS: CPT | Mod: PN

## 2022-08-09 PROCEDURE — 97110 THERAPEUTIC EXERCISES: CPT | Mod: PN

## 2022-08-10 NOTE — PROGRESS NOTES
[facility]   Physical Therapy Treatment Note     Name: Jill Marquez  Clinic Number: [id number]    Therapy Diagnosis:   Encounter Diagnoses   Name Primary?    Left foot pain Yes    Acute left ankle pain      Physician: Fred Alves MD    Visit Date: 8/11/2022    Physician Orders: PT Eval and Treat   Medical Diagnosis from Referral: M79.672 (ICD-10-CM) - Foot pain, left      Evaluation Date: 8/2/2022  Authorization Period Expiration: 7/21/2023  Plan of Care Expiration: 11/2/2022  Visit # / Visits authorized: 3/ 20     PN DUE: 9/2/2022    PTA Visit #: 0/5     Time In: 1:30pm  Time Out: 2:15pm  Total Billable Time: 45 minutes    X-rays:  Ankle mortise is congruent.  Talar dome is intact.  No fracture or dislocation.  Mild soft tissue swelling about the ankle is noted.  Small plantar calcaneal bone spur.  [place] area of calcification along the anterior aspect of the Achilles tendon near its insertion site is noted.  Pes planus appearance is present.       SUBJECTIVE   History of current condition - Jill reports: Left foot swells and has pain medial ankle into foot. Also has pain in bottom of her foot when first takes a step. Pt walks 3 miles a day.        Today Pt reports: Foot is feeling better overall, feels like the dry needling is helping a lot.    She was compliant with home exercise program.  Response to previous treatment: improved with less pain  Functional change: none reported    Pain: 2/10  Location: left feet      OBJECTIVE     Objective Measures updated at progress report unless specified.     Treatment     Jill received the treatments listed below:     (Exercises and Techniques performed today are bolded)    Jill received therapeutic exercises to develop strength, ROM, flexibility and posture for 30 minutes including:    Recumbant bike seat 5 X 7 min L3  Towel crunches X 3 min  Towel sweeps inversion/Eversion X 2 min ea  Calf stretch 30 sec X 2 on slant board  +marble pick  ups (15) x 3 sets    +Marches on foam 2 x 20  +Mini-lunges to foam 2 x 10     Jill received the following manual therapy techniques: Joint mobilizations, Myofacial release and Soft tissue Mobilization were applied to the: left foot/ankle for 15 minutes, including:    TP release Gastroc, med/lat head  Arch kinesiotape     Cont with TP releases gastroc and post tib  FDN left gastroc, Soleus with pistoning           Patient Education and Home Exercises     Home Exercises Provided and Patient Education Provided     Education provided:   - HEP, proper arch supports    Written Home Exercises Provided: Patient instructed to cont prior HEP. Exercises were reviewed and Jill was able to demonstrate them prior to the end of the session.  Jill demonstrated good  understanding of the education provided. See EMR under Patient Instructions for exercises provided during therapy sessions    ASSESSMENT   Pt presents today reporting good improvement in her pain after dry needling last visit. Continued with established exercises to good tolerance. Added standing marches and lunges on foam block to progress ankle stability through more functional activities. Pt tolerated progressed exercises well without any increase in pain, and with good form.  Good twitch response with FDN by PT    Jill Is progressing well towards her goals.   Pt prognosis is Excellent.     Pt will continue to benefit from skilled outpatient physical therapy to address the deficits listed in the problem list box on initial evaluation, provide pt/family education and to maximize pt's level of independence in the home and community environment.     Pt's spiritual, cultural and educational needs considered and pt agreeable to plan of care and goals.     Anticipated barriers to physical therapy: none    Goals:  Short Term Goals: 5 weeks   1. Recent signs and systems trend is improving in order to progress towards LTG's.  2. Patient will improve ankle df to 3  degrees in order to normalize gait  3. Patient will be independent with HEP in order to further progress and return to maximal function.  4. Pain rating at Worst: 5/10 in order to progress towards increased independence with activity.     Long Term Goals: 10 weeks   1. Patient will return to normal ADL, recreational, and work related activities with less pain and limitation.   2. Patient will improve ankle strength to 5/5   3. Patient will improve ankle df to 5 degrees in order to normalize gait.   4. Patient will demonstrate normal gait mechanics in order to minimize any compensation and return to PLOF.   5. Patient will self report improvement to 26% limitation on the FOTO Ankle Survey.       PLAN   Plan of care Certification: 8/2/2022 to 11/2/2022.     Cont PT per POC for LE and foot strengthening, joint and soft tissue mobilization, FDN as tolerated and appropriate. Assess affects of FDN    Amandeep Bowen, PANCHO

## 2022-08-11 ENCOUNTER — CLINICAL SUPPORT (OUTPATIENT)
Dept: REHABILITATION | Facility: HOSPITAL | Age: 67
End: 2022-08-11
Payer: MEDICARE

## 2022-08-11 DIAGNOSIS — M25.572 ACUTE LEFT ANKLE PAIN: ICD-10-CM

## 2022-08-11 DIAGNOSIS — M79.672 LEFT FOOT PAIN: Primary | ICD-10-CM

## 2022-08-11 PROCEDURE — 97110 THERAPEUTIC EXERCISES: CPT | Mod: PN,CQ

## 2022-08-11 PROCEDURE — 97140 MANUAL THERAPY 1/> REGIONS: CPT | Mod: PN

## 2022-08-16 ENCOUNTER — OFFICE VISIT (OUTPATIENT)
Dept: INTERNAL MEDICINE | Facility: CLINIC | Age: 67
End: 2022-08-16
Payer: MEDICARE

## 2022-08-16 VITALS
OXYGEN SATURATION: 99 % | BODY MASS INDEX: 22.38 KG/M2 | DIASTOLIC BLOOD PRESSURE: 84 MMHG | SYSTOLIC BLOOD PRESSURE: 136 MMHG | HEART RATE: 68 BPM | HEIGHT: 63 IN | WEIGHT: 126.31 LBS

## 2022-08-16 DIAGNOSIS — M54.41 CHRONIC MIDLINE LOW BACK PAIN WITH BILATERAL SCIATICA: ICD-10-CM

## 2022-08-16 DIAGNOSIS — D84.9 IMMUNOCOMPROMISED, ACQUIRED: ICD-10-CM

## 2022-08-16 DIAGNOSIS — G89.29 CHRONIC MIDLINE LOW BACK PAIN WITH BILATERAL SCIATICA: ICD-10-CM

## 2022-08-16 DIAGNOSIS — L40.50 PSA (PSORIATIC ARTHRITIS): ICD-10-CM

## 2022-08-16 DIAGNOSIS — E78.5 HYPERLIPIDEMIA, UNSPECIFIED HYPERLIPIDEMIA TYPE: ICD-10-CM

## 2022-08-16 DIAGNOSIS — M47.816 LUMBAR SPONDYLOSIS: Primary | ICD-10-CM

## 2022-08-16 DIAGNOSIS — K59.09 CHRONIC CONSTIPATION: ICD-10-CM

## 2022-08-16 DIAGNOSIS — Z12.11 COLON CANCER SCREENING: ICD-10-CM

## 2022-08-16 DIAGNOSIS — I10 PRIMARY HYPERTENSION: ICD-10-CM

## 2022-08-16 DIAGNOSIS — M54.42 CHRONIC MIDLINE LOW BACK PAIN WITH BILATERAL SCIATICA: ICD-10-CM

## 2022-08-16 PROCEDURE — 99214 OFFICE O/P EST MOD 30 MIN: CPT | Mod: S$PBB,,, | Performed by: INTERNAL MEDICINE

## 2022-08-16 PROCEDURE — 99999 PR PBB SHADOW E&M-EST. PATIENT-LVL V: ICD-10-PCS | Mod: PBBFAC,,, | Performed by: INTERNAL MEDICINE

## 2022-08-16 PROCEDURE — 99999 PR PBB SHADOW E&M-EST. PATIENT-LVL V: CPT | Mod: PBBFAC,,, | Performed by: INTERNAL MEDICINE

## 2022-08-16 PROCEDURE — 99215 OFFICE O/P EST HI 40 MIN: CPT | Mod: PBBFAC | Performed by: INTERNAL MEDICINE

## 2022-08-16 PROCEDURE — 99214 PR OFFICE/OUTPT VISIT, EST, LEVL IV, 30-39 MIN: ICD-10-PCS | Mod: S$PBB,,, | Performed by: INTERNAL MEDICINE

## 2022-08-16 NOTE — PROGRESS NOTES
[facility]   Physical Therapy Treatment Note     Name: Jill Marquez  Clinic Number: [id number]    Therapy Diagnosis:   Encounter Diagnoses   Name Primary?    Left foot pain Yes    Acute left ankle pain      Physician: Fred Alves MD    Visit Date: 8/17/2022    Physician Orders: PT Eval and Treat   Medical Diagnosis from Referral: M79.672 (ICD-10-CM) - Foot pain, left      Evaluation Date: 8/2/2022  Authorization Period Expiration: 7/21/2023  Plan of Care Expiration: 11/2/2022  Visit # / Visits authorized: 4/ 20     PN DUE: 9/2/2022    PTA Visit #: 0/5     Time In: 1:00  Time Out: 1:45  Total Billable Time: 45 minutes    X-rays:  Ankle mortise is congruent.  Talar dome is intact.  No fracture or dislocation.  Mild soft tissue swelling about the ankle is noted.  Small plantar calcaneal bone spur.  [place] area of calcification along the anterior aspect of the Achilles tendon near its insertion site is noted.  Pes planus appearance is present.       SUBJECTIVE   History of current condition - Jill reports: Left foot swells and has pain medial ankle into foot. Also has pain in bottom of her foot when first takes a step. Pt walks 3 miles a day.        Today Pt reports: Foot is feeling better overall, feels like the dry needling is helping a lot. Was on feet a lot yesterday with  at appt in [place]. Foot was a little swollen by end of day but better after soaking.     She was compliant with home exercise program.  Response to previous treatment: improved with less pain  Functional change: none reported    Pain: 2/10  Location: left feet      OBJECTIVE     Objective Measures updated at progress report unless specified.     Treatment     Jill received the treatments listed below:     (Exercises and Techniques performed today are bolded)    Jill received therapeutic exercises to develop strength, ROM, flexibility and posture for 30 minutes including:    Recumbant bike seat 5 X  7 min L3  Towel crunches X 3 min  Towel sweeps inversion/Eversion X 2 min ea  Calf stretch 30 sec X 2 on slant board  marble pick ups (15) x 3 sets    +Marches on foam 2 x 20  +Mini-lunges to foam 2 x 10     Jill received the following manual therapy techniques: Joint mobilizations, Myofacial release and Soft tissue Mobilization were applied to the: left foot/ankle for 15 minutes, including:    TP release Gastroc, med/lat head    Arch kinesiotape     Cont with TP releases gastroc and post tib  FDN left gastroc, Soleus with pistoning   IASTM plantar fascia        Patient Education and Home Exercises     Home Exercises Provided and Patient Education Provided     Education provided:   - HEP, proper arch supports    Written Home Exercises Provided: Patient instructed to cont prior HEP. Exercises were reviewed and Jill was able to demonstrate them prior to the end of the session.  Jill demonstrated good  understanding of the education provided. See EMR under Patient Instructions for exercises provided during therapy sessions    ASSESSMENT   Pt presents today reporting good improvement in her pain after dry needling last visit. Continued with established exercises to good tolerance. Added standing marches and lunges on foam block to progress ankle stability through more functional activities. Pt tolerated progressed exercises well without any increase in pain, and with good form.  Good twitch response with FDN by PT    Jill Is progressing well towards her goals.   Pt prognosis is Excellent.     Pt will continue to benefit from skilled outpatient physical therapy to address the deficits listed in the problem list box on initial evaluation, provide pt/family education and to maximize pt's level of independence in the home and community environment.     Pt's spiritual, cultural and educational needs considered and pt agreeable to plan of care and goals.     Anticipated barriers to physical therapy:  none    Goals:  Short Term Goals: 5 weeks   1. Recent signs and systems trend is improving in order to progress towards LTG's.  2. Patient will improve ankle df to 3 degrees in order to normalize gait  3. Patient will be independent with HEP in order to further progress and return to maximal function.  4. Pain rating at Worst: 5/10 in order to progress towards increased independence with activity.     Long Term Goals: 10 weeks   1. Patient will return to normal ADL, recreational, and work related activities with less pain and limitation.   2. Patient will improve ankle strength to 5/5   3. Patient will improve ankle df to 5 degrees in order to normalize gait.   4. Patient will demonstrate normal gait mechanics in order to minimize any compensation and return to PLOF.   5. Patient will self report improvement to 26% limitation on the FOTO Ankle Survey.       PLAN   Plan of care Certification: 8/2/2022 to 11/2/2022.     Cont PT per POC for LE and foot strengthening, joint and soft tissue mobilization, FDN as tolerated and appropriate. Assess affects of FDN    Jill Borja, PT

## 2022-08-16 NOTE — PROGRESS NOTES
Subjective:       Patient ID: Jill Marquez is a 66 y.o. female.    Chief Complaint: Annual Exam    Patient here for annual assessment of medical problems.  She sees Rheumatology for psoriatic arthritis.  She has a strong family history of heart disease along with an elevated total cholesterol but her HDL is above 100 and her cardiac calcium scan the about 10 months ago showed a score of 0.  We still encouraged her to keep track of her diet and cholesterol and weight.  Hopefully she can keep this manageable but for now her heart seems to be in good shape.  Her potassium was initially elevated but on repeat it was normal.  She may be due for colon screening.  Her last report said follow-up in 5-10 years and it has been about 7.    Review of Systems   Constitutional: Negative for appetite change, chills and fever.   HENT: Negative for nosebleeds and sore throat.    Eyes: Negative for pain and visual disturbance.   Respiratory: Negative for cough, shortness of breath and wheezing.    Cardiovascular: Negative for chest pain and leg swelling.   Gastrointestinal: Negative for abdominal pain, constipation and diarrhea.   Endocrine: Negative for polyuria.   Genitourinary: Negative for difficulty urinating, hematuria and vaginal bleeding.   Musculoskeletal: Positive for arthralgias and joint deformity. Negative for back pain, gait problem and neck pain.   Integumentary:  Negative for pallor and rash.   Neurological: Negative for tremors, seizures and headaches.   Hematological: Does not bruise/bleed easily.   Psychiatric/Behavioral: Negative for dysphoric mood. The patient is not nervous/anxious.            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    HTN (hypertension) 7/23/2012    Hyperlipidemia     Hypothyroid 7/23/2012     Past Surgical History:   Procedure Laterality Date    COLONOSCOPY N/A 1/25/2016    Procedure: COLONOSCOPY;   Surgeon: DAYNA Simmons MD;  Location: [facility] ([address] [place] [address]);  Service: Endoscopy;  Laterality: N/A;    COSMETIC SURGERY      EYE SURGERY      eyelid surgery      HYSTERECTOMY  2004    prolapse    OOPHORECTOMY Bilateral 2015    Tonsillectomy      TONSILLECTOMY        Patient Active Problem List   Diagnosis    Hypothyroid    HTN (hypertension)    Displacement of thoracic intervertebral disc without myelopathy    Hyperlipidemia    Lumbar spondylosis    Carpal tunnel syndrome    Lumbar radiculopathy    Sicca    Fatigue    Myalgia and myositis    Erosive osteoarthritis of both hands    Leg pain, bilateral    Bilateral hand pain    Hepatitis B core antibody positive (negative viral load; suspect false positive)    Elevated CPK    Atrophic vaginitis    Chronic constipation    Screening for colon cancer    Chronic low back pain    CMC arthritis, thumb, degenerative    Mucous cyst of finger    Peripheral spondyloarthritis    Rash of back    PSA (psoriatic arthritis)    Chronic low back pain with left-sided sciatica    Generalized weakness    Difficulty walking    Left sided sciatica    Greater trochanteric bursitis of right hip    Medial epicondylitis of elbow, left    Pain    Edema    Decreased  strength    Decreased strength    Immunocompromised, acquired    Left foot pain    Acute left ankle pain        Objective:      Physical Exam  Constitutional:       General: She is not in acute distress.     Appearance: She is well-developed.   HENT:      Head: Normocephalic and atraumatic.      Right Ear: Tympanic membrane, ear canal and external ear normal.      Left Ear: Tympanic membrane, ear canal and external ear normal.      Mouth/Throat:      Pharynx: No oropharyngeal exudate or posterior oropharyngeal erythema.   Eyes:      General: No scleral icterus.     Conjunctiva/sclera: Conjunctivae normal.      Pupils: Pupils are equal, round, and reactive to light.   Neck:       Thyroid: No thyromegaly.   Cardiovascular:      Rate and Rhythm: Normal rate and regular rhythm.      Pulses: Normal pulses.      Heart sounds: No murmur heard.  Pulmonary:      Effort: Pulmonary effort is normal.      Breath sounds: Normal breath sounds. No wheezing.   Abdominal:      General: Bowel sounds are normal. There is no distension.      Palpations: Abdomen is soft.      Tenderness: There is no abdominal tenderness.   Musculoskeletal:         General: Deformity (fingers) present. No tenderness.      Cervical back: Normal range of motion and neck supple.      Right lower leg: No edema.      Left lower leg: No edema.   Lymphadenopathy:      Cervical: No cervical adenopathy.   Skin:     Coloration: Skin is not jaundiced.      Findings: No rash.   Neurological:      General: No focal deficit present.      Mental Status: She is alert and oriented to person, place, and time.   Psychiatric:         Mood and Affect: Mood normal.         Behavior: Behavior normal.         Assessment:       Problem List Items Addressed This Visit        Neuro    Lumbar spondylosis - Primary       Cardiac/Vascular    HTN (hypertension)    Hyperlipidemia       Immunology/Multi System    Immunocompromised, acquired       GI    Chronic constipation       Orthopedic    Chronic low back pain    PSA (psoriatic arthritis)      Other Visit Diagnoses     Colon cancer screening        Relevant Orders    Ambulatory referral/consult to Endo Procedure           Plan:         Jill was seen today for annual exam.    Diagnoses and all orders for this visit:    Lumbar spondylosis    Primary hypertension    Hyperlipidemia, unspecified hyperlipidemia type    Chronic constipation    Chronic midline low back pain with bilateral sciatica    PSA (psoriatic arthritis)    Immunocompromised, acquired  Comments:  Stable on Enbrel, continue to monitor.     Colon cancer screening  -     Ambulatory referral/consult to Endo Procedure ;  "Future           overall fairly stable with good HDL.  Recent 0 calcium score.    Follow-up in 6 months, follow-up with Rheumatology   Assist with colon screening.           Portions of this note may have been created with voice recognition software. Occasional "wrong-word" or "sound-a-like" substitutions may have occurred due to the inherent limitations of voice recognition software. Please, read the note carefully and recognize, using context, where substitutions have occurred.  "

## 2022-08-17 ENCOUNTER — CLINICAL SUPPORT (OUTPATIENT)
Dept: REHABILITATION | Facility: HOSPITAL | Age: 67
End: 2022-08-17
Payer: MEDICARE

## 2022-08-17 DIAGNOSIS — M79.672 LEFT FOOT PAIN: Primary | ICD-10-CM

## 2022-08-17 DIAGNOSIS — M25.572 ACUTE LEFT ANKLE PAIN: ICD-10-CM

## 2022-08-17 PROCEDURE — 97140 MANUAL THERAPY 1/> REGIONS: CPT | Mod: PN

## 2022-08-17 PROCEDURE — 97110 THERAPEUTIC EXERCISES: CPT | Mod: PN

## 2022-08-19 NOTE — PROGRESS NOTES
[facility]   Physical Therapy Treatment Note     Name: Jill Marquez  Clinic Number: [id number]    Therapy Diagnosis:   Encounter Diagnoses   Name Primary?    Left foot pain Yes    Acute left ankle pain      Physician: Fred Alves MD    Visit Date: 8/23/2022    Physician Orders: PT Eval and Treat   Medical Diagnosis from Referral: M79.672 (ICD-10-CM) - Foot pain, left      Evaluation Date: 8/2/2022  Authorization Period Expiration: 7/21/2023  Plan of Care Expiration: 11/2/2022  Visit # / Visits authorized: 4/ 20     PN DUE: 9/2/2022    PTA Visit #: 0/5     Time In: 9:00  Time Out: 9:45  Total Billable Time: 45 minutes    X-rays:  Ankle mortise is congruent.  Talar dome is intact.  No fracture or dislocation.  Mild soft tissue swelling about the ankle is noted.  Small plantar calcaneal bone spur.  [place] area of calcification along the anterior aspect of the Achilles tendon near its insertion site is noted.  Pes planus appearance is present.       SUBJECTIVE   History of current condition - Jill reports: Left foot swells and has pain medial ankle into foot. Also has pain in bottom of her foot when first takes a step. Pt walks 3 miles a day.        Today Pt reports: Foot is feeling better overall, feels like the dry needling is helping a lot. Was on feet a lot yesterday with  at appt in [place]. Foot was a little swollen by end of day but better after soaking.     She was compliant with home exercise program.  Response to previous treatment: improved with less pain  Functional change: started walking 3 miles, riding bike 3 miles.   Pain: 2/10  Location: left feet      OBJECTIVE     Objective Measures updated at progress report unless specified.     Treatment     Jill received the treatments listed below:     (Exercises and Techniques performed today are bolded)    Jill received therapeutic exercises to develop strength, ROM, flexibility and posture for 30 minutes  including:    Recumbant bike seat 5 X 7 min L3  Towel crunches X 3 min  Towel sweeps inversion/Eversion X 2 min ea  Calf stretch 30 sec X 2 on slant board  marble pick ups (15) x 3 sets    +Marches on foam 2 x 20  +Mini-lunges to foam 2 x 10     Jill received the following manual therapy techniques: Joint mobilizations, Myofacial release and Soft tissue Mobilization were applied to the: left foot/ankle for 15 minutes, including:    TP release Gastroc, med/lat head    Arch kinesiotape     Cont with TP releases gastroc and post tib  FDN left gastroc, Soleus with pistoning   IASTM plantar fascia        Patient Education and Home Exercises     Home Exercises Provided and Patient Education Provided     Education provided:   - HEP, proper arch supports    Written Home Exercises Provided: Patient instructed to cont prior HEP. Exercises were reviewed and Jill was able to demonstrate them prior to the end of the session.  Jill demonstrated good  understanding of the education provided. See EMR under Patient Instructions for exercises provided during therapy sessions    ASSESSMENT   Pt presents today reporting good improvement in her pain after dry needling last visit. Continued with established exercises to good tolerance. Added standing marches and lunges on foam block to progress ankle stability through more functional activities. Pt tolerated progressed exercises well without any increase in pain, and with good form.  Good twitch response with FDN by PT    Jill Is progressing well towards her goals.   Pt prognosis is Excellent.     Pt will continue to benefit from skilled outpatient physical therapy to address the deficits listed in the problem list box on initial evaluation, provide pt/family education and to maximize pt's level of independence in the home and community environment.     Pt's spiritual, cultural and educational needs considered and pt agreeable to plan of care and goals.     Anticipated barriers  to physical therapy: none    Goals:  Short Term Goals: 5 weeks   1. Recent signs and systems trend is improving in order to progress towards LTG's.  2. Patient will improve ankle df to 3 degrees in order to normalize gait  3. Patient will be independent with HEP in order to further progress and return to maximal function.  4. Pain rating at Worst: 5/10 in order to progress towards increased independence with activity.     Long Term Goals: 10 weeks   1. Patient will return to normal ADL, recreational, and work related activities with less pain and limitation.   2. Patient will improve ankle strength to 5/5   3. Patient will improve ankle df to 5 degrees in order to normalize gait.   4. Patient will demonstrate normal gait mechanics in order to minimize any compensation and return to PLOF.   5. Patient will self report improvement to 26% limitation on the FOTO Ankle Survey.       PLAN   Plan of care Certification: 8/2/2022 to 11/2/2022.     Cont PT per POC for LE and foot strengthening, joint and soft tissue mobilization, FDN as tolerated and appropriate. Assess affects of FDN    Jill Borja, PT

## 2022-08-23 ENCOUNTER — CLINICAL SUPPORT (OUTPATIENT)
Dept: REHABILITATION | Facility: HOSPITAL | Age: 67
End: 2022-08-23
Payer: MEDICARE

## 2022-08-23 DIAGNOSIS — M25.572 ACUTE LEFT ANKLE PAIN: ICD-10-CM

## 2022-08-23 DIAGNOSIS — M79.672 LEFT FOOT PAIN: Primary | ICD-10-CM

## 2022-08-23 PROCEDURE — 97110 THERAPEUTIC EXERCISES: CPT | Mod: PN

## 2022-08-23 PROCEDURE — 97140 MANUAL THERAPY 1/> REGIONS: CPT | Mod: PN

## 2022-08-25 NOTE — PROGRESS NOTES
[facility]   Physical Therapy Treatment Note     Name: Jill Marquez  Clinic Number: [id number]    Therapy Diagnosis:   Encounter Diagnoses   Name Primary?    Left foot pain Yes    Acute left ankle pain      Physician: Fred Alves MD    Visit Date: 8/30/2022    Physician Orders: PT Eval and Treat   Medical Diagnosis from Referral: M79.672 (ICD-10-CM) - Foot pain, left      Evaluation Date: 8/2/2022  Authorization Period Expiration: 7/21/2023  Plan of Care Expiration: 11/2/2022  Visit # / Visits authorized: 6/ 20     PN DUE: 9/2/2022    PTA Visit #: 0/5     Time In: 12:30  Time Out: 1:15  Total Billable Time: 45 minutes    X-rays:  Ankle mortise is congruent.  Talar dome is intact.  No fracture or dislocation.  Mild soft tissue swelling about the ankle is noted.  Small plantar calcaneal bone spur.  [place] area of calcification along the anterior aspect of the Achilles tendon near its insertion site is noted.  Pes planus appearance is present.       SUBJECTIVE   History of current condition - Jill reports: Left foot swells and has pain medial ankle into foot. Also has pain in bottom of her foot when first takes a step. Pt walks 3 miles a day.        Today Pt reports: Foot is feeling better overall, feels like the dry needling is helping a lot. Was on feet a lot yesterday with  at appt in [place]. Foot was a little swollen by end of day but better after soaking.     She was compliant with home exercise program.  Response to previous treatment: improved with less pain  Functional change: started walking 3 miles, riding bike 3 miles.   Pain: 2/10  Location: left feet      OBJECTIVE     Objective Measures updated at progress report unless specified.     Treatment     Jill received the treatments listed below:     (Exercises and Techniques performed today are bolded)    Jill received therapeutic exercises to develop strength, ROM, flexibility and posture for 30 minutes  including:    Recumbant bike seat 5 X 7 min L3  Towel crunches X 3 min  Towel sweeps inversion/Eversion X 2 min ea  Calf stretch 30 sec X 2 on slant board  marble pick ups (15) x 3 sets    +Marches on foam 2 x 20  +Mini-lunges to foam 2 x 10     Jill received the following manual therapy techniques: Joint mobilizations, Myofacial release and Soft tissue Mobilization were applied to the: left foot/ankle for 15 minutes, including:    TP release Gastroc, med/lat head    Arch kinesiotape     Cont with TP releases gastroc and post tib  FDN left gastroc, Soleus with pistoning   IASTM plantar fascia, medial insertion of achilles  Cupping and IASTYM to gastroc      Patient Education and Home Exercises     Home Exercises Provided and Patient Education Provided     Education provided:   - HEP, proper arch supports    Written Home Exercises Provided: Patient instructed to cont prior HEP. Exercises were reviewed and Jill was able to demonstrate them prior to the end of the session.  Jill demonstrated good  understanding of the education provided. See EMR under Patient Instructions for exercises provided during therapy sessions    ASSESSMENT   Pt presents today reporting good improvement in her pain after dry needling last visit. Continued with established exercises to good tolerance. Good twitch response with FDN by PT    Jill Is progressing well towards her goals.   Pt prognosis is Excellent.     Pt will continue to benefit from skilled outpatient physical therapy to address the deficits listed in the problem list box on initial evaluation, provide pt/family education and to maximize pt's level of independence in the home and community environment.     Pt's spiritual, cultural and educational needs considered and pt agreeable to plan of care and goals.     Anticipated barriers to physical therapy: none    Goals:  Short Term Goals: 5 weeks   1. Recent signs and systems trend is improving in order to progress towards  LTG's. (Met)  2. Patient will improve ankle df to 3 degrees in order to normalize gait (progressing, not met)  3. Patient will be independent with HEP in order to further progress and return to maximal function. (progressing, not met)  4. Pain rating at Worst: 5/10 in order to progress towards increased independence with activity. (progressing, not met)     Long Term Goals: 10 weeks   1. Patient will return to normal ADL, recreational, and work related activities with less pain and limitation. (progressing, not met)  2. Patient will improve ankle strength to 5/5 (progressing, not met)  3. Patient will improve ankle df to 5 degrees in order to normalize gait. (progressing, not met)  4. Patient will demonstrate normal gait mechanics in order to minimize any compensation and return to PLOF. (progressing, not met)   5. Patient will self report improvement to 26% limitation on the FOTO Ankle Survey. (progressing, not met)      PLAN   Plan of care Certification: 8/2/2022 to 11/2/2022.     Cont PT per POC for LE and foot strengthening, joint and soft tissue mobilization, FDN as tolerated and appropriate. Assess affects of FDN    Jill Borja, PT

## 2022-08-30 ENCOUNTER — CLINICAL SUPPORT (OUTPATIENT)
Dept: REHABILITATION | Facility: HOSPITAL | Age: 67
End: 2022-08-30
Payer: MEDICARE

## 2022-08-30 DIAGNOSIS — M79.672 LEFT FOOT PAIN: Primary | ICD-10-CM

## 2022-08-30 DIAGNOSIS — M25.572 ACUTE LEFT ANKLE PAIN: ICD-10-CM

## 2022-08-30 PROCEDURE — 97140 MANUAL THERAPY 1/> REGIONS: CPT | Mod: PN

## 2022-08-30 PROCEDURE — 97110 THERAPEUTIC EXERCISES: CPT | Mod: PN

## 2022-09-20 ENCOUNTER — TELEPHONE (OUTPATIENT)
Dept: OPHTHALMOLOGY | Facility: CLINIC | Age: 67
End: 2022-09-20
Payer: MEDICARE

## 2022-09-20 ENCOUNTER — OFFICE VISIT (OUTPATIENT)
Dept: OPHTHALMOLOGY | Facility: CLINIC | Age: 67
End: 2022-09-20
Payer: MEDICARE

## 2022-09-20 DIAGNOSIS — H18.523 CORNEAL EPITHELIAL BASEMENT MEMBRANE DYSTROPHY OF BOTH EYES: ICD-10-CM

## 2022-09-20 DIAGNOSIS — H18.832 RECURRENT CORNEAL EROSION, LEFT: Primary | ICD-10-CM

## 2022-09-20 PROCEDURE — 99202 OFFICE O/P NEW SF 15 MIN: CPT | Mod: S$PBB,,, | Performed by: OPHTHALMOLOGY

## 2022-09-20 PROCEDURE — 99213 OFFICE O/P EST LOW 20 MIN: CPT | Mod: PBBFAC | Performed by: OPHTHALMOLOGY

## 2022-09-20 PROCEDURE — 99999 PR PBB SHADOW E&M-EST. PATIENT-LVL III: ICD-10-PCS | Mod: PBBFAC,,, | Performed by: OPHTHALMOLOGY

## 2022-09-20 PROCEDURE — 99202 PR OFFICE/OUTPT VISIT, NEW, LEVL II, 15-29 MIN: ICD-10-PCS | Mod: S$PBB,,, | Performed by: OPHTHALMOLOGY

## 2022-09-20 PROCEDURE — 99999 PR PBB SHADOW E&M-EST. PATIENT-LVL III: CPT | Mod: PBBFAC,,, | Performed by: OPHTHALMOLOGY

## 2022-09-20 NOTE — PROGRESS NOTES
HPI    Triage pt  Patient states OS fb sensation and light sensitivity x 1 day ago.  No eye pain.  Vision stable--used OTC readers.  Last edited by Hillary Melton MA on 9/20/2022 11:19 AM.            Assessment /Plan     For exam results, see Encounter Report.    Recurrent corneal erosion, left    - Artificial tears 4 times a day in both eyes  - Lubricating gel or ointment at night in both eyes  Return as needed. If not effective, will switch to [id number]..

## 2022-09-20 NOTE — TELEPHONE ENCOUNTER
----- Message from Sujatha Wu sent at 9/20/2022  9:05 AM CDT -----  Regarding: Appointment  Pt feels an unknown item rubbing inside the corner of their Left eye. It is inflamed and they requesting an appt.      Jill @ [phone]

## 2022-09-20 NOTE — PATIENT INSTRUCTIONS
-  Artificial tears 4 times a day in both eyes  - Lubricating gel or ointment at night in both eyes  Return as needed. If not effective, will switch to [id number]..

## 2022-09-26 ENCOUNTER — IMMUNIZATION (OUTPATIENT)
Dept: INTERNAL MEDICINE | Facility: CLINIC | Age: 67
End: 2022-09-26
Payer: MEDICARE

## 2022-09-26 DIAGNOSIS — Z23 NEED FOR VACCINATION: Primary | ICD-10-CM

## 2022-09-26 PROCEDURE — 91312 COVID-19, MRNA, LNP-S, BIVALENT BOOSTER, PF, 30 MCG/0.3 ML DOSE: CPT | Mod: PBBFAC

## 2022-09-26 PROCEDURE — 0124A COVID-19, MRNA, LNP-S, BIVALENT BOOSTER, PF, 30 MCG/0.3 ML DOSE: CPT | Mod: PBBFAC,CV19

## 2022-10-05 ENCOUNTER — IMMUNIZATION (OUTPATIENT)
Dept: INTERNAL MEDICINE | Facility: CLINIC | Age: 67
End: 2022-10-05
Payer: MEDICARE

## 2022-10-05 PROCEDURE — G0008 ADMIN INFLUENZA VIRUS VAC: HCPCS | Mod: PBBFAC

## 2022-10-06 ENCOUNTER — CLINICAL SUPPORT (OUTPATIENT)
Dept: ENDOSCOPY | Facility: HOSPITAL | Age: 67
End: 2022-10-06
Attending: INTERNAL MEDICINE
Payer: MEDICARE

## 2022-10-06 VITALS — HEIGHT: 63 IN | BODY MASS INDEX: 22.15 KG/M2 | WEIGHT: 125 LBS

## 2022-10-06 DIAGNOSIS — Z12.11 COLON CANCER SCREENING: ICD-10-CM

## 2022-10-06 RX ORDER — POLYETHYLENE GLYCOL 3350, SODIUM SULFATE ANHYDROUS, SODIUM BICARBONATE, SODIUM CHLORIDE, POTASSIUM CHLORIDE 236; 22.74; 6.74; 5.86; 2.97 G/4L; G/4L; G/4L; G/4L; G/4L
4 POWDER, FOR SOLUTION ORAL ONCE
Qty: 4000 ML | Refills: 0 | Status: SHIPPED | OUTPATIENT
Start: 2022-10-06 | End: 2022-10-06

## 2022-10-19 ENCOUNTER — DOCUMENTATION ONLY (OUTPATIENT)
Dept: REHABILITATION | Facility: HOSPITAL | Age: 67
End: 2022-10-19
Payer: MEDICARE

## 2022-10-19 PROBLEM — M25.572 ACUTE LEFT ANKLE PAIN: Status: RESOLVED | Noted: 2022-08-02 | Resolved: 2022-10-19

## 2022-10-19 PROBLEM — M79.672 LEFT FOOT PAIN: Status: RESOLVED | Noted: 2022-08-02 | Resolved: 2022-10-19

## 2022-10-19 NOTE — PROGRESS NOTES
[facility]  PT Discharge Note    Name: Jill Marquez  Clinic Number: [id number]    Therapy Diagnosis:        Encounter Diagnoses   Name Primary?    Left foot pain Yes    Acute left ankle pain        Physician: Fred Alves MD     Visit Date: 8/30/2022     Physician Orders: PT Eval and Treat   Medical Diagnosis from Referral: M79.672 (ICD-10-CM) - Foot pain, left      Evaluation Date: 8/2/2022      Date of Last visit: 8/30/2022  Total Visits Received: 6    ASSESSMENT      Pt presents today reporting good improvement in her pain after dry needling last visit. Continued with established exercises to good tolerance. Good twitch response with FDN by PT     Discharge reason: Patient has not attended therapy since 8/30      Goals:  Short Term Goals: 5 weeks   1. Recent signs and systems trend is improving in order to progress towards LTG's. (Met)  2. Patient will improve ankle df to 3 degrees in order to normalize gait (progressing, not met)  3. Patient will be independent with HEP in order to further progress and return to maximal function. (progressing, not met)  4. Pain rating at Worst: 5/10 in order to progress towards increased independence with activity. (progressing, not met)     Long Term Goals: 10 weeks   1. Patient will return to normal ADL, recreational, and work related activities with less pain and limitation. (progressing, not met)  2. Patient will improve ankle strength to 5/5 (progressing, not met)  3. Patient will improve ankle df to 5 degrees in order to normalize gait. (progressing, not met)  4. Patient will demonstrate normal gait mechanics in order to minimize any compensation and return to PLOF. (progressing, not met)   5. Patient will self report improvement to 26% limitation on the FOTO Ankle Survey. (progressing, not met)       PLAN   This patient is discharged from Physical Therapy      Jill Borja, PT

## 2022-10-20 ENCOUNTER — PATIENT MESSAGE (OUTPATIENT)
Dept: RHEUMATOLOGY | Facility: CLINIC | Age: 67
End: 2022-10-20
Payer: MEDICARE

## 2022-10-20 DIAGNOSIS — M47.819 PERIPHERAL SPONDYLOARTHRITIS: ICD-10-CM

## 2022-10-20 DIAGNOSIS — L40.50 PSA (PSORIATIC ARTHRITIS): Primary | ICD-10-CM

## 2022-10-20 DIAGNOSIS — L40.50 PSA (PSORIATIC ARTHRITIS): ICD-10-CM

## 2022-10-20 RX ORDER — ETANERCEPT 50 MG/ML
50 SOLUTION SUBCUTANEOUS WEEKLY
Qty: 4 ML | Refills: 2 | Status: SHIPPED | OUTPATIENT
Start: 2022-10-20 | End: 2023-03-20 | Stop reason: SDUPTHER

## 2022-10-20 RX ORDER — ETANERCEPT 50 MG/ML
50 SOLUTION SUBCUTANEOUS WEEKLY
Qty: 4 ML | Refills: 2 | OUTPATIENT
Start: 2022-10-20 | End: 2022-10-20 | Stop reason: SDUPTHER

## 2022-10-25 ENCOUNTER — SPECIALTY PHARMACY (OUTPATIENT)
Dept: PHARMACY | Facility: CLINIC | Age: 67
End: 2022-10-25
Payer: MEDICARE

## 2022-10-25 DIAGNOSIS — L40.50 PSA (PSORIATIC ARTHRITIS): Primary | ICD-10-CM

## 2022-10-25 NOTE — TELEPHONE ENCOUNTER
New order received for Enbrel.  PA still on file until 2/8/2023.  Pt was previously getting Enbrel through [facility].  Outgoing call to pt to confirm.  Pt confirmed she is and is currently having issues scheduling delivery.  Informed pt will contact [employer] [facility] [employer] to confirm PAP status.  Pt stated she has been getting syringes from [facility] but prefers [facility].  Informed pt that new order is for [facility].  Will call [employer] [employer] to check status and contact pt back.  Pt verbalized understanding.

## 2022-10-26 ENCOUNTER — PATIENT MESSAGE (OUTPATIENT)
Dept: ORTHOPEDICS | Facility: CLINIC | Age: 67
End: 2022-10-26
Payer: MEDICARE

## 2022-10-26 DIAGNOSIS — R52 PAIN: Primary | ICD-10-CM

## 2022-10-26 NOTE — TELEPHONE ENCOUNTER
Outgoing call to [employer] to check status of Enbrel PAP.  Rep Romulo stated pt is currently enrolled until 12/31/2022 and re-enrollment for 2023 is currently open.  Asked rep if there were issues with pt's prescription since she was having issues scheduling delivery.  Rep stated that pt's prescription is out of refills and new prescription can be electronically sent to [facility] by [employer] ([place], [place]).  Rep stated re-enrollment letters were sent out on 10/10 to pt and stated 2023 re-enrollment form is available online now.      Outgoing call to pt to inform her that her prescription is out of refills and [facility] will send new order to [facility].  Pt stated she had 1 syringe left on hand. Pt stated she received syringes last time and asked if new order was for pens. Confirmed with pt new order is for pens and to contact [employer] to schedule delivery.  Pt verbalized understanding.  Asked pt if she would like [facility] to assist her with re-enrollment.  Pt gave permission to assist in re-enrollment and requested application be emailed to email on file.

## 2022-10-28 ENCOUNTER — LAB VISIT (OUTPATIENT)
Dept: LAB | Facility: HOSPITAL | Age: 67
End: 2022-10-28
Attending: INTERNAL MEDICINE
Payer: MEDICARE

## 2022-10-28 DIAGNOSIS — Z79.620 ENCOUNTER FOR MONITORING OF ETANERCEPT THERAPY: ICD-10-CM

## 2022-10-28 DIAGNOSIS — Z51.81 ENCOUNTER FOR MONITORING OF ETANERCEPT THERAPY: ICD-10-CM

## 2022-10-28 DIAGNOSIS — M47.819 SPONDYLARTHRITIS: ICD-10-CM

## 2022-10-28 DIAGNOSIS — E78.2 MIXED HYPERLIPIDEMIA: ICD-10-CM

## 2022-10-28 LAB
ALBUMIN SERPL BCP-MCNC: 4.5 G/DL (ref 3.5–5.2)
ALP SERPL-CCNC: 46 U/L (ref 55–135)
ALT SERPL W/O P-5'-P-CCNC: 38 U/L (ref 10–44)
ANION GAP SERPL CALC-SCNC: 13 MMOL/L (ref 8–16)
AST SERPL-CCNC: 37 U/L (ref 10–40)
BASOPHILS # BLD AUTO: 0.05 K/UL (ref 0–0.2)
BASOPHILS NFR BLD: 1 % (ref 0–1.9)
BILIRUB SERPL-MCNC: 0.9 MG/DL (ref 0.1–1)
BUN SERPL-MCNC: 21 MG/DL (ref 8–23)
CALCIUM SERPL-MCNC: 10 MG/DL (ref 8.7–10.5)
CHLORIDE SERPL-SCNC: 97 MMOL/L (ref 95–110)
CHOLEST SERPL-MCNC: 232 MG/DL (ref 120–199)
CHOLEST/HDLC SERPL: 2.8 {RATIO} (ref 2–5)
CO2 SERPL-SCNC: 26 MMOL/L (ref 23–29)
CREAT SERPL-MCNC: 0.7 MG/DL (ref 0.5–1.4)
CRP SERPL-MCNC: <0.3 MG/L (ref 0–8.2)
DIFFERENTIAL METHOD: ABNORMAL
EOSINOPHIL # BLD AUTO: 0.1 K/UL (ref 0–0.5)
EOSINOPHIL NFR BLD: 2.4 % (ref 0–8)
ERYTHROCYTE [DISTWIDTH] IN BLOOD BY AUTOMATED COUNT: 12.9 % (ref 11.5–14.5)
ERYTHROCYTE [SEDIMENTATION RATE] IN BLOOD BY PHOTOMETRIC METHOD: 7 MM/HR (ref 0–36)
EST. GFR  (NO RACE VARIABLE): >60 ML/MIN/1.73 M^2
GLUCOSE SERPL-MCNC: 91 MG/DL (ref 70–110)
HCT VFR BLD AUTO: 40.9 % (ref 37–48.5)
HDLC SERPL-MCNC: 83 MG/DL (ref 40–75)
HDLC SERPL: 35.8 % (ref 20–50)
HGB BLD-MCNC: 14 G/DL (ref 12–16)
IMM GRANULOCYTES # BLD AUTO: 0.01 K/UL (ref 0–0.04)
IMM GRANULOCYTES NFR BLD AUTO: 0.2 % (ref 0–0.5)
LDLC SERPL CALC-MCNC: 130.6 MG/DL (ref 63–159)
LYMPHOCYTES # BLD AUTO: 2.5 K/UL (ref 1–4.8)
LYMPHOCYTES NFR BLD: 49.1 % (ref 18–48)
MCH RBC QN AUTO: 33.4 PG (ref 27–31)
MCHC RBC AUTO-ENTMCNC: 34.2 G/DL (ref 32–36)
MCV RBC AUTO: 98 FL (ref 82–98)
MONOCYTES # BLD AUTO: 0.5 K/UL (ref 0.3–1)
MONOCYTES NFR BLD: 10.3 % (ref 4–15)
NEUTROPHILS # BLD AUTO: 1.9 K/UL (ref 1.8–7.7)
NEUTROPHILS NFR BLD: 37 % (ref 38–73)
NONHDLC SERPL-MCNC: 149 MG/DL
NRBC BLD-RTO: 0 /100 WBC
PLATELET # BLD AUTO: 202 K/UL (ref 150–450)
PMV BLD AUTO: 10.4 FL (ref 9.2–12.9)
POTASSIUM SERPL-SCNC: 4.8 MMOL/L (ref 3.5–5.1)
PROT SERPL-MCNC: 7.5 G/DL (ref 6–8.4)
RBC # BLD AUTO: 4.19 M/UL (ref 4–5.4)
SODIUM SERPL-SCNC: 136 MMOL/L (ref 136–145)
TRIGL SERPL-MCNC: 92 MG/DL (ref 30–150)
WBC # BLD AUTO: 5.03 K/UL (ref 3.9–12.7)

## 2022-10-28 PROCEDURE — 85652 RBC SED RATE AUTOMATED: CPT | Performed by: INTERNAL MEDICINE

## 2022-10-28 PROCEDURE — 36415 COLL VENOUS BLD VENIPUNCTURE: CPT | Performed by: INTERNAL MEDICINE

## 2022-10-28 PROCEDURE — 86140 C-REACTIVE PROTEIN: CPT | Performed by: INTERNAL MEDICINE

## 2022-10-28 PROCEDURE — 80061 LIPID PANEL: CPT | Performed by: INTERNAL MEDICINE

## 2022-10-28 PROCEDURE — 85025 COMPLETE CBC W/AUTO DIFF WBC: CPT | Performed by: INTERNAL MEDICINE

## 2022-10-28 PROCEDURE — 87516 HEPATITIS B DNA AMP PROBE: CPT | Performed by: INTERNAL MEDICINE

## 2022-10-28 PROCEDURE — 80053 COMPREHEN METABOLIC PANEL: CPT | Performed by: INTERNAL MEDICINE

## 2022-10-31 LAB — HBV DNA SERPL QL NAA+PROBE: NOT DETECTED

## 2022-11-01 NOTE — TELEPHONE ENCOUNTER
Pt portion of Enbrel PAP received via email.  Submitted completed application to [email or web address] at [phone].  Will continue to follow up.

## 2022-11-02 ENCOUNTER — PATIENT MESSAGE (OUTPATIENT)
Dept: INTERNAL MEDICINE | Facility: CLINIC | Age: 67
End: 2022-11-02
Payer: MEDICARE

## 2022-11-02 ENCOUNTER — TELEPHONE (OUTPATIENT)
Dept: UROGYNECOLOGY | Facility: CLINIC | Age: 67
End: 2022-11-02
Payer: MEDICARE

## 2022-11-02 NOTE — TELEPHONE ENCOUNTER
----- Message from Jessica Garibay sent at 11/2/2022  1:39 PM CDT -----    Name of Who is Calling: PANKAJ CARTER [[id number]]      What is the request in detail: Pt is requesting a call back for scheduling,       Can the clinic reply by [facility]: No      What Number to Call Back if not in [facility]: [phone]

## 2022-11-03 ENCOUNTER — OFFICE VISIT (OUTPATIENT)
Dept: ORTHOPEDICS | Facility: CLINIC | Age: 67
End: 2022-11-03
Payer: MEDICARE

## 2022-11-03 ENCOUNTER — OFFICE VISIT (OUTPATIENT)
Dept: RHEUMATOLOGY | Facility: CLINIC | Age: 67
End: 2022-11-03
Payer: MEDICARE

## 2022-11-03 ENCOUNTER — HOSPITAL ENCOUNTER (OUTPATIENT)
Dept: RADIOLOGY | Facility: HOSPITAL | Age: 67
Discharge: HOME | End: 2022-11-03
Attending: ORTHOPAEDIC SURGERY
Payer: MEDICARE

## 2022-11-03 VITALS
WEIGHT: 124.13 LBS | BODY MASS INDEX: 21.19 KG/M2 | SYSTOLIC BLOOD PRESSURE: 142 MMHG | HEIGHT: 64 IN | DIASTOLIC BLOOD PRESSURE: 93 MMHG | HEART RATE: 80 BPM

## 2022-11-03 DIAGNOSIS — M19.072 OSTEOARTHRITIS OF MIDFOOT, LEFT: ICD-10-CM

## 2022-11-03 DIAGNOSIS — R52 PAIN: ICD-10-CM

## 2022-11-03 DIAGNOSIS — M76.822 POSTERIOR TIBIAL TENDON DYSFUNCTION, LEFT: ICD-10-CM

## 2022-11-03 DIAGNOSIS — M79.672 FOOT PAIN, LEFT: ICD-10-CM

## 2022-11-03 DIAGNOSIS — M47.819 SPONDYLOARTHRITIS: Primary | ICD-10-CM

## 2022-11-03 DIAGNOSIS — M62.462 GASTROCNEMIUS EQUINUS, LEFT: Primary | ICD-10-CM

## 2022-11-03 PROCEDURE — 99999 PR PBB SHADOW E&M-EST. PATIENT-LVL III: ICD-10-PCS | Mod: PBBFAC,,, | Performed by: ORTHOPAEDIC SURGERY

## 2022-11-03 PROCEDURE — 99204 OFFICE O/P NEW MOD 45 MIN: CPT | Mod: S$PBB,,, | Performed by: ORTHOPAEDIC SURGERY

## 2022-11-03 PROCEDURE — 73630 XR FOOT COMPLETE 3 VIEW LEFT: ICD-10-PCS | Mod: 26,LT,, | Performed by: RADIOLOGY

## 2022-11-03 PROCEDURE — 99213 OFFICE O/P EST LOW 20 MIN: CPT | Mod: S$PBB,,, | Performed by: INTERNAL MEDICINE

## 2022-11-03 PROCEDURE — 99213 OFFICE O/P EST LOW 20 MIN: CPT | Mod: PBBFAC,27 | Performed by: ORTHOPAEDIC SURGERY

## 2022-11-03 PROCEDURE — 99999 PR PBB SHADOW E&M-EST. PATIENT-LVL IV: ICD-10-PCS | Mod: PBBFAC,,, | Performed by: INTERNAL MEDICINE

## 2022-11-03 PROCEDURE — 99999 PR PBB SHADOW E&M-EST. PATIENT-LVL IV: CPT | Mod: PBBFAC,,, | Performed by: INTERNAL MEDICINE

## 2022-11-03 PROCEDURE — 73630 X-RAY EXAM OF FOOT: CPT | Mod: 26,LT,, | Performed by: RADIOLOGY

## 2022-11-03 PROCEDURE — 99999 PR PBB SHADOW E&M-EST. PATIENT-LVL III: CPT | Mod: PBBFAC,,, | Performed by: ORTHOPAEDIC SURGERY

## 2022-11-03 PROCEDURE — 73630 X-RAY EXAM OF FOOT: CPT | Mod: TC,LT

## 2022-11-03 PROCEDURE — 99213 PR OFFICE/OUTPT VISIT, EST, LEVL III, 20-29 MIN: ICD-10-PCS | Mod: S$PBB,,, | Performed by: INTERNAL MEDICINE

## 2022-11-03 PROCEDURE — 99214 OFFICE O/P EST MOD 30 MIN: CPT | Mod: PBBFAC | Performed by: INTERNAL MEDICINE

## 2022-11-03 PROCEDURE — 99204 PR OFFICE/OUTPT VISIT, NEW, LEVL IV, 45-59 MIN: ICD-10-PCS | Mod: S$PBB,,, | Performed by: ORTHOPAEDIC SURGERY

## 2022-11-03 RX ORDER — GABAPENTIN 300 MG/1
300 CAPSULE ORAL NIGHTLY
Qty: 90 CAPSULE | Refills: 1 | Status: SHIPPED | OUTPATIENT
Start: 2022-11-03 | End: 2023-03-20

## 2022-11-03 ASSESSMENT — ROUTINE ASSESSMENT OF PATIENT INDEX DATA (RAPID3)
TOTAL RAPID3 SCORE: 0.33
MDHAQ FUNCTION SCORE: 0
AM STIFFNESS SCORE: 0, NO
PATIENT GLOBAL ASSESSMENT SCORE: 0.5
PAIN SCORE: 0.5
PSYCHOLOGICAL DISTRESS SCORE: 3.3
FATIGUE SCORE: 0

## 2022-11-03 NOTE — PROGRESS NOTES
Subjective:   Chief complaint: left foot pain  Referring provider: Dr. Fred Alves and Dr. Campos    HPI:   Jill Marquez is a 67 y.o. female who presents today for evaluation of left foot pain.  Rates pain as 1/10.  Pain has been ongoing for 6 months.  Inciting event: none known.  Treatments tried: PT.    Notes worsening midfoot pain - localized medially.  Associated with increased flatfoot deformity.  She has been doing PT and notes improvement.    PMH notable for inflammatory OA.    Does the patient use tobacco products? No  If so, what and how often? N/A    ROS:  Musculoskeletal: per HPI  Neurological: Negative for burning, tingling and numbness  Heme: Negative for blood thinners; Negative for history of blood clot  Endocrine: Negative for diabetes    Objective:   Exam:  There were no vitals filed for this visit.  General: No acute distress, well-appearing  Neurologic: Alert and oriented x3  Psychiatric: Appropriate mood and affect, cooperative  Cardiovascular: Regular pulse  Respiratory: Breathing on room air  Skin: No rashes or ulcers  Vascular: Palpable DP/PT  Musculoskeletal: Standing examination demonstrates left ankle valgus.  There is minimal swelling about medial hindfoot.  This is no ecchymosis.  Medial longitudinal arch is pes planus and and asymmetric .  Negative plj-jani-hith sign left > right.  Able to perform double limb heel rise with hindfoot inversion seen.  Single leg stance demonstrates positive midfoot sag.    Focused exam of the left lower extremity demonstrates non-irritable ankle range of motion.  Hindfoot is flexible.  Ankle dorsiflexion is decreased with positive Silfverskiold .  There is not hypermobility of medial longitudinal arch/1st ray    There is TTP about PTT insertion and midfoot TMT/NC joints .  5/5 PTT without pain and with no crepitus.  Otherwise fires TA/GSC/peroneals.  SILT SP/DP/PT and able to localize.      Imaging:  Radiographs were ordered and independently interpreted  by me.    Standing 3v left foot demonstrates midfoot OA with 30 degrees collapse through TN/NC joints with joints preserved.  There is increased talus uncoverage also seen.    Standing 3v left ankle demonstrates maintained ankle joint without valgus tilt.    Additional records/labs reviewed:  Dr. Campos notes reviewed- Peripheral spondyloarthritis v. Pseudo-RA CPPD v seronegative RA- Continue Enbrel SureClick 50mg sc q 7 days  Continue Diclofenac gel 1% four times daily prn fingers and left ankle  Continue Gabapentin 300mg nightly       Assessment:     1. Gastrocnemius equinus, left    2. Foot pain, left    3. Osteoarthritis of midfoot, left    4. Posterior tibial tendon dysfunction, left         Patient is seen for a new problem with treatment complicated by inflammatory OA .    Data:  2 results independently interpreted and 1 results reviewed    Treatment plan: Counseling regarding non-operative treatment and possibility of surgery in future if persistent morbidity from symptoms     I reviewed imaging, clinical history, and diagnosis of posterior tibial tendon dysfunction and painful flatfoot with the patient. I attempted to use layman's terms to educate the patient as well as utilize foot models and/or pictures.   I personally went through imaging with the patient.  I emphasized the role for non-operative treatment.  Non-operative treatment discussed with patient include: Anti-inflammatory medications or creams, RICE modalities, and Braces and/or shoe ware modifications.  Surgery would be reserved for refractory symptoms.    I discussed the connection between the posterior tibial tendon and the medial longitudinal arch and the association with arch collapse.  I reviewed that the patient has a flexible deformity.  In case of refractory symptoms despite treatment, the next step would be MRI to evaluate PTT more fully.      Plan:       1.  Therapy: Continue PT - note sent to physical therapist with updates  2.   Symptomatic treatment: No changes made  3.  Restrictions: Advance activity as tolerated, use pain as guide  4.  Brace/orthotics/etc: OTC inserts and shoes discussed  5.  Follow-up: PRN with no x-rays needed     No orders of the defined types were placed in this encounter.      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    HTN (hypertension) 7/23/2012    Hyperlipidemia     Hypothyroid 7/23/2012       Past Surgical History:   Procedure Laterality Date    COLONOSCOPY N/A 1/25/2016    Procedure: COLONOSCOPY;  Surgeon: DAYNA Simmons MD;  Location: [address]);  Service: Endoscopy;  Laterality: N/A;    COSMETIC SURGERY      EYE SURGERY      eyelid surgery      HYSTERECTOMY  2004    prolapse    OOPHORECTOMY Bilateral 2015    Tonsillectomy      TONSILLECTOMY         Family History   Problem Relation Age of Onset    Diabetes Brother     Retinal detachment Brother     Cancer Brother         kidney    Cataracts Brother     Diabetes Brother     Cancer Brother         throat    Cataracts Brother     Diabetes Brother     Cataracts Brother     No Known Problems Mother     No Known Problems Father     Lupus Other         niece    No Known Problems Sister     No Known Problems Maternal Aunt     No Known Problems Maternal Uncle     No Known Problems Paternal Aunt     No Known Problems Paternal Uncle     No Known Problems Maternal Grandmother     No Known Problems Maternal Grandfather     No Known Problems Paternal Grandmother     No Known Problems Paternal Grandfather     Breast cancer Neg Hx     Colon cancer Neg Hx     Ovarian cancer Neg Hx     Blindness Neg Hx     Glaucoma Neg Hx     Hypertension Neg Hx     Macular degeneration Neg Hx     Strabismus Neg Hx     Stroke Neg Hx     Thyroid disease Neg Hx     Amblyopia Neg Hx     Rheum arthritis Neg Hx     Psoriasis Neg Hx     Inflammatory bowel disease Neg Hx     Cervical cancer Neg Hx      Endometrial cancer Neg Hx     Vaginal cancer Neg Hx     Uterine cancer Neg Hx        Social History     Socioeconomic History    Marital status:     Number of children: 2   Tobacco Use    Smoking status: Former     Years: 12.00     Types: Cigarettes     Quit date: 1982     Years since quittin.5    Smokeless tobacco: Former    Tobacco comments:     , homemaker, 2 children   Substance and Sexual Activity    Alcohol use: Yes     Alcohol/week: 0.0 standard drinks     Comment: 2 cocktails    Drug use: No    Sexual activity: Yes     Partners: Male     Birth control/protection: Surgical, None     Social Determinants of Health     Financial Resource Strain: Low Risk     Difficulty of Paying Living Expenses: Not very hard   Food Insecurity: No Food Insecurity    Worried About Running Out of Food in the Last Year: Never true    Ran Out of Food in the Last Year: Never true   Transportation Needs: No Transportation Needs    Lack of Transportation (Medical): No    Lack of Transportation (Non-Medical): No   Physical Activity: Sufficiently Active    Days of Exercise per Week: 5 days    Minutes of Exercise per Session: 50 min   Stress: No Stress Concern Present    Feeling of Stress : Only a little   Social Connections: Unknown    Frequency of Communication with Friends and Family: More than three times a week    Frequency of Social Gatherings with Friends and Family: Twice a week    Active Member of Clubs or Organizations: Yes    Attends Club or Organization Meetings: 1 to 4 times per year    Marital Status:    Housing Stability: Unknown    Unable to Pay for Housing in the Last Year: No    Unstable Housing in the Last Year: No

## 2022-11-03 NOTE — PROGRESS NOTES
Subjective:       Patient ID: Jill Marquez is a 67 y.o. female.    Chief Complaint:     HPI saw Dr. Wu today for left ankle pain. Pt is helping and she will resume. Needs new orthoses. Other joints fine. Subluxation of right index DIP stable. She definitely feels Enbrel helping. Has been able to reduce gabapentin to 300mg nightly only. Using diclofenac gel to affected fingers and left ankle.   Review of Systems   Constitutional:  Negative for appetite change, fatigue, fever and unexpected weight change.   HENT:  Negative for mouth sores and trouble swallowing.    Eyes:  Positive for redness. Negative for visual disturbance.   Respiratory:  Negative for cough, shortness of breath and wheezing.    Cardiovascular:  Negative for chest pain and palpitations.   Gastrointestinal:  Negative for abdominal pain, anal bleeding, blood in stool, constipation, diarrhea, nausea and vomiting.   Genitourinary:  Negative for dysuria, frequency, genital sores and urgency.   Musculoskeletal:  Negative for arthralgias, back pain, gait problem, joint swelling, myalgias, neck pain and neck stiffness.   Skin:  Negative for rash.   Neurological:  Positive for headaches. Negative for weakness and numbness.   Hematological:  Negative for adenopathy. Does not bruise/bleed easily.   Psychiatric/Behavioral:  Negative for sleep disturbance. The patient is not nervous/anxious.        Objective:   There were no vitals taken for this visit.     Physical Exam       6/15/2021 10/19/2021 4/4/2022 7/21/2022   Tender (HUMPHRIES-28) 1 / 28 1 / 28 1 / 28  0 / 28    Swollen (HUMPHRIES-28) 6 / 28  6 / 28  3 / 28  5 / 28    Provider Global 20 mm 10 mm 10 mm 20 mm   Patient Global 5 mm 15 mm 10 mm 5 mm   ESR 1 mm/hr 1 mm/hr -- 1 mm/hr   CRP 0.2 mg/L 0.3 mg/L 0.4 mg/L 0.3 mg/L   HUMPHRIES-28 (ESR) 1.32 (Remission) 1.46 (Remission) -- 0.7 (Remission)   HUMPHRIES-28 (CRP) 2.34 (Remission) 2.51 (Remission) 2.27 (Remission) 1.75 (Remission)   CDAI Score 9.5  9.5  6  7.5           Latest Reference Range & Units 10/28/22 07:52   WBC 3.90 - 12.70 K/uL 5.03   RBC 4.00 - 5.40 M/uL 4.19   Hemoglobin 12.0 - 16.0 g/dL 14.0   Hematocrit 37.0 - 48.5 % 40.9   MCV 82 - 98 fL 98   MCH 27.0 - 31.0 pg 33.4 (H)   MCHC 32.0 - 36.0 g/dL 34.2   RDW 11.5 - 14.5 % 12.9   Platelets 150 - 450 K/uL 202   MPV 9.2 - 12.9 fL 10.4   Gran % 38.0 - 73.0 % 37.0 (L)   Lymph % 18.0 - 48.0 % 49.1 (H)   Mono % 4.0 - 15.0 % 10.3   Eosinophil % 0.0 - 8.0 % 2.4   Basophil % 0.0 - 1.9 % 1.0   Immature Granulocytes 0.0 - 0.5 % 0.2   Gran # (ANC) 1.8 - 7.7 K/uL 1.9   Lymph # 1.0 - 4.8 K/uL 2.5   Mono # 0.3 - 1.0 K/uL 0.5   Eos # 0.0 - 0.5 K/uL 0.1   Baso # 0.00 - 0.20 K/uL 0.05   Immature Grans (Abs) 0.00 - 0.04 K/uL 0.01   nRBC 0 /100 WBC 0   Differential Method  Automated   Sed Rate 0 - 36 mm/Hr 7   Sodium 136 - 145 mmol/L 136   Potassium 3.5 - 5.1 mmol/L 4.8   Chloride 95 - 110 mmol/L 97   CO2 23 - 29 mmol/L 26   Anion Gap 8 - 16 mmol/L 13   BUN 8 - 23 mg/dL 21   Creatinine 0.5 - 1.4 mg/dL 0.7   eGFR >60 mL/min/1.73 m^2 >60.0   Glucose 70 - 110 mg/dL 91   Calcium 8.7 - 10.5 mg/dL 10.0   Alkaline Phosphatase 55 - 135 U/L 46 (L)   PROTEIN TOTAL 6.0 - 8.4 g/dL 7.5   Albumin 3.5 - 5.2 g/dL 4.5   BILIRUBIN TOTAL 0.1 - 1.0 mg/dL 0.9   AST 10 - 40 U/L 37   ALT 10 - 44 U/L 38   CRP 0.0 - 8.2 mg/L <0.3   Cholesterol 120 - 199 mg/dL 232 (H)   HDL 40 - 75 mg/dL 83 (H)   HDL/Cholesterol Ratio 20.0 - 50.0 % 35.8   LDL Cholesterol External 63.0 - 159.0 mg/dL 130.6   Non-HDL Cholesterol mg/dL 149   Total Cholesterol/HDL Ratio 2.0 - 5.0  2.8   Triglycerides 30 - 150 mg/dL 92   HBV DNA, Qualitative PCR Not detected  Not detected   (H): Data is abnormally high  (L): Data is abnormally low  The 10-year ASCVD risk score (Alejandra HERNDON, et al., 2019) is: 10.8%    Values used to calculate the score:      Age: 67 years      Sex: Female      Is Non- : No      Diabetic: No      Tobacco smoker: No      Systolic Blood Pressure: 136  mmHg      Is BP treated: Yes      HDL Cholesterol: 83 mg/dL      Total Cholesterol: 232 mg/dL     EXAMINATION:  XR FOOT COMPLETE 3 VIEW LEFT     CLINICAL HISTORY:  .  Pain, unspecified     TECHNIQUE:  AP, lateral and oblique views of the left foot were performed.     COMPARISON:  None     FINDINGS:  Pes planus.  No fracture, no osseous lesions.  No significant degenerative change.  Minimal calcification within the distal Achilles tendon near its insertion on the calcaneus.  The soft tissues appear normal.     Impression:     Pes planus        Electronically signed by: Oralia Aldrich MD  Date:                                            11/03/2022  Time:                                           09:23  EXAMINATION:  DEXA BONE DENSITY SPINE HIP     CLINICAL HISTORY:  Asymptomatic menopausal state.  67 y/o female a history of fractured left elbow at 50 y/o.  She had menopausal symptoms at 50 y/o.  She is taking Calcium and Vit D supplements.  She exercise regularly and does not smoke.     TECHNIQUE:  DXA specification: [facility] [employer] Horizon A (S/[id number])     Bone Mineral Density scanning was performed over the hip and lumbar spine.     Review of the images confirms satisfactory positioning and technique.     COMPARISON:  Comparison study done on 12/30/2019.  [facility]     FINDINGS:  Lumbar spine (L1-L4):              BMD is 1.121 g/cm2, T-score is 0.7, and Z-score is 2.6.     Total hip:                                BMD is 0.889 g/cm2, T-score is -0.4, and Z-score is 0.9.     Femoral neck:                          BMD is 0.817 g/cm2, T-score is -0.3, and Z-score is 1.4.     Distal 1/3 radius:                      Not applicable     FRAX:     12% risk of a major osteoporotic fracture in the next 10 years.     0.6% risk of hip fracture in the next 10 years.     Impression:     *Normal bone mineral density.  *Cannot compare to prior study done at a different location.     RECOMMENDATIONS:  *Daily  calcium intake 3164-8685 mg, dietary sources preferred; Vitamin D 3738-5745 IU daily.  *Weight bearing exercise and fall precautions.  *Repeat BMD in 4 years.    Assessment:   Right trochanteric bursitis, resolved post injection 6/14/22  Peripheral spondyloarthritis v. Pseudo-RA CPPD v seronegative RA: TJ  SJ  Pt global 5  ESR 7 CRP < 0.3 HUMPHRIES 28: 2.62(LDA)  VZT30-DEQ 2.31(remission)  CDAI 7(LDA)  DAPSA  TJ 1 SJ 5  Pt global 0.5  Pt pain 0.5  CRP < 0.3= 7.3(LDA)     Left pes planovalgus, posterior tibial tendinitis/probable tear, plantar fasciitis/heel spur, and calcific Achilles tendinitis.  Hyperlipidemia ASCVD 10.8%(intermediate risk)    Plan:   Continue Enbrel SureClick 50mg sc q 7 days  Continue Diclofenac gel 1% four times daily prn fingers and left ankle  Continue Gabapentin 300mg nightly  Resume  PT  Diclofenac gel four times daily to left ankle, Achilles and plantar heel  orthoses  Disc moderate intensity statin Rx with Dr. Smith given ASCVD intermediate risk  RTC 3 months with standing labs including HBV DNA monitoring

## 2022-11-03 NOTE — PATIENT INSTRUCTIONS
"Today, you saw Dr. Wu and were diagnosed with fallen arch with tendinitis and some early arthritis.  The treatment of this condition involves resting the overworked/stretched tendons/ligaments and calming down the inflammation.  The next step is strengthening the muscles that help to support your arch to prevent recurrence.      If you want to learn more about this, I recommend the following websites:  https://www.[employer].com/watch?f=aZ86DXy403x  https://www.footcaremd.org/conditions-treatments/midfoot/acquired-adult-flatfoot-deformity  https://www.footcaremd.org/conditions-treatments/midfoot/progressive-flatfoot    We decided the next step(s) in in treatment is/are  RICE guidelines (see below)  Anti-inflammatory medications (see below)  Therapy: Continue PT, adding hips and HEP demonstrated and taught to the patient today  Activity: Use pain as your guide, advance your activities as tolerated   Bracing/shoes: Spenco total support max; see additional information about supporting your arch below    Thank you for allowing me to participate in your care.  We will see you back as needed to discuss next steps in treatment if current treatment plan does not provide relief.    How to treat inflammation?  1.) What does your doctor mean when they tell you to follow R.I.C.E. Guidelines?    Rest your ankle/foot by limiting activities that cause pain.  A good indicator of activity level is your pain the night following the activity and the day after.  If you have pain that lasts until the next day, you did too much.  Ice it to keep the swelling down. Don't put ice directly on the skin (use a thin piece of cloth between the ice bag and skin) and don't ice more than 20 minutes at a time to avoid frostbite.  Compressive bandages immobilize and support your injury.  Make sure it isn't too tight - your toes should not be turning purple and the wrap should not hurt.  Elevate your ankle above your heart level - "toes above your " "nose". This applies to acute injuries and should be followed for first 48 hours as well as afterward when you have increased pain and swelling after activity or therapy.    2.) Another common way of treating pain and inflammation from an injury is anti-inflammatory medications.  Dr. Wu may prescribe you a medication for inflammation or you can take an over-the-counter anti-inflammatory (also known as NSAIDs - nonsteroidal anti-inflammatory drugs).  These include such medications as aleve, motrin, ibuprofen, naproxen, etc.  They should be taken as prescribed or according to the over-the-counter packaging instructions.  These medications can upset the stomach or rare cases causes ulcer disease or kidney injury.  If you are concerned about using these medications long-term, you should discuss it with you primary doctor.  You can also combine or substitute an NSAID with acetaminophen (commonly known as Tylenol).  Take according to bottle instructions, and you can take up to 3000 mg daily (important to keep in mind if you take other medications that contain acetaminophen).  Again long term use should be discussed with your primary doctor.    How to support your arch?    Avoid ballet flats, flip flops, or other minimally supportive shoes  Often, people with painful flatfoot feel better with a low rise heel.  For women this can mean lots of different things such as chunky wedges or clogs.  For men, this can be more challenging, but includes dress shoes with low heel and cowboy boots.  Sneakers with a thick insole where the heel is higher than the ball of the foot.  When at the shoestore, telling the salesperson you are a "pronator" can be helpful.    How to strengthen your arch?     Inversion (Eccentric), (Resistance Band)        Pull foot in against resistance band. Slowly release for 3-5 seconds. Use resistance band.  10 reps per set, 1-2 sets per day, 7 days per week.     Copyright © I. All rights reserved. "   Eversion (Eccentric), (Resistance Band)        Push foot outward against resistance band. Slowly release for 3-5 seconds. Use resistance band.  10 reps per set, 1-2 sets per day, 7 days per week.      How to stretch your achilles - do these exercises while focusing on pointing toes inward    A.) Each stretch should last 8-10 seconds. Tightness should be felt in calf not Achilles tendon. As a general rule I would encourage you to stretch irrespective of the pain that occurs DURING the stretch.  Do 5-10 times and as they get easier you can progress to doing them with single legs at a time.    B.) Hold this stretch on the effected side for 20-30 seconds.     A.)                                                                                                                       B)        How to strengthen your arch?    You should do these barefoot.  Squeeze and hold keeping toes and ball of foot firmly on ground.  Hold for 8-10 seconds and then relax.  Repeat 5-10 times.  You should feel it pulling through your arch - almost can feel like foot cramp.    https://www.you[employer].com/watch?v=qC4byTwbqMc

## 2022-11-03 NOTE — PROGRESS NOTES
Rapid3 Question Responses and Scores 11/2/2022   MDHAQ Score 0   Psychologic Score 3.3   Pain Score 0.5   When you awakened in the morning OVER THE LAST WEEK, did you feel stiff? No   If Yes, please indicate the number of hours until you are as limber as you will be for the day -   Fatigue Score 0   Global Health Score 0.5   RAPID3 Score 0.33       Answers submitted by the patient for this visit:  Rheumatology Questionnaire (Submitted on 11/2/2022)  fever: No  eye redness: Yes  mouth sores: No  headaches: Yes  shortness of breath: No  chest pain: No  trouble swallowing: No  diarrhea: No  constipation: No  unexpected weight change: No  genital sore: No  dysuria: No  During the last 3 days, have you had a skin rash?: No  Bruises or bleeds easily: No  cough: No

## 2022-11-08 ENCOUNTER — ANESTHESIA (OUTPATIENT)
Dept: ENDOSCOPY | Facility: HOSPITAL | Age: 67
End: 2022-11-08
Payer: MEDICARE

## 2022-11-08 ENCOUNTER — HOSPITAL ENCOUNTER (OUTPATIENT)
Facility: HOSPITAL | Age: 67
Discharge: HOME | End: 2022-11-08
Attending: COLON & RECTAL SURGERY | Admitting: COLON & RECTAL SURGERY
Payer: MEDICARE

## 2022-11-08 ENCOUNTER — ANESTHESIA EVENT (OUTPATIENT)
Dept: ENDOSCOPY | Facility: HOSPITAL | Age: 67
End: 2022-11-08
Payer: MEDICARE

## 2022-11-08 VITALS
OXYGEN SATURATION: 98 % | BODY MASS INDEX: 21.17 KG/M2 | SYSTOLIC BLOOD PRESSURE: 115 MMHG | HEART RATE: 71 BPM | RESPIRATION RATE: 10 BRPM | WEIGHT: 124 LBS | TEMPERATURE: 97 F | HEIGHT: 64 IN | DIASTOLIC BLOOD PRESSURE: 59 MMHG

## 2022-11-08 DIAGNOSIS — Z12.11 ENCOUNTER FOR SCREENING COLONOSCOPY: ICD-10-CM

## 2022-11-08 DIAGNOSIS — Z12.11 SPECIAL SCREENING FOR MALIGNANT NEOPLASMS, COLON: Primary | ICD-10-CM

## 2022-11-08 PROCEDURE — 27201089 HC SNARE, DISP (ANY): Performed by: COLON & RECTAL SURGERY

## 2022-11-08 PROCEDURE — 63600175 PHARM REV CODE 636 W HCPCS: Performed by: NURSE ANESTHETIST, CERTIFIED REGISTERED

## 2022-11-08 PROCEDURE — 88305 TISSUE EXAM BY PATHOLOGIST: ICD-10-PCS | Mod: 26,,, | Performed by: PATHOLOGY

## 2022-11-08 PROCEDURE — 37000008 HC ANESTHESIA 1ST 15 MINUTES: Performed by: COLON & RECTAL SURGERY

## 2022-11-08 PROCEDURE — 45385 COLONOSCOPY W/LESION REMOVAL: CPT | Mod: PT,,, | Performed by: COLON & RECTAL SURGERY

## 2022-11-08 PROCEDURE — E9220 PRA ENDO ANESTHESIA: ICD-10-PCS | Mod: PT,,, | Performed by: NURSE ANESTHETIST, CERTIFIED REGISTERED

## 2022-11-08 PROCEDURE — 45385 PR COLONOSCOPY,REMV LESN,SNARE: ICD-10-PCS | Mod: PT,,, | Performed by: COLON & RECTAL SURGERY

## 2022-11-08 PROCEDURE — 25000003 PHARM REV CODE 250: Performed by: COLON & RECTAL SURGERY

## 2022-11-08 PROCEDURE — 25000003 PHARM REV CODE 250: Performed by: NURSE ANESTHETIST, CERTIFIED REGISTERED

## 2022-11-08 PROCEDURE — 45385 COLONOSCOPY W/LESION REMOVAL: CPT | Mod: PT | Performed by: COLON & RECTAL SURGERY

## 2022-11-08 PROCEDURE — 88305 TISSUE EXAM BY PATHOLOGIST: CPT | Mod: 26,,, | Performed by: PATHOLOGY

## 2022-11-08 PROCEDURE — 37000009 HC ANESTHESIA EA ADD 15 MINS: Performed by: COLON & RECTAL SURGERY

## 2022-11-08 PROCEDURE — 27201012 HC FORCEPS, HOT/COLD, DISP: Performed by: COLON & RECTAL SURGERY

## 2022-11-08 PROCEDURE — 88305 TISSUE EXAM BY PATHOLOGIST: CPT | Performed by: PATHOLOGY

## 2022-11-08 PROCEDURE — E9220 PRA ENDO ANESTHESIA: HCPCS | Mod: PT,,, | Performed by: NURSE ANESTHETIST, CERTIFIED REGISTERED

## 2022-11-08 RX ORDER — PROPOFOL 10 MG/ML
VIAL (ML) INTRAVENOUS CONTINUOUS PRN
Status: DISCONTINUED | OUTPATIENT
Start: 2022-11-08 | End: 2022-11-08

## 2022-11-08 RX ORDER — LIDOCAINE HYDROCHLORIDE 20 MG/ML
INJECTION INTRAVENOUS
Status: DISCONTINUED | OUTPATIENT
Start: 2022-11-08 | End: 2022-11-08

## 2022-11-08 RX ORDER — SODIUM CHLORIDE 9 MG/ML
INJECTION, SOLUTION INTRAVENOUS CONTINUOUS
Status: DISCONTINUED | OUTPATIENT
Start: 2022-11-08 | End: 2022-11-08 | Stop reason: HOSPADM

## 2022-11-08 RX ORDER — PROPOFOL 10 MG/ML
VIAL (ML) INTRAVENOUS
Status: DISCONTINUED | OUTPATIENT
Start: 2022-11-08 | End: 2022-11-08

## 2022-11-08 RX ADMIN — PROPOFOL 20 MG: 10 INJECTION, EMULSION INTRAVENOUS at 11:11

## 2022-11-08 RX ADMIN — PROPOFOL 30 MG: 10 INJECTION, EMULSION INTRAVENOUS at 11:11

## 2022-11-08 RX ADMIN — SODIUM CHLORIDE: 0.9 INJECTION, SOLUTION INTRAVENOUS at 10:11

## 2022-11-08 RX ADMIN — PROPOFOL 150 MCG/KG/MIN: 10 INJECTION, EMULSION INTRAVENOUS at 11:11

## 2022-11-08 RX ADMIN — LIDOCAINE HYDROCHLORIDE 50 MG: 20 INJECTION INTRAVENOUS at 11:11

## 2022-11-08 RX ADMIN — PROPOFOL 60 MG: 10 INJECTION, EMULSION INTRAVENOUS at 11:11

## 2022-11-08 NOTE — TRANSFER OF CARE
"Anesthesia Transfer of Care Note    Patient: Jill Marquez    Procedure(s) Performed: Procedure(s) (LRB):  COLONOSCOPY (N/A)    Patient location: PACU    Anesthesia Type: general    Transport from OR: Transported from OR on room air with adequate spontaneous ventilation    Post pain: adequate analgesia    Post assessment: no apparent anesthetic complications and tolerated procedure well    Post vital signs: stable    Level of consciousness: awake, oriented and alert    Nausea/Vomiting: no nausea/vomiting    Complications: none    Transfer of care protocol was followed      Last vitals:   Visit Vitals  /72   Pulse 79   Temp 36.2 °C (97.2 °F) (Temporal)   Resp 19   Ht 5' 3.5" (1.613 m)   Wt 56.2 kg (124 lb)   SpO2 96%   Breastfeeding No   BMI 21.62 kg/m²     "

## 2022-11-08 NOTE — H&P
COLONOSCOPY HISTORY AND PE    Procedure : Colonoscopy    INDICATIONS: asymptomatic screening exam, personal history of colon polyps, and most recent endoscopic exam 6 years ago    Colonoscopy 1/25/16  Impression:             - One 4 mm polyp in the rectum. Resected and retrieved.   - The examined portion of the ileum was normal.     FINAL PATHOLOGIC DIAGNOSIS Rectum, polyp, biopsy: Hyperplastic polyp.     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    HTN (hypertension) 7/23/2012    Hyperlipidemia     Hypothyroid 7/23/2012       Past Surgical History:   Procedure Laterality Date    COLONOSCOPY N/A 1/25/2016    Procedure: COLONOSCOPY;  Surgeon: DAYNA Simmons MD;  Location: [address]);  Service: Endoscopy;  Laterality: N/A;    COSMETIC SURGERY      EYE SURGERY      eyelid surgery      HYSTERECTOMY  2004    prolapse    OOPHORECTOMY Bilateral 2015    Tonsillectomy      TONSILLECTOMY         Review of patient's allergies indicates:   Allergen Reactions    Sulfasalazine Nausea Only     Malaise, nausea,    Leflunomide Rash       No current facility-administered medications on file prior to encounter.     Current Outpatient Medications on File Prior to Encounter   Medication Sig Dispense Refill    amLODIPine (NORVASC) 10 MG tablet Take 1 tablet (10 mg total) by mouth once daily. 90 tablet 3    azelastine (ASTELIN) 137 mcg (0.1 %) nasal spray 1 spray (137 mcg total) by Nasal route 2 (two) times daily. 90 mL 2    citalopram (CELEXA) 10 MG tablet Take 1 tablet (10 mg total) by mouth once daily. 30 tablet 10    conjugated estrogens (PREMARIN) vaginal cream Place 0.5 g vaginally twice a week. 30 g 5    diclofenac sodium (VOLTAREN) 1 % Gel Apply 4 g topically 4 (four) times daily as needed. 3 each 2    ergocalciferol, vitamin D2, (VITAMIN D ORAL)       fish oil-omega-3 fatty acids 300-1,000 mg capsule Take 2 g by mouth once daily.       fluticasone propionate (FLONASE) 50 mcg/actuation nasal spray INHALE 2 SPRAYS EN QD 48 g 1    PSYLLIUM SEED, WITH SUGAR, (METAMUCIL ORAL) Take by mouth.      RED YEAST RICE ORAL Take by mouth.      sennosides (SENOKOT TO GO ORAL)       SYNTHROID 88 mcg tablet TAKE 1 TABLET (88 MCG TOTAL) BY MOUTH BEFORE BREAKFAST. 30 tablet 11    flu vac 2022 [id number],PF, (FLUAD QUAD 2022-23,65Y UP,,PF,) 60 mcg (15 mcg x 4)/0.5 mL Syrg Inject into the muscle. 0.5 mL 0    triamcinolone acetonide 0.1% (KENALOG) 0.1 % cream Apply topically 2 (two) times daily. Use up to 2 weeks at a time. 454 g 1     Family History   Problem Relation Age of Onset    Diabetes Brother     Retinal detachment Brother     Cancer Brother         kidney    Cataracts Brother     Diabetes Brother     Cancer Brother         throat    Cataracts Brother     Diabetes Brother     Cataracts Brother     No Known Problems Mother     No Known Problems Father     Lupus Other         niece    No Known Problems Sister     No Known Problems Maternal Aunt     No Known Problems Maternal Uncle     No Known Problems Paternal Aunt     No Known Problems Paternal Uncle     No Known Problems Maternal Grandmother     No Known Problems Maternal Grandfather     No Known Problems Paternal Grandmother     No Known Problems Paternal Grandfather     Breast cancer Neg Hx     Colon cancer Neg Hx     Ovarian cancer Neg Hx     Blindness Neg Hx     Glaucoma Neg Hx     Hypertension Neg Hx     Macular degeneration Neg Hx     Strabismus Neg Hx     Stroke Neg Hx     Thyroid disease Neg Hx     Amblyopia Neg Hx     Rheum arthritis Neg Hx     Psoriasis Neg Hx     Inflammatory bowel disease Neg Hx     Cervical cancer Neg Hx     Endometrial cancer Neg Hx     Vaginal cancer Neg Hx     Uterine cancer Neg Hx      Social History     Socioeconomic History    Marital status:     Number of children: 2   Tobacco Use    Smoking status: Former     Years: 12.00     Types: Cigarettes     Quit date:  1982     Years since quittin.5    Smokeless tobacco: Former    Tobacco comments:     , homemaker, 2 children   Substance and Sexual Activity    Alcohol use: Yes     Alcohol/week: 0.0 standard drinks     Comment: 2 cocktails    Drug use: No    Sexual activity: Yes     Partners: Male     Birth control/protection: Surgical, None     Social Determinants of Health     Financial Resource Strain: Low Risk     Difficulty of Paying Living Expenses: Not very hard   Food Insecurity: No Food Insecurity    Worried About Running Out of Food in the Last Year: Never true    Ran Out of Food in the Last Year: Never true   Transportation Needs: No Transportation Needs    Lack of Transportation (Medical): No    Lack of Transportation (Non-Medical): No   Physical Activity: Sufficiently Active    Days of Exercise per Week: 5 days    Minutes of Exercise per Session: 50 min   Stress: No Stress Concern Present    Feeling of Stress : Only a little   Social Connections: Unknown    Frequency of Communication with Friends and Family: More than three times a week    Frequency of Social Gatherings with Friends and Family: Twice a week    Active Member of Clubs or Organizations: Yes    Attends Club or Organization Meetings: 1 to 4 times per year    Marital Status:    Housing Stability: Unknown    Unable to Pay for Housing in the Last Year: No    Unstable Housing in the Last Year: No     Review of Systems -    Respiratory : no cough, shortness of breath, or wheezing  Cardiovascular  no chest pain or dyspnea on exertion  Gastrointestinal no abdominal pain, change in bowel habits, or black or bloody stools  Musculoskeletal no deformities, swelling  Neurological no TIA or stroke symptoms    Physical Exam:  General: NAD  AT NC EOMI  Mallampati Score   Neck supple, trachea midline  Lungs: nl excursions, no retractions.  Breathing comfortably  Abdomen ND soft NT.  No masses  Extremities: No CCE.      ASA:  II    PLAN  COLONOSCOPY.   The details of the procedure, the possible need for biopsy or polypectomy and the potential risks including bleeding, perforation, missed polyps were discussed in detail.    Dain Rosa MD  Colon and Rectal Surgery Fellow

## 2022-11-08 NOTE — ANESTHESIA PREPROCEDURE EVALUATION
11/08/2022  Pankaj Marquez is a 67 y.o., female.    Active Problem List with Overview Notes    Diagnosis Date Noted    Gastrocnemius equinus, left 11/03/2022    Osteoarthritis of midfoot, left 11/03/2022    Posterior tibial tendon dysfunction, left 11/03/2022    Immunocompromised, acquired 07/06/2021    Pain 06/22/2021    Edema 06/22/2021    Decreased  strength 06/22/2021    Decreased strength 06/22/2021    Medial epicondylitis of elbow, left 06/15/2021    Left sided sciatica 10/27/2020    Greater trochanteric bursitis of right hip 10/27/2020    Chronic low back pain with left-sided sciatica 09/17/2020    Generalized weakness 09/17/2020    Difficulty walking 09/17/2020    PSA (psoriatic arthritis) 03/10/2020    Rash of back 08/02/2019    Peripheral spondyloarthritis 10/16/2018     Results for PANKAJ MARQUEZ (MRN [id number]) as of 11/16/2018 13:06   Ref. Range 6/26/2015 11:10 5/29/2018 11:46 10/16/2018 10:50   Anti-SSA Antibody Latest Ref Range: 0.00 - 19.99 EU 3.90     Anti-SSA Interpretation Latest Ref Range: Negative  Negative     Anti-SSB Antibody Latest Ref Range: 0.00 - 19.99 EU  0.09    Anti-SSB Interpretation Latest Ref Range: Negative   Negative    SAMIR Screen Latest Ref Range: Negative <1:160  Negative <1:160     Anti-Ladonna-1 Antibody Latest Ref Range: <20 Units   <20   Anti-PM/Scl Ab Latest Ref Range: <20 Units   <20   Anti-SS-A 52 kD Ab, IgG Latest Ref Range: <20 Units   <20   Anti-U1-RNP  Ab Latest Ref Range: <20 Units   <20   CCP Antibodies Latest Ref Range: <5.0 U/mL <0.5 <0.5    EJ Latest Ref Range: Negative    Negative   Fibrillarin (U3 RNP) Latest Ref Range: Negative    Negative   HMGCR Antibody Latest Ref Range: 0 - 19 Units   <3   Ku Latest Ref Range: Negative    Negative   MDA-5 (P140) (CADM-140) Latest Ref Range: <20 Units   <20   MI-2 Latest Ref Range: Negative     Negative   NXP-2 (P140) Latest Ref Range: <20 Units   <20   OJ Latest Ref Range: Negative    Negative   PL-12 Latest Ref Range: Negative    Negative   PL-7 Latest Ref Range: Negative    Negative   Rheumatoid Factor Latest Ref Range: 0.0 - 15.0 IU/mL 10.0 <10.0    SRP Latest Ref Range: Negative    Negative   TIF1 GAMMA (P155/140) Latest Ref Range: <20 Units   <20   U2 snRNP Latest Ref Range: Negative    Negative     Armando Workman MD 3/5/2018       Narrative     AP view of bilateral hands.    Indication: Pain    Comparison: 6/26/15    Findings      Impression       No acute fractures. Degenerative changes of the hand are unchanged, most notable at the bilateral basal joints and second and fifth DIP joints bilaterally.      Electronically signed by: ARMANDO WORKMAN MD  Date: 03/05/18  Time: 14:38      Results for PANKAJ CARTER (MRN [id number]) as of 11/16/2018 13:06   Ref. Range 10/16/2018 10:50   Hep B Core Total Ab Unknown Negative   Hep B S Ab Unknown Negative   Hepatitis B Surface Ag Unknown Negative   Hepatitis C Ab Unknown Negative   Mitogen - Nil Latest Ref Range: See text IU/mL >10.000   NIL Latest Ref Range: See text IU/mL 0.070   TB Gold Plus Unknown Negative   TB1 - Nil Latest Ref Range: See text IU/mL 0.020   TB2 - Nil Latest Ref Range: See text IU/mL 0.010   HIV 1/2 Ag/Ab Latest Ref Range: Negative  Negative   RPR Latest Ref Range: Non-reactive  Non-reactive   Strongyloides Ab IgG Latest Ref Range: Negative  Negative   Result Notes for X-Ray Chest PA And Lateral     Notes recorded by Nicanor Campos MD on 10/16/2018 at 4:27 PM CDT  The chest x-ray is normal. RJQ     Results for PANKAJ CARTER (MRN [id number]) as of 11/16/2018 13:06   Ref. Range 10/16/2018 10:50   CPK Latest Ref Range: 20 - 180 U/L 280 (H)         Mucous cyst of finger 07/12/2018    CMC arthritis, thumb, degenerative 05/29/2018     S/p injections by Dr. Quintero 3/5/18 and repeated 9/18 both helped      Chronic low back pain  02/22/2016    Screening for colon cancer 01/25/2016    Atrophic vaginitis 12/01/2015    Chronic constipation 12/01/2015    Hepatitis B core antibody positive (negative viral load; suspect false positive) 06/30/2015    Elevated CPK 06/30/2015     Results for PANKAJ CARTER (MRN [id number]) as of 11/16/2018 13:06   Ref. Range 10/16/2018 10:50   HMGCR Antibody Latest Ref Range: 0 - 19 Units <3   Results for PANKAJ CARTER (MRN [id number]) as of 11/16/2018 13:06   Ref. Range 6/26/2015 11:10 6/30/2015 12:50 5/29/2018 11:46 10/16/2018 10:50   CPK Latest Ref Range: 20 - 180 U/L 325 (H) 226 (H) 272 (H) 280 (H)   Results for PANKAJ CARTER (MRN [id number]) as of 11/16/2018 13:06   Ref. Range 10/16/2018 10:50   Anti-Ladonna-1 Antibody Latest Ref Range: <20 Units <20   Anti-PM/Scl Ab Latest Ref Range: <20 Units <20   Anti-SS-A 52 kD Ab, IgG Latest Ref Range: <20 Units <20   Anti-U1-RNP  Ab Latest Ref Range: <20 Units <20   EJ Latest Ref Range: Negative  Negative   Fibrillarin (U3 RNP) Latest Ref Range: Negative  Negative   HMGCR Antibody Latest Ref Range: 0 - 19 Units <3   Ku Latest Ref Range: Negative  Negative   MDA-5 (P140) (CADM-140) Latest Ref Range: <20 Units <20   MI-2 Latest Ref Range: Negative  Negative   NXP-2 (P140) Latest Ref Range: <20 Units <20   OJ Latest Ref Range: Negative  Negative   PL-12 Latest Ref Range: Negative  Negative   PL-7 Latest Ref Range: Negative  Negative   SRP Latest Ref Range: Negative  Negative   TIF1 GAMMA (P155/140) Latest Ref Range: <20 Units <20   U2 snRNP Latest Ref Range: Negative  Negative         Sicca 06/26/2015     Results for PANKAJ CARTER (MRN [id number]) as of 5/29/2018 11:02   Ref. Range 6/26/2015 11:10   Anti-SSA Antibody Latest Ref Range: 0.00 - 19.99 EU 3.90   Anti-SSA Interpretation Latest Ref Range: Negative  Negative   CCP Antibodies Latest Ref Range: <5.0 U/mL <0.5   Rheumatoid Factor Latest Ref Range: 0.0 - 15.0 IU/mL 10.0   Results for PANKAJ CARTER (MRN [id number]) as of  5/29/2018 11:02   Ref. Range 6/26/2015 11:10   SAMIR Screen Latest Ref Range: Negative <1:160  Negative <1:160   Results for PANKAJ CARTER (MRN [id number]) as of 10/16/2018 09:33   Ref. Range 6/26/2015 11:10 5/29/2018 11:46   Anti-SSA Antibody Latest Ref Range: 0.00 - 19.99 EU 3.90    Anti-SSA Interpretation Latest Ref Range: Negative  Negative    Anti-SSB Antibody Latest Ref Range: 0.00 - 19.99 EU  0.09   Anti-SSB Interpretation Latest Ref Range: Negative   Negative   Results for PANKAJ CARTER (MRN [id number]) as of 10/16/2018 09:33   Ref. Range 5/29/2018 11:46   CCP Antibodies Latest Ref Range: <5.0 U/mL <0.5   Rheumatoid Factor Latest Ref Range: 0.0 - 15.0 IU/mL <10.0         Fatigue 06/26/2015    Myalgia and myositis 06/26/2015     Results for PANKAJ CARTER (MRN [id number]) as of 5/29/2018 11:02   Ref. Range 6/26/2015 11:10 6/30/2015 12:50   CPK Latest Ref Range: 20 - 180 U/L 325 (H) 226 (H)         Erosive osteoarthritis of both hands 06/26/2015    Leg pain, bilateral 06/26/2015    Bilateral hand pain 06/26/2015    Lumbar radiculopathy 06/03/2014    Lumbar spondylosis 03/10/2014    Carpal tunnel syndrome 03/10/2014    Hyperlipidemia 10/14/2013     Results for PANKAJ CARTER (MRN [id number]) as of 12/6/2019 13:55   Ref. Range 5/7/2019 07:21   Cholesterol Latest Ref Range: 120 - 199 mg/dL 236 (H)   HDL Latest Ref Range: 40 - 75 mg/dL 74   Hdl/Cholesterol Ratio Latest Ref Range: 20.0 - 50.0 % 31.4   LDL Cholesterol External Latest Ref Range: 63.0 - 159.0 mg/dL 134.8   Non-HDL Cholesterol Latest Units: mg/dL 162   Total Cholesterol/HDL Ratio Latest Ref Range: 2.0 - 5.0  3.2   Triglycerides Latest Ref Range: 30 - 150 mg/dL 136         Hypothyroid 07/23/2012    HTN (hypertension) 07/23/2012    Displacement of thoracic intervertebral disc without myelopathy 05/22/2012     Past Surgical History:   Procedure Laterality Date    COLONOSCOPY N/A 1/25/2016    Procedure: COLONOSCOPY;  Surgeon: DAYNA Simmons MD;   Location: [facility] ([address] [place] [address]);  Service: Endoscopy;  Laterality: N/A;    COSMETIC SURGERY      EYE SURGERY      eyelid surgery      HYSTERECTOMY  2004    prolapse    OOPHORECTOMY Bilateral 2015    Tonsillectomy      TONSILLECTOMY       Results for orders placed or performed in visit on 09/16/15   EKG 12-lead    Collection Time: 09/16/15  2:17 PM    Narrative    Test Reason : V72.81  Blood Pressure :  mmHG  Vent. Rate : 069 BPM     Atrial Rate : 069 BPM     P-R Int : 138 ms          QRS Dur : 086 ms      QT Int : 382 ms       P-R-T Axes : 024 -01 005 degrees     QTc Int : 409 ms    Normal sinus rhythm  Normal ECG  When compared with ECG of 08-OCT-2010 16:45,  No significant change was found  Confirmed by DALE FU MD ([id number]) on 9/17/2015 1:05:13 PM    Referred By:  rivas piedra           Confirmed By:DALE FU MD       Pre-op Assessment    I have reviewed the Patient Summary Reports.    I have reviewed the NPO Status.   I have reviewed the Medications.     Review of Systems  Anesthesia Hx:  No problems with previous Anesthesia Mild nausea post colonoscopy   Social:  Non-Smoker, Social Alcohol Use    Hematology/Oncology:  Hematology Normal   Oncology Normal     EENT/Dental:EENT/Dental Normal   Cardiovascular:   Hypertension  Denies Angina. hyperlipidemia ECG has been reviewed.    Pulmonary:  Pulmonary Normal  Denies Shortness of breath.    Renal/:  Renal/ Normal     Hepatic/GI:  Hepatic/GI Normal    Musculoskeletal:   Arthritis     Neurological:   Neuromuscular Disease,    Endocrine:   Hypothyroidism    Dermatological:  Skin Normal    Psych:  Psychiatric Normal           Physical Exam  General: Well nourished, Cooperative, Alert and Oriented    Airway:  Mallampati: II   Mouth Opening: Normal  TM Distance: Normal  Tongue: Normal  Neck ROM: Normal ROM    Dental:  Intact    Chest/Lungs:  Clear to auscultation, Normal Respiratory Rate    Heart:  Rate: Normal  Rhythm: Regular Rhythm        Anesthesia  Plan  Type of Anesthesia, risks & benefits discussed:    Anesthesia Type: Gen Natural Airway  Intra-op Monitoring Plan: Standard ASA Monitors  Post Op Pain Control Plan: multimodal analgesia  Induction:  IV  Informed Consent: Informed consent signed with the Patient and all parties understand the risks and agree with anesthesia plan.  All questions answered.   ASA Score: 2  Day of Surgery Review of History & Physical: H&P Update referred to the surgeon/provider.    Ready For Surgery From Anesthesia Perspective.     .

## 2022-11-08 NOTE — TELEPHONE ENCOUNTER
Pt is approved for [employer] Patient Assistance Program for Enbrel as of 11/7/2022 though 12/31/2023 and will receive free of charge through the program's dispensing pharmacy, [facility] ([place], [place]) Pharmacy.  Someone from the program's pharmacy will be reaching out to pt to coordinate their first shipment.  Pt can also contact [employer] Patient Assistance Program at [phone] to check the status of their prescription and schedule shipment for medication.

## 2022-11-08 NOTE — PROVATION PATIENT INSTRUCTIONS
Discharge Summary/Instructions after an Endoscopic Procedure  Patient Name: Jill Marquez  Patient MRN: [id number]  Patient YOB: 1955  Tuesday, November 8, 2022  Akbar Simmons MD  Dear patient,  As a result of recent federal legislation (The Federal Cures Act), you may   receive lab or pathology results from your procedure in your [facility]   account before your physician is able to contact you. Your physician or   their representative will relay the results to you with their   recommendations at their soonest availability.  Thank you,  RESTRICTIONS:  During your procedure today, you received medications for sedation.  These   medications may affect your judgment, balance and coordination.  Therefore,   for 24 hours, you have the following restrictions:   - DO NOT drive a car, operate machinery, make legal/financial decisions,   sign important papers or drink alcohol.    ACTIVITY:  Today: no heavy lifting, straining or running due to procedural   sedation/anesthesia.  The following day: return to full activity including work.  DIET:  Eat and drink normally unless instructed otherwise.     TREATMENT FOR COMMON SIDE EFFECTS:  - Mild abdominal pain, nausea, belching, bloating or excessive gas:  rest,   eat lightly and use a heating pad.  - Sore Throat: treat with throat lozenges and/or gargle with warm salt   water.  - Because air was used during the procedure, expelling large amounts of air   from your rectum or belching is normal.  - If a bowel prep was taken, you may not have a bowel movement for 1-3 days.    This is normal.  SYMPTOMS TO WATCH FOR AND REPORT TO YOUR PHYSICIAN:  1. Abdominal pain or bloating, other than gas cramps.  2. Chest pain.  3. Back pain.  4. Signs of infection such as: chills or fever occurring within 24 hours   after the procedure.  5. Rectal bleeding, which would show as bright red, maroon, or black stools.   (A tablespoon of blood from the rectum is not serious, especially if    hemorrhoids are present.)  6. Vomiting.  7. Weakness or dizziness.  GO DIRECTLY TO THE NEAREST EMERGENCY ROOM IF YOU HAVE ANY OF THE FOLLOWING:      Difficulty breathing              Chills and/or fever over 101 F   Persistent vomiting and/or vomiting blood   Severe abdominal pain   Severe chest pain   Black, tarry stools   Bleeding- more than one tablespoon   Any other symptom or condition that you feel may need urgent attention  Your doctor recommends these additional instructions:  If any biopsies were taken, your doctors clinic will contact you in 1 to 2   weeks with any results.  - Discharge patient to home (ambulatory).   - Patient has a contact number available for emergencies.  The signs and   symptoms of potential delayed complications were discussed with the   patient.  Return to normal activities tomorrow.  Written discharge   instructions were provided to the patient.   - Resume previous diet.   - Continue present medications.   - Await pathology results.   - Repeat colonoscopy in 3 years for surveillance based on pathology   results.  For questions, problems or results please call your physician - Akbar Simmons MD at Work:  [phone].  [facility], EMERGENCY ROOM PHONE NUMBER: [phone]  IF A COMPLICATION OR EMERGENCY SITUATION ARISES AND YOU ARE UNABLE TO REACH   YOUR PHYSICIAN - GO DIRECTLY TO THE EMERGENCY ROOM.  Akbar Simmons MD  11/8/2022 12:00:15 PM  This report has been verified and signed electronically.  Dear patient,  As a result of recent federal legislation (The Federal Cures Act), you may   receive lab or pathology results from your procedure in your [facility]   account before your physician is able to contact you. Your physician or   their representative will relay the results to you with their   recommendations at their soonest availability.  Thank you,  PROVATION

## 2022-11-14 ENCOUNTER — PATIENT MESSAGE (OUTPATIENT)
Dept: SURGERY | Facility: CLINIC | Age: 67
End: 2022-11-14
Payer: MEDICARE

## 2022-11-14 LAB
FINAL PATHOLOGIC DIAGNOSIS: NORMAL
GROSS: NORMAL
Lab: NORMAL

## 2022-11-14 NOTE — ANESTHESIA POSTPROCEDURE EVALUATION
Anesthesia Post Evaluation    Patient: Jill Marquez    Procedure(s) Performed: Procedure(s) (LRB):  COLONOSCOPY (N/A)    Final Anesthesia Type: general      Patient location during evaluation: PACU  Patient participation: Yes- Able to Participate  Level of consciousness: awake and alert  Post-procedure vital signs: reviewed and stable  Pain management: adequate  Airway patency: patent    PONV status at discharge: No PONV  Anesthetic complications: no      Cardiovascular status: blood pressure returned to baseline  Respiratory status: spontaneous ventilation and room air  Hydration status: euvolemic  Follow-up not needed.          Vitals Value Taken Time   /59 11/08/22 1229   Temp 36.2 °C (97.2 °F) 11/08/22 1200   Pulse 71 11/08/22 1229   Resp 10 11/08/22 1229   SpO2 98 % 11/08/22 1229         Event Time   Out of Recovery 12:30:33         Pain/Inés Score: No data recorded

## 2022-11-15 ENCOUNTER — LAB VISIT (OUTPATIENT)
Dept: LAB | Facility: HOSPITAL | Age: 67
End: 2022-11-15
Attending: INTERNAL MEDICINE
Payer: MEDICARE

## 2022-11-15 DIAGNOSIS — I10 PRIMARY HYPERTENSION: ICD-10-CM

## 2022-11-15 LAB
ANION GAP SERPL CALC-SCNC: 11 MMOL/L (ref 8–16)
BUN SERPL-MCNC: 20 MG/DL (ref 8–23)
CALCIUM SERPL-MCNC: 9.6 MG/DL (ref 8.7–10.5)
CHLORIDE SERPL-SCNC: 95 MMOL/L (ref 95–110)
CO2 SERPL-SCNC: 28 MMOL/L (ref 23–29)
CREAT SERPL-MCNC: 0.8 MG/DL (ref 0.5–1.4)
EST. GFR  (NO RACE VARIABLE): >60 ML/MIN/1.73 M^2
GLUCOSE SERPL-MCNC: 100 MG/DL (ref 70–110)
POTASSIUM SERPL-SCNC: 3.9 MMOL/L (ref 3.5–5.1)
SODIUM SERPL-SCNC: 134 MMOL/L (ref 136–145)

## 2022-11-15 PROCEDURE — 36415 COLL VENOUS BLD VENIPUNCTURE: CPT | Mod: PN | Performed by: INTERNAL MEDICINE

## 2022-11-15 PROCEDURE — 80048 BASIC METABOLIC PNL TOTAL CA: CPT | Performed by: INTERNAL MEDICINE

## 2022-11-17 ENCOUNTER — PATIENT MESSAGE (OUTPATIENT)
Dept: INTERNAL MEDICINE | Facility: CLINIC | Age: 67
End: 2022-11-17
Payer: MEDICARE

## 2022-12-19 ENCOUNTER — PATIENT MESSAGE (OUTPATIENT)
Dept: RHEUMATOLOGY | Facility: CLINIC | Age: 67
End: 2022-12-19
Payer: MEDICARE

## 2022-12-20 ENCOUNTER — PATIENT MESSAGE (OUTPATIENT)
Dept: ADMINISTRATIVE | Facility: OTHER | Age: 67
End: 2022-12-20
Payer: MEDICARE

## 2022-12-21 ENCOUNTER — OFFICE VISIT (OUTPATIENT)
Dept: RHEUMATOLOGY | Facility: CLINIC | Age: 67
End: 2022-12-21
Payer: MEDICARE

## 2022-12-21 ENCOUNTER — HOSPITAL ENCOUNTER (OUTPATIENT)
Dept: RADIOLOGY | Facility: HOSPITAL | Age: 67
Discharge: HOME | End: 2022-12-21
Attending: INTERNAL MEDICINE
Payer: MEDICARE

## 2022-12-21 VITALS
DIASTOLIC BLOOD PRESSURE: 75 MMHG | HEART RATE: 80 BPM | WEIGHT: 130.31 LBS | SYSTOLIC BLOOD PRESSURE: 112 MMHG | BODY MASS INDEX: 22.72 KG/M2

## 2022-12-21 DIAGNOSIS — M76.32 ILIOTIBIAL BAND SYNDROME, LEFT: ICD-10-CM

## 2022-12-21 DIAGNOSIS — M70.62 GREATER TROCHANTERIC BURSITIS OF LEFT HIP: Primary | ICD-10-CM

## 2022-12-21 DIAGNOSIS — M70.62 GREATER TROCHANTERIC BURSITIS OF LEFT HIP: ICD-10-CM

## 2022-12-21 PROCEDURE — 73502 XR HIP WITH PELVIS WHEN PERFORMED, 2 OR 3 VIEWS LEFT: ICD-10-PCS | Mod: 26,LT,, | Performed by: RADIOLOGY

## 2022-12-21 PROCEDURE — 20610 DRAIN/INJ JOINT/BURSA W/O US: CPT | Mod: PBBFAC | Performed by: INTERNAL MEDICINE

## 2022-12-21 PROCEDURE — 99999 PR PBB SHADOW E&M-EST. PATIENT-LVL V: ICD-10-PCS | Mod: PBBFAC,,, | Performed by: INTERNAL MEDICINE

## 2022-12-21 PROCEDURE — 99215 OFFICE O/P EST HI 40 MIN: CPT | Mod: PBBFAC,25 | Performed by: INTERNAL MEDICINE

## 2022-12-21 PROCEDURE — 73502 X-RAY EXAM HIP UNI 2-3 VIEWS: CPT | Mod: TC,LT

## 2022-12-21 PROCEDURE — 99999 PR PBB SHADOW E&M-EST. PATIENT-LVL V: CPT | Mod: PBBFAC,,, | Performed by: INTERNAL MEDICINE

## 2022-12-21 PROCEDURE — 73552 XR FEMUR 2 VIEW LEFT: ICD-10-PCS | Mod: 26,LT,, | Performed by: RADIOLOGY

## 2022-12-21 PROCEDURE — 73502 X-RAY EXAM HIP UNI 2-3 VIEWS: CPT | Mod: 26,LT,, | Performed by: RADIOLOGY

## 2022-12-21 PROCEDURE — 20610 LARGE JOINT ASPIRATION/INJECTION: L GREATER TROCHANTERIC BURSA: ICD-10-PCS | Mod: S$PBB,LT,, | Performed by: INTERNAL MEDICINE

## 2022-12-21 PROCEDURE — 73552 X-RAY EXAM OF FEMUR 2/>: CPT | Mod: TC,LT

## 2022-12-21 PROCEDURE — 73552 X-RAY EXAM OF FEMUR 2/>: CPT | Mod: 26,LT,, | Performed by: RADIOLOGY

## 2022-12-21 RX ORDER — TRIAMCINOLONE ACETONIDE 40 MG/ML
40 INJECTION, SUSPENSION INTRA-ARTICULAR; INTRAMUSCULAR
Status: DISCONTINUED | OUTPATIENT
Start: 2022-12-21 | End: 2022-12-21 | Stop reason: HOSPADM

## 2022-12-21 RX ADMIN — TRIAMCINOLONE ACETONIDE 40 MG: 40 INJECTION, SUSPENSION INTRA-ARTICULAR; INTRAMUSCULAR at 08:12

## 2022-12-21 ASSESSMENT — ROUTINE ASSESSMENT OF PATIENT INDEX DATA (RAPID3)
AM STIFFNESS SCORE: 0, NO
PAIN SCORE: 5
TOTAL RAPID3 SCORE: 2.72
FATIGUE SCORE: 2.5
MDHAQ FUNCTION SCORE: 0.2
PSYCHOLOGICAL DISTRESS SCORE: 3.3
PATIENT GLOBAL ASSESSMENT SCORE: 2.5

## 2022-12-21 NOTE — PROGRESS NOTES
Patient requests left trochanteric bursal injection for atraumatic left lateral hip pain with radiation lateral proximal thigh in distribution iliotibial band  Hip rom is normal and painless  Motor strength is normal  + tender greater trochanter and iliotibial band      Left trochanteric bursitis and ITB syndrome      * After verbal consent and cleansing with Chloraprep the left gr. Trochanteric bursa injected with Kenalog 40mg with 1 ml 1 % lidocaine. Patient tolerated procedure well.   X-ray left hip and femur  Re-ordered PT  Keep reg scheduled appt.   Answers submitted by the patient for this visit:  Rheumatology Questionnaire (Submitted on 12/19/2022)  fever: No  eye redness: No  mouth sores: No  headaches: No  shortness of breath: No  chest pain: No  trouble swallowing: No  diarrhea: No  constipation: No  unexpected weight change: No  genital sore: No  dysuria: No  During the last 3 days, have you had a skin rash?: No  Bruises or bleeds easily: No  cough: No

## 2022-12-21 NOTE — PROGRESS NOTES
Rapid3 Question Responses and Scores 12/19/2022   MDHAQ Score 0.2   Psychologic Score 3.3   Pain Score 5   When you awakened in the morning OVER THE LAST WEEK, did you feel stiff? No   If Yes, please indicate the number of hours until you are as limber as you will be for the day -   Fatigue Score 2.5   Global Health Score 2.5   RAPID3 Score 2.72         Answers submitted by the patient for this visit:  Rheumatology Questionnaire (Submitted on 12/19/2022)  fever: No  eye redness: No  mouth sores: No  headaches: No  shortness of breath: No  chest pain: No  trouble swallowing: No  diarrhea: No  constipation: No  unexpected weight change: No  genital sore: No  dysuria: No  During the last 3 days, have you had a skin rash?: No  Bruises or bleeds easily: No  cough: No

## 2022-12-21 NOTE — PROGRESS NOTES
The left hip appears normal. There is some erosive v. Degenerative  change left sacroiliac joint. No evidence of stress fracture Q

## 2022-12-21 NOTE — PROCEDURES
Large Joint Aspiration/Injection: L greater trochanteric bursa    Date/Time: 12/21/2022 8:00 AM  Performed by: Nicanor Campos MD  Authorized by: Nicanor Campos MD     Consent Done?:  Yes (Verbal)  Indications:  Pain  Site marked: the procedure site was marked    Timeout: prior to procedure the correct patient, procedure, and site was verified    Prep: patient was prepped and draped in usual sterile fashion      Local anesthesia used?: Yes    Local anesthetic:  Lidocaine 1% without epinephrine and topical anesthetic  Anesthetic total (ml):  1      Details:  Needle Size:  25 G  Ultrasonic Guidance for needle placement?: No    Approach:  Lateral  Location:  Hip  Site:  L greater trochanteric bursa  Medications:  40 mg triamcinolone acetonide 40 mg/mL  Aspirate amount (mL):  0  Patient tolerance:  Patient tolerated the procedure well with no immediate complications

## 2023-01-12 ENCOUNTER — PATIENT MESSAGE (OUTPATIENT)
Dept: UROGYNECOLOGY | Facility: CLINIC | Age: 68
End: 2023-01-12
Payer: MEDICARE

## 2023-01-12 ENCOUNTER — CLINICAL SUPPORT (OUTPATIENT)
Dept: REHABILITATION | Facility: HOSPITAL | Age: 68
End: 2023-01-12
Payer: MEDICARE

## 2023-01-12 DIAGNOSIS — M70.62 GREATER TROCHANTERIC BURSITIS OF LEFT HIP: ICD-10-CM

## 2023-01-12 DIAGNOSIS — R53.1 GENERALIZED WEAKNESS: Primary | ICD-10-CM

## 2023-01-12 DIAGNOSIS — M25.60 DECREASED RANGE OF MOTION: ICD-10-CM

## 2023-01-12 DIAGNOSIS — M62.89 PELVIC FLOOR TENSION: Primary | ICD-10-CM

## 2023-01-12 DIAGNOSIS — M76.32 ILIOTIBIAL BAND SYNDROME, LEFT: ICD-10-CM

## 2023-01-12 PROBLEM — M54.42 CHRONIC LOW BACK PAIN WITH LEFT-SIDED SCIATICA: Status: RESOLVED | Noted: 2020-09-17 | Resolved: 2023-01-12

## 2023-01-12 PROBLEM — R26.2 DIFFICULTY WALKING: Status: RESOLVED | Noted: 2020-09-17 | Resolved: 2023-01-12

## 2023-01-12 PROBLEM — G89.29 CHRONIC LOW BACK PAIN WITH LEFT-SIDED SCIATICA: Status: RESOLVED | Noted: 2020-09-17 | Resolved: 2023-01-12

## 2023-01-12 PROCEDURE — 97161 PT EVAL LOW COMPLEX 20 MIN: CPT | Mod: PN | Performed by: PHYSICAL THERAPIST

## 2023-01-12 NOTE — PLAN OF CARE
[facility]   Physical Therapy Initial Evaluation   Date: 1/12/2023   Name: Jill Marquez  Clinic Number: [id number]    Therapy Diagnosis:  Decreased range of motion    Physician: Nicanor Campos MD     Physician Orders: PT Eval and Treat  Medical Diagnosis from Referral:   Encounter Diagnoses   Name Primary?    Greater trochanteric bursitis of left hip     Iliotibial band syndrome, left      Evaluation Date: 1/12/2023  Authorization Period Expiration: 12/21/2023  Plan of Care Expiration: 4/12/2023  Progress Note Due: 2/12/2023  Visit # / Visits authorized: 1/1   FOTO: 1/3 (last performed on 1/12/2023)    Precautions: Standard    Time In: 1330  Time Out: 1418  Total Billable Time (timed & untimed codes): 48 minutes    SUBJECTIVE   Date of onset: right after thanksgiving    History of current condition - Jill reports she was doing outdoor decorations and putting garland out when she suddenly experienced pain in the left hip.  She reports that she presented to her MD and received an injection of steroid in the hip.  She reports that she can feel the pain with coughing and sneezing.  She wonders if it has something to do with her scoliosis.  She feels pain when pressing on that side, but that the pain feels like a nerve issue.  She reports that she can't walk like she use to, but still rides her bike.  Patient reports that she feels it when riding her bike, but not as bad as when she is walking.  She reports that it really hurts to walk, and that she now has to walk slower.  She reports history of bladder sling surgery in 2015, and wonders if it is related, but has an appointment with GYN in February.  Patient reports that she goes to the gym to walk now, did 2 miles, and weights after recently.    Imaging: [x] Xray [] MRI [] CT: Performed on: 12/21/2022    Pain:  Current 5/10, worst 8/10, best 5/10   Location: [] Right   [x] Left:  from lateral hip to groin, down medial  thigh  Description: Aching and Shooting  Aggravating Factors: coughing and sneezing increase the pain/cause shooting, twisting a certain way  Easing Factors: rest, walk in the house, heating pad    Prior Therapy:   [] N/A    [x] Yes: for back, foot, not hip  Social History: Pt lives with their spouse  Occupation: Pt is retired, worked as   Prior Level of Function: Independent and pain free with all ADL, IADL, community mobility and functional activities.   Current Level of Function: Independent with all ADL, IADL, community mobility and functional activities with reports of increased pain and need for increased time and frequent breaks.  Currently walks at a much slower pace.    Dominant Extremity:    [x] Right    [] Left    Pts goals: Pt reported goals are to decrease overall pain levels in order to return to prior functional level. Goal is to make the pain go away and to walking, 6-7 days a week, 3 miles, ride bike 3 miles.     Medical History:   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    HTN (hypertension) 7/23/2012    Hyperlipidemia     Hypothyroid 7/23/2012       Surgical History:   Jill Marquez  has a past surgical history that includes Tonsillectomy; eyelid surgery; Eye surgery; Cosmetic surgery; Tonsillectomy; Colonoscopy (N/A, 1/25/2016); Oophorectomy (Bilateral, 2015); Hysterectomy (2004); and Colonoscopy (N/A, 11/8/2022).    Medications:   Jill has a current medication list which includes the following prescription(s): amlodipine, azelastine, citalopram, premarin, conjugated estrogens, diclofenac sodium, ergocalciferol (vitamin d2), enbrel sureclick, fish oil-omega-3 fatty acids, fluad quad 2022-23(65y up)(pf), fluticasone propionate, gabapentin, hydrochlorothiazide, psyllium husk, rosuvastatin, sennosides, synthroid, and triamcinolone acetonide 0.1%.    Allergies:   Review of patient's allergies  indicates:   Allergen Reactions    Sulfasalazine Nausea Only     Malaise, nausea,    Leflunomide Rash        OBJECTIVE     Range of Motion:    Lumbar AROM/PROM Right Left Pain/Dysfunction with Movement Goal   Lumbar Flexion  Full  Rib hump noted ---   Lumbar Rotation Limited Limited greater than Right Tightness Full Rotation     Strength:    L/E MMT Right  (spine) Left Pain/Dysfunction with Movement Goal   Modified (90/90) Abdominal Strength  Fair ---     Hip Flexion  3+/5 3+/5  4+/5 B   Hip Extension  3+/5 3+/5  4+/5 B   Hip Abduction  3+/5 3+/5  4+/5 B        Muscle Length:       Muscle Tested  Right Left  Limitation Goal   Hip Flexors [] Normal      [x] Limited [] Normal      [x] Limited  Normal B   ITB / TFL [] Normal      [x] Limited [] Normal      [x] Limited     Quadriceps [] Normal      [x] Limited [] Normal      [x] Limited  Normal B   Hamstrings  [] Normal      [x] Limited [] Normal      [x] Limited  Normal B        Joint Mobility:     Not tested today    Special Tests:  Not tested today    Sensation:  [x] Intact to Light Touch   [] Impaired:    Palpation: Increased tone and tenderness noted with palpation of bilateral quadratus lumborum . Increased tenderness with palpation of the following structures: greater trochanter  and ITB .  Decreased abdominal myofascial mobility in superior and left directions.    Posture:  Pt presents with postural abnormalities which include:    [x] Forward Head   [] Increased Lumbar Lordosis   [x] Rounded Shoulder   [] Genu Recurvatum   [x] Increased Thoracic Kyphosis [] Genu Valgus   [] Trunk Deviated    [] Pes Planus   [] Scapular Winging    [x] Other: Pelvic obliquity, scoliosis      Function:     CMS Impairment/Limitation/Restriction for FOTO Hip Survey    Therapist reviewed FOTO scores for Jill on 1/12/2023.   FOTO documents entered into [employer] - see Media section.    Limitation Score: 31%         TREATMENT     Total Treatment time (time-based codes) separate from  Evaluation: (5) minutes     Jill received the treatments listed below:          THERAPEUTIC ACTIVITIES to improve dynamic and functional performance for (5) minutes including:    Intervention Performed Today    Diaphragmatic Breathing in Hooklying yes    Diaphragmatic Breathing in Sitting yes                                    Plan for Next Visit: transverse abdominis contraction, coordination between diaphragmatic breath and transverse abdominis contraction, lateral hip strengthening, abdominal strengthening        PATIENT EDUCATION AND HOME EXERCISES     Education provided: (5) minutes  PURPOSE: Patient educated on the impairments noted above and the effects of physical therapy intervention to improve overall condition and QOL.   EXERCISE: Patient was educated on all the above exercise prior/during/after for proper posture, positioning, and execution for safe performance with home exercise program.   STRENGTH: Patient educated on the importance of improved core and extremity strength in order to improve alignment of the spine and extremities with static positions and dynamic movement.   POSTURE: Patient educated on postural awareness to reduce stress and maintain optimal alignment of the spine with static positions and dynamic movement   TRANSFERS & TRANSITIONS: Patient educated on proper technique for bed mobility, transitions and transfers to improve body mechanics and decrease risk of injury.     Written Home Exercises Provided: yes.  Exercises were reviewed and Jill was able to demonstrate them prior to the end of the session.  Jill demonstrated good  understanding of the education provided. See EMR under Patient Instructions for exercises provided during therapy sessions.    ASSESSMENT     Jill is a 67 y.o. female referred to outpatient Physical Therapy with a medical diagnosis of greater trochanteric bursitis of left hip and iliotibial band syndrome of left hip. Pt presents with impairments  "including: decreased ROM, decreased strength, decreased muscle length, postural abnormalities, and decreased overall function.    Pt prognosis is Good.   Pt will benefit from skilled outpatient Physical Therapy to address the deficits stated above and in the chart below, provide pt/family education, and to maximize pt's level of independence.     Plan of care discussed with patient: Yes  Pt's spiritual, cultural and educational needs considered and patient is agreeable to the plan of care and goals as stated below:     Anticipated Barriers for therapy: co-morbidities    Medical Necessity is demonstrated by the following  History  Co-morbidities and personal factors that may impact the plan of care Co-morbidities:   HTN    Personal Factors:   []Age   []Education   []Coping style   []Social background   []Lifestyle    []Character   []Attitudes   []Other:   []No deficits      [x]Low   []Moderate  []High    Examination  Body Structures and Functions, activity limitations and participation restrictions that may impact the plan of care Body Regions:   []Head  []Neck   [x]Back   [x]Trunk  []Upper extremities   [x]Lower extremities   []Other:      Body Systems:    []Gross symmetry   [x]ROM   [x]Strength   []Gross coordinated movement   []Balance   []Gait []Transfers  []Transitions   []Motor control   []Motor learning   []Scar formation  []Other:       Participation Restrictions:   See above in "Current Level of Function"     Activity limitations:   Learning and applying knowledge  [x]No deficits       General Tasks and Commands  [x]No deficits    Communication  [x]No deficits    Mobility   []No deficits  []Fine hand use  [x]Walking   []Driving []Lifting/carrying objects  []Using Transportation   []Moving around using equipment  []Other:      Self care  [x]No deficits  []Washing oneself   []Caring for body parts   []Toileting   []Dressing  []Eating   []Drinking   []Looking after one's health  []Other:        Domestic " Life  []No Deficits  []Shopping   []Cooking  []Doing housework  [x]Assisting others   []Other:      Interactions/Relationships  [x]No deficits    Life Areas  [x]No deficits    Community and Social Life  [x]Community life  [x]Recreation and leisure   []Druze and spirituality  []Human rights   []Political life / citizenship  []No deficits      []Low   [x]Moderate  []High    Clinical Presentation []Stable and uncomplicated   [x]Evolving presentation with changing characteristics  []Unstable presentation with unpredictable characteristics []Low   [x]Moderate  []High      Decision Making/ Complexity Score:   [x]Low   []Moderate  []High        Short Term Goals:  4 weeks Status  Date Met   PAIN: Pt will report worst pain of 7/10 in order to progress toward max functional ability and improve quality of life. [x] Progressing  [] Met  [] Not Met    FUNCTION: Patient will demonstrate improved function as indicated by a functional limitation score of less than or equal to 30 out of 100 on FOTO. [x] Progressing  [] Met  [] Not Met    MOBILITY: Patient will improve AROM to 50% of stated goals, listed in objective measures above, in order to progress towards independence with functional activities.  [x] Progressing  [] Met  [] Not Met    STRENGTH: Patient will improve strength to 50% of stated goals, listed in objective measures above, in order to progress towards independence with functional activities.  [x] Progressing  [] Met  [] Not Met    POSTURE: Patient will correct postural deviations in sitting and standing, to decrease pain and promote long term stability.  [x] Progressing  [] Met  [] Not Met    HEP: Patient will demonstrate independence with HEP in order to progress toward functional independence. [x] Progressing  [] Met  [] Not Met      Long Term Goals:  8 weeks Status Date Met   PAIN: Pt will report worst pain of 6/10 in order to progress toward max functional ability and improve quality of life [x] Progressing  []  Met  [] Not Met    FUNCTION: Patient will demonstrate improved function as indicated by a functional limitation score of less than or equal to 29 out of 100 on FOTO. [x] Progressing  [] Met  [] Not Met    MOBILITY: Patient will improve AROM to stated goals, listed in objective measures above, in order to return to maximal functional potential and improve quality of life. [x] Progressing  [] Met  [] Not Met    STRENGTH: Patient will improve strength to stated goals, listed in objective measures above, in order to improve functional independence and quality of life. [x] Progressing  [] Met  [] Not Met    Patient will return to normal ADL's, IADL's, community involvement, recreational activities, and work-related activities with less than or equal to 6/10 pain and maximal function.  [x] Progressing  [] Met  [] Not Met      PLAN   Plan of care Certification: 1/12/2023 to 4/12/2023.    Outpatient Physical Therapy 2 times weekly for 8 weeks to include any combination of the following interventions: virtual visits, dry needling, modalities, electrical stimulation (IFC, Pre-Mod, Attended with Functional Dry Needling), Manual Therapy, Neuromuscular Re-ed, Patient Education, Self Care, Therapeutic Exercise, Ultrasound, and Therapeutic Activites     Magali Plaza, PT, DPT, PPCES      I CERTIFY THE NEED FOR THESE SERVICES FURNISHED UNDER THIS PLAN OF TREATMENT AND WHILE UNDER MY CARE   Physician's comments:     Physician's Signature: ___________________________________________________

## 2023-01-13 PROBLEM — M25.60 DECREASED RANGE OF MOTION: Status: ACTIVE | Noted: 2023-01-13

## 2023-01-25 ENCOUNTER — OFFICE VISIT (OUTPATIENT)
Dept: UROGYNECOLOGY | Facility: CLINIC | Age: 68
End: 2023-01-25
Payer: MEDICARE

## 2023-01-25 VITALS — BODY MASS INDEX: 21.64 KG/M2 | WEIGHT: 126.75 LBS | HEIGHT: 64 IN

## 2023-01-25 DIAGNOSIS — Z01.419 WELL WOMAN EXAM: Primary | ICD-10-CM

## 2023-01-25 DIAGNOSIS — M62.89 PELVIC FLOOR TENSION: ICD-10-CM

## 2023-01-25 DIAGNOSIS — N95.2 ATROPHIC VAGINITIS: ICD-10-CM

## 2023-01-25 DIAGNOSIS — K11.7 SIALOSIS: ICD-10-CM

## 2023-01-25 PROCEDURE — G0101 CA SCREEN;PELVIC/BREAST EXAM: HCPCS | Mod: PBBFAC | Performed by: NURSE PRACTITIONER

## 2023-01-25 PROCEDURE — G0101 CA SCREEN;PELVIC/BREAST EXAM: HCPCS | Mod: S$PBB,,, | Performed by: NURSE PRACTITIONER

## 2023-01-25 PROCEDURE — 99999 PR PBB SHADOW E&M-EST. PATIENT-LVL IV: CPT | Mod: PBBFAC,,, | Performed by: NURSE PRACTITIONER

## 2023-01-25 PROCEDURE — G0101 PR CA SCREEN;PELVIC/BREAST EXAM: ICD-10-PCS | Mod: S$PBB,,, | Performed by: NURSE PRACTITIONER

## 2023-01-25 PROCEDURE — 99214 OFFICE O/P EST MOD 30 MIN: CPT | Mod: PBBFAC | Performed by: NURSE PRACTITIONER

## 2023-01-25 PROCEDURE — 99999 PR PBB SHADOW E&M-EST. PATIENT-LVL IV: ICD-10-PCS | Mod: PBBFAC,,, | Performed by: NURSE PRACTITIONER

## 2023-01-25 RX ORDER — CONJUGATED ESTROGENS 0.62 MG/G
0.5 CREAM VAGINAL
Qty: 30 G | Refills: 2 | Status: SHIPPED | OUTPATIENT
Start: 2023-01-26 | End: 2023-05-18 | Stop reason: SDUPTHER

## 2023-01-25 NOTE — PATIENT INSTRUCTIONS
Always get help lifting 50# or more.     2. Vaginal health:  Vaginal atrophy (dryness):  Use 0.5 gram of estrogen cream in vagina  twice a week    3. H/o constipation:  Continue to avoid straining.  Continue stool softeners/fiber.          4. mammogram is due 05/2023   5. Continue pelvic floor PT   6. RTC 1 year for follow up visit

## 2023-01-25 NOTE — PROGRESS NOTES
MARK GONZALEZ [address]  [address]  [facility] [address]    Jill Marquez  [id number]  1955      Jill Marquez is a 67 y.o. here for well woman exam    10/19/15  Title of Operation:  1) Robotic-assisted laparoscopic lysis of adhesions  2) Robotic-assisted laparoscopic bilateral salpingo-oophorectomy  3) Robotic-assisted laparoscopic sacral colpopexy with polypropylene mesh  4) Placement of retropubic tension-free midurethral sling, RetroARC (AMS)  5) Cystourethroscopy    Indications for Surgery:  1) UI: (+) RICK > (+) UUI - mornings only. (--) pads:Changes underwear. May wear pads if on a trip or away from home for an extended time. Daytime frequency: Q 3-4 hours. Nocturia: No: (--) dysuria, (--) hematuria, (--) frequent UTIs. (--) complete bladder emptying. Leans forward to void. Has some urgency in the morning.  --SUDS 10/6/15:  3. URETHRAL FUNCTION/STORAGE PHASE:  a. WITH prolapse reduction:  CLPP (150 mL): Negative at 162 cm H20  VLPP (150 mL): Negative at 165 cm H20  CLPP (300 mL): Negative at 150 cm H20  VLPP (300 mL): Negative at 147 cm H20  CLPP (409 mL): Negative at 148 cm H20  VLPP (409 mL): Negative at 171 cm H20  CLPP (MAX ): Negative at 155 cm H20  VLPP (MAX): Negative at 145 cm H20  POS CLPP and VLPP after removal of pves catheter.  These findings are consistent with Positive urodynamic stress incontinence only at max capacity with POP reduction and removal of pves catheter.  Assessment:  UF prolonged but normal. PF prolonged but normal. Compliance normal. Max capacity 600 mL. DO (--). RICK (+).   --cysto 10/6/15: Normal with slight decrease coaptation and minimal trabeculations.  2) POP: Present. below introitus. Symptoms:(--) (--) vaginal bleeding. (--) vaginal discharge. (+) sexually active. (--) dyspareunia. (+) Vaginal dryness. (--) vaginal estrogen use.   --POP-Q- per Dr. Ballesteros: Aa +3; Ba +3; C -4; Ap -2; Bp -2--standing; Genital hiatus 2 cm, perineal body 1cm total vaginal  length 9cm.   3) BM: (+) constipation/straining. (--) chronic diarrhea. (--) hematochezia. (--) fecal incontinence. (--) fecal smearing/urgency. (--) incomplete evacuation. Using metamucil 2 capsules twice daily and correctol twice weekly.    Preoperative Diagnosis:  1) Cystocele  2) Vaginal vault prolapse  3) Mixed urinary incontinence  4) Vaginal atrophy  5) Urodynamic stress incontinence    Postoperative Diagnosis:  1) Cystocele  2) Vaginal vault prolapse  3) Mixed urinary incontinence  4) Vaginal atrophy  5) Urodynamic stress incontinence       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    HTN (hypertension) 7/23/2012    Hyperlipidemia     Hypothyroid 7/23/2012       Past Surgical History:   Procedure Laterality Date    COLONOSCOPY N/A 1/25/2016    Procedure: COLONOSCOPY;  Surgeon: DAYNA Simmons MD;  [facility]  Service: Endoscopy;  Laterality: N/A;    COLONOSCOPY N/A 11/8/2022    Procedure: COLONOSCOPY;  Surgeon: DAYNA Simmons MD;  [facility]  Service: Endoscopy;  Laterality: N/A;  vacc-inst portal-vargus only-tb  precall done.arrival time of 10:00 confirmed/mleone    COSMETIC SURGERY      EYE SURGERY      eyelid surgery      HYSTERECTOMY  2004    prolapse    OOPHORECTOMY Bilateral 2015    Tonsillectomy      TONSILLECTOMY         Family History   Problem Relation Age of Onset    Diabetes Brother     Retinal detachment Brother     Cancer Brother         kidney    Cataracts Brother     Diabetes Brother     Cancer Brother         throat    Cataracts Brother     Diabetes Brother     Cataracts Brother     No Known Problems Mother     No Known Problems Father     Lupus Other         niece    No Known Problems Sister     No Known Problems Maternal Aunt     No Known Problems Maternal Uncle     No Known Problems Paternal Aunt     No Known Problems Paternal Uncle     No Known Problems Maternal  Grandmother     No Known Problems Maternal Grandfather     No Known Problems Paternal Grandmother     No Known Problems Paternal Grandfather     Breast cancer Neg Hx     Colon cancer Neg Hx     Ovarian cancer Neg Hx     Blindness Neg Hx     Glaucoma Neg Hx     Hypertension Neg Hx     Macular degeneration Neg Hx     Strabismus Neg Hx     Stroke Neg Hx     Thyroid disease Neg Hx     Amblyopia Neg Hx     Rheum arthritis Neg Hx     Psoriasis Neg Hx     Inflammatory bowel disease Neg Hx     Cervical cancer Neg Hx     Endometrial cancer Neg Hx     Vaginal cancer Neg Hx     Uterine cancer Neg Hx        Social History     Socioeconomic History    Marital status:     Number of children: 2   Tobacco Use    Smoking status: Former     Years:      Types: Cigarettes     Quit date: 1982     Years since quittin.7    Smokeless tobacco: Former    Tobacco comments:     , homemaker, 2 children   Substance and Sexual Activity    Alcohol use: Yes     Alcohol/week: 0.0 standard drinks     Comment: 2 cocktails    Drug use: No    Sexual activity: Yes     Partners: Male     Birth control/protection: Surgical, None     Social Determinants of Health     Financial Resource Strain: Low Risk     Difficulty of Paying Living Expenses: Not very hard   Food Insecurity: No Food Insecurity    Worried About Running Out of Food in the Last Year: Never true    Ran Out of Food in the Last Year: Never true   Transportation Needs: No Transportation Needs    Lack of Transportation (Medical): No    Lack of Transportation (Non-Medical): No   Physical Activity: Sufficiently Active    Days of Exercise per Week: 5 days    Minutes of Exercise per Session: 50 min   Stress: No Stress Concern Present    Feeling of Stress : Only a little   Social Connections: Unknown    Frequency of Communication with Friends and Family: More than three times a week    Frequency of Social Gatherings with Friends and Family: Twice a week    Active Member of  Clubs or Organizations: Yes    Attends Club or Organization Meetings: 1 to 4 times per year    Marital Status:    Housing Stability: Unknown    Unable to Pay for Housing in the Last Year: No    Unstable Housing in the Last Year: No       Current Outpatient Medications   Medication Sig Dispense Refill    amLODIPine (NORVASC) 10 MG tablet TAKE 1 TABLET (10 MG TOTAL) BY MOUTH ONCE DAILY. 90 tablet 2    azelastine (ASTELIN) 137 mcg (0.1 %) nasal spray USE 1 SPRAY (137 MCG TOTAL) IN EACH NOSTRIL 2 (TWO) TIMES DAILY. 90 mL 1    diclofenac sodium (VOLTAREN) 1 % Gel Apply 4 g topically 4 (four) times daily as needed. 3 each 2    ergocalciferol, vitamin D2, (VITAMIN D ORAL)       etanercept (ENBREL SURECLICK) 50 mg/mL (1 mL) Inject 1 mL (50 mg total) into the skin once a week. 4 mL 2    fish oil-omega-3 fatty acids 300-1,000 mg capsule Take 2 g by mouth once daily.      flu vac 2022 [id number],PF, (FLUAD QUAD 2022-23,65Y UP,,PF,) 60 mcg (15 mcg x 4)/0.5 mL Syrg Inject into the muscle. 0.5 mL 0    fluticasone propionate (FLONASE) 50 mcg/actuation nasal spray INHALE 2 SPRAYS IN EACH NOSTRIL EVERY DAY 48 g 1    gabapentin (NEURONTIN) 300 MG capsule Take 1 capsule (300 mg total) by mouth every evening. 90 capsule 1    hydroCHLOROthiazide (HYDRODIURIL) 25 MG tablet Take 1 tablet (25 mg total) by mouth once daily. 90 tablet 1    PSYLLIUM SEED, WITH SUGAR, (METAMUCIL ORAL) Take by mouth.      rosuvastatin (CRESTOR) 5 MG tablet Take 1 tablet (5 mg total) by mouth once daily. 90 tablet 1    sennosides (SENOKOT TO GO ORAL)       SYNTHROID 88 mcg tablet TAKE 1 TABLET (88 MCG TOTAL) BY MOUTH BEFORE BREAKFAST. 30 tablet 11    triamcinolone acetonide 0.1% (KENALOG) 0.1 % cream Apply topically 2 (two) times daily. Use up to 2 weeks at a time. 454 g 1    citalopram (CELEXA) 10 MG tablet Take 1 tablet (10 mg total) by mouth once daily. 30 tablet 10    conjugated estrogens (PREMARIN) vaginal cream Place 1/2 gram vaginally 2 times  per week (Patient not taking: Reported on 12/21/2022) 30 g 2    [START ON 1/26/2023] conjugated estrogens (PREMARIN) vaginal cream Place 0.5 g vaginally twice a week. 30 g 2     No current facility-administered medications for this visit.       Review of patient's allergies indicates:   Allergen Reactions    Sulfasalazine Nausea Only     Malaise, nausea,    Leflunomide Rash     Last pap: 12/2005- normal- patient is post hyst  Last mammogram: 06/2022 normal  Colonoscopy: 01/25/2016 one polyp- normal- repeat 2026  DEXA: 12/2019 No evidence of significant bone density loss    ROS:  Feeling well.   No dyspnea or chest pain on exertion.    No abdominal pain, change in bowel habits, black or bloody stools.  Taking sennokot and metamucil daily.   Has sharp, shocking pain in sacral area with sneeze, cough--radiates over L hip to groin, down to her knee.  Also has dull aching pain along the same distrubution  No urinary tract symptoms. No urgency, frequency or incontinence  GYN ROS: no breast pain or new or enlarging lumps on self exam, no vaginal bleeding. Using vaginal estrogen cream  No neurological complaints.    OBJECTIVE:   There were no vitals filed for this visit.         The patient appears well, alert, oriented x 3, in no distress.  ENT normal.  Neck supple. No adenopathy or thyromegaly. MARYAN. .   Normal respiratory effort  Pulse with regular rate and rhythm  Abdomen soft without tenderness, guarding, mass or organomegaly.   Extremities show no edema, normal peripheral pulses. No TTP in sacral area.  +TTP in Paraspinous muscles.  Neurological is normal, no focal findings.    PELVIC EXAM:   VULVA: normal appearing vulva with no masses, tenderness or lesions,   VAGINA: normal appearing vagina with normal color and discharge, no lesions, atrophic, no mesh visible/ palpable. Sling path nontender. +TTP in L illeococcygeous (mimics pain described)  CERVIX: surgically absent,   UTERUS: absent,   ADNEXA: no masses,    RECTAL: normal rectal, no masses, small nonthrombosed external hemorrhoid      Impression  1. Well woman exam        2. Atrophic vaginitis  conjugated estrogens (PREMARIN) vaginal cream      3. Pelvic floor tension        4. Sialosis            We reviewed the above issues and discussed options for short-term versus long-term management of her problems.   Plan:      1.  Always get help lifting 50# or more.     2. Vaginal health:  Vaginal atrophy (dryness):  Use 0.5 gram of estrogen cream in vagina  twice a week    3. H/o constipation:  Continue to avoid straining.  Continue stool softeners/fiber.          4. mammogram is due 05/2023   5. Continue pelvic floor PT   6. RTC 1year for follow up visit      No LOS data to display  This includes face to face time and non-face to face time preparing to see the patient (eg, review of tests), obtaining and/or reviewing separately obtained history, documenting clinical information in the electronic or other health record, independently interpreting results and communicating results to the patient/family/caregiver, or care coordinator.    Naya Gibson, STACY-BC  [facility]  Division of Female Pelvic Medicine and Reconstructive Surgery  Department of Obstetrics & Gynecology

## 2023-01-27 ENCOUNTER — CLINICAL SUPPORT (OUTPATIENT)
Dept: REHABILITATION | Facility: HOSPITAL | Age: 68
End: 2023-01-27
Payer: MEDICARE

## 2023-01-27 DIAGNOSIS — M25.60 DECREASED RANGE OF MOTION: Primary | ICD-10-CM

## 2023-01-27 DIAGNOSIS — M62.89 PELVIC FLOOR TENSION: ICD-10-CM

## 2023-01-27 PROCEDURE — 97140 MANUAL THERAPY 1/> REGIONS: CPT | Mod: PN

## 2023-01-27 PROCEDURE — 97112 NEUROMUSCULAR REEDUCATION: CPT | Mod: PN

## 2023-01-27 PROCEDURE — 97110 THERAPEUTIC EXERCISES: CPT | Mod: PN

## 2023-01-27 NOTE — PROGRESS NOTES
"  Physical Therapy Daily Treatment Note     Name: Jill Marquez  Clinic Number: [id number]    Therapy Diagnosis:   Encounter Diagnoses   Name Primary?    Pelvic floor tension     Decreased range of motion Yes     Physician: Nicanor Campos MD    Visit Date: 1/27/2023    Physician Orders: PT Eval and Treat  Medical Diagnosis from Referral:        Encounter Diagnoses   Name Primary?    Greater trochanteric bursitis of left hip      Iliotibial band syndrome, left     NEW: M62.89 (ICD-10-CM) - Pelvic floor tension    Evaluation Date: 1/12/2023  Authorization Period Expiration: 12/21/2023  Plan of Care Expiration: 4/12/2023  Progress Note Due: 2/12/2023  Visit # / Visits authorized: 1/1   FOTO: 1/3 (last performed on 1/12/2023)     Precautions: Standard    Time In: 9:30   Time Out: 10:30  Total Billable Time: 60 minutes      Subjective     Pt reports: going to the gynecologist and bladder sling is in place, but found pelvic floor tenderness on the left side. Patient reports being interested in the pelvic floor ultrasound. Patient reports going to chiropractor for severe scoliosis.     She was compliant with home exercise program.  Response to previous treatment: slight decrease in pain   Functional change: no change reported     Pain: 5/10  Location: left hip     Constitutional Symptoms Review: The patient denies having any constitutional symptoms.     Objective   No pelvic exam performed    Therapeutic Exercise to develop  strength, endurance, ROM, flexibility, posture, and core stabilization for 30 minutes including:   Bridges with adduction ball 2 x 10   Steam boats x 10   Seated inna pose 10" x 3   DKTC  x 1 min   SL reverse clams 2 x 10   posterior pelvic tilts 10 x 10 seconds   Open books 5" x 10 (stretching left)   Piriformis stretch 3 x 30"   Quadruped cat/cow 5" x 10   Modified abena stretch 2 x 1 min    Deferred:   HL hip add with ball 10 x 10 seconds   Quadruped bird dogs   Sit to stands with add ball x " "10   Anti rotations x 10 7#     Neuromuscular Re-education to develop Coordination, Posture, and Proprioception for 10 minutes including:   Transverse abdominis contraction 5" x 10   Transverse abdominis contraction with march x 10     Deferred:   Diaphragmatic breathing     Manual Therapy to develop flexibility, extensibility, and desensitization for 15 minutes including: trigger point/myofascial release of left TFL/IT band, piriformis, hip flexor, hip adductors, quadratus lumborum     Therapeutic Activity Patient participated in dynamic functional therapeutic activities to improve functional performance for 5 minutes. Including: Education as described below.    Home Exercises Provided and Patient Education Provided     Education provided:   anatomy/physiology of pelvic floor, posture/body mechanices, diaphragmatic breathing, isometric abdominal exercises, and behavior modifications  Discussed progression of plan of care with patient; educated pt in activity modification; reviewed HEP with pt. Pt demonstrated and verbalized understanding of all instruction and was provided with a handout of HEP (see Patient Instructions).    Written Home Exercises Provided: yes.  Exercises were reviewed and Jill was able to demonstrate them prior to the end of the session.  Jill demonstrated good  understanding of the education provided.     See EMR under Patient Instructions for exercises provided 1/27/2023.    Assessment     Patient presents with continued left hip pain. Patient was started on pelvic floor  and hip stretching for optional length tension relationship of muscles. Core and hip strengthening exercises were performed for increased postural stability. Patient tolerated all exercises well with no complaints. Patient presents with wide spread hypertonicity of left side and hip, but responded well to manual therapy. Discussed performing pelvic exam and/or pelvic floor ultrasound at follow up visits. Pt will continue " to benefit from skilled outpatient physical therapy to address the deficits listed in the problem list box on initial evaluation, provide pt/family education and to maximize pt's level of independence in the home and community environment.       Jill Is progressing well towards her goals.   Pt prognosis is Good.     Anticipated barriers to physical therapy: none    Progress towards goals:  good    Goals:         Short Term Goals:  4 weeks Status  Date Met   PAIN: Pt will report worst pain of 7/10 in order to progress toward max functional ability and improve quality of life. [x] Progressing  [] Met  [] Not Met     FUNCTION: Patient will demonstrate improved function as indicated by a functional limitation score of less than or equal to 30 out of 100 on FOTO. [x] Progressing  [] Met  [] Not Met     MOBILITY: Patient will improve AROM to 50% of stated goals, listed in objective measures above, in order to progress towards independence with functional activities.  [x] Progressing  [] Met  [] Not Met     STRENGTH: Patient will improve strength to 50% of stated goals, listed in objective measures above, in order to progress towards independence with functional activities.  [x] Progressing  [] Met  [] Not Met     POSTURE: Patient will correct postural deviations in sitting and standing, to decrease pain and promote long term stability.  [x] Progressing  [] Met  [] Not Met     HEP: Patient will demonstrate independence with HEP in order to progress toward functional independence. [x] Progressing  [] Met  [] Not Met        Long Term Goals:  8 weeks Status Date Met   PAIN: Pt will report worst pain of 6/10 in order to progress toward max functional ability and improve quality of life [x] Progressing  [] Met  [] Not Met     FUNCTION: Patient will demonstrate improved function as indicated by a functional limitation score of less than or equal to 29 out of 100 on FOTO. [x] Progressing  [] Met  [] Not Met     MOBILITY:  Patient will improve AROM to stated goals, listed in objective measures above, in order to return to maximal functional potential and improve quality of life. [x] Progressing  [] Met  [] Not Met     STRENGTH: Patient will improve strength to stated goals, listed in objective measures above, in order to improve functional independence and quality of life. [x] Progressing  [] Met  [] Not Met     Patient will return to normal ADL's, IADL's, community involvement, recreational activities, and work-related activities with less than or equal to 6/10 pain and maximal function.  [x] Progressing  [] Met  [] Not Met      New/Revised goals: Continue with current established goals at this time.    Pt's spiritual, cultural and educational needs considered and pt agreeable to plan of care and goals.    Plan   Plan of care Certification: 1/12/2023 to 4/12/2023.     Continue with established Plan of Care, working toward established PT goals.    Lelo Avila, PT, DPT, PPCES        1/27/2023

## 2023-01-30 NOTE — PROGRESS NOTES
"  Physical Therapy Daily Treatment Note     Name: Jill Marquez  Clinic Number: [id number]    Therapy Diagnosis:   Encounter Diagnosis   Name Primary?    Decreased range of motion Yes       Physician: Nicanor Campos MD    Visit Date: 2/1/2023    Physician Orders: PT Eval and Treat  Medical Diagnosis from Referral:        Encounter Diagnoses   Name Primary?    Greater trochanteric bursitis of left hip      Iliotibial band syndrome, left     NEW: M62.89 (ICD-10-CM) - Pelvic floor tension    Evaluation Date: 1/12/2023  Authorization Period Expiration: 12/21/2023  Plan of Care Expiration: 4/12/2023  Progress Note Due: 2/12/2023  Visit # / Visits authorized: 2/20  FOTO: 1/3 (last performed on 1/12/2023)     Precautions: Standard    Time In: 11:00 AM  Time Out: 12:00 PM  Total Billable Time: 60 minutes      Subjective     Pt reports: initial decreased pain after first follow up, but a few days ago her pain increased. Increased pain may be due to life stressors with  having cancer.   She was compliant with home exercise program.  Response to previous treatment: slight decrease in pain   Functional change: no change reported     Pain: 6/10  Location: left hip     Constitutional Symptoms Review: The patient denies having any constitutional symptoms.     Objective   No pelvic exam performed    Therapeutic Exercise to develop  strength, endurance, ROM, flexibility, posture, and core stabilization for 30 minutes including:   Bridges with adduction ball 2 x 10   posterior pelvic tilts 10 x 10 seconds   Open books 5" x 10 (stretching left)   Piriformis stretch 3 x 30"   Modified abena stretch x 1 min   + Sit to stands with add ball x 10   + seated hip add with ball 5 x 10 seconds     Deferred:   Quadruped bird dogs   Anti rotations x 10 7#   Quadruped cat/cow 5" x 10   Steam boats x 10   Seated inna pose 10" x 3   DKTC  x 1 min   SL reverse clams 2 x 10     Neuromuscular Re-education to develop Coordination, Posture, " "and Proprioception for 0 minutes including:   Transverse abdominis contraction 5" x 10   Transverse abdominis contraction with march x 10   Diaphragmatic breathing       Manual Therapy to develop flexibility, extensibility, and desensitization for 15 minutes including: trigger point/myofascial release of left TFL/IT band, piriformis, hip flexor, hip adductors      Therapeutic Activity Education as described below provided throughout therapy session.    supervised modalities after being cleared for contradictions: IFC Electrical Stimulation:  Jill received IFC Electrical Stimulation for pain control applied to the left hip for 15 minutes. Jill tolderated treatment well without any adverse effects.      hot pack for 15 minutes to left hip.    Home Exercises Provided and Patient Education Provided     Education provided:   anatomy/physiology of pelvic floor, posture/body mechanices, diaphragmatic breathing, isometric abdominal exercises, and behavior modifications  Discussed progression of plan of care with patient; educated pt in activity modification; reviewed HEP with pt. Pt demonstrated and verbalized understanding of all instruction and was provided with a handout of HEP (see Patient Instructions).    Written Home Exercises Provided: yes.  Exercises were reviewed and Jill was able to demonstrate them prior to the end of the session.  Jill demonstrated good  understanding of the education provided.     See EMR under Patient Instructions for exercises provided 1/27/2023.    Assessment     Patient presents with continued left hip pain. IFC and heat was performed for decreased inflammation and pain control. Myofascial release of left hip musculature was continued for muscle extensibility and pain control. Hip strengthening exercises were continued for increased postural stability. Patient tolerated all exercises well with no complaints. Pt will continue to benefit from skilled outpatient physical therapy to " address the deficits listed in the problem list box on initial evaluation, provide pt/family education and to maximize pt's level of independence in the home and community environment.       Jill Is progressing well towards her goals.   Pt prognosis is Good.     Anticipated barriers to physical therapy: none    Progress towards goals:  good    Goals:         Short Term Goals:  4 weeks Status  Date Met   PAIN: Pt will report worst pain of 7/10 in order to progress toward max functional ability and improve quality of life. [x] Progressing  [] Met  [] Not Met     FUNCTION: Patient will demonstrate improved function as indicated by a functional limitation score of less than or equal to 30 out of 100 on FOTO. [x] Progressing  [] Met  [] Not Met     MOBILITY: Patient will improve AROM to 50% of stated goals, listed in objective measures above, in order to progress towards independence with functional activities.  [x] Progressing  [] Met  [] Not Met     STRENGTH: Patient will improve strength to 50% of stated goals, listed in objective measures above, in order to progress towards independence with functional activities.  [x] Progressing  [] Met  [] Not Met     POSTURE: Patient will correct postural deviations in sitting and standing, to decrease pain and promote long term stability.  [x] Progressing  [] Met  [] Not Met     HEP: Patient will demonstrate independence with HEP in order to progress toward functional independence. [] Progressing  [x] Met  [] Not Met        Long Term Goals:  8 weeks Status Date Met   PAIN: Pt will report worst pain of 6/10 in order to progress toward max functional ability and improve quality of life [x] Progressing  [] Met  [] Not Met     FUNCTION: Patient will demonstrate improved function as indicated by a functional limitation score of less than or equal to 29 out of 100 on FOTO. [x] Progressing  [] Met  [] Not Met     MOBILITY: Patient will improve AROM to stated goals, listed in  objective measures above, in order to return to maximal functional potential and improve quality of life. [x] Progressing  [] Met  [] Not Met     STRENGTH: Patient will improve strength to stated goals, listed in objective measures above, in order to improve functional independence and quality of life. [x] Progressing  [] Met  [] Not Met     Patient will return to normal ADL's, IADL's, community involvement, recreational activities, and work-related activities with less than or equal to 6/10 pain and maximal function.  [x] Progressing  [] Met  [] Not Met      New/Revised goals: Continue with current established goals at this time.    Pt's spiritual, cultural and educational needs considered and pt agreeable to plan of care and goals.    Plan   Plan of care Certification: 1/12/2023 to 4/12/2023.     Continue with established Plan of Care, working toward established PT goals.    Lelo Avila, PT, DPT, PPCES        2/1/2023

## 2023-02-01 ENCOUNTER — CLINICAL SUPPORT (OUTPATIENT)
Dept: REHABILITATION | Facility: HOSPITAL | Age: 68
End: 2023-02-01
Payer: MEDICARE

## 2023-02-01 DIAGNOSIS — M25.60 DECREASED RANGE OF MOTION: Primary | ICD-10-CM

## 2023-02-01 PROCEDURE — 97140 MANUAL THERAPY 1/> REGIONS: CPT | Mod: PN

## 2023-02-01 PROCEDURE — 97110 THERAPEUTIC EXERCISES: CPT | Mod: PN

## 2023-02-01 PROCEDURE — 97014 ELECTRIC STIMULATION THERAPY: CPT | Mod: PN

## 2023-02-02 NOTE — PROGRESS NOTES
"  Physical Therapy Daily Treatment Note     Name: Jill Marquez  Clinic Number: [id number]    Therapy Diagnosis:   Encounter Diagnosis   Name Primary?    Decreased range of motion Yes         Physician: Nicanor Campos MD    Visit Date: 2/3/2023    Physician Orders: PT Eval and Treat  Medical Diagnosis from Referral:        Encounter Diagnoses   Name Primary?    Greater trochanteric bursitis of left hip      Iliotibial band syndrome, left     NEW: M62.89 (ICD-10-CM) - Pelvic floor tension    Evaluation Date: 1/12/2023  Authorization Period Expiration: 12/21/2023  Plan of Care Expiration: 4/12/2023  Progress Note Due: 2/12/2023  Visit # / Visits authorized: 2/20  FOTO: 1/3 (last performed on 1/12/2023)     Precautions: Standard    Time In: 9:33 AM  Time Out: 10:26 AM  Total Billable Time: 53 minutes      Subjective     Pt reports: decreased hip pain since last visit.   She was compliant with home exercise program.  Response to previous treatment: slight decrease in pain   Functional change: no change reported     Pain: 4/10  Location: left hip     Constitutional Symptoms Review: The patient denies having any constitutional symptoms.     Objective   No pelvic exam performed    Therapeutic Exercise to develop  strength, endurance, ROM, flexibility, posture, and core stabilization for 18 minutes including:   Bridges with adduction ball 2 x 10   posterior pelvic tilts 10 x 10 seconds   Open books 5" x 10 (stretching left)   Piriformis stretch 3 x 30"   Modified abena stretch 2 x 1 min     Deferred:   Sit to stands with add ball x 10   Seated hip add with ball 5 x 10 seconds   Quadruped bird dogs   Anti rotations x 10 7#   Quadruped cat/cow 5" x 10   Steam boats x 10   Seated inna pose 10" x 3   DKTC  x 1 min   SL reverse clams 2 x 10     Neuromuscular Re-education to develop Coordination, Posture, and Proprioception for 10 minutes including:   Transverse abdominis contraction 5" x 10   Diaphragmatic breathing x 5 " minutes (maximal tactile and verbal cues)       Manual Therapy to develop flexibility, extensibility, and desensitization for 10 minutes including: trigger point/myofascial release of left TFL/IT band, piriformis, hip flexor, hip adductors      Therapeutic Activity Education as described below provided throughout therapy session.    supervised modalities after being cleared for contradictions: IFC Electrical Stimulation:  Jill received IFC Electrical Stimulation for pain control applied to the left hip for 15 minutes. Jill tolderated treatment well without any adverse effects.      hot pack for 15 minutes to left hip.    Home Exercises Provided and Patient Education Provided     Education provided:   anatomy/physiology of pelvic floor, posture/body mechanices, diaphragmatic breathing, isometric abdominal exercises, and behavior modifications  Discussed progression of plan of care with patient; educated pt in activity modification; reviewed HEP with pt. Pt demonstrated and verbalized understanding of all instruction and was provided with a handout of HEP (see Patient Instructions).    Written Home Exercises Provided: yes.  Exercises were reviewed and Jill was able to demonstrate them prior to the end of the session.  Jill demonstrated good  understanding of the education provided.     See EMR under Patient Instructions for exercises provided 1/27/2023.    Assessment     Patient presents with decreased hip pain after last visit. Minimal changes were made to program today secondary to patient having significant relief after last session. Patient tolerated all exercises well with no complaints. Pt will continue to benefit from skilled outpatient physical therapy to address the deficits listed in the problem list box on initial evaluation, provide pt/family education and to maximize pt's level of independence in the home and community environment.       Jill Is progressing well towards her goals.   Pt prognosis  is Good.     Anticipated barriers to physical therapy: none    Progress towards goals:  good    Goals:         Short Term Goals:  4 weeks Status  Date Met   PAIN: Pt will report worst pain of 7/10 in order to progress toward max functional ability and improve quality of life. [] Progressing  [x] Met  [] Not Met     FUNCTION: Patient will demonstrate improved function as indicated by a functional limitation score of less than or equal to 30 out of 100 on FOTO. [x] Progressing  [] Met  [] Not Met     MOBILITY: Patient will improve AROM to 50% of stated goals, listed in objective measures above, in order to progress towards independence with functional activities.  [x] Progressing  [] Met  [] Not Met     STRENGTH: Patient will improve strength to 50% of stated goals, listed in objective measures above, in order to progress towards independence with functional activities.  [x] Progressing  [] Met  [] Not Met     POSTURE: Patient will correct postural deviations in sitting and standing, to decrease pain and promote long term stability.  [] Progressing  [x] Met  [] Not Met     HEP: Patient will demonstrate independence with HEP in order to progress toward functional independence. [] Progressing  [x] Met  [] Not Met        Long Term Goals:  8 weeks Status Date Met   PAIN: Pt will report worst pain of 6/10 in order to progress toward max functional ability and improve quality of life [x] Progressing  [] Met  [] Not Met     FUNCTION: Patient will demonstrate improved function as indicated by a functional limitation score of less than or equal to 29 out of 100 on FOTO. [x] Progressing  [] Met  [] Not Met     MOBILITY: Patient will improve AROM to stated goals, listed in objective measures above, in order to return to maximal functional potential and improve quality of life. [x] Progressing  [] Met  [] Not Met     STRENGTH: Patient will improve strength to stated goals, listed in objective measures above, in order to improve  functional independence and quality of life. [x] Progressing  [] Met  [] Not Met     Patient will return to normal ADL's, IADL's, community involvement, recreational activities, and work-related activities with less than or equal to 6/10 pain and maximal function.  [x] Progressing  [] Met  [] Not Met      New/Revised goals: Continue with current established goals at this time.    Pt's spiritual, cultural and educational needs considered and pt agreeable to plan of care and goals.    Plan   Plan of care Certification: 1/12/2023 to 4/12/2023.     Continue with established Plan of Care, working toward established PT goals.    Lelo Avila, PT, DPT, PPCES        2/3/2023

## 2023-02-03 ENCOUNTER — CLINICAL SUPPORT (OUTPATIENT)
Dept: REHABILITATION | Facility: HOSPITAL | Age: 68
End: 2023-02-03
Payer: MEDICARE

## 2023-02-03 DIAGNOSIS — M25.60 DECREASED RANGE OF MOTION: Primary | ICD-10-CM

## 2023-02-03 PROCEDURE — 97014 ELECTRIC STIMULATION THERAPY: CPT | Mod: PN

## 2023-02-03 PROCEDURE — 97112 NEUROMUSCULAR REEDUCATION: CPT | Mod: PN

## 2023-02-03 PROCEDURE — 97140 MANUAL THERAPY 1/> REGIONS: CPT | Mod: PN

## 2023-02-03 PROCEDURE — 97110 THERAPEUTIC EXERCISES: CPT | Mod: PN

## 2023-02-06 ENCOUNTER — CLINICAL SUPPORT (OUTPATIENT)
Dept: REHABILITATION | Facility: HOSPITAL | Age: 68
End: 2023-02-06
Payer: MEDICARE

## 2023-02-06 DIAGNOSIS — M25.60 DECREASED RANGE OF MOTION: Primary | ICD-10-CM

## 2023-02-06 PROCEDURE — 97110 THERAPEUTIC EXERCISES: CPT | Mod: PN | Performed by: PHYSICAL THERAPIST

## 2023-02-06 PROCEDURE — 97112 NEUROMUSCULAR REEDUCATION: CPT | Mod: PN | Performed by: PHYSICAL THERAPIST

## 2023-02-06 PROCEDURE — 97140 MANUAL THERAPY 1/> REGIONS: CPT | Mod: PN | Performed by: PHYSICAL THERAPIST

## 2023-02-06 NOTE — PROGRESS NOTES
"  Physical Therapy Daily Treatment Note     Name: Jill Marquez  Clinic Number: [id number]    Therapy Diagnosis:   Encounter Diagnosis   Name Primary?    Decreased range of motion Yes         Physician: Nicanor Campos MD    Visit Date: 2/6/2023    Physician Orders: PT Eval and Treat  Medical Diagnosis from Referral:        Encounter Diagnoses   Name Primary?    Greater trochanteric bursitis of left hip      Iliotibial band syndrome, left     M62.89 (ICD-10-CM) - Pelvic floor tension    Evaluation Date: 1/12/2023  Authorization Period Expiration: 12/21/2023  Plan of Care Expiration: 4/12/2023  Progress Note Due: 2/12/2023  Visit # / Visits authorized: 4/20  FOTO: 1/3 (last performed on 1/12/2023)     Precautions: Standard    Time In: 1430  Time Out: 1530  Total Billable Time: 60 minutes      Subjective     Pt reports: it's feeling a little better, but still hurts.  She reports that she saw the chiropractor today and is a little sore in the left side of her back.  She was compliant with home exercise program.  Response to previous treatment: slight decrease in pain   Functional change: no change reported     Pain: 4/10  Location: left hip     Constitutional Symptoms Review: The patient denies having any constitutional symptoms.     Objective       Therapeutic Exercise to develop  strength, endurance, ROM, flexibility, posture, and core stabilization for 20 minutes including:   +Prone press up 10 x hold position through one breath  Bridges 2 x 10   posterior pelvic tilts 10 x 10 seconds   +Glute stretch 3 x 30" on the left  Piriformis stretch 3 x 30" on the left  +Alternating hip adduction and abduction isometric  +Multifidus isometric 10x each - red theraband  +Rotational strengthening 10x each - red theraband    Deferred:   Open books 5" x 10 (stretching left)   Modified abena stretch 2 x 1 min   Sit to stands with add ball x 10   Seated hip add with ball 5 x 10 seconds   Quadruped bird dogs   Anti rotations x 10 " "7#   Quadruped cat/cow 5" x 10   Steam boats x 10   Seated inna pose 10" x 3   DKTC  x 1 min   SL reverse clams 2 x 10     Neuromuscular Re-education to develop Coordination, Posture, and Proprioception for 10 minutes including:   Transverse abdominis contraction 5" x 10   Diaphragmatic breathing x 5 minutes (maximal tactile and verbal cues)   Multifidus contraction     Manual Therapy to develop flexibility, extensibility, and desensitization for 30 minutes including: trigger point/myofascial release of left quadratus lumborum muscle, piriformis, hip flexor, hip adductors , myofascial release to the abdominal fascia.    Therapeutic Activity Education as described below provided throughout therapy session.      Home Exercises Provided and Patient Education Provided     Education provided:   anatomy/physiology of pelvic floor, posture/body mechanices, diaphragmatic breathing, isometric abdominal exercises, and behavior modifications  Discussed progression of plan of care with patient; educated pt in activity modification; reviewed HEP with pt. Pt demonstrated and verbalized understanding of all instruction and was provided with a handout of HEP (see Patient Instructions).    Written Home Exercises Provided: yes.  Exercises were reviewed and Jill was able to demonstrate them prior to the end of the session.  Jill demonstrated good  understanding of the education provided.     See EMR under Patient Instructions for exercises provided 1/27/2023.    Assessment     Patient presents with hypertonicity in the quadratus lumborum, piriformis, hip flexor, and adductor on the left side, which reduced with manual therapy.  Noted improved symmetry of diaphragmatic breath following release of abdominal fascia.  Patient fatigued with therapeutic exercise.  Pt will continue to benefit from skilled outpatient physical therapy to address the deficits listed in the problem list box on initial evaluation, provide pt/family " education and to maximize pt's level of independence in the home and community environment.       Jill Is progressing well towards her goals.   Pt prognosis is Good.     Anticipated barriers to physical therapy: none    Progress towards goals:  good    Goals:         Short Term Goals:  4 weeks Status  Date Met   PAIN: Pt will report worst pain of 7/10 in order to progress toward max functional ability and improve quality of life. [] Progressing  [x] Met  [] Not Met     FUNCTION: Patient will demonstrate improved function as indicated by a functional limitation score of less than or equal to 30 out of 100 on FOTO. [x] Progressing  [] Met  [] Not Met     MOBILITY: Patient will improve AROM to 50% of stated goals, listed in objective measures above, in order to progress towards independence with functional activities.  [x] Progressing  [] Met  [] Not Met     STRENGTH: Patient will improve strength to 50% of stated goals, listed in objective measures above, in order to progress towards independence with functional activities.  [x] Progressing  [] Met  [] Not Met     POSTURE: Patient will correct postural deviations in sitting and standing, to decrease pain and promote long term stability.  [] Progressing  [x] Met  [] Not Met     HEP: Patient will demonstrate independence with HEP in order to progress toward functional independence. [] Progressing  [x] Met  [] Not Met        Long Term Goals:  8 weeks Status Date Met   PAIN: Pt will report worst pain of 6/10 in order to progress toward max functional ability and improve quality of life [x] Progressing  [] Met  [] Not Met     FUNCTION: Patient will demonstrate improved function as indicated by a functional limitation score of less than or equal to 29 out of 100 on FOTO. [x] Progressing  [] Met  [] Not Met     MOBILITY: Patient will improve AROM to stated goals, listed in objective measures above, in order to return to maximal functional potential and improve quality of  life. [x] Progressing  [] Met  [] Not Met     STRENGTH: Patient will improve strength to stated goals, listed in objective measures above, in order to improve functional independence and quality of life. [x] Progressing  [] Met  [] Not Met     Patient will return to normal ADL's, IADL's, community involvement, recreational activities, and work-related activities with less than or equal to 6/10 pain and maximal function.  [x] Progressing  [] Met  [] Not Met      New/Revised goals: Continue with current established goals at this time.    Pt's spiritual, cultural and educational needs considered and pt agreeable to plan of care and goals.    Plan   Plan of care Certification: 1/12/2023 to 4/12/2023.     Continue with established Plan of Care, working toward established PT goals.    Magali Plaza, PT, DPT, PPCES        2/6/2023

## 2023-02-07 ENCOUNTER — TELEPHONE (OUTPATIENT)
Dept: RHEUMATOLOGY | Facility: CLINIC | Age: 68
End: 2023-02-07
Payer: MEDICARE

## 2023-02-07 ENCOUNTER — LAB VISIT (OUTPATIENT)
Dept: LAB | Facility: HOSPITAL | Age: 68
End: 2023-02-07
Attending: INTERNAL MEDICINE
Payer: MEDICARE

## 2023-02-07 DIAGNOSIS — Z79.899 OTHER LONG TERM (CURRENT) DRUG THERAPY: ICD-10-CM

## 2023-02-07 DIAGNOSIS — E78.2 MIXED HYPERLIPIDEMIA: ICD-10-CM

## 2023-02-07 DIAGNOSIS — D75.89 MACROCYTOSIS WITHOUT ANEMIA: Primary | ICD-10-CM

## 2023-02-07 DIAGNOSIS — Z51.81 ENCOUNTER FOR MONITORING OF ETANERCEPT THERAPY: ICD-10-CM

## 2023-02-07 DIAGNOSIS — M47.819 SPONDYLARTHRITIS: ICD-10-CM

## 2023-02-07 DIAGNOSIS — R74.01 HIGH TRANSAMINASE LEVELS: Primary | ICD-10-CM

## 2023-02-07 DIAGNOSIS — Z79.620 ENCOUNTER FOR MONITORING OF ETANERCEPT THERAPY: ICD-10-CM

## 2023-02-07 DIAGNOSIS — R53.83 FATIGUE, UNSPECIFIED TYPE: ICD-10-CM

## 2023-02-07 LAB
ALBUMIN SERPL BCP-MCNC: 4.3 G/DL (ref 3.5–5.2)
ALP SERPL-CCNC: 46 U/L (ref 55–135)
ALT SERPL W/O P-5'-P-CCNC: 68 U/L (ref 10–44)
ANION GAP SERPL CALC-SCNC: 11 MMOL/L (ref 8–16)
AST SERPL-CCNC: 50 U/L (ref 10–40)
BASOPHILS # BLD AUTO: 0.05 K/UL (ref 0–0.2)
BASOPHILS NFR BLD: 1.1 % (ref 0–1.9)
BILIRUB SERPL-MCNC: 1.3 MG/DL (ref 0.1–1)
BUN SERPL-MCNC: 17 MG/DL (ref 8–23)
CALCIUM SERPL-MCNC: 10 MG/DL (ref 8.7–10.5)
CHLORIDE SERPL-SCNC: 100 MMOL/L (ref 95–110)
CHOLEST SERPL-MCNC: 178 MG/DL (ref 120–199)
CHOLEST/HDLC SERPL: 2 {RATIO} (ref 2–5)
CO2 SERPL-SCNC: 27 MMOL/L (ref 23–29)
CREAT SERPL-MCNC: 0.7 MG/DL (ref 0.5–1.4)
CRP SERPL-MCNC: 0.4 MG/L (ref 0–8.2)
DIFFERENTIAL METHOD: ABNORMAL
EOSINOPHIL # BLD AUTO: 0.1 K/UL (ref 0–0.5)
EOSINOPHIL NFR BLD: 1.7 % (ref 0–8)
ERYTHROCYTE [DISTWIDTH] IN BLOOD BY AUTOMATED COUNT: 13.3 % (ref 11.5–14.5)
ERYTHROCYTE [SEDIMENTATION RATE] IN BLOOD BY PHOTOMETRIC METHOD: <2 MM/HR (ref 0–36)
EST. GFR  (NO RACE VARIABLE): >60 ML/MIN/1.73 M^2
GLUCOSE SERPL-MCNC: 97 MG/DL (ref 70–110)
HCT VFR BLD AUTO: 40.8 % (ref 37–48.5)
HDLC SERPL-MCNC: 88 MG/DL (ref 40–75)
HDLC SERPL: 49.4 % (ref 20–50)
HGB BLD-MCNC: 13.4 G/DL (ref 12–16)
IMM GRANULOCYTES # BLD AUTO: 0.01 K/UL (ref 0–0.04)
IMM GRANULOCYTES NFR BLD AUTO: 0.2 % (ref 0–0.5)
LDLC SERPL CALC-MCNC: 71.2 MG/DL (ref 63–159)
LYMPHOCYTES # BLD AUTO: 2.1 K/UL (ref 1–4.8)
LYMPHOCYTES NFR BLD: 45.2 % (ref 18–48)
MCH RBC QN AUTO: 32.6 PG (ref 27–31)
MCHC RBC AUTO-ENTMCNC: 32.8 G/DL (ref 32–36)
MCV RBC AUTO: 99 FL (ref 82–98)
MONOCYTES # BLD AUTO: 0.5 K/UL (ref 0.3–1)
MONOCYTES NFR BLD: 10.7 % (ref 4–15)
NEUTROPHILS # BLD AUTO: 1.9 K/UL (ref 1.8–7.7)
NEUTROPHILS NFR BLD: 41.1 % (ref 38–73)
NONHDLC SERPL-MCNC: 90 MG/DL
NRBC BLD-RTO: 0 /100 WBC
PLATELET # BLD AUTO: 206 K/UL (ref 150–450)
PMV BLD AUTO: 11.2 FL (ref 9.2–12.9)
POTASSIUM SERPL-SCNC: 4.8 MMOL/L (ref 3.5–5.1)
PROT SERPL-MCNC: 7.2 G/DL (ref 6–8.4)
RBC # BLD AUTO: 4.11 M/UL (ref 4–5.4)
SODIUM SERPL-SCNC: 138 MMOL/L (ref 136–145)
TRIGL SERPL-MCNC: 94 MG/DL (ref 30–150)
WBC # BLD AUTO: 4.6 K/UL (ref 3.9–12.7)

## 2023-02-07 PROCEDURE — 85652 RBC SED RATE AUTOMATED: CPT | Performed by: INTERNAL MEDICINE

## 2023-02-07 PROCEDURE — 85025 COMPLETE CBC W/AUTO DIFF WBC: CPT | Performed by: INTERNAL MEDICINE

## 2023-02-07 PROCEDURE — 87516 HEPATITIS B DNA AMP PROBE: CPT | Performed by: INTERNAL MEDICINE

## 2023-02-07 PROCEDURE — 80053 COMPREHEN METABOLIC PANEL: CPT | Performed by: INTERNAL MEDICINE

## 2023-02-07 PROCEDURE — 80061 LIPID PANEL: CPT | Performed by: INTERNAL MEDICINE

## 2023-02-07 PROCEDURE — 86140 C-REACTIVE PROTEIN: CPT | Performed by: INTERNAL MEDICINE

## 2023-02-07 PROCEDURE — 36415 COLL VENOUS BLD VENIPUNCTURE: CPT | Mod: PN | Performed by: INTERNAL MEDICINE

## 2023-02-08 ENCOUNTER — TELEPHONE (OUTPATIENT)
Dept: RHEUMATOLOGY | Facility: CLINIC | Age: 68
End: 2023-02-08
Payer: MEDICARE

## 2023-02-08 ENCOUNTER — PATIENT MESSAGE (OUTPATIENT)
Dept: RHEUMATOLOGY | Facility: CLINIC | Age: 68
End: 2023-02-08
Payer: MEDICARE

## 2023-02-08 NOTE — TELEPHONE ENCOUNTER
Please add ck and ggt to today's labs and schedule hepatic function panel in 2 wks. Please tell pt no acetaminophen(Tylenol) or NSAIDS Thank you CLAUDIA

## 2023-02-08 NOTE — PROGRESS NOTES
"  Physical Therapy Daily Treatment Note     Name: Jill Marquez  Clinic Number: [id number]    Therapy Diagnosis:   Encounter Diagnosis   Name Primary?    Decreased range of motion Yes       Physician: Nicanor Campos MD    Visit Date: 2/9/2023    Physician Orders: PT Eval and Treat  Medical Diagnosis from Referral:        Encounter Diagnoses   Name Primary?    Greater trochanteric bursitis of left hip      Iliotibial band syndrome, left     NEW: M62.89 (ICD-10-CM) - Pelvic floor tension    Evaluation Date: 1/12/2023  Authorization Period Expiration: 12/21/2023  Plan of Care Expiration: 4/12/2023  Progress Note Due: 2/12/2023  Visit # / Visits authorized: 5/20  FOTO: 1/3 (last performed on 1/12/2023)     Precautions: Standard    Time In: 9:00 AM  Time Out: 9:55 AM  Total Billable Time: 55 minutes      Subjective     Pt reports: her hip feeling a lot better today. She feels like she is walking even and standing up straight.     She was compliant with home exercise program.  Response to previous treatment: slight decrease in pain   Functional change: no change reported     Pain: 2/10  Location: left hip     Constitutional Symptoms Review: The patient denies having any constitutional symptoms.     Objective   No pelvic exam performed    Therapeutic Exercise to develop  strength, endurance, ROM, flexibility, posture, and core stabilization for 30 minutes including:   Bridges with adduction ball 2 x 10   posterior pelvic tilts 10 x 10 seconds   Open books 5" x 10 (stretching left)   Piriformis stretch 3 x 30"   Modified abena stretch 2 x 1 min   Alternating hip adduction and abduction isometric 10 x 10"  Prone press up 2 x 10 hold position through one breath   Sit to stands with 10# kettle bell 2 x 10   Anti rotations 2 x 10 7#   Quadruped cat/cow 5" x 10     Deferred:   Glute stretch 3 x 30" on the left   Multifidus isometric 10x each - red theraband   Rotational strengthening 10x each - red theraband   Steam boats x " "10   Seated inna pose 10" x 3   DKTC  x 1 min   SL reverse clams 2 x 10     Neuromuscular Re-education to develop Coordination, Posture, and Proprioception for 25 minutes including:   Transverse abdominis contraction 5" x 10    + with march   Diaphragmatic breathing x 3 minutes (maximal tactile and verbal cues)   + free motion rows 13# 2 x 10  + lat pull down 10# each 2 x 10   + tricep ext 13# 2 x 10   + free motion shoulder ext with march on foam 7# each 2 x 10   + Quadruped bird dogs 2 x 10      Manual Therapy to develop flexibility, extensibility, and desensitization for 0 minutes including: trigger point/myofascial release of left TFL/IT band, piriformis, hip flexor, hip adductors      Therapeutic Activity Education as described below provided throughout therapy session.    supervised modalities after being cleared for contradictions: IFC Electrical Stimulation:  Jill received IFC Electrical Stimulation for pain control applied to the left hip for 0 minutes. Jill tolderated treatment well without any adverse effects.      hot pack for 0 minutes to left hip.     Home Exercises Provided and Patient Education Provided     Education provided:   anatomy/physiology of pelvic floor, posture/body mechanices, diaphragmatic breathing, isometric abdominal exercises, and behavior modifications  Discussed progression of plan of care with patient; educated pt in activity modification; reviewed HEP with pt. Pt demonstrated and verbalized understanding of all instruction and was provided with a handout of HEP (see Patient Instructions).    Written Home Exercises Provided: yes.  Exercises were reviewed and Jill was able to demonstrate them prior to the end of the session.  Jill demonstrated good  understanding of the education provided.     See EMR under Patient Instructions for exercises provided 1/27/2023.    Assessment     Patient presents with decreased hip pain after last visit. Free motion exercises were added " for increased postural stability and improved posture. Patient tolerated all exercises well with no complaints. Pt will continue to benefit from skilled outpatient physical therapy to address the deficits listed in the problem list box on initial evaluation, provide pt/family education and to maximize pt's level of independence in the home and community environment.       Jill Is progressing well towards her goals.   Pt prognosis is Good.     Anticipated barriers to physical therapy: none    Progress towards goals:  good    Goals:         Short Term Goals:  4 weeks Status  Date Met   PAIN: Pt will report worst pain of 7/10 in order to progress toward max functional ability and improve quality of life. [] Progressing  [x] Met  [] Not Met     FUNCTION: Patient will demonstrate improved function as indicated by a functional limitation score of less than or equal to 30 out of 100 on FOTO. [x] Progressing  [] Met  [] Not Met     MOBILITY: Patient will improve AROM to 50% of stated goals, listed in objective measures above, in order to progress towards independence with functional activities.  [x] Progressing  [] Met  [] Not Met     STRENGTH: Patient will improve strength to 50% of stated goals, listed in objective measures above, in order to progress towards independence with functional activities.  [x] Progressing  [] Met  [] Not Met     POSTURE: Patient will correct postural deviations in sitting and standing, to decrease pain and promote long term stability.  [] Progressing  [x] Met  [] Not Met     HEP: Patient will demonstrate independence with HEP in order to progress toward functional independence. [] Progressing  [x] Met  [] Not Met        Long Term Goals:  8 weeks Status Date Met   PAIN: Pt will report worst pain of 6/10 in order to progress toward max functional ability and improve quality of life [x] Progressing  [] Met  [] Not Met     FUNCTION: Patient will demonstrate improved function as indicated by a  functional limitation score of less than or equal to 29 out of 100 on FOTO. [x] Progressing  [] Met  [] Not Met     MOBILITY: Patient will improve AROM to stated goals, listed in objective measures above, in order to return to maximal functional potential and improve quality of life. [x] Progressing  [] Met  [] Not Met     STRENGTH: Patient will improve strength to stated goals, listed in objective measures above, in order to improve functional independence and quality of life. [x] Progressing  [] Met  [] Not Met     Patient will return to normal ADL's, IADL's, community involvement, recreational activities, and work-related activities with less than or equal to 6/10 pain and maximal function.  [x] Progressing  [] Met  [] Not Met      New/Revised goals: Continue with current established goals at this time.    Pt's spiritual, cultural and educational needs considered and pt agreeable to plan of care and goals.    Plan   Plan of care Certification: 1/12/2023 to 4/12/2023.     Continue with established Plan of Care, working toward established PT goals.    Lelo Avila, PT, DPT, PPCES        2/9/2023

## 2023-02-08 NOTE — TELEPHONE ENCOUNTER
-Please add ck and ggt to today's labs and schedule hepatic function panel in 2 wks. Advised pt not to take any acetaminophen(Tylenol) or NSAIDS. Patient verbalized understanding     Thank you,   Trish

## 2023-02-08 NOTE — PROGRESS NOTES
The bilirubin is mildly elevated as it has been intermittently before. The AST and ALT however are elevated. Will need to recheck in 2 wks. CLAUDIA

## 2023-02-09 ENCOUNTER — CLINICAL SUPPORT (OUTPATIENT)
Dept: REHABILITATION | Facility: HOSPITAL | Age: 68
End: 2023-02-09
Payer: MEDICARE

## 2023-02-09 DIAGNOSIS — M25.60 DECREASED RANGE OF MOTION: Primary | ICD-10-CM

## 2023-02-09 PROCEDURE — 97112 NEUROMUSCULAR REEDUCATION: CPT | Mod: PN

## 2023-02-09 PROCEDURE — 97110 THERAPEUTIC EXERCISES: CPT | Mod: PN

## 2023-02-10 LAB — HBV DNA SERPL QL NAA+PROBE: NOT DETECTED

## 2023-02-14 ENCOUNTER — CLINICAL SUPPORT (OUTPATIENT)
Dept: REHABILITATION | Facility: HOSPITAL | Age: 68
End: 2023-02-14
Payer: MEDICARE

## 2023-02-14 DIAGNOSIS — M25.60 DECREASED RANGE OF MOTION: Primary | ICD-10-CM

## 2023-02-14 PROCEDURE — 97112 NEUROMUSCULAR REEDUCATION: CPT | Mod: PN | Performed by: PHYSICAL THERAPIST

## 2023-02-14 PROCEDURE — 97110 THERAPEUTIC EXERCISES: CPT | Mod: PN | Performed by: PHYSICAL THERAPIST

## 2023-02-14 PROCEDURE — 97140 MANUAL THERAPY 1/> REGIONS: CPT | Mod: PN | Performed by: PHYSICAL THERAPIST

## 2023-02-14 NOTE — PROGRESS NOTES
Physical Therapy Re-evaluation     Name: Jill Marquez  Clinic Number: [id number]    Therapy Diagnosis:   Encounter Diagnoses   Name Primary?    Decreased range of motion Yes    Pelvic floor dysfunction in female     Urinary incontinence, mixed        Physician: Nicanor Campos MD    Visit Date: 2/16/2023    Physician Orders: PT Eval and Treat  Medical Diagnosis from Referral:        Encounter Diagnoses   Name Primary?    Greater trochanteric bursitis of left hip      Iliotibial band syndrome, left     NEW: M62.89 (ICD-10-CM) - Pelvic floor tension    Evaluation Date: 1/12/2023  Authorization Period Expiration: 12/21/2023  Plan of Care Expiration: 4/12/2023  Progress Note Due: 2/12/2023  Visit # / Visits authorized: 7/20  FOTO: 1/3 (last performed on 1/12/2023)     Precautions: Standard    Time In: 9:00 AM  Time Out: 9:55 AM  Total Billable Time: 55 minutes      Subjective     Pt reports: left sided pelvic pain started in November. She mentioned it to her OB, but never received treatment for the pain. Patient reports this Sunday the pain increased in severity. She also reported a history of mixed urinary incontinence. She has a hard time making it to the restroom first thing in the morning and some times leaks urine. She has always had trouble with constipation even as a child, but taking suppliments for it which has her constipation under control. OBGYN doesn't want her straining for bowel movements secondary to bladder sling. She reports having almost full bladder emptying leak in the morning once a week.      She was compliant with home exercise program.  Response to previous treatment: slight decrease in back/hip pain   Functional change: no change reported     Pain: 3/10  Location: left lower abdomen/anterior pelvis     Constitutional Symptoms Review: The patient denies having any constitutional symptoms.     Objective   GIVE pelvic floor FOTO NEXT VISIT     See EMR under MEDIA for written consent provided    Chaperone: declined    OB/GYN History: , vaginal delivery, perineal laceration, Vacuum-Assisted Vaginal Delivery, Forceps-Assisted Vaginal Delivery , and menopause  Using vaginal estrogen cream: Yes  Sexually active? Not so much secondary to her husbands cancer treatments  Pain with vaginal exams, intercourse or tampon use? none reported    Bladder/Bowel History: urinary incontinence and constipation/straining for movement  Frequency of urination:   Daytime: with in normal limits             Nighttime: 0-1  Difficulty initiating urine stream: No  Urine stream: strong  Complete emptying: Yes  Bladder leakage: Yes  Activities that cause leakage: urge and stress, especially sneezing  Frequency of incidents: once a day   Amount leaked (urine): small squirt  and moderate amount  Urinary Urgency: Yes.  Able to delay the urge for at least 0-5 minute(s).  Pain with delaying the urge to urinate: No     Frequency of bowel movements: once a day  Difficulty initiating BM: No  Quality/Shape of BM: with in normal limits    Does Patient Feel Empty after BM? Yes  Bowel Urgency: No.  Able to delay the urge for at least 0-60 minute(s).  Fiber Supplements or Laxative Use? Yes cenecot and metamucil    Pain with BM: No   Bleeding with BM: No   Colon leakage: No    Form of protection: pad  Number of pads required in 24 hours: 1    Pain:  Location: pelvic pain  Current 3/10, worst 5/10, best 2/10   Pelvic Pain Duration never abates  Location of Pelvic Pain: lower abdomen/ anterior pelvis   Description: Sharp  Aggravating Factors/Activities that cause symptoms: none that patient can recall    Easing Factors: rest       VAGINAL PELVIC FLOOR EXAM    EXTERNAL ASSESSMENT  Introitus: WNL  Skin condition: WNL  Scarring: perineal laceration scar noted posterior lateral to the left    Sensation: WNL   Pain: none  Voluntary contraction: visible lift  Voluntary relaxation: with in normal limits    Involuntary contraction: nil  Bearing down:  "bulge  Perineal descent: absent      INTERNAL ASSESSMENT  Pain: tender areas noted as follows: left side of levator ani    Sensation: able to localized pressure appropriately   Vaginal vault: WNL   Muscle Bulk: slightly hypertonus   Muscle Power: 3+ to 4/5 with verbal cues   Muscle Endurance: 5 sec  # Reps To Fatigue: 2  Fast Contractions in 10 seconds: 4     Quality of contraction: incomplete relaxation   Specificity: WNL   Coordination: WNL   Prolapse check: Grade 2 cystocele  Does Pelvic Floor drop and relax with a diaphragmatic breath? no      Therapeutic Exercise to develop  strength, endurance, ROM, flexibility, posture, and core stabilization for 15 minutes including:   Modified abena stretch 2 x 1 min   Supine butterfly stretch x 1 min   Happy baby 2 x 1 min   Prone press ups 2 x 10   Pelvic circles on physio ball x 20 cw/ccw    Deferred:   Multifidus isometric 10x each - red theraband   Rotational strengthening 10x each - red theraband   Steam boats x 10   Seated inna pose 10" x 3   DKTC  x 1 min   SL reverse clams 2 x 10   Alternating hip adduction and abduction isometric 10 x 10"  Prone press up 2 x 10 hold position through one breath   Sit to stands with 10# kettle bell 2 x 10   Anti rotations 2 x 10 7#   Quadruped cat/cow 5" x 10   Bridges with adduction ball 2 x 10   posterior pelvic tilts 10 x 10 seconds   Open books 5" x 10 (stretching left)   Piriformis stretch 3 x 30"   Glute stretch 3 x 30" on the left     Neuromuscular Re-education to develop Coordination, Posture, and Proprioception for 15 minutes including:   Test and measures listed above  Kegels with verbal cues   Down training of pelvic floor hypertonicity     Deferred: Transverse abdominis contraction 5" x 10    with march   Diaphragmatic breathing x 3 minutes (maximal tactile and verbal cues)   free motion rows 13# 2 x 10  lat pull down 10# each 2 x 10   tricep ext 13# 2 x 10   free motion shoulder ext with march on foam 7# each 2 x 10 "   Quadruped bird dogs 2 x 10      Manual Therapy to develop flexibility, extensibility, and desensitization for 15 minutes including:   Myofascial release of bilateral  hip flexors and adductors     Deferred: trigger point/myofascial release of left quadratus lumborum    Therapeutic Activity Education as described below provided for 25 minutes including:   -urge delay strategies  -pelvic floor anatomy  -pelvic floor plan of care     supervised modalities after being cleared for contradictions: IFC Electrical Stimulation:  Jill received IFC Electrical Stimulation for pain control applied to the left hip for 0 minutes. Jill tolderated treatment well without any adverse effects.      hot pack for 0 minutes to left hip.     Home Exercises Provided and Patient Education Provided     Education provided:   anatomy/physiology of pelvic floor, posture/body mechanices, diaphragmatic breathing, isometric abdominal exercises, and behavior modifications  Discussed progression of plan of care with patient; educated pt in activity modification; reviewed HEP with pt. Pt demonstrated and verbalized understanding of all instruction and was provided with a handout of HEP (see Patient Instructions).    Written Home Exercises Provided: yes.  Exercises were reviewed and Jill was able to demonstrate them prior to the end of the session.  Jill demonstrated good  understanding of the education provided.     See EMR under Patient Instructions for exercises provided  2/16/2023 .    Assessment     A re-eval was performed today secondary to patient reporting pelvic floor symptoms such as pelvic pain and urinary incontinence. Pt presents with altered posture, pelvic asymmetry, pelvic floor tenderness, decreased pelvic muscle strength, decreased endurance of the pelvic muscles, decreased phasic ability of the pelvic muscles, and poor coordination of pelvic floor muscles during ADL's leading to urinary or fecal leakage. Patient presents  with signs and symptoms consistent with pelvic floor dysfunction and mixed urinary incontinence. Patient will benefit from treatment of these conditions being added to her plan of care for therapy.       Jill Is progressing well towards her goals.   Pt prognosis is Good.     Anticipated barriers to physical therapy: none    Progress towards goals:  good    Goals:         Short Term Goals:  4 weeks Status  Date Met   PAIN: Pt will report worst pain of 7/10 in order to progress toward max functional ability and improve quality of life. [] Progressing  [x] Met  [] Not Met     FUNCTION: Patient will demonstrate improved function as indicated by a functional limitation score of less than or equal to 30 out of 100 on FOTO. [x] Progressing  [] Met  [] Not Met     MOBILITY: Patient will improve AROM to 50% of stated goals, listed in objective measures above, in order to progress towards independence with functional activities.  [x] Progressing  [] Met  [] Not Met     STRENGTH: Patient will improve strength to 50% of stated goals, listed in objective measures above, in order to progress towards independence with functional activities.  [x] Progressing  [] Met  [] Not Met     POSTURE: Patient will correct postural deviations in sitting and standing, to decrease pain and promote long term stability.  [] Progressing  [x] Met  [] Not Met     HEP: Patient will demonstrate independence with HEP in order to progress toward functional independence. [] Progressing  [x] Met  [] Not Met        Long Term Goals:  8 weeks Status Date Met   PAIN: Pt will report worst pain of 6/10 in order to progress toward max functional ability and improve quality of life [x] Progressing  [] Met  [] Not Met     FUNCTION: Patient will demonstrate improved function as indicated by a functional limitation score of less than or equal to 29 out of 100 on FOTO. [x] Progressing  [] Met  [] Not Met     MOBILITY: Patient will improve AROM to stated goals,  listed in objective measures above, in order to return to maximal functional potential and improve quality of life. [x] Progressing  [] Met  [] Not Met     STRENGTH: Patient will improve strength to stated goals, listed in objective measures above, in order to improve functional independence and quality of life. [x] Progressing  [] Met  [] Not Met     Patient will return to normal ADL's, IADL's, community involvement, recreational activities, and work-related activities with less than or equal to 6/10 pain and maximal function.  [x] Progressing  [] Met  [] Not Met      New goals: Short Term Goals: 4 weeks (added 2/16/2023)  Pt to be edu pelvic muscle bracing and be able to consistently perform correctly and quickly to help decrease incontinence with cough/laugh/sneeze.  Pt to be able to delay the urge to urinate at least 10 minutes with a strong urge to urinate in order to make it to the bathroom without leaking.    Long Term Goals: 12 weeks (added 2/16/2023)  Pt to report elimination of incontinence with ADLs to demonstrate improved pelvic floor muscle strength and coordination.  Pt to report no longer feeling the need to urinate just in case when shopping or participating in social activities to demonstrate improving pelvic floor and bladder control.    Pt's spiritual, cultural and educational needs considered and pt agreeable to plan of care and goals.    Plan   Plan of care Certification: 1/12/2023 to 4/12/2023.     Continue with established Plan of Care, working toward established PT goals.    Lelo Avila, PT, DPT, PPCES        02/16/2023

## 2023-02-14 NOTE — PROGRESS NOTES
"  Physical Therapy Daily Treatment Note     Name: Jill Marquez  Clinic Number: [id number]    Therapy Diagnosis:   No diagnosis found.      Physician: Nicanor Campos MD    Visit Date: 2/14/2023    Physician Orders: PT Eval and Treat  Medical Diagnosis from Referral:        Encounter Diagnoses   Name Primary?    Greater trochanteric bursitis of left hip      Iliotibial band syndrome, left     NEW: M62.89 (ICD-10-CM) - Pelvic floor tension    Evaluation Date: 1/12/2023  Authorization Period Expiration: 12/21/2023  Plan of Care Expiration: 4/12/2023  Progress Note Due: 2/12/2023  Visit # / Visits authorized: 6/20  FOTO: 1/3 (last performed on 1/12/2023)     Precautions: Standard    Time In: 1335  Time Out: 1430  Total Billable Time: 55 minutes      Subjective     Pt reports: Sunday flair up on the left back.     She was compliant with home exercise program.  Response to previous treatment: slight decrease in pain   Functional change: no change reported     Pain: 2/10  Location: left hip     Constitutional Symptoms Review: The patient denies having any constitutional symptoms.     Objective   No pelvic exam performed    Therapeutic Exercise to develop  strength, endurance, ROM, flexibility, posture, and core stabilization for 15 minutes including:   Bridges with adduction ball 2 x 10   posterior pelvic tilts 10 x 10 seconds   Open books 5" x 10 (stretching left)   Piriformis stretch 3 x 30"   Glute stretch 3 x 30" on the left   Modified abena stretch 2 x 1 min   Alternating hip adduction and abduction isometric 10 x 10"  Prone press up 2 x 10 hold position through one breath       Deferred today:   Sit to stands with 10# kettle bell 2 x 10   Anti rotations 2 x 10 7#   Quadruped cat/cow 5" x 10   Multifidus isometric 10x each - red theraband   Rotational strengthening 10x each - red theraband   Steam boats x 10   Seated inna pose 10" x 3   DKTC  x 1 min   SL reverse clams 2 x 10     Neuromuscular Re-education to " "develop Coordination, Posture, and Proprioception for 25 minutes including:   Transverse abdominis contraction 5" x 10    + with march   Diaphragmatic breathing x 3 minutes (maximal tactile and verbal cues)   free motion rows 13# 2 x 10  lat pull down 10# each 2 x 10   tricep ext 13# 2 x 10   free motion shoulder ext with march on foam 7# each 2 x 10     Deferred today:  Quadruped bird dogs 2 x 10      Manual Therapy to develop flexibility, extensibility, and desensitization for 15 minutes including: trigger point/myofascial release of left quadratus lumborum    Therapeutic Activity Education as described below provided throughout therapy session.    supervised modalities after being cleared for contradictions: IFC Electrical Stimulation:  Jill received IFC Electrical Stimulation for pain control applied to the left hip for 0 minutes. Jill tolderated treatment well without any adverse effects.      hot pack for 0 minutes to left hip.     Home Exercises Provided and Patient Education Provided     Education provided:   anatomy/physiology of pelvic floor, posture/body mechanices, diaphragmatic breathing, isometric abdominal exercises, and behavior modifications  Discussed progression of plan of care with patient; educated pt in activity modification; reviewed HEP with pt. Pt demonstrated and verbalized understanding of all instruction and was provided with a handout of HEP (see Patient Instructions).    Written Home Exercises Provided: yes.  Exercises were reviewed and Jill was able to demonstrate them prior to the end of the session.  Jill demonstrated good  understanding of the education provided.     See EMR under Patient Instructions for exercises provided 1/27/2023.    Assessment     Patient presents with hypertonicity in the left quadratus lumborum muscle, which normalized with manual therapy. Continued working on symmetrical core control and supported posturing through movement.  Pt will continue to " benefit from skilled outpatient physical therapy to address the deficits listed in the problem list box on initial evaluation, provide pt/family education and to maximize pt's level of independence in the home and community environment.       Jill Is progressing well towards her goals.   Pt prognosis is Good.     Anticipated barriers to physical therapy: none    Progress towards goals:  good    Goals:         Short Term Goals:  4 weeks Status  Date Met   PAIN: Pt will report worst pain of 7/10 in order to progress toward max functional ability and improve quality of life. [] Progressing  [x] Met  [] Not Met     FUNCTION: Patient will demonstrate improved function as indicated by a functional limitation score of less than or equal to 30 out of 100 on FOTO. [x] Progressing  [] Met  [] Not Met     MOBILITY: Patient will improve AROM to 50% of stated goals, listed in objective measures above, in order to progress towards independence with functional activities.  [x] Progressing  [] Met  [] Not Met     STRENGTH: Patient will improve strength to 50% of stated goals, listed in objective measures above, in order to progress towards independence with functional activities.  [x] Progressing  [] Met  [] Not Met     POSTURE: Patient will correct postural deviations in sitting and standing, to decrease pain and promote long term stability.  [] Progressing  [x] Met  [] Not Met     HEP: Patient will demonstrate independence with HEP in order to progress toward functional independence. [] Progressing  [x] Met  [] Not Met        Long Term Goals:  8 weeks Status Date Met   PAIN: Pt will report worst pain of 6/10 in order to progress toward max functional ability and improve quality of life [x] Progressing  [] Met  [] Not Met     FUNCTION: Patient will demonstrate improved function as indicated by a functional limitation score of less than or equal to 29 out of 100 on FOTO. [x] Progressing  [] Met  [] Not Met     MOBILITY: Patient  will improve AROM to stated goals, listed in objective measures above, in order to return to maximal functional potential and improve quality of life. [x] Progressing  [] Met  [] Not Met     STRENGTH: Patient will improve strength to stated goals, listed in objective measures above, in order to improve functional independence and quality of life. [x] Progressing  [] Met  [] Not Met     Patient will return to normal ADL's, IADL's, community involvement, recreational activities, and work-related activities with less than or equal to 6/10 pain and maximal function.  [x] Progressing  [] Met  [] Not Met      New/Revised goals: Continue with current established goals at this time.    Pt's spiritual, cultural and educational needs considered and pt agreeable to plan of care and goals.    Plan   Plan of care Certification: 1/12/2023 to 4/12/2023.     Continue with established Plan of Care, working toward established PT goals.  Magali Plaza, PT, DPT, PPCES        2/14/2023

## 2023-02-16 ENCOUNTER — CLINICAL SUPPORT (OUTPATIENT)
Dept: REHABILITATION | Facility: HOSPITAL | Age: 68
End: 2023-02-16
Payer: MEDICARE

## 2023-02-16 DIAGNOSIS — N39.46 URINARY INCONTINENCE, MIXED: ICD-10-CM

## 2023-02-16 DIAGNOSIS — M25.60 DECREASED RANGE OF MOTION: Primary | ICD-10-CM

## 2023-02-16 DIAGNOSIS — M62.89 PELVIC FLOOR DYSFUNCTION IN FEMALE: ICD-10-CM

## 2023-02-16 PROCEDURE — 97112 NEUROMUSCULAR REEDUCATION: CPT | Mod: PN

## 2023-02-16 PROCEDURE — 97140 MANUAL THERAPY 1/> REGIONS: CPT | Mod: PN

## 2023-02-16 PROCEDURE — 97110 THERAPEUTIC EXERCISES: CPT | Mod: PN

## 2023-02-16 PROCEDURE — 97530 THERAPEUTIC ACTIVITIES: CPT | Mod: PN

## 2023-02-16 NOTE — PLAN OF CARE
Physical Therapy Re-evaluation     Name: Jill Marquez  Clinic Number: [id number]    Therapy Diagnosis:   No diagnosis found.      Physician: Nicanor Campos MD    Visit Date: 2023    Physician Orders: PT Eval and Treat  Medical Diagnosis from Referral:        Encounter Diagnoses   Name Primary?    Greater trochanteric bursitis of left hip      Iliotibial band syndrome, left     NEW: M62.89 (ICD-10-CM) - Pelvic floor tension    Evaluation Date: 2023  Authorization Period Expiration: 2023  Plan of Care Expiration: 2023  Progress Note Due: 3/16/2023  Visit # / Visits authorized:   FOTO: 1/3 (last performed on 2023)     Precautions: Standard    Time In: 9:00 AM  Time Out: 10:10 AM  Total Billable Time: 70 minutes      Subjective     Pt reports: left sided pelvic pain started in November. She mentioned it to her OB, but never received treatment for the pain. Patient reports this  the pain increased in severity. She also reported a history of mixed urinary incontinence. She has a hard time making it to the restroom first thing in the morning and some times leaks urine. She has always had trouble with constipation even as a child, but taking suppliments for it which has her constipation under control. OBGYN doesn't want her straining for bowel movements secondary to bladder sling. She reports having almost full bladder emptying leak in the morning once a week.      She was compliant with home exercise program.  Response to previous treatment: slight decrease in back/hip pain   Functional change: no change reported     Pain: 3/10  Location: left lower abdomen/anterior pelvis     Constitutional Symptoms Review: The patient denies having any constitutional symptoms.     Objective   See EMR under MEDIA for written consent provided   Chaperone: declined    OB/GYN History: , vaginal delivery, perineal laceration, Vacuum-Assisted Vaginal Delivery, Forceps-Assisted Vaginal Delivery , and  menopause  Using vaginal estrogen cream: Yes  Sexually active? Not so much secondary to her husbands cancer treatments  Pain with vaginal exams, intercourse or tampon use? none reported    Bladder/Bowel History: urinary incontinence and constipation/straining for movement  Frequency of urination:   Daytime: with in normal limits             Nighttime: 0-1  Difficulty initiating urine stream: No  Urine stream: strong  Complete emptying: Yes  Bladder leakage: Yes  Activities that cause leakage: urge and stress, especially sneezing  Frequency of incidents: once a day   Amount leaked (urine): small squirt  and moderate amount  Urinary Urgency: Yes.  Able to delay the urge for at least 0-5 minute(s).  Pain with delaying the urge to urinate: No     Frequency of bowel movements: once a day  Difficulty initiating BM: No  Quality/Shape of BM: with in normal limits    Does Patient Feel Empty after BM? Yes  Bowel Urgency: No.  Able to delay the urge for at least 0-60 minute(s).  Fiber Supplements or Laxative Use? Yes cenecot and metamucil    Pain with BM: No   Bleeding with BM: No   Colon leakage: No    Form of protection: pad  Number of pads required in 24 hours: 1    Pain:  Location: pelvic pain  Current 3/10, worst 5/10, best 2/10   Pelvic Pain Duration never abates  Location of Pelvic Pain: lower abdomen/ anterior pelvis   Description: Sharp  Aggravating Factors/Activities that cause symptoms: none that patient can recall    Easing Factors: rest       VAGINAL PELVIC FLOOR EXAM    EXTERNAL ASSESSMENT  Introitus: WNL  Skin condition: WNL  Scarring: perineal laceration scar noted posterior lateral to the left    Sensation: WNL   Pain: none  Voluntary contraction: visible lift  Voluntary relaxation: with in normal limits    Involuntary contraction: nil  Bearing down: bulge  Perineal descent: absent      INTERNAL ASSESSMENT  Pain: tender areas noted as follows: left side of levator ani    Sensation: able to localized pressure  appropriately   Vaginal vault: WNL   Muscle Bulk: slightly hypertonus   Muscle Power: 3+ to 4/5 with verbal cues   Muscle Endurance: 5 sec  # Reps To Fatigue: 2  Fast Contractions in 10 seconds: 4     Quality of contraction: incomplete relaxation   Specificity: WNL   Coordination: WNL   Prolapse check: Grade 2 cystocele  Does Pelvic Floor drop and relax with a diaphragmatic breath? no    Home Exercises Provided and Patient Education Provided     Education provided:   anatomy/physiology of pelvic floor, posture/body mechanices, diaphragmatic breathing, isometric abdominal exercises, and behavior modifications  Discussed progression of plan of care with patient; educated pt in activity modification; reviewed HEP with pt. Pt demonstrated and verbalized understanding of all instruction and was provided with a handout of HEP (see Patient Instructions).    Written Home Exercises Provided: yes.  Exercises were reviewed and Jill was able to demonstrate them prior to the end of the session.  Jill demonstrated good  understanding of the education provided.     See EMR under Patient Instructions for exercises provided 2/16/2023.    Assessment     A re-eval was performed today secondary to patient reporting pelvic floor symptoms such as pelvic pain and urinary incontinence. Pt presents with altered posture, pelvic asymmetry, pelvic floor tenderness, decreased pelvic muscle strength, decreased endurance of the pelvic muscles, decreased phasic ability of the pelvic muscles, and poor coordination of pelvic floor muscles during ADL's leading to urinary or fecal leakage. Patient presents with signs and symptoms consistent with pelvic floor dysfunction and mixed urinary incontinence. Patient will benefit from treatment of these conditions being added to her plan of care for therapy.       Jill Is progressing well towards her goals.   Pt prognosis is Good.     Anticipated barriers to physical therapy: none    Progress towards  goals:  good    Goals:         Short Term Goals:  4 weeks Status  Date Met   PAIN: Pt will report worst pain of 7/10 in order to progress toward max functional ability and improve quality of life. [] Progressing  [x] Met  [] Not Met     FUNCTION: Patient will demonstrate improved function as indicated by a functional limitation score of less than or equal to 30 out of 100 on FOTO. [x] Progressing  [] Met  [] Not Met     MOBILITY: Patient will improve AROM to 50% of stated goals, listed in objective measures above, in order to progress towards independence with functional activities.  [x] Progressing  [] Met  [] Not Met     STRENGTH: Patient will improve strength to 50% of stated goals, listed in objective measures above, in order to progress towards independence with functional activities.  [x] Progressing  [] Met  [] Not Met     POSTURE: Patient will correct postural deviations in sitting and standing, to decrease pain and promote long term stability.  [] Progressing  [x] Met  [] Not Met     HEP: Patient will demonstrate independence with HEP in order to progress toward functional independence. [] Progressing  [x] Met  [] Not Met        Long Term Goals:  8 weeks Status Date Met   PAIN: Pt will report worst pain of 6/10 in order to progress toward max functional ability and improve quality of life [x] Progressing  [] Met  [] Not Met     FUNCTION: Patient will demonstrate improved function as indicated by a functional limitation score of less than or equal to 29 out of 100 on FOTO. [x] Progressing  [] Met  [] Not Met     MOBILITY: Patient will improve AROM to stated goals, listed in objective measures above, in order to return to maximal functional potential and improve quality of life. [x] Progressing  [] Met  [] Not Met     STRENGTH: Patient will improve strength to stated goals, listed in objective measures above, in order to improve functional independence and quality of life. [x] Progressing  [] Met  [] Not Met      Patient will return to normal ADL's, IADL's, community involvement, recreational activities, and work-related activities with less than or equal to 6/10 pain and maximal function.  [x] Progressing  [] Met  [] Not Met      New goals: Short Term Goals: 4 weeks (added 2/16/2023)  Pt to be edu pelvic muscle bracing and be able to consistently perform correctly and quickly to help decrease incontinence with cough/laugh/sneeze.  Pt to be able to delay the urge to urinate at least 10 minutes with a strong urge to urinate in order to make it to the bathroom without leaking.    Long Term Goals: 12 weeks (added 2/16/2023)  Pt to report elimination of incontinence with ADLs to demonstrate improved pelvic floor muscle strength and coordination.  Pt to report no longer feeling the need to urinate just in case when shopping or participating in social activities to demonstrate improving pelvic floor and bladder control.    Pt's spiritual, cultural and educational needs considered and pt agreeable to plan of care and goals.    Plan   Plan of care Certification: 1/12/2023 to 4/12/2023.     Continue with established Plan of Care, working toward established PT goals.    Lelo Avila, PT, DPT, PPCES        02/16/2023

## 2023-02-16 NOTE — PATIENT INSTRUCTIONS
Home Exercise Program: 02/16/2023    Hip and Pelvic Stretching Program                                 Hold 60 seconds, repeat 2 times.  Pull your knee close enough to your chest to keep your back flat.

## 2023-02-21 ENCOUNTER — CLINICAL SUPPORT (OUTPATIENT)
Dept: REHABILITATION | Facility: HOSPITAL | Age: 68
End: 2023-02-21
Payer: MEDICARE

## 2023-02-21 DIAGNOSIS — M25.60 DECREASED RANGE OF MOTION: Primary | ICD-10-CM

## 2023-02-21 DIAGNOSIS — M62.89 PELVIC FLOOR DYSFUNCTION IN FEMALE: ICD-10-CM

## 2023-02-21 DIAGNOSIS — N39.46 URINARY INCONTINENCE, MIXED: ICD-10-CM

## 2023-02-21 PROCEDURE — 97112 NEUROMUSCULAR REEDUCATION: CPT | Mod: PN | Performed by: PHYSICAL THERAPIST

## 2023-02-21 PROCEDURE — 97140 MANUAL THERAPY 1/> REGIONS: CPT | Mod: PN | Performed by: PHYSICAL THERAPIST

## 2023-02-21 PROCEDURE — 97110 THERAPEUTIC EXERCISES: CPT | Mod: PN | Performed by: PHYSICAL THERAPIST

## 2023-02-21 NOTE — PROGRESS NOTES
"  Physical Therapy Daily Treatment Note     Name: Jill Marquez  Clinic Number: [id number]    Therapy Diagnosis:   Encounter Diagnoses   Name Primary?    Pelvic floor dysfunction in female Yes    Decreased range of motion     Urinary incontinence, mixed      Physician: Nicanor Campos MD    Visit Date: 2/21/2023    Physician Orders: PT Eval and Treat  Medical Diagnosis from Referral:        Encounter Diagnoses   Name Primary?    Greater trochanteric bursitis of left hip      Iliotibial band syndrome, left     NEW: M62.89 (ICD-10-CM) - Pelvic floor tension    Evaluation Date: 1/12/2023  Authorization Period Expiration: 12/21/2023  Plan of Care Expiration: 4/12/2023  Progress Note Due: 2/12/2023  Visit # / Visits authorized: 8/20  FOTO: 1/3 (last performed on 1/12/2023)     Precautions: Standard    Time In: 1335  Time Out: 1425  Total Billable Time: 50 minutes      Subjective     Pt reports she hasn't had to take any tylenol, no flair ups.     She was compliant with home exercise program.  Response to previous treatment: slight decrease in pain   Functional change: no change reported     Pain: 2/10  Location: left hip     Constitutional Symptoms Review: The patient denies having any constitutional symptoms.     Objective   No pelvic exam performed    Therapeutic Exercise to develop  strength, endurance, ROM, flexibility, posture, and core stabilization for 20 minutes including:   Bridges with adduction ball 2 x 10   posterior pelvic tilts 10 x 10 seconds   Open books 5" x 10 (stretching left)   Piriformis stretch 3 x 30"   Glute stretch 3 x 30" on the left   Modified abena stretch 2 x 1 minute  Happy Baby stretch x 1 minute  Alternating hip adduction and abduction isometric 10 x 10"  Prone press up 2 x 10 hold position through one breath   Alternating hip extension 20x      Deferred today:   Sit to stands with 10# kettle bell 2 x 10   Anti rotations 2 x 10 7#   Quadruped cat/cow 5" x 10   Multifidus isometric 10x " "each - red theraband   Rotational strengthening 10x each - red theraband   Steam boats x 10   Seated inna pose 10" x 3   DKTC  x 1 min   SL reverse clams 2 x 10     Neuromuscular Re-education to develop Coordination, Posture, and Proprioception for 15 minutes including:   Transverse abdominis contraction 5" x 10    + with march   Diaphragmatic breathing x 3 minutes (maximal tactile and verbal cues)   free motion rows 13# 2 x 10  lat pull down 10# each 2 x 10   tricep ext 13# 2 x 10   free motion shoulder ext with march on foam 7# each 2 x 10     Deferred today:  Quadruped bird dogs 2 x 10      Manual Therapy to develop flexibility, extensibility, and desensitization for 15 minutes including:  trigger point/myofascial release of quadratus lumborum muscles    Therapeutic Activity Education as described below provided throughout therapy session.        Home Exercises Provided and Patient Education Provided     Education provided:   anatomy/physiology of pelvic floor, posture/body mechanices, diaphragmatic breathing, isometric abdominal exercises, and behavior modifications  Discussed progression of plan of care with patient; educated pt in activity modification; reviewed HEP with pt. Pt demonstrated and verbalized understanding of all instruction and was provided with a handout of HEP (see Patient Instructions).    Written Home Exercises Provided: yes.  Exercises were reviewed and Jill was able to demonstrate them prior to the end of the session.  Jill demonstrated good  understanding of the education provided.     See EMR under Patient Instructions for exercises provided 1/27/2023.    Assessment     Patient presents with hypertonicity in the left quadratus lumborum muscle, which normalized with manual therapy. Continued working on symmetrical core control through movement.  Patient demonstrated appropriate response to exercises and activities today.    Pt will continue to benefit from skilled outpatient physical " therapy to address the deficits listed in the problem list box on initial evaluation, provide pt/family education and to maximize pt's level of independence in the home and community environment.       Jill Is progressing well towards her goals.   Pt prognosis is Good.     Anticipated barriers to physical therapy: none    Progress towards goals:  good    Goals:         Short Term Goals:  4 weeks Status  Date Met   PAIN: Pt will report worst pain of 7/10 in order to progress toward max functional ability and improve quality of life. [] Progressing  [x] Met  [] Not Met     FUNCTION: Patient will demonstrate improved function as indicated by a functional limitation score of less than or equal to 30 out of 100 on FOTO. [x] Progressing  [] Met  [] Not Met     MOBILITY: Patient will improve AROM to 50% of stated goals, listed in objective measures above, in order to progress towards independence with functional activities.  [x] Progressing  [] Met  [] Not Met     STRENGTH: Patient will improve strength to 50% of stated goals, listed in objective measures above, in order to progress towards independence with functional activities.  [x] Progressing  [] Met  [] Not Met     POSTURE: Patient will correct postural deviations in sitting and standing, to decrease pain and promote long term stability.  [] Progressing  [x] Met  [] Not Met     HEP: Patient will demonstrate independence with HEP in order to progress toward functional independence. [] Progressing  [x] Met  [] Not Met        Long Term Goals:  8 weeks Status Date Met   PAIN: Pt will report worst pain of 6/10 in order to progress toward max functional ability and improve quality of life [x] Progressing  [] Met  [] Not Met     FUNCTION: Patient will demonstrate improved function as indicated by a functional limitation score of less than or equal to 29 out of 100 on FOTO. [x] Progressing  [] Met  [] Not Met     MOBILITY: Patient will improve AROM to stated goals, listed  in objective measures above, in order to return to maximal functional potential and improve quality of life. [x] Progressing  [] Met  [] Not Met     STRENGTH: Patient will improve strength to stated goals, listed in objective measures above, in order to improve functional independence and quality of life. [x] Progressing  [] Met  [] Not Met     Patient will return to normal ADL's, IADL's, community involvement, recreational activities, and work-related activities with less than or equal to 6/10 pain and maximal function.  [x] Progressing  [] Met  [] Not Met      New/Revised goals: Continue with current established goals at this time.    Pt's spiritual, cultural and educational needs considered and pt agreeable to plan of care and goals.    Plan   Plan of care Certification: 1/12/2023 to 4/12/2023.     Continue with established Plan of Care, working toward established PT goals.  Maagli Plaza, PT, DPT, PPCES        2/22/2023

## 2023-02-22 ENCOUNTER — LAB VISIT (OUTPATIENT)
Dept: LAB | Facility: HOSPITAL | Age: 68
End: 2023-02-22
Attending: INTERNAL MEDICINE
Payer: MEDICARE

## 2023-02-22 ENCOUNTER — OFFICE VISIT (OUTPATIENT)
Dept: INTERNAL MEDICINE | Facility: CLINIC | Age: 68
End: 2023-02-22
Payer: MEDICARE

## 2023-02-22 VITALS
HEIGHT: 64 IN | WEIGHT: 128.31 LBS | HEART RATE: 67 BPM | BODY MASS INDEX: 21.91 KG/M2 | DIASTOLIC BLOOD PRESSURE: 80 MMHG | OXYGEN SATURATION: 99 % | SYSTOLIC BLOOD PRESSURE: 120 MMHG

## 2023-02-22 DIAGNOSIS — Z79.899 OTHER LONG TERM (CURRENT) DRUG THERAPY: ICD-10-CM

## 2023-02-22 DIAGNOSIS — L40.50 PSA (PSORIATIC ARTHRITIS): ICD-10-CM

## 2023-02-22 DIAGNOSIS — R74.01 HIGH TRANSAMINASE LEVELS: ICD-10-CM

## 2023-02-22 DIAGNOSIS — E78.5 HYPERLIPIDEMIA, UNSPECIFIED HYPERLIPIDEMIA TYPE: ICD-10-CM

## 2023-02-22 DIAGNOSIS — E03.9 HYPOTHYROIDISM, UNSPECIFIED TYPE: ICD-10-CM

## 2023-02-22 DIAGNOSIS — D84.9 IMMUNOCOMPROMISED, ACQUIRED: ICD-10-CM

## 2023-02-22 DIAGNOSIS — D75.89 MACROCYTOSIS WITHOUT ANEMIA: ICD-10-CM

## 2023-02-22 DIAGNOSIS — R53.83 FATIGUE, UNSPECIFIED TYPE: ICD-10-CM

## 2023-02-22 DIAGNOSIS — M47.816 LUMBAR SPONDYLOSIS: ICD-10-CM

## 2023-02-22 DIAGNOSIS — R79.89 ELEVATED LFTS: ICD-10-CM

## 2023-02-22 DIAGNOSIS — I10 PRIMARY HYPERTENSION: Primary | ICD-10-CM

## 2023-02-22 LAB
ALBUMIN SERPL BCP-MCNC: 4.4 G/DL (ref 3.5–5.2)
ALP SERPL-CCNC: 50 U/L (ref 55–135)
ALT SERPL W/O P-5'-P-CCNC: 49 U/L (ref 10–44)
AST SERPL-CCNC: 38 U/L (ref 10–40)
BILIRUB DIRECT SERPL-MCNC: 0.4 MG/DL (ref 0.1–0.3)
BILIRUB SERPL-MCNC: 1 MG/DL (ref 0.1–1)
CK SERPL-CCNC: 190 U/L (ref 20–180)
FOLATE SERPL-MCNC: 15.3 NG/ML (ref 4–24)
GGT SERPL-CCNC: 137 U/L (ref 8–55)
PROT SERPL-MCNC: 7.2 G/DL (ref 6–8.4)
T4 FREE SERPL-MCNC: 1.07 NG/DL (ref 0.71–1.51)
TSH SERPL DL<=0.005 MIU/L-ACNC: 0.36 UIU/ML (ref 0.4–4)
VIT B12 SERPL-MCNC: 448 PG/ML (ref 210–950)

## 2023-02-22 PROCEDURE — 99214 OFFICE O/P EST MOD 30 MIN: CPT | Mod: PBBFAC | Performed by: INTERNAL MEDICINE

## 2023-02-22 PROCEDURE — 99214 PR OFFICE/OUTPT VISIT, EST, LEVL IV, 30-39 MIN: ICD-10-PCS | Mod: S$PBB,,, | Performed by: INTERNAL MEDICINE

## 2023-02-22 PROCEDURE — 84439 ASSAY OF FREE THYROXINE: CPT | Performed by: INTERNAL MEDICINE

## 2023-02-22 PROCEDURE — 99999 PR PBB SHADOW E&M-EST. PATIENT-LVL IV: CPT | Mod: PBBFAC,,, | Performed by: INTERNAL MEDICINE

## 2023-02-22 PROCEDURE — 82550 ASSAY OF CK (CPK): CPT | Performed by: INTERNAL MEDICINE

## 2023-02-22 PROCEDURE — 82746 ASSAY OF FOLIC ACID SERUM: CPT | Performed by: INTERNAL MEDICINE

## 2023-02-22 PROCEDURE — 99214 OFFICE O/P EST MOD 30 MIN: CPT | Mod: S$PBB,,, | Performed by: INTERNAL MEDICINE

## 2023-02-22 PROCEDURE — 84443 ASSAY THYROID STIM HORMONE: CPT | Performed by: INTERNAL MEDICINE

## 2023-02-22 PROCEDURE — 82977 ASSAY OF GGT: CPT | Performed by: INTERNAL MEDICINE

## 2023-02-22 PROCEDURE — 82607 VITAMIN B-12: CPT | Performed by: INTERNAL MEDICINE

## 2023-02-22 PROCEDURE — 36415 COLL VENOUS BLD VENIPUNCTURE: CPT | Performed by: INTERNAL MEDICINE

## 2023-02-22 PROCEDURE — 80076 HEPATIC FUNCTION PANEL: CPT | Performed by: INTERNAL MEDICINE

## 2023-02-22 PROCEDURE — 99999 PR PBB SHADOW E&M-EST. PATIENT-LVL IV: ICD-10-PCS | Mod: PBBFAC,,, | Performed by: INTERNAL MEDICINE

## 2023-02-22 NOTE — PROGRESS NOTES
Subjective:       Patient ID: Jill Marquez is a 67 y.o. female.    Chief Complaint: Follow-up    Patient psoriatic arthritis hypertension hypothyroidism, dyslipidemia, comes in for follow-up.  She sees Rheumatology and urogynecology.  We reviewed prescriptions and lab results.  She denies any new complaints although she is trying to make sure that her  who is going through a hematologic issue stays healthy.    She denies any GI or  complaints.  No side effects to her medication.  We reviewed these in detail and will continue to follow.  Recent liver test elevation may have been from Tylenol as she cut out Tylenol and the numbers improved significantly in just a short period.    Review of Systems   Constitutional:  Negative for appetite change, chills and fever.   HENT:  Negative for nosebleeds and sore throat.    Eyes:  Negative for pain and visual disturbance.   Respiratory:  Negative for cough, shortness of breath and wheezing.    Cardiovascular:  Negative for chest pain and leg swelling.   Gastrointestinal:  Negative for abdominal pain, constipation and diarrhea.   Endocrine: Negative for polyuria.   Genitourinary:  Negative for difficulty urinating, hematuria and vaginal bleeding.   Musculoskeletal:  Positive for arthralgias and joint deformity (fingers). Negative for back pain, gait problem and neck pain.   Integumentary:  Negative for pallor and rash.   Neurological:  Negative for tremors, seizures and headaches.   Hematological:  Does not bruise/bleed easily.   Psychiatric/Behavioral:  Negative for dysphoric mood. The patient is not nervous/anxious.          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    HTN (hypertension) 7/23/2012    Hyperlipidemia     Hypothyroid 7/23/2012     Past Surgical History:   Procedure Laterality Date    COLONOSCOPY N/A 1/25/2016    Procedure: COLONOSCOPY;  Surgeon: DAYNA Simmons MD;   Location: [facility] ([facility]);  Service: Endoscopy;  Laterality: N/A;    COLONOSCOPY N/A 11/8/2022    Procedure: COLONOSCOPY;  Surgeon: DAYNA Simmons MD;  Location: [facility] ([facility]);  Service: Endoscopy;  Laterality: N/A;  vacc-inst portal-vargus only-tb  precall done.arrival time of 10:00 confirmed/mleone    COSMETIC SURGERY      EYE SURGERY      eyelid surgery      HYSTERECTOMY  2004    prolapse    OOPHORECTOMY Bilateral 2015    Tonsillectomy      TONSILLECTOMY        Patient Active Problem List   Diagnosis    Hypothyroid    HTN (hypertension)    Displacement of thoracic intervertebral disc without myelopathy    Hyperlipidemia    Lumbar spondylosis    Carpal tunnel syndrome    Lumbar radiculopathy    Sicca    Fatigue    Myalgia and myositis    Erosive osteoarthritis of both hands    Leg pain, bilateral    Bilateral hand pain    Hepatitis B core antibody positive (negative viral load; suspect false positive)    Elevated CPK    Atrophic vaginitis    Chronic constipation    Screening for colon cancer    Chronic low back pain    CMC arthritis, thumb, degenerative    Mucous cyst of finger    Peripheral spondyloarthritis    Rash of back    PSA (psoriatic arthritis)    Left sided sciatica    Greater trochanteric bursitis of right hip    Medial epicondylitis of elbow, left    Pain    Edema    Decreased  strength    Decreased strength    Immunocompromised, acquired    Gastrocnemius equinus, left    Osteoarthritis of midfoot, left    Posterior tibial tendon dysfunction, left    Decreased range of motion    Pelvic floor dysfunction in female    Urinary incontinence, mixed        Objective:      Physical Exam  Constitutional:       General: She is not in acute distress.     Appearance: She is well-developed.   HENT:      Head: Normocephalic and atraumatic.      Right Ear: Tympanic membrane, ear canal and external ear normal.      Left Ear: Tympanic membrane, ear canal and external ear normal.      Mouth/Throat:       Pharynx: No oropharyngeal exudate or posterior oropharyngeal erythema.   Eyes:      General: No scleral icterus.     Conjunctiva/sclera: Conjunctivae normal.      Pupils: Pupils are equal, round, and reactive to light.   Neck:      Thyroid: No thyromegaly.   Cardiovascular:      Rate and Rhythm: Normal rate and regular rhythm.      Pulses: Normal pulses.      Heart sounds: No murmur heard.  Pulmonary:      Effort: Pulmonary effort is normal.      Breath sounds: Normal breath sounds. No wheezing.   Abdominal:      General: Bowel sounds are normal. There is no distension.      Palpations: Abdomen is soft.      Tenderness: There is no abdominal tenderness.   Musculoskeletal:         General: Tenderness and deformity (fingers) present.      Cervical back: Normal range of motion and neck supple.      Right lower leg: No edema.      Left lower leg: No edema.   Lymphadenopathy:      Cervical: No cervical adenopathy.   Skin:     Coloration: Skin is not jaundiced.      Findings: No rash.   Neurological:      General: No focal deficit present.      Mental Status: She is alert and oriented to person, place, and time.   Psychiatric:         Mood and Affect: Mood normal.         Behavior: Behavior normal.       Assessment:       Problem List Items Addressed This Visit          Neuro    Lumbar spondylosis       Cardiac/Vascular    HTN (hypertension) - Primary    Hyperlipidemia       Immunology/Multi System    Immunocompromised, acquired       Endocrine    Hypothyroid       Orthopedic    PSA (psoriatic arthritis)     Other Visit Diagnoses       Elevated LFTs                Plan:         Jill was seen today for follow-up.    Diagnoses and all orders for this visit:    Primary hypertension    PSA (psoriatic arthritis)  Comments:  Stable, monitored by Rheumatology.     Immunocompromised, acquired  Comments:  Monitored by Rheumatology    Elevated LFTs    Hypothyroidism, unspecified type    Lumbar spondylosis    Hyperlipidemia,  "unspecified hyperlipidemia type                     Portions of this note may have been created with voice recognition software. Occasional "wrong-word" or "sound-a-like" substitutions may have occurred due to the inherent limitations of voice recognition software. Please, read the note carefully and recognize, using context, where substitutions have occurred.  "

## 2023-02-22 NOTE — PROGRESS NOTES
"  Physical Therapy Re-evaluation     Name: Jill Marquez  Clinic Number: [id number]    Therapy Diagnosis:   Encounter Diagnoses   Name Primary?    Pelvic floor dysfunction in female Yes    Decreased range of motion     Urinary incontinence, mixed          Physician: Nicanor Campos MD    Visit Date: 2/23/2023    Physician Orders: PT Eval and Treat  Medical Diagnosis from Referral:        Encounter Diagnoses   Name Primary?    Greater trochanteric bursitis of left hip      Iliotibial band syndrome, left     NEW: M62.89 (ICD-10-CM) - Pelvic floor tension    Evaluation Date: 1/12/2023  Authorization Period Expiration: 12/21/2023  Plan of Care Expiration: 4/12/2023  Progress Note Due: 3/23/2023  Visit # / Visits authorized: 9/20  FOTO: 1/3 (last performed on 1/12/2023)     Precautions: Standard    Time In: 9:00 AM  Time Out: 9:55 AM  Total Billable Time: 55 minutes      Subjective     Pt reports: decreased urge incontinence with urge delay.     She was compliant with home exercise program.  Response to previous treatment: slight decrease in back/hip pain   Functional change: no change reported     Pain: 3/10  Location: posterior hip/ low back     Constitutional Symptoms Review: The patient denies having any constitutional symptoms.     Objective       Therapeutic Exercise to develop  strength, endurance, ROM, flexibility, posture, and core stabilization for 27 minutes including:   Modified abena stretch  x 1 min   Supine butterfly stretch x 1 min   Happy baby 2 x 1 min   Prone press ups 2 x 10   Pelvic circles on physio ball x 20 cw/ccw   DKTC 2 x 1 min   Bridges with adduction ball 2 x 10   posterior pelvic tilts 10 x 10 seconds   Open books 5" x 10 (stretching left)   Piriformis stretch 2 x 1 min each     Deferred:   Hamstring stretch   Multifidus isometric 10x each - red theraband   Rotational strengthening 10x each - red theraband   Steam boats x 10   Seated inna pose 10" x 3   SL reverse clams 2 x 10 " "  Alternating hip adduction and abduction isometric 10 x 10"  Prone press up 2 x 10 hold position through one breath   Sit to stands with 10# kettle bell 2 x 10   Anti rotations 2 x 10 7#   Quadruped cat/cow 5" x 10   Glute stretch 3 x 30" on the left     Neuromuscular Re-education to develop Coordination, Posture, and Proprioception for 3 minutes including:   Diaphragmatic breathing x 3 minutes (decreased tactile and verbal cues)     Deferred: Transverse abdominis contraction 5" x 10    with march   free motion rows 13# 2 x 10  lat pull down 10# each 2 x 10   tricep ext 13# 2 x 10   free motion shoulder ext with march on foam 7# each 2 x 10   Quadruped bird dogs 2 x 10      Manual Therapy to develop flexibility, extensibility, and desensitization for 15 minutes including:   trigger point/myofascial release of left quadratus lumborum, piriformis, and lumbar paraspinals    Deferred:   Myofascial release of bilateral  hip flexors and adductors     Therapeutic Activity Education as described below provided for 10 minutes including:   -kegels with stress   -pelvic floor plan of care   -discharge in 3 weeks    supervised modalities after being cleared for contradictions: IFC Electrical Stimulation:  Jill received IFC Electrical Stimulation for pain control applied to the left hip for 0 minutes. Jill tolderated treatment well without any adverse effects.      hot pack for 0 minutes to left hip.     Home Exercises Provided and Patient Education Provided     Education provided:   anatomy/physiology of pelvic floor, posture/body mechanices, diaphragmatic breathing, isometric abdominal exercises, and behavior modifications  Discussed progression of plan of care with patient; educated pt in activity modification; reviewed HEP with pt. Pt demonstrated and verbalized understanding of all instruction and was provided with a handout of HEP (see Patient Instructions).    Written Home Exercises Provided: yes.  Exercises were " reviewed and Jill was able to demonstrate them prior to the end of the session.  Jill demonstrated good  understanding of the education provided.     See EMR under Patient Instructions for exercises provided  2/16/2023 .    Assessment     Patient presents with decreased urge urinary incontinence after implementing urge delay strategies. Patient was taught kegel with stress to farther decrease urinary incontinence. Continued pelvic floor and hip stretches and core and hip stability exercises. Patient tolerated all exercises well with no complaints. Plan to continue therapy for 3 more weeks and then discharge from therapy. Therapy frequency was decreased to once a week to progress towards discharge.     Jill Is progressing well towards her goals.   Pt prognosis is Good.     Anticipated barriers to physical therapy: none    Progress towards goals:  good    Goals:         Short Term Goals:  4 weeks Status  Date Met   PAIN: Pt will report worst pain of 7/10 in order to progress toward max functional ability and improve quality of life. [] Progressing  [x] Met  [] Not Met     FUNCTION: Patient will demonstrate improved function as indicated by a functional limitation score of less than or equal to 30 out of 100 on FOTO. [x] Progressing  [] Met  [] Not Met     MOBILITY: Patient will improve AROM to 50% of stated goals, listed in objective measures above, in order to progress towards independence with functional activities.  [x] Progressing  [] Met  [] Not Met     STRENGTH: Patient will improve strength to 50% of stated goals, listed in objective measures above, in order to progress towards independence with functional activities.  [x] Progressing  [] Met  [] Not Met     POSTURE: Patient will correct postural deviations in sitting and standing, to decrease pain and promote long term stability.  [] Progressing  [x] Met  [] Not Met     HEP: Patient will demonstrate independence with HEP in order to progress toward  functional independence. [] Progressing  [x] Met  [] Not Met        Long Term Goals:  8 weeks Status Date Met   PAIN: Pt will report worst pain of 6/10 in order to progress toward max functional ability and improve quality of life [x] Progressing  [] Met  [] Not Met     FUNCTION: Patient will demonstrate improved function as indicated by a functional limitation score of less than or equal to 29 out of 100 on FOTO. [x] Progressing  [] Met  [] Not Met     MOBILITY: Patient will improve AROM to stated goals, listed in objective measures above, in order to return to maximal functional potential and improve quality of life. [x] Progressing  [] Met  [] Not Met     STRENGTH: Patient will improve strength to stated goals, listed in objective measures above, in order to improve functional independence and quality of life. [x] Progressing  [] Met  [] Not Met     Patient will return to normal ADL's, IADL's, community involvement, recreational activities, and work-related activities with less than or equal to 6/10 pain and maximal function.  [] Progressing  [x] Met  [] Not Met      New goals: Short Term Goals: 4 weeks (added 2/16/2023)  Pt to be edu pelvic muscle bracing and be able to consistently perform correctly and quickly to help decrease incontinence with cough/laugh/sneeze.  Pt to be able to delay the urge to urinate at least 10 minutes with a strong urge to urinate in order to make it to the bathroom without leaking. MET 2/23/2023    Long Term Goals: 12 weeks (added 2/16/2023)  Pt to report elimination of incontinence with ADLs to demonstrate improved pelvic floor muscle strength and coordination.  Pt to report no longer feeling the need to urinate just in case when shopping or participating in social activities to demonstrate improving pelvic floor and bladder control.    Pt's spiritual, cultural and educational needs considered and pt agreeable to plan of care and goals.    Plan   Plan of care Certification:  1/12/2023 to 4/12/2023.     Continue with established Plan of Care, working toward established PT goals.    Lelo Avila, PT, DPT, PPCES        02/23/2023

## 2023-02-23 ENCOUNTER — TELEPHONE (OUTPATIENT)
Dept: RHEUMATOLOGY | Facility: CLINIC | Age: 68
End: 2023-02-23
Payer: MEDICARE

## 2023-02-23 ENCOUNTER — PATIENT MESSAGE (OUTPATIENT)
Dept: INTERNAL MEDICINE | Facility: CLINIC | Age: 68
End: 2023-02-23
Payer: MEDICARE

## 2023-02-23 ENCOUNTER — CLINICAL SUPPORT (OUTPATIENT)
Dept: REHABILITATION | Facility: HOSPITAL | Age: 68
End: 2023-02-23
Payer: MEDICARE

## 2023-02-23 DIAGNOSIS — M62.89 PELVIC FLOOR DYSFUNCTION IN FEMALE: Primary | ICD-10-CM

## 2023-02-23 DIAGNOSIS — N39.46 URINARY INCONTINENCE, MIXED: ICD-10-CM

## 2023-02-23 DIAGNOSIS — R74.01 ALT (SGPT) LEVEL RAISED: Primary | ICD-10-CM

## 2023-02-23 DIAGNOSIS — M25.60 DECREASED RANGE OF MOTION: ICD-10-CM

## 2023-02-23 PROCEDURE — 97140 MANUAL THERAPY 1/> REGIONS: CPT | Mod: PN

## 2023-02-23 PROCEDURE — 97110 THERAPEUTIC EXERCISES: CPT | Mod: PN

## 2023-02-23 PROCEDURE — 97530 THERAPEUTIC ACTIVITIES: CPT | Mod: PN

## 2023-02-23 NOTE — PATIENT INSTRUCTIONS
Pelvic floor contraction/Kegel with activity     Sit upright with good posture or remain in whatever position you are in when the laughing/coughing/sneezing occurs. Contract your pelvic floor muscles as if you are holding back urine or holding back gas when you are about to laugh/cough/sneeze.  Hold this contract for as long as the stressful activity continues (coughing, sneezing, laughing) while continuing your normal breathing pattern. Be sure to perform a full relax in between each contract. Do not hold your breath.

## 2023-02-24 ENCOUNTER — PATIENT MESSAGE (OUTPATIENT)
Dept: RHEUMATOLOGY | Facility: CLINIC | Age: 68
End: 2023-02-24
Payer: MEDICARE

## 2023-02-24 NOTE — PROGRESS NOTES
The liver tests are mildly elevated. Have placed referral to Hepatology for evaluation. The thyroid hormone test suggests you may need to reduce dose of levothyroxine. Will copy Dr. Smith with the thyroid results. The ck is minimally elevated and improved.  The folate and B12 are fine.  CLAUDIA

## 2023-02-27 NOTE — PROGRESS NOTES
"  Physical Therapy Re-evaluation     Name: Jill Marquez  Clinic Number: [id number]    Therapy Diagnosis:   Encounter Diagnoses   Name Primary?    Pelvic floor dysfunction in female Yes    Decreased range of motion     Urinary incontinence, mixed        Physician: Nicanor Campos MD    Visit Date: 2/28/2023    Physician Orders: PT Eval and Treat  Medical Diagnosis from Referral:        Encounter Diagnoses   Name Primary?    Greater trochanteric bursitis of left hip      Iliotibial band syndrome, left     NEW: M62.89 (ICD-10-CM) - Pelvic floor tension    Evaluation Date: 1/12/2023  Authorization Period Expiration: 12/21/2023  Plan of Care Expiration: 4/12/2023  Progress Note Due: 3/23/2023  Visit # / Visits authorized: 10/20  FOTO: 1/3 (last performed on 1/12/2023)     Precautions: Standard    Time In: 7:00 AM  Time Out: 7:57 AM  Total Billable Time: 55 minutes      Subjective     Pt reports: woke up this morning with her left side hurting. Pain starts in posterior hip and back and wraps around the front of her leg. It bothers her when she kneels in [employer]. Patient reports no leaking since being taught urge delay strategies.    She was compliant with home exercise program.  Response to previous treatment: slight decrease in back/hip pain after treatment   Functional change: no change reported     Pain: 4/10  Location: posterior hip/ low back     Constitutional Symptoms Review: The patient denies having any constitutional symptoms.     Objective   GIVE FOTO NEXT VISIT    (exercises performed today are bolded)    Therapeutic Exercise to develop  strength, endurance, ROM, flexibility, posture, and core stabilization for 27 minutes including:   Modified abena stretch 2 x 1 min   Supine butterfly stretch x 1 min   Happy baby x 1 min   Prone press ups 2 x 10   Pelvic circles on physio ball x 20 cw/ccw   DKTC 2 x 1 min   Bridges with adduction ball 2 x 10   posterior pelvic tilts 10 x 10 seconds  Open books 5" x 10 " "(changed to bilateral)   Piriformis stretch 2 x 1 min each   + quadratus lumborum stretch over bosu x 1 min     Deferred:   Hamstring stretch   Multifidus isometric 10x each - red theraband   Rotational strengthening 10x each - red theraband   Steam boats x 10   Seated inna pose 10" x 3   SL reverse clams 2 x 10   Alternating hip adduction and abduction isometric 10 x 10"  Prone press up 2 x 10 hold position through one breath   Sit to stands with 10# kettle bell 2 x 10   Anti rotations 2 x 10 7#   Quadruped cat/cow 5" x 10   Glute stretch 3 x 30" on the left     Neuromuscular Re-education to develop Coordination, Posture, and Proprioception for 0 minutes including:     Deferred: Transverse abdominis contraction 5" x 10    with march   free motion rows 13# 2 x 10  lat pull down 10# each 2 x 10   tricep ext 13# 2 x 10   free motion shoulder ext with march on foam 7# each 2 x 10   Quadruped bird dogs 2 x 10  Diaphragmatic breathing x 3 minutes (decreased tactile and verbal cues)    Manual Therapy to develop flexibility, extensibility, and desensitization for 20 minutes including:   trigger point/myofascial release of left quadratus lumborum, piriformis, and lumbar paraspinals  Myofascial release of left hip flexors   Left hip long axis traction     Therapeutic Activity Education as described below provided for 10 minutes including:   -Transverse abdominis with activity which causes pain such as laughing, coughing, sneezing  -pelvic floor plan of care     supervised modalities after being cleared for contradictions: IFC Electrical Stimulation:  Jill received IFC Electrical Stimulation for pain control applied to the left hip for 0 minutes. Jill tolderated treatment well without any adverse effects.      hot pack for 0 minutes to left hip.     Home Exercises Provided and Patient Education Provided     Education provided:   anatomy/physiology of pelvic floor, posture/body mechanices, diaphragmatic breathing, " isometric abdominal exercises, and behavior modifications  Discussed progression of plan of care with patient; educated pt in activity modification; reviewed HEP with pt. Pt demonstrated and verbalized understanding of all instruction and was provided with a handout of HEP (see Patient Instructions).    Written Home Exercises Provided: yes.  Exercises were reviewed and Jill was able to demonstrate them prior to the end of the session.  Jill demonstrated good  understanding of the education provided.     See EMR under Patient Instructions for exercises provided  2/16/2023 .    Assessment     Patient presents with complete resolution of urinary incontinence after education and increased hip pain after seeing chiropractor yesterday. Continued pelvic floor and hip stretches and core and hip stability exercises. Focused more on stretching right side of the body after reviewing x-rays and inferring right side is short and tight. Patient tolerated all exercises well with no complaints. Discussed patient contacting her MD for possible imaging. Moderate hypertonicity noted during manual therapy of left low back and hips, but patient tolerated manual therapy well. Patient reports decreased pain at end of session after manuals and exercise program.     Jill Is progressing well towards her goals.   Pt prognosis is Good.     Anticipated barriers to physical therapy: none    Progress towards goals:  good    Goals:      Short Term Goals:  4 weeks Status  Date Met   PAIN: Pt will report worst pain of 7/10 in order to progress toward max functional ability and improve quality of life. [] Progressing  [x] Met  [] Not Met     FUNCTION: Patient will demonstrate improved function as indicated by a functional limitation score of less than or equal to 30 out of 100 on FOTO. [x] Progressing  [] Met  [] Not Met     MOBILITY: Patient will improve AROM to 50% of stated goals, listed in objective measures above, in order to progress  towards independence with functional activities.  [x] Progressing  [] Met  [] Not Met     STRENGTH: Patient will improve strength to 50% of stated goals, listed in objective measures above, in order to progress towards independence with functional activities.  [x] Progressing  [] Met  [] Not Met     POSTURE: Patient will correct postural deviations in sitting and standing, to decrease pain and promote long term stability.  [] Progressing  [x] Met  [] Not Met     HEP: Patient will demonstrate independence with HEP in order to progress toward functional independence. [] Progressing  [x] Met  [] Not Met        Long Term Goals:  8 weeks Status Date Met   PAIN: Pt will report worst pain of 6/10 in order to progress toward max functional ability and improve quality of life [] Progressing  [x] Met  [] Not Met     FUNCTION: Patient will demonstrate improved function as indicated by a functional limitation score of less than or equal to 29 out of 100 on FOTO. [x] Progressing  [] Met  [] Not Met     MOBILITY: Patient will improve AROM to stated goals, listed in objective measures above, in order to return to maximal functional potential and improve quality of life. [x] Progressing  [] Met  [] Not Met     STRENGTH: Patient will improve strength to stated goals, listed in objective measures above, in order to improve functional independence and quality of life. [x] Progressing  [] Met  [] Not Met     Patient will return to normal ADL's, IADL's, community involvement, recreational activities, and work-related activities with less than or equal to 6/10 pain and maximal function.  [] Progressing  [x] Met  [] Not Met      New goals: Short Term Goals: 4 weeks (added 2/16/2023)  Pt to be edu pelvic muscle bracing and be able to consistently perform correctly and quickly to help decrease incontinence with cough/laugh/sneeze. MET 2/28/2023  Pt to be able to delay the urge to urinate at least 10 minutes with a strong urge to urinate in  order to make it to the bathroom without leaking. MET 2/23/2023    Long Term Goals: 12 weeks (added 2/16/2023)  Pt to report elimination of incontinence with ADLs to demonstrate improved pelvic floor muscle strength and coordination. MET 2/28/2023  Pt to report no longer feeling the need to urinate just in case when shopping or participating in social activities to demonstrate improving pelvic floor and bladder control. MET 2/28/2023    Pt's spiritual, cultural and educational needs considered and pt agreeable to plan of care and goals.    Plan   Plan of care Certification: 1/12/2023 to 4/12/2023.     Continue with established Plan of Care, working toward established PT goals.    Lelo Avila, PT, DPT, PPCES        02/28/2023

## 2023-02-28 ENCOUNTER — CLINICAL SUPPORT (OUTPATIENT)
Dept: REHABILITATION | Facility: HOSPITAL | Age: 68
End: 2023-02-28
Payer: MEDICARE

## 2023-02-28 DIAGNOSIS — M62.89 PELVIC FLOOR DYSFUNCTION IN FEMALE: Primary | ICD-10-CM

## 2023-02-28 DIAGNOSIS — M25.60 DECREASED RANGE OF MOTION: ICD-10-CM

## 2023-02-28 DIAGNOSIS — N39.46 URINARY INCONTINENCE, MIXED: ICD-10-CM

## 2023-02-28 PROCEDURE — 97530 THERAPEUTIC ACTIVITIES: CPT | Mod: PN

## 2023-02-28 PROCEDURE — 97110 THERAPEUTIC EXERCISES: CPT | Mod: PN

## 2023-02-28 PROCEDURE — 97140 MANUAL THERAPY 1/> REGIONS: CPT | Mod: PN

## 2023-03-07 ENCOUNTER — OFFICE VISIT (OUTPATIENT)
Dept: HEPATOLOGY | Facility: CLINIC | Age: 68
End: 2023-03-07
Payer: MEDICARE

## 2023-03-07 ENCOUNTER — PATIENT MESSAGE (OUTPATIENT)
Dept: HEPATOLOGY | Facility: CLINIC | Age: 68
End: 2023-03-07

## 2023-03-07 VITALS
SYSTOLIC BLOOD PRESSURE: 136 MMHG | HEART RATE: 87 BPM | RESPIRATION RATE: 16 BRPM | HEIGHT: 63 IN | DIASTOLIC BLOOD PRESSURE: 72 MMHG | TEMPERATURE: 98 F | WEIGHT: 127.44 LBS | BODY MASS INDEX: 22.58 KG/M2

## 2023-03-07 DIAGNOSIS — R74.01 ALT (SGPT) LEVEL RAISED: Primary | ICD-10-CM

## 2023-03-07 DIAGNOSIS — I10 PRIMARY HYPERTENSION: ICD-10-CM

## 2023-03-07 DIAGNOSIS — E78.5 HYPERLIPIDEMIA, UNSPECIFIED HYPERLIPIDEMIA TYPE: ICD-10-CM

## 2023-03-07 PROCEDURE — 99214 PR OFFICE/OUTPT VISIT, EST, LEVL IV, 30-39 MIN: ICD-10-PCS | Mod: S$PBB,,, | Performed by: NURSE PRACTITIONER

## 2023-03-07 PROCEDURE — 99215 OFFICE O/P EST HI 40 MIN: CPT | Mod: PBBFAC | Performed by: NURSE PRACTITIONER

## 2023-03-07 PROCEDURE — 99999 PR PBB SHADOW E&M-EST. PATIENT-LVL V: ICD-10-PCS | Mod: PBBFAC,,, | Performed by: NURSE PRACTITIONER

## 2023-03-07 PROCEDURE — 99214 OFFICE O/P EST MOD 30 MIN: CPT | Mod: S$PBB,,, | Performed by: NURSE PRACTITIONER

## 2023-03-07 PROCEDURE — 99999 PR PBB SHADOW E&M-EST. PATIENT-LVL V: CPT | Mod: PBBFAC,,, | Performed by: NURSE PRACTITIONER

## 2023-03-07 NOTE — PROGRESS NOTES
[facility] VISIT NEW PT NOTE    REFERRING PROVIDER:  Dr. Nicanor Campos  PCP: Dain Smith MD     CHIEF COMPLAINT: elevated liver enzymes     HPI: This is a 67 y.o. female with PMH noted below, presenting for evaluation of elevated liver enzymes    Previous serologic w/u negative for viral hepatitis A, B and C a few years ago - will complete full sero w/u    Prior serologic workup:   Lab Results   Component Value Date    ANASCREEN Negative <1:160 06/26/2015    FERRITIN 137 03/09/2021    FESATURATED 25 03/09/2021    HEPBSAG Negative 03/10/2020    HEPCAB Negative 03/10/2020    HEPAIGM Negative 06/26/2015       Liver fibrosis staging:  -- fibroscan with RTC    Risk factors for fatty liver include HTN, HLD, alcohol use     Interval HPI: Presents today alone. Crestor started 11/2022  Celexa since 2020  Enbrel x years   + Herbal medications: started liver supplement 2 days ago    Labs done 2/2023 show elevated transaminase levels (ALT > AST, elevated x2 in 2023, previously WNL)  Platelets WNL, alk phos low  Synthetic liver functioning WNL    Lab Results   Component Value Date    ALT 49 (H) 02/22/2023    AST 38 02/22/2023    ALKPHOS 50 (L) 02/22/2023    BILITOT 1.0 02/22/2023    ALBUMIN 4.4 02/22/2023     02/07/2023       Abd U/S to be done     Denies family history of liver disease . 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    PSHX:  has a past surgical history that includes Tonsillectomy; eyelid surgery; Eye surgery; Cosmetic surgery; Tonsillectomy; Colonoscopy (N/A, 1/25/2016);  "Oophorectomy (Bilateral, 2015); Hysterectomy (2004); and Colonoscopy (N/A, 11/8/2022).    FAMILY HISTORY: Updated and reviewed in EPIC    SOCIAL HISTORY:   Social History     Substance and Sexual Activity   Alcohol Use Yes    Comment: 2 servings daily x 30+ years       Social History     Substance and Sexual Activity   Drug Use No       ROS:   GENERAL: intermittent fatigue  CARDIOVASCULAR: Denies edema  GI: Denies abdominal pain  SKIN: Denies rash, itching   NEURO: Denies confusion, memory loss, or mood changes    PHYSICAL EXAM:   Friendly White female, in no acute distress; alert and oriented to person, place and time  VITALS: /72 (BP Location: Right arm, Patient Position: Sitting, BP Method: Medium (Automatic))   Pulse 87   Temp 98 °F (36.7 °C) (Oral)   Resp 16   Ht 5' 3" (1.6 m)   Wt 57.8 kg (127 lb 6.8 oz)   BMI 22.57 kg/m²   EYES: Sclerae anicteric  GI: Soft, non-tender, non-distended. No ascites.  EXTREMITIES:  No edema.  SKIN: Warm and dry. No jaundice. No telangectasias noted. No palmar erythema.  NEURO:  No asterixis.  PSYCH:  Thought and speech pattern appropriate. Behavior normal      EDUCATION:  See instructions discussed with patient in Instructions section of the After Visit Summary     ASSESSMENT & PLAN:  67 y.o. female with:  1. Elevated liver enzymes   -- Labs note elevated transaminase levels since 2/2023, previously WNL  --- synthetic liver function WNL  --- Abd US to be done   --- previous serological work up : will obtain   --- Hep A and B immunity: will check today, will arrange Hep A and B vaccines if needed    -- labs and US soon   Orders Placed This Encounter   Procedures    FibroScan [place] (Vibration Controlled Transient Elastography)    US Abdomen Complete    Alpha-1-Antitrypsin    SAMIR Screen w/Reflex    Antimitochondrial Antibody    Anti-Smooth Muscle Antibody    Ceruloplasmin    CK    Ferritin    Hepatic Function Panel    Hepatitis Panel, Acute    IgG    Iron and TIBC "    Phosphatidylethanol (PETH)    Hepatitis B Core Antibody, Total    Hepatitis A antibody, IgG    Hepatitis B Surface Ab, Qualitative      -- fibroscan with RTC        2. HTN, HLD  Body mass index is 22.57 kg/m².   Can increase risk of fatty liver     3. Alcohol use  -- would recommend to avoid daily use but will determine based  on testing how much to decrease, will determine at next visit     Social History     Substance and Sexual Activity   Alcohol Use Yes    Comment: 2 servings daily x 30+ years               Follow up in about 2 weeks (around 3/21/2023). with US, labs and fibroscan before  Orders Placed This Encounter   Procedures    FibroScan [place] (Vibration Controlled Transient Elastography)    US Abdomen Complete    Alpha-1-Antitrypsin    SAMIR Screen w/Reflex    Antimitochondrial Antibody    Anti-Smooth Muscle Antibody    Ceruloplasmin    CK    Ferritin    Hepatic Function Panel    Hepatitis Panel, Acute    IgG    Iron and TIBC    Phosphatidylethanol (PETH)    Hepatitis B Core Antibody, Total    Hepatitis A antibody, IgG    Hepatitis B Surface Ab, Qualitative        Thank you for allowing me to participate in the care of JOANN Osorio    I spent a total of 30 minutes on the day of the visit.This includes face to face time and non-face to face time preparing to see the patient (eg, review of tests), obtaining and/or reviewing separately obtained history, documenting clinical information in the electronic or other health record, independently interpreting results and communicating results to the patient/family/caregiver, and coordinating care.         CC'ed note to:   Nicanor Campos MD Steven J Granier, MD

## 2023-03-07 NOTE — PATIENT INSTRUCTIONS
1. Fibroscan to look for fat or scar tissue in the liver with return to clinic   2. Will check immunity markers for Hepatitis A and B and arrange for vaccination if needed  3. Labs and US soon to  check for multiple causes of liver disease. These labs will release to you as soon as they are resulted but we will discuss them in detail at your upcoming visit to discuss what the lab results mean.   4.  Follow up in 2-3 weeks with fibroscan same day

## 2023-03-12 ENCOUNTER — PATIENT MESSAGE (OUTPATIENT)
Dept: RHEUMATOLOGY | Facility: CLINIC | Age: 68
End: 2023-03-12
Payer: MEDICARE

## 2023-03-13 ENCOUNTER — CLINICAL SUPPORT (OUTPATIENT)
Dept: REHABILITATION | Facility: HOSPITAL | Age: 68
End: 2023-03-13
Payer: MEDICARE

## 2023-03-13 ENCOUNTER — TELEPHONE (OUTPATIENT)
Dept: RHEUMATOLOGY | Facility: CLINIC | Age: 68
End: 2023-03-13
Payer: MEDICARE

## 2023-03-13 ENCOUNTER — PATIENT MESSAGE (OUTPATIENT)
Dept: ADMINISTRATIVE | Facility: OTHER | Age: 68
End: 2023-03-13
Payer: MEDICARE

## 2023-03-13 DIAGNOSIS — M25.559 PAIN IN UNSPECIFIED HIP: Primary | ICD-10-CM

## 2023-03-13 DIAGNOSIS — N39.46 URINARY INCONTINENCE, MIXED: ICD-10-CM

## 2023-03-13 DIAGNOSIS — M62.89 PELVIC FLOOR DYSFUNCTION IN FEMALE: Primary | ICD-10-CM

## 2023-03-13 DIAGNOSIS — M25.552 PAIN IN JOINT OF LEFT HIP: ICD-10-CM

## 2023-03-13 DIAGNOSIS — M25.60 DECREASED RANGE OF MOTION: ICD-10-CM

## 2023-03-13 PROCEDURE — 97530 THERAPEUTIC ACTIVITIES: CPT | Mod: PN

## 2023-03-13 PROCEDURE — 97110 THERAPEUTIC EXERCISES: CPT | Mod: PN

## 2023-03-13 PROCEDURE — 97140 MANUAL THERAPY 1/> REGIONS: CPT | Mod: PN

## 2023-03-13 NOTE — PROGRESS NOTES
"  Physical Therapy Re-evaluation     Name: Jill Marquze  Clinic Number: [id number]    Therapy Diagnosis:   Encounter Diagnoses   Name Primary?    Pelvic floor dysfunction in female Yes    Decreased range of motion     Urinary incontinence, mixed          Physician: Nicanor Campos MD    Visit Date: 3/13/2023    Physician Orders: PT Eval and Treat  Medical Diagnosis from Referral:        Encounter Diagnoses   Name Primary?    Greater trochanteric bursitis of left hip      Iliotibial band syndrome, left     NEW: M62.89 (ICD-10-CM) - Pelvic floor tension    Evaluation Date: 1/12/2023  Authorization Period Expiration: 12/21/2023  Plan of Care Expiration: 4/12/2023  Progress Note Due: 3/23/2023  Visit # / Visits authorized: 11/20  FOTO: 2/3 (last performed today)     Precautions: Standard    Time In: 3:01 PM  Time Out: 3:57 PM  Total Billable Time: 56 minutes      Subjective     Pt reports: she is still having pain that is wrapping from her posterior hip to the front of her thigh to her knee. She reports she sent a message to her doctor about getting an MRI as a peace of mind. She reports she doesn't feel her pain is getting better. No leaking since last was here.    She was compliant with home exercise program.  Response to previous treatment: did not feel that great   Functional change: no change reported     Pain: 3/10  Location: posterior hip/ low back     Constitutional Symptoms Review: The patient denies having any constitutional symptoms.     Objective         (exercises performed today are bolded)    Therapeutic Exercise to develop  strength, endurance, ROM, flexibility, posture, and core stabilization for 25 minutes including:   Supine butterfly stretch x 1 min   Prone press ups 2 x 10   Open books 5" x 10 (changed to bilateral)   Anti rotations 2 x 10 7# on right/ 3# on left   Piriformis stretch 2 x 30" each   + TFL stretch 2 x 30" each   + prone rectus femoris stretch 2 x 30"       Deferred:   Hamstring " "stretch   Multifidus isometric 10x each - red theraband   Rotational strengthening 10x each - red theraband     Seated inna pose 10" x 3   SL reverse clams 2 x 10   Alternating hip adduction and abduction isometric 10 x 10"  Prone press up 2 x 10 hold position through one breath   Sit to stands with 10# kettle bell 2 x 10   Quadruped cat/cow 5" x 10   Glute stretch 3 x 30" on the left   Modified abena stretch 2 x 1 min  Bridges with adduction ball 2 x 10   posterior pelvic tilts 10 x 10 seconds   Happy baby x 1 min  DKTC 2 x 1 min   quadratus lumborum stretch over bosu x 1 min   Pelvic circles on physio ball x 20 cw/ccw   Steam boats x 10    Neuromuscular Re-education to develop Coordination, Posture, and Proprioception for 0 minutes including:     Deferred: Transverse abdominis contraction 5" x 10    with march   free motion rows 13# 2 x 10  lat pull down 10# each 2 x 10   tricep ext 13# 2 x 10   free motion shoulder ext with march on foam 7# each 2 x 10   Quadruped bird dogs 2 x 10  Diaphragmatic breathing x 3 minutes (decreased tactile and verbal cues)    Manual Therapy to develop flexibility, extensibility, and desensitization for 23 minutes including:   trigger point/myofascial release of left quadratus lumborum, piriformis, and lumbar paraspinals  Myofascial release of left hip flexors, left lower abdomen, left anterior thigh musculature   Left hip long axis traction    Therapeutic Activity Education as described below provided for 8 minutes including:   - Added TFL stretch to HEP  - HEP  - follow up with doctor regarding continued hip pain    supervised modalities after being cleared for contradictions: IFC Electrical Stimulation:  Jill received IFC Electrical Stimulation for pain control applied to the left hip for 0 minutes. Jill tolderated treatment well without any adverse effects.      hot pack for 0 minutes to left hip.     Home Exercises Provided and Patient Education Provided     Education " provided:   anatomy/physiology of pelvic floor, posture/body mechanices, diaphragmatic breathing, isometric abdominal exercises, and behavior modifications  Discussed progression of plan of care with patient; educated pt in activity modification; reviewed HEP with pt. Pt demonstrated and verbalized understanding of all instruction and was provided with a handout of HEP (see Patient Instructions).    Written Home Exercises Provided: yes.  Exercises were reviewed and Jill was able to demonstrate them prior to the end of the session.  Jill demonstrated good  understanding of the education provided.     See EMR under Patient Instructions for exercises provided  2/16/2023 .    Assessment     Patient presents Increased pain to the left posterior hip that wraps around her hip and down to her knee. Continued hip stretches and core and hip stability exercises. Session focused on predominately on bilateral hip/thigh stretching secondary to increased tension/tightness greater on left than right and increased pain to that particular pattern. Patient tolerated all exercises well; however, had minimal difficulty with left side core strengthening. Moderate hypertonicity noted during manual therapy of left low back and hips, but patient tolerated manual therapy well. Patient reports decreased pain at end of session after manuals and exercise program. Patient plans to hear back from doctor and proceed accordingly.     Jill Is progressing well towards her goals.   Pt prognosis is Good.     Anticipated barriers to physical therapy: none    Progress towards goals:  good    Goals:      Short Term Goals:  4 weeks Status  Date Met   PAIN: Pt will report worst pain of 7/10 in order to progress toward max functional ability and improve quality of life. [] Progressing  [x] Met  [] Not Met     FUNCTION: Patient will demonstrate improved function as indicated by a functional limitation score of less than or equal to 30 out of 100 on  FOTO. [x] Progressing  [] Met  [] Not Met     MOBILITY: Patient will improve AROM to 50% of stated goals, listed in objective measures above, in order to progress towards independence with functional activities.  [x] Progressing  [] Met  [] Not Met     STRENGTH: Patient will improve strength to 50% of stated goals, listed in objective measures above, in order to progress towards independence with functional activities.  [x] Progressing  [] Met  [] Not Met     POSTURE: Patient will correct postural deviations in sitting and standing, to decrease pain and promote long term stability.  [] Progressing  [x] Met  [] Not Met     HEP: Patient will demonstrate independence with HEP in order to progress toward functional independence. [] Progressing  [x] Met  [] Not Met        Long Term Goals:  8 weeks Status Date Met   PAIN: Pt will report worst pain of 6/10 in order to progress toward max functional ability and improve quality of life [] Progressing  [x] Met  [] Not Met     FUNCTION: Patient will demonstrate improved function as indicated by a functional limitation score of less than or equal to 29 out of 100 on FOTO. [x] Progressing  [] Met  [] Not Met     MOBILITY: Patient will improve AROM to stated goals, listed in objective measures above, in order to return to maximal functional potential and improve quality of life. [x] Progressing  [] Met  [] Not Met     STRENGTH: Patient will improve strength to stated goals, listed in objective measures above, in order to improve functional independence and quality of life. [x] Progressing  [] Met  [] Not Met     Patient will return to normal ADL's, IADL's, community involvement, recreational activities, and work-related activities with less than or equal to 6/10 pain and maximal function.  [] Progressing  [x] Met  [] Not Met      New goals: Short Term Goals: 4 weeks (added 2/16/2023)  Pt to be edu pelvic muscle bracing and be able to consistently perform correctly and quickly to  help decrease incontinence with cough/laugh/sneeze. MET 2/28/2023  Pt to be able to delay the urge to urinate at least 10 minutes with a strong urge to urinate in order to make it to the bathroom without leaking. MET 2/23/2023    Long Term Goals: 12 weeks (added 2/16/2023)  Pt to report elimination of incontinence with ADLs to demonstrate improved pelvic floor muscle strength and coordination. MET 2/28/2023  Pt to report no longer feeling the need to urinate just in case when shopping or participating in social activities to demonstrate improving pelvic floor and bladder control. MET 2/28/2023    Pt's spiritual, cultural and educational needs considered and pt agreeable to plan of care and goals.    Plan   Plan of care Certification: 1/12/2023 to 4/12/2023.     Continue with established Plan of Care, working toward established PT goals.    Angela Loya, SPT    I certify that I was present in the room directing the student in service delivery and guiding them using my skilled judgment. As the co-signing therapist I have reviewed the students documentation and am responsible for the treatment, assessment, and plan.       Lelo Avila, PT, DPT, PPCES        03/13/2023

## 2023-03-13 NOTE — PROGRESS NOTES
"  Physical Therapy Re-evaluation     Name: Jill Marquez  Clinic Number: [id number]    Therapy Diagnosis:   No diagnosis found.      Physician: Nicanor Campos MD    Visit Date: 3/13/2023    Physician Orders: PT Eval and Treat  Medical Diagnosis from Referral:        Encounter Diagnoses   Name Primary?    Greater trochanteric bursitis of left hip      Iliotibial band syndrome, left     NEW: M62.89 (ICD-10-CM) - Pelvic floor tension    Evaluation Date: 1/12/2023  Authorization Period Expiration: 12/21/2023  Plan of Care Expiration: 4/12/2023  Progress Note Due: 3/23/2023  Visit # / Visits authorized: 10/20  FOTO: 1/3 (last performed on 1/12/2023)     Precautions: Standard    Time In: 7:00 AM  Time Out: 7:57 AM  Total Billable Time: 55 minutes      Subjective     Pt reports: woke up this morning with her left side hurting. Pain starts in posterior hip and back and wraps around the front of her leg. It bothers her when she kneels in [employer]. Patient reports no leaking since being taught urge delay strategies.    She was compliant with home exercise program.  Response to previous treatment: slight decrease in back/hip pain after treatment   Functional change: no change reported     Pain: 4/10  Location: posterior hip/ low back     Constitutional Symptoms Review: The patient denies having any constitutional symptoms.     Objective   GIVE FOTO NEXT VISIT    (exercises performed today are bolded)    Therapeutic Exercise to develop  strength, endurance, ROM, flexibility, posture, and core stabilization for 27 minutes including:   Modified abena stretch 2 x 1 min   Supine butterfly stretch x 1 min   Happy baby x 1 min   Prone press ups 2 x 10   Pelvic circles on physio ball x 20 cw/ccw   DKTC 2 x 1 min   Bridges with adduction ball 2 x 10   posterior pelvic tilts 10 x 10 seconds  Open books 5" x 10 (changed to bilateral)   Piriformis stretch 2 x 1 min each   + quadratus lumborum stretch over bosu x 1 min     Deferred: " "  Hamstring stretch   Multifidus isometric 10x each - red theraband   Rotational strengthening 10x each - red theraband   Steam boats x 10   Seated inna pose 10" x 3   SL reverse clams 2 x 10   Alternating hip adduction and abduction isometric 10 x 10"  Prone press up 2 x 10 hold position through one breath   Sit to stands with 10# kettle bell 2 x 10   Anti rotations 2 x 10 7#   Quadruped cat/cow 5" x 10   Glute stretch 3 x 30" on the left     Neuromuscular Re-education to develop Coordination, Posture, and Proprioception for 0 minutes including:     Deferred: Transverse abdominis contraction 5" x 10    with march   free motion rows 13# 2 x 10  lat pull down 10# each 2 x 10   tricep ext 13# 2 x 10   free motion shoulder ext with march on foam 7# each 2 x 10   Quadruped bird dogs 2 x 10  Diaphragmatic breathing x 3 minutes (decreased tactile and verbal cues)    Manual Therapy to develop flexibility, extensibility, and desensitization for 20 minutes including:   trigger point/myofascial release of left quadratus lumborum, piriformis, and lumbar paraspinals  Myofascial release of left hip flexors   Left hip long axis traction     Therapeutic Activity Education as described below provided for 10 minutes including:   -Transverse abdominis with activity which causes pain such as laughing, coughing, sneezing  -pelvic floor plan of care     supervised modalities after being cleared for contradictions: IFC Electrical Stimulation:  Jill received IFC Electrical Stimulation for pain control applied to the left hip for 0 minutes. Jill tolderated treatment well without any adverse effects.      hot pack for 0 minutes to left hip.     Home Exercises Provided and Patient Education Provided     Education provided:   anatomy/physiology of pelvic floor, posture/body mechanices, diaphragmatic breathing, isometric abdominal exercises, and behavior modifications  Discussed progression of plan of care with patient; educated pt in " activity modification; reviewed HEP with pt. Pt demonstrated and verbalized understanding of all instruction and was provided with a handout of HEP (see Patient Instructions).    Written Home Exercises Provided: yes.  Exercises were reviewed and Jill was able to demonstrate them prior to the end of the session.  Jill demonstrated good  understanding of the education provided.     See EMR under Patient Instructions for exercises provided  2/16/2023 .    Assessment     Patient presents with complete resolution of urinary incontinence after education and increased hip pain after seeing chiropractor yesterday. Continued pelvic floor and hip stretches and core and hip stability exercises. Focused more on stretching right side of the body after reviewing x-rays and inferring right side is short and tight. Patient tolerated all exercises well with no complaints. Discussed patient contacting her MD for possible imaging. Moderate hypertonicity noted during manual therapy of left low back and hips, but patient tolerated manual therapy well. Patient reports decreased pain at end of session after manuals and exercise program.     Jill Is progressing well towards her goals.   Pt prognosis is Good.     Anticipated barriers to physical therapy: none    Progress towards goals:  good    Goals:      Short Term Goals:  4 weeks Status  Date Met   PAIN: Pt will report worst pain of 7/10 in order to progress toward max functional ability and improve quality of life. [] Progressing  [x] Met  [] Not Met     FUNCTION: Patient will demonstrate improved function as indicated by a functional limitation score of less than or equal to 30 out of 100 on FOTO. [x] Progressing  [] Met  [] Not Met     MOBILITY: Patient will improve AROM to 50% of stated goals, listed in objective measures above, in order to progress towards independence with functional activities.  [x] Progressing  [] Met  [] Not Met     STRENGTH: Patient will improve strength  to 50% of stated goals, listed in objective measures above, in order to progress towards independence with functional activities.  [x] Progressing  [] Met  [] Not Met     POSTURE: Patient will correct postural deviations in sitting and standing, to decrease pain and promote long term stability.  [] Progressing  [x] Met  [] Not Met     HEP: Patient will demonstrate independence with HEP in order to progress toward functional independence. [] Progressing  [x] Met  [] Not Met        Long Term Goals:  8 weeks Status Date Met   PAIN: Pt will report worst pain of 6/10 in order to progress toward max functional ability and improve quality of life [] Progressing  [x] Met  [] Not Met     FUNCTION: Patient will demonstrate improved function as indicated by a functional limitation score of less than or equal to 29 out of 100 on FOTO. [x] Progressing  [] Met  [] Not Met     MOBILITY: Patient will improve AROM to stated goals, listed in objective measures above, in order to return to maximal functional potential and improve quality of life. [x] Progressing  [] Met  [] Not Met     STRENGTH: Patient will improve strength to stated goals, listed in objective measures above, in order to improve functional independence and quality of life. [x] Progressing  [] Met  [] Not Met     Patient will return to normal ADL's, IADL's, community involvement, recreational activities, and work-related activities with less than or equal to 6/10 pain and maximal function.  [] Progressing  [x] Met  [] Not Met      New goals: Short Term Goals: 4 weeks (added 2/16/2023)  Pt to be edu pelvic muscle bracing and be able to consistently perform correctly and quickly to help decrease incontinence with cough/laugh/sneeze. MET 2/28/2023  Pt to be able to delay the urge to urinate at least 10 minutes with a strong urge to urinate in order to make it to the bathroom without leaking. MET 2/23/2023    Long Term Goals: 12 weeks (added 2/16/2023)  Pt to report  elimination of incontinence with ADLs to demonstrate improved pelvic floor muscle strength and coordination. MET 2/28/2023  Pt to report no longer feeling the need to urinate just in case when shopping or participating in social activities to demonstrate improving pelvic floor and bladder control. MET 2/28/2023    Pt's spiritual, cultural and educational needs considered and pt agreeable to plan of care and goals.    Plan   Plan of care Certification: 1/12/2023 to 4/12/2023.     Continue with established Plan of Care, working toward established PT goals.    Lelo Avila, PT, DPT, PPCES        03/13/2023

## 2023-03-15 ENCOUNTER — HOSPITAL ENCOUNTER (OUTPATIENT)
Dept: RADIOLOGY | Facility: HOSPITAL | Age: 68
Discharge: HOME | End: 2023-03-15
Attending: NURSE PRACTITIONER
Payer: MEDICARE

## 2023-03-15 DIAGNOSIS — R74.01 ALT (SGPT) LEVEL RAISED: ICD-10-CM

## 2023-03-15 PROCEDURE — 76700 US ABDOMEN COMPLETE: ICD-10-PCS | Mod: 26,,, | Performed by: STUDENT IN AN ORGANIZED HEALTH CARE EDUCATION/TRAINING PROGRAM

## 2023-03-15 PROCEDURE — 76700 US EXAM ABDOM COMPLETE: CPT | Mod: 26,,, | Performed by: STUDENT IN AN ORGANIZED HEALTH CARE EDUCATION/TRAINING PROGRAM

## 2023-03-15 PROCEDURE — 76700 US EXAM ABDOM COMPLETE: CPT | Mod: TC,PN

## 2023-03-20 ENCOUNTER — OFFICE VISIT (OUTPATIENT)
Dept: RHEUMATOLOGY | Facility: CLINIC | Age: 68
End: 2023-03-20
Payer: MEDICARE

## 2023-03-20 VITALS
BODY MASS INDEX: 22.5 KG/M2 | HEART RATE: 82 BPM | DIASTOLIC BLOOD PRESSURE: 82 MMHG | HEIGHT: 63 IN | SYSTOLIC BLOOD PRESSURE: 121 MMHG | WEIGHT: 127 LBS

## 2023-03-20 DIAGNOSIS — L40.50 PSA (PSORIATIC ARTHRITIS): ICD-10-CM

## 2023-03-20 DIAGNOSIS — M47.819 PERIPHERAL SPONDYLOARTHRITIS: ICD-10-CM

## 2023-03-20 PROCEDURE — 99999 PR PBB SHADOW E&M-EST. PATIENT-LVL IV: CPT | Mod: PBBFAC,,, | Performed by: INTERNAL MEDICINE

## 2023-03-20 PROCEDURE — 99214 OFFICE O/P EST MOD 30 MIN: CPT | Mod: PBBFAC | Performed by: INTERNAL MEDICINE

## 2023-03-20 PROCEDURE — 99213 OFFICE O/P EST LOW 20 MIN: CPT | Mod: S$PBB,,, | Performed by: INTERNAL MEDICINE

## 2023-03-20 PROCEDURE — 99999 PR PBB SHADOW E&M-EST. PATIENT-LVL IV: ICD-10-PCS | Mod: PBBFAC,,, | Performed by: INTERNAL MEDICINE

## 2023-03-20 PROCEDURE — 99213 PR OFFICE/OUTPT VISIT, EST, LEVL III, 20-29 MIN: ICD-10-PCS | Mod: S$PBB,,, | Performed by: INTERNAL MEDICINE

## 2023-03-20 RX ORDER — ETANERCEPT 50 MG/ML
50 SOLUTION SUBCUTANEOUS WEEKLY
Qty: 4 ML | Refills: 5 | Status: SHIPPED | OUTPATIENT
Start: 2023-03-20 | End: 2023-06-20 | Stop reason: SDUPTHER

## 2023-03-20 RX ORDER — DICLOFENAC SODIUM 10 MG/G
4 GEL TOPICAL 4 TIMES DAILY PRN
Qty: 3 EACH | Refills: 2 | Status: SHIPPED | OUTPATIENT
Start: 2023-03-20 | End: 2023-06-20

## 2023-03-20 RX ORDER — GABAPENTIN 300 MG/1
300 CAPSULE ORAL 2 TIMES DAILY
Qty: 180 CAPSULE | Refills: 1 | Status: SHIPPED | OUTPATIENT
Start: 2023-03-20 | End: 2023-04-04

## 2023-03-20 NOTE — PROGRESS NOTES
Rapid3 Question Responses and Scores 3/13/2023   MDHAQ Score 0.1   Psychologic Score 3.3   Pain Score 2   When you awakened in the morning OVER THE LAST WEEK, did you feel stiff? No   If Yes, please indicate the number of hours until you are as limber as you will be for the day -   Fatigue Score 1   Global Health Score 1   RAPID3 Score 1.11     Answers submitted by the patient for this visit:  Rheumatology Questionnaire (Submitted on 3/13/2023)  fever: No  eye redness: No  mouth sores: No  headaches: No  shortness of breath: No  chest pain: No  trouble swallowing: No  diarrhea: No  constipation: No  unexpected weight change: No  genital sore: No  dysuria: No  During the last 3 days, have you had a skin rash?: No  Bruises or bleeds easily: No  cough: No

## 2023-03-20 NOTE — PROGRESS NOTES
"Subjective:       Patient ID: Jill Marquez is a 67 y.o. female.    Chief Complaint: Peripheral spondyloarthritis v. Pseudo-RA CPPD v seronegative RA  Persistent left lateral hip pain with radiation thigh to left knee  HPI pain left lateral hip with radiation left thigh to knee. Pain varies from 2/10 to 10/10 Only slight improvement with 6 wks of PT. Requested MRI left hip and Ortho referral(Dr. Mathis) which are scheduled for next week.  Other joints are fine; no redness, pain or swelling in hands Left ankle fine with insert for pes planovalgus  Review of Systems   Constitutional:  Negative for appetite change, fatigue, fever and unexpected weight change.   HENT:  Negative for mouth sores and trouble swallowing.    Eyes:  Negative for redness and visual disturbance.   Respiratory:  Negative for cough, shortness of breath and wheezing.    Cardiovascular:  Negative for chest pain and palpitations.   Gastrointestinal:  Negative for abdominal pain, anal bleeding, blood in stool, constipation, diarrhea, nausea and vomiting.   Genitourinary:  Negative for dysuria, frequency, genital sores and urgency.   Musculoskeletal:  Negative for arthralgias, back pain, gait problem, joint swelling, myalgias, neck pain and neck stiffness.   Skin:  Negative for rash.   Neurological:  Negative for weakness, numbness and headaches.   Hematological:  Negative for adenopathy. Does not bruise/bleed easily.   Psychiatric/Behavioral:  Negative for sleep disturbance. The patient is not nervous/anxious.        Objective:   /82   Pulse 82   Ht 5' 3" (1.6 m)   Wt 57.6 kg (127 lb)   BMI 22.50 kg/m²      Physical Exam   Musculoskeletal:      Right shoulder: Normal.      Left shoulder: Normal.      Right elbow: Normal.      Left elbow: Normal.      Right wrist: Normal.      Left wrist: Normal.      Right knee: Normal.      Left knee: Normal.       Right Side Rheumatological Exam     Examination finds the shoulder, elbow, wrist, knee, 1st " PIP, 1st MCP, 2nd PIP, 3rd PIP, 4th MCP, 5th PIP and 5th MCP normal.    The patient has an enlarged 1st CMC, 2nd DIP and 5th DIP    Left Side Rheumatological Exam     Examination finds the shoulder, elbow, wrist, knee, 1st PIP, 1st MCP, 2nd PIP, 3rd PIP, 4th PIP, 4th MCP, 5th PIP and 5th MCP normal.    The patient has an enlarged 1st CMC, 2nd DIP and 5th DIP.          10/19/2021 4/4/2022 7/21/2022 11/3/2022   Tender (HUMPHRIES-28) 1 / 28 1 / 28  0 / 28 1 / 28    Swollen (HUMPHRIES-28) 6 / 28  3 / 28  5 / 28  5 / 28    Provider Global 10 mm 10 mm 20 mm 5 mm   Patient Global 15 mm 10 mm 5 mm 5 mm   ESR 1 mm/hr -- 1 mm/hr 7 mm/hr   CRP 0.3 mg/L 0.4 mg/L 0.3 mg/L 0.3 mg/L   HUMPHRIES-28 (ESR) 1.46 (Remission) -- 0.7 (Remission) 2.62 (Low disease activity)   HUMPHRIES-28 (CRP) 2.51 (Remission) 2.27 (Remission) 1.75 (Remission) 2.31 (Remission)   CDAI Score 9.5  6  7.5  7       Latest Reference Range & Units 02/07/23 08:53 02/22/23 08:50 03/15/23 09:04 03/15/23 10:00   WBC 3.90 - 12.70 K/uL 4.60      RBC 4.00 - 5.40 M/uL 4.11      Hemoglobin 12.0 - 16.0 g/dL 13.4      Hematocrit 37.0 - 48.5 % 40.8      MCV 82 - 98 fL 99 (H)      MCH 27.0 - 31.0 pg 32.6 (H)      MCHC 32.0 - 36.0 g/dL 32.8      RDW 11.5 - 14.5 % 13.3      Platelets 150 - 450 K/uL 206      MPV 9.2 - 12.9 fL 11.2      Gran % 38.0 - 73.0 % 41.1      Lymph % 18.0 - 48.0 % 45.2      Mono % 4.0 - 15.0 % 10.7      Eosinophil % 0.0 - 8.0 % 1.7      Basophil % 0.0 - 1.9 % 1.1      Immature Granulocytes 0.0 - 0.5 % 0.2      Gran # (ANC) 1.8 - 7.7 K/uL 1.9      Lymph # 1.0 - 4.8 K/uL 2.1      Mono # 0.3 - 1.0 K/uL 0.5      Eos # 0.0 - 0.5 K/uL 0.1      Baso # 0.00 - 0.20 K/uL 0.05      Immature Grans (Abs) 0.00 - 0.04 K/uL 0.01      nRBC 0 /100 WBC 0      Differential Method  Automated      Iron 30 - 160 ug/dL   149    TIBC 250 - 450 ug/dL   435    Saturated Iron 20 - 50 %   34    Transferrin 200 - 375 mg/dL   294    Ferritin 20.0 - 300.0 ng/mL   201    Folate 4.0 - 24.0 ng/mL  15.3      Vitamin B-12 210 - 950 pg/mL  448     Sed Rate 0 - 36 mm/Hr <2      Sodium 136 - 145 mmol/L 138      Potassium 3.5 - 5.1 mmol/L 4.8      Chloride 95 - 110 mmol/L 100      CO2 23 - 29 mmol/L 27      Anion Gap 8 - 16 mmol/L 11      BUN 8 - 23 mg/dL 17      Creatinine 0.5 - 1.4 mg/dL 0.7      eGFR >60 mL/min/1.73 m^2 >60.0      Glucose 70 - 110 mg/dL 97      Calcium 8.7 - 10.5 mg/dL 10.0      Alkaline Phosphatase 55 - 135 U/L 46 (L) 50 (L) 50 (L)    PROTEIN TOTAL 6.0 - 8.4 g/dL 7.2 7.2 7.4    Albumin 3.5 - 5.2 g/dL 4.3 4.4 4.4    BILIRUBIN TOTAL 0.1 - 1.0 mg/dL 1.3 (H) 1.0 1.1 (H)    Bilirubin Direct 0.1 - 0.3 mg/dL  0.4 (H) 0.4 (H)    AST 10 - 40 U/L 50 (H) 38 40    ALT 10 - 44 U/L 68 (H) 49 (H) 57 (H)    GGT 8 - 55 U/L  137 (H)     CRP 0.0 - 8.2 mg/L 0.4      Cholesterol 120 - 199 mg/dL 178      HDL 40 - 75 mg/dL 88 (H)      HDL/Cholesterol Ratio 20.0 - 50.0 % 49.4      LDL Cholesterol External 63.0 - 159.0 mg/dL 71.2      Non-HDL Cholesterol mg/dL 90      Total Cholesterol/HDL Ratio 2.0 - 5.0  2.0      Triglycerides 30 - 150 mg/dL 94      CPK 20 - 180 U/L  190 (H) 168    CERULOPLASMIN 15.0 - 45.0 mg/dL   20.0    PEth 16:0/18.1 (POPEth) Cutoff: 10 ng/mL   78    PEth 16:0/18.2 (PLPEth) Cutoff: 10 ng/mL   SEE BELOW    PETH INTERPRETATION    Positive.    TSH 0.400 - 4.000 uIU/mL  0.364 (L)     Free T4 0.71 - 1.51 ng/dL  1.07     SAMIR Screen Negative <1:80    Positive !    SAMIR Titer 1    1:80    SAMIR PATTERN 1    Homogeneous    ds DNA Ab Negative 1:10    Negative 1:10    Smooth Muscle Ab Negative    Negative 1:40    Anti-Mitochon Ab IFA Negative    Negative 1:40    IgG 650 - 1600 mg/dL   803    Hep A IgM Non-reactive    Non-reactive    Hepatitis A Antibody IgG    Reactive    HBV DNA, Qualitative PCR Not detected  Not detected      Hep B C IgM Non-reactive    Non-reactive    Hep B Core Total Ab Non-reactive    Non-reactive    Hep B S Ab mIU/mL  -    95.02  Reactive    Hepatitis B Surface Ag Non-reactive    Non-reactive     Hepatitis C Ab Non-reactive    Non-reactive    US ABDOMEN COMPLETE     Rpt   A-1 Antitrypsin 100 - 190 mg/dL   82 (L)    (H): Data is abnormally high  (L): Data is abnormally low  !: Data is abnormal  Rpt: View report in Results Review for more information     Assessment:   Left trochanteric burstis/gluteus medius tendinitis  S/p Right trochanteric bursitis, resolved post injection 6/14/22  Peripheral spondyloarthritis v. Pseudo-RA CPPD v seronegative RA: TJ  SJ  Pt global 5  ESR <2 CRP 0.4 HUMPHRIES 28: 0.77(remission) [id number] 1.85(remission)  CDAI 7(LDA)  DAPSA  TJ 0 SJ 5  Pt global 1Pt pain 2  CRP 0.04= 8.04(LDA)     Left pes planovalgus, posterior tibial tendinitis/probable tear, plantar fasciitis/heel spur, and calcific Achilles tendinitis. All improved with orthosis  Hyperlipidemia ASCVD 10.8%(intermediate risk  /82      Plan:   Continue Enbrel SureClick 50mg sc q 7 days  Continue Diclofenac gel 1% four times daily prn fingers   Continue Gabapentin 300mg twice daily  MRI  left hip scheduled 3/29/23 (earliest appt could not get prior to Dr. Mathis appt)  Ortho appt with Dr. Mathis 3/28/23  Diclofenac gel four times daily to left ankle, Achilles and plantar heel  orthoses  Cont rosuvastatin 5mg nightly per Dr. Smith  RTC 3 months with standing labs including HBV DNA monitoring

## 2023-03-21 ENCOUNTER — SPECIALTY PHARMACY (OUTPATIENT)
Dept: PHARMACY | Facility: CLINIC | Age: 68
End: 2023-03-21
Payer: MEDICARE

## 2023-03-21 DIAGNOSIS — M47.819 SPONDYLOARTHROPATHY: Primary | ICD-10-CM

## 2023-03-21 DIAGNOSIS — L40.50 PSORIATIC ARTHRITIS: ICD-10-CM

## 2023-03-21 NOTE — TELEPHONE ENCOUNTER
Enbrel PA approved until 12/31/2023.  Outgoing call to pt to inform her of approval and will route prescription to [facility].  Pt voiced understanding.  Routing prescription to [facility] and closing pt out.

## 2023-03-21 NOTE — TELEPHONE ENCOUNTER
[place], this is Sandee Jefferson with [facility].  We are working on your prescription that your doctor has sent us. We will be working with your insurance to get this approved for you. We will be calling you along the way with updates on your medication.  If you have any questions, you can reach us at [phone].    Welcome call outcome: Patient/caregiver reached    New order for Enbrel received.  Continuation of therapy.  New PA is required.  Pt is currently enrolled in PAP.   Informed pt will submit PA and route prescription to [facility] once approved.  Pt voiced understanding.      Enbrel PA submitted via [facility] website (Key: [id number] - PA Case ID: [id number]).

## 2023-03-23 ENCOUNTER — PATIENT MESSAGE (OUTPATIENT)
Dept: UROGYNECOLOGY | Facility: CLINIC | Age: 68
End: 2023-03-23
Payer: MEDICARE

## 2023-03-28 ENCOUNTER — LAB VISIT (OUTPATIENT)
Dept: LAB | Facility: HOSPITAL | Age: 68
End: 2023-03-28
Payer: MEDICARE

## 2023-03-28 ENCOUNTER — OFFICE VISIT (OUTPATIENT)
Dept: ORTHOPEDICS | Facility: CLINIC | Age: 68
End: 2023-03-28
Payer: MEDICARE

## 2023-03-28 ENCOUNTER — OFFICE VISIT (OUTPATIENT)
Dept: HEPATOLOGY | Facility: CLINIC | Age: 68
End: 2023-03-28
Payer: MEDICARE

## 2023-03-28 ENCOUNTER — PROCEDURE VISIT (OUTPATIENT)
Dept: HEPATOLOGY | Facility: CLINIC | Age: 68
End: 2023-03-28
Payer: MEDICARE

## 2023-03-28 ENCOUNTER — PATIENT MESSAGE (OUTPATIENT)
Dept: HEPATOLOGY | Facility: CLINIC | Age: 68
End: 2023-03-28

## 2023-03-28 VITALS — BODY MASS INDEX: 21.42 KG/M2 | WEIGHT: 125.44 LBS | HEIGHT: 64 IN

## 2023-03-28 VITALS — WEIGHT: 125.44 LBS | HEIGHT: 64 IN | BODY MASS INDEX: 21.42 KG/M2

## 2023-03-28 DIAGNOSIS — R74.01 ALT (SGPT) LEVEL RAISED: ICD-10-CM

## 2023-03-28 DIAGNOSIS — R74.01 ALT (SGPT) LEVEL RAISED: Primary | ICD-10-CM

## 2023-03-28 DIAGNOSIS — E88.01 LOW PLASMA ALPHA-1 ANTITRYPSIN: ICD-10-CM

## 2023-03-28 DIAGNOSIS — E78.5 HYPERLIPIDEMIA, UNSPECIFIED HYPERLIPIDEMIA TYPE: ICD-10-CM

## 2023-03-28 DIAGNOSIS — M47.816 LUMBAR SPONDYLOSIS: ICD-10-CM

## 2023-03-28 DIAGNOSIS — M54.16 LUMBAR RADICULOPATHY: ICD-10-CM

## 2023-03-28 DIAGNOSIS — M47.26 OTHER SPONDYLOSIS WITH RADICULOPATHY, LUMBAR REGION: Primary | ICD-10-CM

## 2023-03-28 DIAGNOSIS — M79.605 LEFT LEG PAIN: ICD-10-CM

## 2023-03-28 DIAGNOSIS — E88.01 ALPHA-1-ANTITRYPSIN DEFICIENCY: ICD-10-CM

## 2023-03-28 PROCEDURE — 76981 FIBROSCAN NEW ORLEANS (VIBRATION CONTROLLED TRANSIENT ELASTOGRAPHY): ICD-10-PCS | Mod: 26,S$PBB,, | Performed by: NURSE PRACTITIONER

## 2023-03-28 PROCEDURE — 99214 OFFICE O/P EST MOD 30 MIN: CPT | Mod: PBBFAC,27 | Performed by: ORTHOPAEDIC SURGERY

## 2023-03-28 PROCEDURE — 99999 PR PBB SHADOW E&M-EST. PATIENT-LVL IV: ICD-10-PCS | Mod: PBBFAC,,, | Performed by: NURSE PRACTITIONER

## 2023-03-28 PROCEDURE — 99999 PR PBB SHADOW E&M-EST. PATIENT-LVL IV: CPT | Mod: PBBFAC,,, | Performed by: ORTHOPAEDIC SURGERY

## 2023-03-28 PROCEDURE — 99999 PR PBB SHADOW E&M-EST. PATIENT-LVL IV: ICD-10-PCS | Mod: PBBFAC,,, | Performed by: ORTHOPAEDIC SURGERY

## 2023-03-28 PROCEDURE — 99214 OFFICE O/P EST MOD 30 MIN: CPT | Mod: 25,PBBFAC | Performed by: NURSE PRACTITIONER

## 2023-03-28 PROCEDURE — 91200 LIVER ELASTOGRAPHY: CPT | Mod: PBBFAC | Performed by: NURSE PRACTITIONER

## 2023-03-28 PROCEDURE — 99213 OFFICE O/P EST LOW 20 MIN: CPT | Mod: S$PBB,,, | Performed by: STUDENT IN AN ORGANIZED HEALTH CARE EDUCATION/TRAINING PROGRAM

## 2023-03-28 PROCEDURE — 76981 USE PARENCHYMA: CPT | Mod: 26,S$PBB,, | Performed by: NURSE PRACTITIONER

## 2023-03-28 PROCEDURE — 36415 COLL VENOUS BLD VENIPUNCTURE: CPT | Performed by: NURSE PRACTITIONER

## 2023-03-28 PROCEDURE — 99214 PR OFFICE/OUTPT VISIT, EST, LEVL IV, 30-39 MIN: ICD-10-PCS | Mod: S$PBB,,, | Performed by: NURSE PRACTITIONER

## 2023-03-28 PROCEDURE — 99213 PR OFFICE/OUTPT VISIT, EST, LEVL III, 20-29 MIN: ICD-10-PCS | Mod: S$PBB,,, | Performed by: STUDENT IN AN ORGANIZED HEALTH CARE EDUCATION/TRAINING PROGRAM

## 2023-03-28 PROCEDURE — 99999 PR PBB SHADOW E&M-EST. PATIENT-LVL IV: CPT | Mod: PBBFAC,,, | Performed by: NURSE PRACTITIONER

## 2023-03-28 PROCEDURE — 99214 OFFICE O/P EST MOD 30 MIN: CPT | Mod: S$PBB,,, | Performed by: NURSE PRACTITIONER

## 2023-03-28 NOTE — PROGRESS NOTES
Subjective:   Chief Complaint:   Pain of the Left Hip       Jill Marquez is a 67 y.o. female presenting with left sided back and thigh pain. They have tried gabapentin, which did mildly help. They have a PMH of lumbar spondylosis. She describes the pain a radiating around from the left low back to the left knee.         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    HTN (hypertension) 7/23/2012    Hyperlipidemia     Hypothyroid 7/23/2012     Past Surgical History:   Procedure Laterality Date    COLONOSCOPY N/A 1/25/2016    Procedure: COLONOSCOPY;  Surgeon: DAYNA Simmons MD;  Location: [facility] ENDO (4TH FLR);  Service: Endoscopy;  Laterality: N/A;    COLONOSCOPY N/A 11/8/2022    Procedure: COLONOSCOPY;  Surgeon: DAYNA Simmons MD;  Location: [facility] ENDO (4TH FLR);  Service: Endoscopy;  Laterality: N/A;  vacc-inst portal-vargus only-tb  precall done.arrival time of 10:00 confirmed/mleone    COSMETIC SURGERY      EYE SURGERY      eyelid surgery      HYSTERECTOMY  2004    prolapse    OOPHORECTOMY Bilateral 2015    Tonsillectomy      TONSILLECTOMY       Family History   Problem Relation Age of Onset    Diabetes Brother     Retinal detachment Brother     Cancer Brother         kidney    Cataracts Brother     Diabetes Brother     Cancer Brother         throat    Cataracts Brother     Diabetes Brother     Cataracts Brother     No Known Problems Mother     No Known Problems Father     Lupus Other         niece    No Known Problems Sister     No Known Problems Maternal Aunt     No Known Problems Maternal Uncle     No Known Problems Paternal Aunt     No Known Problems Paternal Uncle     No Known Problems Maternal Grandmother     No Known Problems Maternal Grandfather     No Known Problems Paternal Grandmother     No Known Problems Paternal Grandfather     Breast cancer Neg Hx     Colon cancer Neg Hx     Ovarian cancer Neg Hx     Blindness  Neg Hx     Glaucoma Neg Hx     Hypertension Neg Hx     Macular degeneration Neg Hx     Strabismus Neg Hx     Stroke Neg Hx     Thyroid disease Neg Hx     Amblyopia Neg Hx     Rheum arthritis Neg Hx     Psoriasis Neg Hx     Inflammatory bowel disease Neg Hx     Cervical cancer Neg Hx     Endometrial cancer Neg Hx     Vaginal cancer Neg Hx     Uterine cancer Neg Hx      Social History     Socioeconomic History    Marital status:     Number of children: 2   Tobacco Use    Smoking status: Former     Years: 12.00     Types: Cigarettes     Quit date: 1982     Years since quittin.9    Smokeless tobacco: Former    Tobacco comments:     , homemaker, 2 children   Substance and Sexual Activity    Alcohol use: Yes     Comment: 2 servings daily x 30+ years, stopped 2023    Drug use: No    Sexual activity: Yes     Partners: Male     Birth control/protection: Surgical, None     Social Determinants of Health     Financial Resource Strain: Low Risk     Difficulty of Paying Living Expenses: Not hard at all   Food Insecurity: No Food Insecurity    Worried About Running Out of Food in the Last Year: Never true    Ran Out of Food in the Last Year: Never true   Transportation Needs: No Transportation Needs    Lack of Transportation (Medical): No    Lack of Transportation (Non-Medical): No   Physical Activity: Sufficiently Active    Days of Exercise per Week: 4 days    Minutes of Exercise per Session: 60 min   Stress: Stress Concern Present    Feeling of Stress : To some extent   Social Connections: Unknown    Frequency of Communication with Friends and Family: More than three times a week    Frequency of Social Gatherings with Friends and Family: Twice a week    Active Member of Clubs or Organizations: Yes    Attends Club or Organization Meetings: Never    Marital Status:    Housing Stability: Low Risk     Unable to Pay for Housing in the Last Year: No    Number of Places Lived in the Last Year: 1     "Unstable Housing in the Last Year: No       Current Outpatient Medications   Medication Sig Dispense Refill    amLODIPine (NORVASC) 10 MG tablet TAKE 1 TABLET (10 MG TOTAL) BY MOUTH ONCE DAILY. 90 tablet 2    azelastine (ASTELIN) 137 mcg (0.1 %) nasal spray USE 1 SPRAY (137 MCG TOTAL) IN EACH NOSTRIL 2 (TWO) TIMES DAILY. 90 mL 1    citalopram (CELEXA) 10 MG tablet Take 1 tablet (10 mg total) by mouth once daily. 90 tablet 2    conjugated estrogens (PREMARIN) vaginal cream Place 0.5 g vaginally twice a week. 30 g 2    diclofenac sodium (VOLTAREN) 1 % Gel Apply 4 g topically 4 (four) times daily as needed. 3 each 2    ergocalciferol, vitamin D2, (VITAMIN D ORAL)       etanercept (ENBREL SURECLICK) 50 mg/mL (1 mL) Inject 1 mL (50 mg total) into the skin once a week. 4 mL 5    fish oil-omega-3 fatty acids 300-1,000 mg capsule Take 2 g by mouth once daily.      fluticasone propionate (FLONASE) 50 mcg/actuation nasal spray INHALE 2 SPRAYS IN EACH NOSTRIL EVERY DAY 48 g 1    gabapentin (NEURONTIN) 300 MG capsule Take 1 capsule (300 mg total) by mouth 2 (two) times daily. 180 capsule 1    hydroCHLOROthiazide (HYDRODIURIL) 25 MG tablet Take 1 tablet (25 mg total) by mouth once daily. 90 tablet 1    PSYLLIUM SEED, WITH SUGAR, (METAMUCIL ORAL) Take by mouth.      rosuvastatin (CRESTOR) 5 MG tablet Take 1 tablet (5 mg total) by mouth once daily. 90 tablet 1    sennosides (SENOKOT TO GO ORAL)       SYNTHROID 88 mcg tablet TAKE 1 TABLET (88 MCG TOTAL) BY MOUTH BEFORE BREAKFAST. 30 tablet 11    triamcinolone acetonide 0.1% (KENALOG) 0.1 % cream Apply topically 2 (two) times daily. Use up to 2 weeks at a time. 454 g 1     No current facility-administered medications for this visit.     Review of patient's allergies indicates:   Allergen Reactions    Sulfasalazine Nausea Only     Malaise, nausea,    Leflunomide Rash         Objective:      Vitals:    03/28/23 1326   Weight: 56.9 kg (125 lb 7.1 oz)   Height: 5' 3.5" (1.613 m) "     Physical Exam  LLE:  No gross deformities, wounds  No crepitus, No TTP, full hip ROM without pain   Motor intact hip flex, quad, Tib Ant, gastroc, EHL, FHL.  Sensation intact saphenous, sural, deep/superficial peroneal, tibial nerves  Palp DP/PT pulse, BCR      Imaging Review: Xrays ordered and reviewed for this encounter. Xrays of the pelvis show no arthritis of the bilateral hip. There are no acute fractures or dislocations.     Assessment:     Problem Noted   Left Leg Pain 6/26/2015       Plan:     Problem List Items Addressed This Visit          Orthopedic    Left leg pain     -- Symptoms seem more consistent with radiculopathy than hip pathology   -- Will refer to Pain management and spine clinic           Other Visit Diagnoses       Other spondylosis with radiculopathy, lumbar region    -  Primary    Relevant Orders    Ambulatory referral/consult to Pain Clinic    Ambulatory referral/consult to Back & Spine Clinic            Orders Placed This Encounter   Procedures    Ambulatory referral/consult to Pain Clinic    Ambulatory referral/consult to Back & Spine Clinic         The patient's pathophysiology was explained in detail with reference to x-rays, models, other visual aids as appropriate.  Treatment options were discussed in detail.  Questions were invited and answered to the patient's satisfaction.    Ant Mathis MD  Orthopaedic Surgery

## 2023-03-28 NOTE — Clinical Note
Results of lab sent to pt in [facility] with explanation. Please contact pt ot schedule f/u with me in 1 year with lab a few days before, fibroscan same day as appt. Thanks !

## 2023-03-28 NOTE — ASSESSMENT & PLAN NOTE
-- Symptoms seem more consistent with radiculopathy than hip pathology   -- Will refer to Pain management and spine clinic   
2 seconds or less

## 2023-03-28 NOTE — PATIENT INSTRUCTIONS
1. Fibroscan to look for fat or scar tissue in the liver showed no significant fat or scar tissue  2. Follow up based on results of repeat labs  3. Labs in 6 weeks and 3 months

## 2023-03-28 NOTE — PROGRESS NOTES
[facility] VISIT FOLLOW UP NOTE    PCP: Dain Smith MD     CHIEF COMPLAINT: elevated liver enzymes, low A1AT level     HPI: This is a 67 y.o. female with PMH noted below, presenting for follow up of above     Serological workup was negative for David's, hemochromatosis, autoimmune etiology, and viral hepatitis.   Mildly + SAMIR 1:80, all other markers negative, low suspicion for AIH given low SAMIR and others negative   Low A1AT, will repeat with phenotype     Prior serologic workup:   Lab Results   Component Value Date    SMOOTHMUSCAB Negative 1:40 03/15/2023    AMAIFA Negative 1:40 03/15/2023    IGGSERUM 803 03/15/2023    ANASCREEN Positive (A) 03/15/2023    FERRITIN 201 03/15/2023    FESATURATED 34 03/15/2023    CERULOPLSM 20.0 03/15/2023    HEPBSAG Non-reactive 03/15/2023    HEPCAB Non-reactive 03/15/2023    HEPAIGM Non-reactive 03/15/2023       Liver fibrosis staging:  -- fibroscan 3/2023 normal (kPA 4, )      Interval HPI: Presents today alone. Crestor started 11/2022. No fat or fibrosis on fibroscan  Celexa since 2020  Enbrel x years   No Herbal medications, stopped liver supplement after first visit (although only took a couple of doses)  Previously daily alcohol use but stopped after last visit   Liver enzymes remain elevated, slightly higher on repeat     Labs done 3/2023 show elevated transaminase levels (ALT > AST, elevated x2 in 2023, previously WNL)  Platelets WNL, alk phos low  Synthetic liver functioning WNL    Lab Results   Component Value Date    ALT 57 (H) 03/15/2023    AST 40 03/15/2023    ALKPHOS 50 (L) 03/15/2023    BILITOT 1.1 (H) 03/15/2023    ALBUMIN 4.4 03/15/2023     02/07/2023       Abd U/S 3/2023 noted normal liver size, fatty liver (?), no splenomegaly    Denies family history of liver disease . Daily alcohol consumption   Social History     Substance and Sexual Activity   Alcohol Use Yes    Comment: 2 servings daily x 30+ years, stopped March 2023  "        +Immunity to Hep A and B       Allergy and medication list reviewed and updated     PMHX:  has a past medical history of Colon polyp (01/25/2016), DJD (degenerative joint disease) of lumbar spine (3/10/2014), HTN (hypertension) (7/23/2012), Hyperlipidemia, and Hypothyroid (7/23/2012).    PSHX:  has a past surgical history that includes Tonsillectomy; eyelid surgery; Eye surgery; Cosmetic surgery; Tonsillectomy; Colonoscopy (N/A, 1/25/2016); Oophorectomy (Bilateral, 2015); Hysterectomy (2004); and Colonoscopy (N/A, 11/8/2022).    FAMILY HISTORY: Updated and reviewed in [facility]    SOCIAL HISTORY:   Social History     Substance and Sexual Activity   Alcohol Use Yes    Comment: 2 servings daily x 30+ years, stopped March 2023       Social History     Substance and Sexual Activity   Drug Use No       ROS:   GENERAL: intermittent fatigue  CARDIOVASCULAR: Denies edema  GI: Denies abdominal pain  SKIN: Denies rash, itching   NEURO: Denies confusion, memory loss, or mood changes    PHYSICAL EXAM:   Friendly White female, in no acute distress; alert and oriented to person, place and time  VITALS: Ht 5' 3.5" (1.613 m)   Wt 56.9 kg (125 lb 7.1 oz)   BMI 21.87 kg/m²   EYES: Sclerae anicteric  GI: Soft, non-tender, non-distended. No ascites.  EXTREMITIES:  No edema.  SKIN: Warm and dry. No jaundice. No telangectasias noted. No palmar erythema.  NEURO:  No asterixis.  PSYCH:  Thought and speech pattern appropriate. Behavior normal      EDUCATION:  See instructions discussed with patient in Instructions section of the After Visit Summary     ASSESSMENT & PLAN:  67 y.o. female with:  1. Elevated liver enzymes   -- may be related to Crestor? Increased after starting. So far, other sero w/u negative so would be beneficial to stop statin and check labs in 6 weeks and 3 months. If normalizes, would recommend changing to alternative statin. PCP updated   -- Labs note elevated transaminase levels since 2/2023, previously WNL  --- " synthetic liver function WNL  --- previous serological work up : see HPI  -- labs in 6 week and 3 months   -- fibroscan normal    2. A1AT low  -- will obtain phenotype and repeat lab       ADDENDUM 4/4/2023: MS phenotype for A1AT, level barely low. Pulm consult x1 but may not need chronic f/u with them. Will follow in hepatology yearly with labs and fibroscan  Recommend brother to be tested for A1AT given COPD      Follow up for pending results of workup. Labs in 6 weeks and 3 months   Orders Placed This Encounter   Procedures    Alpha 1 Antitrypsin Phenotype    Hepatic Function Panel    CK    Lipid Panel        Thank you for allowing me to participate in the care of JOANN Osorio    I spent a total of 30 minutes on the day of the visit.This includes face to face time and non-face to face time preparing to see the patient (eg, review of tests), obtaining and/or reviewing separately obtained history, documenting clinical information in the electronic or other health record, independently interpreting results and communicating results to the patient/family/caregiver, and coordinating care.         CC'ed note to:   Dain Smith MD

## 2023-03-28 NOTE — Clinical Note
Chace Smith: So far her lab workup has been negative for cause of elevated ALT and it correlates to when her crestor was started so if it is ok with you, I recommended to her that she hold her Crestor for 6 weeks and then I will repeat labs in 6 weeks and 3 months. If the labs return to normal after stopping, we typically recommend trying a different statin. Do you have preferred statin in mind if that is the case? I don't mind starting it and trending labs after starting. Let me know what you think  Thanks Lavonne

## 2023-03-28 NOTE — PROCEDURES
FibroScan Reading (Vibration Controlled Transient Elastography)    Date/Time: 3/28/2023 9:45 AM  Performed by: Lavonne Melendez NP  Authorized by: Lavonne Melendez NP     Diagnosis:  Other    Probe:  M    Universal Protocol: Patient's identity, procedure and site were verified, confirmatory pause was performed.  Discussed procedure including risks and potential complications.  Questions answered.  Patient verbalizes understanding and wishes to proceed with VCTE.     Procedure: After providing explanations of the procedure, patient was placed in the supine position with right arm in maximum abduction to allow optimal exposure of right lateral abdomen.  Patient was briefly assessed, Testing was performed in the mid-axillary location, 50Hz Shear Wave pulses were applied and the resulting Shear Wave and Propagation Speed detected with a 3.5 MHz ultrasonic signal, using the FibroScan probe, Skin to liver capsule distance and liver parenchyma were accessed during the entire examination with the FibroScan probe, Patient was instructed to breathe normally and to abstain from sudden movements during the procedure, allowing for random measurements of liver stiffness. At least 10 Shear Waves were produced, Individual measurements of each Shear Wave were calculated.  Patient tolerated the procedure well with no complications.  Meets discharge criteria as was dismissed.  Rates pain 0 out of 10.  Patient will follow up with ordering provider to review results.    Findings  Median liver stiffness score:  4  CAP Reading: dB/m:  194    IQR/med %:  5  Interpretation  Fibrosis interpretation is based on medial liver stiffness - Kilopascal (kPa).    Fibrosis Stage:  F 0-1  Steatosis interpretation is based on controlled attenuation parameter - (dB/m).    Steatosis Grade:  <S1

## 2023-03-30 NOTE — PROGRESS NOTES
DATE: 4/4/2023  PATIENT: Jill Marquez    Supervising Physician: João Bruno M.D.    CHIEF COMPLAINT: low back pain    HISTORY:  Jill Marquez is a 67 y.o. female with pmhx of scoliosis here for initial evaluation of low back and left lateral leg pain (Back - 4, Leg - 6).  The pain in the left leg is what bothers her most.  The pain has been present for about 3 months. Denies specific injury. The patient describes the pain as constant and dull.  The pain is worse with rest and improved by walking. There is no associated numbness and tingling. There is no subjective weakness. Prior treatments have included tylenol, gabapentin and PT, but no TOMASA or surgery.    The patient denies myelopathic symptoms such as handwriting changes or difficulty with buttons/coins/keys. Denies perineal paresthesias, bowel/bladder dysfunction.    PAST MEDICAL/SURGICAL HISTORY:  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    HTN (hypertension) 7/23/2012    Hyperlipidemia     Hypothyroid 7/23/2012     Past Surgical History:   Procedure Laterality Date    COLONOSCOPY N/A 1/25/2016    Procedure: COLONOSCOPY;  Surgeon: DAYNA Simmons MD;  Location: [address]);  Service: Endoscopy;  Laterality: N/A;    COLONOSCOPY N/A 11/8/2022    Procedure: COLONOSCOPY;  Surgeon: DAYNA Simmons MD;  Location: [address]);  Service: Endoscopy;  Laterality: N/A;  vacc-inst portal-vargus only-tb  precall done.arrival time of 10:00 confirmed/mleone    COSMETIC SURGERY      EYE SURGERY      eyelid surgery      HYSTERECTOMY  2004    prolapse    OOPHORECTOMY Bilateral 2015    Tonsillectomy      TONSILLECTOMY         Medications:   Current Outpatient Medications on File Prior to Visit   Medication Sig Dispense Refill    amLODIPine (NORVASC) 10 MG tablet TAKE 1 TABLET (10 MG TOTAL) BY MOUTH ONCE DAILY. 90 tablet 2    azelastine (ASTELIN) 137 mcg (0.1 %) nasal  spray USE 1 SPRAY (137 MCG TOTAL) IN EACH NOSTRIL 2 (TWO) TIMES DAILY. 90 mL 1    citalopram (CELEXA) 10 MG tablet Take 1 tablet (10 mg total) by mouth once daily. 90 tablet 2    conjugated estrogens (PREMARIN) vaginal cream Place 0.5 g vaginally twice a week. 30 g 2    diclofenac sodium (VOLTAREN) 1 % Gel Apply 4 g topically 4 (four) times daily as needed. 3 each 2    ergocalciferol, vitamin D2, (VITAMIN D ORAL)       etanercept (ENBREL SURECLICK) 50 mg/mL (1 mL) Inject 1 mL (50 mg total) into the skin once a week. 4 mL 5    fish oil-omega-3 fatty acids 300-1,000 mg capsule Take 2 g by mouth once daily.      fluticasone propionate (FLONASE) 50 mcg/actuation nasal spray INHALE 2 SPRAYS IN EACH NOSTRIL EVERY DAY 48 g 1    hydroCHLOROthiazide (HYDRODIURIL) 25 MG tablet Take 1 tablet (25 mg total) by mouth once daily. 90 tablet 1    PSYLLIUM SEED, WITH SUGAR, (METAMUCIL ORAL) Take by mouth.      rosuvastatin (CRESTOR) 5 MG tablet Take 1 tablet (5 mg total) by mouth once daily. 90 tablet 1    sennosides (SENOKOT TO GO ORAL)       SYNTHROID 88 mcg tablet TAKE 1 TABLET (88 MCG TOTAL) BY MOUTH BEFORE BREAKFAST. 30 tablet 11    triamcinolone acetonide 0.1% (KENALOG) 0.1 % cream Apply topically 2 (two) times daily. Use up to 2 weeks at a time. 454 g 1    [DISCONTINUED] gabapentin (NEURONTIN) 300 MG capsule Take 1 capsule (300 mg total) by mouth 2 (two) times daily. 180 capsule 1     No current facility-administered medications on file prior to visit.       Social History:   Social History     Socioeconomic History    Marital status:     Number of children: 2   Tobacco Use    Smoking status: Former     Years: 12.00     Types: Cigarettes     Quit date: 1982     Years since quittin.9    Smokeless tobacco: Former    Tobacco comments:     , homemaker, 2 children   Substance and Sexual Activity    Alcohol use: Yes     Comment: 2 servings daily x 30+ years, stopped 2023    Drug use: No    Sexual  "activity: Yes     Partners: Male     Birth control/protection: Surgical, None     Social Determinants of Health     Financial Resource Strain: Low Risk     Difficulty of Paying Living Expenses: Not hard at all   Food Insecurity: No Food Insecurity    Worried About Running Out of Food in the Last Year: Never true    Ran Out of Food in the Last Year: Never true   Transportation Needs: No Transportation Needs    Lack of Transportation (Medical): No    Lack of Transportation (Non-Medical): No   Physical Activity: Sufficiently Active    Days of Exercise per Week: 4 days    Minutes of Exercise per Session: 60 min   Stress: Stress Concern Present    Feeling of Stress : To some extent   Social Connections: Unknown    Frequency of Communication with Friends and Family: More than three times a week    Frequency of Social Gatherings with Friends and Family: Twice a week    Active Member of Clubs or Organizations: Yes    Attends Club or Organization Meetings: Never    Marital Status:    Housing Stability: Low Risk     Unable to Pay for Housing in the Last Year: No    Number of Places Lived in the Last Year: 1    Unstable Housing in the Last Year: No       REVIEW OF SYSTEMS:  Constitution: Negative. Negative for chills, fever and night sweats.   Cardiovascular: Negative for chest pain and syncope.   Respiratory: Negative for cough and shortness of breath.   Gastrointestinal: See HPI. Negative for nausea/vomiting. Negative for abdominal pain.  Genitourinary: See HPI. Negative for discoloration or dysuria.  Skin: Negative for dry skin, itching and rash.   Hematologic/Lymphatic: Negative for bleeding problem. Does not bruise/bleed easily.   Musculoskeletal: Negative for falls and muscle weakness.   Neurological: See HPI. No seizures.   Endocrine: Negative for polydipsia, polyphagia and polyuria.   Allergic/Immunologic: Negative for hives and persistent infections.     EXAM:  Ht 5' 3.5" (1.613 m)   Wt 53.8 kg (118 lb 11.5 " oz)   BMI 20.70 kg/m²     General: The patient is a very pleasant 67 y.o. female in no apparent distress, the patient is oriented to person, place and time.  Psych: Normal mood and affect  HEENT: Vision grossly intact, hearing intact to the spoken word.  Lungs: Respirations unlabored.  Gait: Normal station and gait, no difficulty with toe or heel walk.   Skin: Dorsal lumbar skin negative for rashes, lesions, hairy patches and surgical scars. There is mild lumbar tenderness to palpation.  Range of motion: Lumbar range of motion is acceptable.  Spinal Balance: Global saggital and coronal spinal balance acceptable, not significant for scoliosis and kyphosis.  Musculoskeletal: No pain with the range of motion of the bilateral hips. No trochanteric tenderness to palpation.  Vascular: Bilateral lower extremities warm and well perfused, dorsalis pedis pulses 2+ bilaterally.  Neurological: Normal strength and tone in all major motor groups in the bilateral lower extremities. Normal sensation to light touch in the L2-S1 dermatomes bilaterally.  Deep tendon reflexes symmetric 2+ in the bilateral lower extremities.  Negative Babinski bilaterally. Straight leg raise negative bilaterally.    IMAGING:      Today I personally reviewed AP, Lat and Flex/Ex  upright L-spine films that demonstrate thoracolumbar scoliosis with convexity to the left. Moderate DDD throughout.      Body mass index is 20.7 kg/m².    Hemoglobin A1C   Date Value Ref Range Status   06/29/2021 5.1 4.0 - 5.6 % Final     Comment:     ADA Screening Guidelines:  5.7-6.4%  Consistent with prediabetes  >or=6.5%  Consistent with diabetes    High levels of fetal hemoglobin interfere with the HbA1C  assay. Heterozygous hemoglobin variants (HbS, HgC, etc)do  not significantly interfere with this assay.   However, presence of multiple variants may affect accuracy.     04/30/2020 5.2 4.0 - 5.6 % Final     Comment:     ADA Screening Guidelines:  5.7-6.4%  Consistent with  prediabetes  >or=6.5%  Consistent with diabetes  High levels of fetal hemoglobin interfere with the HbA1C  assay. Heterozygous hemoglobin variants (HbS, HgC, etc)do  not significantly interfere with this assay.   However, presence of multiple variants may affect accuracy.     08/13/2019 5.1 4.0 - 5.6 % Final     Comment:     ADA Screening Guidelines:  5.7-6.4%  Consistent with prediabetes  >or=6.5%  Consistent with diabetes  High levels of fetal hemoglobin interfere with the HbA1C  assay. Heterozygous hemoglobin variants (HbS, HgC, etc)do  not significantly interfere with this assay.   However, presence of multiple variants may affect accuracy.             ASSESSMENT/PLAN:    Jill was seen today for pain.    Diagnoses and all orders for this visit:    Dorsalgia, unspecified  -     MRI Lumbar Spine Without Contrast; Future    Juvenile idiopathic scoliosis of thoracolumbar region  -     Ambulatory referral[facility]  -     diclofenac (VOLTAREN) 50 MG EC tablet; Take 1 tablet (50 mg total) by mouth 2 (two) times daily as needed (pain).  -     gabapentin (NEURONTIN) 300 MG capsule; Take 2 capsules (600 mg total) by mouth 2 (two) times daily.  -     MRI Lumbar Spine Without Contrast; Future      Today we discussed at length all of the different treatment options including anti-inflammatories, acetaminophen, rest, ice, heat, physical therapy including strengthening and stretching exercises, home exercises, ROM, aerobic conditioning, aqua therapy, other modalities including ultrasound, massage, and dry needling, epidural steroid injections and finally surgical intervention.    Lumbar MRI ordered, I will call with results and order a left TOMASA.

## 2023-03-31 DIAGNOSIS — M51.36 DDD (DEGENERATIVE DISC DISEASE), LUMBAR: Primary | ICD-10-CM

## 2023-04-03 LAB
A1AT PHENOTYP SERPL-IMP: ABNORMAL
A1AT SERPL NEPH-MCNC: 99 MG/DL (ref 100–190)

## 2023-04-04 ENCOUNTER — PATIENT MESSAGE (OUTPATIENT)
Dept: HEPATOLOGY | Facility: CLINIC | Age: 68
End: 2023-04-04
Payer: MEDICARE

## 2023-04-04 ENCOUNTER — HOSPITAL ENCOUNTER (OUTPATIENT)
Dept: RADIOLOGY | Facility: HOSPITAL | Age: 68
Discharge: HOME | End: 2023-04-04
Attending: REGISTERED NURSE
Payer: MEDICARE

## 2023-04-04 ENCOUNTER — OFFICE VISIT (OUTPATIENT)
Dept: ORTHOPEDICS | Facility: CLINIC | Age: 68
End: 2023-04-04
Payer: MEDICARE

## 2023-04-04 VITALS — WEIGHT: 118.69 LBS | BODY MASS INDEX: 20.26 KG/M2 | HEIGHT: 64 IN

## 2023-04-04 DIAGNOSIS — M41.115 JUVENILE IDIOPATHIC SCOLIOSIS OF THORACOLUMBAR REGION: ICD-10-CM

## 2023-04-04 DIAGNOSIS — M54.9 DORSALGIA, UNSPECIFIED: Primary | ICD-10-CM

## 2023-04-04 DIAGNOSIS — M51.36 DDD (DEGENERATIVE DISC DISEASE), LUMBAR: ICD-10-CM

## 2023-04-04 DIAGNOSIS — E88.01 ALPHA-1-ANTITRYPSIN DEFICIENCY: ICD-10-CM

## 2023-04-04 PROCEDURE — 72110 X-RAY EXAM L-2 SPINE 4/>VWS: CPT | Mod: TC

## 2023-04-04 PROCEDURE — 99214 PR OFFICE/OUTPT VISIT, EST, LEVL IV, 30-39 MIN: ICD-10-PCS | Mod: S$PBB,,, | Performed by: REGISTERED NURSE

## 2023-04-04 PROCEDURE — 72110 XR LUMBAR SPINE AP AND LAT WITH FLEX/EXT: ICD-10-PCS | Mod: 26,,, | Performed by: RADIOLOGY

## 2023-04-04 PROCEDURE — 99999 PR PBB SHADOW E&M-EST. PATIENT-LVL IV: CPT | Mod: PBBFAC,,, | Performed by: REGISTERED NURSE

## 2023-04-04 PROCEDURE — 99214 OFFICE O/P EST MOD 30 MIN: CPT | Mod: PBBFAC | Performed by: REGISTERED NURSE

## 2023-04-04 PROCEDURE — 72110 X-RAY EXAM L-2 SPINE 4/>VWS: CPT | Mod: 26,,, | Performed by: RADIOLOGY

## 2023-04-04 PROCEDURE — 99214 OFFICE O/P EST MOD 30 MIN: CPT | Mod: S$PBB,,, | Performed by: REGISTERED NURSE

## 2023-04-04 PROCEDURE — 99999 PR PBB SHADOW E&M-EST. PATIENT-LVL IV: ICD-10-PCS | Mod: PBBFAC,,, | Performed by: REGISTERED NURSE

## 2023-04-04 RX ORDER — GABAPENTIN 300 MG/1
600 CAPSULE ORAL 2 TIMES DAILY
Qty: 120 CAPSULE | Refills: 11 | Status: SHIPPED | OUTPATIENT
Start: 2023-04-04 | End: 2024-04-03

## 2023-04-04 RX ORDER — DICLOFENAC SODIUM 50 MG/1
50 TABLET, DELAYED RELEASE ORAL 2 TIMES DAILY PRN
Qty: 30 TABLET | Refills: 2 | Status: SHIPPED | OUTPATIENT
Start: 2023-04-04 | End: 2023-08-30 | Stop reason: SDUPTHER

## 2023-04-05 ENCOUNTER — TELEPHONE (OUTPATIENT)
Dept: PULMONOLOGY | Facility: CLINIC | Age: 68
End: 2023-04-05
Payer: MEDICARE

## 2023-04-05 DIAGNOSIS — R06.02 SOB (SHORTNESS OF BREATH): Primary | ICD-10-CM

## 2023-04-05 NOTE — TELEPHONE ENCOUNTER
I spoke with patient in regards to scheduling a new patient appointment with Dr. Sinha. I advised patient that Dr. Sinha was not accepting new patients as she is transitioning to oncology. I advised patient that I could get her scheduled with Dr. Villa. Patient verbalized understanding and was scheduled on 5/31/23.

## 2023-04-11 ENCOUNTER — TELEPHONE (OUTPATIENT)
Dept: HEPATOLOGY | Facility: CLINIC | Age: 68
End: 2023-04-11
Payer: MEDICARE

## 2023-04-11 NOTE — TELEPHONE ENCOUNTER
----- Message from Lavonne Melendez NP sent at 4/4/2023  4:26 PM CDT -----  Results of lab sent to pt in [facility] with explanation. Please contact pt ot schedule f/u with me in 1 year with lab a few days before, fibroscan same day as appt. Thanks !

## 2023-04-12 NOTE — PROGRESS NOTES
Established Patient - Audio Only Telehealth Visit     The patient location is: home  The chief complaint leading to consultation is: MRI results  Visit type: Virtual visit with audio only (telephone)  Total time spent with patient: 15min     The reason for the audio only service rather than synchronous audio and video virtual visit was related to technical difficulties or patient preference/necessity.     Each patient to whom I provide medical services by telemedicine is:  (1) informed of the relationship between the physician and patient and the respective role of any other health care provider with respect to management of the patient; and (2) notified that they may decline to receive medical services by telemedicine and may withdraw from such care at any time. Patient verbally consented to receive this service via voice-only telephone call.    DATE: 4/26/2023  PATIENT: Jill Marquez    Attending Physician: João Bruno M.D.    HISTORY:  Jill Marquez is a 67 y.o. female who returns to me today for MRI results.  She was last seen by me 4/4/2023.  Today she is doing well but notes continued left lateral leg pain. Although she states it has decreased since increasing her Gabapentin dose.    The Patient denies myelopathic symptoms such as handwriting changes or difficulty with buttons/coins/keys. Denies perineal paresthesias, bowel/bladder dysfunction.    PMH/PSH/FamHx/SocHx:  Unchanged from prior visit    ROS:  REVIEW OF SYSTEMS:  Constitution: Negative. Negative for chills, fever and night sweats.   HENT: Negative for congestion and headaches.    Eyes: Negative for blurred vision, left vision loss and right vision loss.   Cardiovascular: Negative for chest pain and syncope.   Respiratory: Negative for cough and shortness of breath.    Endocrine: Negative for polydipsia, polyphagia and polyuria.   Hematologic/Lymphatic: Negative for bleeding problem. Does not bruise/bleed easily.   Skin: Negative for dry  skin, itching and rash.   Musculoskeletal: Negative for falls and muscle weakness.   Gastrointestinal: Negative for abdominal pain and bowel incontinence.   Allergic/Immunologic: Negative for hives and persistent infections.  Genitourinary: Negative for urinary retention/incontinence and nocturia.   Neurological: negative for disturbances in coordination, no myelopathic symptoms such as handwriting changes or difficulty with buttons, coins, keys or small objects. No loss of balance and seizures.   Psychiatric/Behavioral: Negative for depression. The patient does not have insomnia.   Denies perineal paresthesias, bowel or bladder incontinence    EXAM:  There were no vitals taken for this visit.  Stable.     IMAGING:    Today I personally re- reviewed AP, Lat and Flex/Ex  upright L-spine that demonstrate thoracolumbar scoliosis with convexity to the left. Moderate DDD throughout.     Lumbar MRI shows moderate spinal stenosis at L3-4 and moderate neural foraminal narrowing at L3-4 through L5-S1    There is no height or weight on file to calculate BMI.    Hemoglobin A1C   Date Value Ref Range Status   06/29/2021 5.1 4.0 - 5.6 % Final     Comment:     ADA Screening Guidelines:  5.7-6.4%  Consistent with prediabetes  >or=6.5%  Consistent with diabetes    High levels of fetal hemoglobin interfere with the HbA1C  assay. Heterozygous hemoglobin variants (HbS, HgC, etc)do  not significantly interfere with this assay.   However, presence of multiple variants may affect accuracy.     04/30/2020 5.2 4.0 - 5.6 % Final     Comment:     ADA Screening Guidelines:  5.7-6.4%  Consistent with prediabetes  >or=6.5%  Consistent with diabetes  High levels of fetal hemoglobin interfere with the HbA1C  assay. Heterozygous hemoglobin variants (HbS, HgC, etc)do  not significantly interfere with this assay.   However, presence of multiple variants may affect accuracy.     08/13/2019 5.1 4.0 - 5.6 % Final     Comment:     ADA Screening  Guidelines:  5.7-6.4%  Consistent with prediabetes  >or=6.5%  Consistent with diabetes  High levels of fetal hemoglobin interfere with the HbA1C  assay. Heterozygous hemoglobin variants (HbS, HgC, etc)do  not significantly interfere with this assay.   However, presence of multiple variants may affect accuracy.           ASSESSMENT/PLAN:    Diagnoses and all orders for this visit:    Lumbar radiculopathy  -     Procedure Order to Pain Management; Future      Today we discussed at length all of the different treatment options including anti-inflammatories, acetaminophen, rest, ice, heat, physical therapy including strengthening and stretching exercises, home exercises, ROM, aerobic conditioning, aqua therapy, other modalities including ultrasound, massage, and dry needling, epidural steroid injections and finally surgical intervention.    Left L3/4 & L4/5 TF TOMASA ordered with pain management. The patient may follow up 2 weeks after if her pain persists.      This service was not originating from a related E/M service provided within the previous 7 days nor will  to an E/M service or procedure within the next 24 hours or my soonest available appointment.  Prevailing standard of care was able to be met in this audio-only visit.

## 2023-04-25 ENCOUNTER — HOSPITAL ENCOUNTER (OUTPATIENT)
Dept: RADIOLOGY | Facility: HOSPITAL | Age: 68
Discharge: HOME | End: 2023-04-25
Attending: REGISTERED NURSE
Payer: MEDICARE

## 2023-04-25 DIAGNOSIS — M54.9 DORSALGIA, UNSPECIFIED: ICD-10-CM

## 2023-04-25 DIAGNOSIS — M41.115 JUVENILE IDIOPATHIC SCOLIOSIS OF THORACOLUMBAR REGION: ICD-10-CM

## 2023-04-25 PROCEDURE — 72148 MRI LUMBAR SPINE WITHOUT CONTRAST: ICD-10-PCS | Mod: 26,,, | Performed by: RADIOLOGY

## 2023-04-25 PROCEDURE — 72148 MRI LUMBAR SPINE W/O DYE: CPT | Mod: 26,,, | Performed by: RADIOLOGY

## 2023-04-25 PROCEDURE — 72148 MRI LUMBAR SPINE W/O DYE: CPT | Mod: TC

## 2023-04-26 ENCOUNTER — TELEPHONE (OUTPATIENT)
Dept: ADMINISTRATIVE | Facility: OTHER | Age: 68
End: 2023-04-26
Payer: MEDICARE

## 2023-04-26 ENCOUNTER — OFFICE VISIT (OUTPATIENT)
Dept: ORTHOPEDICS | Facility: CLINIC | Age: 68
End: 2023-04-26
Payer: MEDICARE

## 2023-04-26 DIAGNOSIS — M54.16 LUMBAR RADICULOPATHY: Primary | ICD-10-CM

## 2023-04-26 PROCEDURE — 99442 PR PHYSICIAN TELEPHONE EVALUATION 11-20 MIN: ICD-10-PCS | Mod: 95,,, | Performed by: REGISTERED NURSE

## 2023-04-26 PROCEDURE — 99442 PR PHYSICIAN TELEPHONE EVALUATION 11-20 MIN: CPT | Mod: 95,,, | Performed by: REGISTERED NURSE

## 2023-04-28 ENCOUNTER — TELEPHONE (OUTPATIENT)
Dept: PAIN MEDICINE | Facility: CLINIC | Age: 68
End: 2023-04-28
Payer: MEDICARE

## 2023-04-28 ENCOUNTER — TELEPHONE (OUTPATIENT)
Dept: PAIN MEDICINE | Facility: OTHER | Age: 68
End: 2023-04-28
Payer: MEDICARE

## 2023-04-28 ENCOUNTER — TELEPHONE (OUTPATIENT)
Dept: ADMINISTRATIVE | Facility: OTHER | Age: 68
End: 2023-04-28
Payer: MEDICARE

## 2023-04-28 NOTE — TELEPHONE ENCOUNTER
"----- Message from Maryanne Espinoza sent at 4/28/2023  3:27 PM CDT -----                Name of Who is Calling:  Jill Marquez    Who Left The Message:  Jill Marquez      What is the request in detail:      Patient called stating,  "she's returning the Office's call and would like you to please call again."    Thank you      Reply by MY [facility]: YES      Preferred Call Back  :  [phone] (m)                                          "

## 2023-04-28 NOTE — TELEPHONE ENCOUNTER
Patient left message wanting to change 5/10 procedure date to either 5/16 or 5/19 patient did not answer but I left a detailed message with call back number

## 2023-05-05 ENCOUNTER — DOCUMENTATION ONLY (OUTPATIENT)
Dept: REHABILITATION | Facility: HOSPITAL | Age: 68
End: 2023-05-05
Payer: MEDICARE

## 2023-05-05 ENCOUNTER — LAB VISIT (OUTPATIENT)
Dept: LAB | Facility: HOSPITAL | Age: 68
End: 2023-05-05
Attending: NURSE PRACTITIONER
Payer: MEDICARE

## 2023-05-05 DIAGNOSIS — R74.01 ALT (SGPT) LEVEL RAISED: ICD-10-CM

## 2023-05-05 LAB
ALBUMIN SERPL BCP-MCNC: 4.1 G/DL (ref 3.5–5.2)
ALP SERPL-CCNC: 45 U/L (ref 55–135)
ALT SERPL W/O P-5'-P-CCNC: 34 U/L (ref 10–44)
AST SERPL-CCNC: 27 U/L (ref 10–40)
BILIRUB DIRECT SERPL-MCNC: 0.3 MG/DL (ref 0.1–0.3)
BILIRUB SERPL-MCNC: 1 MG/DL (ref 0.1–1)
CHOLEST SERPL-MCNC: 234 MG/DL (ref 120–199)
CHOLEST/HDLC SERPL: 4.4 {RATIO} (ref 2–5)
CK SERPL-CCNC: 164 U/L (ref 20–180)
HDLC SERPL-MCNC: 53 MG/DL (ref 40–75)
HDLC SERPL: 22.6 % (ref 20–50)
LDLC SERPL CALC-MCNC: 158.8 MG/DL (ref 63–159)
NONHDLC SERPL-MCNC: 181 MG/DL
PROT SERPL-MCNC: 6.7 G/DL (ref 6–8.4)
TRIGL SERPL-MCNC: 111 MG/DL (ref 30–150)

## 2023-05-05 PROCEDURE — 36415 COLL VENOUS BLD VENIPUNCTURE: CPT | Mod: PN | Performed by: NURSE PRACTITIONER

## 2023-05-05 PROCEDURE — 80076 HEPATIC FUNCTION PANEL: CPT | Performed by: NURSE PRACTITIONER

## 2023-05-05 PROCEDURE — 80061 LIPID PANEL: CPT | Performed by: NURSE PRACTITIONER

## 2023-05-05 PROCEDURE — 82550 ASSAY OF CK (CPK): CPT | Performed by: NURSE PRACTITIONER

## 2023-05-05 NOTE — PROGRESS NOTES
[facility]   Discharge Note    Name: Jill Marquez  Clinic Number: [id number]    Therapy Diagnosis:        Encounter Diagnoses   Name Primary?    Pelvic floor dysfunction in female Yes    Decreased range of motion      Urinary incontinence, mixed           Physician: Nicanor Campos MD     Physician Orders: PT Eval and Treat  Medical Diagnosis from Referral:           Encounter Diagnoses   Name Primary?    Greater trochanteric bursitis of left hip      Iliotibial band syndrome, left     NEW: M62.89 (ICD-10-CM) - Pelvic floor tension     Evaluation Date: 1/12/2023    Date of Last visit: 3/13/2023  Total Visits Received: 11    ASSESSMENT      Discharge reason: Patient has not attended therapy since 3/13/2023    Goals:   Short Term Goals:  4 weeks Status  Date Met   PAIN: Pt will report worst pain of 7/10 in order to progress toward max functional ability and improve quality of life. [] Progressing  [x] Met  [] Not Met     FUNCTION: Patient will demonstrate improved function as indicated by a functional limitation score of less than or equal to 30 out of 100 on FOTO. [x] Progressing  [] Met  [] Not Met     MOBILITY: Patient will improve AROM to 50% of stated goals, listed in objective measures above, in order to progress towards independence with functional activities.  [x] Progressing  [] Met  [] Not Met     STRENGTH: Patient will improve strength to 50% of stated goals, listed in objective measures above, in order to progress towards independence with functional activities.  [x] Progressing  [] Met  [] Not Met     POSTURE: Patient will correct postural deviations in sitting and standing, to decrease pain and promote long term stability.  [] Progressing  [x] Met  [] Not Met     HEP: Patient will demonstrate independence with HEP in order to progress toward functional independence. [] Progressing  [x] Met  [] Not Met        Long Term Goals:  8 weeks Status Date Met   PAIN: Pt will report  worst pain of 6/10 in order to progress toward max functional ability and improve quality of life [] Progressing  [x] Met  [] Not Met     FUNCTION: Patient will demonstrate improved function as indicated by a functional limitation score of less than or equal to 29 out of 100 on FOTO. [x] Progressing  [] Met  [] Not Met     MOBILITY: Patient will improve AROM to stated goals, listed in objective measures above, in order to return to maximal functional potential and improve quality of life. [x] Progressing  [] Met  [] Not Met     STRENGTH: Patient will improve strength to stated goals, listed in objective measures above, in order to improve functional independence and quality of life. [x] Progressing  [] Met  [] Not Met     Patient will return to normal ADL's, IADL's, community involvement, recreational activities, and work-related activities with less than or equal to 6/10 pain and maximal function.  [] Progressing  [x] Met  [] Not Met        New goals: Short Term Goals: 4 weeks (added 2/16/2023)  Pt to be edu pelvic muscle bracing and be able to consistently perform correctly and quickly to help decrease incontinence with cough/laugh/sneeze. MET 2/28/2023  Pt to be able to delay the urge to urinate at least 10 minutes with a strong urge to urinate in order to make it to the bathroom without leaking. MET 2/23/2023     Long Term Goals: 12 weeks (added 2/16/2023)  Pt to report elimination of incontinence with ADLs to demonstrate improved pelvic floor muscle strength and coordination. MET 2/28/2023  Pt to report no longer feeling the need to urinate just in case when shopping or participating in social activities to demonstrate improving pelvic floor and bladder control. MET 2/28/2023    PLAN   This patient is discharged from Physical Therapy    Lelo Avila, PT

## 2023-05-08 ENCOUNTER — PATIENT MESSAGE (OUTPATIENT)
Dept: HEPATOLOGY | Facility: CLINIC | Age: 68
End: 2023-05-08
Payer: MEDICARE

## 2023-05-08 DIAGNOSIS — R74.01 ALT (SGPT) LEVEL RAISED: ICD-10-CM

## 2023-05-08 DIAGNOSIS — E78.5 HYPERLIPIDEMIA, UNSPECIFIED HYPERLIPIDEMIA TYPE: Primary | ICD-10-CM

## 2023-05-09 ENCOUNTER — PATIENT MESSAGE (OUTPATIENT)
Dept: HEPATOLOGY | Facility: CLINIC | Age: 68
End: 2023-05-09
Payer: MEDICARE

## 2023-05-09 RX ORDER — PRAVASTATIN SODIUM 20 MG/1
20 TABLET ORAL DAILY
Qty: 30 TABLET | Refills: 3 | Status: SHIPPED | OUTPATIENT
Start: 2023-05-09 | End: 2023-09-08 | Stop reason: SDUPTHER

## 2023-05-18 ENCOUNTER — PATIENT MESSAGE (OUTPATIENT)
Dept: UROGYNECOLOGY | Facility: CLINIC | Age: 68
End: 2023-05-18
Payer: MEDICARE

## 2023-05-18 DIAGNOSIS — N95.2 ATROPHIC VAGINITIS: ICD-10-CM

## 2023-05-18 RX ORDER — CONJUGATED ESTROGENS 0.62 MG/G
0.5 CREAM VAGINAL
Qty: 30 G | Refills: 11 | Status: SHIPPED | OUTPATIENT
Start: 2023-05-18 | End: 2024-02-28

## 2023-05-19 ENCOUNTER — PATIENT MESSAGE (OUTPATIENT)
Dept: ADMINISTRATIVE | Facility: OTHER | Age: 68
End: 2023-05-19
Payer: MEDICARE

## 2023-05-26 ENCOUNTER — HOSPITAL ENCOUNTER (OUTPATIENT)
Facility: OTHER | Age: 68
Discharge: HOME | End: 2023-05-26
Attending: ANESTHESIOLOGY | Admitting: STUDENT IN AN ORGANIZED HEALTH CARE EDUCATION/TRAINING PROGRAM
Payer: MEDICARE

## 2023-05-26 VITALS
WEIGHT: 120 LBS | HEIGHT: 63 IN | OXYGEN SATURATION: 94 % | SYSTOLIC BLOOD PRESSURE: 122 MMHG | HEART RATE: 71 BPM | DIASTOLIC BLOOD PRESSURE: 65 MMHG | RESPIRATION RATE: 16 BRPM | BODY MASS INDEX: 21.26 KG/M2 | TEMPERATURE: 98 F

## 2023-05-26 DIAGNOSIS — M54.16 LUMBAR RADICULOPATHY: Primary | ICD-10-CM

## 2023-05-26 DIAGNOSIS — G89.29 CHRONIC PAIN: ICD-10-CM

## 2023-05-26 PROCEDURE — 63600175 PHARM REV CODE 636 W HCPCS: Performed by: ANESTHESIOLOGY

## 2023-05-26 PROCEDURE — 64483 NJX AA&/STRD TFRM EPI L/S 1: CPT | Mod: LT | Performed by: ANESTHESIOLOGY

## 2023-05-26 PROCEDURE — 64484 PRA INJECT ANES/STEROID FORAMEN LUMBAR/SACRAL W IMG GUIDE ,EA ADD LEVEL: ICD-10-PCS | Mod: LT,,, | Performed by: ANESTHESIOLOGY

## 2023-05-26 PROCEDURE — 64483 NJX AA&/STRD TFRM EPI L/S 1: CPT | Mod: LT,,, | Performed by: ANESTHESIOLOGY

## 2023-05-26 PROCEDURE — 64483 PR EPIDURAL INJ, ANES/STEROID, TRANSFORAMINAL, LUMB/SACR, SNGL LEVL: ICD-10-PCS | Mod: LT,,, | Performed by: ANESTHESIOLOGY

## 2023-05-26 PROCEDURE — 25500020 PHARM REV CODE 255: Performed by: ANESTHESIOLOGY

## 2023-05-26 PROCEDURE — 64484 NJX AA&/STRD TFRM EPI L/S EA: CPT | Mod: LT,,, | Performed by: ANESTHESIOLOGY

## 2023-05-26 PROCEDURE — 25000003 PHARM REV CODE 250: Performed by: ANESTHESIOLOGY

## 2023-05-26 PROCEDURE — 64484 NJX AA&/STRD TFRM EPI L/S EA: CPT | Mod: LT | Performed by: ANESTHESIOLOGY

## 2023-05-26 RX ORDER — MIDAZOLAM HYDROCHLORIDE 1 MG/ML
INJECTION INTRAMUSCULAR; INTRAVENOUS
Status: DISCONTINUED | OUTPATIENT
Start: 2023-05-26 | End: 2023-05-26 | Stop reason: HOSPADM

## 2023-05-26 RX ORDER — DEXAMETHASONE SODIUM PHOSPHATE 10 MG/ML
INJECTION INTRAMUSCULAR; INTRAVENOUS
Status: DISCONTINUED | OUTPATIENT
Start: 2023-05-26 | End: 2023-05-26 | Stop reason: HOSPADM

## 2023-05-26 RX ORDER — FENTANYL CITRATE 50 UG/ML
INJECTION, SOLUTION INTRAMUSCULAR; INTRAVENOUS
Status: DISCONTINUED | OUTPATIENT
Start: 2023-05-26 | End: 2023-05-26 | Stop reason: HOSPADM

## 2023-05-26 RX ORDER — SODIUM CHLORIDE 9 MG/ML
500 INJECTION, SOLUTION INTRAVENOUS CONTINUOUS
Status: DISCONTINUED | OUTPATIENT
Start: 2023-05-26 | End: 2023-05-26 | Stop reason: HOSPADM

## 2023-05-26 RX ORDER — LIDOCAINE HYDROCHLORIDE 10 MG/ML
INJECTION, SOLUTION EPIDURAL; INFILTRATION; INTRACAUDAL; PERINEURAL
Status: DISCONTINUED | OUTPATIENT
Start: 2023-05-26 | End: 2023-05-26 | Stop reason: HOSPADM

## 2023-05-26 RX ORDER — LIDOCAINE HYDROCHLORIDE 20 MG/ML
INJECTION, SOLUTION INFILTRATION; PERINEURAL
Status: DISCONTINUED | OUTPATIENT
Start: 2023-05-26 | End: 2023-05-26 | Stop reason: HOSPADM

## 2023-05-26 NOTE — OP NOTE
Lumbar Transforaminal Epidural Steroid Injection under Fluoroscopic Guidance    The procedure, risks, benefits, and options were discussed with the patient. There are no contraindications to the procedure. The patent expressed understanding and agreed to the procedure. Informed written consent was obtained prior to the start of the procedure and can be found in the patient's chart.    PATIENT NAME: Jill Marquez   MRN: [id number]     DATE OF PROCEDURE: 05/26/2023    PROCEDURE:  Left  L3/4 and L4/5 Lumbar Transforaminal Epidural Steroid Injection under Fluoroscopic Guidance    PRE-OP DIAGNOSIS: Lumbar radiculopathy [M54.16] Lumbar radiculopathy [M54.16]    POST-OP DIAGNOSIS: Same    PHYSICIAN: Popeye Ca MD    ASSISTANTS: Kong Lockwood MD     MEDICATIONS INJECTED: Preservative-free Decadron 10mg with 5cc of Lidocaine 1% MPF     LOCAL ANESTHETIC INJECTED: Xylocaine 2%     SEDATION: Versed 2mg and Fentanyl 50mcg                                                                                                                                                                                     Conscious sedation ordered by M.D. Patient re-evaluation prior to administration of conscious sedation. No changes noted in patient's status from initial evaluation. The patient's vital signs were monitored by RN and patient remained hemodynamically stable throughout the procedure.    Event Time In   Sedation Start 1027   Sedation End 1033       ESTIMATED BLOOD LOSS: None    COMPLICATIONS: None    TECHNIQUE: Time-out was performed to identify the patient and procedure to be performed. With the patient laying in a prone position, the surgical area was prepped and draped in the usual sterile fashion using ChloraPrep and a fenestrated drape.The levels were determined under fluoroscopy guidance. Skin anesthesia was achieved by injecting Lidocaine 2% over the injection sites. The transforaminal spaces were then approached with a 25  gauge, 3.5 inch spinal quinke needle that was introduced under fluoroscopic guidance in the AP and Lateral views. Once the needle tip was in the area of the transforaminal space, and there was no blood, CSF or paraesthesias, contrast dye Omnipaque (240mg/mL) was injected to confirm placement and there was no vascular runoff. Fluoroscopic imaging in the AP and lateral views revealed a clear outline of the spinal nerve with proximal spread of agent through the neural foramen into the epidural space. 3 mL of the medication mixture listed above was injected slowly at each site. Displacement of the radio opaque contrast after injection of the medication confirmed that the medication went into the area of the transforaminal spaces. The needles were removed and bleeding was nil. A sterile dressing was applied. No specimens collected. The patient tolerated the procedure well.       The patient was monitored after the procedure in the recovery area. They were given post-procedure and discharge instructions to follow at home. The patient was discharged in a stable condition.    Popeye Ca MD

## 2023-05-26 NOTE — INTERVAL H&P NOTE
The patient was examined and no significant changes were noted from the updated H&P or last clinic note.    The risks and benefits of this procedure, including alternative therapies, were discussed with the patient.  The discussion of risks included infection, bleeding, need for additional procedures or surgery, nerve damage, paralysis, adverse medication reaction(s), stroke, and if appropriate for the procedure, death.  Questions regarding the procedure, risks, expected outcome, and possible side effects were solicited and answered to Jill's satisfaction.  Jill Marquez wishes to proceed with the injection or procedure as confirmed by written consent.

## 2023-05-26 NOTE — DISCHARGE INSTRUCTIONS

## 2023-05-26 NOTE — DISCHARGE SUMMARY
Discharge Note  Short Stay      SUMMARY     Admit Date: 5/26/2023    Attending Physician: Popeye Ca      Discharge Physician: Popeye Ca      Discharge Date: 5/26/2023 10:28 AM    Procedure(s) (LRB):  INJECTION, STEROID, EPIDURAL, TRANSFORAMINAL APPROACH, LEFT L3/L4 & L4/L5 DIRECT REF (Left)    Final Diagnosis: Lumbar radiculopathy [M54.16]    Disposition: Home or self care    Patient Instructions:   Current Discharge Medication List        CONTINUE these medications which have NOT CHANGED    Details   amLODIPine (NORVASC) 10 MG tablet TAKE 1 TABLET (10 MG TOTAL) BY MOUTH ONCE DAILY.  Qty: 90 tablet, Refills: 2      azelastine (ASTELIN) 137 mcg (0.1 %) nasal spray USE 1 SPRAY (137 MCG TOTAL) IN EACH NOSTRIL 2 (TWO) TIMES DAILY.  Qty: 90 mL, Refills: 1    Associated Diagnoses: Seasonal and perennial allergic rhinitis      citalopram (CELEXA) 10 MG tablet Take 1 tablet (10 mg total) by mouth once daily.  Qty: 90 tablet, Refills: 2    Comments: 01/30/2023 9:12:52 AM      conjugated estrogens (PREMARIN) vaginal cream Place 0.5 g vaginally twice a week.  Qty: 30 g, Refills: 11    Associated Diagnoses: Atrophic vaginitis      diclofenac (VOLTAREN) 50 MG EC tablet Take 1 tablet (50 mg total) by mouth 2 (two) times daily as needed (pain).  Qty: 30 tablet, Refills: 2    Associated Diagnoses: Juvenile idiopathic scoliosis of thoracolumbar region      diclofenac sodium (VOLTAREN) 1 % Gel Apply 4 g topically 4 (four) times daily as needed.  Qty: 3 each, Refills: 2      ergocalciferol, vitamin D2, (VITAMIN D ORAL)       etanercept (ENBREL SURECLICK) 50 mg/mL (1 mL) Inject 1 mL (50 mg total) into the skin once a week.  Qty: 4 mL, Refills: 5    Associated Diagnoses: PSA (psoriatic arthritis); Peripheral spondyloarthritis      fish oil-omega-3 fatty acids 300-1,000 mg capsule Take 2 g by mouth once daily.      fluticasone propionate (FLONASE) 50 mcg/actuation nasal spray INHALE 2 SPRAYS IN EACH NOSTRIL EVERY DAY  Qty: 48  g, Refills: 1      gabapentin (NEURONTIN) 300 MG capsule Take 2 capsules (600 mg total) by mouth 2 (two) times daily.  Qty: 120 capsule, Refills: 11    Associated Diagnoses: Juvenile idiopathic scoliosis of thoracolumbar region      hydroCHLOROthiazide (HYDRODIURIL) 25 MG tablet TAKE 1 TABLET (25 MG TOTAL) BY MOUTH ONCE DAILY.  Qty: 90 tablet, Refills: 3    Associated Diagnoses: Primary hypertension      pravastatin (PRAVACHOL) 20 MG tablet Take 1 tablet (20 mg total) by mouth once daily.  Qty: 30 tablet, Refills: 3    Associated Diagnoses: Hyperlipidemia, unspecified hyperlipidemia type      PSYLLIUM SEED, WITH SUGAR, (METAMUCIL ORAL) Take by mouth.      sennosides (SENOKOT TO GO ORAL)       SYNTHROID 88 mcg tablet TAKE 1 TABLET (88 MCG TOTAL) BY MOUTH BEFORE BREAKFAST.  Qty: 30 tablet, Refills: 11      triamcinolone acetonide 0.1% (KENALOG) 0.1 % cream Apply topically 2 (two) times daily. Use up to 2 weeks at a time.  Qty: 454 g, Refills: 1    Associated Diagnoses: Notalgia paresthetica                 Discharge Diagnosis: Lumbar radiculopathy [M54.16]  Condition on Discharge: Stable with no complications to procedure   Diet on Discharge: Same as before.  Activity: as per instruction sheet.  Discharge to: Home with a responsible adult.  Follow up: 2-4 weeks

## 2023-05-31 ENCOUNTER — HOSPITAL ENCOUNTER (OUTPATIENT)
Dept: PULMONOLOGY | Facility: CLINIC | Age: 68
Discharge: HOME | End: 2023-05-31
Payer: MEDICARE

## 2023-05-31 ENCOUNTER — OFFICE VISIT (OUTPATIENT)
Dept: PULMONOLOGY | Facility: CLINIC | Age: 68
End: 2023-05-31
Payer: MEDICARE

## 2023-05-31 VITALS
BODY MASS INDEX: 21.36 KG/M2 | DIASTOLIC BLOOD PRESSURE: 70 MMHG | SYSTOLIC BLOOD PRESSURE: 120 MMHG | WEIGHT: 120.56 LBS | OXYGEN SATURATION: 95 % | HEIGHT: 63 IN | HEART RATE: 73 BPM

## 2023-05-31 DIAGNOSIS — R06.02 SOB (SHORTNESS OF BREATH): ICD-10-CM

## 2023-05-31 DIAGNOSIS — Z14.8 ALPHA-1-ANTITRYPSIN DEFICIENCY CARRIER: ICD-10-CM

## 2023-05-31 DIAGNOSIS — E88.01 ALPHA-1-ANTITRYPSIN DEFICIENCY: ICD-10-CM

## 2023-05-31 PROCEDURE — 94729 DIFFUSING CAPACITY: CPT | Mod: PBBFAC | Performed by: INTERNAL MEDICINE

## 2023-05-31 PROCEDURE — 94010 BREATHING CAPACITY TEST: ICD-10-PCS | Mod: 26,S$PBB,, | Performed by: INTERNAL MEDICINE

## 2023-05-31 PROCEDURE — 99204 OFFICE O/P NEW MOD 45 MIN: CPT | Mod: S$PBB,25,, | Performed by: INTERNAL MEDICINE

## 2023-05-31 PROCEDURE — 99214 OFFICE O/P EST MOD 30 MIN: CPT | Mod: PBBFAC | Performed by: INTERNAL MEDICINE

## 2023-05-31 PROCEDURE — 94727 GAS DIL/WSHOT DETER LNG VOL: CPT | Mod: PBBFAC | Performed by: INTERNAL MEDICINE

## 2023-05-31 PROCEDURE — 94010 BREATHING CAPACITY TEST: CPT | Mod: PBBFAC | Performed by: INTERNAL MEDICINE

## 2023-05-31 PROCEDURE — 99999 PR PBB SHADOW E&M-EST. PATIENT-LVL IV: CPT | Mod: PBBFAC,,, | Performed by: INTERNAL MEDICINE

## 2023-05-31 PROCEDURE — 99204 PR OFFICE/OUTPT VISIT, NEW, LEVL IV, 45-59 MIN: ICD-10-PCS | Mod: S$PBB,25,, | Performed by: INTERNAL MEDICINE

## 2023-05-31 PROCEDURE — 94010 BREATHING CAPACITY TEST: CPT | Mod: 26,S$PBB,, | Performed by: INTERNAL MEDICINE

## 2023-05-31 PROCEDURE — 94729 DIFFUSING CAPACITY: CPT | Mod: 26,S$PBB,, | Performed by: INTERNAL MEDICINE

## 2023-05-31 PROCEDURE — 94727 GAS DIL/WSHOT DETER LNG VOL: CPT | Mod: 26,S$PBB,, | Performed by: INTERNAL MEDICINE

## 2023-05-31 PROCEDURE — 94729 PR C02/MEMBANE DIFFUSE CAPACITY: ICD-10-PCS | Mod: 26,S$PBB,, | Performed by: INTERNAL MEDICINE

## 2023-05-31 PROCEDURE — 99999 PR PBB SHADOW E&M-EST. PATIENT-LVL IV: ICD-10-PCS | Mod: PBBFAC,,, | Performed by: INTERNAL MEDICINE

## 2023-05-31 PROCEDURE — 94727 PR PULM FUNCTION TEST BY GAS: ICD-10-PCS | Mod: 26,S$PBB,, | Performed by: INTERNAL MEDICINE

## 2023-05-31 NOTE — LETTER
May 31, 2023        Lavonne Melendez, NP  [address]  [facility] [address]             [facility] [address]  [address]  [facility] [address]  Phone: [phone]   Patient: Jill Marquez   MR Number: [id number]   YOB: 1955   Date of Visit: 5/31/2023       Dear Dr. Melendez:    Thank you for referring Jill Marquez to me for evaluation. Below are the relevant portions of my assessment and plan of care.            If you have questions, please do not hesitate to call me. I look forward to following Jill along with you.    Sincerely,      Sunil Villa MD           CC    No Recipients

## 2023-05-31 NOTE — PROGRESS NOTES
History & Physical  [facility]    SUBJECTIVE:     Chief Complaint:   Abnormal laboratory testing    History of Present Illness:  Jill Marquez is a 67 y.o. female who presents for evaluation of an abnormal blood test. Pt had elevated LFTs & ultrasound showing fatty infiltration of the liver. She underwent a litany of blood tests as an investigation in to the etiology of these hepatic abnormalities. Alpha 1 antitrypsin profile was included in this set of blood tests & came back abnormal with MS phenotype.    The pt denies shortness of breath & chest pain.    The pt has a remote smoking history & quit in her early 30s.    The pt sees Dr Campos for an inflammatory arthritis & takes enbrel.    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    HTN (hypertension) 7/23/2012    Hyperlipidemia     Hypothyroid 7/23/2012     Past Surgical History:   Procedure Laterality Date    COLONOSCOPY N/A 1/25/2016    Procedure: COLONOSCOPY;  Surgeon: DAYNA Simmons MD;  Location: [facility] ([address] [place] [address]);  Service: Endoscopy;  Laterality: N/A;    COLONOSCOPY N/A 11/8/2022    Procedure: COLONOSCOPY;  Surgeon: DAYNA Simmons MD;  Location: [address]);  Service: Endoscopy;  Laterality: N/A;  vacc-inst portal-vargus only-tb  precall done.arrival time of 10:00 confirmed/mleone    COSMETIC SURGERY      EYE SURGERY      eyelid surgery      HYSTERECTOMY  2004    prolapse    OOPHORECTOMY Bilateral 2015    Tonsillectomy      TONSILLECTOMY      TRANSFORAMINAL EPIDURAL INJECTION OF STEROID Left 5/26/2023    Procedure: INJECTION, STEROID, EPIDURAL, TRANSFORAMINAL APPROACH, LEFT L3/L4 & L4/L5 DIRECT REF;  Surgeon: Popeye Ca MD;  Location:  [facility];  Service: Pain Management;  Laterality: Left;     Family History   Problem Relation Age of Onset    Diabetes Brother     Retinal detachment Brother     Cancer Brother         kidney    Cataracts Brother     Diabetes Brother     Cancer Brother         throat    Cataracts Brother     Diabetes Brother     Cataracts Brother     No Known Problems Mother     No Known Problems Father     Lupus Other         niece    No Known Problems Sister     No Known Problems Maternal Aunt     No Known Problems Maternal Uncle     No Known Problems Paternal Aunt     No Known Problems Paternal Uncle     No Known Problems Maternal Grandmother     No Known Problems Maternal Grandfather     No Known Problems Paternal Grandmother     No Known Problems Paternal Grandfather     Breast cancer Neg Hx     Colon cancer Neg Hx     Ovarian cancer Neg Hx     Blindness Neg Hx     Glaucoma Neg Hx     Hypertension Neg Hx     Macular degeneration Neg Hx     Strabismus Neg Hx     Stroke Neg Hx     Thyroid disease Neg Hx     Amblyopia Neg Hx     Rheum arthritis Neg Hx     Psoriasis Neg Hx     Inflammatory bowel disease Neg Hx     Cervical cancer Neg Hx     Endometrial cancer Neg Hx     Vaginal cancer Neg Hx     Uterine cancer Neg Hx      Social History     Socioeconomic History    Marital status:     Number of children: 2   Tobacco Use    Smoking status: Former     Years: 12.00     Types: Cigarettes     Quit date: 1982     Years since quittin.1     Passive exposure: Past    Smokeless tobacco: Former    Tobacco comments:     , homemaker, 2 children   Substance and Sexual Activity    Alcohol use: Yes     Comment: 2 servings daily x 30+ years, stopped 2023    Drug use: No    Sexual activity: Yes     Partners: Male     Birth control/protection: Surgical, None     Social Determinants of Health     Financial Resource Strain: Low Risk     Difficulty of Paying Living Expenses: Not hard at all   Food Insecurity: No Food  "Insecurity    Worried About Running Out of Food in the Last Year: Never true    Ran Out of Food in the Last Year: Never true   Transportation Needs: No Transportation Needs    Lack of Transportation (Medical): No    Lack of Transportation (Non-Medical): No   Physical Activity: Sufficiently Active    Days of Exercise per Week: 4 days    Minutes of Exercise per Session: 60 min   Stress: Stress Concern Present    Feeling of Stress : To some extent   Social Connections: Unknown    Frequency of Communication with Friends and Family: More than three times a week    Frequency of Social Gatherings with Friends and Family: Twice a week    Active Member of Clubs or Organizations: Yes    Attends Club or Organization Meetings: Never    Marital Status:    Housing Stability: Low Risk     Unable to Pay for Housing in the Last Year: No    Number of Places Lived in the Last Year: 1    Unstable Housing in the Last Year: No       Review of Systems:  Musculoskeletal: +arthritis  Otherwise negative    OBJECTIVE:     Vital Signs  Vitals:    05/31/23 0952   BP: 120/70   BP Location: Right arm   Patient Position: Sitting   Pulse: 73   SpO2: 95%   Weight: 54.7 kg (120 lb 9.5 oz)   Height: 5' 3" (1.6 m)     Body mass index is 21.36 kg/m².    Physical Exam:  General: no distress  Eyes:  conjunctivae/corneas clear  Nose: no discharge  Neck: trachea midline with no masses appreciated  Lungs:  normal respiratory effort, no wheezes, no rales  Heart: regular rate and rhythm and no murmur  Abdomen: non-distended  Extremities: no cyanosis, no edema, no clubbing  Skin: No rashes or lesions. good skin turgor  Neurologic: alert, oriented, thought content appropriate    Laboratory:  Lab Results   Component Value Date    WBC 4.60 02/07/2023    HGB 13.4 02/07/2023    HCT 40.8 02/07/2023    MCV 99 (H) 02/07/2023     02/07/2023       Chest Imaging, My Impression:   Chest CT 2021: no emphysema    Diagnostic Results:  PFT today: No obstruction, " "mild restriction    ASSESSMENT/PLAN:     Alpha 1 antitrypsin carrier (MS phenotype).  - This is a "carrier phenotype;" this is not a phenotype that causes disease. I provided reassurance to the patient. No further testing is needed for this patient.    Total professional time spent for the encounter: 45 minutes  Time was spent preparing to see the patient, reviewing results of prior testing, obtaining and/or reviewing separately obtained history, performing a medically appropriate examination and interview, counseling and educating the patient/family, ordering medications/tests/procedures, referring and communicating with other health care professionals, documenting clinical information in the electronic health record, and independently interpreting results.    Sunil Caswell, MD  [facility]  "

## 2023-06-01 ENCOUNTER — PATIENT MESSAGE (OUTPATIENT)
Dept: ADMINISTRATIVE | Facility: OTHER | Age: 68
End: 2023-06-01
Payer: MEDICARE

## 2023-06-13 ENCOUNTER — TELEPHONE (OUTPATIENT)
Dept: RHEUMATOLOGY | Facility: CLINIC | Age: 68
End: 2023-06-13
Payer: MEDICARE

## 2023-06-13 ENCOUNTER — LAB VISIT (OUTPATIENT)
Dept: LAB | Facility: HOSPITAL | Age: 68
End: 2023-06-13
Attending: INTERNAL MEDICINE
Payer: MEDICARE

## 2023-06-13 DIAGNOSIS — Z79.620 ENCOUNTER FOR MONITORING OF ETANERCEPT THERAPY: ICD-10-CM

## 2023-06-13 DIAGNOSIS — M47.819 SPONDYLARTHRITIS: ICD-10-CM

## 2023-06-13 DIAGNOSIS — D70.9 NEUTROPENIA, UNSPECIFIED TYPE: Primary | ICD-10-CM

## 2023-06-13 DIAGNOSIS — Z51.81 ENCOUNTER FOR MONITORING OF ETANERCEPT THERAPY: ICD-10-CM

## 2023-06-13 LAB
ALBUMIN SERPL BCP-MCNC: 3.9 G/DL (ref 3.5–5.2)
ALP SERPL-CCNC: 46 U/L (ref 55–135)
ALT SERPL W/O P-5'-P-CCNC: 43 U/L (ref 10–44)
ANION GAP SERPL CALC-SCNC: 9 MMOL/L (ref 8–16)
AST SERPL-CCNC: 28 U/L (ref 10–40)
BASOPHILS # BLD AUTO: 0.04 K/UL (ref 0–0.2)
BASOPHILS NFR BLD: 1 % (ref 0–1.9)
BILIRUB SERPL-MCNC: 0.8 MG/DL (ref 0.1–1)
BUN SERPL-MCNC: 13 MG/DL (ref 8–23)
CALCIUM SERPL-MCNC: 9.9 MG/DL (ref 8.7–10.5)
CHLORIDE SERPL-SCNC: 103 MMOL/L (ref 95–110)
CO2 SERPL-SCNC: 29 MMOL/L (ref 23–29)
CREAT SERPL-MCNC: 0.6 MG/DL (ref 0.5–1.4)
CRP SERPL-MCNC: 0.3 MG/L (ref 0–8.2)
DIFFERENTIAL METHOD: ABNORMAL
EOSINOPHIL # BLD AUTO: 0.1 K/UL (ref 0–0.5)
EOSINOPHIL NFR BLD: 3.4 % (ref 0–8)
ERYTHROCYTE [DISTWIDTH] IN BLOOD BY AUTOMATED COUNT: 12.7 % (ref 11.5–14.5)
ERYTHROCYTE [SEDIMENTATION RATE] IN BLOOD BY PHOTOMETRIC METHOD: <2 MM/HR (ref 0–36)
EST. GFR  (NO RACE VARIABLE): >60 ML/MIN/1.73 M^2
GLUCOSE SERPL-MCNC: 71 MG/DL (ref 70–110)
HCT VFR BLD AUTO: 40.5 % (ref 37–48.5)
HGB BLD-MCNC: 12.9 G/DL (ref 12–16)
IMM GRANULOCYTES # BLD AUTO: 0.01 K/UL (ref 0–0.04)
IMM GRANULOCYTES NFR BLD AUTO: 0.3 % (ref 0–0.5)
LYMPHOCYTES # BLD AUTO: 2.2 K/UL (ref 1–4.8)
LYMPHOCYTES NFR BLD: 57 % (ref 18–48)
MCH RBC QN AUTO: 30.1 PG (ref 27–31)
MCHC RBC AUTO-ENTMCNC: 31.9 G/DL (ref 32–36)
MCV RBC AUTO: 95 FL (ref 82–98)
MONOCYTES # BLD AUTO: 0.4 K/UL (ref 0.3–1)
MONOCYTES NFR BLD: 11.5 % (ref 4–15)
NEUTROPHILS # BLD AUTO: 1 K/UL (ref 1.8–7.7)
NEUTROPHILS NFR BLD: 26.8 % (ref 38–73)
NRBC BLD-RTO: 0 /100 WBC
PLATELET # BLD AUTO: 190 K/UL (ref 150–450)
PMV BLD AUTO: 12.2 FL (ref 9.2–12.9)
POTASSIUM SERPL-SCNC: 4.2 MMOL/L (ref 3.5–5.1)
PROT SERPL-MCNC: 6.3 G/DL (ref 6–8.4)
RBC # BLD AUTO: 4.28 M/UL (ref 4–5.4)
SODIUM SERPL-SCNC: 141 MMOL/L (ref 136–145)
WBC # BLD AUTO: 3.81 K/UL (ref 3.9–12.7)

## 2023-06-13 PROCEDURE — 86140 C-REACTIVE PROTEIN: CPT | Performed by: INTERNAL MEDICINE

## 2023-06-13 PROCEDURE — 87516 HEPATITIS B DNA AMP PROBE: CPT | Performed by: INTERNAL MEDICINE

## 2023-06-13 PROCEDURE — 85025 COMPLETE CBC W/AUTO DIFF WBC: CPT | Performed by: INTERNAL MEDICINE

## 2023-06-13 PROCEDURE — 80053 COMPREHEN METABOLIC PANEL: CPT | Performed by: INTERNAL MEDICINE

## 2023-06-13 PROCEDURE — 85652 RBC SED RATE AUTOMATED: CPT | Performed by: INTERNAL MEDICINE

## 2023-06-16 ENCOUNTER — PATIENT MESSAGE (OUTPATIENT)
Dept: HEPATOLOGY | Facility: CLINIC | Age: 68
End: 2023-06-16
Payer: MEDICARE

## 2023-06-16 LAB — HBV DNA SERPL QL NAA+PROBE: NOT DETECTED

## 2023-06-20 ENCOUNTER — LAB VISIT (OUTPATIENT)
Dept: LAB | Facility: HOSPITAL | Age: 68
End: 2023-06-20
Attending: INTERNAL MEDICINE
Payer: MEDICARE

## 2023-06-20 ENCOUNTER — OFFICE VISIT (OUTPATIENT)
Dept: RHEUMATOLOGY | Facility: CLINIC | Age: 68
End: 2023-06-20
Payer: MEDICARE

## 2023-06-20 VITALS
SYSTOLIC BLOOD PRESSURE: 105 MMHG | BODY MASS INDEX: 21.09 KG/M2 | HEART RATE: 70 BPM | WEIGHT: 119.06 LBS | DIASTOLIC BLOOD PRESSURE: 69 MMHG | HEIGHT: 63 IN

## 2023-06-20 DIAGNOSIS — M47.819 PERIPHERAL SPONDYLOARTHRITIS: ICD-10-CM

## 2023-06-20 DIAGNOSIS — L40.50 PSA (PSORIATIC ARTHRITIS): ICD-10-CM

## 2023-06-20 DIAGNOSIS — M47.819 SPONDYLOARTHRITIS: ICD-10-CM

## 2023-06-20 DIAGNOSIS — B18.1 HEPATITIS B CARRIER: ICD-10-CM

## 2023-06-20 LAB
BASOPHILS # BLD AUTO: 0.05 K/UL (ref 0–0.2)
BASOPHILS NFR BLD: 0.9 % (ref 0–1.9)
DIFFERENTIAL METHOD: ABNORMAL
EOSINOPHIL # BLD AUTO: 0.1 K/UL (ref 0–0.5)
EOSINOPHIL NFR BLD: 2.6 % (ref 0–8)
ERYTHROCYTE [DISTWIDTH] IN BLOOD BY AUTOMATED COUNT: 12.9 % (ref 11.5–14.5)
HCT VFR BLD AUTO: 38.5 % (ref 37–48.5)
HGB BLD-MCNC: 13 G/DL (ref 12–16)
IMM GRANULOCYTES # BLD AUTO: 0.01 K/UL (ref 0–0.04)
IMM GRANULOCYTES NFR BLD AUTO: 0.2 % (ref 0–0.5)
LYMPHOCYTES # BLD AUTO: 2.6 K/UL (ref 1–4.8)
LYMPHOCYTES NFR BLD: 49.4 % (ref 18–48)
MCH RBC QN AUTO: 30.5 PG (ref 27–31)
MCHC RBC AUTO-ENTMCNC: 33.8 G/DL (ref 32–36)
MCV RBC AUTO: 90 FL (ref 82–98)
MONOCYTES # BLD AUTO: 0.6 K/UL (ref 0.3–1)
MONOCYTES NFR BLD: 11.8 % (ref 4–15)
NEUTROPHILS # BLD AUTO: 1.9 K/UL (ref 1.8–7.7)
NEUTROPHILS NFR BLD: 35.1 % (ref 38–73)
NRBC BLD-RTO: 0 /100 WBC
PLATELET # BLD AUTO: 214 K/UL (ref 150–450)
PMV BLD AUTO: 10.8 FL (ref 9.2–12.9)
RBC # BLD AUTO: 4.26 M/UL (ref 4–5.4)
WBC # BLD AUTO: 5.34 K/UL (ref 3.9–12.7)

## 2023-06-20 PROCEDURE — 99999 PR PBB SHADOW E&M-EST. PATIENT-LVL IV: CPT | Mod: PBBFAC,,, | Performed by: INTERNAL MEDICINE

## 2023-06-20 PROCEDURE — 99213 PR OFFICE/OUTPT VISIT, EST, LEVL III, 20-29 MIN: ICD-10-PCS | Mod: S$PBB,,, | Performed by: INTERNAL MEDICINE

## 2023-06-20 PROCEDURE — 99214 OFFICE O/P EST MOD 30 MIN: CPT | Mod: PBBFAC | Performed by: INTERNAL MEDICINE

## 2023-06-20 PROCEDURE — 99213 OFFICE O/P EST LOW 20 MIN: CPT | Mod: S$PBB,,, | Performed by: INTERNAL MEDICINE

## 2023-06-20 PROCEDURE — 85025 COMPLETE CBC W/AUTO DIFF WBC: CPT | Performed by: INTERNAL MEDICINE

## 2023-06-20 PROCEDURE — 36415 COLL VENOUS BLD VENIPUNCTURE: CPT | Performed by: INTERNAL MEDICINE

## 2023-06-20 PROCEDURE — 99999 PR PBB SHADOW E&M-EST. PATIENT-LVL IV: ICD-10-PCS | Mod: PBBFAC,,, | Performed by: INTERNAL MEDICINE

## 2023-06-20 RX ORDER — ETANERCEPT 50 MG/ML
50 SOLUTION SUBCUTANEOUS WEEKLY
Qty: 4 ML | Refills: 5 | Status: ACTIVE | OUTPATIENT
Start: 2023-06-20 | End: 2023-10-16 | Stop reason: SDUPTHER

## 2023-06-20 NOTE — PROGRESS NOTES
I have personally taken the history and examined the patient and agree with the  staff note as stated above          Peripheral spondyloarthritis v. Pseudo-RA CPPD : TJ 0 SJ 5 Pt global 5  ESR <2 CRP 0.3 HUMPHRIES 28: 0.7(remission) PAC19-WMZ 1.75(remission)  CDAI 6.5(LDA)  DAPSA  TJ 0 SJ 5  Pt global 0.5 Pt pain 0.5  CRP 0.03= 6.03(LDA)    OA hands   Left trochanteric burstis/gluteus medius tendinitis resolved  S/p Right trochanteric bursitis, resolved post injection 6/14/22 resolved  Left pes planovalgus, posterior tibial tendinitis/probable tear, plantar fasciitis/heel spur, and calcific Achilles tendinitis. All resolved with orthosis  Hyperlipidemia ASCVD 10.8%(intermediate risk  /69  Abnormal transaminases from rosuvastatin, resolved with change to pravastatin  Mild-moderate neutropenia      Repeat CBC today  Continue Enbrel SureClick 50mg sc q 7 days refilled to [facility]  Continue Diclofenac gel 1% four times daily prn fingers   RTC 3 months with standing labs including HBV DNA monitoring  Answers submitted by the patient for this visit:  Rheumatology Questionnaire (Submitted on 6/13/2023)  fever: No  eye redness: No  mouth sores: No  headaches: No  shortness of breath: No  chest pain: No  trouble swallowing: No  diarrhea: No  constipation: No  unexpected weight change: No  genital sore: No  dysuria: No  During the last 3 days, have you had a skin rash?: No  Bruises or bleeds easily: No  cough: No

## 2023-06-20 NOTE — PROGRESS NOTES
Rapid3 Question Responses and Scores 6/13/2023   MDHAQ Score 0.1   Psychologic Score 3.3   Pain Score 0.5   When you awakened in the morning OVER THE LAST WEEK, did you feel stiff? No   If Yes, please indicate the number of hours until you are as limber as you will be for the day -   Fatigue Score 0.5   Global Health Score 0.5   RAPID3 Score 0.44     Answers submitted by the patient for this visit:  Rheumatology Questionnaire (Submitted on 6/13/2023)  fever: No  eye redness: No  mouth sores: No  headaches: No  shortness of breath: No  chest pain: No  trouble swallowing: No  diarrhea: No  constipation: No  unexpected weight change: No  genital sore: No  dysuria: No  During the last 3 days, have you had a skin rash?: No  Bruises or bleeds easily: No  cough: No

## 2023-06-20 NOTE — PROGRESS NOTES
Subjective:       Patient ID: Jill Marquez is a 67 y.o. female.    Chief Complaint: Peripheral spondyloarthritis v. Pseudo-RA CPPD v seronegative RA                                Persistent left lateral hip pain with radiation thigh to left knee    HPI On LCV, patient complained of pain left lateral hip with radiation left thigh to knee which only slightly improved with 6 wks of PT.   Takes gabapentin 300mg in am and 600mg in pm, Diclofenac tabs and gel prn.  Seen by Ortho (Dr. Mathis) on 3/28/23, suspect pain more c/w with radiculopathy and not related to hip joint. Provided referrals for back and spine clinic and pain clinic.   Seen by Ortho Spine on 4/4/23, Obtained MRI Lumbar spine, found to havemoderate spinal canal stenosis at L3-4 and moderate neural foraminal narrowing at L3-4 through L5-S1 and advanced right-sided L3-4 degenerative disc disease. S/p Left L3/4 & L4/5 TF TOMASA with pain management.     Today patient reports improvement in L hip and knee pain since the TOMASA. Reports R second DIP to be tender and swollen occasionally. Reports no morning stiffness, joint pain, tenderness or swelling in other joints. Left ankle fine with insert for pes planovalgus    Recent Labs 6/213/23  HBV DNA PCR - not detected  CBC with WBC 3.81, ANC 1.0  CMP wnl  ESR <2, CRP 0.3    Imaging:  XRAY Lumbar spine: 4/4/23  FINDINGS:  Thoracolumbar scoliosis with convexity to the left can be seen.  Vertebral bodies are intact.  No collapse is identified.  Disc spaces are maintained except for some narrowing at the L3-L4 level.  No bony destruction is seen.  Degenerative changes are present     MRI Lumbar spine: 4/25/23  Impression:  Lumbar spondylosis, contributing to moderate spinal canal stenosis at L3-4 and moderate neural foraminal narrowing at L3-4 through L5-S1, as above.  Prominent levocurvature to the lumbar spine with advanced right-sided L3-4 degenerative disc disease, as above      Review of Systems   Constitutional:   Negative for appetite change, fatigue, fever and unexpected weight change.   HENT:  Negative for mouth sores and trouble swallowing.    Eyes:  Negative for redness and visual disturbance.   Respiratory:  Negative for cough, shortness of breath and wheezing.    Cardiovascular:  Negative for chest pain and palpitations.   Gastrointestinal:  Negative for abdominal pain, anal bleeding, blood in stool, constipation, diarrhea, nausea and vomiting.   Genitourinary:  Negative for dysuria, frequency, genital sores and urgency.   Musculoskeletal:  Negative for arthralgias, back pain, gait problem, joint swelling, myalgias, neck pain and neck stiffness.   Skin:  Negative for rash.   Neurological:  Negative for weakness, numbness and headaches.   Hematological:  Negative for adenopathy. Does not bruise/bleed easily.   Psychiatric/Behavioral:  Negative for sleep disturbance. The patient is not nervous/anxious.        Objective:   Physical Exam   Musculoskeletal:      Right shoulder: Normal.      Left shoulder: Normal.      Right elbow: Normal.      Left elbow: Normal.      Right wrist: Normal.      Left wrist: Normal.      Right knee: Normal.      Left knee: Normal.       Right Side Rheumatological Exam     Examination finds the shoulder, elbow, wrist, knee, 1st PIP, 1st MCP, 2nd PIP, 3rd PIP, 4th MCP, 5th PIP and 5th MCP normal.    The patient has an enlarged 1st CMC, 2nd DIP and 5th DIP  Swollen 2nd MCP, 3rd MCP, 2nd DIP    Left Side Rheumatological Exam     Examination finds the shoulder, elbow, wrist, knee, 1st PIP, 1st MCP, 2nd PIP, 3rd PIP, 4th PIP, 4th MCP, 5th PIP and 5th MCP normal.    The patient has an enlarged 1st CMC, 2nd DIP and 5th DIP.  Swollen 2nd MCP and 3rd MCP        Assessment:   Left trochanteric burstis/gluteus medius tendinitis  S/p Right trochanteric bursitis, resolved post injection 6/14/22  Peripheral spondyloarthritis v. Pseudo-RA CPPD v seronegative RA  Left pes planovalgus, posterior tibial  tendinitis/probable tear, plantar fasciitis/heel spur, and calcific Achilles tendinitis. All improved with orthosis  Hyperlipidemia ASCVD 10.8%(intermediate risk  /82  Lumbar spondylosis, moderate spinal canal stenosis at L3-4 and moderate neural foraminal narrowing at L3-4 through L5-S1. Advanced right-sided L3-4 degenerative disc disease. S/p TOMASA   OA hands  Plan:   Continue Enbrel SureClick 50mg sc q 7 days  Continue Diclofenac gel 1% four times daily prn fingers   Continue Gabapentin 300mg in am and 600mg in pm  Diclofenac gel four times daily to left ankle, Achilles and plantar heel orthoses  Repeat CBC   Advised to get covid bivalent vaccine. Hold Enbrel one week after vaccination and resume after  RTC 3 months with standing labs

## 2023-06-23 ENCOUNTER — PATIENT MESSAGE (OUTPATIENT)
Dept: UROGYNECOLOGY | Facility: CLINIC | Age: 68
End: 2023-06-23
Payer: MEDICARE

## 2023-06-23 ENCOUNTER — PATIENT MESSAGE (OUTPATIENT)
Dept: ADMINISTRATIVE | Facility: OTHER | Age: 68
End: 2023-06-23
Payer: MEDICARE

## 2023-06-23 DIAGNOSIS — Z12.31 ENCOUNTER FOR SCREENING MAMMOGRAM FOR BREAST CANCER: Primary | ICD-10-CM

## 2023-06-29 ENCOUNTER — PATIENT MESSAGE (OUTPATIENT)
Dept: INTERNAL MEDICINE | Facility: CLINIC | Age: 68
End: 2023-06-29
Payer: MEDICARE

## 2023-07-03 ENCOUNTER — PATIENT MESSAGE (OUTPATIENT)
Dept: OPTOMETRY | Facility: CLINIC | Age: 68
End: 2023-07-03
Payer: MEDICARE

## 2023-07-12 ENCOUNTER — OFFICE VISIT (OUTPATIENT)
Dept: URGENT CARE | Facility: CLINIC | Age: 68
End: 2023-07-12
Payer: MEDICARE

## 2023-07-12 VITALS
OXYGEN SATURATION: 97 % | RESPIRATION RATE: 19 BRPM | HEIGHT: 63 IN | TEMPERATURE: 98 F | DIASTOLIC BLOOD PRESSURE: 67 MMHG | WEIGHT: 120 LBS | BODY MASS INDEX: 21.26 KG/M2 | HEART RATE: 74 BPM | SYSTOLIC BLOOD PRESSURE: 131 MMHG

## 2023-07-12 DIAGNOSIS — H61.23 HEARING LOSS OF BOTH EARS DUE TO CERUMEN IMPACTION: Primary | ICD-10-CM

## 2023-07-12 PROCEDURE — 99214 PR OFFICE/OUTPT VISIT, EST, LEVL IV, 30-39 MIN: ICD-10-PCS | Mod: S$GLB,,, | Performed by: NURSE PRACTITIONER

## 2023-07-12 PROCEDURE — 99214 OFFICE O/P EST MOD 30 MIN: CPT | Mod: S$GLB,,, | Performed by: NURSE PRACTITIONER

## 2023-07-12 NOTE — PATIENT INSTRUCTIONS
Use over the counter debrox as directed on label to help with ear wax buildup.  Continue taking your Claritin and using flonase daily.  Follow up with primary care provider if symptoms persist.

## 2023-07-12 NOTE — PROGRESS NOTES
"Subjective:      Patient ID: Jill Marquez is a 67 y.o. female.    Vitals:  height is 5' 3" (1.6 m) and weight is 54.4 kg (120 lb). Her oral temperature is 98 °F (36.7 °C). Her blood pressure is 131/67 and her pulse is 74. Her respiration is 19 and oxygen saturation is 97%.     Chief Complaint: Ear Problem (And Nasal Congestion in left ear and nostril.  I went to urgent care July 1st they gave me steroid injection but my left ear and nostril are still congested.  Jill Marquez - Entered by patient)    Pt presented here today with ear congestion x3 wks. Pt states that it feels like the ear is clogged and left side of face feels congested. Pt visited another urgent care 07/01. Pt was given steroid shot and antibiotics with no relief. Pt was in and out of hospital setting due to recent passing of . No other known exposure.     Ear Fullness   There is pain in the left ear. This is a new problem. The current episode started 1 to 4 weeks ago. The problem occurs constantly. The problem has been unchanged. There has been no fever. The pain is at a severity of 0/10. The patient is experiencing no pain. Associated symptoms include rhinorrhea. Pertinent negatives include no abdominal pain, coughing, diarrhea, ear discharge, headaches, hearing loss, neck pain, rash or vomiting. She has tried antibiotics for the symptoms. The treatment provided no relief.   HENT:  Negative for ear discharge and hearing loss.    Neck: Negative for neck pain.   Respiratory:  Negative for cough.    Gastrointestinal:  Negative for abdominal pain, vomiting and diarrhea.   Skin:  Negative for rash.   Neurological:  Negative for headaches.    Objective:     Physical Exam   Constitutional: She is oriented to person, place, and time. She appears well-developed. She is cooperative.  Non-toxic appearance. She does not appear ill. No distress.   HENT:   Head: Normocephalic and atraumatic.   Ears:   Right Ear: Hearing, tympanic membrane, external " ear and ear canal normal. There is cerumen present. No decreased hearing is noted.   Left Ear: Tympanic membrane, external ear and ear canal normal. There is cerumen present. Decreased hearing is noted.   Nose: Nose normal. No mucosal edema, rhinorrhea or nasal deformity. No epistaxis. Right sinus exhibits no maxillary sinus tenderness and no frontal sinus tenderness. Left sinus exhibits no maxillary sinus tenderness and no frontal sinus tenderness.   Mouth/Throat: Uvula is midline, oropharynx is clear and moist and mucous membranes are normal. No trismus in the jaw. Normal dentition. No uvula swelling. Cobblestoning present. No oropharyngeal exudate, posterior oropharyngeal edema or posterior oropharyngeal erythema.   Tm intact, no erythema post ear wax removal      Comments: Tm intact, no erythema post ear wax removal  Eyes: Conjunctivae and lids are normal. No scleral icterus.   Neck: Trachea normal and phonation normal. Neck supple. No edema present. No erythema present. No neck rigidity present.   Cardiovascular: Normal rate, regular rhythm, normal heart sounds and normal pulses.   Pulmonary/Chest: Effort normal and breath sounds normal. No respiratory distress. She has no decreased breath sounds. She has no wheezes. She has no rhonchi.   Abdominal: Normal appearance.   Musculoskeletal: Normal range of motion.         General: No deformity. Normal range of motion.   Neurological: She is alert and oriented to person, place, and time. She exhibits normal muscle tone. Coordination normal.   Skin: Skin is warm, dry, intact, not diaphoretic and not pale.   Psychiatric: Her speech is normal and behavior is normal. Judgment and thought content normal.   Nursing note and vitals reviewed.    Assessment:     1. Hearing loss of both ears due to cerumen impaction        Plan:       Hearing loss of both ears due to cerumen impaction  -     Ear wax removal  -     carbamide peroxide (DEBROX) 6.5 % otic solution; Use as  directed; Refill: 0                Pt tolerated ear wax removal well. States she can hear much better and feels relief of pressure in left ear.    Use over the counter debrox as directed on label to help with ear wax buildup.  Continue taking your Claritin and using flonase daily.  Follow up with primary care provider if symptoms persist.

## 2023-07-15 ENCOUNTER — TELEPHONE (OUTPATIENT)
Dept: URGENT CARE | Facility: CLINIC | Age: 68
End: 2023-07-15
Payer: MEDICARE

## 2023-07-19 ENCOUNTER — OFFICE VISIT (OUTPATIENT)
Dept: OTOLARYNGOLOGY | Facility: CLINIC | Age: 68
End: 2023-07-19
Payer: MEDICARE

## 2023-07-19 VITALS — HEIGHT: 63 IN | TEMPERATURE: 97 F | WEIGHT: 118.38 LBS | BODY MASS INDEX: 20.98 KG/M2

## 2023-07-19 DIAGNOSIS — H69.92 ETD (EUSTACHIAN TUBE DYSFUNCTION), LEFT: Primary | ICD-10-CM

## 2023-07-19 PROCEDURE — 99203 PR OFFICE/OUTPT VISIT, NEW, LEVL III, 30-44 MIN: ICD-10-PCS | Mod: S$PBB,,, | Performed by: PHYSICIAN ASSISTANT

## 2023-07-19 PROCEDURE — 99999 PR PBB SHADOW E&M-EST. PATIENT-LVL III: ICD-10-PCS | Mod: PBBFAC,,, | Performed by: PHYSICIAN ASSISTANT

## 2023-07-19 PROCEDURE — 99999 PR PBB SHADOW E&M-EST. PATIENT-LVL III: CPT | Mod: PBBFAC,,, | Performed by: PHYSICIAN ASSISTANT

## 2023-07-19 PROCEDURE — 99213 OFFICE O/P EST LOW 20 MIN: CPT | Mod: PBBFAC,PO | Performed by: PHYSICIAN ASSISTANT

## 2023-07-19 PROCEDURE — 99203 OFFICE O/P NEW LOW 30 MIN: CPT | Mod: S$PBB,,, | Performed by: PHYSICIAN ASSISTANT

## 2023-07-20 NOTE — PROGRESS NOTES
"Subjective:   Patient ID: Jill Marquez is a 67 y.o. female.    Chief Complaint: Cerumen Impaction (Ear cleaning, bilateral hearing loss. Patient stated she went to the urgent care where they cleaned both ears but she is still having problems with her L ear.)    Ms. Marquez is a very pleasant 68 yo female here to see me today with complaints of ear fullness bilaterally ( L>R). She recently went o urgent care and had cerumen flushed out but left ear still feels full and muffled. She states that it feels like she need to pop her ears and she is on an airplane. She recently lost her  and has been stressed at home. She tries to vagal but ear wont pop open. She has muffled hearings but still able to hear.     Review of patient's allergies indicates:   Allergen Reactions    Crestor [rosuvastatin] Other (See Comments)     Elevated liver enzymes     Sulfasalazine Nausea Only     Malaise, nausea,    Leflunomide Rash           Review of Systems   Constitutional: Negative.    HENT:  Positive for hearing loss (muffled) and sinus pressure.    Eyes: Negative.    Respiratory: Negative.     Cardiovascular: Negative.    Gastrointestinal: Negative.    Endocrine: Negative.    Genitourinary: Negative.    Musculoskeletal:  Positive for neck pain.   Skin: Negative.    Neurological:  Positive for light-headedness and headaches.   Hematological: Negative.    Psychiatric/Behavioral:  The patient is nervous/anxious.        Objective:   Temp 97.2 °F (36.2 °C) (Temporal)   Ht 5' 3" (1.6 m)   Wt 53.7 kg (118 lb 6.2 oz)   BMI 20.97 kg/m²     Physical Exam  Constitutional:       General: She is not in acute distress.     Appearance: She is well-developed.   HENT:      Head: Normocephalic and atraumatic.      Right Ear: Ear canal and external ear normal. Tympanic membrane is retracted.      Left Ear: Ear canal and external ear normal. Tympanic membrane is retracted.      Nose: Nose normal. No nasal deformity, septal deviation, mucosal " edema or rhinorrhea.      Right Sinus: No maxillary sinus tenderness or frontal sinus tenderness.      Left Sinus: No maxillary sinus tenderness or frontal sinus tenderness.      Mouth/Throat:      Mouth: Mucous membranes are not pale and not dry.      Dentition: No dental caries.      Pharynx: Uvula midline. No oropharyngeal exudate or posterior oropharyngeal erythema.   Eyes:      General: Lids are normal. No scleral icterus.     Extraocular Movements:      Right eye: Normal extraocular motion and no nystagmus.      Left eye: Normal extraocular motion and no nystagmus.      Conjunctiva/sclera: Conjunctivae normal.      Right eye: Right conjunctiva is not injected. No chemosis.     Left eye: Left conjunctiva is not injected. No chemosis.     Pupils: Pupils are equal, round, and reactive to light.   Neck:      Thyroid: No thyroid mass or thyromegaly.      Trachea: Trachea and phonation normal. No tracheal tenderness or tracheal deviation.   Pulmonary:      Effort: Pulmonary effort is normal. No respiratory distress.      Breath sounds: No stridor.   Abdominal:      General: There is no distension.   Lymphadenopathy:      Head:      Right side of head: No submental, submandibular, preauricular, posterior auricular or occipital adenopathy.      Left side of head: No submental, submandibular, preauricular, posterior auricular or occipital adenopathy.      Cervical: No cervical adenopathy.   Skin:     General: Skin is warm and dry.      Findings: No erythema or rash.   Neurological:      Mental Status: She is alert and oriented to person, place, and time.      Cranial Nerves: No cranial nerve deficit.   Psychiatric:         Behavior: Behavior normal.        Imaging : Normal tympanogram bilaterally. Negative pressure in left ear.     Assessment:     1. ETD (Eustachian tube dysfunction), left        Plan:     ETD (Eustachian tube dysfunction), left      We had a long discussion regarding the anatomy and function of the  eustachian tube.  We discussed that the eustachian tube acts as a pump to keep the appropriate amount of pressure behind the ear drum.  I gave the patient a prescription for a nasal steroid spray to be used on a daily basis, and we discussed that it will take 2-3 weeks of daily use to achieve maximal effectiveness.      I would like to nohemi her back in for an audiogram at her convenience.

## 2023-07-26 ENCOUNTER — HOSPITAL ENCOUNTER (OUTPATIENT)
Dept: RADIOLOGY | Facility: HOSPITAL | Age: 68
Discharge: HOME | End: 2023-07-26
Attending: NURSE PRACTITIONER
Payer: MEDICARE

## 2023-07-26 DIAGNOSIS — Z12.31 ENCOUNTER FOR SCREENING MAMMOGRAM FOR BREAST CANCER: ICD-10-CM

## 2023-07-26 PROCEDURE — 77067 SCR MAMMO BI INCL CAD: CPT | Mod: 26,,, | Performed by: RADIOLOGY

## 2023-07-26 PROCEDURE — 77063 BREAST TOMOSYNTHESIS BI: CPT | Mod: 26,,, | Performed by: RADIOLOGY

## 2023-07-26 PROCEDURE — 77063 MAMMO DIGITAL SCREENING BILAT WITH TOMO: ICD-10-PCS | Mod: 26,,, | Performed by: RADIOLOGY

## 2023-07-26 PROCEDURE — 77067 SCR MAMMO BI INCL CAD: CPT | Mod: TC,PN

## 2023-07-26 PROCEDURE — 77067 MAMMO DIGITAL SCREENING BILAT WITH TOMO: ICD-10-PCS | Mod: 26,,, | Performed by: RADIOLOGY

## 2023-07-28 ENCOUNTER — PATIENT MESSAGE (OUTPATIENT)
Dept: UROGYNECOLOGY | Facility: CLINIC | Age: 68
End: 2023-07-28
Payer: MEDICARE

## 2023-07-31 ENCOUNTER — PATIENT MESSAGE (OUTPATIENT)
Dept: INTERNAL MEDICINE | Facility: CLINIC | Age: 68
End: 2023-07-31
Payer: MEDICARE

## 2023-07-31 RX ORDER — LEVOTHYROXINE SODIUM 88 UG/1
88 TABLET ORAL
Qty: 30 TABLET | Refills: 11 | OUTPATIENT
Start: 2023-07-31

## 2023-07-31 RX ORDER — LEVOTHYROXINE SODIUM 88 UG/1
TABLET ORAL
Qty: 90 TABLET | Refills: 2 | Status: SHIPPED | OUTPATIENT
Start: 2023-07-31

## 2023-07-31 NOTE — TELEPHONE ENCOUNTER
No care due was identified.  [facility] Embedded Care Due Messages. Reference number: [id number].   7/31/2023 8:32:57 AM CDT

## 2023-07-31 NOTE — TELEPHONE ENCOUNTER
Refill Decision Note   Jill Marquez  is requesting a refill authorization.  Brief Assessment and Rationale for Refill:  Approve     Medication Therapy Plan:         Comments:     Note composed:1:35 PM 07/31/2023

## 2023-07-31 NOTE — TELEPHONE ENCOUNTER
No care due was identified.  [facility] Embedded Care Due Messages. Reference number: [id number].   7/31/2023 8:22:14 AM CDT

## 2023-07-31 NOTE — TELEPHONE ENCOUNTER
Refill Decision Note   Jill Marquez  is requesting a refill authorization.  Brief Assessment and Rationale for Refill:  Quick Discontinue     Medication Therapy Plan:         Comments:     Note composed:1:23 PM 07/31/2023

## 2023-08-01 ENCOUNTER — OFFICE VISIT (OUTPATIENT)
Dept: URGENT CARE | Facility: CLINIC | Age: 68
End: 2023-08-01
Payer: MEDICARE

## 2023-08-01 VITALS
DIASTOLIC BLOOD PRESSURE: 66 MMHG | RESPIRATION RATE: 16 BRPM | WEIGHT: 120 LBS | TEMPERATURE: 97 F | OXYGEN SATURATION: 97 % | HEIGHT: 63 IN | HEART RATE: 80 BPM | BODY MASS INDEX: 21.26 KG/M2 | SYSTOLIC BLOOD PRESSURE: 117 MMHG

## 2023-08-01 DIAGNOSIS — J34.9 SINUS PROBLEM: ICD-10-CM

## 2023-08-01 DIAGNOSIS — R09.81 CONGESTION OF NASAL SINUS: Primary | ICD-10-CM

## 2023-08-01 LAB
CTP QC/QA: YES
SARS-COV-2 AG RESP QL IA.RAPID: NEGATIVE

## 2023-08-01 PROCEDURE — 99213 PR OFFICE/OUTPT VISIT, EST, LEVL III, 20-29 MIN: ICD-10-PCS | Mod: ICN,CMP,S$GLB, | Performed by: NURSE PRACTITIONER

## 2023-08-01 PROCEDURE — 87811 SARS-COV-2 COVID19 W/OPTIC: CPT | Mod: QW,ICN,CMP,S$GLB | Performed by: NURSE PRACTITIONER

## 2023-08-01 PROCEDURE — 99213 OFFICE O/P EST LOW 20 MIN: CPT | Mod: ICN,CMP,S$GLB, | Performed by: NURSE PRACTITIONER

## 2023-08-01 PROCEDURE — 87811 SARS CORONAVIRUS 2 ANTIGEN POCT, MANUAL READ: ICD-10-PCS | Mod: QW,ICN,CMP,S$GLB | Performed by: NURSE PRACTITIONER

## 2023-08-01 NOTE — PROGRESS NOTES
"Subjective:      Patient ID: Jill Marquez is a 67 y.o. female.    Vitals:  height is 5' 3" (1.6 m) and weight is 54.4 kg (120 lb). Her tympanic temperature is 97.3 °F (36.3 °C). Her blood pressure is 117/66 and her pulse is 80. Her respiration is 16 and oxygen saturation is 97%.     Chief Complaint: Sinus Problem    Patient presents with congestion and headache that began yesterday. She was exposed to COVID on Sunday. She has been taking OTC tylenol.    Sinus Problem  This is a new problem. The current episode started yesterday. There has been no fever. Associated symptoms include congestion and headaches. Pertinent negatives include no chills, coughing, diaphoresis, ear pain, hoarse voice, neck pain, shortness of breath, sinus pressure, sneezing, sore throat or swollen glands. Past treatments include acetaminophen.       Constitution: Negative for chills and sweating.   HENT:  Positive for congestion. Negative for ear pain, sinus pressure and sore throat.    Neck: Negative for neck pain.   Cardiovascular:  Negative for chest pain.   Eyes:  Negative for eye pain.   Respiratory:  Negative for cough and shortness of breath.    Gastrointestinal:  Negative for nausea.   Musculoskeletal:  Negative for joint swelling.   Skin:  Negative for rash.   Allergic/Immunologic: Positive for immunocompromised state. Negative for sneezing.   Neurological:  Positive for headaches. Negative for altered mental status.   Psychiatric/Behavioral:  Negative for altered mental status.       Objective:     Physical Exam   Constitutional: She is oriented to person, place, and time. She appears well-developed. She is cooperative.  Non-toxic appearance. She does not appear ill. No distress.   HENT:   Head: Normocephalic and atraumatic.   Ears:   Right Ear: Hearing, tympanic membrane, external ear and ear canal normal.   Left Ear: Hearing, tympanic membrane, external ear and ear canal normal.   Nose: Nose normal. No mucosal edema, rhinorrhea " or nasal deformity. No epistaxis. Right sinus exhibits no maxillary sinus tenderness and no frontal sinus tenderness. Left sinus exhibits no maxillary sinus tenderness and no frontal sinus tenderness.   Mouth/Throat: Uvula is midline, oropharynx is clear and moist and mucous membranes are normal. No trismus in the jaw. Normal dentition. No uvula swelling. No oropharyngeal exudate, posterior oropharyngeal edema or posterior oropharyngeal erythema.   Eyes: Conjunctivae and lids are normal. No scleral icterus.   Neck: Trachea normal and phonation normal. Neck supple. No edema present. No erythema present. No neck rigidity present.   Cardiovascular: Normal rate, regular rhythm, normal heart sounds and normal pulses.   Pulmonary/Chest: Effort normal and breath sounds normal. No respiratory distress. She has no decreased breath sounds. She has no rhonchi.   Abdominal: Normal appearance.   Musculoskeletal: Normal range of motion.         General: No deformity. Normal range of motion.   Neurological: She is alert and oriented to person, place, and time. She exhibits normal muscle tone. Coordination normal.   Skin: Skin is warm, dry, intact, not diaphoretic and not pale.   Psychiatric: Her speech is normal and behavior is normal. Judgment and thought content normal.   Nursing note and vitals reviewed.      Assessment:     1. Congestion of nasal sinus    2. Sinus problem        Plan:       Congestion of nasal sinus  -     SARS Coronavirus 2 Antigen, POCT Manual Read    Sinus problem          Medical Decision Making:   Initial Assessment:   Covid exposure  Headache  Congestion  Differential Diagnosis:   COVID  URI  Allergic rhinitis  Clinical Tests:   Lab Tests: Ordered and Reviewed       <> Summary of Lab: Covid nergative  Urgent Care Management:  Continue Claritin and Flonase  Supportive care reviewed         Patient counseled on symptomatic treatment.   - Rest  - Hydration-- 64 ounces fluid per day  - Cool mist  humidifier/vaporizer  - Nasal saline/steroids  - Antihistamines  - OTC pain/fever relievers    Follow up with PCP or ER immediately for worsening, new symptoms or no improvement. Go to ER if you develop fever of 103 or higher, chest pain, shortness of breath, upper back pain, stiff neck or severe headache.      Diagnosis, treatment, AVS reviewed with patient. Patient understands and agrees with plan

## 2023-08-01 NOTE — TELEPHONE ENCOUNTER
----- Message from Inessa Reyez sent at 12/7/2020 11:03 AM CST -----  Contact: [phone]  What orders is pt asking for?: 6 months f/u  lab order    Is there a future appointment with the provider?: yes    When?: 01/05/21    Comments?:        
Informed pt she is not due for labs  
Labs are due around Apri or May. I placed order.   
does pt need 6 mo follow up labs?  
130

## 2023-08-04 ENCOUNTER — TELEPHONE (OUTPATIENT)
Dept: URGENT CARE | Facility: CLINIC | Age: 68
End: 2023-08-04
Payer: MEDICARE

## 2023-08-08 ENCOUNTER — LAB VISIT (OUTPATIENT)
Dept: LAB | Facility: HOSPITAL | Age: 68
End: 2023-08-08
Attending: NURSE PRACTITIONER
Payer: MEDICARE

## 2023-08-08 DIAGNOSIS — E78.5 HYPERLIPIDEMIA, UNSPECIFIED HYPERLIPIDEMIA TYPE: ICD-10-CM

## 2023-08-08 DIAGNOSIS — R74.01 ALT (SGPT) LEVEL RAISED: ICD-10-CM

## 2023-08-08 LAB
ALBUMIN SERPL BCP-MCNC: 4.2 G/DL (ref 3.5–5.2)
ALP SERPL-CCNC: 42 U/L (ref 55–135)
ALT SERPL W/O P-5'-P-CCNC: 38 U/L (ref 10–44)
AST SERPL-CCNC: 32 U/L (ref 10–40)
BILIRUB DIRECT SERPL-MCNC: 0.4 MG/DL (ref 0.1–0.3)
BILIRUB SERPL-MCNC: 1.3 MG/DL (ref 0.1–1)
CHOLEST SERPL-MCNC: 194 MG/DL (ref 120–199)
CHOLEST/HDLC SERPL: 3 {RATIO} (ref 2–5)
CK SERPL-CCNC: 275 U/L (ref 20–180)
HDLC SERPL-MCNC: 64 MG/DL (ref 40–75)
HDLC SERPL: 33 % (ref 20–50)
LDLC SERPL CALC-MCNC: 112 MG/DL (ref 63–159)
NONHDLC SERPL-MCNC: 130 MG/DL
PROT SERPL-MCNC: 6.7 G/DL (ref 6–8.4)
TRIGL SERPL-MCNC: 90 MG/DL (ref 30–150)

## 2023-08-08 PROCEDURE — 82550 ASSAY OF CK (CPK): CPT | Performed by: NURSE PRACTITIONER

## 2023-08-08 PROCEDURE — 80076 HEPATIC FUNCTION PANEL: CPT | Performed by: NURSE PRACTITIONER

## 2023-08-08 PROCEDURE — 36415 COLL VENOUS BLD VENIPUNCTURE: CPT | Mod: PN | Performed by: NURSE PRACTITIONER

## 2023-08-08 PROCEDURE — 80061 LIPID PANEL: CPT | Performed by: NURSE PRACTITIONER

## 2023-08-10 ENCOUNTER — PATIENT MESSAGE (OUTPATIENT)
Dept: HEPATOLOGY | Facility: CLINIC | Age: 68
End: 2023-08-10
Payer: MEDICARE

## 2023-08-10 DIAGNOSIS — R74.8 ELEVATED CK: Primary | ICD-10-CM

## 2023-08-16 ENCOUNTER — PES CALL (OUTPATIENT)
Dept: ADMINISTRATIVE | Facility: CLINIC | Age: 68
End: 2023-08-16
Payer: MEDICARE

## 2023-08-23 ENCOUNTER — OFFICE VISIT (OUTPATIENT)
Dept: INTERNAL MEDICINE | Facility: CLINIC | Age: 68
End: 2023-08-23
Payer: MEDICARE

## 2023-08-23 VITALS
WEIGHT: 116.19 LBS | BODY MASS INDEX: 20.59 KG/M2 | SYSTOLIC BLOOD PRESSURE: 116 MMHG | OXYGEN SATURATION: 98 % | HEART RATE: 77 BPM | HEIGHT: 63 IN | DIASTOLIC BLOOD PRESSURE: 70 MMHG

## 2023-08-23 DIAGNOSIS — E78.5 HYPERLIPIDEMIA, UNSPECIFIED HYPERLIPIDEMIA TYPE: ICD-10-CM

## 2023-08-23 DIAGNOSIS — F43.21 GRIEF: ICD-10-CM

## 2023-08-23 DIAGNOSIS — M26.622 ARTHRALGIA OF LEFT TEMPOROMANDIBULAR JOINT: ICD-10-CM

## 2023-08-23 DIAGNOSIS — I10 PRIMARY HYPERTENSION: Primary | ICD-10-CM

## 2023-08-23 DIAGNOSIS — L40.50 PSA (PSORIATIC ARTHRITIS): ICD-10-CM

## 2023-08-23 DIAGNOSIS — E03.9 HYPOTHYROIDISM, UNSPECIFIED TYPE: ICD-10-CM

## 2023-08-23 PROCEDURE — 99397 PR PREVENTIVE VISIT,EST,65 & OVER: ICD-10-PCS | Mod: S$PBB,GZ,, | Performed by: INTERNAL MEDICINE

## 2023-08-23 PROCEDURE — 99999 PR PBB SHADOW E&M-EST. PATIENT-LVL IV: ICD-10-PCS | Mod: PBBFAC,,, | Performed by: INTERNAL MEDICINE

## 2023-08-23 PROCEDURE — 99214 OFFICE O/P EST MOD 30 MIN: CPT | Mod: PBBFAC | Performed by: INTERNAL MEDICINE

## 2023-08-23 PROCEDURE — 99397 PER PM REEVAL EST PAT 65+ YR: CPT | Mod: S$PBB,GZ,, | Performed by: INTERNAL MEDICINE

## 2023-08-23 PROCEDURE — 99999 PR PBB SHADOW E&M-EST. PATIENT-LVL IV: CPT | Mod: PBBFAC,,, | Performed by: INTERNAL MEDICINE

## 2023-08-23 NOTE — PROGRESS NOTES
Subjective:       Patient ID: Jill Marquez is a 67 y.o. female.    Chief Complaint: Annual Exam    Patient here for annual follow-up of medical problems including hypothyroidism, arthritis, hyperlipidemia.  I reviewed both the rheumatology and hepatology notes.  She was tried on pravastatin for lipids.  She had a 40 point drop in cholesterol but bilirubin and CPK bumped up a little.  She said hepatology is watching this.    Still undergoing treatment for arthritis   Grieving the loss of her .  He  few weeks ago after a long farmer with a blood disorder support from her kids but they live out of town so she go to [place] to visit 1 of them.  She does not want any medication now but may think about it depending on how she does.  Labs in prescriptions reviewed.  I encouraged her to get a flu shot and COVID shot when available in the next 1-2 weeks      Review of Systems   Constitutional:  Negative for appetite change, chills and fever.   HENT:  Negative for nosebleeds and sore throat.    Eyes:  Negative for pain and visual disturbance.   Respiratory:  Negative for cough, shortness of breath and wheezing.    Cardiovascular:  Negative for chest pain and leg swelling.   Gastrointestinal:  Negative for abdominal pain, constipation and diarrhea.   Endocrine: Negative for polyuria.   Genitourinary:  Negative for difficulty urinating, hematuria and vaginal bleeding.   Musculoskeletal:  Positive for arthralgias. Negative for back pain, gait problem and neck pain.   Integumentary:  Negative for pallor and rash.   Neurological:  Negative for tremors, seizures and headaches.   Hematological:  Does not bruise/bleed easily.   Psychiatric/Behavioral:  Positive for dysphoric mood. The patient is not nervous/anxious.            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arrival time of 10:00 confirmed/mleone    COSMETIC SURGERY      EYE SURGERY      eyelid surgery      HYSTERECTOMY  2004    prolapse    OOPHORECTOMY Bilateral 2015    Tonsillectomy      TONSILLECTOMY      TRANSFORAMINAL EPIDURAL INJECTION OF STEROID Left 05/26/2023    Procedure: INJECTION, STEROID, EPIDURAL, TRANSFORAMINAL APPROACH, LEFT L3/L4 & L4/L5 DIRECT REF;  Surgeon: Popeye Ca MD;  Location: [facility];  Service: Pain Management;  Laterality: Left;      Patient Active Problem List   Diagnosis    Hypothyroid    HTN (hypertension)    Displacement of thoracic intervertebral disc without myelopathy    Hyperlipidemia    Lumbar spondylosis    Carpal tunnel syndrome    Lumbar radiculopathy    Sicca    Fatigue    Myalgia and myositis    Erosive osteoarthritis of both hands    Left leg pain    Bilateral hand pain    Elevated CPK    Atrophic vaginitis    Chronic constipation    Screening for colon cancer    Chronic low back pain    CMC arthritis, thumb, degenerative    Mucous cyst of finger    Peripheral spondyloarthritis    Rash of back    PSA (psoriatic arthritis)    Left sided sciatica    Greater trochanteric bursitis of right hip    Medial epicondylitis of elbow, left    Pain    Edema    Decreased  strength    Decreased strength    Immunocompromised, acquired    Gastrocnemius equinus, left    Osteoarthritis of midfoot, left    Posterior tibial tendon dysfunction, left    Decreased range of motion    Pelvic floor dysfunction in female    Urinary incontinence, mixed    ALT (SGPT)  level raised    Alpha-1-antitrypsin deficiency carrier    SOB (shortness of breath)        Objective:      Physical Exam  Constitutional:       General: She is not in acute distress.     Appearance: She is well-developed.   HENT:      Head: Normocephalic and atraumatic.      Right Ear: Tympanic membrane, ear canal and external ear normal.      Left Ear: Tympanic membrane, ear canal and external ear normal.      Mouth/Throat:      Pharynx: No oropharyngeal exudate or posterior oropharyngeal erythema.   Eyes:      General: No scleral icterus.     Conjunctiva/sclera: Conjunctivae normal.      Pupils: Pupils are equal, round, and reactive to light.   Neck:      Thyroid: No thyromegaly.   Cardiovascular:      Rate and Rhythm: Normal rate and regular rhythm.      Pulses: Normal pulses.      Heart sounds: No murmur heard.  Pulmonary:      Effort: Pulmonary effort is normal.      Breath sounds: Normal breath sounds. No wheezing.   Abdominal:      General: Bowel sounds are normal. There is no distension.      Palpations: Abdomen is soft.      Tenderness: There is no abdominal tenderness.   Musculoskeletal:         General: Deformity (fingers) present. No tenderness.      Cervical back: Normal range of motion and neck supple.      Right lower leg: No edema.      Left lower leg: No edema.   Lymphadenopathy:      Cervical: No cervical adenopathy.   Skin:     Coloration: Skin is not jaundiced.      Findings: No rash.   Neurological:      General: No focal deficit present.      Mental Status: She is alert and oriented to person, place, and time.   Psychiatric:         Behavior: Behavior normal.      Comments: Sad mood         Assessment:       Problem List Items Addressed This Visit          Cardiac/Vascular    HTN (hypertension) - Primary    Hyperlipidemia       Endocrine    Hypothyroid    Relevant Orders    TSH       Orthopedic    PSA (psoriatic arthritis)     Other Visit Diagnoses       Arthralgia of left temporomandibular  "joint        Grief                Plan:         Jill was seen today for annual exam.    Diagnoses and all orders for this visit:    Primary hypertension    Hypothyroidism, unspecified type  -     TSH; Future    Hyperlipidemia, unspecified hyperlipidemia type    PSA (psoriatic arthritis)    Arthralgia of left temporomandibular joint    Grief           Follow-up 6 months with lab    Continue to see rheumatology and hepatology      Portions of this note may have been created with voice recognition software. Occasional "wrong-word" or "sound-a-like" substitutions may have occurred due to the inherent limitations of voice recognition software. Please, read the note carefully and recognize, using context, where substitutions have occurred.  "

## 2023-08-30 ENCOUNTER — TELEPHONE (OUTPATIENT)
Dept: ORTHOPEDICS | Facility: CLINIC | Age: 68
End: 2023-08-30
Payer: MEDICARE

## 2023-08-30 ENCOUNTER — PATIENT MESSAGE (OUTPATIENT)
Dept: ORTHOPEDICS | Facility: CLINIC | Age: 68
End: 2023-08-30
Payer: MEDICARE

## 2023-08-30 DIAGNOSIS — M41.115 JUVENILE IDIOPATHIC SCOLIOSIS OF THORACOLUMBAR REGION: ICD-10-CM

## 2023-08-30 RX ORDER — DICLOFENAC SODIUM 50 MG/1
50 TABLET, DELAYED RELEASE ORAL 2 TIMES DAILY PRN
Qty: 30 TABLET | Refills: 2 | Status: SHIPPED | OUTPATIENT
Start: 2023-08-30 | End: 2023-10-16

## 2023-08-30 RX ORDER — DICLOFENAC SODIUM 50 MG/1
50 TABLET, DELAYED RELEASE ORAL 2 TIMES DAILY PRN
Qty: 30 TABLET | Refills: 2 | Status: SHIPPED | OUTPATIENT
Start: 2023-08-30 | End: 2023-08-30

## 2023-08-30 NOTE — TELEPHONE ENCOUNTER
Spoke to patient regarding her medication refill. Stated to patient that Deb was not in clinic today. Also stated that I will forward this message to Noelle Rincon. Patient stated thank you. Thanks.

## 2023-09-08 ENCOUNTER — PATIENT MESSAGE (OUTPATIENT)
Dept: HEPATOLOGY | Facility: CLINIC | Age: 68
End: 2023-09-08
Payer: MEDICARE

## 2023-09-08 DIAGNOSIS — E78.5 HYPERLIPIDEMIA, UNSPECIFIED HYPERLIPIDEMIA TYPE: ICD-10-CM

## 2023-09-08 RX ORDER — PRAVASTATIN SODIUM 20 MG/1
20 TABLET ORAL DAILY
Qty: 30 TABLET | Refills: 6 | Status: SHIPPED | OUTPATIENT
Start: 2023-09-08 | End: 2023-11-08

## 2023-09-11 ENCOUNTER — TELEPHONE (OUTPATIENT)
Dept: RHEUMATOLOGY | Facility: CLINIC | Age: 68
End: 2023-09-11
Payer: MEDICARE

## 2023-09-11 ENCOUNTER — LAB VISIT (OUTPATIENT)
Dept: LAB | Facility: HOSPITAL | Age: 68
End: 2023-09-11
Attending: INTERNAL MEDICINE
Payer: MEDICARE

## 2023-09-11 DIAGNOSIS — M47.819 SPONDYLARTHRITIS: ICD-10-CM

## 2023-09-11 DIAGNOSIS — Z51.81 ENCOUNTER FOR MONITORING OF ETANERCEPT THERAPY: ICD-10-CM

## 2023-09-11 DIAGNOSIS — R74.01 HIGH TRANSAMINASE LEVELS: Primary | ICD-10-CM

## 2023-09-11 DIAGNOSIS — R74.8 ELEVATED CK: ICD-10-CM

## 2023-09-11 DIAGNOSIS — Z79.620 ENCOUNTER FOR MONITORING OF ETANERCEPT THERAPY: ICD-10-CM

## 2023-09-11 LAB
ALBUMIN SERPL BCP-MCNC: 4.2 G/DL (ref 3.5–5.2)
ALP SERPL-CCNC: 46 U/L (ref 55–135)
ALT SERPL W/O P-5'-P-CCNC: 69 U/L (ref 10–44)
ANION GAP SERPL CALC-SCNC: 10 MMOL/L (ref 8–16)
AST SERPL-CCNC: 63 U/L (ref 10–40)
BASOPHILS # BLD AUTO: 0.04 K/UL (ref 0–0.2)
BASOPHILS NFR BLD: 0.9 % (ref 0–1.9)
BILIRUB SERPL-MCNC: 0.7 MG/DL (ref 0.1–1)
BUN SERPL-MCNC: 16 MG/DL (ref 8–23)
CALCIUM SERPL-MCNC: 9.1 MG/DL (ref 8.7–10.5)
CHLORIDE SERPL-SCNC: 96 MMOL/L (ref 95–110)
CK SERPL-CCNC: 239 U/L (ref 20–180)
CO2 SERPL-SCNC: 27 MMOL/L (ref 23–29)
CREAT SERPL-MCNC: 0.7 MG/DL (ref 0.5–1.4)
CRP SERPL-MCNC: 0.4 MG/L (ref 0–8.2)
DIFFERENTIAL METHOD: ABNORMAL
EOSINOPHIL # BLD AUTO: 0.1 K/UL (ref 0–0.5)
EOSINOPHIL NFR BLD: 2.6 % (ref 0–8)
ERYTHROCYTE [DISTWIDTH] IN BLOOD BY AUTOMATED COUNT: 14.6 % (ref 11.5–14.5)
ERYTHROCYTE [SEDIMENTATION RATE] IN BLOOD BY PHOTOMETRIC METHOD: <2 MM/HR (ref 0–36)
EST. GFR  (NO RACE VARIABLE): >60 ML/MIN/1.73 M^2
GLUCOSE SERPL-MCNC: 78 MG/DL (ref 70–110)
HCT VFR BLD AUTO: 40.2 % (ref 37–48.5)
HGB BLD-MCNC: 13.2 G/DL (ref 12–16)
IMM GRANULOCYTES # BLD AUTO: 0.01 K/UL (ref 0–0.04)
IMM GRANULOCYTES NFR BLD AUTO: 0.2 % (ref 0–0.5)
LYMPHOCYTES # BLD AUTO: 2.1 K/UL (ref 1–4.8)
LYMPHOCYTES NFR BLD: 45.1 % (ref 18–48)
MCH RBC QN AUTO: 31.4 PG (ref 27–31)
MCHC RBC AUTO-ENTMCNC: 32.8 G/DL (ref 32–36)
MCV RBC AUTO: 96 FL (ref 82–98)
MONOCYTES # BLD AUTO: 0.6 K/UL (ref 0.3–1)
MONOCYTES NFR BLD: 12.9 % (ref 4–15)
NEUTROPHILS # BLD AUTO: 1.8 K/UL (ref 1.8–7.7)
NEUTROPHILS NFR BLD: 38.3 % (ref 38–73)
NRBC BLD-RTO: 0 /100 WBC
PLATELET # BLD AUTO: 187 K/UL (ref 150–450)
PMV BLD AUTO: 11.6 FL (ref 9.2–12.9)
POTASSIUM SERPL-SCNC: 3.8 MMOL/L (ref 3.5–5.1)
PROT SERPL-MCNC: 6.8 G/DL (ref 6–8.4)
RBC # BLD AUTO: 4.21 M/UL (ref 4–5.4)
SODIUM SERPL-SCNC: 133 MMOL/L (ref 136–145)
WBC # BLD AUTO: 4.57 K/UL (ref 3.9–12.7)

## 2023-09-11 PROCEDURE — 87516 HEPATITIS B DNA AMP PROBE: CPT | Performed by: INTERNAL MEDICINE

## 2023-09-11 PROCEDURE — 86140 C-REACTIVE PROTEIN: CPT | Performed by: INTERNAL MEDICINE

## 2023-09-11 PROCEDURE — 85652 RBC SED RATE AUTOMATED: CPT | Performed by: INTERNAL MEDICINE

## 2023-09-11 PROCEDURE — 80053 COMPREHEN METABOLIC PANEL: CPT | Performed by: INTERNAL MEDICINE

## 2023-09-11 PROCEDURE — 85025 COMPLETE CBC W/AUTO DIFF WBC: CPT | Performed by: INTERNAL MEDICINE

## 2023-09-11 PROCEDURE — 82977 ASSAY OF GGT: CPT | Performed by: INTERNAL MEDICINE

## 2023-09-11 PROCEDURE — 82550 ASSAY OF CK (CPK): CPT | Performed by: NURSE PRACTITIONER

## 2023-09-11 PROCEDURE — 36415 COLL VENOUS BLD VENIPUNCTURE: CPT | Mod: PN | Performed by: INTERNAL MEDICINE

## 2023-09-12 ENCOUNTER — PATIENT MESSAGE (OUTPATIENT)
Dept: INTERNAL MEDICINE | Facility: CLINIC | Age: 68
End: 2023-09-12
Payer: MEDICARE

## 2023-09-12 ENCOUNTER — PATIENT MESSAGE (OUTPATIENT)
Dept: RHEUMATOLOGY | Facility: CLINIC | Age: 68
End: 2023-09-12
Payer: MEDICARE

## 2023-09-12 LAB — GGT SERPL-CCNC: 51 U/L (ref 8–55)

## 2023-09-14 LAB — HBV DNA SERPL QL NAA+PROBE: NOT DETECTED

## 2023-09-20 ENCOUNTER — PATIENT MESSAGE (OUTPATIENT)
Dept: OPTOMETRY | Facility: CLINIC | Age: 68
End: 2023-09-20
Payer: MEDICARE

## 2023-09-21 ENCOUNTER — PATIENT MESSAGE (OUTPATIENT)
Dept: RHEUMATOLOGY | Facility: CLINIC | Age: 68
End: 2023-09-21
Payer: MEDICARE

## 2023-09-25 ENCOUNTER — LAB VISIT (OUTPATIENT)
Dept: LAB | Facility: HOSPITAL | Age: 68
End: 2023-09-25
Attending: INTERNAL MEDICINE
Payer: MEDICARE

## 2023-09-25 DIAGNOSIS — R74.01 HIGH TRANSAMINASE LEVELS: ICD-10-CM

## 2023-09-25 LAB
ALBUMIN SERPL BCP-MCNC: 4.5 G/DL (ref 3.5–5.2)
ALP SERPL-CCNC: 50 U/L (ref 55–135)
ALT SERPL W/O P-5'-P-CCNC: 78 U/L (ref 10–44)
ANION GAP SERPL CALC-SCNC: 7 MMOL/L (ref 8–16)
AST SERPL-CCNC: 55 U/L (ref 10–40)
BILIRUB SERPL-MCNC: 1.2 MG/DL (ref 0.1–1)
BUN SERPL-MCNC: 12 MG/DL (ref 8–23)
CALCIUM SERPL-MCNC: 9.9 MG/DL (ref 8.7–10.5)
CHLORIDE SERPL-SCNC: 97 MMOL/L (ref 95–110)
CK SERPL-CCNC: 247 U/L (ref 20–180)
CO2 SERPL-SCNC: 31 MMOL/L (ref 23–29)
CREAT SERPL-MCNC: 0.6 MG/DL (ref 0.5–1.4)
EST. GFR  (NO RACE VARIABLE): >60 ML/MIN/1.73 M^2
GLUCOSE SERPL-MCNC: 91 MG/DL (ref 70–110)
POTASSIUM SERPL-SCNC: 3.8 MMOL/L (ref 3.5–5.1)
PROT SERPL-MCNC: 7.3 G/DL (ref 6–8.4)
SODIUM SERPL-SCNC: 135 MMOL/L (ref 136–145)

## 2023-09-25 PROCEDURE — 36415 COLL VENOUS BLD VENIPUNCTURE: CPT | Mod: PN | Performed by: INTERNAL MEDICINE

## 2023-09-25 PROCEDURE — 82977 ASSAY OF GGT: CPT | Performed by: INTERNAL MEDICINE

## 2023-09-25 PROCEDURE — 80053 COMPREHEN METABOLIC PANEL: CPT | Performed by: INTERNAL MEDICINE

## 2023-09-25 PROCEDURE — 82550 ASSAY OF CK (CPK): CPT | Performed by: INTERNAL MEDICINE

## 2023-09-26 ENCOUNTER — IMMUNIZATION (OUTPATIENT)
Dept: INTERNAL MEDICINE | Facility: CLINIC | Age: 68
End: 2023-09-26
Payer: MEDICARE

## 2023-09-26 ENCOUNTER — TELEPHONE (OUTPATIENT)
Dept: RHEUMATOLOGY | Facility: CLINIC | Age: 68
End: 2023-09-26
Payer: MEDICARE

## 2023-09-26 ENCOUNTER — OFFICE VISIT (OUTPATIENT)
Dept: OPHTHALMOLOGY | Facility: CLINIC | Age: 68
End: 2023-09-26
Payer: MEDICARE

## 2023-09-26 DIAGNOSIS — H04.123 DRY EYE SYNDROME, BILATERAL: ICD-10-CM

## 2023-09-26 DIAGNOSIS — H52.4 PRESBYOPIA OF BOTH EYES: ICD-10-CM

## 2023-09-26 DIAGNOSIS — R74.8 ELEVATED CK: Primary | ICD-10-CM

## 2023-09-26 DIAGNOSIS — D31.32 CHOROIDAL NEVUS OF LEFT EYE: Primary | ICD-10-CM

## 2023-09-26 DIAGNOSIS — R53.83 FATIGUE, UNSPECIFIED TYPE: ICD-10-CM

## 2023-09-26 DIAGNOSIS — H25.813 COMBINED FORM OF AGE-RELATED CATARACT, BOTH EYES: ICD-10-CM

## 2023-09-26 LAB — GGT SERPL-CCNC: 60 U/L (ref 8–55)

## 2023-09-26 PROCEDURE — G0008 ADMIN INFLUENZA VIRUS VAC: HCPCS | Mod: PBBFAC,PO

## 2023-09-26 PROCEDURE — 92014 PR EYE EXAM, EST PATIENT,COMPREHESV: ICD-10-PCS | Mod: S$PBB,,, | Performed by: OPTOMETRIST

## 2023-09-26 PROCEDURE — 99999PBSHW FLU VACCINE - QUADRIVALENT - ADJUVANTED: ICD-10-PCS | Mod: PBBFAC,,,

## 2023-09-26 PROCEDURE — 99999 PR PBB SHADOW E&M-EST. PATIENT-LVL III: ICD-10-PCS | Mod: PBBFAC,,, | Performed by: OPTOMETRIST

## 2023-09-26 PROCEDURE — 99999 PR PBB SHADOW E&M-EST. PATIENT-LVL III: CPT | Mod: PBBFAC,,, | Performed by: OPTOMETRIST

## 2023-09-26 PROCEDURE — 92014 COMPRE OPH EXAM EST PT 1/>: CPT | Mod: S$PBB,,, | Performed by: OPTOMETRIST

## 2023-09-26 PROCEDURE — 99999PBSHW FLU VACCINE - QUADRIVALENT - ADJUVANTED: Mod: PBBFAC,,,

## 2023-09-26 PROCEDURE — 99213 OFFICE O/P EST LOW 20 MIN: CPT | Mod: PBBFAC,PO | Performed by: OPTOMETRIST

## 2023-09-26 NOTE — PROGRESS NOTES
The GGT liver test is mildly elevated. The ck muscle enzyme is mildly elevated and stable. The AST and ALT remain elevated, bilirubin is mildly elevated. Will copy Lavonne Scroggs in Hepatology. Will recheck some additional  muscle related antibodies given the elevated CK  Shelby will schedule. CLAUDIA

## 2023-09-26 NOTE — PROGRESS NOTES
HPI     Annual Exam            Comments: New patient to [facility].  Patient reports for routine eye exam.           Comments    Vision changes since last eye exam?: None noticed      Any eye pain today: None    Other ocular symptoms: Dry eye OU    Interested in contact lens fitting today? No   Patient wears OTC readers +2.50.             Last edited by Roselyn Vo MA on 9/26/2023  8:29 AM.            Assessment /Plan     For exam results, see Encounter Report.    Choroidal nevus of left eye  -     Ambulatory referral/consult to Ophthalmology; Future; Expected date: 10/03/2023  Recommend consult with Dr Haynes for baseline imaging, unable to get nevus with [facility] in [facility] due to being in periphery.     Combined form of age-related cataract, both eyes  Not visually significant, observe.     Dry eye syndrome, bilateral  There were signs of mild dry eye on today's examination. Discussed warm compresses with lid hygiene twice daily. Recommend preservative-free artificial tears 3-4 times daily. Patient to return to clinic if no improvement in symptoms with current treatment.     Presbyopia of both eyes  Eyeglass Final Rx       Eyeglass Final Rx         Sphere Cylinder Axis Add    Right +0.25 +0.50 025 +2.50    Left +0.25   +2.50      Type: PAL    Expiration Date: 9/26/2024   PD- 64                   RTC 1 yr for dilated eye exam or sooner if any changes to vision.   Discussed above and answered questions.

## 2023-09-27 ENCOUNTER — PATIENT MESSAGE (OUTPATIENT)
Dept: HEPATOLOGY | Facility: CLINIC | Age: 68
End: 2023-09-27
Payer: MEDICARE

## 2023-09-27 ENCOUNTER — TELEPHONE (OUTPATIENT)
Dept: RHEUMATOLOGY | Facility: CLINIC | Age: 68
End: 2023-09-27
Payer: MEDICARE

## 2023-09-27 DIAGNOSIS — R74.8 ELEVATED CK: Primary | ICD-10-CM

## 2023-09-27 DIAGNOSIS — E78.5 HYPERLIPIDEMIA, UNSPECIFIED HYPERLIPIDEMIA TYPE: ICD-10-CM

## 2023-09-27 NOTE — TELEPHONE ENCOUNTER
Please add ck, aldolase, ld and ast to labs scheduled 10/16/23 Thank you  
[Time Spent: ___ minutes] : I have spent [unfilled] minutes of time on the encounter.

## 2023-10-03 ENCOUNTER — OFFICE VISIT (OUTPATIENT)
Dept: OPHTHALMOLOGY | Facility: CLINIC | Age: 68
End: 2023-10-03
Payer: MEDICARE

## 2023-10-03 DIAGNOSIS — H25.813 COMBINED FORM OF AGE-RELATED CATARACT, BOTH EYES: Primary | ICD-10-CM

## 2023-10-03 DIAGNOSIS — H18.523 CORNEAL EPITHELIAL BASEMENT MEMBRANE DYSTROPHY OF BOTH EYES: ICD-10-CM

## 2023-10-03 DIAGNOSIS — D31.32 CHOROIDAL NEVUS OF LEFT EYE: ICD-10-CM

## 2023-10-03 PROCEDURE — 92014 COMPRE OPH EXAM EST PT 1/>: CPT | Mod: S$PBB,,, | Performed by: OPHTHALMOLOGY

## 2023-10-03 PROCEDURE — 99999 PR PBB SHADOW E&M-EST. PATIENT-LVL III: ICD-10-PCS | Mod: PBBFAC,,, | Performed by: OPHTHALMOLOGY

## 2023-10-03 PROCEDURE — 99999 PR PBB SHADOW E&M-EST. PATIENT-LVL III: CPT | Mod: PBBFAC,,, | Performed by: OPHTHALMOLOGY

## 2023-10-03 PROCEDURE — 92014 PR EYE EXAM, EST PATIENT,COMPREHESV: ICD-10-PCS | Mod: S$PBB,,, | Performed by: OPHTHALMOLOGY

## 2023-10-03 PROCEDURE — 99213 OFFICE O/P EST LOW 20 MIN: CPT | Mod: PBBFAC | Performed by: OPHTHALMOLOGY

## 2023-10-03 NOTE — PROGRESS NOTES
===============================  Date today is 10/3/2023  Jill Marquez is a 67 y.o. female  Last visit [facility]: :Visit date not found   Last visit eye dept. Visit date not found    Uncorrected distance visual acuity was 20/30 in the right eye and 20/30 in the left eye.  Tonometry       Tonometry (Applanation, 8:58 AM)         Right Left    Pressure 18 18                  Not recorded       Not recorded       Not recorded       Chief Complaint   Patient presents with    Choroidal nevus of left eye     Eval per Dr. Atkins     HPI     Choroidal nevus of left eye     Additional comments: Eval per Dr. Atkins           Comments    No dm  No sx's          Last edited by Birdie Carrera on 10/3/2023  8:55 AM.      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  Check OU independently daily.    Gave my office and personal cell phone number.  ________________  10/3/2023 today  Jill Marquez    OS Nevus  OD looks great  OS 1.5 DD choroidal nevus with drusen, partial halo, oblong in appearance  No elevation    RTC 1 year  Instructed to call 24/7 for any worsening of vision or symptoms. Check OU daily.   Gave my office and cell phone number.      =============================

## 2023-10-04 RX ORDER — AMLODIPINE BESYLATE 10 MG/1
10 TABLET ORAL DAILY
Qty: 90 TABLET | Refills: 3 | Status: SHIPPED | OUTPATIENT
Start: 2023-10-04

## 2023-10-04 NOTE — TELEPHONE ENCOUNTER
No care due was identified.  [facility] Embedded Care Due Messages. Reference number: [id number].   10/04/2023 10:05:10 AM CDT

## 2023-10-04 NOTE — TELEPHONE ENCOUNTER
Refill Decision Note   Jill Marquez  is requesting a refill authorization.  Brief Assessment and Rationale for Refill:  Approve     Medication Therapy Plan:         Comments:     Note composed:11:08 AM 10/04/2023

## 2023-10-16 ENCOUNTER — PATIENT MESSAGE (OUTPATIENT)
Dept: ADMINISTRATIVE | Facility: OTHER | Age: 68
End: 2023-10-16
Payer: MEDICARE

## 2023-10-16 ENCOUNTER — LAB VISIT (OUTPATIENT)
Dept: LAB | Facility: HOSPITAL | Age: 68
End: 2023-10-16
Attending: INTERNAL MEDICINE
Payer: MEDICARE

## 2023-10-16 ENCOUNTER — OFFICE VISIT (OUTPATIENT)
Dept: RHEUMATOLOGY | Facility: CLINIC | Age: 68
End: 2023-10-16
Payer: MEDICARE

## 2023-10-16 VITALS
WEIGHT: 114 LBS | SYSTOLIC BLOOD PRESSURE: 119 MMHG | HEART RATE: 74 BPM | BODY MASS INDEX: 20.2 KG/M2 | HEIGHT: 63 IN | DIASTOLIC BLOOD PRESSURE: 76 MMHG

## 2023-10-16 DIAGNOSIS — R53.83 FATIGUE, UNSPECIFIED TYPE: ICD-10-CM

## 2023-10-16 DIAGNOSIS — M47.819 PERIPHERAL SPONDYLOARTHRITIS: ICD-10-CM

## 2023-10-16 DIAGNOSIS — R74.8 ELEVATED CK: ICD-10-CM

## 2023-10-16 DIAGNOSIS — L40.50 PSA (PSORIATIC ARTHRITIS): ICD-10-CM

## 2023-10-16 LAB
AST SERPL-CCNC: 52 U/L (ref 10–40)
CK SERPL-CCNC: 290 U/L (ref 20–180)
LDH SERPL L TO P-CCNC: 252 U/L (ref 110–260)
T4 FREE SERPL-MCNC: 0.82 NG/DL (ref 0.71–1.51)
TSH SERPL DL<=0.005 MIU/L-ACNC: 1.54 UIU/ML (ref 0.4–4)

## 2023-10-16 PROCEDURE — 83615 LACTATE (LD) (LDH) ENZYME: CPT | Performed by: INTERNAL MEDICINE

## 2023-10-16 PROCEDURE — 82085 ASSAY OF ALDOLASE: CPT | Performed by: INTERNAL MEDICINE

## 2023-10-16 PROCEDURE — 84443 ASSAY THYROID STIM HORMONE: CPT | Performed by: INTERNAL MEDICINE

## 2023-10-16 PROCEDURE — 83516 IMMUNOASSAY NONANTIBODY: CPT | Performed by: INTERNAL MEDICINE

## 2023-10-16 PROCEDURE — 99999 PR PBB SHADOW E&M-EST. PATIENT-LVL III: ICD-10-PCS | Mod: PBBFAC,,, | Performed by: INTERNAL MEDICINE

## 2023-10-16 PROCEDURE — 36415 COLL VENOUS BLD VENIPUNCTURE: CPT | Performed by: INTERNAL MEDICINE

## 2023-10-16 PROCEDURE — 99999 PR PBB SHADOW E&M-EST. PATIENT-LVL III: CPT | Mod: PBBFAC,,, | Performed by: INTERNAL MEDICINE

## 2023-10-16 PROCEDURE — 99213 OFFICE O/P EST LOW 20 MIN: CPT | Mod: PBBFAC | Performed by: INTERNAL MEDICINE

## 2023-10-16 PROCEDURE — 84450 TRANSFERASE (AST) (SGOT): CPT | Performed by: INTERNAL MEDICINE

## 2023-10-16 PROCEDURE — 84439 ASSAY OF FREE THYROXINE: CPT | Performed by: INTERNAL MEDICINE

## 2023-10-16 PROCEDURE — 99213 OFFICE O/P EST LOW 20 MIN: CPT | Mod: S$PBB,,, | Performed by: INTERNAL MEDICINE

## 2023-10-16 PROCEDURE — 99213 PR OFFICE/OUTPT VISIT, EST, LEVL III, 20-29 MIN: ICD-10-PCS | Mod: S$PBB,,, | Performed by: INTERNAL MEDICINE

## 2023-10-16 PROCEDURE — 82550 ASSAY OF CK (CPK): CPT | Performed by: INTERNAL MEDICINE

## 2023-10-16 RX ORDER — ETANERCEPT 50 MG/ML
50 SOLUTION SUBCUTANEOUS WEEKLY
Qty: 4 ML | Refills: 5 | Status: ACTIVE | OUTPATIENT
Start: 2023-10-16 | End: 2024-01-08 | Stop reason: SDUPTHER

## 2023-10-16 NOTE — PROGRESS NOTES
10/12/2023     7:13 AM   Rapid3 Question Responses and Scores   MDHAQ Score 0.1   Psychologic Score 3.3   Pain Score 1   When you awakened in the morning OVER THE LAST WEEK, did you feel stiff? No   Fatigue Score 0   Global Health Score 1   RAPID3 Score 0.78    Answers submitted by the patient for this visit:  Rheumatology Questionnaire (Submitted on 10/12/2023)  fever: No  eye redness: No  mouth sores: No  headaches: Yes  shortness of breath: No  chest pain: No  trouble swallowing: No  diarrhea: No  constipation: No  unexpected weight change: No  genital sore: No  dysuria: No  During the last 3 days, have you had a skin rash?: No  Bruises or bleeds easily: No  cough: No

## 2023-10-16 NOTE — PATIENT INSTRUCTIONS
Complete RSV  Complete new Covid  Patient should follow up with Orthopedic spine NP, Deb Nguyen, for posterior leg burning pain  Stop Diclofenac tablets as they may be causing the elevated liver enzymes. Pt can continue Diclofenac gel.  Stop Pravastatin as it may be causing repeated elevation of CPK. Follow up with PCP Dr. Smith to discuss additional options

## 2023-10-16 NOTE — PROGRESS NOTES
Patient ID:  Jill Marquez    YOB: 1955     MRN:  [id number]     Subjective:     Chief Complaint:  Disease Management     History of Present Illness:  Pt is a 68 y.o. female with Peripheral spondyloarthritis v. Pseudo-RA CPPD and Persistent left lateral hip pain with radiation thigh to left knee who presents today for f/u. LCV was 6/20/23. At that time patient's L hip/thigh/knee pain had improved with TOMASA.    Today: Patient notes her L hip pain is still resolved since getting the TOMASA. She notes today that she has a burning pain in the back of her L thigh that occurs only when she is sitting. This has been occurring over a month. She does not have any rashes or lesion at the site. This pain is daily, especially when she is driving. She uses the diclofenac gel and tablet daily with minimal relief. Additional labs ordered today to trend the patient's elevated CK.    UTD on PNA, shingles, flu, TDAP. She is interested in RSV and new Covid vaccine.    Denies hair loss, dry eyes, vision changes, dry mouth, oral/nasal ulcers, trouble swallowing, new rashes, joint swelling, morning stiffness, or GI disturbances.      Review of Systems   Review of Systems   Constitutional:  Negative for fever and unexpected weight change.   HENT:  Negative for mouth sores and trouble swallowing.    Eyes:  Negative for redness.   Respiratory:  Negative for cough and shortness of breath.    Cardiovascular:  Negative for chest pain.   Gastrointestinal:  Negative for constipation and diarrhea.   Genitourinary:  Negative for dysuria and genital sores.   Skin:  Negative for rash.   Neurological:  Positive for headaches.   Hematological:  Does not bruise/bleed easily.        Current Medications:    Current Outpatient Medications:     amLODIPine (NORVASC) 10 MG tablet, TAKE 1 TABLET (10 MG TOTAL) BY MOUTH ONCE DAILY., Disp: 90 tablet, Rfl: 3    azelastine (ASTELIN) 137 mcg (0.1 %) nasal spray, USE 1 SPRAY (137 MCG TOTAL) IN EACH  NOSTRIL 2 (TWO) TIMES DAILY., Disp: 90 mL, Rfl: 1    citalopram (CELEXA) 10 MG tablet, Take 1 tablet (10 mg total) by mouth once daily., Disp: 90 tablet, Rfl: 2    conjugated estrogens (PREMARIN) vaginal cream, Place 0.5 g vaginally twice a week., Disp: 30 g, Rfl: 11    ergocalciferol, vitamin D2, (VITAMIN D ORAL), , Disp: , Rfl:     fish oil-omega-3 fatty acids 300-1,000 mg capsule, Take 2 g by mouth once daily., Disp: , Rfl:     fluticasone propionate (FLONASE) 50 mcg/actuation nasal spray, INHALE 2 SPRAYS IN EACH NOSTRIL EVERY DAY, Disp: 48 g, Rfl: 1    gabapentin (NEURONTIN) 300 MG capsule, Take 2 capsules (600 mg total) by mouth 2 (two) times daily., Disp: 120 capsule, Rfl: 11    hydroCHLOROthiazide (HYDRODIURIL) 25 MG tablet, TAKE 1 TABLET (25 MG TOTAL) BY MOUTH ONCE DAILY., Disp: 90 tablet, Rfl: 3    pravastatin (PRAVACHOL) 20 MG tablet, Take 1 tablet (20 mg total) by mouth once daily., Disp: 30 tablet, Rfl: 6    PSYLLIUM SEED, WITH SUGAR, (METAMUCIL ORAL), Take by mouth., Disp: , Rfl:     sennosides (SENOKOT TO GO ORAL), , Disp: , Rfl:     SYNTHROID 88 mcg tablet, TAKE 1 TABLET (88 MCG TOTAL) BY MOUTH BEFORE BREAKFAST., Disp: 90 tablet, Rfl: 2    triamcinolone acetonide 0.1% (KENALOG) 0.1 % cream, Apply topically 2 (two) times daily. Use up to 2 weeks at a time., Disp: 454 g, Rfl: 1    etanercept (ENBREL SURECLICK) 50 mg/mL (1 mL), Inject 1 mL (50 mg total) into the skin once a week., Disp: 4 mL, Rfl: 5     Objective:     Vitals:    10/16/23 1234   BP: 119/76   Pulse: 74      Body mass index is 20.19 kg/m².     Physical Exam   Constitutional: She is oriented to person, place, and time. normal appearance. No distress. She does not appear ill.   HENT:   Head: Normocephalic and atraumatic.   Mouth/Throat: Mucous membranes are moist.   Pulmonary/Chest: Effort normal. No respiratory distress.   Musculoskeletal:      Right shoulder: Normal.      Left shoulder: Normal.      Right elbow: Normal.      Left elbow:  Normal.      Right wrist: Normal.      Left wrist: Normal.      Cervical back: Normal range of motion. No tenderness.      Right knee: Normal.      Left knee: Normal.   Neurological: She is alert and oriented to person, place, and time. She displays no weakness.   Skin: Capillary refill takes less than 2 seconds.       Right Side Rheumatological Exam     Examination finds the shoulder, elbow, wrist, knee, 1st PIP, 1st MCP, 2nd PIP, 2nd MCP, 3rd PIP, 3rd MCP, 4th MCP, 5th PIP and 5th MCP normal.    She has swelling of the 4th PIP    Muscle Strength (0-5 scale):  Neck Flexion:  5  Deltoid:  5  Biceps: 5/5   Triceps:  5  : 5/5     Left Side Rheumatological Exam     Examination finds the shoulder, elbow, wrist, knee, 1st PIP, 1st MCP, 2nd PIP, 3rd PIP, 3rd MCP, 4th PIP, 4th MCP, 5th PIP and 5th MCP normal.    She has swelling of the 2nd MCP    Muscle Strength (0-5 scale):  Neck Flexion:  5  Deltoid:  5  Biceps: 5/5   Triceps:  5  :  5/5              11/3/2022 3/20/2023 6/20/2023 10/16/2023   Tender (HUMPHRIES-28) 1 / 28  0 / 28  0 / 28  0 / 28    Swollen (HUMPHRIES-28) 5 / 28 5 / 28 5 / 28 5 / 28    Provider Global 5 mm 10 mm 10 mm 10 mm   Patient Global 5 mm 10 mm 5 mm 10 mm   ESR 7 mm/hr 1 mm/hr 1 mm/hr 0 mm/hr   CRP 0.3 mg/L 0.4 mg/L 0.3 mg/L 0.4 mg/L   HUMPHRIES-28 (ESR) 2.62 (Low disease activity) 0.77 (Remission) 0.7 (Remission) --   HUMPHRIES-28 (CRP) 2.31 (Remission) 1.85 (Remission) 1.75 (Remission) 1.85 (Remission)   CDAI Score 7  7  6.5  7        Latest Reference Range & Units 09/11/23 09:40   HBV DNA, Qualitative PCR Not detected  Not detected      Latest Reference Range & Units 09/11/23 09:40 09/25/23 11:33   WBC 3.90 - 12.70 K/uL 4.57    RBC 4.00 - 5.40 M/uL 4.21    Hemoglobin 12.0 - 16.0 g/dL 13.2    Hematocrit 37.0 - 48.5 % 40.2    MCV 82 - 98 fL 96    MCH 27.0 - 31.0 pg 31.4 (H)    MCHC 32.0 - 36.0 g/dL 32.8    RDW 11.5 - 14.5 % 14.6 (H)    Platelet Count 150 - 450 K/uL 187    Sodium 136 - 145 mmol/L 133 (L) 135 (L)    Potassium 3.5 - 5.1 mmol/L 3.8 3.8   Chloride 95 - 110 mmol/L 96 97   CO2 23 - 29 mmol/L 27 31 (H)   Anion Gap 8 - 16 mmol/L 10 7 (L)   BUN 8 - 23 mg/dL 16 12   Creatinine 0.5 - 1.4 mg/dL 0.7 0.6   eGFR >60 mL/min/1.73 m^2 >60.0 >60.0   Glucose 70 - 110 mg/dL 78 91   Calcium 8.7 - 10.5 mg/dL 9.1 9.9   ALP 55 - 135 U/L 46 (L) 50 (L)   PROTEIN TOTAL 6.0 - 8.4 g/dL 6.8 7.3   Albumin 3.5 - 5.2 g/dL 4.2 4.5   BILIRUBIN TOTAL 0.1 - 1.0 mg/dL 0.7 1.2 (H)   AST 10 - 40 U/L 63 (H) 55 (H)   ALT 10 - 44 U/L 69 (H) 78 (H)   GGT 8 - 55 U/L 51 60 (H)   CPK 20 - 180 U/L 239 (H) 247 (H)   (H): Data is abnormally high  (L): Data is abnormally low          Assessment:     1. PSA (psoriatic arthritis)    2. Peripheral spondyloarthritis       Peripheral spondyloarthritis v. Pseudo-RA CPPD : TJ 0 SJ 5 Pt global 10  ESR <2 CRP 0.4 HUMPHRIES 28: 0.7(remission) [id number] 1.62(remission)  CDAI 4(LDA)  DAPSA  TJ 0 SJ 5  Pt global 5 Pt pain 0  CRP 0.04     OA hands   Left trochanteric burstis/gluteus medius tendinitis resolved  S/p Right trochanteric bursitis, resolved post injection 6/14/22 resolved  Left pes planovalgus, posterior tibial tendinitis/probable tear, plantar fasciitis/heel spur, and calcific Achilles tendinitis. All resolved with orthosis  Hyperlipidemia ASCVD 9.4% (moderate to high risk)  /76  Abnormal transaminases from rosuvastatin. Resolved with change to pravastatin, but now elevated again.    Continue Enbrel SureClick 50mg sc q 7 days refilled to [facility]  Continue Diclofenac gel 1% four times daily prn fingers   RTC 3 months with standing labs including HBV DNA monitoring     Plan:      Problem List Items Addressed This Visit          Orthopedic    Peripheral spondyloarthritis    Relevant Medications    etanercept (ENBREL SURECLICK) 50 mg/mL (1 mL)    PSA (psoriatic arthritis)    Relevant Medications    etanercept (ENBREL SURECLICK) 50 mg/mL (1 mL)       Complete RSV vaccine  Complete new Covid vaccine  Continue Enbrel  SureClick 50mg sc q 7 days refilled to [facility]  Patient should follow up with Orthopedic spine NP Deb Nguyen for posterior leg burning pain  Stop Diclofenac tablets as they may be causing the elevated liver enzymes. Pt can continue diclofenac gel 1% QID.  Stop pravastatin as it may be causing repeated elevation of CPK. Follow up with PCP Dr. Smith to discuss additional options  Follow repeat labs today:  aldolase, ck, alt, ast, Myomarker 3, HMGCR, anti-IBM and TSH  RTC in 3 mo with updated labs      Isaias Sue M.D.  PGY-1  [employer] [facility]

## 2023-10-16 NOTE — PROGRESS NOTES
I have personally taken the history and examined the patient and agree with the resident,s note as stated above         Latest Reference Range & Units 09/11/23 09:40   WBC 3.90 - 12.70 K/uL 4.57   RBC 4.00 - 5.40 M/uL 4.21   Hemoglobin 12.0 - 16.0 g/dL 13.2   Hematocrit 37.0 - 48.5 % 40.2   MCV 82 - 98 fL 96   MCH 27.0 - 31.0 pg 31.4 (H)   MCHC 32.0 - 36.0 g/dL 32.8   RDW 11.5 - 14.5 % 14.6 (H)   Platelet Count 150 - 450 K/uL 187   MPV 9.2 - 12.9 fL 11.6   Gran % 38.0 - 73.0 % 38.3   Lymph % 18.0 - 48.0 % 45.1   Mono % 4.0 - 15.0 % 12.9   Eosinophil % 0.0 - 8.0 % 2.6   Basophil % 0.0 - 1.9 % 0.9   Immature Granulocytes 0.0 - 0.5 % 0.2   Gran # (ANC) 1.8 - 7.7 K/uL 1.8   Lymph # 1.0 - 4.8 K/uL 2.1   Mono # 0.3 - 1.0 K/uL 0.6   Eos # 0.0 - 0.5 K/uL 0.1   Baso # 0.00 - 0.20 K/uL 0.04   Immature Grans (Abs) 0.00 - 0.04 K/uL 0.01   nRBC 0 /100 WBC 0   Differential Method  Automated   Sed Rate 0 - 36 mm/Hr <2   Sodium 136 - 145 mmol/L 133 (L)   Potassium 3.5 - 5.1 mmol/L 3.8   Chloride 95 - 110 mmol/L 96   CO2 23 - 29 mmol/L 27   Anion Gap 8 - 16 mmol/L 10   BUN 8 - 23 mg/dL 16   Creatinine 0.5 - 1.4 mg/dL 0.7   eGFR >60 mL/min/1.73 m^2 >60.0   Glucose 70 - 110 mg/dL 78   Calcium 8.7 - 10.5 mg/dL 9.1   ALP 55 - 135 U/L 46 (L)   PROTEIN TOTAL 6.0 - 8.4 g/dL 6.8   Albumin 3.5 - 5.2 g/dL 4.2   BILIRUBIN TOTAL 0.1 - 1.0 mg/dL 0.7   AST 10 - 40 U/L 63 (H)   ALT 10 - 44 U/L 69 (H)   GGT 8 - 55 U/L 51   CRP 0.0 - 8.2 mg/L 0.4   CPK 20 - 180 U/L 239 (H)   HBV DNA, Qualitative PCR Not detected  Not detected   (H): Data is abnormally high  (L): Data is abnormally low       Latest Reference Range & Units 08/08/23 08:10   Cholesterol Total 120 - 199 mg/dL 194   HDL 40 - 75 mg/dL 64   HDL/Cholesterol Ratio 20.0 - 50.0 % 33.0   LDL Cholesterol External 63.0 - 159.0 mg/dL 112.0   Non-HDL Cholesterol mg/dL 130   Total Cholesterol/HDL Ratio 2.0 - 5.0  3.0   Triglycerides 30 - 150 mg/dL 90       Peripheral spondyloarthritis v. Pseudo-RA  CPPD : TJ  SJ  Pt global 10  ESR <2 CRP 0.4 HUMPHRIES 28:  [id number] CDAI  DAPSA  TJ SJ  Pt global 1 Pt pain 1 CRP 0.04= 2.04     OA hands   Left trochanteric burstis/gluteus medius tendinitis resolved  S/p Right trochanteric bursitis, resolved post injection 6/14/22 resolved  Left pes planovalgus, posterior tibial tendinitis/probable tear, plantar fasciitis/heel spur, and calcific Achilles tendinitis. All resolved with orthosis  Hyperlipidemia  8/8/23  /69  Abnormal transaminases from rosuvastatin, initially resolved with change to pravastatin  now recurrent  CPK elevated  AST, ALT, bilrubin elevated  Burning pain left post thigh only with sitting       *new Covid vaccine  *RSV vaccine   AST, aldolase, CK anti-IBM, hHMGCR, Myomarker 3 TSH all pending today  Stop pravastatin given the elevated CK and f/u with Dr. Smith for lipids  Elevated transaminases either liver or muscle,   Stop oral diclofenac and use only topical   Continue Enbrel SureClick 50mg sc q 7 days refilled sent to [facility] for p.a.  Continue Diclofenac gel 1% four times daily prn fingers   F/u Deb Nguyen in [facility] for left post thigh numbness  RTC 3 months with standing labs including HBV DNA monitoring

## 2023-10-17 LAB
ALDOLASE SERPL-CCNC: 6.3 U/L (ref 1.2–7.6)
HMGCR IGG SER IA-ACNC: <20 CU

## 2023-10-19 ENCOUNTER — PATIENT MESSAGE (OUTPATIENT)
Dept: UROGYNECOLOGY | Facility: CLINIC | Age: 68
End: 2023-10-19
Payer: MEDICARE

## 2023-10-24 ENCOUNTER — PATIENT MESSAGE (OUTPATIENT)
Dept: INTERNAL MEDICINE | Facility: CLINIC | Age: 68
End: 2023-10-24
Payer: MEDICARE

## 2023-10-24 RX ORDER — CITALOPRAM 10 MG/1
10 TABLET ORAL DAILY
Qty: 90 TABLET | Refills: 2 | Status: SHIPPED | OUTPATIENT
Start: 2023-10-24 | End: 2024-02-23

## 2023-10-30 ENCOUNTER — TELEPHONE (OUTPATIENT)
Dept: INTERNAL MEDICINE | Facility: CLINIC | Age: 68
End: 2023-10-30
Payer: MEDICARE

## 2023-10-30 NOTE — TELEPHONE ENCOUNTER
----- Message from Sharmaine Cash sent at 10/30/2023  8:42 AM CDT -----  Contact: [phone] Patient  Patient would like to get medical advice.  Symptoms (please be specific):   pt states she has been in direct contact with someone who tested positive for Covid. Pt is asking if she should still get her Pfizer Covid booster today at 10 am  How long have you had these symptoms: N/A  Would you like a call back, or a response through your [facility] portal?:   whichever is quicker per pt  Pharmacy name and phone # (copy from chart):   N/A  Comments:

## 2023-11-01 ENCOUNTER — OFFICE VISIT (OUTPATIENT)
Dept: URGENT CARE | Facility: CLINIC | Age: 68
End: 2023-11-01
Payer: MEDICARE

## 2023-11-01 VITALS
DIASTOLIC BLOOD PRESSURE: 75 MMHG | WEIGHT: 120 LBS | TEMPERATURE: 98 F | OXYGEN SATURATION: 98 % | RESPIRATION RATE: 14 BRPM | HEIGHT: 63 IN | BODY MASS INDEX: 21.26 KG/M2 | SYSTOLIC BLOOD PRESSURE: 127 MMHG | HEART RATE: 69 BPM

## 2023-11-01 DIAGNOSIS — M54.2 SORE NECK: ICD-10-CM

## 2023-11-01 DIAGNOSIS — R51.9 SINUS HEADACHE: ICD-10-CM

## 2023-11-01 DIAGNOSIS — R52 GENERALIZED BODY ACHES: ICD-10-CM

## 2023-11-01 DIAGNOSIS — R09.81 COUGH WITH CONGESTION OF PARANASAL SINUS: ICD-10-CM

## 2023-11-01 DIAGNOSIS — R05.8 COUGH WITH CONGESTION OF PARANASAL SINUS: ICD-10-CM

## 2023-11-01 DIAGNOSIS — Z20.822 CLOSE EXPOSURE TO COVID-19 VIRUS: Primary | ICD-10-CM

## 2023-11-01 LAB
CTP QC/QA: YES
CTP QC/QA: YES
POC MOLECULAR INFLUENZA A AGN: NEGATIVE
POC MOLECULAR INFLUENZA B AGN: NEGATIVE
SARS-COV-2 AG RESP QL IA.RAPID: NEGATIVE

## 2023-11-01 PROCEDURE — 87811 SARS CORONAVIRUS 2 ANTIGEN POCT, MANUAL READ: ICD-10-PCS | Mod: QW,S$GLB,, | Performed by: PHYSICIAN ASSISTANT

## 2023-11-01 PROCEDURE — 87502 INFLUENZA DNA AMP PROBE: CPT | Mod: QW,S$GLB,, | Performed by: PHYSICIAN ASSISTANT

## 2023-11-01 PROCEDURE — 87502 POCT INFLUENZA A/B MOLECULAR: ICD-10-PCS | Mod: QW,S$GLB,, | Performed by: PHYSICIAN ASSISTANT

## 2023-11-01 PROCEDURE — 99214 OFFICE O/P EST MOD 30 MIN: CPT | Mod: S$GLB,,, | Performed by: PHYSICIAN ASSISTANT

## 2023-11-01 PROCEDURE — 99214 PR OFFICE/OUTPT VISIT, EST, LEVL IV, 30-39 MIN: ICD-10-PCS | Mod: S$GLB,,, | Performed by: PHYSICIAN ASSISTANT

## 2023-11-01 PROCEDURE — 87811 SARS-COV-2 COVID19 W/OPTIC: CPT | Mod: QW,S$GLB,, | Performed by: PHYSICIAN ASSISTANT

## 2023-11-01 NOTE — PROGRESS NOTES
"Subjective:      Patient ID: Jill Marquez is a 68 y.o. female.    Vitals:  height is 5' 3" (1.6 m) and weight is 54.4 kg (120 lb). Her tympanic temperature is 98.2 °F (36.8 °C). Her blood pressure is 127/75 and her pulse is 69. Her respiration is 14 and oxygen saturation is 98%.     Chief Complaint: Sinus Problem    Patient present with congestion, sore throat, and neck pain that began yesterday morning. She has not taken any medicine. She has been helping her brother who is COVID positive. She also recently received her RSV and flu vaccines.    Sinus Problem  This is a new problem. The current episode started yesterday. There has been no fever. Her pain is at a severity of 5/10. Associated symptoms include congestion, coughing, ear pain (left ear), headaches, neck pain, sinus pressure and a sore throat. Pertinent negatives include no chills, diaphoresis, hoarse voice, shortness of breath, sneezing or swollen glands. Past treatments include nothing.       Constitution: Negative for chills and sweating.   HENT:  Positive for ear pain (left ear), congestion, sinus pressure and sore throat.    Neck: Positive for neck pain.   Respiratory:  Positive for cough. Negative for shortness of breath.    Allergic/Immunologic: Negative for sneezing.   Neurological:  Positive for headaches.      Objective:     Vitals:    11/01/23 0937   BP: 127/75   BP Location: Right arm   Patient Position: Sitting   BP Method: Large (Automatic)   Pulse: 69   Resp: 14   Temp: 98.2 °F (36.8 °C)   TempSrc: Tympanic   SpO2: 98%   Weight: 54.4 kg (120 lb)   Height: 5' 3" (1.6 m)       Physical Exam   Constitutional: She is oriented to person, place, and time. She appears well-developed. She is cooperative.  Non-toxic appearance. She appears ill. No distress. awake  HENT:   Head: Normocephalic and atraumatic.   Ears:   Right Ear: Hearing, tympanic membrane, external ear and ear canal normal. No middle ear effusion.   Left Ear: Hearing, tympanic " membrane, external ear and ear canal normal.  No middle ear effusion.   Nose: Rhinorrhea and congestion present.   Mouth/Throat: Mucous membranes are moist. No oropharyngeal exudate or posterior oropharyngeal erythema. Oropharynx is clear.   Eyes: Right eye visual fields normal and left eye visual fields normal. Conjunctivae and EOM are normal. Pupils are equal, round, and reactive to light. Right eye exhibits discharge. Left eye exhibits discharge. No scleral icterus. Right eye exhibits no nystagmus. Left eye exhibits no nystagmus. Extraocular movement intact vision grossly intact gaze aligned appropriately periorbital hyperpigmentation   Neck: Trachea normal. Neck supple. No Brudzinski's sign and no Kernig's sign noted. No neck rigidity present.   Cardiovascular: Normal rate, regular rhythm, normal heart sounds and normal pulses.   Pulmonary/Chest: Effort normal and breath sounds normal. No accessory muscle usage or stridor. No respiratory distress. She has no wheezes. She has no rhonchi. She has no rales. She exhibits no tenderness.   Abdominal: Bowel sounds are normal. She exhibits no distension. Soft. There is no guarding.   Musculoskeletal:      Right lower leg: No edema.      Left lower leg: No edema.   Lymphadenopathy:     She has no cervical adenopathy.   Neurological: She is alert, oriented to person, place, and time and at baseline.   Skin: Skin is warm, dry, not diaphoretic and no rash. Capillary refill takes less than 2 seconds.   Psychiatric: She experiences Normal attention and Normal perception. Her speech is normal and behavior is normal. Mood, memory, affect, judgment and thought content normal. Cognition normal  Nursing note and vitals reviewed.      Assessment:     1. Close exposure to COVID-19 virus    2. Generalized body aches    3. Cough with congestion of paranasal sinus    4. Sore neck    5. Sinus headache      Results for orders placed or performed in visit on 11/01/23   SARS Coronavirus 2  Antigen, POCT Manual Read   Result Value Ref Range    SARS Coronavirus 2 Antigen Negative Negative     Acceptable Yes    POCT Influenza A/B Molecular   Result Value Ref Range    POC Molecular Influenza A Ag Negative Negative, Not Reported    POC Molecular Influenza B Ag Negative Negative, Not Reported     Acceptable Yes      *Note: Due to a large number of results and/or encounters for the requested time period, some results have not been displayed. A complete set of results can be found in Results Review.       Plan:       Close exposure to COVID-19 virus  -     SARS Coronavirus 2 Antigen, POCT Manual Read    Generalized body aches  -     POCT Influenza A/B Molecular    Cough with congestion of paranasal sinus    Sore neck    Sinus headache          Medical Decision Making:   History:   Old Records Summarized: records from clinic visits.  Initial Assessment:   Vital signs stable  Clinical Tests:   Lab Tests: Ordered and Reviewed       Patient Instructions   You have tested negative for COVID on our Binax test. As a follow up the recommendation is if you have symptoms within the first 7 days to retest within 48 hours. If you have NO SYMPTOMS then you should test every 48 hours two more times. You can use a purchased covid test at home.    VIRAL URI: OVER THE COUNTER RECOMMENDATIONS/SUGGESTIONS--if needed      SORE THROAT:    You may gargle with hot salt water 4 times a day for the next 2 days and then you may also gargle diluted hydrogen peroxide once to twice daily to alleviate some of your throat discomfort.  Drink plenty of fluids, recommend warm tea with honey.     YOU MAY USE OVER-THE-COUNTER CEPACOL FOR SOOTHING OF YOUR THROAT.  You may wish to avoid spicy food, citrus fruits, and red sauces- as this may irritate the throat more.    You can also take a daily anti-histamine such as Zyrtec, Claritin, Xyzal, OR Allegra-IN DAYTIME; NON DROWSY) AND/OR Benadryl- AT NIGHT; DROWSY) to  help with runny nose/sneezing/sore throat/cough. Remember to switch antihistamines every 3 months, if taken daily.     COUGH:      Make sure you are getting rest and drinking lots of fluids.    You can use cough drops (recommend ricola lemon mint honey) or Cepacol to soothe your sore throat.     You can also take Elderberry and/or Emergen-C (vitamin C) to help boost your immune system.     You may use any of the over-the-counter cough suppressant combination medications such as: NyQuil, DayQuil, Mucinex (guaifenesin), Robitussin, Delsym (Bromfed), TheraFlu  Note:   -pseudoephedrine (behind the counter) is a decongestant. Pseudoephedrine 30 mg up to 240 mg/day. *BE AWARE- It can raise your blood pressure and give you palpitations.  -Mucinex (guaifenisin) is to break up mucus up to 2400mg/day to loosen any mucous.   -Mucinex DM pill has a cough suppressant that can be sedating. It can be used at night to stop the tickle at the back of your throat.  -Mucinex D (it has guaifenesin and a high dose of pseudoephedrine) which could be helpful in the mornings to help decongest.  -Nyquil at night is beneficial to help you get some rest, however it is sedating and it does have an antihistamine and tylenol.  -- you may use over-the-counter Coricidin HBP in the event that you have a history of high blood pressure    Honey is a natural cough suppressant that can be used.    If your symptoms do not improve, you should return to this clinic. If your symptoms worsen, go to the emergency room.         CONGESTION:  Make sure to stay well hydrated.    Use Nasal Saline to mechanically move any post nasal drip from your eustachian tube or from the back of your throat.    You may insert a whole garlic cloves into your nostrils and leave for 10-15 minutes. When you remove them, mucus will be pulled down. This may burn as garlic is strong.  Repeat as often as needed and able to tolerate.  Please do not use garlic if you have an allergy to  garlic.      PAIN/DISCOMFORT:  Tylenol up to 4,000 mg a day is safe for short periods and can be used for headache, body aches, pain, and fever. However in high doses and prolonged use it can cause liver irritation.    Ibuprofen is a non-steroidal anti-inflammatory that can be used for headache, body aches, pain, and fever. However it can also cause stomach irritation if over used.      If you have been discharged from the clinic prior to your point of care test results being completed, please make sure to check your MyChart account.  If there is a change in treatment, we will communicate with you through here.  If your test is positive, and medications are ordered, these will be sent to your preferred pharmacy.   If your test is negative, no further steps needed. If you do not hear from us or have questions, please call the clinic.      - You must understand that you have received an Urgent Care treatment only and that you may be released before all of your medical problems are known or treated.   - You, the patient, will arrange for follow up care as instructed with your primary care provider or recommended specialist.   - If your condition worsens or fails to improve we recommend that you receive another evaluation at the ER immediately or contact your PCP to discuss your concerns, or return here.   - Please do not drive or make any important decisions for 24 hours if you have received any pain medications, sedatives or mood altering drugs during your visit.    Disclaimer: This document was drafted with the use of a voice recognition device and is likely to have sound alike errors.

## 2023-11-01 NOTE — PATIENT INSTRUCTIONS
You have tested negative for COVID on our Binax test. As a follow up the recommendation is if you have symptoms within the first 7 days to retest within 48 hours. If you have NO SYMPTOMS then you should test every 48 hours two more times. You can use a purchased covid test at home.    VIRAL URI: OVER THE COUNTER RECOMMENDATIONS/SUGGESTIONS--if needed      SORE THROAT:    You may gargle with hot salt water 4 times a day for the next 2 days and then you may also gargle diluted hydrogen peroxide once to twice daily to alleviate some of your throat discomfort.  Drink plenty of fluids, recommend warm tea with honey.     YOU MAY USE OVER-THE-COUNTER CEPACOL FOR SOOTHING OF YOUR THROAT.  You may wish to avoid spicy food, citrus fruits, and red sauces- as this may irritate the throat more.    You can also take a daily anti-histamine such as Zyrtec, Claritin, Xyzal, OR Allegra-IN DAYTIME; NON DROWSY) AND/OR Benadryl- AT NIGHT; DROWSY) to help with runny nose/sneezing/sore throat/cough. Remember to switch antihistamines every 3 months, if taken daily.     COUGH:      Make sure you are getting rest and drinking lots of fluids.    You can use cough drops (recommend ricola lemon mint honey) or Cepacol to soothe your sore throat.     You can also take Elderberry and/or Emergen-C (vitamin C) to help boost your immune system.     You may use any of the over-the-counter cough suppressant combination medications such as: NyQuil, DayQuil, Mucinex (guaifenesin), Robitussin, Delsym (Bromfed), TheraFlu  Note:   -pseudoephedrine (behind the counter) is a decongestant. Pseudoephedrine 30 mg up to 240 mg/day. *BE AWARE- It can raise your blood pressure and give you palpitations.  -Mucinex (guaifenisin) is to break up mucus up to 2400mg/day to loosen any mucous.   -Mucinex DM pill has a cough suppressant that can be sedating. It can be used at night to stop the tickle at the back of your throat.  -Mucinex D (it has guaifenesin and a high  dose of pseudoephedrine) which could be helpful in the mornings to help decongest.  -Nyquil at night is beneficial to help you get some rest, however it is sedating and it does have an antihistamine and tylenol.  -- you may use over-the-counter Coricidin HBP in the event that you have a history of high blood pressure    Honey is a natural cough suppressant that can be used.    If your symptoms do not improve, you should return to this clinic. If your symptoms worsen, go to the emergency room.         CONGESTION:  Make sure to stay well hydrated.    Use Nasal Saline to mechanically move any post nasal drip from your eustachian tube or from the back of your throat.    You may insert a whole garlic cloves into your nostrils and leave for 10-15 minutes. When you remove them, mucus will be pulled down. This may burn as garlic is strong.  Repeat as often as needed and able to tolerate.  Please do not use garlic if you have an allergy to garlic.      PAIN/DISCOMFORT:  Tylenol up to 4,000 mg a day is safe for short periods and can be used for headache, body aches, pain, and fever. However in high doses and prolonged use it can cause liver irritation.    Ibuprofen is a non-steroidal anti-inflammatory that can be used for headache, body aches, pain, and fever. However it can also cause stomach irritation if over used.      If you have been discharged from the clinic prior to your point of care test results being completed, please make sure to check [facility].  If there is a change in treatment, we will communicate with you through here.  If your test is positive, and medications are ordered, these will be sent to your preferred pharmacy.   If your test is negative, no further steps needed. If you do not hear from us or have questions, please call the clinic.      - You must understand that you have received an Urgent Care treatment only and that you may be released before all of your medical problems are known or  treated.   - You, the patient, will arrange for follow up care as instructed with your primary care provider or recommended specialist.   - If your condition worsens or fails to improve we recommend that you receive another evaluation at the ER immediately or contact your PCP to discuss your concerns, or return here.   - Please do not drive or make any important decisions for 24 hours if you have received any pain medications, sedatives or mood altering drugs during your visit.    Disclaimer: This document was drafted with the use of a voice recognition device and is likely to have sound alike errors.

## 2023-11-02 ENCOUNTER — TELEPHONE (OUTPATIENT)
Dept: URGENT CARE | Facility: CLINIC | Age: 68
End: 2023-11-02
Payer: MEDICARE

## 2023-11-02 LAB
ANTI-IBM ANTIBODY (ANTI-CN1A): <20 UNITS
ANTI-PM/SCL AB: <20 UNITS
ANTI-SS-A 52 KD AB, IGG: <20 UNITS
EJ AB SER QL: NEGATIVE
ENA JO1 AB SER IA-ACNC: <20 UNITS
ENA SM+RNP AB SER IA-ACNC: <20 UNITS
FIBRILLARIN (U3 RNP): NEGATIVE
KU AB SER QL: NEGATIVE
MDA-5 (P140): <20 UNITS
MI2 AB SER QL: NEGATIVE
NXP-2 (P140): <20 UNITS
OJ AB SER QL: NEGATIVE
PL12 AB SER QL: NEGATIVE
PL7 AB SER QL: NEGATIVE
SRP AB SERPL QL: NEGATIVE
TIF1 GAMMA (P155/140): <20 UNITS
U2 SNRNP: NEGATIVE

## 2023-11-02 NOTE — TELEPHONE ENCOUNTER
Explained to patient that I do not been think she would benefit from a inhaler as she did not have any wheezing.   Chest tightness could be due to costochondritis from coughing.  Reiterated the conservative treatment for URIs, also found in AVS from yesterday.  Patient verbalized agreement/ understanding to plan.  All questions answered and addressed.    Results for orders placed or performed in visit on 11/01/23   SARS Coronavirus 2 Antigen, POCT Manual Read   Result Value Ref Range    SARS Coronavirus 2 Antigen Negative Negative     Acceptable Yes    POCT Influenza A/B Molecular   Result Value Ref Range    POC Molecular Influenza A Ag Negative Negative, Not Reported    POC Molecular Influenza B Ag Negative Negative, Not Reported     Acceptable Yes      *Note: Due to a large number of results and/or encounters for the requested time period, some results have not been displayed. A complete set of results can be found in Results Review.

## 2023-11-02 NOTE — TELEPHONE ENCOUNTER
Re: Patient Call Back    Spoke with the patient. She requested an inhaler because she has been coughing and feels some tightness in her chest. She took another at home COVID test today and it was negative.

## 2023-11-02 NOTE — TELEPHONE ENCOUNTER
----- Message from Celina Jefferson sent at 11/2/2023 10:59 AM CDT -----  Regarding: Need advice  Contact: pt [phone]  Patient is having a congestion and a cough. She is asking if an inhaler can be prescribed. She ask for a call back.    Pharmacy  [facility] - [facility]  [address] [address]  [address] [address]  [place] [address]  Phone: [phone] Fax: [phone]

## 2023-11-04 ENCOUNTER — TELEPHONE (OUTPATIENT)
Dept: URGENT CARE | Facility: CLINIC | Age: 68
End: 2023-11-04
Payer: MEDICARE

## 2023-11-04 NOTE — TELEPHONE ENCOUNTER
Called patient with a courtesy call after recent visit at urgent care, Spoke with patient she stated that she is not doing too much better, She is still experiencing some congestion, I informed patient that if she needs to be seen again she is more then welcome to come in and see a physician. Pt state she is scheduled to see and ENT doctor this coming Tuesday. She states she will hold out until then

## 2023-11-06 ENCOUNTER — OFFICE VISIT (OUTPATIENT)
Dept: ORTHOPEDICS | Facility: CLINIC | Age: 68
End: 2023-11-06
Payer: MEDICARE

## 2023-11-06 ENCOUNTER — PATIENT MESSAGE (OUTPATIENT)
Dept: ORTHOPEDICS | Facility: CLINIC | Age: 68
End: 2023-11-06

## 2023-11-06 DIAGNOSIS — M54.16 LUMBAR RADICULOPATHY: Primary | ICD-10-CM

## 2023-11-06 PROCEDURE — 99442 PR PHYSICIAN TELEPHONE EVALUATION 11-20 MIN: ICD-10-PCS | Mod: 95,,, | Performed by: REGISTERED NURSE

## 2023-11-06 PROCEDURE — 99442 PR PHYSICIAN TELEPHONE EVALUATION 11-20 MIN: CPT | Mod: 95,,, | Performed by: REGISTERED NURSE

## 2023-11-06 NOTE — PROGRESS NOTES
Established Patient - Audio Only Telehealth Visit     The patient location is: home  The chief complaint leading to consultation is: TOMASA f/u  Visit type: Virtual visit with audio only (telephone)  Total time spent with patient: 15min     The reason for the audio only service rather than synchronous audio and video virtual visit was related to technical difficulties or patient preference/necessity.     Each patient to whom I provide medical services by telemedicine is:  (1) informed of the relationship between the physician and patient and the respective role of any other health care provider with respect to management of the patient; and (2) notified that they may decline to receive medical services by telemedicine and may withdraw from such care at any time. Patient verbally consented to receive this service via voice-only telephone call.    DATE: 10/30/2023  PATIENT: Jill Marquez    Attending Physician: João Bruno M.D.    HISTORY:  Jill Marquez is a 68 y.o. female who returns to me today for follow up after a left L3/4 & L4/5 TF TOMASA on 5/26/23 which gave 90% relief in her pain.  She was last seen by me 4/26/2023.  Today she is doing well but notes 7/10 posterior thigh burning while sitting.    The Patient denies myelopathic symptoms such as handwriting changes or difficulty with buttons/coins/keys. Denies perineal paresthesias, bowel/bladder dysfunction.    PMH/PSH/FamHx/SocHx:  Unchanged from prior visit    ROS:  REVIEW OF SYSTEMS:  Constitution: Negative. Negative for chills, fever and night sweats.   HENT: Negative for congestion and headaches.    Eyes: Negative for blurred vision, left vision loss and right vision loss.   Cardiovascular: Negative for chest pain and syncope.   Respiratory: Negative for cough and shortness of breath.    Endocrine: Negative for polydipsia, polyphagia and polyuria.   Hematologic/Lymphatic: Negative for bleeding problem. Does not bruise/bleed easily.   Skin: Negative  for dry skin, itching and rash.   Musculoskeletal: Negative for falls and muscle weakness.   Gastrointestinal: Negative for abdominal pain and bowel incontinence.   Allergic/Immunologic: Negative for hives and persistent infections.  Genitourinary: Negative for urinary retention/incontinence and nocturia.   Neurological: negative for disturbances in coordination, no myelopathic symptoms such as handwriting changes or difficulty with buttons, coins, keys or small objects. No loss of balance and seizures.   Psychiatric/Behavioral: Negative for depression. The patient does not have insomnia.   Denies perineal paresthesias, bowel or bladder incontinence    EXAM:  There were no vitals taken for this visit.  Stable.     IMAGING:    Today I personally re- reviewed AP, Lat and Flex/Ex  upright L-spine that demonstrate thoracolumbar scoliosis with convexity to the left. Moderate DDD throughout.      Lumbar MRI shows moderate spinal stenosis at L3-4 and moderate neural foraminal narrowing at L3-4 through L5-S1.     There is no height or weight on file to calculate BMI.    Hemoglobin A1C   Date Value Ref Range Status   06/29/2021 5.1 4.0 - 5.6 % Final     Comment:     ADA Screening Guidelines:  5.7-6.4%  Consistent with prediabetes  >or=6.5%  Consistent with diabetes    High levels of fetal hemoglobin interfere with the HbA1C  assay. Heterozygous hemoglobin variants (HbS, HgC, etc)do  not significantly interfere with this assay.   However, presence of multiple variants may affect accuracy.     04/30/2020 5.2 4.0 - 5.6 % Final     Comment:     ADA Screening Guidelines:  5.7-6.4%  Consistent with prediabetes  >or=6.5%  Consistent with diabetes  High levels of fetal hemoglobin interfere with the HbA1C  assay. Heterozygous hemoglobin variants (HbS, HgC, etc)do  not significantly interfere with this assay.   However, presence of multiple variants may affect accuracy.     08/13/2019 5.1 4.0 - 5.6 % Final     Comment:     ADA  Screening Guidelines:  5.7-6.4%  Consistent with prediabetes  >or=6.5%  Consistent with diabetes  High levels of fetal hemoglobin interfere with the HbA1C  assay. Heterozygous hemoglobin variants (HbS, HgC, etc)do  not significantly interfere with this assay.   However, presence of multiple variants may affect accuracy.           ASSESSMENT/PLAN:    There are no diagnoses linked to this encounter.    Car L5/S1 TF TOMASA ordered, she may follow up 2 weeks after if her pain persists.      This service was not originating from a related E/M service provided within the previous 7 days nor will  to an E/M service or procedure within the next 24 hours or my soonest available appointment.  Prevailing standard of care was able to be met in this audio-only visit.

## 2023-11-07 ENCOUNTER — OFFICE VISIT (OUTPATIENT)
Dept: OTOLARYNGOLOGY | Facility: CLINIC | Age: 68
End: 2023-11-07
Payer: MEDICARE

## 2023-11-07 ENCOUNTER — LAB VISIT (OUTPATIENT)
Dept: LAB | Facility: HOSPITAL | Age: 68
End: 2023-11-07
Attending: NURSE PRACTITIONER
Payer: MEDICARE

## 2023-11-07 ENCOUNTER — PATIENT MESSAGE (OUTPATIENT)
Dept: PHYSICAL MEDICINE AND REHAB | Facility: CLINIC | Age: 68
End: 2023-11-07
Payer: MEDICARE

## 2023-11-07 DIAGNOSIS — M54.2 NECK PAIN: Primary | ICD-10-CM

## 2023-11-07 DIAGNOSIS — H92.02 OTALGIA, LEFT: ICD-10-CM

## 2023-11-07 DIAGNOSIS — E78.5 HYPERLIPIDEMIA, UNSPECIFIED HYPERLIPIDEMIA TYPE: ICD-10-CM

## 2023-11-07 DIAGNOSIS — H91.90 SUBJECTIVE HEARING LOSS: ICD-10-CM

## 2023-11-07 DIAGNOSIS — R74.01 ALT (SGPT) LEVEL RAISED: ICD-10-CM

## 2023-11-07 DIAGNOSIS — R74.8 ELEVATED CK: ICD-10-CM

## 2023-11-07 DIAGNOSIS — L40.50 PSORIATIC ARTHRITIS: ICD-10-CM

## 2023-11-07 DIAGNOSIS — M54.16 LUMBAR RADICULOPATHY: Primary | ICD-10-CM

## 2023-11-07 LAB
ALBUMIN SERPL BCP-MCNC: 3.9 G/DL (ref 3.5–5.2)
ALP SERPL-CCNC: 52 U/L (ref 55–135)
ALT SERPL W/O P-5'-P-CCNC: 27 U/L (ref 10–44)
AST SERPL-CCNC: 28 U/L (ref 10–40)
BILIRUB DIRECT SERPL-MCNC: 0.2 MG/DL (ref 0.1–0.3)
BILIRUB SERPL-MCNC: 0.6 MG/DL (ref 0.1–1)
CK SERPL-CCNC: 169 U/L (ref 20–180)
PROT SERPL-MCNC: 6.8 G/DL (ref 6–8.4)

## 2023-11-07 PROCEDURE — 82550 ASSAY OF CK (CPK): CPT | Performed by: NURSE PRACTITIONER

## 2023-11-07 PROCEDURE — 80076 HEPATIC FUNCTION PANEL: CPT | Performed by: NURSE PRACTITIONER

## 2023-11-07 PROCEDURE — 99213 OFFICE O/P EST LOW 20 MIN: CPT | Mod: PBBFAC | Performed by: ORTHOPAEDIC SURGERY

## 2023-11-07 PROCEDURE — 99999 PR PBB SHADOW E&M-EST. PATIENT-LVL III: CPT | Mod: PBBFAC,,, | Performed by: ORTHOPAEDIC SURGERY

## 2023-11-07 PROCEDURE — 99213 PR OFFICE/OUTPT VISIT, EST, LEVL III, 20-29 MIN: ICD-10-PCS | Mod: S$PBB,,, | Performed by: ORTHOPAEDIC SURGERY

## 2023-11-07 PROCEDURE — 99213 OFFICE O/P EST LOW 20 MIN: CPT | Mod: S$PBB,,, | Performed by: ORTHOPAEDIC SURGERY

## 2023-11-07 PROCEDURE — 36415 COLL VENOUS BLD VENIPUNCTURE: CPT | Mod: PN | Performed by: NURSE PRACTITIONER

## 2023-11-07 PROCEDURE — 99999 PR PBB SHADOW E&M-EST. PATIENT-LVL III: ICD-10-PCS | Mod: PBBFAC,,, | Performed by: ORTHOPAEDIC SURGERY

## 2023-11-07 NOTE — PROGRESS NOTES
"Subjective:      Patient ID: Jill Marquez is a 68 y.o. female.    Chief Complaint: Otalgia (Pt c/o Left Ear pain since March thought possibly jaw and utilized a split without relief. Pt went to  and got ear cleaned but c/o muffled feeling causing pain and headaches )    Patient is a 68 year old female seen today for evaluation of her left ear.  She says that she has had pain and pressure in her left ear since March.  She also feels that her hearing in that ear is muffled.  She has a "cracking" noise in her left ear when she moves her head, she does have known arthritis as well as pain in her neck at the base of her skull.  She has known psoriatic arthritis.  She tried a splint as she felt it could be TMJ, but has not made her symptoms better.  She is on Astelin and Flonase daily for nasal congestion.  She has had increased nasal symptoms over the last week.          Review of Systems   Constitutional: Negative.    Eyes: Negative.    Cardiovascular: Negative.    Gastrointestinal: Negative.    Endocrine: Negative.    Genitourinary: Negative.    Musculoskeletal:  Positive for back pain.   Skin: Negative.    Hematological: Negative.    Psychiatric/Behavioral:  The patient is nervous/anxious.        Objective:       Physical Exam  Constitutional:       General: She is not in acute distress.     Appearance: She is well-developed.   HENT:      Head: Normocephalic and atraumatic.      Right Ear: Tympanic membrane, ear canal and external ear normal.      Left Ear: Tympanic membrane, ear canal and external ear normal.      Nose: Nose normal. No nasal deformity, septal deviation, mucosal edema or rhinorrhea.      Right Sinus: No maxillary sinus tenderness or frontal sinus tenderness.      Left Sinus: No maxillary sinus tenderness or frontal sinus tenderness.      Mouth/Throat:      Mouth: Mucous membranes are not pale and not dry.      Dentition: No dental caries.      Pharynx: Uvula midline. No oropharyngeal exudate or " posterior oropharyngeal erythema.   Eyes:      General: Lids are normal. No scleral icterus.     Extraocular Movements:      Right eye: Normal extraocular motion and no nystagmus.      Left eye: Normal extraocular motion and no nystagmus.      Conjunctiva/sclera: Conjunctivae normal.      Right eye: Right conjunctiva is not injected. No chemosis.     Left eye: Left conjunctiva is not injected. No chemosis.     Pupils: Pupils are equal, round, and reactive to light.   Neck:      Thyroid: No thyroid mass or thyromegaly.      Trachea: Trachea and phonation normal. No tracheal tenderness or tracheal deviation.   Pulmonary:      Effort: Pulmonary effort is normal. No respiratory distress.      Breath sounds: No stridor.   Abdominal:      General: There is no distension.   Lymphadenopathy:      Head:      Right side of head: No submental, submandibular, preauricular, posterior auricular or occipital adenopathy.      Left side of head: No submental, submandibular, preauricular, posterior auricular or occipital adenopathy.      Cervical: No cervical adenopathy.   Skin:     General: Skin is warm and dry.      Findings: No erythema or rash.   Neurological:      Mental Status: She is alert and oriented to person, place, and time.      Cranial Nerves: No cranial nerve deficit.   Psychiatric:         Behavior: Behavior normal.         Assessment:       1. Neck pain    2. Otalgia, left    3. Psoriatic arthritis    4. Subjective hearing loss        Plan:     Neck pain  -     Ambulatory referral/consult to Physical Medicine Rehab; Future; Expected date: 11/14/2023    Otalgia, left  -     Ambulatory referral/consult to Physical Medicine Rehab; Future; Expected date: 11/14/2023    Psoriatic arthritis  -     Ambulatory referral/consult to Physical Medicine Rehab; Future; Expected date: 11/14/2023    Subjective hearing loss    Will schedule audiogram at her convenience and will review when complete.    Ear exam today is normal.  She  has known psoriatic arthritis and sound in ear with moving head.  Discussed referred pain, referral placed for Dr. Millard.

## 2023-11-08 ENCOUNTER — PATIENT MESSAGE (OUTPATIENT)
Dept: HEPATOLOGY | Facility: CLINIC | Age: 68
End: 2023-11-08
Payer: MEDICARE

## 2023-11-08 RX ORDER — ATORVASTATIN CALCIUM 10 MG/1
10 TABLET, FILM COATED ORAL DAILY
Qty: 30 TABLET | Refills: 3 | Status: SHIPPED | OUTPATIENT
Start: 2023-11-08 | End: 2024-02-19

## 2023-11-08 NOTE — TELEPHONE ENCOUNTER
Thanks for the message.  I would be willing to try Lipitor at the lowest dose and maybe only starting 3 days a week for a month or 2 and seeing how the labs look.  Let me know if that would be okay with you.  I also sent a message to the patient, waiting for her reply.  I can happily place the prescription and orders if that is easiest.  Just wanted to let the patient know and get her thoughts and then the group.

## 2023-11-08 NOTE — TELEPHONE ENCOUNTER
Her liver enzymes and CK normalized after stopping Pravastatin. Can you please let me/her know what you recommend for her HLD treatment?     If you use another statin I can check labs after starting.     Thanks !

## 2023-11-08 NOTE — TELEPHONE ENCOUNTER
I sent a Rx for Lipitor for her to take MWF until we can do labs. I placed order for LFT, CK and Total Chol unless you suggest something else or additional. I can schedule and send to you unless you prefer to monitor a different way. Just let me know and I can finalize with the pt.

## 2023-11-27 ENCOUNTER — OFFICE VISIT (OUTPATIENT)
Dept: PHYSICAL MEDICINE AND REHAB | Facility: CLINIC | Age: 68
End: 2023-11-27
Payer: MEDICARE

## 2023-11-27 VITALS
BODY MASS INDEX: 21.26 KG/M2 | WEIGHT: 120 LBS | HEIGHT: 63 IN | DIASTOLIC BLOOD PRESSURE: 80 MMHG | HEART RATE: 89 BPM | SYSTOLIC BLOOD PRESSURE: 129 MMHG | RESPIRATION RATE: 14 BRPM

## 2023-11-27 DIAGNOSIS — M70.72 BURSITIS OF OTHER BURSA OF BOTH HIPS: ICD-10-CM

## 2023-11-27 DIAGNOSIS — M53.82 MUSCULOSKELETAL DISORDER OF THE SUBOCCIPITAL: ICD-10-CM

## 2023-11-27 DIAGNOSIS — M70.71 BURSITIS OF OTHER BURSA OF BOTH HIPS: ICD-10-CM

## 2023-11-27 DIAGNOSIS — H92.02 OTALGIA, LEFT: ICD-10-CM

## 2023-11-27 DIAGNOSIS — M79.18 MYOFASCIAL PAIN: Primary | ICD-10-CM

## 2023-11-27 DIAGNOSIS — G57.00 PIRIFORMIS SYNDROME, UNSPECIFIED LATERALITY: ICD-10-CM

## 2023-11-27 DIAGNOSIS — M46.00 SPINAL ENTHESOPATHY: ICD-10-CM

## 2023-11-27 PROCEDURE — 99999 PR PBB SHADOW E&M-EST. PATIENT-LVL IV: CPT | Mod: PBBFAC,,, | Performed by: PHYSICAL MEDICINE & REHABILITATION

## 2023-11-27 PROCEDURE — 99214 OFFICE O/P EST MOD 30 MIN: CPT | Mod: 25,S$PBB,, | Performed by: PHYSICAL MEDICINE & REHABILITATION

## 2023-11-27 PROCEDURE — 99214 PR OFFICE/OUTPT VISIT, EST, LEVL IV, 30-39 MIN: ICD-10-PCS | Mod: 25,S$PBB,, | Performed by: PHYSICAL MEDICINE & REHABILITATION

## 2023-11-27 PROCEDURE — 20553 PR INJECT TRIGGER POINTS, > 3: ICD-10-PCS | Mod: S$PBB,,, | Performed by: PHYSICAL MEDICINE & REHABILITATION

## 2023-11-27 PROCEDURE — 20553 NJX 1/MLT TRIGGER POINTS 3/>: CPT | Mod: PBBFAC | Performed by: PHYSICAL MEDICINE & REHABILITATION

## 2023-11-27 PROCEDURE — 99214 OFFICE O/P EST MOD 30 MIN: CPT | Mod: PBBFAC,25 | Performed by: PHYSICAL MEDICINE & REHABILITATION

## 2023-11-27 PROCEDURE — 20553 NJX 1/MLT TRIGGER POINTS 3/>: CPT | Mod: S$PBB,,, | Performed by: PHYSICAL MEDICINE & REHABILITATION

## 2023-11-27 PROCEDURE — 99999PBSHW PR PBB SHADOW TECHNICAL ONLY FILED TO HB: Mod: PBBFAC,,,

## 2023-11-27 PROCEDURE — 99999PBSHW PR PBB SHADOW TECHNICAL ONLY FILED TO HB: ICD-10-PCS | Mod: PBBFAC,,,

## 2023-11-27 PROCEDURE — 99999 PR PBB SHADOW E&M-EST. PATIENT-LVL IV: ICD-10-PCS | Mod: PBBFAC,,, | Performed by: PHYSICAL MEDICINE & REHABILITATION

## 2023-11-27 RX ORDER — METHYLPREDNISOLONE ACETATE 40 MG/ML
40 INJECTION, SUSPENSION INTRA-ARTICULAR; INTRALESIONAL; INTRAMUSCULAR; SOFT TISSUE
Status: COMPLETED | OUTPATIENT
Start: 2023-11-27 | End: 2023-11-27

## 2023-11-27 RX ORDER — TIZANIDINE 2 MG/1
2 TABLET ORAL NIGHTLY PRN
Qty: 30 TABLET | Refills: 1 | Status: SHIPPED | OUTPATIENT
Start: 2023-11-27 | End: 2024-01-25

## 2023-11-27 RX ADMIN — METHYLPREDNISOLONE ACETATE 40 MG: 40 INJECTION, SUSPENSION INTRA-ARTICULAR; INTRALESIONAL; INTRAMUSCULAR; INTRASYNOVIAL; SOFT TISSUE at 09:11

## 2023-11-27 NOTE — PROGRESS NOTES
PM&R NEW PATIENT HISTORY & PHYSICAL :    Referring Physician: Dr Taylor    Chief Complaint   Patient presents with    Neck Pain    Muscle Pain       HPI: This is a 68 y.o.  female being seen in clinic today for evaluation of achy pain and tightness in her neck, base of skull, and upper back.  She has achy pain and tightness in her gluteus muscles.  She has been under stress/grief after the loss of her  to cancer.  History obtained from patient    Functional History:  Walking: Not limited  Transfers: Independent  Assistive devices: No  Power mobility: No  Falls: None     Needs help with:  Nothing - all ADLS normal    Cooking   Cleaning  Bathing   Dressing   Toileting     Past family, medical, social, and surgical history reviewed in chart    Review of Systems:     General- denies lethargy, weight change, fever, chills  Head/neck- denies swallowing difficulties  ENT- denies hearing changes  Cardiovascular-denies chest pain  Pulmonary- denies shortness of breath  GI- denies constipation or bowel incontinence  - denies bladder incontinence  Skin- denies wounds or rashes  Musculoskeletal- denies weakness, +pain  Neurologic- denies numbness and tingling  Psychiatric- denies depressive or psychotic features, denies anxiety  Lymphatic-denies swelling  Endocrine- denies hypoglycemic symptoms/DM history  All other pertinent systems negative     Physical Examination:  General: Well developed, well nourished female, NAD  HEENT:NCAT EOMI bilaterally   Pulmonary:Normal respirations    Spinal Examination: CERVICAL  Active ROM is within normal limits.  Inspection: No deformity of spinal alignment.  Palpation: No vertebral tenderness to percussion.  Very tight and ttp at splenius capitus and trapezius-local twitch at trapezius  Spurling test: neg    Spinal Examination: LUMBAR or THORACIC  Active ROM is within normal limits.  Inspection: No deformity of spinal alignment.    Ttp at bilateral piriformis with reproduction of  symptoms, local twitch at right   Musculoskeletal Tests:    Elbow compression (ulnar): neg  Tinels at wrist: neg  Phalen: neg    Bilateral Upper and Lower Extremities:  Pulses are 2+ at radial, bilaterally.  Shoulder/Elbow/Wrist/Hand ROM wnl except limited at bilateral hands, arthritic deformities noted  Hip/Knee/Ankle ROM   Bilateral Extremities show normal capillary refill.  No signs of cyanosis, rubor, edema, skin changes, or dysvascular changes of appendages.  Nails appear intact.    Neurological Exam:  Cranial Nerves:  II-XII grossly intact    Manual Muscle Testing: (Motor 5=normal)    RIGHT Upper extremity: Shoulder abduction 5/5, Biceps 5/5, Triceps 5/5, Wrist extension 5/5, Abductor pollicis brevis 4/5, Ulnar hand intrinsics 4/5,  LEFT Upper extremity: Shoulder abduction 5/5, Biceps 5/5, Triceps 5/5, Wrist extension 5/5, Abductor pollicis brevis 4/5, Ulnar hand intrinsics 4/5,  No focal atrophy is noted of either upper extremity.    Bilateral Reflexes:1+bic tric   Choe's response is absent bilaterally.    Sensation: tested to light touch  - intact in arms  Gait: Narrow base and good arm swing.      IMPRESSION/PLAN: This is a 68 y.o.  female with myofascial pain    1. TPIs today  2. Cont Ice/heat  3. Handouts on neck/shoulder, piriformis exercise provided  4. If not improving, will send formal PT order    Vanessa Millard M.D.  Physical Medicine and Rehab      PROCEDURE NOTE    Diagnosis: Myofascial pain  Procedure: trigger point injections to bilateral splenius capitus, trapezius, piriformis    Risks and benefits of procedure explained to patient including risks of infection, bleeding, pain, or damage to surrounding tissues. All questions answered. Informed consent obtained prior to proceeding. Areas marked and prepped in sterile fashion. Using a 27g 1.25inch needle, a 10 cc mixture of depo medrol 1cc (40mg) and 1% lidocaine was injected evenly into the above mentioned muscles. None to minimal bleeding  noted. ER and post injection instructions given.    Vanessa Millard M.D.

## 2023-11-28 ENCOUNTER — CLINICAL SUPPORT (OUTPATIENT)
Dept: AUDIOLOGY | Facility: CLINIC | Age: 68
End: 2023-11-28
Payer: MEDICARE

## 2023-11-28 ENCOUNTER — TELEPHONE (OUTPATIENT)
Dept: OTOLARYNGOLOGY | Facility: CLINIC | Age: 68
End: 2023-11-28
Payer: MEDICARE

## 2023-11-28 DIAGNOSIS — H69.92 ETD (EUSTACHIAN TUBE DYSFUNCTION), LEFT: Primary | ICD-10-CM

## 2023-11-28 DIAGNOSIS — H69.92 ETD (EUSTACHIAN TUBE DYSFUNCTION), LEFT: ICD-10-CM

## 2023-11-28 PROCEDURE — 92552 PURE TONE AUDIOMETRY AIR: CPT | Mod: PBBFAC,PO

## 2023-11-28 PROCEDURE — 99211 OFF/OP EST MAY X REQ PHY/QHP: CPT | Mod: PBBFAC,PO

## 2023-11-28 PROCEDURE — 92567 TYMPANOMETRY: CPT | Mod: PBBFAC,PO

## 2023-11-28 PROCEDURE — 99999 PR PBB SHADOW E&M-EST. PATIENT-LVL I: ICD-10-PCS | Mod: PBBFAC,,,

## 2023-11-28 PROCEDURE — 99999PBSHW PR PBB SHADOW TECHNICAL ONLY FILED TO HB: Mod: PBBFAC,,,

## 2023-11-28 PROCEDURE — 92555 SPEECH THRESHOLD AUDIOMETRY: CPT | Mod: PBBFAC,PO

## 2023-11-28 PROCEDURE — 99999PBSHW PR PBB SHADOW TECHNICAL ONLY FILED TO HB: ICD-10-PCS | Mod: PBBFAC,,,

## 2023-11-28 PROCEDURE — 99999 PR PBB SHADOW E&M-EST. PATIENT-LVL I: CPT | Mod: PBBFAC,,,

## 2023-11-28 NOTE — PROGRESS NOTES
Referring Provider: MD Brandon    Jill Marquez was seen 11/28/2023 for an audiological evaluation. Previous audiological evaluation on 8/22/2017 revealed overall normal hearing sensitivity in both ears with the exception of a mild hearing loss at 250 Hz in the left ear. Patient complains of fullness and muffled hearing in the left ear that started in June of this year. Patient noted that if she pushes on her tragus it improves her symptoms. There is a family history of age-related hearing loss. Patient denied tinnitus, dizziness, and history of noise exposure.     Otoscopy revealed clear canals with visualization of the tympanic membrane in both ears. Tympanograms were Type A for the right ear and Type As for the left ear. Audiometry revealed normal hearing sensitivity in both ears. Speech Reception Thresholds were  10 dBHL for the right ear and 10 dBHL for the left ear.     Patient was counseled on the above findings.    Recommendations:  ENT Review.   Repeat audiological evaluation as needed.

## 2023-12-19 ENCOUNTER — OFFICE VISIT (OUTPATIENT)
Dept: PHYSICAL MEDICINE AND REHAB | Facility: CLINIC | Age: 68
End: 2023-12-19
Payer: MEDICARE

## 2023-12-19 VITALS
BODY MASS INDEX: 21.26 KG/M2 | SYSTOLIC BLOOD PRESSURE: 127 MMHG | RESPIRATION RATE: 14 BRPM | WEIGHT: 120 LBS | HEART RATE: 79 BPM | DIASTOLIC BLOOD PRESSURE: 79 MMHG | HEIGHT: 63 IN

## 2023-12-19 DIAGNOSIS — M53.82 MUSCULOSKELETAL DISORDER OF THE SUBOCCIPITAL: Primary | ICD-10-CM

## 2023-12-19 DIAGNOSIS — M46.00 SPINAL ENTHESOPATHY: ICD-10-CM

## 2023-12-19 PROCEDURE — 99213 OFFICE O/P EST LOW 20 MIN: CPT | Mod: PBBFAC | Performed by: PHYSICAL MEDICINE & REHABILITATION

## 2023-12-19 PROCEDURE — 99999 PR PBB SHADOW E&M-EST. PATIENT-LVL III: CPT | Mod: PBBFAC,,, | Performed by: PHYSICAL MEDICINE & REHABILITATION

## 2023-12-19 PROCEDURE — 20553 NJX 1/MLT TRIGGER POINTS 3/>: CPT | Mod: PBBFAC | Performed by: PHYSICAL MEDICINE & REHABILITATION

## 2023-12-19 PROCEDURE — 99214 PR OFFICE/OUTPT VISIT, EST, LEVL IV, 30-39 MIN: ICD-10-PCS | Mod: 25,S$PBB,, | Performed by: PHYSICAL MEDICINE & REHABILITATION

## 2023-12-19 PROCEDURE — 99999 PR PBB SHADOW E&M-EST. PATIENT-LVL III: ICD-10-PCS | Mod: PBBFAC,,, | Performed by: PHYSICAL MEDICINE & REHABILITATION

## 2023-12-19 PROCEDURE — 20553 NJX 1/MLT TRIGGER POINTS 3/>: CPT | Mod: S$PBB,,, | Performed by: PHYSICAL MEDICINE & REHABILITATION

## 2023-12-19 PROCEDURE — 20553 PR INJECT TRIGGER POINTS, > 3: ICD-10-PCS | Mod: S$PBB,,, | Performed by: PHYSICAL MEDICINE & REHABILITATION

## 2023-12-19 PROCEDURE — 99214 OFFICE O/P EST MOD 30 MIN: CPT | Mod: 25,S$PBB,, | Performed by: PHYSICAL MEDICINE & REHABILITATION

## 2023-12-19 NOTE — PROGRESS NOTES
PM&R CLINIC NOTE    Chief Complaint   Patient presents with    Muscle Pain       HPI: This is a 68 y.o.  female being seen in clinic today for repeat TPIs due to achy pain and tightness in her neck, base of skull, and upper back.  She reports improvement of pain after last injections and stretching/exercise.She still has achy pain and tightness in her gluteus muscles.  History obtained from patient    Functional History:  Walking: Not limited  Transfers: Independent  Assistive devices: No  Power mobility: No  Falls: None     Needs help with:  Nothing - all ADLS normal    Past family, medical, social, and surgical history reviewed in chart    Review of Systems:     General- denies lethargy, weight change, fever, chills  Head/neck- denies swallowing difficulties  ENT- denies hearing changes  Cardiovascular-denies chest pain  Pulmonary- denies shortness of breath  GI- denies constipation or bowel incontinence  - denies bladder incontinence  Skin- denies wounds or rashes  Musculoskeletal- denies weakness, +pain  Neurologic- denies numbness and tingling  Psychiatric- denies depressive or psychotic features, denies anxiety  Lymphatic-denies swelling  Endocrine- denies hypoglycemic symptoms/DM history  All other pertinent systems negative     Physical Examination:  General: Well developed, well nourished female, NAD  HEENT:NCAT EOMI bilaterally   Pulmonary:Normal respirations    Spinal Examination: CERVICAL  Active ROM is within normal limits.  Inspection: No deformity of spinal alignment.  Palpation:  tight and ttp at splenius capitus and trapezius-local twitch at trapezius on right only    Spinal Examination: LUMBAR or THORACIC  Active ROM is within normal limits.  Inspection: No deformity of spinal alignment.    Ttp at bilateral piriformis, local twitch at right   Musculoskeletal Tests:    Bilateral Upper and Lower Extremities:  Pulses are 2+ at radial, bilaterally.  Shoulder/Elbow/Wrist/Hand ROM wnl except limited at  bilateral hands, arthritic deformities noted  Hip/Knee/Ankle ROM wnl  Bilateral Extremities show normal capillary refill.  No signs of cyanosis, rubor, edema, skin changes, or dysvascular changes of appendages.  Nails appear intact.    Neurological Exam:  Cranial Nerves:  II-XII grossly intact    Manual Muscle Testing: (Motor 5=normal)    RIGHT Upper extremity: Shoulder abduction 5/5, Biceps 5/5, Triceps 5/5, Wrist extension 5/5, Abductor pollicis brevis 4/5, Ulnar hand intrinsics 4/5,  LEFT Upper extremity: Shoulder abduction 5/5, Biceps 5/5, Triceps 5/5, Wrist extension 5/5, Abductor pollicis brevis 4/5, Ulnar hand intrinsics 4/5,  No focal atrophy is noted of either upper extremity.    Bilateral Reflexes:  Choe's response is absent bilaterally.    Sensation: tested to light touch  - intact in arms  Gait: Narrow base and good arm swing.      IMPRESSION/PLAN: This is a 68 y.o.  female with myofascial pain    1. TPIs again today  2. Cont Ice/heat  3. cont neck/shoulder, piriformis exercise   4. If not improving, will send formal PT order    Vanessa Millard M.D.  Physical Medicine and Rehab      PROCEDURE NOTE    Diagnosis: Myofascial pain  Procedure: trigger point injections to bilateral splenius capitus, trapezius, piriformis    Risks and benefits of procedure explained to patient including risks of infection, bleeding, pain, or damage to surrounding tissues. All questions answered. Informed consent obtained prior to proceeding. Areas marked and prepped in sterile fashion. Using a 27g 1.25inch needle, a 10 cc of 1% lidocaine was injected evenly into the above mentioned muscles. None to minimal bleeding noted. ER and post injection instructions given.    Vanessa Millard M.D.

## 2024-01-03 ENCOUNTER — PATIENT MESSAGE (OUTPATIENT)
Dept: RHEUMATOLOGY | Facility: CLINIC | Age: 69
End: 2024-01-03
Payer: MEDICARE

## 2024-01-08 ENCOUNTER — PATIENT MESSAGE (OUTPATIENT)
Dept: RHEUMATOLOGY | Facility: CLINIC | Age: 69
End: 2024-01-08
Payer: MEDICARE

## 2024-01-08 DIAGNOSIS — L40.50 PSA (PSORIATIC ARTHRITIS): ICD-10-CM

## 2024-01-08 DIAGNOSIS — M47.819 PERIPHERAL SPONDYLOARTHRITIS: ICD-10-CM

## 2024-01-08 RX ORDER — ETANERCEPT 50 MG/ML
50 SOLUTION SUBCUTANEOUS
Qty: 4 ML | Refills: 2 | Status: ACTIVE | COMMUNITY
Start: 2024-01-08 | End: 2024-02-19

## 2024-01-12 ENCOUNTER — PATIENT MESSAGE (OUTPATIENT)
Dept: INTERNAL MEDICINE | Facility: CLINIC | Age: 69
End: 2024-01-12
Payer: MEDICARE

## 2024-01-12 RX ORDER — FLUTICASONE PROPIONATE 50 MCG
SPRAY, SUSPENSION (ML) NASAL
Qty: 48 G | Refills: 1 | Status: SHIPPED | OUTPATIENT
Start: 2024-01-12

## 2024-01-12 NOTE — TELEPHONE ENCOUNTER
No care due was identified.  [facility] Embedded Care Due Messages. Reference number: [id number].   1/12/2024 8:20:32 AM CST

## 2024-01-25 ENCOUNTER — PATIENT MESSAGE (OUTPATIENT)
Dept: RHEUMATOLOGY | Facility: CLINIC | Age: 69
End: 2024-01-25
Payer: MEDICARE

## 2024-01-25 DIAGNOSIS — M79.18 MYOFASCIAL PAIN: ICD-10-CM

## 2024-01-25 DIAGNOSIS — G57.00 PIRIFORMIS SYNDROME, UNSPECIFIED LATERALITY: ICD-10-CM

## 2024-01-25 RX ORDER — TIZANIDINE 2 MG/1
2 TABLET ORAL NIGHTLY PRN
Qty: 30 TABLET | Refills: 1 | Status: SHIPPED | OUTPATIENT
Start: 2024-01-25 | End: 2024-03-21

## 2024-01-30 ENCOUNTER — OFFICE VISIT (OUTPATIENT)
Dept: PHYSICAL MEDICINE AND REHAB | Facility: CLINIC | Age: 69
End: 2024-01-30
Payer: MEDICARE

## 2024-01-30 VITALS
HEART RATE: 93 BPM | RESPIRATION RATE: 13 BRPM | WEIGHT: 120 LBS | HEIGHT: 63 IN | BODY MASS INDEX: 21.26 KG/M2 | SYSTOLIC BLOOD PRESSURE: 142 MMHG | DIASTOLIC BLOOD PRESSURE: 88 MMHG

## 2024-01-30 DIAGNOSIS — M53.82 MUSCULOSKELETAL DISORDER OF THE SUBOCCIPITAL: ICD-10-CM

## 2024-01-30 DIAGNOSIS — M46.00 SPINAL ENTHESOPATHY: ICD-10-CM

## 2024-01-30 DIAGNOSIS — M70.72 BURSITIS OF OTHER BURSA OF LEFT HIP: ICD-10-CM

## 2024-01-30 DIAGNOSIS — M79.18 DIFFUSE MYOFASCIAL PAIN SYNDROME: Primary | ICD-10-CM

## 2024-01-30 PROCEDURE — 99214 OFFICE O/P EST MOD 30 MIN: CPT | Mod: 25,S$PBB,, | Performed by: PHYSICAL MEDICINE & REHABILITATION

## 2024-01-30 PROCEDURE — 20553 NJX 1/MLT TRIGGER POINTS 3/>: CPT | Mod: S$PBB,,, | Performed by: PHYSICAL MEDICINE & REHABILITATION

## 2024-01-30 PROCEDURE — 99999 PR PBB SHADOW E&M-EST. PATIENT-LVL III: CPT | Mod: PBBFAC,,, | Performed by: PHYSICAL MEDICINE & REHABILITATION

## 2024-01-30 PROCEDURE — 99213 OFFICE O/P EST LOW 20 MIN: CPT | Mod: PBBFAC | Performed by: PHYSICAL MEDICINE & REHABILITATION

## 2024-01-30 PROCEDURE — 20553 NJX 1/MLT TRIGGER POINTS 3/>: CPT | Mod: PBBFAC | Performed by: PHYSICAL MEDICINE & REHABILITATION

## 2024-01-30 NOTE — PROGRESS NOTES
Arthritis: Care Instructions Your Care Instructions Arthritis, also called osteoarthritis, is a breakdown of the cartilage that cushions your joints. When the cartilage wears down, your bones rub against each other. This causes pain and stiffness. Many people have some arthritis as they age. Arthritis most often affects the joints of the spine, hands, hips, knees, or feet. You can take simple measures to protect your joints, ease your pain, and help you stay active. Follow-up care is a key part of your treatment and safety. Be sure to make and go to all appointments, and call your doctor if you are having problems. It's also a good idea to know your test results and keep a list of the medicines you take. How can you care for yourself at home? · Stay at a healthy weight. Being overweight puts extra strain on your joints. · Talk to your doctor or physical therapist about exercises that will help ease joint pain. ? Stretch. You may enjoy gentle forms of yoga to help keep your joints and muscles flexible. ? Walk instead of jog. Other types of exercise that are less stressful on the joints include riding a bicycle, swimming, ambrocio chi, or water exercise. ? Lift weights. Strong muscles help reduce stress on your joints. Stronger thigh muscles, for example, take some of the stress off of the knees and hips. Learn the right way to lift weights so you do not make joint pain worse. · Take your medicines exactly as prescribed. Call your doctor if you think you are having a problem with your medicine. · Take pain medicines exactly as directed. ? If the doctor gave you a prescription medicine for pain, take it as prescribed. ? If you are not taking a prescription pain medicine, ask your doctor if you can take an over-the-counter medicine. · Use a cane, crutch, walker, or another device if you need help to get around. These can help rest your joints.  You also can use other things to PM&R CLINIC NOTE    Chief Complaint   Patient presents with    Muscle Pain       HPI: This is a 68 y.o.  female being seen in clinic today for repeat TPIs due to achy pain and tightness in her muscles. She had a good holiday with her daughter's family in [facility].  The flight caused increased left piriformis pain and her head/neck tightness has gradually returned.  History obtained from patient    Functional History:  Walking: Not limited  Transfers: Independent  Assistive devices: No  Power mobility: No  Falls: None     Needs help with:  Nothing - all ADLS normal    Past family, medical, social, and surgical history reviewed in chart    Review of Systems:     General- denies lethargy, weight change, fever, chills  Head/neck- denies swallowing difficulties  ENT- denies hearing changes  Cardiovascular-denies chest pain  Pulmonary- denies shortness of breath  GI- denies constipation or bowel incontinence  - denies bladder incontinence  Skin- denies wounds or rashes  Musculoskeletal- denies weakness, +pain  Neurologic- denies numbness and tingling  Psychiatric- denies depressive or psychotic features, denies anxiety  Lymphatic-denies swelling  Endocrine- denies hypoglycemic symptoms/DM history  All other pertinent systems negative     Physical Examination:  General: Well developed, well nourished female, NAD  HEENT:NCAT EOMI bilaterally   Pulmonary:Normal respirations    Spinal Examination: CERVICAL  Active ROM is within normal limits.  Inspection: No deformity of spinal alignment.  Palpation:  tight and ttp at splenius capitus and trapezius-local twitch at trapezius on right only    Spinal Examination: LUMBAR or THORACIC  Active ROM is within normal limits.  Inspection: No deformity of spinal alignment.    Ttp at bilateral piriformis, local twitch at right   Musculoskeletal Tests:    Bilateral Upper and Lower Extremities:  Pulses are 2+ at radial, bilaterally.  Shoulder/Elbow/Wrist/Hand ROM wnl except limited at  bilateral hands, arthritic deformities noted  Hip/Knee/Ankle ROM wnl  Bilateral Extremities show normal capillary refill.  No signs of cyanosis, rubor, edema, skin changes, or dysvascular changes of appendages.  Nails appear intact.    Neurological Exam:  Cranial Nerves:  II-XII grossly intact    Manual Muscle Testing: (Motor 5=normal)    RIGHT Upper extremity: Shoulder abduction 5/5, Biceps 5/5, Triceps 5/5, Wrist extension 5/5, Abductor pollicis brevis 4/5, Ulnar hand intrinsics 4/5,  LEFT Upper extremity: Shoulder abduction 5/5, Biceps 5/5, Triceps 5/5, Wrist extension 5/5, Abductor pollicis brevis 4/5, Ulnar hand intrinsics 4/5,  No focal atrophy is noted of either upper extremity.    Bilateral Reflexes:  Choe's response is absent bilaterally.    Sensation: tested to light touch  - intact in arms  Gait: Narrow base and good arm swing.      IMPRESSION/PLAN: This is a 68 y.o.  female with myofascial pain    1. TPIs again   2. Cont Ice/heat  3. Cont neck/shoulder, piriformis exercises   4. If not improving, will send formal PT order    Vanessa Millard M.D.  Physical Medicine and Rehab      PROCEDURE NOTE    Diagnosis: Myofascial pain  Procedure: trigger point injections to bilateral splenius capitus, trapezius, rhomboid, left piriformis    Risks and benefits of procedure explained to patient including risks of infection, bleeding, pain, or damage to surrounding tissues. All questions answered. Informed consent obtained prior to proceeding. Areas marked and prepped in sterile fashion. Using a 27g 1.25inch needle, a 10 cc of 1% lidocaine was injected evenly into the above mentioned muscles. None to minimal bleeding noted. ER and post injection instructions given.    Vanessa Millard M.D.         make life easier, such as a higher toilet seat and padded handles on kitchen utensils. · Do not sit in low chairs, which can make it hard to get up. · Put heat or cold on your sore joints as needed. Use whichever helps you most. You also can take turns with hot and cold packs. ? Apply heat 2 or 3 times a day for 20 to 30 minutesusing a heating pad, hot shower, or hot packto relieve pain and stiffness. ? Put ice or a cold pack on your sore joint for 10 to 20 minutes at a time. Put a thin cloth between the ice and your skin. When should you call for help? Call your doctor now or seek immediate medical care if: 
  · You have sudden swelling, warmth, or pain in any joint.  
  · You have joint pain and a fever or rash.  
  · You have such bad pain that you cannot use a joint.  
 Watch closely for changes in your health, and be sure to contact your doctor if: 
  · You have mild joint symptoms that continue even with more than 6 weeks of care at home.  
  · You have stomach pain or other problems with your medicine. Where can you learn more? Go to http://rose-velvet.info/. Enter X788 in the search box to learn more about \"Arthritis: Care Instructions. \" Current as of: Sheree 10, 2018 Content Version: 11.9 © 3717-0847 [employer]. Care instructions adapted under license by [employer] (which disclaims liability or warranty for this information). If you have questions about a medical condition or this instruction, always ask your healthcare professional. [place] [address] any warranty or liability for your use of this information.

## 2024-02-01 DIAGNOSIS — L40.50 PSA (PSORIATIC ARTHRITIS): Primary | ICD-10-CM

## 2024-02-01 RX ORDER — ETANERCEPT 50 MG/ML
50 SOLUTION SUBCUTANEOUS WEEKLY
Qty: 2 ML | Refills: 0 | COMMUNITY
Start: 2024-02-01 | End: 2024-03-18 | Stop reason: SDUPTHER

## 2024-02-01 NOTE — PROGRESS NOTES
Enbrel is apparently not on formulary.  I gave her 2 samples.  She would just taken her last shot this week.  This will be enough for her next 2 infusions.  She is scheduled to see Dr. Campos in 3 weeks.

## 2024-02-06 ENCOUNTER — PATIENT MESSAGE (OUTPATIENT)
Dept: UROGYNECOLOGY | Facility: CLINIC | Age: 69
End: 2024-02-06
Payer: MEDICARE

## 2024-02-16 ENCOUNTER — LAB VISIT (OUTPATIENT)
Dept: LAB | Facility: HOSPITAL | Age: 69
End: 2024-02-16
Attending: INTERNAL MEDICINE
Payer: MEDICARE

## 2024-02-16 DIAGNOSIS — R74.8 ELEVATED CK: ICD-10-CM

## 2024-02-16 DIAGNOSIS — Z51.81 ENCOUNTER FOR MONITORING OF ETANERCEPT THERAPY: ICD-10-CM

## 2024-02-16 DIAGNOSIS — M47.819 SPONDYLARTHRITIS: ICD-10-CM

## 2024-02-16 DIAGNOSIS — E78.5 HYPERLIPIDEMIA, UNSPECIFIED HYPERLIPIDEMIA TYPE: ICD-10-CM

## 2024-02-16 DIAGNOSIS — E03.9 HYPOTHYROIDISM, UNSPECIFIED TYPE: ICD-10-CM

## 2024-02-16 DIAGNOSIS — Z79.620 ENCOUNTER FOR MONITORING OF ETANERCEPT THERAPY: ICD-10-CM

## 2024-02-16 LAB
ALBUMIN SERPL BCP-MCNC: 4.2 G/DL (ref 3.5–5.2)
ALBUMIN SERPL BCP-MCNC: 4.2 G/DL (ref 3.5–5.2)
ALP SERPL-CCNC: 50 U/L (ref 55–135)
ALP SERPL-CCNC: 50 U/L (ref 55–135)
ALT SERPL W/O P-5'-P-CCNC: 25 U/L (ref 10–44)
ALT SERPL W/O P-5'-P-CCNC: 25 U/L (ref 10–44)
ANION GAP SERPL CALC-SCNC: 11 MMOL/L (ref 8–16)
AST SERPL-CCNC: 30 U/L (ref 10–40)
AST SERPL-CCNC: 30 U/L (ref 10–40)
BASOPHILS # BLD AUTO: 0.04 K/UL (ref 0–0.2)
BASOPHILS NFR BLD: 0.7 % (ref 0–1.9)
BILIRUB DIRECT SERPL-MCNC: 0.3 MG/DL (ref 0.1–0.3)
BILIRUB SERPL-MCNC: 1 MG/DL (ref 0.1–1)
BILIRUB SERPL-MCNC: 1 MG/DL (ref 0.1–1)
BUN SERPL-MCNC: 14 MG/DL (ref 8–23)
CALCIUM SERPL-MCNC: 10.2 MG/DL (ref 8.7–10.5)
CHLORIDE SERPL-SCNC: 97 MMOL/L (ref 95–110)
CHOLEST SERPL-MCNC: 251 MG/DL (ref 120–199)
CK SERPL-CCNC: 220 U/L (ref 20–180)
CO2 SERPL-SCNC: 29 MMOL/L (ref 23–29)
CREAT SERPL-MCNC: 0.7 MG/DL (ref 0.5–1.4)
CRP SERPL-MCNC: 0.4 MG/L (ref 0–8.2)
DIFFERENTIAL METHOD BLD: ABNORMAL
EOSINOPHIL # BLD AUTO: 0.1 K/UL (ref 0–0.5)
EOSINOPHIL NFR BLD: 1.2 % (ref 0–8)
ERYTHROCYTE [DISTWIDTH] IN BLOOD BY AUTOMATED COUNT: 13.4 % (ref 11.5–14.5)
ERYTHROCYTE [SEDIMENTATION RATE] IN BLOOD BY PHOTOMETRIC METHOD: <2 MM/HR (ref 0–36)
EST. GFR  (NO RACE VARIABLE): >60 ML/MIN/1.73 M^2
GLUCOSE SERPL-MCNC: 93 MG/DL (ref 70–110)
HCT VFR BLD AUTO: 42.4 % (ref 37–48.5)
HGB BLD-MCNC: 13.7 G/DL (ref 12–16)
IMM GRANULOCYTES # BLD AUTO: 0.01 K/UL (ref 0–0.04)
IMM GRANULOCYTES NFR BLD AUTO: 0.2 % (ref 0–0.5)
LYMPHOCYTES # BLD AUTO: 2.2 K/UL (ref 1–4.8)
LYMPHOCYTES NFR BLD: 38.6 % (ref 18–48)
MCH RBC QN AUTO: 33.1 PG (ref 27–31)
MCHC RBC AUTO-ENTMCNC: 32.3 G/DL (ref 32–36)
MCV RBC AUTO: 102 FL (ref 82–98)
MONOCYTES # BLD AUTO: 0.6 K/UL (ref 0.3–1)
MONOCYTES NFR BLD: 10.8 % (ref 4–15)
NEUTROPHILS # BLD AUTO: 2.8 K/UL (ref 1.8–7.7)
NEUTROPHILS NFR BLD: 48.5 % (ref 38–73)
NRBC BLD-RTO: 0 /100 WBC
PLATELET # BLD AUTO: 229 K/UL (ref 150–450)
PMV BLD AUTO: 11.6 FL (ref 9.2–12.9)
POTASSIUM SERPL-SCNC: 4.4 MMOL/L (ref 3.5–5.1)
PROT SERPL-MCNC: 7.2 G/DL (ref 6–8.4)
PROT SERPL-MCNC: 7.2 G/DL (ref 6–8.4)
RBC # BLD AUTO: 4.14 M/UL (ref 4–5.4)
SODIUM SERPL-SCNC: 137 MMOL/L (ref 136–145)
TSH SERPL DL<=0.005 MIU/L-ACNC: 2.85 UIU/ML (ref 0.4–4)
WBC # BLD AUTO: 5.67 K/UL (ref 3.9–12.7)

## 2024-02-16 PROCEDURE — 84443 ASSAY THYROID STIM HORMONE: CPT | Performed by: INTERNAL MEDICINE

## 2024-02-16 PROCEDURE — 86140 C-REACTIVE PROTEIN: CPT | Performed by: INTERNAL MEDICINE

## 2024-02-16 PROCEDURE — 87516 HEPATITIS B DNA AMP PROBE: CPT | Performed by: INTERNAL MEDICINE

## 2024-02-16 PROCEDURE — 80053 COMPREHEN METABOLIC PANEL: CPT | Performed by: INTERNAL MEDICINE

## 2024-02-16 PROCEDURE — 82550 ASSAY OF CK (CPK): CPT | Performed by: INTERNAL MEDICINE

## 2024-02-16 PROCEDURE — 85652 RBC SED RATE AUTOMATED: CPT | Performed by: INTERNAL MEDICINE

## 2024-02-16 PROCEDURE — 82465 ASSAY BLD/SERUM CHOLESTEROL: CPT | Performed by: INTERNAL MEDICINE

## 2024-02-16 PROCEDURE — 36415 COLL VENOUS BLD VENIPUNCTURE: CPT | Mod: PN | Performed by: INTERNAL MEDICINE

## 2024-02-16 PROCEDURE — 85025 COMPLETE CBC W/AUTO DIFF WBC: CPT | Performed by: INTERNAL MEDICINE

## 2024-02-16 PROCEDURE — 80076 HEPATIC FUNCTION PANEL: CPT | Performed by: INTERNAL MEDICINE

## 2024-02-17 ENCOUNTER — PATIENT MESSAGE (OUTPATIENT)
Dept: INTERNAL MEDICINE | Facility: CLINIC | Age: 69
End: 2024-02-17
Payer: MEDICARE

## 2024-02-19 ENCOUNTER — OFFICE VISIT (OUTPATIENT)
Dept: RHEUMATOLOGY | Facility: CLINIC | Age: 69
End: 2024-02-19
Payer: MEDICARE

## 2024-02-19 VITALS
HEIGHT: 63 IN | WEIGHT: 117.81 LBS | SYSTOLIC BLOOD PRESSURE: 142 MMHG | DIASTOLIC BLOOD PRESSURE: 85 MMHG | BODY MASS INDEX: 20.88 KG/M2 | HEART RATE: 75 BPM

## 2024-02-19 DIAGNOSIS — L40.50 PSA (PSORIATIC ARTHRITIS): ICD-10-CM

## 2024-02-19 LAB — HBV DNA SERPL QL NAA+PROBE: NOT DETECTED

## 2024-02-19 PROCEDURE — 99999 PR PBB SHADOW E&M-EST. PATIENT-LVL III: CPT | Mod: PBBFAC,,, | Performed by: INTERNAL MEDICINE

## 2024-02-19 PROCEDURE — 99213 OFFICE O/P EST LOW 20 MIN: CPT | Mod: S$PBB,,, | Performed by: INTERNAL MEDICINE

## 2024-02-19 PROCEDURE — 99213 OFFICE O/P EST LOW 20 MIN: CPT | Mod: PBBFAC | Performed by: INTERNAL MEDICINE

## 2024-02-19 ASSESSMENT — ROUTINE ASSESSMENT OF PATIENT INDEX DATA (RAPID3)
AM STIFFNESS SCORE: 0, NO
PSYCHOLOGICAL DISTRESS SCORE: 3.3
MDHAQ FUNCTION SCORE: 0.1
FATIGUE SCORE: 0.5
PATIENT GLOBAL ASSESSMENT SCORE: 0.5
PAIN SCORE: 1
TOTAL RAPID3 SCORE: 0.61

## 2024-02-19 NOTE — PROGRESS NOTES
Patient ID:  Jill Marquez    YOB: 1955     MRN:  [id number]     Subjective:     Chief Complaint:  Disease Management     History of Present Illness:  Pt is a 68 y.o. female with Peripheral spondyloarthritis v. Pseudo-RA CPPD and Persistent left lateral hip pain with radiation thigh to left knee who presents today for f/u. LCV was 10/16/23. At that time patient was experiencing burning pain in the back of her L thigh.     Today: Patient reports doing well overall. She will get intermittent, moderate pain in the base of her thumbs BL, worse with cold weather. Endorses taking Enbrel as prescribed. She denies concerns or side effects. She has been getting trigger point injections q 6 weeks in her cervical paraspinals and trap muscles. She finds significant relief in muscle pain and headaches symptoms. She was also given tizandine for  muscle pain and found it to be helpful. Cholesterol was elevated on recent labs. She has not been taking the lipitor because of myalgias. She has upcoming appointment with PCP on Friday to discuss alternative medications.     Vaccines: UTD on PNA, shingles, flu, RSV, Covid, TDAP.   Diet: follows the Mediterranean diet  Exercise: 5x/week she walks 3-4 miles or rides her bike 3 miles.     Denies hair loss, dry eyes, vision changes, dry mouth, oral/nasal ulcers, trouble swallowing, new rashes, joint swelling, morning stiffness, or GI disturbances.      Review of Systems   Review of Systems   Constitutional:  Negative for fever and unexpected weight change.   HENT:  Negative for mouth sores and trouble swallowing.    Eyes:  Negative for redness.   Respiratory:  Negative for cough and shortness of breath.    Cardiovascular:  Negative for chest pain.   Gastrointestinal:  Negative for constipation and diarrhea.   Genitourinary:  Negative for dysuria and genital sores.   Musculoskeletal:  Negative for joint swelling and neck pain.   Skin:  Negative for rash.   Neurological:   Negative for headaches.   Hematological:  Does not bruise/bleed easily.        Current Medications:    Current Outpatient Medications:     amLODIPine (NORVASC) 10 MG tablet, TAKE 1 TABLET (10 MG TOTAL) BY MOUTH ONCE DAILY., Disp: 90 tablet, Rfl: 3    azelastine (ASTELIN) 137 mcg (0.1 %) nasal spray, USE 1 SPRAY (137 MCG TOTAL) IN EACH NOSTRIL 2 (TWO) TIMES DAILY., Disp: 90 mL, Rfl: 1    citalopram (CELEXA) 10 MG tablet, Take 1 tablet (10 mg total) by mouth once daily., Disp: 90 tablet, Rfl: 2    conjugated estrogens (PREMARIN) vaginal cream, Place 0.5 g vaginally twice a week., Disp: 30 g, Rfl: 11    ergocalciferol, vitamin D2, (VITAMIN D ORAL), , Disp: , Rfl:     etanercept (ENBREL SURECLICK) 50 mg/mL (1 mL), Inject 1 mL (50 mg total) into the skin once a week., Disp: 2 mL, Rfl: 0    fish oil-omega-3 fatty acids 300-1,000 mg capsule, Take 2 g by mouth once daily., Disp: , Rfl:     fluticasone propionate (FLONASE) 50 mcg/actuation nasal spray, INHALE 2 SPRAYS IN EACH NOSTRIL EVERY DAY, Disp: 48 g, Rfl: 1    gabapentin (NEURONTIN) 300 MG capsule, Take 2 capsules (600 mg total) by mouth 2 (two) times daily., Disp: 120 capsule, Rfl: 11    hydroCHLOROthiazide (HYDRODIURIL) 25 MG tablet, TAKE 1 TABLET (25 MG TOTAL) BY MOUTH ONCE DAILY., Disp: 90 tablet, Rfl: 3    PSYLLIUM SEED, WITH SUGAR, (METAMUCIL ORAL), Take by mouth., Disp: , Rfl:     sennosides (SENOKOT TO GO ORAL), , Disp: , Rfl:     SYNTHROID 88 mcg tablet, TAKE 1 TABLET (88 MCG TOTAL) BY MOUTH BEFORE BREAKFAST., Disp: 90 tablet, Rfl: 2    tiZANidine (ZANAFLEX) 2 MG tablet, TAKE 1 TABLET (2 MG TOTAL) BY MOUTH NIGHTLY AS NEEDED (MUSCLE SPASMS)., Disp: 30 tablet, Rfl: 1    triamcinolone acetonide 0.1% (KENALOG) 0.1 % cream, Apply topically 2 (two) times daily. Use up to 2 weeks at a time., Disp: 454 g, Rfl: 1     Objective:     Vitals:    02/19/24 0939   BP: (!) 142/85   Pulse: 75      Body mass index is 20.87 kg/m².     Physical Exam   Constitutional: She is  oriented to person, place, and time. normal appearance. No distress. She does not appear ill.   HENT:   Head: Normocephalic and atraumatic.   Mouth/Throat: Mucous membranes are moist.   Pulmonary/Chest: Effort normal. No respiratory distress.   Musculoskeletal:      Right shoulder: Normal.      Left shoulder: Normal.      Right elbow: Normal.      Left elbow: Normal.      Right wrist: Normal.      Left wrist: Normal.      Cervical back: Normal range of motion. No tenderness.      Right knee: Normal.      Left knee: Normal.   Neurological: She is alert and oriented to person, place, and time. She displays no weakness.   Skin: Capillary refill takes less than 2 seconds.       Right Side Rheumatological Exam     Examination finds the shoulder, elbow, wrist, knee, 1st PIP, 1st MCP, 2nd PIP, 3rd PIP, 4th MCP, 5th PIP and 5th MCP normal.    She has swelling of the 2nd MCP, 3rd MCP and 4th PIP    Knee Exam   Patellofemoral Crepitus: positive    Muscle Strength (0-5 scale):  Neck Flexion:  5  Deltoid:  5  Biceps: 5/5   Triceps:  5  : 5/5     Left Side Rheumatological Exam     Examination finds the shoulder, elbow, wrist, knee, 1st PIP, 1st MCP, 2nd PIP, 3rd PIP, 4th PIP, 4th MCP, 5th PIP and 5th MCP normal.    She has swelling of the 2nd MCP and 3rd MCP    Knee Exam     Patellofemoral Crepitus: positive    Muscle Strength (0-5 scale):  Neck Flexion:  5  Deltoid:  5  Biceps: 5/5   Triceps:  5  :  5/5              3/20/2023 6/20/2023 10/16/2023 2/19/2024   Tender (HUMPHRIES-28) 0 / 28  0 / 28  0 / 28  0 / 28    Swollen (HUMPHRIES-28) 5 / 28 5 / 28 5 / 28 5 / 28    Provider Global 10 mm 10 mm 10 mm --   Patient Global 10 mm 5 mm 10 mm 5 mm   ESR 1 mm/hr 1 mm/hr 0 mm/hr --   CRP 0.4 mg/L 0.3 mg/L 0.4 mg/L 0.4 mg/L   HUMPHRIES-28 (ESR) 0.77 (Remission) 0.7 (Remission) -- --   HUMPHRIES-28 (CRP) 1.85 (Remission) 1.75 (Remission) 1.85 (Remission) 1.78 (Remission)   CDAI Score 7  6.5  7  --                Assessment:          Peripheral  spondyloarthritis v. Pseudo-RA CPPD : TJ 0 SJ 5 Pt global 10  ESR <2 CRP 0.4 HUMPHRIES 28: 0.7(remission) [id number]-WBZ 1.62(remission)  CDAI 4(LDA)  DAPSA  TJ 0 SJ 5  Pt global 5 Pt pain 0  CRP 0.04     OA hands   Left trochanteric burstis/gluteus medius tendinitis resolved  S/p Right trochanteric bursitis, resolved post injection 6/14/22 resolved  Left pes planovalgus, posterior tibial tendinitis/probable tear, plantar fasciitis/heel spur, and calcific Achilles tendinitis. All resolved with orthosis  Hyperlipidemia ASCVD 9.4% (moderate to high risk)  /76  Abnormal transaminases from rosuvastatin. Resolved with change to pravastatin, but now elevated again.       Plan:          Continue Enbrel SureClick 50mg sc q 7 days refilled to [facility]  Follow up with PCP Dr. Smith to discuss additional cholesterol medication options  Standing labs q 3 months  RTC in 6 mo with updated labs      Elisabeth Moseley, DO  PGY-1  [employer] [facility]

## 2024-02-19 NOTE — PROGRESS NOTES
I have personally taken the history and examined the patient and agree with the resident,s note as stated above         Latest Reference Range & Units 02/16/24 09:19   WBC 3.90 - 12.70 K/uL 5.67   RBC 4.00 - 5.40 M/uL 4.14   Hemoglobin 12.0 - 16.0 g/dL 13.7   Hematocrit 37.0 - 48.5 % 42.4   MCV 82 - 98 fL 102 (H)   MCH 27.0 - 31.0 pg 33.1 (H)   MCHC 32.0 - 36.0 g/dL 32.3   RDW 11.5 - 14.5 % 13.4   Platelet Count 150 - 450 K/uL 229   MPV 9.2 - 12.9 fL 11.6   Gran % 38.0 - 73.0 % 48.5   Lymph % 18.0 - 48.0 % 38.6   Mono % 4.0 - 15.0 % 10.8   Eos % 0.0 - 8.0 % 1.2   Basophil % 0.0 - 1.9 % 0.7   Immature Granulocytes 0.0 - 0.5 % 0.2   Gran # (ANC) 1.8 - 7.7 K/uL 2.8   Lymph # 1.0 - 4.8 K/uL 2.2   Mono # 0.3 - 1.0 K/uL 0.6   Eos # 0.0 - 0.5 K/uL 0.1   Baso # 0.00 - 0.20 K/uL 0.04   Immature Grans (Abs) 0.00 - 0.04 K/uL 0.01   nRBC 0 /100 WBC 0   Differential Method  Automated   Sed Rate 0 - 36 mm/Hr <2   Sodium 136 - 145 mmol/L 137   Potassium 3.5 - 5.1 mmol/L 4.4   Chloride 95 - 110 mmol/L 97   CO2 23 - 29 mmol/L 29   Anion Gap 8 - 16 mmol/L 11   BUN 8 - 23 mg/dL 14   Creatinine 0.5 - 1.4 mg/dL 0.7   eGFR >60 mL/min/1.73 m^2 >60.0   Glucose 70 - 110 mg/dL 93   Calcium 8.7 - 10.5 mg/dL 10.2   ALP 55 - 135 U/L  55 - 135 U/L 50 (L)  50 (L)   PROTEIN TOTAL 6.0 - 8.4 g/dL  6.0 - 8.4 g/dL 7.2  7.2   Albumin 3.5 - 5.2 g/dL  3.5 - 5.2 g/dL 4.2  4.2   BILIRUBIN TOTAL 0.1 - 1.0 mg/dL  0.1 - 1.0 mg/dL 1.0  1.0   Bilirubin Direct 0.1 - 0.3 mg/dL 0.3   AST 10 - 40 U/L  10 - 40 U/L 30  30   ALT 10 - 44 U/L  10 - 44 U/L 25  25   CRP 0.0 - 8.2 mg/L 0.4   Cholesterol Total 120 - 199 mg/dL 251 (H)   CPK 20 - 180 U/L 220 (H)   TSH 0.400 - 4.000 uIU/mL 2.853   (H): Data is abnormally high  (L): Data is abnormally low   Latest Reference Range & Units 10/16/23 10:55   Anti-CN1A Antibody <20 Units <20   Anti-Ladonna-1 Antibody <20 Units <20   Anti-PM/Scl Ab <20 Units <20   Anti-SS-A 52 kD Ab, IgG <20 Units <20   Anti-U1-RNP  Ab <20 Units <20   EJ  Negative  Negative   Fibrillarin (U3 RNP) Negative  Negative   HMGCR Antibody <20.0 CU <20.0   Ku Negative  Negative   MDA-5 (P140) (CADM-140) <20 Units <20   MI-2 Negative  Negative   NXP-2 (P140) <20 Units <20   OJ Negative  Negative   PL-12 Negative  Negative   PL-7 Negative  Negative   SRP Negative  Negative   TIF1 GAMMA (P155/140) <20 Units <20   U2 snRNP Negative  Negative      Latest Reference Range & Units 02/16/24 09:19   TSH 0.400 - 4.000 uIU/mL 2.853         Peripheral spondyloarthritis v. Pseudo-RA CPPD : TJ 0  SJ 5  Pt global 5  ESR <2 CRP 0.4 HUMPHRIES 28 0.7   [id number] 1.78 (both remission) CDAI 6(LDA)  DAPSA  TJ 0 SJ 5 Pt global 0.5  Pt pain 1 CRP 0.04= 6.54(LDA)     OA hands   Left trochanteric burstis/gluteus medius tendinitis resolved  S/p Right trochanteric bursitis, resolved post injection 6/14/22 resolved  Left pes planovalgus, posterior tibial tendinitis/probable tear, plantar fasciitis/heel spur, and calcific Achilles tendinitis. All resolved with orthosis  Hyperlipidemia  8/8/23  /85  CPK intermittently elevated. Again off pravastatin(5/9/23- 11/8/23 , rosuvastatin /9/22-5/9/23 , now atorvastatin 11/8/23  Chronic mild hyperbilirubinemia, likely Gilbert's  Burning pain left post thigh only with sitting      *Prevnar 20  Continue Enbrel SureClick 50mg sc q 7 days Dr. Smith to consider non-statin hypolipidemic Rx given  myalgias and recurrent increased ck on atorvastatin, previously same with pravastatin and rosuvastatin  Continue Diclofenac gel 1% four times daily prn fingers   F/u Deb Nguyen in [facility] for left post thigh numbness much improved  standing labs including HBV DNA monitoring q 3 months  RTC 6 months

## 2024-02-19 NOTE — PROGRESS NOTES
2/13/2024     2:33 PM   Rapid3 Question Responses and Scores   MDHAQ Score 0.1   Psychologic Score 3.3   Pain Score 1   When you awakened in the morning OVER THE LAST WEEK, did you feel stiff? No   Fatigue Score 0.5   Global Health Score 0.5   RAPID3 Score 0.61     Answers submitted by the patient for this visit:  Rheumatology Questionnaire (Submitted on 2/13/2024)  fever: No  eye redness: Yes  mouth sores: No  headaches: Yes  shortness of breath: No  chest pain: No  trouble swallowing: No  diarrhea: No  constipation: No  unexpected weight change: No  genital sore: No  dysuria: No  During the last 3 days, have you had a skin rash?: No  Bruises or bleeds easily: No  cough: No

## 2024-02-23 ENCOUNTER — OFFICE VISIT (OUTPATIENT)
Dept: INTERNAL MEDICINE | Facility: CLINIC | Age: 69
End: 2024-02-23
Payer: MEDICARE

## 2024-02-23 VITALS
WEIGHT: 120.56 LBS | HEART RATE: 75 BPM | BODY MASS INDEX: 21.36 KG/M2 | DIASTOLIC BLOOD PRESSURE: 92 MMHG | SYSTOLIC BLOOD PRESSURE: 142 MMHG | HEIGHT: 63 IN | OXYGEN SATURATION: 98 %

## 2024-02-23 DIAGNOSIS — D84.9 IMMUNOCOMPROMISED, ACQUIRED: ICD-10-CM

## 2024-02-23 DIAGNOSIS — G72.0 STATIN MYOPATHY: Primary | ICD-10-CM

## 2024-02-23 DIAGNOSIS — T46.6X5A STATIN MYOPATHY: Primary | ICD-10-CM

## 2024-02-23 DIAGNOSIS — I10 PRIMARY HYPERTENSION: ICD-10-CM

## 2024-02-23 DIAGNOSIS — E88.01 ALPHA-1-ANTITRYPSIN DEFICIENCY: ICD-10-CM

## 2024-02-23 DIAGNOSIS — I70.0 AORTIC ATHEROSCLEROSIS: ICD-10-CM

## 2024-02-23 DIAGNOSIS — E03.9 HYPOTHYROIDISM, UNSPECIFIED TYPE: ICD-10-CM

## 2024-02-23 DIAGNOSIS — Z23 NEED FOR PNEUMOCOCCAL VACCINATION: ICD-10-CM

## 2024-02-23 DIAGNOSIS — F43.21 GRIEF: ICD-10-CM

## 2024-02-23 PROCEDURE — 90677 PCV20 VACCINE IM: CPT | Mod: PBBFAC

## 2024-02-23 PROCEDURE — G2211 COMPLEX E/M VISIT ADD ON: HCPCS | Mod: S$PBB,,, | Performed by: INTERNAL MEDICINE

## 2024-02-23 PROCEDURE — 99215 OFFICE O/P EST HI 40 MIN: CPT | Mod: PBBFAC | Performed by: INTERNAL MEDICINE

## 2024-02-23 PROCEDURE — 99999 PR PBB SHADOW E&M-EST. PATIENT-LVL V: CPT | Mod: PBBFAC,,, | Performed by: INTERNAL MEDICINE

## 2024-02-23 PROCEDURE — 99999PBSHW PNEUMOCOCCAL CONJUGATE VACCINE 20-VALENT: Mod: PBBFAC,,,

## 2024-02-23 PROCEDURE — 99214 OFFICE O/P EST MOD 30 MIN: CPT | Mod: S$PBB,,, | Performed by: INTERNAL MEDICINE

## 2024-02-23 RX ORDER — EZETIMIBE 10 MG/1
10 TABLET ORAL DAILY
Qty: 30 TABLET | Refills: 12 | Status: SHIPPED | OUTPATIENT
Start: 2024-02-23 | End: 2024-03-20 | Stop reason: SDUPTHER

## 2024-02-23 RX ORDER — CITALOPRAM 20 MG/1
20 TABLET, FILM COATED ORAL DAILY
Qty: 30 TABLET | Refills: 12 | Status: SHIPPED | OUTPATIENT
Start: 2024-02-23

## 2024-02-23 NOTE — PROGRESS NOTES
Subjective:       Patient ID: Jill Marquez is a 68 y.o. female.   Chief Complaint: Annual Exam (Cholesterol check)    Psoriatic arthritis and difficulty tolerating statins, hyperlipidemia, statin myopathy with elevated CPK comes in for follow-up.  Patient stopped her statin because of symptoms.  CPK was elevated.  She feels much better since the statin has been stopped but knows or cholesterol remains high.  She is still grieving the death of her  and would like to consider increasing the Celexa.    She would be willing to take Zetia if it does not cause symptoms and if it helps the cholesterol.      Review of Systems   Constitutional:  Negative for fever.   Cardiovascular:  Negative for chest pain.   Musculoskeletal:  Positive for arthralgias and joint deformity.   Psychiatric/Behavioral:  Positive for dysphoric mood.           Objective:      Physical Exam  Constitutional:       General: She is not in acute distress.     Appearance: She is well-developed.   HENT:      Head: Normocephalic and atraumatic.      Right Ear: Tympanic membrane, ear canal and external ear normal.      Left Ear: Tympanic membrane, ear canal and external ear normal.      Mouth/Throat:      Pharynx: No oropharyngeal exudate or posterior oropharyngeal erythema.   Eyes:      General: No scleral icterus.     Conjunctiva/sclera: Conjunctivae normal.      Pupils: Pupils are equal, round, and reactive to light.   Neck:      Thyroid: No thyromegaly.   Cardiovascular:      Rate and Rhythm: Normal rate and regular rhythm.      Pulses: Normal pulses.      Heart sounds: No murmur heard.  Pulmonary:      Effort: Pulmonary effort is normal.      Breath sounds: Normal breath sounds. No wheezing.   Abdominal:      General: Bowel sounds are normal. There is no distension.      Palpations: Abdomen is soft.      Tenderness: There is no abdominal tenderness.   Musculoskeletal:         General: Deformity (fingers) present. No tenderness.      Cervical  back: Normal range of motion and neck supple.      Right lower leg: No edema.      Left lower leg: No edema.   Lymphadenopathy:      Cervical: No cervical adenopathy.   Skin:     Coloration: Skin is not jaundiced.      Findings: No rash.   Neurological:      General: No focal deficit present.      Mental Status: She is alert and oriented to person, place, and time.   Psychiatric:         Mood and Affect: Mood normal.         Behavior: Behavior normal.         Assessment:       Problem List Items Addressed This Visit          Pulmonary    Alpha-1-antitrypsin deficiency       Cardiac/Vascular    HTN (hypertension)    Relevant Orders    CK    Aortic atherosclerosis       Immunology/Multi System    Immunocompromised, acquired       Endocrine    Hypothyroid     Other Visit Diagnoses       Statin myopathy    -  Primary    Relevant Orders    CK    Grief        Need for pneumococcal vaccination        Relevant Orders    Pneumococcal Conjugate Vaccine (20 Valent) (IM)(Preferred) (Completed)            Plan:       Jill was seen today for annual exam.    Diagnoses and all orders for this visit:    Statin myopathy  -     CK; Future    Primary hypertension  -     CK; Future    Hypothyroidism, unspecified type    Grief    Alpha-1-antitrypsin deficiency  Comments:  Seeing Pulm and Hepatology for monitoring    Aortic atherosclerosis  Comments:  Monitoring Bp and lipids. May try Zetia    Immunocompromised, acquired  Comments:  Clinically stable, continue to monitor    Need for pneumococcal vaccination  -     Pneumococcal Conjugate Vaccine (20 Valent) (IM)(Preferred)    Other orders  -     ezetimibe (ZETIA) 10 mg tablet; Take 1 tablet (10 mg total) by mouth once daily.  -     citalopram (CELEXA) 20 MG tablet; Take 1 tablet (20 mg total) by mouth once daily.       Still with some grief over 's death but doing ok. Continuing Celexa but will raise the dose.     CPK in 4 weeks.  If okay consider Zetia    As this patient's PCP, I  "am actively managing and/or treating their chronic medical conditions including Hyperlipidemia, Stain Myopathy, Psoriatic Arthritis  and have been for at least 1 year. This includes, but is not limited to, medication management, coordination of care, documentation review from their specialists and labs/imaging review where pertinent.        Portions of this note may have been created with voice recognition software. Occasional "wrong-word" or "sound-a-like" substitutions may have occurred due to the inherent limitations of voice recognition software. Please, read the note carefully and recognize, using context, where substitutions have occurred.  "

## 2024-02-27 NOTE — PROGRESS NOTES
MARK GONZALEZ [address]  [address]  [facility] [id number]    Jill Marquez  [id number]  1955      Jill Marquez is a 68 y.o. here for follow up of vaginal atrophy    10/19/15  Title of Operation:  1) Robotic-assisted laparoscopic lysis of adhesions  2) Robotic-assisted laparoscopic bilateral salpingo-oophorectomy  3) Robotic-assisted laparoscopic sacral colpopexy with polypropylene mesh  4) Placement of retropubic tension-free midurethral sling, RetroARC (AMS)  5) Cystourethroscopy    Indications for Surgery:  1) UI: (+) RICK > (+) UUI - mornings only. (--) pads:Changes underwear. May wear pads if on a trip or away from home for an extended time. Daytime frequency: Q 3-4 hours. Nocturia: No: (--) dysuria, (--) hematuria, (--) frequent UTIs. (--) complete bladder emptying. Leans forward to void. Has some urgency in the morning.  --SUDS 10/6/15:  3. URETHRAL FUNCTION/STORAGE PHASE:  a. WITH prolapse reduction:  CLPP (150 mL): Negative at 162 cm H20  VLPP (150 mL): Negative at 165 cm H20  CLPP (300 mL): Negative at 150 cm H20  VLPP (300 mL): Negative at 147 cm H20  CLPP (409 mL): Negative at 148 cm H20  VLPP (409 mL): Negative at 171 cm H20  CLPP (MAX ): Negative at 155 cm H20  VLPP (MAX): Negative at 145 cm H20  POS CLPP and VLPP after removal of pves catheter.  These findings are consistent with Positive urodynamic stress incontinence only at max capacity with POP reduction and removal of pves catheter.  Assessment:  UF prolonged but normal. PF prolonged but normal. Compliance normal. Max capacity 600 mL. DO (--). RICK (+).   --cysto 10/6/15: Normal with slight decrease coaptation and minimal trabeculations.  2) POP: Present. below introitus. Symptoms:(--) (--) vaginal bleeding. (--) vaginal discharge. (+) sexually active. (--) dyspareunia. (+) Vaginal dryness. (--) vaginal estrogen use.   --POP-Q- per Dr. Ballesteros: Aa +3; Ba +3; C -4; Ap -2; Bp -2--standing; Genital hiatus 2 cm, perineal body 1cm   metFORMIN (GLUCOPHAGE-XR) 500 MG 24 hr tablet         Sig: N/A    Disp:  30 tablet    Refills:  1    Start: 9/30/2020    Class: Eprescribe    Non-formulary    Last ordered: 1 year ago by Steven J Heyden, MD         verapamil (CALAN SR) 240 MG CR tablet         Sig: Take 1 tablet by mouth nightly.    Disp:  90 tablet    Refills:  1    Start: 9/30/2020    Class: Eprescribe    Last ordered: 3 months ago by Steven J Heyden, MD         losartan-hydrochlorothiazide (HYZAAR) 100-12.5 MG per tablet         Sig: Take 1 tablet by mouth daily.    Disp:  90 tablet    Refills:  1    Start: 9/30/2020    Class: Eprescribe    Last ordered: 3 months ago by Steven J Heyden, MD         metoPROLOL succinate (Toprol XL) 25 MG 24 hr tablet         Sig: Take 1 tablet by mouth daily.    Disp:  90 tablet    Refills:  1    Start: 9/30/2020    Class: Eprescribe    Non-formulary    Last ordered: 3 months ago by Steven J Heyden, MD        To be filled at: [id number] - [place], [place] - [address] [facility]     APPTS AND LABS ARE UP-TO-DATE  LAST OFFICE VISIT: 06/02/20  FOLLOW UP APPT: NONE   total vaginal length 9cm.   3) BM: (+) constipation/straining. (--) chronic diarrhea. (--) hematochezia. (--) fecal incontinence. (--) fecal smearing/urgency. (--) incomplete evacuation. Using metamucil 2 capsules twice daily and correctol twice weekly.    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    HTN (hypertension) 07/23/2012    Hyperlipidemia     Hypothyroid 07/23/2012       Past Surgical History:   Procedure Laterality Date    COLONOSCOPY N/A 01/25/2016    Procedure: COLONOSCOPY;  Surgeon: DAYNA Simmons MD;  Location: [facility] ENDO [address]);  Service: Endoscopy;  Laterality: N/A;    COLONOSCOPY N/A 11/08/2022    Procedure: COLONOSCOPY;  Surgeon: DAYNA Simmons MD;  Location: [facility] END[facility] ([address] [place] [address]);  Service: Endoscopy;  Laterality: N/A;  vacc-inst portal-vargus only-tb  precall done.arrival time of 10:00 confirmed/mleone    COSMETIC SURGERY      EYE SURGERY      eyelid surgery      HYSTERECTOMY  2004    prolapse    OOPHORECTOMY Bilateral 2015    Tonsillectomy      TONSILLECTOMY      TRANSFORAMINAL EPIDURAL INJECTION OF STEROID Left 05/26/2023    Procedure: INJECTION, STEROID, EPIDURAL, TRANSFORAMINAL APPROACH, LEFT L3/L4 & L4/L5 DIRECT REF;  Surgeon: Popeye Ca MD;  Location: [facility]GT;  Service: Pain Management;  Laterality: Left;       Family History   Problem Relation Age of Onset    Diabetes Brother     Retinal detachment Brother     Cancer Brother         kidney    Cataracts Brother     Diabetes Brother     Cancer Brother         throat    Cataracts Brother     Diabetes Brother     Cataracts Brother     No  Known Problems Mother     No Known Problems Father     Lupus Other         niece    No Known Problems Sister     No Known Problems Maternal Aunt     No Known Problems Maternal Uncle     No Known Problems Paternal Aunt     No Known Problems Paternal Uncle     No Known Problems Maternal Grandmother     No Known Problems Maternal Grandfather     No Known Problems Paternal Grandmother     No Known Problems Paternal Grandfather     Breast cancer Neg Hx     Colon cancer Neg Hx     Ovarian cancer Neg Hx     Blindness Neg Hx     Glaucoma Neg Hx     Hypertension Neg Hx     Macular degeneration Neg Hx     Strabismus Neg Hx     Stroke Neg Hx     Thyroid disease Neg Hx     Amblyopia Neg Hx     Rheum arthritis Neg Hx     Psoriasis Neg Hx     Inflammatory bowel disease Neg Hx     Cervical cancer Neg Hx     Endometrial cancer Neg Hx     Vaginal cancer Neg Hx     Uterine cancer Neg Hx        Social History     Socioeconomic History    Marital status:     Number of children: 2   Tobacco Use    Smoking status: Former     Current packs/day: 0.00     Types: Cigarettes     Start date: 1970     Quit date: 1982     Years since quittin.8     Passive exposure: Past    Smokeless tobacco: Former    Tobacco comments:     , homemaker, 2 children   Substance and Sexual Activity    Alcohol use: Yes     Comment: 2 servings daily x 30+ years, stopped 2023    Drug use: No    Sexual activity: Yes     Partners: Male     Birth control/protection: Surgical, None     Social Determinants of Health     Financial Resource Strain: Low Risk  (2024)    Overall Financial Resource Strain (CARDIA)     Difficulty of Paying Living Expenses: Not hard at all   Food Insecurity: No Food Insecurity (2024)    Hunger Vital Sign     Worried About Running Out of Food in the Last Year: Never true     Ran Out of Food in the Last Year: Never true   Transportation Needs: No Transportation Needs (2024)    PRAPARE - Transportation      Lack of Transportation (Medical): No     Lack of Transportation (Non-Medical): No   Physical Activity: Sufficiently Active (2/13/2024)    Exercise Vital Sign     Days of Exercise per Week: 5 days     Minutes of Exercise per Session: 60 min   Stress: Stress Concern Present (2/13/2024)    [employer] - Occupational Stress Questionnaire     Feeling of Stress : To some extent   Social Connections: Unknown (2/13/2024)    Social Connection and Isolation Panel [NHANES]     Frequency of Communication with Friends and Family: More than three times a week     Frequency of Social Gatherings with Friends and Family: More than three times a week     Active Member of Clubs or Organizations: Yes     Attends Club or Organization Meetings: 1 to 4 times per year     Marital Status:    Housing Stability: Low Risk  (2/13/2024)    Housing Stability Vital Sign     Unable to Pay for Housing in the Last Year: No     Number of Places Lived in the Last Year: 1     Unstable Housing in the Last Year: No       Current Outpatient Medications   Medication Sig Dispense Refill    amLODIPine (NORVASC) 10 MG tablet TAKE 1 TABLET (10 MG TOTAL) BY MOUTH ONCE DAILY. 90 tablet 3    azelastine (ASTELIN) 137 mcg (0.1 %) nasal spray USE 1 SPRAY (137 MCG TOTAL) IN EACH NOSTRIL 2 (TWO) TIMES DAILY. 90 mL 1    citalopram (CELEXA) 20 MG tablet Take 1 tablet (20 mg total) by mouth once daily. 30 tablet 12    ergocalciferol, vitamin D2, (VITAMIN D ORAL)       etanercept (ENBREL SURECLICK) 50 mg/mL (1 mL) Inject 1 mL (50 mg total) into the skin once a week. 2 mL 0    ezetimibe (ZETIA) 10 mg tablet Take 1 tablet (10 mg total) by mouth once daily. 30 tablet 12    fish oil-omega-3 fatty acids 300-1,000 mg capsule Take 2 g by mouth once daily.      fluticasone propionate (FLONASE) 50 mcg/actuation nasal spray INHALE 2 SPRAYS IN EACH NOSTRIL EVERY DAY 48 g 1    gabapentin (NEURONTIN) 300 MG capsule Take 2 capsules (600 mg total)  by mouth 2 (two) times daily. 120 capsule 11    hydroCHLOROthiazide (HYDRODIURIL) 25 MG tablet TAKE 1 TABLET (25 MG TOTAL) BY MOUTH ONCE DAILY. 90 tablet 3    PSYLLIUM SEED, WITH SUGAR, (METAMUCIL ORAL) Take by mouth.      sennosides (SENOKOT TO GO ORAL)       SYNTHROID 88 mcg tablet TAKE 1 TABLET (88 MCG TOTAL) BY MOUTH BEFORE BREAKFAST. 90 tablet 2    tiZANidine (ZANAFLEX) 2 MG tablet TAKE 1 TABLET (2 MG TOTAL) BY MOUTH NIGHTLY AS NEEDED (MUSCLE SPASMS). 30 tablet 1    [START ON 2/29/2024] estradioL (ESTRACE) 0.01 % (0.1 mg/gram) vaginal cream Place 1 g vaginally twice a week. 42.5 g 2    triamcinolone acetonide 0.1% (KENALOG) 0.1 % cream Apply topically 2 (two) times daily. Use up to 2 weeks at a time. (Patient not taking: Reported on 2/23/2024) 454 g 1     No current facility-administered medications for this visit.       Review of patient's allergies indicates:   Allergen Reactions    Atorvastatin      Myalgia, increased ck    Crestor [rosuvastatin] Other (See Comments)     Elevated liver enzymes     Pravastatin      Myalgia, elevated ck    Sulfasalazine Nausea Only     Malaise, nausea,    Leflunomide Rash     Last pap: 12/2005- normal- patient is post hyst  Last mammogram: 07/2023  Colonoscopy: 01/25/2016 one polyp- normal- repeat 2026  DEXA: 7/2022 No evidence of significant bone density loss    ROS:  Feeling well.   No dyspnea or chest pain on exertion.    No abdominal pain, change in bowel habits, black or bloody stools.  Taking sennokot and metamucil daily.   No urinary tract symptoms. No urgency, frequency or incontinence, voids   GYN ROS: no breast pain or new or enlarging lumps on self exam, no vaginal bleeding. Using vaginal estrogen cream since 2015, twice/weekly, wants to know if there is another one that is less expensive  No neurological complaints.    OBJECTIVE:   There were no vitals filed for this visit.       BREAST EXAM: breasts appear normal, no suspicious masses, no skin or nipple changes or  axillary nodes      The patient appears well, alert, oriented x 3, in no distress.  ENT normal.  Neck supple. No adenopathy or thyromegaly. MARYAN. .   Normal respiratory effort  Pulse with regular rate and rhythm  Abdomen soft without tenderness, guarding, mass or organomegaly.   Extremities show no edema, normal peripheral pulses.   Neurological is normal, no focal findings.    PELVIC EXAM:   VULVA: normal appearing vulva with no masses, tenderness or lesions,   VAGINA: normal appearing vagina with normal color and discharge, no lesions, atrophic, no mesh visible/ palpable. Sling path nontender.  CERVIX: surgically absent,   UTERUS: absent,   ADNEXA: no masses,   RECTAL: normal rectal, no masses, small nonthrombosed external hemorrhoid      Impression  1. Atrophic vaginitis  estradioL (ESTRACE) 0.01 % (0.1 mg/gram) vaginal cream      2. Encounter for screening for malignant neoplasm of breast, unspecified screening modality        3. Screening mammogram for breast cancer  Mammo Digital Screening Bilat w/ [facility]            We reviewed the above issues and discussed options for short-term versus long-term management of her problems.   Plan:      1.  Always get help lifting 50# or more.     2. Vaginal health:  Vaginal atrophy (dryness):  Use 0.5 gram of estrogen cream in vagina  twice a week    3. H/o constipation:  Continue to avoid straining.  Continue stool softeners/fiber.          4. mammogram is due 05/2023   5. Continue pelvic floor PT home exercises   6. RTC 1year for annual      I spent a total of 20 minutes on the day of the visit.  This includes face to face time and non-face to face time preparing to see the patient (eg, review of tests), obtaining and/or reviewing separately obtained history, documenting clinical information in the electronic or other health record, independently interpreting results and communicating results to the patient/family/caregiver, or care coordinator.    Naya Gibson,  FNP-BC  [facility]  Division of Female Pelvic Medicine and Reconstructive Surgery  Department of Obstetrics & Gynecology

## 2024-02-28 ENCOUNTER — PATIENT MESSAGE (OUTPATIENT)
Dept: UROGYNECOLOGY | Facility: CLINIC | Age: 69
End: 2024-02-28

## 2024-02-28 ENCOUNTER — OFFICE VISIT (OUTPATIENT)
Dept: UROGYNECOLOGY | Facility: CLINIC | Age: 69
End: 2024-02-28
Payer: MEDICARE

## 2024-02-28 VITALS — WEIGHT: 119.25 LBS | BODY MASS INDEX: 21.13 KG/M2 | HEIGHT: 63 IN

## 2024-02-28 DIAGNOSIS — N95.2 ATROPHIC VAGINITIS: Primary | ICD-10-CM

## 2024-02-28 DIAGNOSIS — Z12.39 ENCOUNTER FOR SCREENING FOR MALIGNANT NEOPLASM OF BREAST, UNSPECIFIED SCREENING MODALITY: ICD-10-CM

## 2024-02-28 DIAGNOSIS — Z12.31 SCREENING MAMMOGRAM FOR BREAST CANCER: ICD-10-CM

## 2024-02-28 PROCEDURE — 99214 OFFICE O/P EST MOD 30 MIN: CPT | Mod: PBBFAC | Performed by: NURSE PRACTITIONER

## 2024-02-28 PROCEDURE — 99999 PR PBB SHADOW E&M-EST. PATIENT-LVL IV: CPT | Mod: PBBFAC,,, | Performed by: NURSE PRACTITIONER

## 2024-02-28 PROCEDURE — 99213 OFFICE O/P EST LOW 20 MIN: CPT | Mod: S$PBB,,, | Performed by: NURSE PRACTITIONER

## 2024-02-28 RX ORDER — ESTRADIOL 0.1 MG/G
1 CREAM VAGINAL
Qty: 42.5 G | Refills: 2 | Status: SHIPPED | OUTPATIENT
Start: 2024-02-29 | End: 2025-02-28

## 2024-02-28 NOTE — PATIENT INSTRUCTIONS
1.  Always get help lifting 50# or more.          2. Vaginal health:  Vaginal atrophy (dryness):  Use 0.5 gram of estrogen cream in vagina  twice a week         3. H/o constipation:  Continue to avoid straining.  Continue stool softeners/fiber.          4. mammogram is due 05/2023        5. Continue pelvic floor PT home exercises        6. RTC 1year for annual    If estradiol cream more than $50-- go to [facility] [employer] [facility]-- let me know when you do this and I can send a prescription with your email [email or web address]

## 2024-03-06 ENCOUNTER — TELEPHONE (OUTPATIENT)
Dept: OTOLARYNGOLOGY | Facility: CLINIC | Age: 69
End: 2024-03-06
Payer: MEDICARE

## 2024-03-06 ENCOUNTER — OFFICE VISIT (OUTPATIENT)
Dept: OTOLARYNGOLOGY | Facility: CLINIC | Age: 69
End: 2024-03-06
Payer: MEDICARE

## 2024-03-06 DIAGNOSIS — J34.89 NASAL VESTIBULITIS: Primary | ICD-10-CM

## 2024-03-06 DIAGNOSIS — J34.89 NASAL PAIN: ICD-10-CM

## 2024-03-06 PROCEDURE — 99213 OFFICE O/P EST LOW 20 MIN: CPT | Mod: PBBFAC | Performed by: OTOLARYNGOLOGY

## 2024-03-06 PROCEDURE — 99214 OFFICE O/P EST MOD 30 MIN: CPT | Mod: S$PBB,,, | Performed by: OTOLARYNGOLOGY

## 2024-03-06 PROCEDURE — 99999 PR PBB SHADOW E&M-EST. PATIENT-LVL III: CPT | Mod: PBBFAC,,, | Performed by: OTOLARYNGOLOGY

## 2024-03-06 RX ORDER — MUPIROCIN 20 MG/G
OINTMENT TOPICAL 2 TIMES DAILY
Qty: 15 G | Refills: 3 | Status: SHIPPED | OUTPATIENT
Start: 2024-03-06 | End: 2024-03-16

## 2024-03-06 RX ORDER — SULFAMETHOXAZOLE AND TRIMETHOPRIM 800; 160 MG/1; MG/1
1 TABLET ORAL 2 TIMES DAILY
Qty: 20 TABLET | Refills: 0 | Status: SHIPPED | OUTPATIENT
Start: 2024-03-06 | End: 2024-03-16

## 2024-03-06 NOTE — PROGRESS NOTES
Referring Provider:    No referring provider defined for this encounter.  Subjective:   Patient: Jill Marquez [id number], :1955   Visit date:3/6/2024 12:51 PM    Chief Complaint:  Other (Pt complains of scab in her left nostril that she has noticed for 3 weeks now )    HPI:    Prior notes reviewed by myself.  Clinical documentation obtained by nursing staff reviewed.     68-year-old female presents for evaluation of inflammation and scabbing in her left nostril.  This has been an issue for the last 3 weeks.  Occasionally the scab will come off with a Q-tip but it returns.  She has tried topical Neosporin without significant relief.  She has not been on any oral antibiotics or other treatment      Objective:     Physical Exam:  Vitals:  There were no vitals taken for this visit.  General appearance:  Well developed, well nourished    Ears:  Otoscopy of external auditory canals and tympanic membranes was normal, clinical speech reception thresholds grossly intact, no mass/lesion of auricle.    Nose:  No masses/lesions of external nose, nasal mucosa with erythema and yellow crusting in nasal vestibule left side, septum, and turbinates were within normal limits.    Mouth:  No mass/lesion of lips, teeth, gums, hard/soft palate, tongue, tonsils, or oropharynx.    Neck & Lymphatics:  No cervical lymphadenopathy, no neck mass/crepitus/ asymmetry, trachea is midline, no thyroid enlargement/tenderness/mass.        [x]  Data Reviewed:    Lab Results   Component Value Date    WBC 5.67 2024    HGB 13.7 2024    HCT 42.4 2024     (H) 2024    EOSINOPHIL 1.2 2024             Assessment & Plan:   Nasal vestibulitis  -     sulfamethoxazole-trimethoprim 800-160mg (BACTRIM DS) 800-160 mg Tab; Take 1 tablet by mouth 2 (two) times daily. for 10 days  Dispense: 20 tablet; Refill: 0  -     mupirocin (BACTROBAN) 2 % ointment; by Nasal route 2 (two) times daily. Apply to nose twice daily for 10  days  Dispense: 15 g; Refill: 3    Nasal pain        She has tried and failed topical Neosporin, so I recommended a course of oral Bactrim as well as changing her ointment to Bactroban.  She does have an allergy listed as sulfasalazine, but her reaction to this was only nausea.  We agreed to move forward with the aforementioned plan and we did discuss potential for reactions to these medications including Galdamez Gaurang syndrome.    Cornelius Santillan M.D.  Department of Otolaryngology - Head & Neck Surgery  [id number] [place] [address].  [place] [address]  P: [phone]  F: [phone]        DISCLAIMER: This note was prepared with [employer] voice recognition transcription software. Garbled syntax, mangled pronouns, and other bizarre constructions may be attributed to that software system. While efforts were made to correct any mistakes made by this voice recognition program, some errors and/or omissions may remain in the note that were missed when the note was originally created.

## 2024-03-06 NOTE — TELEPHONE ENCOUNTER
Call placed to patient in regards to scheduling appointment due to scab in her nose. Appointment scheduled.

## 2024-03-11 ENCOUNTER — PATIENT MESSAGE (OUTPATIENT)
Dept: HEPATOLOGY | Facility: CLINIC | Age: 69
End: 2024-03-11
Payer: MEDICARE

## 2024-03-12 ENCOUNTER — OFFICE VISIT (OUTPATIENT)
Dept: PHYSICAL MEDICINE AND REHAB | Facility: CLINIC | Age: 69
End: 2024-03-12
Payer: MEDICARE

## 2024-03-12 VITALS — WEIGHT: 119 LBS | RESPIRATION RATE: 13 BRPM | BODY MASS INDEX: 21.09 KG/M2 | HEIGHT: 63 IN

## 2024-03-12 DIAGNOSIS — M75.80 ROTATOR CUFF TENDINITIS, UNSPECIFIED LATERALITY: ICD-10-CM

## 2024-03-12 DIAGNOSIS — M53.82 MUSCULOSKELETAL DISORDER OF THE SUBOCCIPITAL: ICD-10-CM

## 2024-03-12 DIAGNOSIS — M79.18 DIFFUSE MYOFASCIAL PAIN SYNDROME: Primary | ICD-10-CM

## 2024-03-12 DIAGNOSIS — M46.00 SPINAL ENTHESOPATHY: ICD-10-CM

## 2024-03-12 PROCEDURE — 99213 OFFICE O/P EST LOW 20 MIN: CPT | Mod: PBBFAC,25 | Performed by: PHYSICAL MEDICINE & REHABILITATION

## 2024-03-12 PROCEDURE — 99999 PR PBB SHADOW E&M-EST. PATIENT-LVL III: CPT | Mod: PBBFAC,,, | Performed by: PHYSICAL MEDICINE & REHABILITATION

## 2024-03-12 PROCEDURE — 99999PBSHW PR PBB SHADOW TECHNICAL ONLY FILED TO HB: Mod: PBBFAC,,,

## 2024-03-12 PROCEDURE — 99214 OFFICE O/P EST MOD 30 MIN: CPT | Mod: 25,S$PBB,, | Performed by: PHYSICAL MEDICINE & REHABILITATION

## 2024-03-12 PROCEDURE — 20553 NJX 1/MLT TRIGGER POINTS 3/>: CPT | Mod: PBBFAC | Performed by: PHYSICAL MEDICINE & REHABILITATION

## 2024-03-12 PROCEDURE — 20553 NJX 1/MLT TRIGGER POINTS 3/>: CPT | Mod: S$PBB,,, | Performed by: PHYSICAL MEDICINE & REHABILITATION

## 2024-03-12 RX ORDER — METHYLPREDNISOLONE ACETATE 40 MG/ML
40 INJECTION, SUSPENSION INTRA-ARTICULAR; INTRALESIONAL; INTRAMUSCULAR; SOFT TISSUE
Status: COMPLETED | OUTPATIENT
Start: 2024-03-12 | End: 2024-03-12

## 2024-03-12 RX ADMIN — METHYLPREDNISOLONE ACETATE 40 MG: 40 INJECTION, SUSPENSION INTRA-ARTICULAR; INTRALESIONAL; INTRAMUSCULAR; INTRASYNOVIAL; SOFT TISSUE at 10:03

## 2024-03-12 NOTE — PROGRESS NOTES
PM&R CLINIC NOTE    Chief Complaint   Patient presents with    Muscle Pain    Neck Pain       HPI: This is a 68 y.o.  female being seen in clinic today for repeat TPIs due to achy pain and tightness in her muscles. She has had relief until a few days ago.  She has an upcoming trip to visit her grandchildren in [place].     History obtained from patient    Functional History:  Walking: Not limited  Transfers: Independent  Assistive devices: No  Power mobility: No  Falls: None     Needs help with:  Nothing - all ADLS normal    Past family, medical, social, and surgical history reviewed in chart    Review of Systems:     General- denies lethargy, weight change, fever, chills  Head/neck- denies swallowing difficulties  ENT- denies hearing changes  Cardiovascular-denies chest pain  Pulmonary- denies shortness of breath  GI- denies constipation or bowel incontinence  - denies bladder incontinence  Skin- denies wounds or rashes  Musculoskeletal- denies weakness, +pain  Neurologic- denies numbness and tingling  Psychiatric- denies depressive or psychotic features, denies anxiety  Lymphatic-denies swelling  Endocrine- denies hypoglycemic symptoms/DM history  All other pertinent systems negative     Physical Examination:  General: Well developed, well nourished female, NAD  HEENT:NCAT EOMI bilaterally   Pulmonary:Normal respirations    Spinal Examination: CERVICAL  Active ROM is within normal limits.  Inspection: No deformity of spinal alignment.  Palpation:  tight and ttp at splenius capitus,rhomboids, and trapezius-local twitch at trapezius on right only    Spinal Examination: LUMBAR or THORACIC  Active ROM is within normal limits.  Inspection: No deformity of spinal alignment.        Bilateral Upper and Lower Extremities:  Pulses are 2+ at radial, bilaterally.  Shoulder/Elbow/Wrist/Hand ROM wnl except limited at bilateral hands, arthritic deformities noted  Hip/Knee/Ankle ROM wnl  Bilateral Extremities show normal  capillary refill.  No signs of cyanosis, rubor, edema, skin changes, or dysvascular changes of appendages.  Nails appear intact.    Neurological Exam:  Cranial Nerves:  II-XII grossly intact    Manual Muscle Testing: (Motor 5=normal)    RIGHT Upper extremity: Shoulder abduction 5/5, Biceps 5/5, Triceps 5/5, Wrist extension 5/5, Abductor pollicis brevis 4/5, Ulnar hand intrinsics 4/5,  LEFT Upper extremity: Shoulder abduction 5/5, Biceps 5/5, Triceps 5/5, Wrist extension 5/5, Abductor pollicis brevis 4/5, Ulnar hand intrinsics 4/5,  No focal atrophy is noted of either upper extremity.    Bilateral Reflexes:  Choe's response is absent bilaterally.    Sensation: tested to light touch  - intact in arms  Gait: Narrow base and good arm swing.      IMPRESSION/PLAN: This is a 68 y.o.  female with myofascial pain    1. TPIs again   2. Cont Ice/heat  3. Cont neck/shoulder, piriformis exercises   4. If not improving, will send formal PT order    Vanessa Millard M.D.  Physical Medicine and Rehab      PROCEDURE NOTE    Diagnosis: Myofascial pain  Procedure: trigger point injections to bilateral splenius capitus, trapezius, rhomboids  Risks and benefits of procedure explained to patient including risks of infection, bleeding, pain, or damage to surrounding tissues. All questions answered. Informed consent obtained prior to proceeding. Areas marked and prepped in sterile fashion. Using a 27g 1.25inch needle, a 10 cc of depomedrol 40mg 1cc and 1% lidocaine was injected evenly into the above mentioned muscles. None to minimal bleeding noted. ER and post injection instructions given.    Vanessa Millard M.D.

## 2024-03-15 ENCOUNTER — LAB VISIT (OUTPATIENT)
Dept: LAB | Facility: HOSPITAL | Age: 69
End: 2024-03-15
Attending: INTERNAL MEDICINE
Payer: MEDICARE

## 2024-03-15 DIAGNOSIS — G72.0 STATIN MYOPATHY: ICD-10-CM

## 2024-03-15 DIAGNOSIS — T46.6X5A STATIN MYOPATHY: ICD-10-CM

## 2024-03-15 DIAGNOSIS — I10 PRIMARY HYPERTENSION: ICD-10-CM

## 2024-03-15 LAB — CK SERPL-CCNC: 242 U/L (ref 20–180)

## 2024-03-15 PROCEDURE — 36415 COLL VENOUS BLD VENIPUNCTURE: CPT | Mod: PN | Performed by: INTERNAL MEDICINE

## 2024-03-15 PROCEDURE — 82550 ASSAY OF CK (CPK): CPT | Performed by: INTERNAL MEDICINE

## 2024-03-18 ENCOUNTER — TELEPHONE (OUTPATIENT)
Dept: RHEUMATOLOGY | Facility: CLINIC | Age: 69
End: 2024-03-18
Payer: MEDICARE

## 2024-03-18 ENCOUNTER — PATIENT MESSAGE (OUTPATIENT)
Dept: RHEUMATOLOGY | Facility: CLINIC | Age: 69
End: 2024-03-18
Payer: MEDICARE

## 2024-03-18 DIAGNOSIS — L40.50 PSA (PSORIATIC ARTHRITIS): ICD-10-CM

## 2024-03-18 RX ORDER — ETANERCEPT 50 MG/ML
50 SOLUTION SUBCUTANEOUS WEEKLY
Qty: 12 ML | Refills: 1 | Status: ACTIVE | OUTPATIENT
Start: 2024-03-18 | End: 2024-05-15 | Stop reason: SDUPTHER

## 2024-03-20 ENCOUNTER — PATIENT MESSAGE (OUTPATIENT)
Dept: INTERNAL MEDICINE | Facility: CLINIC | Age: 69
End: 2024-03-20
Payer: MEDICARE

## 2024-03-20 RX ORDER — EZETIMIBE 10 MG/1
10 TABLET ORAL DAILY
Qty: 30 TABLET | Refills: 12 | Status: SHIPPED | OUTPATIENT
Start: 2024-03-20 | End: 2024-04-25

## 2024-03-20 NOTE — TELEPHONE ENCOUNTER
No care due was identified.  [facility] Embedded Care Due Messages. Reference number: [id number].   3/20/2024 8:52:52 AM CDT

## 2024-03-21 ENCOUNTER — PATIENT MESSAGE (OUTPATIENT)
Dept: PHYSICAL MEDICINE AND REHAB | Facility: CLINIC | Age: 69
End: 2024-03-21
Payer: MEDICARE

## 2024-03-21 ENCOUNTER — PATIENT MESSAGE (OUTPATIENT)
Dept: INTERNAL MEDICINE | Facility: CLINIC | Age: 69
End: 2024-03-21
Payer: MEDICARE

## 2024-03-21 DIAGNOSIS — G57.00 PIRIFORMIS SYNDROME, UNSPECIFIED LATERALITY: ICD-10-CM

## 2024-03-21 DIAGNOSIS — G72.0 STATIN MYOPATHY: Primary | ICD-10-CM

## 2024-03-21 DIAGNOSIS — T46.6X5A STATIN MYOPATHY: Primary | ICD-10-CM

## 2024-03-21 DIAGNOSIS — M79.18 MYOFASCIAL PAIN: ICD-10-CM

## 2024-03-21 RX ORDER — TIZANIDINE 2 MG/1
2 TABLET ORAL NIGHTLY PRN
Qty: 30 TABLET | Refills: 1 | Status: SHIPPED | OUTPATIENT
Start: 2024-03-21 | End: 2024-05-21

## 2024-04-11 ENCOUNTER — PATIENT MESSAGE (OUTPATIENT)
Dept: PHYSICAL MEDICINE AND REHAB | Facility: CLINIC | Age: 69
End: 2024-04-11
Payer: MEDICARE

## 2024-04-12 DIAGNOSIS — M41.115 JUVENILE IDIOPATHIC SCOLIOSIS OF THORACOLUMBAR REGION: ICD-10-CM

## 2024-04-12 RX ORDER — GABAPENTIN 300 MG/1
600 CAPSULE ORAL 2 TIMES DAILY
Qty: 120 CAPSULE | Refills: 11 | OUTPATIENT
Start: 2024-04-12 | End: 2025-04-12

## 2024-04-16 ENCOUNTER — PATIENT MESSAGE (OUTPATIENT)
Dept: PHYSICAL MEDICINE AND REHAB | Facility: CLINIC | Age: 69
End: 2024-04-16
Payer: MEDICARE

## 2024-04-16 DIAGNOSIS — M41.115 JUVENILE IDIOPATHIC SCOLIOSIS OF THORACOLUMBAR REGION: ICD-10-CM

## 2024-04-16 RX ORDER — GABAPENTIN 300 MG/1
600 CAPSULE ORAL 2 TIMES DAILY
Qty: 120 CAPSULE | Refills: 11 | Status: SHIPPED | OUTPATIENT
Start: 2024-04-16 | End: 2025-04-16

## 2024-04-17 DIAGNOSIS — I10 PRIMARY HYPERTENSION: ICD-10-CM

## 2024-04-17 NOTE — TELEPHONE ENCOUNTER
No care due was identified.  [facility] Embedded Care Due Messages. Reference number: [id number].   4/17/2024 10:34:30 AM CDT

## 2024-04-18 RX ORDER — HYDROCHLOROTHIAZIDE 25 MG/1
25 TABLET ORAL DAILY
Qty: 90 TABLET | Refills: 3 | Status: SHIPPED | OUTPATIENT
Start: 2024-04-18 | End: 2025-04-13

## 2024-04-18 NOTE — TELEPHONE ENCOUNTER
Refill Routing Note   Medication(s) are not appropriate for processing by [facility] for the following reason(s):        Required vitals abnormal    [facility] action(s):  Defer             Appointments  past 12m or future 3m with PCP    Date Provider   Last Visit   2/23/2024 Dain Smith MD   Next Visit   Visit date not found Dain Smith MD   ED visits in past 90 days: 0        Note composed:8:47 PM 04/17/2024

## 2024-04-22 ENCOUNTER — OFFICE VISIT (OUTPATIENT)
Dept: PHYSICAL MEDICINE AND REHAB | Facility: CLINIC | Age: 69
End: 2024-04-22
Payer: MEDICARE

## 2024-04-22 VITALS — RESPIRATION RATE: 13 BRPM | HEIGHT: 63 IN | BODY MASS INDEX: 21.09 KG/M2 | WEIGHT: 119.06 LBS

## 2024-04-22 DIAGNOSIS — M53.82 MUSCULOSKELETAL DISORDER OF THE SUBOCCIPITAL: ICD-10-CM

## 2024-04-22 DIAGNOSIS — M46.00 SPINAL ENTHESOPATHY: ICD-10-CM

## 2024-04-22 DIAGNOSIS — M79.18 DIFFUSE MYOFASCIAL PAIN SYNDROME: Primary | ICD-10-CM

## 2024-04-22 DIAGNOSIS — M70.72 BURSITIS OF LEFT HIP, UNSPECIFIED BURSA: ICD-10-CM

## 2024-04-22 PROCEDURE — 99999 PR PBB SHADOW E&M-EST. PATIENT-LVL III: CPT | Mod: PBBFAC,,, | Performed by: PHYSICAL MEDICINE & REHABILITATION

## 2024-04-22 PROCEDURE — 99214 OFFICE O/P EST MOD 30 MIN: CPT | Mod: 25,S$PBB,, | Performed by: PHYSICAL MEDICINE & REHABILITATION

## 2024-04-22 PROCEDURE — 20553 NJX 1/MLT TRIGGER POINTS 3/>: CPT | Mod: PBBFAC | Performed by: PHYSICAL MEDICINE & REHABILITATION

## 2024-04-22 PROCEDURE — 20553 NJX 1/MLT TRIGGER POINTS 3/>: CPT | Mod: S$PBB,,, | Performed by: PHYSICAL MEDICINE & REHABILITATION

## 2024-04-22 PROCEDURE — 99213 OFFICE O/P EST LOW 20 MIN: CPT | Mod: PBBFAC,25 | Performed by: PHYSICAL MEDICINE & REHABILITATION

## 2024-04-22 NOTE — PROGRESS NOTES
PM&R CLINIC NOTE    Chief Complaint   Patient presents with    Neck Pain    Muscle Pain       HPI: This is a 68 y.o.  female being seen in clinic today for repeat TPIs due to achy pain and tightness in her muscles-mostly in her neck/shoulders and left piriformis.   History obtained from patient    Functional History:  Walking: Not limited  Transfers: Independent  Assistive devices: No  Power mobility: No  Falls: None     Needs help with:  Nothing - all ADLS normal    Past family, medical, social, and surgical history reviewed in chart    Review of Systems:     General- denies lethargy, weight change, fever, chills  Head/neck- denies swallowing difficulties  ENT- denies hearing changes  Cardiovascular-denies chest pain  Pulmonary- denies shortness of breath  GI- denies constipation or bowel incontinence  - denies bladder incontinence  Skin- denies wounds or rashes  Musculoskeletal- denies weakness, +pain  Neurologic- denies numbness and tingling  Psychiatric- denies depressive or psychotic features, denies anxiety  Lymphatic-denies swelling  Endocrine- denies hypoglycemic symptoms/DM history  All other pertinent systems negative     Physical Examination:  General: Well developed, well nourished female, NAD  HEENT:NCAT EOMI bilaterally   Pulmonary:Normal respirations    Spinal Examination: CERVICAL  Active ROM is within normal limits.  Inspection: No deformity of spinal alignment.  Palpation:  tight and ttp at splenius capitus,rhomboids, and trapezius-local twitch at trapezius     Spinal Examination: LUMBAR or THORACIC  Active ROM is within normal limits.  Inspection: No deformity of spinal alignment.        Bilateral Upper and Lower Extremities:  Pulses are 2+ at radial, bilaterally.  Shoulder/Elbow/Wrist/Hand ROM wnl except limited at bilateral hands, arthritic deformities noted  Hip/Knee/Ankle ROM wnl, ttp and tight at left piriformis  Bilateral Extremities show normal capillary refill.  No signs of cyanosis,  rubor, edema, skin changes, or dysvascular changes of appendages.  Nails appear intact.    Neurological Exam:  Cranial Nerves:  II-XII grossly intact    Manual Muscle Testing: (Motor 5=normal)    RIGHT Upper extremity: Shoulder abduction 5/5, Biceps 5/5, Triceps 5/5, Wrist extension 5/5, Abductor pollicis brevis 4/5, Ulnar hand intrinsics 4/5,  LEFT Upper extremity: Shoulder abduction 5/5, Biceps 5/5, Triceps 5/5, Wrist extension 5/5, Abductor pollicis brevis 4/5, Ulnar hand intrinsics 4/5,  No focal atrophy is noted of either upper extremity.    Bilateral Reflexes:  Choe's response is absent bilaterally.    Sensation: tested to light touch  - intact in arms  Gait: Narrow base and good arm swing.      IMPRESSION/PLAN: This is a 68 y.o.  female with myofascial pain    1. TPIs   2. Cont Ice/heat  3. Cont neck/shoulder, piriformis exercises   4. If not improving, will send formal PT order    Vanessa Millard M.D.  Physical Medicine and Rehab      PROCEDURE NOTE    Diagnosis: Myofascial pain  Procedure: trigger point injections to bilateral splenius capitus, trapezius, rhomboids, left piriformis  Risks and benefits of procedure explained to patient including risks of infection, bleeding, pain, or damage to surrounding tissues. All questions answered. Informed consent obtained prior to proceeding. Areas marked and prepped in sterile fashion. Using a 27g 1.25inch needle,  10 cc of 1% lidocaine was injected evenly into the above mentioned muscles. None to minimal bleeding noted. ER and post injection instructions given.    Vanessa Millard M.D.

## 2024-04-23 ENCOUNTER — PATIENT MESSAGE (OUTPATIENT)
Dept: RHEUMATOLOGY | Facility: CLINIC | Age: 69
End: 2024-04-23
Payer: MEDICARE

## 2024-04-23 RX ORDER — LEVOTHYROXINE SODIUM 88 UG/1
88 TABLET ORAL
Qty: 90 TABLET | Refills: 3 | Status: SHIPPED | OUTPATIENT
Start: 2024-04-23

## 2024-04-23 NOTE — TELEPHONE ENCOUNTER
No care due was identified.  Health [facility] Embedded Care Due Messages. Reference number: [id number].   4/23/2024 10:48:03 AM CDT

## 2024-04-24 NOTE — TELEPHONE ENCOUNTER
Refill Decision Note   Jill Marquez  is requesting a refill authorization.  Brief Assessment and Rationale for Refill:  Approve     Medication Therapy Plan:         Comments:     Note composed:8:52 PM 04/23/2024

## 2024-04-24 NOTE — TELEPHONE ENCOUNTER
No care due was identified.  [facility] Embedded Care Due Messages. Reference number: [id number].   4/24/2024 8:48:24 AM CDT

## 2024-04-25 RX ORDER — EZETIMIBE 10 MG/1
10 TABLET ORAL
Qty: 90 TABLET | Refills: 1 | Status: SHIPPED | OUTPATIENT
Start: 2024-04-25

## 2024-04-25 NOTE — TELEPHONE ENCOUNTER
"Refill Routing Note   Medication(s) are not appropriate for processing by [facility] for the following reason(s):        New or recently adjusted medication: previous orders set to "Print"    [facility] action(s):  Defer               Appointments  past 12m or future 3m with PCP    Date Provider   Last Visit   2/23/2024 Dain Smith MD   Next Visit   Visit date not found Dain Smith MD   ED visits in past 90 days: 0        Note composed:7:48 PM 04/24/2024          "

## 2024-05-08 ENCOUNTER — LAB VISIT (OUTPATIENT)
Dept: LAB | Facility: HOSPITAL | Age: 69
End: 2024-05-08
Attending: INTERNAL MEDICINE
Payer: MEDICARE

## 2024-05-08 DIAGNOSIS — M47.819 SPONDYLARTHRITIS: ICD-10-CM

## 2024-05-08 LAB
ALBUMIN SERPL BCP-MCNC: 4.1 G/DL (ref 3.5–5.2)
ALP SERPL-CCNC: 53 U/L (ref 55–135)
ALT SERPL W/O P-5'-P-CCNC: 24 U/L (ref 10–44)
ANION GAP SERPL CALC-SCNC: 10 MMOL/L (ref 8–16)
AST SERPL-CCNC: 31 U/L (ref 10–40)
BASOPHILS # BLD AUTO: 0.05 K/UL (ref 0–0.2)
BASOPHILS NFR BLD: 1.3 % (ref 0–1.9)
BILIRUB SERPL-MCNC: 0.9 MG/DL (ref 0.1–1)
BUN SERPL-MCNC: 13 MG/DL (ref 8–23)
CALCIUM SERPL-MCNC: 10.2 MG/DL (ref 8.7–10.5)
CHLORIDE SERPL-SCNC: 100 MMOL/L (ref 95–110)
CO2 SERPL-SCNC: 28 MMOL/L (ref 23–29)
CREAT SERPL-MCNC: 0.7 MG/DL (ref 0.5–1.4)
CRP SERPL-MCNC: 3 MG/L (ref 0–8.2)
DIFFERENTIAL METHOD BLD: ABNORMAL
EOSINOPHIL # BLD AUTO: 0.1 K/UL (ref 0–0.5)
EOSINOPHIL NFR BLD: 2.3 % (ref 0–8)
ERYTHROCYTE [DISTWIDTH] IN BLOOD BY AUTOMATED COUNT: 13.1 % (ref 11.5–14.5)
ERYTHROCYTE [SEDIMENTATION RATE] IN BLOOD BY PHOTOMETRIC METHOD: 4 MM/HR (ref 0–36)
EST. GFR  (NO RACE VARIABLE): >60 ML/MIN/1.73 M^2
GLUCOSE SERPL-MCNC: 86 MG/DL (ref 70–110)
HCT VFR BLD AUTO: 39.9 % (ref 37–48.5)
HGB BLD-MCNC: 13.1 G/DL (ref 12–16)
IMM GRANULOCYTES # BLD AUTO: 0.01 K/UL (ref 0–0.04)
IMM GRANULOCYTES NFR BLD AUTO: 0.3 % (ref 0–0.5)
LYMPHOCYTES # BLD AUTO: 2 K/UL (ref 1–4.8)
LYMPHOCYTES NFR BLD: 51.1 % (ref 18–48)
MCH RBC QN AUTO: 32.4 PG (ref 27–31)
MCHC RBC AUTO-ENTMCNC: 32.8 G/DL (ref 32–36)
MCV RBC AUTO: 99 FL (ref 82–98)
MONOCYTES # BLD AUTO: 0.5 K/UL (ref 0.3–1)
MONOCYTES NFR BLD: 12.1 % (ref 4–15)
NEUTROPHILS # BLD AUTO: 1.3 K/UL (ref 1.8–7.7)
NEUTROPHILS NFR BLD: 32.9 % (ref 38–73)
NRBC BLD-RTO: 0 /100 WBC
PLATELET # BLD AUTO: 219 K/UL (ref 150–450)
PMV BLD AUTO: 11.4 FL (ref 9.2–12.9)
POTASSIUM SERPL-SCNC: 4.5 MMOL/L (ref 3.5–5.1)
PROT SERPL-MCNC: 7.2 G/DL (ref 6–8.4)
RBC # BLD AUTO: 4.04 M/UL (ref 4–5.4)
SODIUM SERPL-SCNC: 138 MMOL/L (ref 136–145)
WBC # BLD AUTO: 3.97 K/UL (ref 3.9–12.7)

## 2024-05-08 PROCEDURE — 86140 C-REACTIVE PROTEIN: CPT | Performed by: INTERNAL MEDICINE

## 2024-05-08 PROCEDURE — 36415 COLL VENOUS BLD VENIPUNCTURE: CPT | Mod: PN | Performed by: INTERNAL MEDICINE

## 2024-05-08 PROCEDURE — 80053 COMPREHEN METABOLIC PANEL: CPT | Performed by: INTERNAL MEDICINE

## 2024-05-08 PROCEDURE — 85025 COMPLETE CBC W/AUTO DIFF WBC: CPT | Performed by: INTERNAL MEDICINE

## 2024-05-08 PROCEDURE — 85652 RBC SED RATE AUTOMATED: CPT | Performed by: INTERNAL MEDICINE

## 2024-05-13 ENCOUNTER — TELEPHONE (OUTPATIENT)
Dept: OTOLARYNGOLOGY | Facility: CLINIC | Age: 69
End: 2024-05-13
Payer: MEDICARE

## 2024-05-13 NOTE — TELEPHONE ENCOUNTER
Spoke to pt who states she has the same issue as before. C/o pain in nose once again.Appt scheduled for 05/14/24 at 12:45. Voiced understanding.

## 2024-05-15 ENCOUNTER — OFFICE VISIT (OUTPATIENT)
Dept: RHEUMATOLOGY | Facility: CLINIC | Age: 69
End: 2024-05-15
Payer: MEDICARE

## 2024-05-15 VITALS
WEIGHT: 121.25 LBS | HEIGHT: 63 IN | DIASTOLIC BLOOD PRESSURE: 83 MMHG | SYSTOLIC BLOOD PRESSURE: 135 MMHG | BODY MASS INDEX: 21.48 KG/M2 | HEART RATE: 70 BPM

## 2024-05-15 DIAGNOSIS — M47.819 PERIPHERAL SPONDYLOARTHRITIS: Primary | ICD-10-CM

## 2024-05-15 DIAGNOSIS — L40.50 PSA (PSORIATIC ARTHRITIS): ICD-10-CM

## 2024-05-15 DIAGNOSIS — M54.2 NECK PAIN: ICD-10-CM

## 2024-05-15 DIAGNOSIS — M47.819 SPONDYLARTHRITIS: Primary | ICD-10-CM

## 2024-05-15 PROCEDURE — 99214 OFFICE O/P EST MOD 30 MIN: CPT | Mod: S$PBB,,, | Performed by: INTERNAL MEDICINE

## 2024-05-15 PROCEDURE — 99215 OFFICE O/P EST HI 40 MIN: CPT | Mod: PBBFAC | Performed by: INTERNAL MEDICINE

## 2024-05-15 PROCEDURE — 99999 PR PBB SHADOW E&M-EST. PATIENT-LVL V: CPT | Mod: PBBFAC,,, | Performed by: INTERNAL MEDICINE

## 2024-05-15 RX ORDER — ETANERCEPT 50 MG/ML
50 SOLUTION SUBCUTANEOUS WEEKLY
Qty: 12 ML | Refills: 1 | Status: SHIPPED | OUTPATIENT
Start: 2024-05-15

## 2024-05-15 RX ORDER — FOLIC ACID 1 MG/1
1 TABLET ORAL DAILY
Qty: 90 TABLET | Refills: 3 | Status: SHIPPED | OUTPATIENT
Start: 2024-05-15 | End: 2025-05-15

## 2024-05-15 RX ORDER — METHOTREXATE 2.5 MG/1
10 TABLET ORAL
Qty: 16 TABLET | Refills: 1 | Status: SHIPPED | OUTPATIENT
Start: 2024-05-15 | End: 2024-05-15 | Stop reason: SDUPTHER

## 2024-05-15 RX ORDER — METHOTREXATE 2.5 MG/1
10 TABLET ORAL
Qty: 16 TABLET | Refills: 1 | Status: SHIPPED | OUTPATIENT
Start: 2024-05-15

## 2024-05-15 ASSESSMENT — ROUTINE ASSESSMENT OF PATIENT INDEX DATA (RAPID3)
PATIENT GLOBAL ASSESSMENT SCORE: 0.5
AM STIFFNESS SCORE: 0, NO
FATIGUE SCORE: 0.5
TOTAL RAPID3 SCORE: 0.33
MDHAQ FUNCTION SCORE: 0
PAIN SCORE: 0.5
PSYCHOLOGICAL DISTRESS SCORE: 3.3

## 2024-05-15 NOTE — PROGRESS NOTES
Subjective:      Patient ID: Jill Marquez is a 68 y.o. female.    Chief Complaint: Peripheral spondyloarthritis v. Pseudo-RA CPPD     HPI woke with neck pain/headache this am. Seeing Vanessa Millard in PM& R who is doing trigger point injections, considered Botox but pt deferred. Hands are stable. No rash.   Review of Systems   Constitutional:  Negative for appetite change, fatigue, fever and unexpected weight change.   HENT:  Negative for mouth sores and trouble swallowing.    Eyes:  Negative for redness and visual disturbance.   Respiratory:  Negative for cough, shortness of breath and wheezing.    Cardiovascular:  Negative for chest pain and palpitations.   Gastrointestinal:  Negative for abdominal pain, anal bleeding, blood in stool, constipation, diarrhea, nausea and vomiting.   Genitourinary:  Negative for dysuria, frequency, genital sores and urgency.   Musculoskeletal:  Negative for arthralgias, back pain, gait problem, joint swelling, myalgias, neck pain and neck stiffness.   Skin:  Negative for rash.   Neurological:  Positive for headaches. Negative for weakness and numbness.   Hematological:  Negative for adenopathy. Does not bruise/bleed easily.   Psychiatric/Behavioral:  Negative for sleep disturbance. The patient is not nervous/anxious.         Objective:   There were no vitals taken for this visit.  Physical Exam      3/20/2023 6/20/2023 10/16/2023 2/19/2024   Tender (HUMPHRIES-28) 0 / 28  0 / 28  0 / 28  0 / 28    Swollen (HUMPHRIES-28) 5 / 28 5 / 28 5 / 28 5 / 28    Provider Global 10 mm 10 mm 10 mm 5 mm   Patient Global 10 mm 5 mm 10 mm 5 mm   ESR 1 mm/hr 1 mm/hr 0 mm/hr 1 mm/hr   CRP 0.4 mg/L 0.3 mg/L 0.4 mg/L 0.4 mg/L   HUMPHRIES-28 (ESR) 0.77 (Remission) 0.7 (Remission) -- 0.7 (Remission)   HUMPHRIES-28 (CRP) 1.85 (Remission) 1.75 (Remission) 1.85 (Remission) 1.78 (Remission)   CDAI Score 7  6.5  7  6       Latest Reference Range & Units 05/08/24 08:23   WBC 3.90 - 12.70 K/uL 3.97   RBC 4.00 - 5.40 M/uL 4.04    Hemoglobin 12.0 - 16.0 g/dL 13.1   Hematocrit 37.0 - 48.5 % 39.9   MCV 82 - 98 fL 99 (H)   MCH 27.0 - 31.0 pg 32.4 (H)   MCHC 32.0 - 36.0 g/dL 32.8   RDW 11.5 - 14.5 % 13.1   Platelet Count 150 - 450 K/uL 219   MPV 9.2 - 12.9 fL 11.4   Gran % 38.0 - 73.0 % 32.9 (L)   Lymph % 18.0 - 48.0 % 51.1 (H)   Mono % 4.0 - 15.0 % 12.1   Eos % 0.0 - 8.0 % 2.3   Basophil % 0.0 - 1.9 % 1.3   Immature Granulocytes 0.0 - 0.5 % 0.3   Gran # (ANC) 1.8 - 7.7 K/uL 1.3 (L)   Lymph # 1.0 - 4.8 K/uL 2.0   Mono # 0.3 - 1.0 K/uL 0.5   Eos # 0.0 - 0.5 K/uL 0.1   Baso # 0.00 - 0.20 K/uL 0.05   Immature Grans (Abs) 0.00 - 0.04 K/uL 0.01   nRBC 0 /100 WBC 0   Differential Method  Automated   Sed Rate 0 - 36 mm/Hr 4   Sodium 136 - 145 mmol/L 138   Potassium 3.5 - 5.1 mmol/L 4.5   Chloride 95 - 110 mmol/L 100   CO2 23 - 29 mmol/L 28   Anion Gap 8 - 16 mmol/L 10   BUN 8 - 23 mg/dL 13   Creatinine 0.5 - 1.4 mg/dL 0.7   eGFR >60 mL/min/1.73 m^2 >60.0   Glucose 70 - 110 mg/dL 86   Calcium 8.7 - 10.5 mg/dL 10.2   ALP 55 - 135 U/L 53 (L)   PROTEIN TOTAL 6.0 - 8.4 g/dL 7.2   Albumin 3.5 - 5.2 g/dL 4.1   BILIRUBIN TOTAL 0.1 - 1.0 mg/dL 0.9   AST 10 - 40 U/L 31   ALT 10 - 44 U/L 24   CRP 0.0 - 8.2 mg/L 3.0   (H): Data is abnormally high  (L): Data is abnormally low     Assessment:     Peripheral spondyloarthritis v. Pseudo-RA CPPD : TJ 0  SJ 4  Pt global 5  ESR 4 CRP 3 HUMPHRIES 28 1.6   RVH64-WQV 2.09 (both remission)  CDAI  DAPSA  TJ 0 SJ  4 Pt global 0.5 Pt pain 0.5 CRP 0.03= 5.03(LDA)    Neck pain, myofascial follows with Dr. Millard   OA hands   Left trochanteric burstis/gluteus medius tendinitis resolved  S/p Right trochanteric bursitis, resolved post injection 6/14/22 resolved  Left pes planovalgus, posterior tibial tendinitis/probable tear, plantar fasciitis/heel spur, and calcific Achilles tendinitis. All resolved with orthosis  Hyperlipidemia  8/8/23  EH  135/83  CPK intermittently elevated. On pravastatin, atorvastatin and rosuvastatin; now  on ezetimibe.  Chronic intermittent  mild hyperbilirubinemia, likely Gilbert's resolved.   Burning pain left post thigh only with sittingiimproved with piriformis injection  increased ck on atorvastatin, previously same with pravastatin and rosuvastatin    Plan:   Cervical spine x-ray  Ref to PT for neck  F/u Dr. Millard in PM & R  Continue Enbrel SureClick 50mg sc q 7 days  Add methotrexate 10mg po once a week with folic acid 1mg daily now has only 2 drinks/wk  ACR handout provided, risks and benefits discussed.   CBC, CMP, CK in 1 month  CBC, CMP, ESR, CRP CK, HBV DNA CK  monitoring q 3 months  RTC 6 months

## 2024-05-15 NOTE — PROGRESS NOTES
5/13/2024     7:00 AM   Rapid3 Question Responses and Scores   MDHAQ Score 0   Psychologic Score 3.3   Pain Score 0.5   When you awakened in the morning OVER THE LAST WEEK, did you feel stiff? No   Fatigue Score 0.5   Global Health Score 0.5   RAPID3 Score 0.33        Answers submitted by the patient for this visit:  Rheumatology Questionnaire (Submitted on 5/13/2024)  fever: No  eye redness: No  mouth sores: No  headaches: Yes  shortness of breath: No  chest pain: No  trouble swallowing: No  diarrhea: No  constipation: No  unexpected weight change: No  genital sore: No  dysuria: No  During the last 3 days, have you had a skin rash?: No  Bruises or bleeds easily: No  cough: No

## 2024-05-16 ENCOUNTER — HOSPITAL ENCOUNTER (OUTPATIENT)
Dept: RADIOLOGY | Facility: HOSPITAL | Age: 69
Discharge: HOME | End: 2024-05-16
Attending: INTERNAL MEDICINE
Payer: MEDICARE

## 2024-05-16 ENCOUNTER — TELEPHONE (OUTPATIENT)
Dept: RHEUMATOLOGY | Facility: CLINIC | Age: 69
End: 2024-05-16
Payer: MEDICARE

## 2024-05-16 DIAGNOSIS — M47.819 SPONDYLARTHRITIS: ICD-10-CM

## 2024-05-16 DIAGNOSIS — M43.12 SPONDYLOLISTHESIS OF CERVICAL REGION: Primary | ICD-10-CM

## 2024-05-16 DIAGNOSIS — M54.2 NECK PAIN: ICD-10-CM

## 2024-05-16 PROCEDURE — 72040 X-RAY EXAM NECK SPINE 2-3 VW: CPT | Mod: 26,,, | Performed by: RADIOLOGY

## 2024-05-16 PROCEDURE — 72040 X-RAY EXAM NECK SPINE 2-3 VW: CPT | Mod: TC,PN

## 2024-05-16 NOTE — PROGRESS NOTES
"DATE: 5/28/2024  PATIENT: Jill Marquez    Attending Physician: João Bruno M.D.    HISTORY:  Jill Marquez is a 68 y.o. female who returns to me today for follow up.  She was last seen by me 11/6/2023.  Today she is doing well but notes left neck pain and stiffness along with jaw pain that she believes is from grinding her teeth. She also reports frequent headaches due to the neck and jaw pain. She has stated PT for her neck and treated with Advil and Zanaflex.   The patient denies myelopathic symptoms such as handwriting changes or difficulty with buttons/coins/keys. Denies perineal paresthesias, bowel/bladder dysfunction.    PMH/PSH/FamHx/SocHx:  Unchanged from prior visit    ROS:  REVIEW OF SYSTEMS:  Constitution: Negative. Negative for chills, fever and night sweats.   HENT: Negative for congestion and headaches.    Eyes: Negative for blurred vision, left vision loss and right vision loss.   Cardiovascular: Negative for chest pain and syncope.   Respiratory: Negative for cough and shortness of breath.    Endocrine: Negative for polydipsia, polyphagia and polyuria.   Hematologic/Lymphatic: Negative for bleeding problem. Does not bruise/bleed easily.   Skin: Negative for dry skin, itching and rash.   Musculoskeletal: Negative for falls and muscle weakness.   Gastrointestinal: Negative for abdominal pain and bowel incontinence.   Allergic/Immunologic: Negative for hives and persistent infections.  Genitourinary: Negative for urinary retention/incontinence and nocturia.   Neurological: negative for disturbances in coordination, no myelopathic symptoms such as handwriting changes or difficulty with buttons, coins, keys or small objects. No loss of balance and seizures.   Psychiatric/Behavioral: Negative for depression. The patient does not have insomnia.   Denies myelopathic symptoms, perineal paresthesias, bowel or bladder incontinence    EXAM:  Ht 5' 3" (1.6 m)   Wt 54.6 kg (120 lb 7.7 oz)   BMI 21.34 " kg/m²     My physical examination was notable for the following findings:     Normal station and gait, no difficulty with toe or heel walk.   Cervical skin negative for rashes, lesions, hairy patches and surgical scars.   Cervical range of motion is acceptable.  There is mild tenderness to palpatio.  No pain with range of motion of the bilateral shoulders and elbows.  Normal bulk and contour of the bilateral hands.    Bilateral hands warm and well perfused, radial pulses 2+ bilaterally.   Normal strength and tone in all major motor groups in the bilateral upper and lower extremities. Normal sensation to light touch in the C5-T1 and L2-S1 dermatomes bilaterally.   Deep tendon reflexes symmetric 2+ in the bilateral upper and lower extremities.  Negative Inverted Radial Reflex and Choe's bilaterally. Negative Babinski bilaterally.     IMAGING:    Today I personally re-reviewed AP, Lat and Flex/Ex  upright C-spine films that demonstrate retrolisthesis of C5 on C6 with moderate DDD throughout.    AP, Lat and Flex/Ex  upright L-spine that demonstrate thoracolumbar scoliosis with convexity to the left. Moderate DDD throughout.      Lumbar MRI shows moderate spinal stenosis at L3-4 and moderate neural foraminal narrowing at L3-4 through L5-S1    Body mass index is 21.34 kg/m².    Hemoglobin A1C   Date Value Ref Range Status   06/29/2021 5.1 4.0 - 5.6 % Final     Comment:     ADA Screening Guidelines:  5.7-6.4%  Consistent with prediabetes  >or=6.5%  Consistent with diabetes    High levels of fetal hemoglobin interfere with the HbA1C  assay. Heterozygous hemoglobin variants (HbS, HgC, etc)do  not significantly interfere with this assay.   However, presence of multiple variants may affect accuracy.     04/30/2020 5.2 4.0 - 5.6 % Final     Comment:     ADA Screening Guidelines:  5.7-6.4%  Consistent with prediabetes  >or=6.5%  Consistent with diabetes  High levels of fetal hemoglobin interfere with the HbA1C  assay.  Heterozygous hemoglobin variants (HbS, HgC, etc)do  not significantly interfere with this assay.   However, presence of multiple variants may affect accuracy.     08/13/2019 5.1 4.0 - 5.6 % Final     Comment:     ADA Screening Guidelines:  5.7-6.4%  Consistent with prediabetes  >or=6.5%  Consistent with diabetes  High levels of fetal hemoglobin interfere with the HbA1C  assay. Heterozygous hemoglobin variants (HbS, HgC, etc)do  not significantly interfere with this assay.   However, presence of multiple variants may affect accuracy.           ASSESSMENT/PLAN:    Jill was seen today for neck pain.    Diagnoses and all orders for this visit:    Spondylolisthesis of cervical region  -     Ambulatory referral/consult to Orthopedics  -     MRI Cervical Spine Without Contrast; Future      Today we discussed at length all of the different treatment options including anti-inflammatories, acetaminophen, rest, ice, heat, physical therapy including strengthening and stretching exercises, home exercises, ROM, aerobic conditioning, aqua therapy, other modalities including ultrasound, massage, and dry needling, epidural steroid injections and finally surgical intervention.  Cervical MRI ordered, I will call with results and have her follow up with pain mgmt for botox vs trigger point injections.

## 2024-05-17 ENCOUNTER — HOSPITAL ENCOUNTER (OUTPATIENT)
Dept: RADIOLOGY | Facility: HOSPITAL | Age: 69
Discharge: HOME | End: 2024-05-17
Attending: INTERNAL MEDICINE
Payer: MEDICARE

## 2024-05-17 DIAGNOSIS — M43.12 SPONDYLOLISTHESIS OF CERVICAL REGION: ICD-10-CM

## 2024-05-17 PROCEDURE — 72040 X-RAY EXAM NECK SPINE 2-3 VW: CPT | Mod: TC,PN

## 2024-05-17 PROCEDURE — 72040 X-RAY EXAM NECK SPINE 2-3 VW: CPT | Mod: 26,,, | Performed by: RADIOLOGY

## 2024-05-20 ENCOUNTER — CLINICAL SUPPORT (OUTPATIENT)
Dept: REHABILITATION | Facility: HOSPITAL | Age: 69
End: 2024-05-20
Payer: MEDICARE

## 2024-05-20 DIAGNOSIS — M54.2 NECK PAIN: ICD-10-CM

## 2024-05-20 DIAGNOSIS — M53.82 DECREASED RANGE OF MOTION OF INTERVERTEBRAL DISCS OF CERVICAL SPINE: Primary | ICD-10-CM

## 2024-05-20 PROBLEM — N39.46 URINARY INCONTINENCE, MIXED: Status: RESOLVED | Noted: 2023-02-16 | Resolved: 2024-05-20

## 2024-05-20 PROBLEM — M62.89 PELVIC FLOOR DYSFUNCTION IN FEMALE: Status: RESOLVED | Noted: 2023-02-16 | Resolved: 2024-05-20

## 2024-05-20 PROBLEM — M25.60 DECREASED RANGE OF MOTION: Status: RESOLVED | Noted: 2023-01-13 | Resolved: 2024-05-20

## 2024-05-20 PROCEDURE — 97110 THERAPEUTIC EXERCISES: CPT | Mod: PN

## 2024-05-20 PROCEDURE — 97162 PT EVAL MOD COMPLEX 30 MIN: CPT | Mod: PN

## 2024-05-20 PROCEDURE — 97140 MANUAL THERAPY 1/> REGIONS: CPT | Mod: PN

## 2024-05-20 NOTE — PLAN OF CARE
[facility]  Physical Therapy Initial Evaluation    Date: 5/20/2024   Name: Jill Marquez  Clinic Number: [id number]    Therapy Diagnosis:   Encounter Diagnoses   Name Primary?    Neck pain     Decreased range of motion of intervertebral discs of cervical spine Yes     Physician: Nicanor Campos MD    Physician Orders: PT Eval and Treat   Medical Diagnosis from Referral: M54.2 (ICD-10-CM) - Neck pain   Evaluation Date: 5/20/2024  Authorization Period Expiration: 5/15/2024  Plan of Care Expiration: 8/20/2024  Visit # / Visits authorized: 1/ 1    PN DUE: 6/20/2024    Time In: 11:00 AM   Time Out: 11:45 AM   Total Appointment Time (timed & untimed codes): 45 minutes    Precautions: Standard    Subjective   Date of onset: Patient reports she has been having a jaw issue. She was given a retainer. Most of her pain is in the bottom of her skull. She is having really bad headaches. She reports neck pain for at least 3 years, but the past 2 weeks it has got worse.      History of current condition - Jill reports: Initially injections helped, but the last round didn't help. She is seeing a spine Doctor on the 28th. The pain is so bad she is nauseated.        Medical History:   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    HTN (hypertension) 07/23/2012    Hyperlipidemia     Hypothyroid 07/23/2012       Surgical History:   Jill Marquez  has a past surgical history that includes Tonsillectomy; eyelid surgery; Eye surgery; Cosmetic surgery; Tonsillectomy; Colonoscopy (N/A, 01/25/2016); Oophorectomy (Bilateral, 2015); Hysterectomy (2004); Colonoscopy (N/A, 11/08/2022); and Transforaminal epidural injection of steroid (Left, 05/26/2023).    Medications:   Jill has a current medication list which includes the following prescription(s): amlodipine, azelastine, citalopram, ergocalciferol (vitamin d2), estradiol,  enbrel sureclick, ezetimibe, fish oil-omega-3 fatty acids, fluticasone propionate, folic acid, gabapentin, hydrochlorothiazide, levothyroxine, methotrexate, psyllium husk, sennosides, tizanidine, and triamcinolone acetonide 0.1%.    Allergies:   Review of patient's allergies indicates:   Allergen Reactions    Atorvastatin      Myalgia, increased ck    Crestor [rosuvastatin] Other (See Comments)     Elevated liver enzymes     Pravastatin      Myalgia, elevated ck    Sulfasalazine Nausea Only     Malaise, nausea,    Leflunomide Rash        Imaging, x-ray: EXAMINATION:  XR CERVICAL SPINE FLEXION  AND EXTENSION ONLY     CLINICAL HISTORY:  Spondylolisthesis, cervical region     TECHNIQUE:  Flexion and extension lateral views of the cervical spine.  Comparison is made to May 16, 2024.     COMPARISON:  05/16/2024.     FINDINGS:  Reversal the normal cervical lordosis with associated degenerative disc disease.  On extension there is 2 mm of retrolisthesis C5 on C6 which improves on flexion.  On flexion there is 2 mm anterolisthesis C4 on C5 which improves on extension.  Vertebral heights are maintained.     Impression:     As above.        Electronically signed by:Watson Strong  Date:                                            05/17/2024  Time:                                           13:39    Prior Therapy: not for her neck   Social History: lives alone  Occupation: retired   Prior Level of Function: independent with all functional mobility and ADLs  Current Level of Function: modified independent with all functional mobility and ADLs secondary to being limited by pain and muscle tightness (cervical range of motion )    Pain:  Current 5/10, worst 10/10, best 5/10   Location: bilateral neck (bottom of the skull) and left jaw   Description: Aching  Aggravating Factors: continuous; doesn't know what aggravates it   Easing Factors: ice, heating pad, and advil     Patients goals: help with the pain     Objective  "      Observation: pt pleasant and appropriate throughout evaluation; A&O x 3     Posture: forward head posture     Cervical Range of Motion:    Degrees Pain   Flexion (50) 18 All motions hurt   Extension (75) 30    Right Rotation (75) 35    Left Rotation (75) 60    Right Side Bending (45) 12    Left Side Bending (45) 34       Shoulder Range of Motion: with in normal limits     Upper Extremity Strength  (R) UE  (L) UE    Shoulder flexion: 4/5 Shoulder flexion: 4/5   Shoulder Abduction: 4+/5 Shoulder abduction: 4+/5   Shoulder ER 4+/5 Shoulder ER 4+/5   Shoulder IR 4+/5 Shoulder IR 4+/5   Elbow flexion: 5/5 Elbow flexion: 5/5   Elbow extension: 5/5 Elbow extension: 5/5    5/5 : 5/5   Lower Trap 4+/5 Lower Trap 4+/5   Middle Trap 4+/5 Middle Trap 4+/5   Rhomboids 4+/5 Rhomboids 4+/5     Palpation: tender to palpation of bilateral upper trap, levator, scalenes, and sub occipitals       Sensation: numbness and tingling in bilateral upper extremities     Flexibility: decreased bilateral upper trap and levator muscle length     Limitation/Restriction for FOTO Survey    Therapist reviewed FOTO scores for Jill Marquez on 5/20/2024.   FOTO documents entered into EPIC - see Media section.    Score:          TREATMENT   Treatment Time In: 11:20 AM   Treatment Time Out: 11:45 AM   Total Treatment time (time-based codes) separate from Evaluation: 25 minutes    Jill received therapeutic exercises to develop strength, ROM, flexibility, and posture for 10 minutes including:  Chin tucks 3" x 10   Upper trap and levator stretch 2 x 30"   Pec door stretch 2 x 30"   Red theraband rows x 10     Jill received the following manual therapy techniques: Myofacial release were applied to the: cervical spine for 15 minutes, including:  Myofascial release of bilateral upper trap, levator, scalenes, and sub occipitals       Home Exercises and Patient Education Provided    Education provided:   Education/Self-Care provided: "   Patient educated on the impairments noted above and the effects of physical therapy intervention to improve overall condition and QOL.   Patient was educated on all the above exercise prior/during/after for proper posture, positioning, and execution for safe performance with home exercise program.   Patient educated on the importance of improved core and upper extremity strength in order to improve alignment of the spine and upper extremities with static positions and dynamic movement.     Written Home Exercises Provided: yes.  Exercises were reviewed and Jill was able to demonstrate them prior to the end of the session.  Jill demonstrated good  understanding of the education provided.     See EMR under Patient Instructions for exercises provided 5/20/2024.    Assessment   Jill is a 68 y.o. female referred to outpatient Physical Therapy with a medical diagnosis of M54.2 (ICD-10-CM) - Neck pain. The patient presents with impairments which include impairments list: ROM, strength, endurance, muscle length, posture, and sensation.  These impairments are limiting patient's ability to perform desired task without pain. Pt prognosis is Good due to personal factors and co-morbidities listed below. Pt will benefit from skilled outpatient Physical Therapy to address the deficits stated above and in the chart below, provide pt/family education, and to maximize pt's level of independence.     Patient prognosis is Good.   Patientt will benefit from skilled outpatient Physical Therapy to address the deficits stated above and in the chart below, provide patient /family education, and to maximize patientt's level of independence.     Plan of care discussed with patient: Yes  Patient's spiritual, cultural and educational needs considered and patient is agreeable to the plan of care and goals as stated below:     Anticipated Barriers for therapy: co-morbidities, pain, and exercise tolerance     Medical Necessity is  demonstrated by the following  History  Co-morbidities and personal factors that may impact the plan of care  LOW: no personal factors / co-morbidities   MODERATE: 1-2 personal factors / co-morbidities   HIGH: 3+ personal factors / co-morbidities    Moderate / High Support Documentation:   Co-morbidities   advanced age and HTN    Personal Factors:   age     Examination  Body Structures and Functions, activity limitations and participation restrictions that may impact the plan of care  LOW: addressing 1-2 elements   MODERATE: 3+ elements   HIGH: 4+ elements (please support below)    Moderate / High Support Documentation:   Body Regions/Systems/Functions:  ROM, strength, endurance, muscle length, posture, and sensation    Activity Limitations:  Pain with ADLs    Participation Restrictions:  ADL participation affected by pain and exercise restrictions due to pain     Activity limitations:   Learning and applying knowledge  no deficits    General Tasks and Commands  no deficits    Communication  no deficits    Mobility  lifting and carrying objects  fine hand use (grasping/picking up)    Self care  no deficits    Domestic Life  shopping  cooking  doing house work (cleaning house, washing dishes, laundry)    Interactions/Relationships  no deficits    Life Areas  no deficits    Community and Social Life  recreation and leisure     Clinical Presentation  LOW: stable   MODERATE: Evolving   HIGH: Unstable     Decision Making/ Complexity Score: moderate           Goals:  Short Term Goals:  4 weeks   Pain: Pt will demonstrate improved pain by reports of less than or equal to 7/10 worst pain on the verbal rating scale in order to progress toward maximal functional ability and improve QOL.   2.  Mobility: Patient will improve AROM of cervical spine to with in normal limits in order to return to maximal functional potential and improve quality of life.   3.  HEP: Patient will demonstrate independence with HEP in order to progress  toward functional independence.      Long Term Goals:  8 weeks   Pain: Pt will demonstrate improved pain by reports of less than or equal to 5/10 worst pain on the verbal rating scale in order to progress toward maximal functional ability and improve QOL.     Function: Patient will demonstrate improved function as indicated by a score of 60 on the FOTO.   3. Strength: Patient will improve strength to 5/5 in bilateral upper extremities in order to improve functional independence and quality of life.   4. Patient will return to normal ADL's, community involvement, and recreational activities with less than or equal to 3/10 pain and maximal function.       Goals Key:  PC= progressing/continue;        PM= partially met;             DC= discontinue      Plan   Plan of care Certification: 5/20/2024 to 8/20/2024.    Outpatient Physical Therapy 2 times weekly for 10 weeks to include the following interventions: Cervical/Lumbar Traction, Electrical Stimulation IFC, ect, Manual Therapy, Moist Heat/ Ice, Neuromuscular Re-ed, Patient Education, Self Care, Therapeutic Activities, Therapeutic Exercise, and Ultrasound. And any other therapies and modalities as clinically indicated and appropriate, including but not limited to FDN and telehealth. Pt may be seen by PTA as a part of pt's care team.       Lelo Avila, PT, DPT  5/20/2024

## 2024-05-21 ENCOUNTER — OFFICE VISIT (OUTPATIENT)
Dept: OTOLARYNGOLOGY | Facility: CLINIC | Age: 69
End: 2024-05-21
Payer: MEDICARE

## 2024-05-21 ENCOUNTER — PATIENT MESSAGE (OUTPATIENT)
Dept: OTOLARYNGOLOGY | Facility: CLINIC | Age: 69
End: 2024-05-21

## 2024-05-21 VITALS — WEIGHT: 121.25 LBS | BODY MASS INDEX: 21.48 KG/M2 | HEIGHT: 63 IN

## 2024-05-21 DIAGNOSIS — M79.18 MYOFASCIAL PAIN: ICD-10-CM

## 2024-05-21 DIAGNOSIS — G57.00 PIRIFORMIS SYNDROME, UNSPECIFIED LATERALITY: ICD-10-CM

## 2024-05-21 DIAGNOSIS — J34.89 NASAL VESTIBULITIS: Primary | ICD-10-CM

## 2024-05-21 PROCEDURE — 99213 OFFICE O/P EST LOW 20 MIN: CPT | Mod: S$PBB,,, | Performed by: OTOLARYNGOLOGY

## 2024-05-21 PROCEDURE — 99999 PR PBB SHADOW E&M-EST. PATIENT-LVL II: CPT | Mod: PBBFAC,,, | Performed by: OTOLARYNGOLOGY

## 2024-05-21 PROCEDURE — 99212 OFFICE O/P EST SF 10 MIN: CPT | Mod: PBBFAC,PO | Performed by: OTOLARYNGOLOGY

## 2024-05-21 RX ORDER — TIZANIDINE 2 MG/1
2 TABLET ORAL NIGHTLY PRN
Qty: 30 TABLET | Refills: 1 | Status: SHIPPED | OUTPATIENT
Start: 2024-05-21

## 2024-05-21 NOTE — PROGRESS NOTES
"Referring Provider:    No referring provider defined for this encounter.  Subjective:   Patient: Jill Marquez [id number], :1955   Visit date:2024 12:51 PM    Chief Complaint:  Follow-up (Pt has come for a recheck of the left nostril possible scab )    HPI:    Prior notes reviewed by myself.  Clinical documentation obtained by nursing staff reviewed.     68-year-old female presents for evaluation of inflammation and scabbing in her left nostril.  This has been an issue for the last 3 weeks.  Occasionally the scab will come off with a Q-tip but it returns.  She has tried topical Neosporin without significant relief.  She has not been on any oral antibiotics or other treatment    24 update:  She has been noticing some crusting/possible scab in her nasal vestibule on the left side and wanted to have it checked out.  She denies any pain, bleeding.        Objective:     Physical Exam:  Vitals:  Ht 5' 3" (1.6 m)   Wt 55 kg (121 lb 4.1 oz)   BMI 21.48 kg/m²   General appearance:  Well developed, well nourished    Ears:  Otoscopy of external auditory canals and tympanic membranes was normal, clinical speech reception thresholds grossly intact, no mass/lesion of auricle.    Nose:  No masses/lesions of external nose, nasal vestibule skin dry with very subtle crusting anteriorly, septum, and turbinates were within normal limits.    Mouth:  No mass/lesion of lips, teeth, gums, hard/soft palate, tongue, tonsils, or oropharynx.    Neck & Lymphatics:  No cervical lymphadenopathy, no neck mass/crepitus/ asymmetry, trachea is midline, no thyroid enlargement/tenderness/mass.        [x]  Data Reviewed:    Lab Results   Component Value Date    WBC 3.97 2024    HGB 13.1 2024    HCT 39.9 2024    MCV 99 (H) 2024    EOSINOPHIL 2.3 2024             Assessment & Plan:   Nasal vestibulitis          She has tried and failed topical Neosporin, so I recommended a course of oral Bactrim as well as " changing her ointment to Bactroban.  She does have an allergy listed as sulfasalazine, but her reaction to this was only nausea.  We agreed to move forward with the aforementioned plan and we did discuss potential for reactions to these medications including Galdamez Gaurang syndrome.    5/21/24 update:  Recommend using her mupirocin b.i.d. for 1 week.  If no resolution, she understands she should schedule a follow-up appointment.    Cornelius Santillan M.D.  Department of Otolaryngology - Head & Neck Surgery  [id number] [place] [address].  [place] [address]  P: [phone]  F: [phone]        DISCLAIMER: This note was prepared with [employer] voice recognition transcription software. Garbled syntax, mangled pronouns, and other bizarre constructions may be attributed to that software system. While efforts were made to correct any mistakes made by this voice recognition program, some errors and/or omissions may remain in the note that were missed when the note was originally created.

## 2024-05-28 ENCOUNTER — OFFICE VISIT (OUTPATIENT)
Dept: ORTHOPEDICS | Facility: CLINIC | Age: 69
End: 2024-05-28
Payer: MEDICARE

## 2024-05-28 VITALS — WEIGHT: 120.5 LBS | BODY MASS INDEX: 21.35 KG/M2 | HEIGHT: 63 IN

## 2024-05-28 DIAGNOSIS — M43.12 SPONDYLOLISTHESIS OF CERVICAL REGION: ICD-10-CM

## 2024-05-28 PROCEDURE — 99214 OFFICE O/P EST MOD 30 MIN: CPT | Mod: S$PBB,,, | Performed by: REGISTERED NURSE

## 2024-05-28 PROCEDURE — 99999 PR PBB SHADOW E&M-EST. PATIENT-LVL IV: CPT | Mod: PBBFAC,,, | Performed by: REGISTERED NURSE

## 2024-05-28 PROCEDURE — 99214 OFFICE O/P EST MOD 30 MIN: CPT | Mod: PBBFAC | Performed by: REGISTERED NURSE

## 2024-05-30 ENCOUNTER — CLINICAL SUPPORT (OUTPATIENT)
Dept: REHABILITATION | Facility: HOSPITAL | Age: 69
End: 2024-05-30
Payer: MEDICARE

## 2024-05-30 DIAGNOSIS — M53.82 DECREASED RANGE OF MOTION OF INTERVERTEBRAL DISCS OF CERVICAL SPINE: Primary | ICD-10-CM

## 2024-05-30 PROCEDURE — 97530 THERAPEUTIC ACTIVITIES: CPT | Mod: PN

## 2024-05-30 PROCEDURE — 97140 MANUAL THERAPY 1/> REGIONS: CPT | Mod: PN

## 2024-05-30 NOTE — PROGRESS NOTES
"[facility]   Physical Therapy Treatment Note      Name: Jill Marquez  Clinic Number: [id number]    Therapy Diagnosis:   Encounter Diagnosis   Name Primary?    Decreased range of motion of intervertebral discs of cervical spine Yes     Physician: Nicanor Campos MD    Visit Date: 5/30/2024    Physician Orders: PT Eval and Treat   Medical Diagnosis from Referral: M54.2 (ICD-10-CM) - Neck pain   Evaluation Date: 5/20/2024  Authorization Period Expiration: 5/15/2024  Plan of Care Expiration: 8/20/2024  Visit # / Visits authorized: 1/ 20     PN DUE: 6/20/2024     Time In: 10:30 AM   Time Out: 11:15 AM   Total Appointment Time (timed & untimed codes): 45 minutes (only billed 25 minutes of 1:1 time)     Precautions: Standard     PTA Visit #: 0/5     Subjective     Patient reports: been having bad headaches and nauseous.  She was compliant with home exercise program.  Response to previous treatment: no change reported   Functional change: no change reported     Pain: 4/10  Location: headache     Objective      Objective Measures updated at progress report unless specified.     Treatment     Jill received the treatments listed below:      therapeutic exercises to develop strength, ROM, flexibility, and posture for 20 minutes including:  UBE for upper extremity strength, range of motion, and endurance for 3 min forward/backward  Chin tucks 3" x 20   Upper trap and levator stretch 2 x 30"   Pec door stretch 2 x 30"     Add next visit:   Red theraband shoulder adduction   Cervical range of motion with ball on wall     manual therapy techniques: Myofacial release were applied to the: cervical spine for 15 minutes, including:  Myofascial release of bilateral upper trap, levator, scalenes, and sub occipitals    therapeutic activities to improve functional performance for 10 minutes, including:  Red theraband rows 2 x 10   Red theraband shoulder ext 2 x 10   Red theraband shoulder ER 2 x 10 "       Patient Education and Home Exercises       Education provided:   Patient educated on the impairments noted above and the effects of physical therapy intervention to improve overall condition and QOL.   Patient was educated on all the above exercise prior/during/after for proper posture, positioning, and execution for safe performance with home exercise program.   Patient educated on the importance of improved core and upper extremity strength in order to improve alignment of the spine and upper extremities with static positions and dynamic movement.     Written Home Exercises Provided: Patient instructed to cont prior HEP. Exercises were reviewed and Jill was able to demonstrate them prior to the end of the session.  Jill demonstrated good  understanding of the education provided. See Electronic Medical Record under Patient Instructions for exercises provided during therapy sessions    Assessment     Patient presents with continued headaches. Initiated plan of care for cervical range of motion and scapular strengthening. Myofascial release of cervical musculature was performed for increased muscle extensibility. Patient tolerated all exercises well with no complaints.     Jill Is progressing well towards her goals.   Patient prognosis is Good.     Patient will continue to benefit from skilled outpatient physical therapy to address the deficits listed in the problem list box on initial evaluation, provide pt/family education and to maximize pt's level of independence in the home and community environment.     Patient's spiritual, cultural and educational needs considered and pt agreeable to plan of care and goals.     Anticipated barriers to physical therapy: co-morbidities, pain, and exercise tolerance     Goals:   Short Term Goals:  4 weeks   Pain: Pt will demonstrate improved pain by reports of less than or equal to 7/10 worst pain on the verbal rating scale in order to progress toward maximal  functional ability and improve QOL.   2.  Mobility: Patient will improve AROM of cervical spine to with in normal limits in order to return to maximal functional potential and improve quality of life.   3.  HEP: Patient will demonstrate independence with HEP in order to progress toward functional independence.      Long Term Goals:  8 weeks   Pain: Pt will demonstrate improved pain by reports of less than or equal to 5/10 worst pain on the verbal rating scale in order to progress toward maximal functional ability and improve QOL.     Function: Patient will demonstrate improved function as indicated by a score of 60 on the FOTO.   3. Strength: Patient will improve strength to 5/5 in bilateral upper extremities in order to improve functional independence and quality of life.   4. Patient will return to normal ADL's, community involvement, and recreational activities with less than or equal to 3/10 pain and maximal function.       Goals Key:  PC= progressing/continue;        PM= partially met;             DC= discontinue    Plan     Plan of care Certification: 5/20/2024 to 8/20/2024.     Outpatient Physical Therapy 2 times weekly for 10 weeks to include the following interventions: Cervical/Lumbar Traction, Electrical Stimulation IFC, ect, Manual Therapy, Moist Heat/ Ice, Neuromuscular Re-ed, Patient Education, Self Care, Therapeutic Activities, Therapeutic Exercise, and Ultrasound. And any other therapies and modalities as clinically indicated and appropriate, including but not limited to FDN and telehealth. Pt may be seen by PTA as a part of pt's care team.     Lelo Avila, PT

## 2024-05-31 ENCOUNTER — PATIENT MESSAGE (OUTPATIENT)
Dept: ORTHOPEDICS | Facility: CLINIC | Age: 69
End: 2024-05-31
Payer: MEDICARE

## 2024-05-31 ENCOUNTER — OFFICE VISIT (OUTPATIENT)
Dept: OTOLARYNGOLOGY | Facility: CLINIC | Age: 69
End: 2024-05-31
Payer: MEDICARE

## 2024-05-31 ENCOUNTER — PATIENT MESSAGE (OUTPATIENT)
Dept: OTOLARYNGOLOGY | Facility: CLINIC | Age: 69
End: 2024-05-31

## 2024-05-31 VITALS — BODY MASS INDEX: 21.36 KG/M2 | WEIGHT: 120.56 LBS | HEIGHT: 63 IN

## 2024-05-31 DIAGNOSIS — J34.0: ICD-10-CM

## 2024-05-31 DIAGNOSIS — J34.89 NASAL VESTIBULITIS: Primary | ICD-10-CM

## 2024-05-31 PROCEDURE — 87075 CULTR BACTERIA EXCEPT BLOOD: CPT | Performed by: STUDENT IN AN ORGANIZED HEALTH CARE EDUCATION/TRAINING PROGRAM

## 2024-05-31 PROCEDURE — 99999 PR PBB SHADOW E&M-EST. PATIENT-LVL III: CPT | Mod: PBBFAC,,, | Performed by: STUDENT IN AN ORGANIZED HEALTH CARE EDUCATION/TRAINING PROGRAM

## 2024-05-31 PROCEDURE — 99213 OFFICE O/P EST LOW 20 MIN: CPT | Mod: PBBFAC | Performed by: STUDENT IN AN ORGANIZED HEALTH CARE EDUCATION/TRAINING PROGRAM

## 2024-05-31 PROCEDURE — 99214 OFFICE O/P EST MOD 30 MIN: CPT | Mod: S$PBB,,, | Performed by: STUDENT IN AN ORGANIZED HEALTH CARE EDUCATION/TRAINING PROGRAM

## 2024-05-31 RX ORDER — FLUTICASONE FUROATE 27.5 UG/1
2 SPRAY, METERED NASAL DAILY
Qty: 9.1 ML | Refills: 0 | Status: SHIPPED | OUTPATIENT
Start: 2024-05-31

## 2024-05-31 RX ORDER — SULFAMETHOXAZOLE AND TRIMETHOPRIM 800; 160 MG/1; MG/1
1 TABLET ORAL 2 TIMES DAILY
Qty: 20 TABLET | Refills: 0 | Status: SHIPPED | OUTPATIENT
Start: 2024-05-31 | End: 2024-06-10

## 2024-05-31 NOTE — PROGRESS NOTES
"Referring Provider:    No referring provider defined for this encounter.  Subjective:   Patient: Jill Marquez [id number], :1955   Visit date:2024 12:51 PM    Chief Complaint:  Other (Pain in L nostril scab. Patient states it feels like it is swollen. She states she has staph. She was seen by previous ENT. She was told it was ok but she states its swollen.)    HPI:    Prior notes reviewed by myself.  Clinical documentation obtained by nursing staff reviewed.     68-year-old female presents for evaluation of inflammation and scabbing in her left nostril.  This has been an issue for the last 3 weeks.  Occasionally the scab will come off with a Q-tip but it returns.  She has tried topical Neosporin without significant relief.  She has not been on any oral antibiotics or other treatment    24 update:  She has been noticing some crusting/possible scab in her nasal vestibule on the left side and wanted to have it checked out.  She denies any pain, bleeding.      24 update: over last few days the left nostril has gotten worse. Now with swelling and redness of the ala. Tender. No fevers. Crusting noted inside. Using mupirocin, flonase.       Objective:     Physical Exam:  Vitals:  Ht 5' 3" (1.6 m)   Wt 54.7 kg (120 lb 9.5 oz)   BMI 21.36 kg/m²   General appearance:  Well developed, well nourished    Ears:  Otoscopy of external auditory canals and tympanic membranes was normal, clinical speech reception thresholds grossly intact, no mass/lesion of auricle.    Nose:  No masses/lesions of external nose, nasal vestibule skin dry with small crust and dried purulence, the ala is mildly erythematous, swollen, and tender, no fluctuance     Mouth:  No mass/lesion of lips, teeth, gums, hard/soft palate, tongue, tonsils, or oropharynx.    Neck & Lymphatics:  No cervical lymphadenopathy, no neck mass/crepitus/ asymmetry, trachea is midline, no thyroid enlargement/tenderness/mass.        [x]  Data " Reviewed:    Lab Results   Component Value Date    WBC 3.97 05/08/2024    HGB 13.1 05/08/2024    HCT 39.9 05/08/2024    MCV 99 (H) 05/08/2024    EOSINOPHIL 2.3 05/08/2024             Assessment & Plan:   Nasal vestibulitis  -     CULTURE, AEROBIC  (SPECIFY SOURCE)  -     CULTURE, ANAEROBE    Cellulitis of ala nasi  -     CULTURE, AEROBIC  (SPECIFY SOURCE)  -     CULTURE, ANAEROBE    Other orders  -     fluticasone (FLONASE SENSIMIST) 27.5 mcg/actuation nasal spray; 2 sprays by Nasal route once daily.  Dispense: 9.1 mL; Refill: 0            She has tried and failed topical Neosporin, so I recommended a course of oral Bactrim as well as changing her ointment to Bactroban.  She does have an allergy listed as sulfasalazine, but her reaction to this was only nausea.  We agreed to move forward with the aforementioned plan and we did discuss potential for reactions to these medications including Galdamez Gaurang syndrome.    5/21/24 update:  Recommend using her mupirocin b.i.d. for 1 week.  If no resolution, she understands she should schedule a follow-up appointment.    5/31/24 update  Add bactrim for worsening infection now with ala cellulitis  Nozin to decolonize  Measures to improve moisturizing - stop flonase, Add ayr gel 2-3 times daily, restart with flonase sensimist once infection cleared in a couple weeks

## 2024-06-03 ENCOUNTER — CLINICAL SUPPORT (OUTPATIENT)
Dept: REHABILITATION | Facility: HOSPITAL | Age: 69
End: 2024-06-03
Payer: MEDICARE

## 2024-06-03 DIAGNOSIS — M53.82 DECREASED RANGE OF MOTION OF INTERVERTEBRAL DISCS OF CERVICAL SPINE: Primary | ICD-10-CM

## 2024-06-03 PROCEDURE — 97530 THERAPEUTIC ACTIVITIES: CPT | Mod: PN,CQ

## 2024-06-03 PROCEDURE — 97140 MANUAL THERAPY 1/> REGIONS: CPT | Mod: PN,CQ

## 2024-06-03 PROCEDURE — 97110 THERAPEUTIC EXERCISES: CPT | Mod: PN,CQ

## 2024-06-03 NOTE — PROGRESS NOTES
"[facility]   Physical Therapy Treatment Note      Name: Jill Marquez  Clinic Number: [id number]    Therapy Diagnosis:   Encounter Diagnosis   Name Primary?    Decreased range of motion of intervertebral discs of cervical spine Yes     Physician: Nicanor Campos MD    Visit Date: 6/3/2024    Physician Orders: PT Eval and Treat   Medical Diagnosis from Referral: M54.2 (ICD-10-CM) - Neck pain   Evaluation Date: 5/20/2024  Authorization Period Expiration: 5/15/2024  Plan of Care Expiration: 8/20/2024  Visit # / Visits authorized: 2/20 (+eval)     PN DUE: 6/20/2024     Time In: 10:30 AM   Time Out: 11:15 AM   Total Appointment Time (timed & untimed codes): 45 minutes     Precautions: Standard     PTA Visit #: 1/5     Subjective     Patient reports: HA's seem to be better.  She was compliant with home exercise program.  Response to previous treatment: no change reported   Functional change: no change reported     Pain: 4/10  Location: headache     Objective      Objective Measures updated at progress report unless specified.     Treatment     Jill received the treatments listed below:      therapeutic exercises to develop strength, ROM, flexibility, and posture for 15 minutes including:  UBE for upper extremity strength, range of motion, and endurance for 3 min forward/backward  Chin tucks 3" x 20   Upper trap and levator stretch 2 x 30"   Pec door stretch 3 x 30"   Red theraband shoulder adduction   Cervical range of motion with ball on wall     manual therapy techniques: Myofacial release were applied to the: cervical spine for 20 minutes, including:  Myofascial release of bilateral upper trap, levator, scalenes, and sub occipitals  Manual cervical traction  SOR  TPR to B UT    therapeutic activities to improve functional performance for 10 minutes, including:  Red theraband rows 2 x 10   Red theraband shoulder ext 2 x 10   Red theraband shoulder ER 2 x 10       Patient Education and " Home Exercises       Education provided:   Patient educated on the impairments noted above and the effects of physical therapy intervention to improve overall condition and QOL.   Patient was educated on all the above exercise prior/during/after for proper posture, positioning, and execution for safe performance with home exercise program.   Patient educated on the importance of improved core and upper extremity strength in order to improve alignment of the spine and upper extremities with static positions and dynamic movement.     Written Home Exercises Provided: Patient instructed to cont prior HEP. Exercises were reviewed and Jill was able to demonstrate them prior to the end of the session.  Jill demonstrated good  understanding of the education provided. See Electronic Medical Record under Patient Instructions for exercises provided during therapy sessions    Assessment     Patient presents with decreased HA's. Continued plan of care for cervical range of motion and scapular strengthening. Had a good response to manual cervical traction.    Jill Is progressing well towards her goals.   Patient prognosis is Good.     Patient will continue to benefit from skilled outpatient physical therapy to address the deficits listed in the problem list box on initial evaluation, provide pt/family education and to maximize pt's level of independence in the home and community environment.     Patient's spiritual, cultural and educational needs considered and pt agreeable to plan of care and goals.     Anticipated barriers to physical therapy: co-morbidities, pain, and exercise tolerance     Goals:   Short Term Goals:  4 weeks   Pain: Pt will demonstrate improved pain by reports of less than or equal to 7/10 worst pain on the verbal rating scale in order to progress toward maximal functional ability and improve QOL.   2.  Mobility: Patient will improve AROM of cervical spine to with in normal limits in order to return to  maximal functional potential and improve quality of life.   3.  HEP: Patient will demonstrate independence with HEP in order to progress toward functional independence.      Long Term Goals:  8 weeks   Pain: Pt will demonstrate improved pain by reports of less than or equal to 5/10 worst pain on the verbal rating scale in order to progress toward maximal functional ability and improve QOL.     Function: Patient will demonstrate improved function as indicated by a score of 60 on the FOTO.   3. Strength: Patient will improve strength to 5/5 in bilateral upper extremities in order to improve functional independence and quality of life.   4. Patient will return to normal ADL's, community involvement, and recreational activities with less than or equal to 3/10 pain and maximal function.       Goals Key:  PC= progressing/continue;        PM= partially met;             DC= discontinue    Plan     Plan of care Certification: 5/20/2024 to 8/20/2024.     Outpatient Physical Therapy 2 times weekly for 10 weeks to include the following interventions: Cervical/Lumbar Traction, Electrical Stimulation IFC, ect, Manual Therapy, Moist Heat/ Ice, Neuromuscular Re-ed, Patient Education, Self Care, Therapeutic Activities, Therapeutic Exercise, and Ultrasound. And any other therapies and modalities as clinically indicated and appropriate, including but not limited to FDN and telehealth. Pt may be seen by PTA as a part of pt's care team.     Phi Sexton PTA

## 2024-06-03 NOTE — PROGRESS NOTES
DATE: 6/24/2024  PATIENT: Jill Marquez    Attending Physician: João Bruno M.D.    HISTORY:  Jill Marquez is a 68 y.o. female who returns to me today for MRI results.  She was last seen by me 5/28/2024.  Today she is doing well but notes left neck pain and stiffness along with jaw pain that she believes is from grinding her teeth. She also reports frequent headaches due to the neck and jaw pain. She recently got botox injections in her jaw that were helpful for her pain. She is scheduled with pain mgmt tomorrow to discuss RFAs.   The Patient denies myelopathic symptoms such as handwriting changes or difficulty with buttons/coins/keys. Denies perineal paresthesias, bowel/bladder dysfunction.    PMH/PSH/FamHx/SocHx:  Unchanged from prior visit    ROS:  REVIEW OF SYSTEMS:  Constitution: Negative. Negative for chills, fever and night sweats.   HENT: Negative for congestion and headaches.    Eyes: Negative for blurred vision, left vision loss and right vision loss.   Cardiovascular: Negative for chest pain and syncope.   Respiratory: Negative for cough and shortness of breath.    Endocrine: Negative for polydipsia, polyphagia and polyuria.   Hematologic/Lymphatic: Negative for bleeding problem. Does not bruise/bleed easily.   Skin: Negative for dry skin, itching and rash.   Musculoskeletal: Negative for falls and muscle weakness.   Gastrointestinal: Negative for abdominal pain and bowel incontinence.   Allergic/Immunologic: Negative for hives and persistent infections.  Genitourinary: Negative for urinary retention/incontinence and nocturia.   Neurological: negative for disturbances in coordination, no myelopathic symptoms such as handwriting changes or difficulty with buttons, coins, keys or small objects. No loss of balance and seizures.   Psychiatric/Behavioral: Negative for depression. The patient does not have insomnia.   Denies myelopathic symptoms, perineal paresthesias, bowel or bladder  "incontinence    EXAM:  Ht 5' 3" (1.6 m)   Wt 55.2 kg (121 lb 9.3 oz)   BMI 21.54 kg/m²   Stable.     IMAGING:      Today I personally re-reviewed AP, Lat and Flex/Ex  upright C-spine films that demonstrate retrolisthesis of C5 on C6 with moderate DDD throughout.    Cervical MR shows moderate stenosis from C4-6. Multilevel foraminal narrowing.   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 Consider further characterization with brain MRI if clinically warranted.      AP, Lat and Flex/Ex  upright L-spine that demonstrate thoracolumbar scoliosis with convexity to the left. Moderate DDD throughout.      Lumbar MRI shows moderate spinal stenosis at L3-4 and moderate neural foraminal narrowing at L3-4 through L5-S1    Body mass index is 21.54 kg/m².    Hemoglobin A1C   Date Value Ref Range Status   06/29/2021 5.1 4.0 - 5.6 % Final     Comment:     ADA Screening Guidelines:  5.7-6.4%  Consistent with prediabetes  >or=6.5%  Consistent with diabetes    High levels of fetal hemoglobin interfere with the HbA1C  assay. Heterozygous hemoglobin variants (HbS, HgC, etc)do  not significantly interfere with this assay.   However, presence of multiple variants may affect accuracy.     04/30/2020 5.2 4.0 - 5.6 % Final     Comment:     ADA Screening Guidelines:  5.7-6.4%  Consistent with prediabetes  >or=6.5%  Consistent with diabetes  High levels of fetal hemoglobin interfere with the HbA1C  assay. Heterozygous hemoglobin variants (HbS, HgC, etc)do  not significantly interfere with this assay.   However, presence of multiple variants may affect accuracy.     08/13/2019 5.1 4.0 - 5.6 % Final     Comment:     ADA Screening Guidelines:  5.7-6.4%  Consistent with prediabetes  >or=6.5%  Consistent with diabetes  High levels of fetal hemoglobin interfere with the HbA1C  assay. Heterozygous hemoglobin " variants (HbS, HgC, etc)do  not significantly interfere with this assay.   However, presence of multiple variants may affect accuracy.           ASSESSMENT/PLAN:    Jill was seen today for neck pain.    Diagnoses and all orders for this visit:    Cervical radiculopathy      Patient denies stroke symptoms. We will hold off on brain MRI for now. She is scheduled to see primary care next month and will ask to be established with cardiology as well.  She is scheduled with pain mgmt tomorrow to further discuss RFAs and/or trigger point injections.   May follow up as needed.

## 2024-06-05 ENCOUNTER — PATIENT MESSAGE (OUTPATIENT)
Dept: RHEUMATOLOGY | Facility: CLINIC | Age: 69
End: 2024-06-05
Payer: MEDICARE

## 2024-06-05 LAB — BACTERIA SPEC ANAEROBE CULT: NORMAL

## 2024-06-05 NOTE — PROGRESS NOTES
"[facility]   Physical Therapy Treatment Note      Name: Jill Marquez  Clinic Number: [id number]    Therapy Diagnosis:   Encounter Diagnosis   Name Primary?    Decreased range of motion of intervertebral discs of cervical spine Yes       Physician: Nicanor Campos MD    Visit Date: 6/6/2024    Physician Orders: PT Eval and Treat   Medical Diagnosis from Referral: M54.2 (ICD-10-CM) - Neck pain   Evaluation Date: 5/20/2024  Authorization Period Expiration: 5/15/2024  Plan of Care Expiration: 8/20/2024  Visit # / Visits authorized: 3/ 20     PN DUE: 6/20/2024     Time In: 11:18 AM   Time Out: 11:58 AM   Total Appointment Time (timed & untimed codes): 40 minutes      Precautions: Standard     PTA Visit #: 0/5     Subjective     Patient reports: her headaches are better.   She was compliant with home exercise program.  Response to previous treatment: no change reported   Functional change: no change reported     Pain: 4/10  Location: neck/ base of skull      Objective      Objective Measures updated at progress report unless specified.     Treatment     Jill received the treatments listed below:      therapeutic exercises to develop strength, ROM, flexibility, and posture for 20 minutes including:  UBE for upper extremity strength, range of motion, and endurance for 3 min forward/backward  Chin tucks 3" x 10  Upper trap and levator stretch 2 x 30"   Pec door stretch 2 x 30"   Cervical range of motion with ball on wall x 10     manual therapy techniques: Myofacial release were applied to the: cervical spine for 10 minutes, including:  Myofascial release of bilateral upper trap, levator, scalenes, and sub occipitals    therapeutic activities to improve functional performance for 10 minutes, including:  Red theraband rows 2 x 10   Red theraband shoulder ext 2 x 10   Red theraband shoulder ER 2 x 10   + free motion shoulder adduction 7# 2 x 10   + free motion tricep ext with rope 10# x 10 "     Patient Education and Home Exercises       Education provided:   Patient educated on the impairments noted above and the effects of physical therapy intervention to improve overall condition and QOL.   Patient was educated on all the above exercise prior/during/after for proper posture, positioning, and execution for safe performance with home exercise program.   Patient educated on the importance of improved core and upper extremity strength in order to improve alignment of the spine and upper extremities with static positions and dynamic movement.     Written Home Exercises Provided: Patient instructed to cont prior HEP. Exercises were reviewed and Jill was able to demonstrate them prior to the end of the session.  Jill demonstrated good  understanding of the education provided. See Electronic Medical Record under Patient Instructions for exercises provided during therapy sessions    Assessment     Patient presents with improved headaches. Progressed upper extremity strengthening and patient tolerated well. Myofascial release of cervical musculature was performed for increased muscle extensibility. Plan to inquire next visit how patient responded to treatment.     Jill Is progressing well towards her goals.   Patient prognosis is Good.     Patient will continue to benefit from skilled outpatient physical therapy to address the deficits listed in the problem list box on initial evaluation, provide pt/family education and to maximize pt's level of independence in the home and community environment.     Patient's spiritual, cultural and educational needs considered and pt agreeable to plan of care and goals.     Anticipated barriers to physical therapy: co-morbidities, pain, and exercise tolerance     Goals:   Short Term Goals:  4 weeks   Pain: Pt will demonstrate improved pain by reports of less than or equal to 7/10 worst pain on the verbal rating scale in order to progress toward maximal functional  ability and improve QOL.   2.  Mobility: Patient will improve AROM of cervical spine to with in normal limits in order to return to maximal functional potential and improve quality of life.   3.  HEP: Patient will demonstrate independence with HEP in order to progress toward functional independence.      Long Term Goals:  8 weeks   Pain: Pt will demonstrate improved pain by reports of less than or equal to 5/10 worst pain on the verbal rating scale in order to progress toward maximal functional ability and improve QOL.     Function: Patient will demonstrate improved function as indicated by a score of 60 on the FOTO.   3. Strength: Patient will improve strength to 5/5 in bilateral upper extremities in order to improve functional independence and quality of life.   4. Patient will return to normal ADL's, community involvement, and recreational activities with less than or equal to 3/10 pain and maximal function.       Goals Key:  PC= progressing/continue;        PM= partially met;             DC= discontinue    Plan     Plan of care Certification: 5/20/2024 to 8/20/2024.     Outpatient Physical Therapy 2 times weekly for 10 weeks to include the following interventions: Cervical/Lumbar Traction, Electrical Stimulation IFC, ect, Manual Therapy, Moist Heat/ Ice, Neuromuscular Re-ed, Patient Education, Self Care, Therapeutic Activities, Therapeutic Exercise, and Ultrasound. And any other therapies and modalities as clinically indicated and appropriate, including but not limited to FDN and telehealth. Pt may be seen by PTA as a part of pt's care team.     Lelo Avila, PT

## 2024-06-06 ENCOUNTER — CLINICAL SUPPORT (OUTPATIENT)
Dept: REHABILITATION | Facility: HOSPITAL | Age: 69
End: 2024-06-06
Payer: MEDICARE

## 2024-06-06 DIAGNOSIS — M53.82 DECREASED RANGE OF MOTION OF INTERVERTEBRAL DISCS OF CERVICAL SPINE: Primary | ICD-10-CM

## 2024-06-06 PROCEDURE — 97530 THERAPEUTIC ACTIVITIES: CPT | Mod: PN

## 2024-06-06 PROCEDURE — 97140 MANUAL THERAPY 1/> REGIONS: CPT | Mod: PN

## 2024-06-06 PROCEDURE — 97110 THERAPEUTIC EXERCISES: CPT | Mod: PN

## 2024-06-08 ENCOUNTER — PATIENT MESSAGE (OUTPATIENT)
Dept: INTERNAL MEDICINE | Facility: CLINIC | Age: 69
End: 2024-06-08
Payer: MEDICARE

## 2024-06-08 DIAGNOSIS — E78.5 HYPERLIPIDEMIA, UNSPECIFIED HYPERLIPIDEMIA TYPE: Primary | ICD-10-CM

## 2024-06-10 ENCOUNTER — OFFICE VISIT (OUTPATIENT)
Dept: PHYSICAL MEDICINE AND REHAB | Facility: CLINIC | Age: 69
End: 2024-06-10
Payer: MEDICARE

## 2024-06-10 ENCOUNTER — PATIENT MESSAGE (OUTPATIENT)
Dept: INTERNAL MEDICINE | Facility: CLINIC | Age: 69
End: 2024-06-10
Payer: MEDICARE

## 2024-06-10 VITALS — BODY MASS INDEX: 21.36 KG/M2 | RESPIRATION RATE: 13 BRPM | WEIGHT: 120.56 LBS | HEIGHT: 63 IN

## 2024-06-10 DIAGNOSIS — M79.18 DIFFUSE MYOFASCIAL PAIN SYNDROME: Primary | ICD-10-CM

## 2024-06-10 DIAGNOSIS — M75.80 ROTATOR CUFF TENDINITIS, UNSPECIFIED LATERALITY: ICD-10-CM

## 2024-06-10 DIAGNOSIS — M53.82 MUSCULOSKELETAL DISORDER OF THE SUBOCCIPITAL: ICD-10-CM

## 2024-06-10 PROCEDURE — 99999PBSHW PR PBB SHADOW TECHNICAL ONLY FILED TO HB: Mod: PBBFAC,,,

## 2024-06-10 PROCEDURE — 99213 OFFICE O/P EST LOW 20 MIN: CPT | Mod: PBBFAC | Performed by: PHYSICAL MEDICINE & REHABILITATION

## 2024-06-10 PROCEDURE — 99999 PR PBB SHADOW E&M-EST. PATIENT-LVL III: CPT | Mod: PBBFAC,,, | Performed by: PHYSICAL MEDICINE & REHABILITATION

## 2024-06-10 PROCEDURE — 99214 OFFICE O/P EST MOD 30 MIN: CPT | Mod: 25,S$PBB,, | Performed by: PHYSICAL MEDICINE & REHABILITATION

## 2024-06-10 PROCEDURE — 20553 NJX 1/MLT TRIGGER POINTS 3/>: CPT | Mod: S$PBB,,, | Performed by: PHYSICAL MEDICINE & REHABILITATION

## 2024-06-10 PROCEDURE — 20553 NJX 1/MLT TRIGGER POINTS 3/>: CPT | Mod: PBBFAC | Performed by: PHYSICAL MEDICINE & REHABILITATION

## 2024-06-10 RX ORDER — METHYLPREDNISOLONE ACETATE 40 MG/ML
40 INJECTION, SUSPENSION INTRA-ARTICULAR; INTRALESIONAL; INTRAMUSCULAR; SOFT TISSUE
Status: COMPLETED | OUTPATIENT
Start: 2024-06-10 | End: 2024-06-10

## 2024-06-10 RX ADMIN — METHYLPREDNISOLONE ACETATE 40 MG: 40 INJECTION, SUSPENSION INTRA-ARTICULAR; INTRALESIONAL; INTRAMUSCULAR; INTRASYNOVIAL; SOFT TISSUE at 12:06

## 2024-06-10 NOTE — PROGRESS NOTES
"[facility]   Physical Therapy Treatment Note      Name: Jill Marquez  Clinic Number: [id number]    Therapy Diagnosis:   Encounter Diagnosis   Name Primary?    Decreased range of motion of intervertebral discs of cervical spine Yes         Physician: Nicanor Campos MD    Visit Date: 6/11/2024    Physician Orders: PT Eval and Treat   Medical Diagnosis from Referral: M54.2 (ICD-10-CM) - Neck pain   Evaluation Date: 5/20/2024  Authorization Period Expiration: 5/15/2024  Plan of Care Expiration: 8/20/2024  Visit # / Visits authorized: 4/ 20     PN DUE: 6/20/2024     Time In: 10:55 AM   Time Out: 11:35 AM   Total Appointment Time (timed & untimed codes): 40 minutes      Precautions: Standard     PTA Visit #: 0/5     Subjective     Patient reports: sore from injections yesterday.   She was compliant with home exercise program.  Response to previous treatment: no change reported   Functional change: no change reported     Pain: 3/10  Location: neck/ base of skull      Objective      Objective Measures updated at progress report unless specified.     Treatment     Jill received the treatments listed below:      therapeutic exercises to develop strength, ROM, flexibility, and posture for 15 minutes including:  UBE for upper extremity strength, range of motion, and endurance for 3 min forward/backward  Chin tucks 3" x 10   Upper trap and levator stretch 2 x 30"   Pec door stretch 2 x 30"   Cervical rotation with ball on wall 2 x 10   + seated thoracic ext x 30     manual therapy techniques: Myofacial release were applied to the: cervical spine for 0 minutes, including:  Myofascial release of bilateral upper trap, levator, scalenes, and sub occipitals    therapeutic activities to improve functional performance for 25 minutes, including:  Free motion rows 10# 2 x 10   Free motion shoulder ext 7# 2 x 10   Red theraband shoulder ER 2 x 10   free motion shoulder adduction 7# 2 x 10   free motion " tricep ext with rope 10# x 10   + red theraband shoulder horizontal abduction 2 x 10   + red theraband shoulder flexion and abduction 2 x 10     Patient Education and Home Exercises       Education provided:   Patient educated on the impairments noted above and the effects of physical therapy intervention to improve overall condition and QOL.   Patient was educated on all the above exercise prior/during/after for proper posture, positioning, and execution for safe performance with home exercise program.   Patient educated on the importance of improved core and upper extremity strength in order to improve alignment of the spine and upper extremities with static positions and dynamic movement.     Written Home Exercises Provided: Patient instructed to cont prior HEP. Exercises were reviewed and Jill was able to demonstrate them prior to the end of the session.  Jill demonstrated good  understanding of the education provided. See Electronic Medical Record under Patient Instructions for exercises provided during therapy sessions    Assessment     Patient presents with decreased pain after injections. Progressed upper extremity strengthening and patient tolerated well. No manuals performed today secondary to soreness from injections. Plan to inquire next visit how patient responded to treatment.     Jill Is progressing well towards her goals.   Patient prognosis is Good.     Patient will continue to benefit from skilled outpatient physical therapy to address the deficits listed in the problem list box on initial evaluation, provide pt/family education and to maximize pt's level of independence in the home and community environment.     Patient's spiritual, cultural and educational needs considered and pt agreeable to plan of care and goals.     Anticipated barriers to physical therapy: co-morbidities, pain, and exercise tolerance     Goals:   Short Term Goals:  4 weeks   Pain: Pt will demonstrate improved pain by  reports of less than or equal to 7/10 worst pain on the verbal rating scale in order to progress toward maximal functional ability and improve QOL.   2.  Mobility: Patient will improve AROM of cervical spine to with in normal limits in order to return to maximal functional potential and improve quality of life.   3.  HEP: Patient will demonstrate independence with HEP in order to progress toward functional independence.      Long Term Goals:  8 weeks   Pain: Pt will demonstrate improved pain by reports of less than or equal to 5/10 worst pain on the verbal rating scale in order to progress toward maximal functional ability and improve QOL.     Function: Patient will demonstrate improved function as indicated by a score of 60 on the FOTO.   3. Strength: Patient will improve strength to 5/5 in bilateral upper extremities in order to improve functional independence and quality of life.   4. Patient will return to normal ADL's, community involvement, and recreational activities with less than or equal to 3/10 pain and maximal function.       Goals Key:  PC= progressing/continue;        PM= partially met;             DC= discontinue    Plan     Plan of care Certification: 5/20/2024 to 8/20/2024.     Outpatient Physical Therapy 2 times weekly for 10 weeks to include the following interventions: Cervical/Lumbar Traction, Electrical Stimulation IFC, ect, Manual Therapy, Moist Heat/ Ice, Neuromuscular Re-ed, Patient Education, Self Care, Therapeutic Activities, Therapeutic Exercise, and Ultrasound. And any other therapies and modalities as clinically indicated and appropriate, including but not limited to FDN and telehealth. Pt may be seen by PTA as a part of pt's care team.     Lelo Avila, PT

## 2024-06-10 NOTE — PROGRESS NOTES
PM&R CLINIC NOTE    Chief Complaint   Patient presents with    Muscle Pain       HPI: This is a 68 y.o.  female being seen in clinic today for repeat TPIs due to achy pain and tightness in her muscles. She has increased jaw and head/neck tightness.  She has plans for botox injections for TMJ with a specialist.   History obtained from patient    Functional History:  Walking: Not limited  Transfers: Independent  Assistive devices: No  Power mobility: No  Falls: None     Needs help with:  Nothing - all ADLS normal    Past family, medical, social, and surgical history reviewed in chart    Review of Systems:     General- denies lethargy, weight change, fever, chills  Head/neck- denies swallowing difficulties  ENT- denies hearing changes  Cardiovascular-denies chest pain  Pulmonary- denies shortness of breath  GI- denies constipation or bowel incontinence  - denies bladder incontinence  Skin- denies wounds or rashes  Musculoskeletal- denies weakness, +pain  Neurologic- denies numbness and tingling  Psychiatric- denies depressive or psychotic features, denies anxiety  Lymphatic-denies swelling  Endocrine- denies hypoglycemic symptoms/DM history  All other pertinent systems negative     Physical Examination:  General: Well developed, well nourished female, NAD  HEENT:NCAT EOMI bilaterally   Pulmonary:Normal respirations    Spinal Examination: CERVICAL  Active ROM is within normal limits.  Inspection: No deformity of spinal alignment.  Palpation:  tight and ttp at splenius capitus,rhomboids, and trapezius-local twitch at trapezius     Spinal Examination: LUMBAR or THORACIC  Active ROM is within normal limits.  Inspection: No deformity of spinal alignment.        Bilateral Upper and Lower Extremities:  Pulses are 2+ at radial, bilaterally.  Shoulder/Elbow/Wrist/Hand ROM wnl except limited at bilateral hands, arthritic deformities noted  Hip/Knee/Ankle ROM wnl, ttp and tight at left piriformis  Bilateral Extremities show  normal capillary refill.  No signs of cyanosis, rubor, edema, skin changes, or dysvascular changes of appendages.  Nails appear intact.    Neurological Exam:  Cranial Nerves:  II-XII grossly intact    Manual Muscle Testing: (Motor 5=normal)    RIGHT Upper extremity: Shoulder abduction 5/5, Biceps 5/5, Triceps 5/5, Wrist extension 5/5, Abductor pollicis brevis 4/5, Ulnar hand intrinsics 4/5,  LEFT Upper extremity: Shoulder abduction 5/5, Biceps 5/5, Triceps 5/5, Wrist extension 5/5, Abductor pollicis brevis 4/5, Ulnar hand intrinsics 4/5,  No focal atrophy is noted of either upper extremity.    Bilateral Reflexes:  Choe's response is absent bilaterally.    Sensation: tested to light touch  - intact in arms  Gait: Narrow base and good arm swing.      IMPRESSION/PLAN: This is a 68 y.o.  female with myofascial pain    1. TPIs   2. Cont Ice/heat  3. Cont neck/shoulder, piriformis exercises   4. If not improving, will send formal PT order    Vanessa Millard M.D.  Physical Medicine and Rehab      PROCEDURE NOTE    Diagnosis: Myofascial pain  Procedure: trigger point injections to bilateral splenius capitus, trapezius, rhomboids, left piriformis  Risks and benefits of procedure explained to patient including risks of infection, bleeding, pain, or damage to surrounding tissues. All questions answered. Informed consent obtained prior to proceeding. Areas marked and prepped in sterile fashion. Using a 27g 1.25inch needle,  10 cc of 1% lidocaine and depomedrol 40mg 1cc  was injected evenly into the above mentioned muscles. None to minimal bleeding noted. ER and post injection instructions given.    Vanessa Millard M.D.

## 2024-06-11 ENCOUNTER — CLINICAL SUPPORT (OUTPATIENT)
Dept: REHABILITATION | Facility: HOSPITAL | Age: 69
End: 2024-06-11
Payer: MEDICARE

## 2024-06-11 DIAGNOSIS — M53.82 DECREASED RANGE OF MOTION OF INTERVERTEBRAL DISCS OF CERVICAL SPINE: Primary | ICD-10-CM

## 2024-06-11 PROCEDURE — 97530 THERAPEUTIC ACTIVITIES: CPT | Mod: KX,PN

## 2024-06-11 PROCEDURE — 97110 THERAPEUTIC EXERCISES: CPT | Mod: KX,PN

## 2024-06-12 NOTE — PROGRESS NOTES
"[facility]   Physical Therapy Treatment Note      Name: Jill Marquez  Clinic Number: [id number]    Therapy Diagnosis:   Encounter Diagnosis   Name Primary?    Decreased range of motion of intervertebral discs of cervical spine Yes     Physician: Nicanor Campos MD    Visit Date: 6/13/2024    Physician Orders: PT Eval and Treat   Medical Diagnosis from Referral: M54.2 (ICD-10-CM) - Neck pain   Evaluation Date: 5/20/2024  Authorization Period Expiration: 5/15/2024  Plan of Care Expiration: 8/20/2024  Visit # / Visits authorized: 5/ 20     PN DUE: 6/20/2024     Time In: 11:00 AM   Time Out: 11:55 AM   Total Appointment Time (timed & untimed codes): 55 minutes      Precautions: Standard     PTA Visit #: 0/5     Subjective     Patient reports: getting TMJ injections so jaw and ear are feeling better.   She was compliant with home exercise program.  Response to previous treatment: no change reported   Functional change: no change reported     Pain: 3/10  Location: neck/ base of skull      Objective      Objective Measures updated at progress report unless specified.     Treatment     Jill received the treatments listed below:      therapeutic exercises to develop strength, ROM, flexibility, and posture for 15 minutes including:  UBE for upper extremity strength, range of motion, and endurance for 3 min forward/backward  Chin tucks 3" x 10   Upper trap and levator stretch 2 x 30"   Pec door stretch 2 x 30"   Cervical rotation with ball on wall 2 x 10   seated thoracic ext x 30     manual therapy techniques: Myofacial release were applied to the: cervical spine for 15 minutes, including:  Myofascial release of bilateral upper trap, levator, scalenes, and sub occipitals     therapeutic activities to improve functional performance for 25 minutes, including:  Free motion rows 10# 2 x 10   Free motion shoulder ext 7# 2 x 10   Red theraband shoulder ER 2 x 10   free motion shoulder adduction 7# " 2 x 10   free motion tricep ext with rope 10# 2 x 10   red theraband shoulder horizontal abduction 2 x 10   red theraband shoulder flexion and abduction 2 x 10     Patient Education and Home Exercises       Education provided:   Patient educated on the impairments noted above and the effects of physical therapy intervention to improve overall condition and QOL.   Patient was educated on all the above exercise prior/during/after for proper posture, positioning, and execution for safe performance with home exercise program.   Patient educated on the importance of improved core and upper extremity strength in order to improve alignment of the spine and upper extremities with static positions and dynamic movement.     Written Home Exercises Provided: Patient instructed to cont prior HEP. Exercises were reviewed and Jill was able to demonstrate them prior to the end of the session.  Jill demonstrated good  understanding of the education provided. See Electronic Medical Record under Patient Instructions for exercises provided during therapy sessions    Assessment     Patient presents with decreased pain after injections. Continued current exercise program with no complaints. Reinitiated manuals since patient's injection soreness has improved. Plan to inquire next visit how patient responded to treatment.     Jill Is progressing well towards her goals.   Patient prognosis is Good.     Patient will continue to benefit from skilled outpatient physical therapy to address the deficits listed in the problem list box on initial evaluation, provide pt/family education and to maximize pt's level of independence in the home and community environment.     Patient's spiritual, cultural and educational needs considered and pt agreeable to plan of care and goals.     Anticipated barriers to physical therapy: co-morbidities, pain, and exercise tolerance     Goals:   Short Term Goals:  4 weeks   Pain: Pt will demonstrate improved  pain by reports of less than or equal to 7/10 worst pain on the verbal rating scale in order to progress toward maximal functional ability and improve QOL.   2.  Mobility: Patient will improve AROM of cervical spine to with in normal limits in order to return to maximal functional potential and improve quality of life.   3.  HEP: Patient will demonstrate independence with HEP in order to progress toward functional independence.      Long Term Goals:  8 weeks   Pain: Pt will demonstrate improved pain by reports of less than or equal to 5/10 worst pain on the verbal rating scale in order to progress toward maximal functional ability and improve QOL.     Function: Patient will demonstrate improved function as indicated by a score of 60 on the FOTO.   3. Strength: Patient will improve strength to 5/5 in bilateral upper extremities in order to improve functional independence and quality of life.   4. Patient will return to normal ADL's, community involvement, and recreational activities with less than or equal to 3/10 pain and maximal function.       Goals Key:  PC= progressing/continue;        PM= partially met;             DC= discontinue    Plan     Plan of care Certification: 5/20/2024 to 8/20/2024.     Outpatient Physical Therapy 2 times weekly for 10 weeks to include the following interventions: Cervical/Lumbar Traction, Electrical Stimulation IFC, ect, Manual Therapy, Moist Heat/ Ice, Neuromuscular Re-ed, Patient Education, Self Care, Therapeutic Activities, Therapeutic Exercise, and Ultrasound. And any other therapies and modalities as clinically indicated and appropriate, including but not limited to FDN and telehealth. Pt may be seen by PTA as a part of pt's care team.     Lelo Avila, PT

## 2024-06-13 ENCOUNTER — CLINICAL SUPPORT (OUTPATIENT)
Dept: REHABILITATION | Facility: HOSPITAL | Age: 69
End: 2024-06-13
Payer: MEDICARE

## 2024-06-13 ENCOUNTER — PATIENT MESSAGE (OUTPATIENT)
Dept: PHYSICAL MEDICINE AND REHAB | Facility: CLINIC | Age: 69
End: 2024-06-13
Payer: MEDICARE

## 2024-06-13 DIAGNOSIS — M79.18 DIFFUSE MYOFASCIAL PAIN SYNDROME: Primary | ICD-10-CM

## 2024-06-13 DIAGNOSIS — M53.82 DECREASED RANGE OF MOTION OF INTERVERTEBRAL DISCS OF CERVICAL SPINE: Primary | ICD-10-CM

## 2024-06-13 DIAGNOSIS — M26.649 ARTHRITIS OF TEMPOROMANDIBULAR JOINT, UNSPECIFIED LATERALITY: ICD-10-CM

## 2024-06-13 PROCEDURE — 97140 MANUAL THERAPY 1/> REGIONS: CPT | Mod: PN

## 2024-06-13 PROCEDURE — 97110 THERAPEUTIC EXERCISES: CPT | Mod: PN

## 2024-06-13 PROCEDURE — 97530 THERAPEUTIC ACTIVITIES: CPT | Mod: PN

## 2024-06-14 ENCOUNTER — OFFICE VISIT (OUTPATIENT)
Dept: OTOLARYNGOLOGY | Facility: CLINIC | Age: 69
End: 2024-06-14
Payer: MEDICARE

## 2024-06-14 DIAGNOSIS — J34.89 NASAL VESTIBULITIS: Primary | ICD-10-CM

## 2024-06-14 DIAGNOSIS — J34.0: ICD-10-CM

## 2024-06-14 PROCEDURE — 99999 PR PBB SHADOW E&M-EST. PATIENT-LVL II: CPT | Mod: PBBFAC,,, | Performed by: STUDENT IN AN ORGANIZED HEALTH CARE EDUCATION/TRAINING PROGRAM

## 2024-06-14 PROCEDURE — 99212 OFFICE O/P EST SF 10 MIN: CPT | Mod: PBBFAC | Performed by: STUDENT IN AN ORGANIZED HEALTH CARE EDUCATION/TRAINING PROGRAM

## 2024-06-14 PROCEDURE — 99213 OFFICE O/P EST LOW 20 MIN: CPT | Mod: S$PBB,,, | Performed by: STUDENT IN AN ORGANIZED HEALTH CARE EDUCATION/TRAINING PROGRAM

## 2024-06-14 NOTE — PROGRESS NOTES
Referring Provider:    No referring provider defined for this encounter.  Subjective:   Patient: Jill Marquez [id number], :1955   Visit date:2024 12:51 PM    Chief Complaint:  Follow-up (Pt states she feels improvement from her last appointment )    HPI:    Prior notes reviewed by myself.  Clinical documentation obtained by nursing staff reviewed.     68-year-old female presents for evaluation of inflammation and scabbing in her left nostril.  This has been an issue for the last 3 weeks.  Occasionally the scab will come off with a Q-tip but it returns.  She has tried topical Neosporin without significant relief.  She has not been on any oral antibiotics or other treatment    24 update:  She has been noticing some crusting/possible scab in her nasal vestibule on the left side and wanted to have it checked out.  She denies any pain, bleeding.      24 update: over last few days the left nostril has gotten worse. Now with swelling and redness of the ala. Tender. No fevers. Crusting noted inside. Using mupirocin, flonase.     24 update: pain, swelling, redness all resolved. She completed anitbiotics and mupirocin. Now using nozin the lst week and ayr gel.    Objective:     Physical Exam:  Vitals:  There were no vitals taken for this visit.  General appearance:  Well developed, well nourished    Ears:  Otoscopy of external auditory canals and tympanic membranes was normal, clinical speech reception thresholds grossly intact, no mass/lesion of auricle.    Nose:  No masses/lesions of external nose, resolved prior nasal vestibule skin crusting and purulence, resolved prior alar erythema     Mouth:  No mass/lesion of lips, teeth, gums, hard/soft palate, tongue, tonsils, or oropharynx.    Neck & Lymphatics:  No cervical lymphadenopathy, no neck mass/crepitus/ asymmetry, trachea is midline, no thyroid enlargement/tenderness/mass.        [x]  Data Reviewed:    Lab Results   Component Value Date     WBC 3.97 05/08/2024    HGB 13.1 05/08/2024    HCT 39.9 05/08/2024    MCV 99 (H) 05/08/2024    EOSINOPHIL 2.3 05/08/2024         Cultures - did not result for aerobic, anaerobic showed no growth    Assessment & Plan:   There are no diagnoses linked to this encounter.        She has tried and failed topical Neosporin, so I recommended a course of oral Bactrim as well as changing her ointment to Bactroban.  She does have an allergy listed as sulfasalazine, but her reaction to this was only nausea.  We agreed to move forward with the aforementioned plan and we did discuss potential for reactions to these medications including Galdamez Gaurang syndrome.    5/21/24 update:  Recommend using her mupirocin b.i.d. for 1 week.  If no resolution, she understands she should schedule a follow-up appointment.    5/31/24 update  Add bactrim for worsening infection now with ala cellulitis  Nozin to decolonize  Measures to improve moisturizing - stop flonase, Add ayr gel 2-3 times daily, restart with flonase sensimist once infection cleared in a couple weeks    6/14/24 update:  Infection appears to be resolved  Finish nozin x 1 more week  Continue ayr fel, avoid manipulation/instrumentation  Return to clinic as needed

## 2024-06-17 ENCOUNTER — CLINICAL SUPPORT (OUTPATIENT)
Dept: REHABILITATION | Facility: HOSPITAL | Age: 69
End: 2024-06-17
Payer: MEDICARE

## 2024-06-17 DIAGNOSIS — M53.82 DECREASED RANGE OF MOTION OF INTERVERTEBRAL DISCS OF CERVICAL SPINE: Primary | ICD-10-CM

## 2024-06-17 PROCEDURE — 97530 THERAPEUTIC ACTIVITIES: CPT | Mod: PN

## 2024-06-17 PROCEDURE — 97110 THERAPEUTIC EXERCISES: CPT | Mod: PN

## 2024-06-17 PROCEDURE — 97140 MANUAL THERAPY 1/> REGIONS: CPT | Mod: PN

## 2024-06-17 NOTE — PROGRESS NOTES
"[facility]   Physical Therapy Treatment Note      Name: Jill Marquez  Clinic Number: [id number]    Therapy Diagnosis:   Encounter Diagnosis   Name Primary?    Decreased range of motion of intervertebral discs of cervical spine Yes       Physician: Nicanor Campos MD    Visit Date: 6/17/2024    Physician Orders: PT Eval and Treat   Medical Diagnosis from Referral: M54.2 (ICD-10-CM) - Neck pain   Evaluation Date: 5/20/2024  Authorization Period Expiration: 5/15/2024  Plan of Care Expiration: 8/20/2024  Visit # / Visits authorized: 6/ 20     PN DUE: 6/20/2024     Time In: 11:00 AM   Time Out: 11:45 AM   Total Appointment Time (timed & untimed codes): 45 minutes      Precautions: Standard     PTA Visit #: 0/5     Subjective     Patient reports: going for TMJ therapy on Wednesday. She is doing a MRI on her neck tomorrow.   She was compliant with home exercise program.  Response to previous treatment: no change reported   Functional change: no change reported     Pain: 4/10  Location: headache/ albert of the neck     Objective      Objective Measures updated at progress report unless specified.     Plan to update objective measures next visit    Treatment     Jill received the treatments listed below:      therapeutic exercises to develop strength, ROM, flexibility, and posture for 10 minutes including:  UBE for upper extremity strength, range of motion, and endurance for 3 min forward/backward  Upper trap and levator stretch 2 x 30"   Pec door stretch 2 x 30"   seated thoracic ext x 30     Deferred: Cervical rotation with ball on wall 2 x 10   Chin tucks 3" x 10     manual therapy techniques: Myofacial release were applied to the: cervical spine for 10 minutes, including:  Myofascial release of bilateral upper trap, levator, scalenes, and sub occipitals     therapeutic activities to improve functional performance for 25 minutes, including:  Free motion rows 10# 2 x 10   Free motion " shoulder ext 7# 2 x 10   Red theraband shoulder ER 2 x 10   free motion shoulder adduction 7# 2 x 10   free motion tricep ext with rope 10# 2 x 10   red theraband shoulder horizontal abduction 2 x 10   red theraband shoulder flexion and abduction 2 x 10     Patient Education and Home Exercises       Education provided:   Patient educated on the impairments noted above and the effects of physical therapy intervention to improve overall condition and QOL.   Patient was educated on all the above exercise prior/during/after for proper posture, positioning, and execution for safe performance with home exercise program.   Patient educated on the importance of improved core and upper extremity strength in order to improve alignment of the spine and upper extremities with static positions and dynamic movement.     Written Home Exercises Provided: Patient instructed to cont prior HEP. Exercises were reviewed and Jill was able to demonstrate them prior to the end of the session.  Jill demonstrated good  understanding of the education provided. See Electronic Medical Record under Patient Instructions for exercises provided during therapy sessions    Assessment     Patient presents with a headache today. Continued current exercise program with no complaints. Manuals performed for increased muscle extensibility and pain control. Plan to inquire next visit how patient responded to treatment. Plan to update objective measures next visit.     Jill Is progressing well towards her goals.   Patient prognosis is Good.     Patient will continue to benefit from skilled outpatient physical therapy to address the deficits listed in the problem list box on initial evaluation, provide pt/family education and to maximize pt's level of independence in the home and community environment.     Patient's spiritual, cultural and educational needs considered and pt agreeable to plan of care and goals.     Anticipated barriers to physical  therapy: co-morbidities, pain, and exercise tolerance     Goals:   Short Term Goals:  4 weeks   Pain: Pt will demonstrate improved pain by reports of less than or equal to 7/10 worst pain on the verbal rating scale in order to progress toward maximal functional ability and improve QOL.   2.  Mobility: Patient will improve AROM of cervical spine to with in normal limits in order to return to maximal functional potential and improve quality of life.   3.  HEP: Patient will demonstrate independence with HEP in order to progress toward functional independence.      Long Term Goals:  8 weeks   Pain: Pt will demonstrate improved pain by reports of less than or equal to 5/10 worst pain on the verbal rating scale in order to progress toward maximal functional ability and improve QOL.     Function: Patient will demonstrate improved function as indicated by a score of 60 on the FOTO.   3. Strength: Patient will improve strength to 5/5 in bilateral upper extremities in order to improve functional independence and quality of life.   4. Patient will return to normal ADL's, community involvement, and recreational activities with less than or equal to 3/10 pain and maximal function.       Goals Key:  PC= progressing/continue;        PM= partially met;             DC= discontinue    Plan     Plan of care Certification: 5/20/2024 to 8/20/2024.     Outpatient Physical Therapy 2 times weekly for 10 weeks to include the following interventions: Cervical/Lumbar Traction, Electrical Stimulation IFC, ect, Manual Therapy, Moist Heat/ Ice, Neuromuscular Re-ed, Patient Education, Self Care, Therapeutic Activities, Therapeutic Exercise, and Ultrasound. And any other therapies and modalities as clinically indicated and appropriate, including but not limited to FDN and telehealth. Pt may be seen by PTA as a part of pt's care team.     Lelo Avila, PT

## 2024-06-18 ENCOUNTER — HOSPITAL ENCOUNTER (OUTPATIENT)
Dept: RADIOLOGY | Facility: HOSPITAL | Age: 69
Discharge: HOME | End: 2024-06-18
Attending: REGISTERED NURSE
Payer: MEDICARE

## 2024-06-18 DIAGNOSIS — M43.12 SPONDYLOLISTHESIS OF CERVICAL REGION: ICD-10-CM

## 2024-06-18 PROCEDURE — 72141 MRI NECK SPINE W/O DYE: CPT | Mod: 26,,, | Performed by: RADIOLOGY

## 2024-06-18 PROCEDURE — 72141 MRI NECK SPINE W/O DYE: CPT | Mod: TC

## 2024-06-24 ENCOUNTER — CLINICAL SUPPORT (OUTPATIENT)
Dept: REHABILITATION | Facility: HOSPITAL | Age: 69
End: 2024-06-24
Payer: MEDICARE

## 2024-06-24 ENCOUNTER — PATIENT MESSAGE (OUTPATIENT)
Dept: INTERNAL MEDICINE | Facility: CLINIC | Age: 69
End: 2024-06-24
Payer: MEDICARE

## 2024-06-24 ENCOUNTER — OFFICE VISIT (OUTPATIENT)
Dept: ORTHOPEDICS | Facility: CLINIC | Age: 69
End: 2024-06-24
Payer: MEDICARE

## 2024-06-24 VITALS — HEIGHT: 63 IN | BODY MASS INDEX: 21.54 KG/M2 | WEIGHT: 121.56 LBS

## 2024-06-24 DIAGNOSIS — M54.12 CERVICAL RADICULOPATHY: Primary | ICD-10-CM

## 2024-06-24 DIAGNOSIS — M53.82 DECREASED RANGE OF MOTION OF INTERVERTEBRAL DISCS OF CERVICAL SPINE: Primary | ICD-10-CM

## 2024-06-24 PROCEDURE — 97110 THERAPEUTIC EXERCISES: CPT | Mod: KX,PN

## 2024-06-24 PROCEDURE — 99213 OFFICE O/P EST LOW 20 MIN: CPT | Mod: PBBFAC | Performed by: REGISTERED NURSE

## 2024-06-24 PROCEDURE — 99999 PR PBB SHADOW E&M-EST. PATIENT-LVL III: CPT | Mod: PBBFAC,,, | Performed by: REGISTERED NURSE

## 2024-06-24 PROCEDURE — 99213 OFFICE O/P EST LOW 20 MIN: CPT | Mod: S$PBB,,, | Performed by: REGISTERED NURSE

## 2024-06-24 PROCEDURE — 97530 THERAPEUTIC ACTIVITIES: CPT | Mod: KX,PN

## 2024-06-24 PROCEDURE — 97140 MANUAL THERAPY 1/> REGIONS: CPT | Mod: KX,PN

## 2024-06-24 NOTE — PROGRESS NOTES
[facility]   Physical Therapy Treatment Note      Name: Jill Marquez  Clinic Number: [id number]    Therapy Diagnosis:   Encounter Diagnosis   Name Primary?    Decreased range of motion of intervertebral discs of cervical spine Yes       Physician: Nicanro Campos MD    Visit Date: 6/24/2024    Physician Orders: PT Eval and Treat   Medical Diagnosis from Referral: M54.2 (ICD-10-CM) - Neck pain   Evaluation Date: 5/20/2024  Authorization Period Expiration: 5/15/2024  Plan of Care Expiration: 8/20/2024  Visit # / Visits authorized: 7/ 20     PN DUE: 7/24/2024     Time In: 10:43 AM   Time Out: 11:38 AM   Total Appointment Time (timed & untimed codes): 55 minutes      Precautions: Standard     PTA Visit #: 0/5     Subjective     Patient reports: she saw the spine doctor today who is sending her to pain management to get different injections. Patient reports her neck is not doing well.   She was compliant with home exercise program.  Response to previous treatment: no change reported   Functional change: no change reported     Pain: 3/10  Location: headache/ albert of the neck     Objective      Objective Measures updated at progress report unless specified.     Cervical Range of Motion:     Degrees Pain   Flexion (50) 23 All motions hurt   Extension (75) 30     Right Rotation (75) 39     Left Rotation (75) 65     Right Side Bending (45) 12     Left Side Bending (45) 28        Shoulder Range of Motion: with in normal limits      Upper Extremity Strength  (R) UE   (L) UE     Shoulder flexion: 4+/5 Shoulder flexion: 4+/5   Shoulder Abduction: 4+/5 Shoulder abduction: 4+/5   Shoulder ER 4+/5 Shoulder ER 4+/5   Shoulder IR 4+/5 Shoulder IR 4+/5   Elbow flexion: 5/5 Elbow flexion: 5/5   Elbow extension: 5/5 Elbow extension: 5/5    5/5 : 5/5   Lower Trap 4+/5 Lower Trap 4+/5   Middle Trap 4+/5 Middle Trap 4+/5   Rhomboids 4+/5 Rhomboids 4+/5     FOTO:     Treatment     Jill received the  "treatments listed below:      therapeutic exercises to develop strength, ROM, flexibility, and posture for 10 minutes including:  UBE for upper extremity strength, range of motion, and endurance for 3 min forward/backward  Upper trap and levator stretch 2 x 30"   Pec door stretch 2 x 30"   seated thoracic ext x 30     Deferred: Cervical rotation with ball on wall 2 x 10   Chin tucks 3" x 10     manual therapy techniques: Myofacial release were applied to the: cervical spine for 20 minutes, including:  Myofascial release of bilateral upper trap, levator, scalenes, and sub occipitals     therapeutic activities to improve functional performance for 25 minutes, including:  Free motion rows 10# 2 x 10   Free motion shoulder ext 7# 2 x 10   Red theraband shoulder ER 2 x 10   free motion shoulder adduction 7# 2 x 10   free motion tricep ext with rope 10# 2 x 10   red theraband shoulder horizontal abduction 2 x 10   red theraband shoulder flexion and abduction 2 x 10     Patient Education and Home Exercises       Education provided:   Patient educated on the impairments noted above and the effects of physical therapy intervention to improve overall condition and QOL.   Patient was educated on all the above exercise prior/during/after for proper posture, positioning, and execution for safe performance with home exercise program.   Patient educated on the importance of improved core and upper extremity strength in order to improve alignment of the spine and upper extremities with static positions and dynamic movement.     Written Home Exercises Provided: Patient instructed to cont prior HEP. Exercises were reviewed and Jill was able to demonstrate them prior to the end of the session.  Jill demonstrated good  understanding of the education provided. See Electronic Medical Record under Patient Instructions for exercises provided during therapy sessions    Assessment     Patient presents with continued neck today. Patient " also presents with minimal improvement in upper extremity strength and cervical range of motion. Cervical rotation was the motion that improved the most. Continued current exercise program with no complaints. Manuals performed for increased muscle extensibility and pain control. Plan to inquire next visit how patient responded to treatment.     Jill Is progressing well towards her goals.   Patient prognosis is Good.     Patient will continue to benefit from skilled outpatient physical therapy to address the deficits listed in the problem list box on initial evaluation, provide pt/family education and to maximize pt's level of independence in the home and community environment.     Patient's spiritual, cultural and educational needs considered and pt agreeable to plan of care and goals.     Anticipated barriers to physical therapy: co-morbidities, pain, and exercise tolerance     Goals:   Short Term Goals:  4 weeks   Pain: Pt will demonstrate improved pain by reports of less than or equal to 7/10 worst pain on the verbal rating scale in order to progress toward maximal functional ability and improve QOL. MET 6/24/2024   2.  Mobility: Patient will improve AROM of cervical spine to with in normal limits in order to return to maximal functional potential and improve quality of life. PC   3.  HEP: Patient will demonstrate independence with HEP in order to progress toward functional independence. MET 6/24/2024      Long Term Goals:  8 weeks   Pain: Pt will demonstrate improved pain by reports of less than or equal to 5/10 worst pain on the verbal rating scale in order to progress toward maximal functional ability and improve QOL.  PC   Function: Patient will demonstrate improved function as indicated by a score of 60 on the FOTO. PC   3. Strength: Patient will improve strength to 5/5 in bilateral upper extremities in order to improve functional independence and quality of life. PC   4. Patient will return to normal  ADL's, community involvement, and recreational activities with less than or equal to 3/10 pain and maximal function. PC      Goals Key:  PC= progressing/continue;        PM= partially met;             DC= discontinue    Plan     Plan of care Certification: 5/20/2024 to 8/20/2024.     Outpatient Physical Therapy 2 times weekly for 10 weeks to include the following interventions: Cervical/Lumbar Traction, Electrical Stimulation IFC, ect, Manual Therapy, Moist Heat/ Ice, Neuromuscular Re-ed, Patient Education, Self Care, Therapeutic Activities, Therapeutic Exercise, and Ultrasound. And any other therapies and modalities as clinically indicated and appropriate, including but not limited to FDN and telehealth. Pt may be seen by PTA as a part of pt's care team.     Lelo Avila, PT

## 2024-06-25 ENCOUNTER — OFFICE VISIT (OUTPATIENT)
Dept: PAIN MEDICINE | Facility: CLINIC | Age: 69
End: 2024-06-25
Payer: MEDICARE

## 2024-06-25 ENCOUNTER — TELEPHONE (OUTPATIENT)
Dept: ORTHOPEDICS | Facility: CLINIC | Age: 69
End: 2024-06-25
Payer: MEDICARE

## 2024-06-25 ENCOUNTER — PATIENT MESSAGE (OUTPATIENT)
Dept: ORTHOPEDICS | Facility: CLINIC | Age: 69
End: 2024-06-25
Payer: MEDICARE

## 2024-06-25 VITALS
WEIGHT: 121.25 LBS | DIASTOLIC BLOOD PRESSURE: 87 MMHG | HEIGHT: 63 IN | TEMPERATURE: 97 F | BODY MASS INDEX: 21.48 KG/M2 | OXYGEN SATURATION: 100 % | SYSTOLIC BLOOD PRESSURE: 156 MMHG | HEART RATE: 82 BPM | RESPIRATION RATE: 18 BRPM

## 2024-06-25 DIAGNOSIS — M54.12 CERVICAL RADICULOPATHY: Primary | ICD-10-CM

## 2024-06-25 DIAGNOSIS — M50.30 DDD (DEGENERATIVE DISC DISEASE), CERVICAL: ICD-10-CM

## 2024-06-25 DIAGNOSIS — R51.9 INTRACTABLE HEADACHE, UNSPECIFIED CHRONICITY PATTERN, UNSPECIFIED HEADACHE TYPE: Primary | ICD-10-CM

## 2024-06-25 DIAGNOSIS — M43.12 SPONDYLOLISTHESIS OF CERVICAL REGION: ICD-10-CM

## 2024-06-25 PROCEDURE — 99214 OFFICE O/P EST MOD 30 MIN: CPT | Mod: PBBFAC | Performed by: ANESTHESIOLOGY

## 2024-06-25 PROCEDURE — 99999 PR PBB SHADOW E&M-EST. PATIENT-LVL IV: CPT | Mod: PBBFAC,,, | Performed by: ANESTHESIOLOGY

## 2024-06-25 PROCEDURE — 99214 OFFICE O/P EST MOD 30 MIN: CPT | Mod: S$PBB,GC,, | Performed by: ANESTHESIOLOGY

## 2024-06-25 NOTE — PROGRESS NOTES
Chronic Pain - New Consult    Referring Physician: PANKAJ Nguyen NP    Chief Complaint:   Chief Complaint   Patient presents with    Neck Pain        SUBJECTIVE:    Jill Marquez presents to the clinic for the evaluation of cervical pain. The pain started a year ago and noticed that her ear felt funny and had some pain in her jaw. Afterward her neck pain began and started to radiate up the back of her head. Symptoms have been worsening.The pain is located in the middle of the neck area and radiates to the top of the scalp and into the jaw.  The pain is described as aching and dull and is rated as 4/10. The pain is rated with a score of  3/10 on the BEST day and a score of 8/10 on the WORST day.  Symptoms interfere with sleeping. The pain is exacerbated by Extension and Flexing.  The pain is mitigated by heat and ice. The patient reports 8 hours of uninterrupted sleep per night. Pt currently attends physical therapy for her cervical and lumbar spine.    Patient denies night fever/night sweats, urinary incontinence, bowel incontinence, significant weight loss, significant motor weakness, and loss of sensations.    Physical Therapy/Home Exercise: yes      Pain Disability Index Review:      6/25/2024    11:01 AM   Last 3 PDI Scores   Pain Disability Index (PDI) 28       Pain Medications:    - Adjuvant Medications: Neurontin (Gabapentin) and Zanaflex ( Tizanidine)     report:  Not applicable    Pain Procedures: TFESI     Imaging:     MRI CERVICAL SPINE WITHOUT CONTRAST     CLINICAL HISTORY:  Neck pain, chronic, degenerative changes on xray; Spondylolisthesis, cervical region     TECHNIQUE:  Multiplanar, multisequence MR images of the cervical spine were performed without the administration of contrast.     COMPARISON:  Cervical spine radiograph 05/16/2024, 05/17/2024; MRI thoracic spine 05/02/2012     FINDINGS:  Skull base and craniocervical junction: T2/STIR signal hyperintensity at the inferior aspect of the  right cerebellar hemisphere with superimposed T1 signal hyperintensity.  Mild volume loss of the visualized cerebral hemispheres.  Normal vertebral artery flow voids are present.     Odontoid process: Heterogeneous pannus formation posterior to the odontoid process with mass effect on the ventral thecal sac.  No significant spinal canal stenosis.  No significant erosive changes of the odontoid process.  No widening of the atlantodens interval noting limited static exam.     Alignment: Straightening of the normal cervical lordosis with 3 mm retrolisthesis C5 on C6 in 3 mm of anterolisthesis C7 on T1.     Vertebrae: Vertebral body heights are adequately maintained without evidence of compression fracture.  Heterogeneous bone marrow signal diffusely suggestive of red marrow reconversion.  Chronic degenerative changes with questionable multifocal endplate edema/Modic type 1 change.     Discs: Multilevel advanced degenerative disc space narrowing and desiccation most pronounced C2-C3 through C6-7.     Cord: Normal signal.     Degenerative findings:     C2-C3: Posterior disc osteophyte complex with extension into the left extraforaminal zone.  Mild bilateral facet arthropathy.  No spinal canal stenosis. Mild left neural foraminal narrowing.     C3-C4: Posterior disc osteophyte complex.  Bilateral facet arthropathy and uncovertebral hypertrophy.  No spinal canal stenosis. Mild left neural foraminal narrowing.     C4-C5: Posterior disc osteophyte complex.  Bilateral facet arthropathy and uncovertebral hypertrophy.  Mild spinal canal stenosis.  Moderate left neural foraminal narrowing, mild to moderate right neural foraminal narrowing.     C5-C6: Posterior disc osteophyte complex.  Bilateral facet arthropathy and uncovertebral hypertrophy.  Mild spinal canal stenosis.  Moderate bilateral neural foraminal narrowing most severe on the left.     C6-C7: Posterior disc osteophyte complex.  Facet arthropathy and uncovertebral  hypertrophy.  No spinal canal stenosis. No neural foraminal narrowing.     C7-T1: Mild bilateral facet arthropathy.  No spinal canal stenosis. No neural foraminal narrowing.     Paraspinal muscles & soft tissues: Salivary glands are unremarkable.  Small thyroid gland relating to patient's history of hypothyroidism.  No laryngeal or tonsillar mass.     Impression:     Multilevel cervical spondylosis most pronounced at C4-C5 and C5-C6 as detailed above.     Pannus formation posterior to the odontoid process with mass effect on the ventral thecal sac.  No significant erosive changes of the odontoid.  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  Consider further characterization with brain MRI if clinically warranted    XR CERVICAL SPINE FLEXION  AND EXTENSION ONLY     CLINICAL HISTORY:  Spondylolisthesis, cervical region     TECHNIQUE:  Flexion and extension lateral views of the cervical spine.  Comparison is made to May 16, 2024.     COMPARISON:  05/16/2024.     FINDINGS:  Reversal the normal cervical lordosis with associated degenerative disc disease.  On extension there is 2 mm of retrolisthesis C5 on C6 which improves on flexion.  On flexion there is 2 mm anterolisthesis C4 on C5 which improves on extension.  Vertebral heights are maintained.       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    HTN (hypertension) 07/23/2012    Hyperlipidemia     Hypothyroid 07/23/2012     Past Surgical History:   Procedure Laterality Date    COLONOSCOPY N/A 01/25/2016    Procedure: COLONOSCOPY;  Surgeon: DAYNA Simmons MD;  Location: [address];  Service: Endoscopy;  Laterality: N/A;    COLONOSCOPY N/A 11/08/2022    Procedure:  COLONOSCOPY;  Surgeon: DAYNA Simmons MD;  Location: [facility] ENDO ([address] [place] [address]);  Service: Endoscopy;  Laterality: N/A;  vacc-inst portal-vargus only-tb  precall done.arrival time of 10:00 confirmed/mleone    COSMETIC SURGERY      EYE SURGERY      eyelid surgery      HYSTERECTOMY  2004    prolapse    OOPHORECTOMY Bilateral 2015    Tonsillectomy      TONSILLECTOMY      TRANSFORAMINAL EPIDURAL INJECTION OF STEROID Left 2023    Procedure: INJECTION, STEROID, EPIDURAL, TRANSFORAMINAL APPROACH, LEFT L3/L4 & L4/L5 DIRECT REF;  Surgeon: Popeye Ca MD;  Location: [facility];  Service: Pain Management;  Laterality: Left;     Social History     Socioeconomic History    Marital status:     Number of children: 2   Tobacco Use    Smoking status: Former     Current packs/day: 0.00     Types: Cigarettes     Start date: 1970     Quit date: 1982     Years since quittin.1     Passive exposure: Past    Smokeless tobacco: Former    Tobacco comments:     , homemaker, 2 children   Substance and Sexual Activity    Alcohol use: Yes     Comment: 2 servings daily x 30+ years, stopped 2023    Drug use: No    Sexual activity: Yes     Partners: Male     Birth control/protection: Surgical, None     Social Determinants of Health     Financial Resource Strain: Low Risk  (2024)    Overall Financial Resource Strain (CARDIA)     Difficulty of Paying Living Expenses: Not hard at all   Food Insecurity: No Food Insecurity (2024)    Hunger Vital Sign     Worried About Running Out of Food in the Last Year: Never true     Ran Out of Food in the Last Year: Never true   Transportation Needs: No Transportation Needs (2024)    PRAPARE - Transportation     Lack of Transportation (Medical): No     Lack of Transportation (Non-Medical): No   Physical Activity: Sufficiently Active (2024)    Exercise Vital Sign     Days of Exercise per Week: 5 days     Minutes of Exercise per Session: 60 min   Stress:  Stress Concern Present (2/13/2024)    [employer] - Occupational Stress Questionnaire     Feeling of Stress : To some extent   Housing Stability: Low Risk  (2/13/2024)    Housing Stability Vital Sign     Unable to Pay for Housing in the Last Year: No     Number of Places Lived in the Last Year: 1     Unstable Housing in the Last Year: No     Family History   Problem Relation Name Age of Onset    Diabetes Brother      Retinal detachment Brother      Cancer Brother          kidney    Cataracts Brother      Diabetes Brother      Cancer Brother          throat    Cataracts Brother      Diabetes Brother      Cataracts Brother      No Known Problems Mother      No Known Problems Father      Lupus Other          niece    No Known Problems Sister      No Known Problems Maternal Aunt      No Known Problems Maternal Uncle      No Known Problems Paternal Aunt      No Known Problems Paternal Uncle      No Known Problems Maternal Grandmother      No Known Problems Maternal Grandfather      No Known Problems Paternal Grandmother      No Known Problems Paternal Grandfather      Breast cancer Neg Hx      Colon cancer Neg Hx      Ovarian cancer Neg Hx      Blindness Neg Hx      Glaucoma Neg Hx      Hypertension Neg Hx      Macular degeneration Neg Hx      Strabismus Neg Hx      Stroke Neg Hx      Thyroid disease Neg Hx      Amblyopia Neg Hx      Rheum arthritis Neg Hx      Psoriasis Neg Hx      Inflammatory bowel disease Neg Hx      Cervical cancer Neg Hx      Endometrial cancer Neg Hx      Vaginal cancer Neg Hx      Uterine cancer Neg Hx         Review of patient's allergies indicates:   Allergen Reactions    Atorvastatin      Myalgia, increased ck    Crestor [rosuvastatin] Other (See Comments)     Elevated liver enzymes     Pravastatin      Myalgia, elevated ck    Sulfasalazine Nausea Only     Malaise, nausea,    Leflunomide Rash       Current Outpatient Medications   Medication Sig    amLODIPine  (NORVASC) 10 MG tablet TAKE 1 TABLET (10 MG TOTAL) BY MOUTH ONCE DAILY.    azelastine (ASTELIN) 137 mcg (0.1 %) nasal spray USE 1 SPRAY (137 MCG TOTAL) IN EACH NOSTRIL 2 (TWO) TIMES DAILY.    citalopram (CELEXA) 20 MG tablet Take 1 tablet (20 mg total) by mouth once daily.    ergocalciferol, vitamin D2, (VITAMIN D ORAL)     estradioL (ESTRACE) 0.01 % (0.1 mg/gram) vaginal cream Place 1 g vaginally twice a week.    etanercept (ENBREL SURECLICK) 50 mg/mL (1 mL) Inject 1 mL (50 mg total) into the skin once a week.    ezetimibe (ZETIA) 10 mg tablet TAKE ONE TABLET BY MOUTH EVERY DAY    fish oil-omega-3 fatty acids 300-1,000 mg capsule Take 2 g by mouth once daily.    fluticasone (FLONASE SENSIMIST) 27.5 mcg/actuation nasal spray 2 sprays by Nasal route once daily.    gabapentin (NEURONTIN) 300 MG capsule Take 2 capsules (600 mg total) by mouth 2 (two) times daily.    hydroCHLOROthiazide (HYDRODIURIL) 25 MG tablet TAKE 1 TABLET (25 MG TOTAL) BY MOUTH ONCE DAILY.    levothyroxine (SYNTHROID) 88 MCG tablet TAKE 1 TABLET (88 MCG TOTAL) BY MOUTH BEFORE BREAKFAST.    PSYLLIUM SEED, WITH SUGAR, (METAMUCIL ORAL) Take by mouth.    sennosides (SENOKOT TO GO ORAL)     tiZANidine (ZANAFLEX) 2 MG tablet TAKE 1 TABLET (2 MG TOTAL) BY MOUTH NIGHTLY AS NEEDED (MUSCLE SPASMS).    triamcinolone acetonide 0.1% (KENALOG) 0.1 % cream Apply topically 2 (two) times daily. Use up to 2 weeks at a time.    folic acid (FOLVITE) 1 MG tablet Take 1 tablet (1 mg total) by mouth once daily. (Patient not taking: Reported on 6/25/2024)    methotrexate 2.5 MG Tab Take 4 tablets (10 mg total) by mouth every 7 days. (Patient not taking: Reported on 6/25/2024)     No current facility-administered medications for this visit.       REVIEW OF SYSTEMS:    GENERAL:  No weight loss, malaise or fevers.  HEENT:  Negative for frequent or significant headaches.  NECK:  Negative for lumps, goiter, pain and significant neck swelling.  RESPIRATORY:  Negative for cough,  "wheezing or shortness of breath.  CARDIOVASCULAR:  Negative for chest pain, leg swelling or palpitations.  GI:  Negative for abdominal discomfort, blood in stools or black stools or change in bowel habits.  MUSCULOSKELETAL:  See HPI.  SKIN:  Negative for lesions, rash, and itching.  PSYCH:  Negative for sleep disturbance, mood disorder and recent psychosocial stressors.  HEMATOLOGY/LYMPHOLOGY:  Negative for prolonged bleeding, bruising easily or swollen nodes.  NEURO:   No history of headaches, syncope, paralysis, seizures or tremors.  All other reviewed and negative other than HPI.    OBJECTIVE:    BP (!) 156/87   Pulse 82   Temp 97.2 °F (36.2 °C)   Resp 18   Ht 5' 3" (1.6 m)   Wt 55 kg (121 lb 4.1 oz)   SpO2 100%   BMI 21.48 kg/m²     PHYSICAL EXAMINATION:    General appearance: Well appearing, in no acute distress, alert and oriented x3.  Psych:  Mood and affect appropriate.  Skin: Skin color, texture, turgor normal, no rashes or lesions, in both upper and lower body.  Head/face:  Normocephalic, atraumatic. No palpable lymph nodes.  Neck: Pain to palpation over the cervical paraspinous muscles. Spurling Negative. Pain with neck flexion, extension, or lateral flexion.   Cor: RRR  Pulm: CTA  GI:  Soft and non-tender.  Back: Straight leg raising in the sitting and supine positions is negative to radicular pain. No pain to palpation over the spine or costovertebral angles. Normal range of motion without pain reproduction.  Extremities: Peripheral joint ROM is full and pain free without obvious instability or laxity in all four extremities. No deformities, edema, or skin discoloration. Good capillary refill.  Musculoskeletal: Shoulder, hip, sacroiliac and knee provocative maneuvers are negative. Bilateral upper and lower extremity strength is normal and symmetric.  No atrophy or tone abnormalities are noted.  Neuro: Bilateral upper and lower extremity coordination and muscle stretch reflexes are physiologic and " symmetric.  Plantar response are downgoing. No loss of sensation is noted.  Gait: normal.    ASSESSMENT: 68 y.o. year old female with cervical pain, consistent with      1. Cervical radiculopathy  Procedure Order to Pain Management      2. DDD (degenerative disc disease), cervical  Procedure Order to Pain Management      3. Spondylolisthesis of cervical region  Procedure Order to Pain Management            PLAN:     - I have stressed the importance of physical activity and a home exercise plan to help with pain and improve health.  - Patient can continue with medications for now since they are providing benefits, using them appropriately, and without side effects.  - Schedule for a Cervical Epidural Injection at C7/T1  - We will consider cervical MBB in the future.  - RTC 1 month after TOMASA to evaluate progress  - Counseled patient regarding the importance of activity modification, constant sleeping habits, and physical therapy.    The above plan and management options were discussed at length with patient. Patient is in agreement with the above and verbalized understanding. It will be communicated with the referring physician via electronic record, fax, or mail.    Javon Nance MD, PGY2  Department of Anesthesiology  [facility]    I spent a total of 30 minutes on the day of the visit.  This includes face to face time and non-face to face time preparing to see the patient by reviewing previous labs/imaging, obtaining and/or reviewing separately obtained history, documenting clinical information in the electronic or other health record, independently interpreting results and communicating results to the patient/family/caregiver.    Popeye Ca

## 2024-06-25 NOTE — TELEPHONE ENCOUNTER
Spoke to patient regarding an mri appointment. Patient scheduled for 1:15 pm on 07/11/2024 at the [facility]. Patient stated thank you. Thanks.

## 2024-06-26 ENCOUNTER — PATIENT MESSAGE (OUTPATIENT)
Dept: PAIN MEDICINE | Facility: OTHER | Age: 69
End: 2024-06-26
Payer: MEDICARE

## 2024-06-27 ENCOUNTER — PATIENT OUTREACH (OUTPATIENT)
Dept: ADMINISTRATIVE | Facility: HOSPITAL | Age: 69
End: 2024-06-27
Payer: MEDICARE

## 2024-06-27 DIAGNOSIS — G57.00 PIRIFORMIS SYNDROME, UNSPECIFIED LATERALITY: ICD-10-CM

## 2024-06-27 DIAGNOSIS — M79.18 MYOFASCIAL PAIN: ICD-10-CM

## 2024-06-27 RX ORDER — TIZANIDINE 2 MG/1
2 TABLET ORAL NIGHTLY PRN
Qty: 30 TABLET | Refills: 1 | Status: SHIPPED | OUTPATIENT
Start: 2024-06-27

## 2024-07-09 ENCOUNTER — LAB VISIT (OUTPATIENT)
Dept: LAB | Facility: HOSPITAL | Age: 69
End: 2024-07-09
Attending: PHYSICIAN ASSISTANT
Payer: MEDICARE

## 2024-07-09 ENCOUNTER — PATIENT MESSAGE (OUTPATIENT)
Dept: ADMINISTRATIVE | Facility: OTHER | Age: 69
End: 2024-07-09
Payer: MEDICARE

## 2024-07-09 DIAGNOSIS — G72.0 STATIN MYOPATHY: ICD-10-CM

## 2024-07-09 DIAGNOSIS — E78.5 HYPERLIPIDEMIA, UNSPECIFIED HYPERLIPIDEMIA TYPE: ICD-10-CM

## 2024-07-09 DIAGNOSIS — T46.6X5A STATIN MYOPATHY: ICD-10-CM

## 2024-07-09 LAB
CHOLEST SERPL-MCNC: 233 MG/DL (ref 120–199)
CHOLEST/HDLC SERPL: 3 {RATIO} (ref 2–5)
CK SERPL-CCNC: 141 U/L (ref 20–180)
HDLC SERPL-MCNC: 78 MG/DL (ref 40–75)
HDLC SERPL: 33.5 % (ref 20–50)
LDLC SERPL CALC-MCNC: 133.2 MG/DL (ref 63–159)
NONHDLC SERPL-MCNC: 155 MG/DL
TRIGL SERPL-MCNC: 109 MG/DL (ref 30–150)

## 2024-07-09 PROCEDURE — 82550 ASSAY OF CK (CPK): CPT | Performed by: INTERNAL MEDICINE

## 2024-07-09 PROCEDURE — 80061 LIPID PANEL: CPT | Performed by: PHYSICIAN ASSISTANT

## 2024-07-09 PROCEDURE — 36415 COLL VENOUS BLD VENIPUNCTURE: CPT | Mod: PN | Performed by: INTERNAL MEDICINE

## 2024-07-11 ENCOUNTER — OFFICE VISIT (OUTPATIENT)
Dept: INTERNAL MEDICINE | Facility: CLINIC | Age: 69
End: 2024-07-11
Payer: MEDICARE

## 2024-07-11 ENCOUNTER — HOSPITAL ENCOUNTER (OUTPATIENT)
Dept: RADIOLOGY | Facility: HOSPITAL | Age: 69
Discharge: HOME | End: 2024-07-11
Attending: REGISTERED NURSE
Payer: MEDICARE

## 2024-07-11 VITALS
HEIGHT: 63 IN | SYSTOLIC BLOOD PRESSURE: 130 MMHG | OXYGEN SATURATION: 98 % | BODY MASS INDEX: 21.29 KG/M2 | DIASTOLIC BLOOD PRESSURE: 88 MMHG | HEART RATE: 81 BPM | WEIGHT: 120.13 LBS

## 2024-07-11 DIAGNOSIS — R74.8 ELEVATED CPK: ICD-10-CM

## 2024-07-11 DIAGNOSIS — I10 PRIMARY HYPERTENSION: ICD-10-CM

## 2024-07-11 DIAGNOSIS — E78.5 HYPERLIPIDEMIA, UNSPECIFIED HYPERLIPIDEMIA TYPE: Primary | ICD-10-CM

## 2024-07-11 DIAGNOSIS — L40.50 PSA (PSORIATIC ARTHRITIS): ICD-10-CM

## 2024-07-11 DIAGNOSIS — J20.9 ACUTE BRONCHITIS, UNSPECIFIED ORGANISM: ICD-10-CM

## 2024-07-11 DIAGNOSIS — E88.01 ALPHA-1-ANTITRYPSIN DEFICIENCY: ICD-10-CM

## 2024-07-11 DIAGNOSIS — R51.9 INTRACTABLE HEADACHE, UNSPECIFIED CHRONICITY PATTERN, UNSPECIFIED HEADACHE TYPE: ICD-10-CM

## 2024-07-11 DIAGNOSIS — D84.9 IMMUNOCOMPROMISED, ACQUIRED: ICD-10-CM

## 2024-07-11 DIAGNOSIS — M54.16 LUMBAR RADICULOPATHY: ICD-10-CM

## 2024-07-11 PROCEDURE — 99214 OFFICE O/P EST MOD 30 MIN: CPT | Mod: S$PBB,,, | Performed by: INTERNAL MEDICINE

## 2024-07-11 PROCEDURE — 70551 MRI BRAIN STEM W/O DYE: CPT | Mod: 26,,, | Performed by: RADIOLOGY

## 2024-07-11 PROCEDURE — 99999 PR PBB SHADOW E&M-EST. PATIENT-LVL IV: CPT | Mod: PBBFAC,,, | Performed by: INTERNAL MEDICINE

## 2024-07-11 PROCEDURE — 99214 OFFICE O/P EST MOD 30 MIN: CPT | Mod: PBBFAC,25 | Performed by: INTERNAL MEDICINE

## 2024-07-11 PROCEDURE — 70551 MRI BRAIN STEM W/O DYE: CPT | Mod: TC

## 2024-07-11 RX ORDER — DOXYCYCLINE 100 MG/1
100 CAPSULE ORAL 2 TIMES DAILY
Start: 2024-07-11 | End: 2024-07-18

## 2024-07-11 NOTE — PROGRESS NOTES
Subjective:       Patient ID: Jill Marquez is a 68 y.o. female.    Chief Complaint: Follow-up    68-year-old here for follow-up of cholesterol on Zetia as well as recent respiratory infection.  She was out of town visiting family and had a respiratory illness.  COVID strep and flu were negative.  She was given breathing treatments and steroids.  She has improved somewhat but still has significant symptoms and because she is on immunosuppressant meds wonders if she needs an antibiotic.  Wheezing and congestion have improved but she still has some thick yellow mucus.  She is having some neck scans and brain scan for follow-up arthritis condition.  She may have an epidural    Follow-up  Associated symptoms include arthralgias, headaches, joint swelling and neck pain. Pertinent negatives include no chest pain, vomiting or weakness.     Review of Systems   Constitutional:  Positive for activity change. Negative for unexpected weight change.   HENT:  Negative for hearing loss, rhinorrhea and trouble swallowing.    Eyes:  Negative for discharge and visual disturbance.   Respiratory:  Positive for wheezing. Negative for chest tightness.    Cardiovascular:  Negative for chest pain and palpitations.   Gastrointestinal:  Negative for blood in stool, constipation, diarrhea and vomiting.   Endocrine: Negative for polydipsia and polyuria.   Genitourinary:  Negative for difficulty urinating, dysuria, hematuria and menstrual problem.   Musculoskeletal:  Positive for arthralgias, joint swelling and neck pain.   Neurological:  Positive for headaches. Negative for weakness.   Psychiatric/Behavioral:  Positive for dysphoric mood. Negative for confusion.            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    HTN (hypertension) 07/23/2012    Hyperlipidemia     Hypothyroid 07/23/2012     Past Surgical History:   Procedure Laterality Date     COLONOSCOPY N/A 01/25/2016    Procedure: COLONOSCOPY;  Surgeon: DAYNA Simmons MD;  Location: [facility] ENDO (4TH FLR);  Service: Endoscopy;  Laterality: N/A;    COLONOSCOPY N/A 11/08/2022    Procedure: COLONOSCOPY;  Surgeon: DAYNA Simmons MD;  Location: [facility] ENDO (4TH FLR);  Service: Endoscopy;  Laterality: N/A;  vacc-inst portal-vargus only-tb  precall done.arrival time of 10:00 confirmed/mleone    COSMETIC SURGERY      EYE SURGERY      eyelid surgery      HYSTERECTOMY  2004    prolapse    OOPHORECTOMY Bilateral 2015    Tonsillectomy      TONSILLECTOMY      TRANSFORAMINAL EPIDURAL INJECTION OF STEROID Left 05/26/2023    Procedure: INJECTION, STEROID, EPIDURAL, TRANSFORAMINAL APPROACH, LEFT L3/L4 & L4/L5 DIRECT REF;  Surgeon: Popeye Ca MD;  Location: [facility] PAIN MGT;  Service: Pain Management;  Laterality: Left;      Patient Active Problem List   Diagnosis    Hypothyroid    HTN (hypertension)    Displacement of thoracic intervertebral disc without myelopathy    Hyperlipidemia    Lumbar spondylosis    Carpal tunnel syndrome    Lumbar radiculopathy    Sicca    Fatigue    Myalgia and myositis    Erosive osteoarthritis of both hands    Left leg pain    Bilateral hand pain    Elevated CPK    Atrophic vaginitis    Chronic constipation    Screening for colon cancer    Chronic low back pain    CMC arthritis, thumb, degenerative    Mucous cyst of finger    Peripheral spondyloarthritis    Rash of back    PSA (psoriatic arthritis)    Left sided sciatica    Greater trochanteric bursitis of right hip    Medial epicondylitis of elbow, left    Pain    Edema    Upper extremity weakness    Decreased strength    Immunocompromised, acquired    Gastrocnemius equinus, left    Osteoarthritis of midfoot, left    Posterior tibial tendon dysfunction, left    ALT (SGPT) level raised    Alpha-1-antitrypsin deficiency carrier    SOB (shortness of breath)    Alpha-1-antitrypsin deficiency    Aortic atherosclerosis    Decreased range of  motion of intervertebral discs of cervical spine        Objective:      Physical Exam  Constitutional:       General: She is not in acute distress.     Appearance: She is well-developed.   HENT:      Head: Normocephalic and atraumatic.      Right Ear: Tympanic membrane, ear canal and external ear normal.      Left Ear: Tympanic membrane, ear canal and external ear normal.      Mouth/Throat:      Pharynx: No oropharyngeal exudate or posterior oropharyngeal erythema.   Eyes:      General: No scleral icterus.     Conjunctiva/sclera: Conjunctivae normal.      Pupils: Pupils are equal, round, and reactive to light.   Neck:      Thyroid: No thyromegaly.   Cardiovascular:      Rate and Rhythm: Normal rate and regular rhythm.      Pulses: Normal pulses.      Heart sounds: No murmur heard.  Pulmonary:      Effort: Pulmonary effort is normal. No respiratory distress.      Breath sounds: No stridor. Wheezing (very slight) present.   Abdominal:      General: Bowel sounds are normal. There is no distension.      Palpations: Abdomen is soft.      Tenderness: There is no abdominal tenderness.   Musculoskeletal:         General: No tenderness.      Cervical back: Normal range of motion and neck supple.      Right lower leg: No edema.      Left lower leg: No edema.   Lymphadenopathy:      Cervical: No cervical adenopathy.   Skin:     Coloration: Skin is not jaundiced.      Findings: No rash.   Neurological:      General: No focal deficit present.      Mental Status: She is alert and oriented to person, place, and time.   Psychiatric:         Mood and Affect: Mood normal.         Behavior: Behavior normal.         Assessment:       Problem List Items Addressed This Visit          Neuro    Lumbar radiculopathy       Pulmonary    Alpha-1-antitrypsin deficiency       Cardiac/Vascular    HTN (hypertension)    Hyperlipidemia - Primary       Immunology/Multi System    Immunocompromised, acquired       Orthopedic    PSA (psoriatic  "arthritis)       Other    Elevated CPK     Other Visit Diagnoses       Acute bronchitis, unspecified organism                Plan:         Jill was seen today for follow-up.    Diagnoses and all orders for this visit:    Hyperlipidemia, unspecified hyperlipidemia type    Lumbar radiculopathy    Immunocompromised, acquired    Primary hypertension    Elevated CPK    Acute bronchitis, unspecified organism    Alpha-1-antitrypsin deficiency    PSA (psoriatic arthritis)       Few months sooner p.r.n..  Doxycycline respiratory illness.                 Portions of this note may have been created with voice recognition software. Occasional "wrong-word" or "sound-a-like" substitutions may have occurred due to the inherent limitations of voice recognition software. Please, read the note carefully and recognize, using context, where substitutions have occurred.  "

## 2024-07-12 ENCOUNTER — PATIENT MESSAGE (OUTPATIENT)
Dept: INTERNAL MEDICINE | Facility: CLINIC | Age: 69
End: 2024-07-12
Payer: MEDICARE

## 2024-07-12 ENCOUNTER — PATIENT MESSAGE (OUTPATIENT)
Dept: PAIN MEDICINE | Facility: OTHER | Age: 69
End: 2024-07-12
Payer: MEDICARE

## 2024-07-22 ENCOUNTER — PATIENT MESSAGE (OUTPATIENT)
Dept: PHYSICAL MEDICINE AND REHAB | Facility: CLINIC | Age: 69
End: 2024-07-22
Payer: MEDICARE

## 2024-07-23 ENCOUNTER — LAB VISIT (OUTPATIENT)
Dept: LAB | Facility: HOSPITAL | Age: 69
End: 2024-07-23
Attending: NURSE PRACTITIONER
Payer: MEDICARE

## 2024-07-23 DIAGNOSIS — E88.01 LOW PLASMA ALPHA-1 ANTITRYPSIN: ICD-10-CM

## 2024-07-23 DIAGNOSIS — R74.01 ALT (SGPT) LEVEL RAISED: ICD-10-CM

## 2024-07-23 LAB
ALBUMIN SERPL BCP-MCNC: 4.4 G/DL (ref 3.5–5.2)
ALP SERPL-CCNC: 49 U/L (ref 55–135)
ALT SERPL W/O P-5'-P-CCNC: 33 U/L (ref 10–44)
AST SERPL-CCNC: 33 U/L (ref 10–40)
BILIRUB DIRECT SERPL-MCNC: 0.4 MG/DL (ref 0.1–0.3)
BILIRUB SERPL-MCNC: 1.4 MG/DL (ref 0.1–1)
PROT SERPL-MCNC: 7.2 G/DL (ref 6–8.4)

## 2024-07-23 PROCEDURE — 36415 COLL VENOUS BLD VENIPUNCTURE: CPT | Mod: PN | Performed by: NURSE PRACTITIONER

## 2024-07-23 PROCEDURE — 80076 HEPATIC FUNCTION PANEL: CPT | Performed by: NURSE PRACTITIONER

## 2024-07-24 ENCOUNTER — PATIENT MESSAGE (OUTPATIENT)
Dept: HEPATOLOGY | Facility: CLINIC | Age: 69
End: 2024-07-24
Payer: MEDICARE

## 2024-07-30 ENCOUNTER — PROCEDURE VISIT (OUTPATIENT)
Dept: HEPATOLOGY | Facility: CLINIC | Age: 69
End: 2024-07-30
Payer: MEDICARE

## 2024-07-30 ENCOUNTER — OFFICE VISIT (OUTPATIENT)
Dept: HEPATOLOGY | Facility: CLINIC | Age: 69
End: 2024-07-30
Payer: MEDICARE

## 2024-07-30 VITALS — BODY MASS INDEX: 21.76 KG/M2 | HEIGHT: 63 IN | WEIGHT: 122.81 LBS

## 2024-07-30 DIAGNOSIS — E88.01 ALPHA-1-ANTITRYPSIN DEFICIENCY: Primary | ICD-10-CM

## 2024-07-30 DIAGNOSIS — R74.01 ALT (SGPT) LEVEL RAISED: ICD-10-CM

## 2024-07-30 DIAGNOSIS — E88.01 LOW PLASMA ALPHA-1 ANTITRYPSIN: ICD-10-CM

## 2024-07-30 PROBLEM — Z14.8 ALPHA-1-ANTITRYPSIN DEFICIENCY CARRIER: Status: RESOLVED | Noted: 2023-04-04 | Resolved: 2024-07-30

## 2024-07-30 PROCEDURE — 99999 PR PBB SHADOW E&M-EST. PATIENT-LVL III: CPT | Mod: PBBFAC,,, | Performed by: NURSE PRACTITIONER

## 2024-07-30 PROCEDURE — 91200 LIVER ELASTOGRAPHY: CPT | Mod: PBBFAC | Performed by: NURSE PRACTITIONER

## 2024-07-30 PROCEDURE — 99213 OFFICE O/P EST LOW 20 MIN: CPT | Mod: PBBFAC | Performed by: NURSE PRACTITIONER

## 2024-07-30 PROCEDURE — 91200 LIVER ELASTOGRAPHY: CPT | Mod: 26,S$PBB,, | Performed by: NURSE PRACTITIONER

## 2024-07-30 PROCEDURE — 99214 OFFICE O/P EST MOD 30 MIN: CPT | Mod: S$PBB,,, | Performed by: NURSE PRACTITIONER

## 2024-07-30 RX ORDER — MUPIROCIN 20 MG/G
OINTMENT TOPICAL
COMMUNITY
Start: 2024-05-31

## 2024-07-30 RX ORDER — ALBUTEROL SULFATE 90 UG/1
AEROSOL, METERED RESPIRATORY (INHALATION)
COMMUNITY
Start: 2024-07-03

## 2024-07-30 NOTE — PROCEDURES
FibroScan [place] (Vibration Controlled Transient Elastography)    Date/Time: 7/30/2024 10:45 AM    Performed by: Lavonne Melendez NP  Authorized by: Lavonne Melendez NP    Diagnosis:  Other    Probe:  M    Universal Protocol: Patient's identity, procedure and site were verified, confirmatory pause was performed.  Discussed procedure including risks and potential complications.  Questions answered.  Patient verbalizes understanding and wishes to proceed with VCTE.     Procedure: After providing explanations of the procedure, patient was placed in the supine position with right arm in maximum abduction to allow optimal exposure of right lateral abdomen.  Patient was briefly assessed, Testing was performed in the mid-axillary location, 50Hz Shear Wave pulses were applied and the resulting Shear Wave and Propagation Speed detected with a 3.5 MHz ultrasonic signal, using the FibroScan probe, Skin to liver capsule distance and liver parenchyma were accessed during the entire examination with the FibroScan probe, Patient was instructed to breathe normally and to abstain from sudden movements during the procedure, allowing for random measurements of liver stiffness. At least 10 Shear Waves were produced, Individual measurements of each Shear Wave were calculated.  Patient tolerated the procedure well with no complications.  Meets discharge criteria as was dismissed.  Rates pain 0 out of 10.  Patient will follow up with ordering provider to review results.    Findings  Median liver stiffness score:  4.7  CAP Reading: dB/m:  229    IQR/med %:  14  Interpretation  Fibrosis interpretation is based on medial liver stiffness - Kilopascal (kPa).    Fibrosis Stage:  F 0-1  Steatosis interpretation is based on controlled attenuation parameter - (dB/m).    Steatosis Grade:  <S1

## 2024-07-30 NOTE — PROGRESS NOTES
[facility] VISIT FOLLOW UP NOTE    PCP: Dain Smith MD     CHIEF COMPLAINT: A1AT def (MS phenotype)    HPI: This is a 68 y.o. female with PMH noted below, presenting for follow up of above     Serological workup was negative for David's, hemochromatosis, autoimmune etiology, and viral hepatitis.   Mildly + SAMIR 1:80, all other markers negative, low suspicion for AIH given low SAMIR and others negative   low A1AT phenotype MS  CK 220s   2/2024    Prior serologic workup:   Lab Results   Component Value Date    SMOOTHMUSCAB Negative 1:40 03/15/2023    AMAIFA Negative 1:40 03/15/2023    IGGSERUM 803 03/15/2023    ANASCREEN Positive (A) 03/15/2023    FERRITIN 201 03/15/2023    FESATURATED 34 03/15/2023    [id number] SEE BELOW 03/28/2023    TPWCC5LYRDSL 99 (L) 03/28/2023    CERULOPLSM 20.0 03/15/2023    HEPBSAG Non-reactive 03/15/2023    HEPCAB Non-reactive 03/15/2023    HEPAIGM Non-reactive 03/15/2023       Liver fibrosis staging:  -- fibroscan 3/2023 normal (kPA 4, )  -- fibroscan 7/2024 noted F0, S0 (kPA 4.7, )      Interval HPI: Presents today alone. Previous liver enzyme elevation suspected due to statins (crestor 11/2022 - 2023 and pravastatin ~11/2023 stopped), elevated CK  Liver enzymes normal before and after statins, now on Zetia  On pred and antibiotics for 10 days due to bronchitis 7/2024  No fat or fibrosis on fibroscan  Liver enzymes WNL  No knowledge of other family members with A1At def but brother does have COPD    Labs done 7/2024 show normal transaminase levels   Platelets WNL, alk phos low  Synthetic liver functioning WNL    Lab Results   Component Value Date    ALT 33 07/23/2024    AST 33 07/23/2024    ALKPHOS 49 (L) 07/23/2024    BILITOT 1.4 (H) 07/23/2024    ALBUMIN 4.4 07/23/2024     05/08/2024       Abd U/S 3/2023 noted normal liver size, fatty liver (?), no splenomegaly    Denies family history of liver disease . Intermittent alcohol consumption  "  Social History     Substance and Sexual Activity   Alcohol Use Yes    Comment: 1-2 servings per week         +Immunity to Hep A and B       Allergy and medication list reviewed and updated     PMHX:  has a past medical history of Colon polyp (01/25/2016), DJD (degenerative joint disease) of lumbar spine (03/10/2014), HTN (hypertension) (07/23/2012), Hyperlipidemia, and Hypothyroid (07/23/2012).    PSHX:  has a past surgical history that includes Tonsillectomy; eyelid surgery; Eye surgery; Cosmetic surgery; Tonsillectomy; Colonoscopy (N/A, 01/25/2016); Oophorectomy (Bilateral, 2015); Hysterectomy (2004); Colonoscopy (N/A, 11/08/2022); and Transforaminal epidural injection of steroid (Left, 05/26/2023).    FAMILY HISTORY: Updated and reviewed in [facility]    SOCIAL HISTORY:   Social History     Substance and Sexual Activity   Alcohol Use Yes    Comment: 1-2 servings per week       Social History     Substance and Sexual Activity   Drug Use No       ROS:   GENERAL: intermittent fatigue  CARDIOVASCULAR: Denies edema  GI: Denies abdominal pain  SKIN: Denies rash, itching   NEURO: Denies confusion, memory loss, or mood changes    PHYSICAL EXAM:   Friendly White female, in no acute distress; alert and oriented to person, place and time  VITALS: Ht 5' 3" (1.6 m)   Wt 55.7 kg (122 lb 12.7 oz)   BMI 21.75 kg/m²   EYES: Sclerae anicteric  GI: Soft, non-tender, non-distended. No ascites.  EXTREMITIES:  No edema.  SKIN: Warm and dry. No jaundice. No telangectasias noted. No palmar erythema.  NEURO:  No asterixis.  PSYCH:  Thought and speech pattern appropriate. Behavior normal      EDUCATION:  See instructions discussed with patient in Instructions section of the After Visit Summary     ASSESSMENT & PLAN:  68 y.o. female with:  1. Previously Elevated liver enzymes   -- normalized after stopping statin x2, now on Zetia, liver enzymes remain WNL  --- previous serological work up : see HPI  -- fibroscan normal    2. A1AT def  Very " mild deficiency, does not smoke, no lung s/s. No fibrosis. Likely will not cause significant liver or lung disease  Did advise her siblings and children are tested   Fibroscan q2 years       Follow up in about 2 years (around 7/30/2026) for with fibroscan before .   No orders of the defined types were placed in this encounter.       Thank you for allowing me to participate in the care of Jill ERNESTO MarquezJOANN Perez    I spent a total of 30 minutes on the day of the visit.This includes face to face time and non-face to face time preparing to see the patient (eg, review of tests), obtaining and/or reviewing separately obtained history, documenting clinical information in the electronic or other health record, independently interpreting results and communicating results to the patient/family/caregiver, and coordinating care.         CC'ed note to:   Dain Smith MD

## 2024-07-30 NOTE — PATIENT INSTRUCTIONS
"ALPHA 1 ANTITRYPSIN DEFICIENCY  Your repeat lab did show that you have a low alpha 1 antitrypsin level. You only have 1 of the abnormal genes (M is the normal gene, S is the abnormal gene), which is deemed a "carrier" and can sometimes cause low alpha 1 levels    This is a very helpful website that explains it : https://[email or web address].org/wp-content/uploads/[email or web address]/Csjcy9Xkcnzerw-[email or web address].pdf    Low alpha 1 levels can cause liver and lung problems, although the MS genetic makeup is very mild and typically does not cause serious issues.     From a liver standpoint, given the above, there is nothing that we do differently except we will likely follow you long term to monitor liver enzymes and liver fibrosis    Since it is genetic, it is recommended that your parents, siblings and any kids are tested. The test is the alpha 1 antitrypsin phenotype lab.          "

## 2024-07-31 ENCOUNTER — HOSPITAL ENCOUNTER (OUTPATIENT)
Dept: RADIOLOGY | Facility: HOSPITAL | Age: 69
Discharge: HOME | End: 2024-07-31
Attending: NURSE PRACTITIONER
Payer: MEDICARE

## 2024-07-31 DIAGNOSIS — Z12.31 SCREENING MAMMOGRAM FOR BREAST CANCER: ICD-10-CM

## 2024-07-31 PROCEDURE — 77063 BREAST TOMOSYNTHESIS BI: CPT | Mod: 26,,, | Performed by: RADIOLOGY

## 2024-07-31 PROCEDURE — 77063 BREAST TOMOSYNTHESIS BI: CPT | Mod: TC,PN

## 2024-07-31 PROCEDURE — 77067 SCR MAMMO BI INCL CAD: CPT | Mod: 26,,, | Performed by: RADIOLOGY

## 2024-08-01 ENCOUNTER — HOSPITAL ENCOUNTER (OUTPATIENT)
Facility: OTHER | Age: 69
Discharge: HOME | End: 2024-08-01
Attending: ANESTHESIOLOGY | Admitting: ANESTHESIOLOGY
Payer: MEDICARE

## 2024-08-01 VITALS
HEIGHT: 63 IN | WEIGHT: 118 LBS | HEART RATE: 76 BPM | OXYGEN SATURATION: 95 % | RESPIRATION RATE: 16 BRPM | BODY MASS INDEX: 20.91 KG/M2 | DIASTOLIC BLOOD PRESSURE: 65 MMHG | SYSTOLIC BLOOD PRESSURE: 136 MMHG | TEMPERATURE: 98 F

## 2024-08-01 DIAGNOSIS — G89.29 CHRONIC PAIN: ICD-10-CM

## 2024-08-01 DIAGNOSIS — M54.12 CERVICAL RADICULOPATHY, CHRONIC: Primary | ICD-10-CM

## 2024-08-01 DIAGNOSIS — M50.30 DDD (DEGENERATIVE DISC DISEASE), CERVICAL: ICD-10-CM

## 2024-08-01 DIAGNOSIS — M46.92 CERVICAL SPONDYLITIS: ICD-10-CM

## 2024-08-01 PROCEDURE — 25000003 PHARM REV CODE 250: Performed by: ANESTHESIOLOGY

## 2024-08-01 PROCEDURE — 99152 MOD SED SAME PHYS/QHP 5/>YRS: CPT | Performed by: ANESTHESIOLOGY

## 2024-08-01 PROCEDURE — 62321 NJX INTERLAMINAR CRV/THRC: CPT | Mod: ,,, | Performed by: ANESTHESIOLOGY

## 2024-08-01 PROCEDURE — 25500020 PHARM REV CODE 255: Performed by: ANESTHESIOLOGY

## 2024-08-01 PROCEDURE — 62321 NJX INTERLAMINAR CRV/THRC: CPT | Performed by: ANESTHESIOLOGY

## 2024-08-01 PROCEDURE — 63600175 PHARM REV CODE 636 W HCPCS: Performed by: ANESTHESIOLOGY

## 2024-08-01 RX ORDER — DEXAMETHASONE SODIUM PHOSPHATE 10 MG/ML
INJECTION INTRAMUSCULAR; INTRAVENOUS
Status: DISCONTINUED | OUTPATIENT
Start: 2024-08-01 | End: 2024-08-01 | Stop reason: HOSPADM

## 2024-08-01 RX ORDER — MIDAZOLAM HYDROCHLORIDE 1 MG/ML
INJECTION INTRAMUSCULAR; INTRAVENOUS
Status: DISCONTINUED | OUTPATIENT
Start: 2024-08-01 | End: 2024-08-01 | Stop reason: HOSPADM

## 2024-08-01 RX ORDER — FENTANYL CITRATE 50 UG/ML
INJECTION, SOLUTION INTRAMUSCULAR; INTRAVENOUS
Status: DISCONTINUED | OUTPATIENT
Start: 2024-08-01 | End: 2024-08-01 | Stop reason: HOSPADM

## 2024-08-01 RX ORDER — LIDOCAINE HYDROCHLORIDE 20 MG/ML
INJECTION, SOLUTION INFILTRATION; PERINEURAL
Status: DISCONTINUED | OUTPATIENT
Start: 2024-08-01 | End: 2024-08-01 | Stop reason: HOSPADM

## 2024-08-01 RX ORDER — SODIUM CHLORIDE 9 MG/ML
INJECTION, SOLUTION INTRAVENOUS CONTINUOUS
Status: DISCONTINUED | OUTPATIENT
Start: 2024-08-01 | End: 2024-08-01 | Stop reason: HOSPADM

## 2024-08-01 RX ORDER — LIDOCAINE HYDROCHLORIDE 10 MG/ML
INJECTION, SOLUTION EPIDURAL; INFILTRATION; INTRACAUDAL; PERINEURAL
Status: DISCONTINUED | OUTPATIENT
Start: 2024-08-01 | End: 2024-08-01 | Stop reason: HOSPADM

## 2024-08-01 NOTE — DISCHARGE SUMMARY
Discharge Note  Short Stay      SUMMARY     Admit Date: 8/1/2024    Attending Physician: Popeye Ca MD      Discharge Physician: Popeye Ca MD      Discharge Date: 8/1/2024 3:41 PM    Procedure(s) (LRB):  CERVICAL C7/T1 TOMASA (N/A)    Final Diagnosis: Cervical radiculopathy [M54.12]    Disposition: Home or self care    Patient Instructions:   Cannot display discharge medications since this patient is expected but has not yet arrived.          Discharge Diagnosis: Cervical radiculopathy [M54.12]  Condition on Discharge: Stable with no complications to procedure   Diet on Discharge: Same as before.  Activity: as per instruction sheet.  Discharge to: Home with a responsible adult.  Follow up: 2 weeks

## 2024-08-01 NOTE — OP NOTE
Cervical Interlaminar Epidural Steroid Injection under Fluoroscopic Guidance    The procedure, risks, benefits, and options were discussed with the patient. There are no contraindications to the procedure. The patent expressed understanding and agreed to the procedure. Informed written consent was obtained prior to the start of the procedure and can be found in the patient's chart.     PATIENT NAME: Jill Marquez   MRN: [id number]     DATE OF PROCEDURE: 08/01/2024    PROCEDURE: Cervical Interlaminar Epidural Steroid Injection C7/T1 under Fluoroscopic Guidance    PRE-OP DIAGNOSIS: Cervical radiculopathy [M54.12] Cervical radiculopathy [M54.12]    POST-OP DIAGNOSIS: Same    PHYSICIAN: Popeye Ca MD     ASSISTANTS: Ant Moffett MD     MEDICATIONS INJECTED: Preservative-free Decadron 10mg with 1cc of Lidocaine 1% MPF and preservative free normal saline    LOCAL ANESTHETIC INJECTED: Xylocaine 2%     SEDATION: Versed 1mg and Fentanyl 75mcg                                                                                                                                                                                     Conscious sedation ordered by M.D. Patient re-evaluation prior to administration of conscious sedation. No changes noted in patient's status from initial evaluation. The patient's vital signs were monitored by RN and patient remained hemodynamically stable throughout the procedure.    Event Time In   Sedation Start 1323   Sedation End 1340       ESTIMATED BLOOD LOSS: None    COMPLICATIONS: None    TECHNIQUE: Time-out was performed to identify the patient and procedure to be performed. With the patient laying in a prone position, the surgical area was prepped and draped in the usual sterile fashion using ChloraPrep and a fenestrated drape. The level was determined under fluoroscopy guidance. Skin anesthesia was achieved by injecting Lidocaine 2% over the injection site.  The interlaminar space was  then approached with a 20 gauge, 3.5 inch Tuohy needle that was introduced under fluoroscopic guidance with AP, lateral and/or contralateral oblique imaging. Once the Ligamentum flavum was encountered loss of resistance to saline was used to enter the epidural space. With positive loss of resistance and negative aspiration for CSF or Blood, contrast dye  Omnipaque (300mg/mL) was injected to confirm placement and there was no vascular runoff. Then 3 mL of the medication mixture listed above was then injected slowly. Displacement of the radio opaque contrast after injection of the medication confirmed that the medication went into the area of the epidural space. The needles were removed, and bleeding was nil. A sterile dressing was applied. No specimens collected. The patient tolerated the procedure well.         The patient was monitored after the procedure in the recovery area. They were given post-procedure and discharge instructions to follow at home. The patient was discharged in a stable condition.    Ant Moffett MD     I reviewed and edited the fellow's note. I conducted my own interview and physical examination. I agree with the findings. I was present and supervising all critical portions of the procedure.    Popeye Ca MD

## 2024-08-01 NOTE — H&P
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    HTN (hypertension) 07/23/2012    Hyperlipidemia     Hypothyroid 07/23/2012     Past Surgical History:   Procedure Laterality Date    COLONOSCOPY N/A 01/25/2016    Procedure: COLONOSCOPY;  Surgeon: DAYNA Simmons MD;  Location: [facility] ENDO (4TH FLR);  Service: Endoscopy;  Laterality: N/A;    COLONOSCOPY N/A 11/08/2022    Procedure: COLONOSCOPY;  Surgeon: DAYNA Simmons MD;  Location: [facility] ENDO (4TH FLR);  Service: Endoscopy;  Laterality: N/A;  vacc-inst portal-vargus only-tb  precall done.arrival time of 10:00 confirmed/mleone    COSMETIC SURGERY      EYE SURGERY      eyelid surgery      HYSTERECTOMY  2004    prolapse    OOPHORECTOMY Bilateral 2015    Tonsillectomy      TONSILLECTOMY      TRANSFORAMINAL EPIDURAL INJECTION OF STEROID Left 05/26/2023    Procedure: INJECTION, STEROID, EPIDURAL, TRANSFORAMINAL APPROACH, LEFT L3/L4 & L4/L5 DIRECT REF;  Surgeon: Popeye Ca MD;  Location: [facility] PAIN MGT;  Service: Pain Management;  Laterality: Left;     Review of patient's allergies indicates:   Allergen Reactions    Atorvastatin      Myalgia, increased ck    Crestor [rosuvastatin] Other (See Comments)     Elevated liver enzymes     Pravastatin      Myalgia, elevated ck    Sulfasalazine Nausea Only     Malaise, nausea,    Leflunomide Rash      Current Facility-Administered Medications   Medication    0.9%  NaCl infusion       PMHx, PSHx, Allergies, Medications reviewed in epic    ROS negative except pain complaints in HPI    OBJECTIVE:    BP (!) 160/71   Pulse 83   Temp 97.8 °F (36.6 °C) (Oral)   Resp 16   Ht 5' 3" (1.6 m)   Wt 53.5 kg (118 lb)   SpO2 96%   BMI 20.90 kg/m²     PHYSICAL EXAMINATION:    GENERAL: Well appearing, " in no acute distress, alert and oriented x3.  PSYCH:  Mood and affect appropriate.  SKIN: Skin color, texture, turgor normal, no rashes or lesions which will impact the procedure.  CV: RRR with palpation of the radial artery.  PULM: No evidence of respiratory difficulty, symmetric chest rise. Clear to auscultation.  NEURO: Cranial nerves grossly intact.    Plan:    Proceed with procedure as planned Procedure(s) (LRB):  CERVICAL C7/T1 TOMASA (N/A)    Ant Moffett  08/01/2024

## 2024-08-01 NOTE — DISCHARGE INSTRUCTIONS

## 2024-08-07 ENCOUNTER — PATIENT MESSAGE (OUTPATIENT)
Dept: PAIN MEDICINE | Facility: CLINIC | Age: 69
End: 2024-08-07
Payer: MEDICARE

## 2024-08-08 ENCOUNTER — TELEPHONE (OUTPATIENT)
Dept: NEUROLOGY | Facility: CLINIC | Age: 69
End: 2024-08-08
Payer: MEDICARE

## 2024-08-08 ENCOUNTER — OFFICE VISIT (OUTPATIENT)
Dept: NEUROLOGY | Facility: CLINIC | Age: 69
End: 2024-08-08
Payer: MEDICARE

## 2024-08-08 VITALS
HEART RATE: 80 BPM | HEIGHT: 63 IN | DIASTOLIC BLOOD PRESSURE: 84 MMHG | WEIGHT: 122.13 LBS | SYSTOLIC BLOOD PRESSURE: 145 MMHG | RESPIRATION RATE: 16 BRPM | OXYGEN SATURATION: 99 % | BODY MASS INDEX: 21.64 KG/M2

## 2024-08-08 DIAGNOSIS — I67.9 SMALL VESSEL DISEASE, CEREBROVASCULAR: ICD-10-CM

## 2024-08-08 DIAGNOSIS — M47.819 PERIPHERAL SPONDYLOARTHRITIS: ICD-10-CM

## 2024-08-08 DIAGNOSIS — M19.072 OSTEOARTHRITIS OF MIDFOOT, LEFT: ICD-10-CM

## 2024-08-08 DIAGNOSIS — E88.01 ALPHA-1-ANTITRYPSIN DEFICIENCY: ICD-10-CM

## 2024-08-08 DIAGNOSIS — E78.5 HYPERLIPIDEMIA, UNSPECIFIED HYPERLIPIDEMIA TYPE: ICD-10-CM

## 2024-08-08 DIAGNOSIS — M76.822 POSTERIOR TIBIAL TENDON DYSFUNCTION, LEFT: ICD-10-CM

## 2024-08-08 DIAGNOSIS — M47.816 LUMBAR SPONDYLOSIS: ICD-10-CM

## 2024-08-08 DIAGNOSIS — M54.41 CHRONIC MIDLINE LOW BACK PAIN WITH BILATERAL SCIATICA: ICD-10-CM

## 2024-08-08 DIAGNOSIS — I70.0 AORTIC ATHEROSCLEROSIS: ICD-10-CM

## 2024-08-08 DIAGNOSIS — M67.449 MUCOUS CYST OF FINGER: ICD-10-CM

## 2024-08-08 DIAGNOSIS — M79.641 BILATERAL HAND PAIN: ICD-10-CM

## 2024-08-08 DIAGNOSIS — E03.9 HYPOTHYROIDISM, UNSPECIFIED TYPE: ICD-10-CM

## 2024-08-08 DIAGNOSIS — M18.0 PRIMARY OSTEOARTHRITIS OF BOTH FIRST CARPOMETACARPAL JOINTS: ICD-10-CM

## 2024-08-08 DIAGNOSIS — M70.61 GREATER TROCHANTERIC BURSITIS OF RIGHT HIP: ICD-10-CM

## 2024-08-08 DIAGNOSIS — R06.02 SOB (SHORTNESS OF BREATH): ICD-10-CM

## 2024-08-08 DIAGNOSIS — M54.81 OCCIPITAL NEURALGIA OF LEFT SIDE: Primary | ICD-10-CM

## 2024-08-08 DIAGNOSIS — M53.82 DECREASED RANGE OF MOTION OF INTERVERTEBRAL DISCS OF CERVICAL SPINE: ICD-10-CM

## 2024-08-08 DIAGNOSIS — M54.42 CHRONIC MIDLINE LOW BACK PAIN WITH BILATERAL SCIATICA: ICD-10-CM

## 2024-08-08 DIAGNOSIS — D84.9 IMMUNOCOMPROMISED, ACQUIRED: ICD-10-CM

## 2024-08-08 DIAGNOSIS — M77.02 MEDIAL EPICONDYLITIS OF ELBOW, LEFT: ICD-10-CM

## 2024-08-08 DIAGNOSIS — K59.09 CHRONIC CONSTIPATION: ICD-10-CM

## 2024-08-08 DIAGNOSIS — R53.82 CHRONIC FATIGUE: ICD-10-CM

## 2024-08-08 DIAGNOSIS — R29.898 UPPER EXTREMITY WEAKNESS: ICD-10-CM

## 2024-08-08 DIAGNOSIS — M54.81 CERVICO-OCCIPITAL NEURALGIA: ICD-10-CM

## 2024-08-08 DIAGNOSIS — N95.2 ATROPHIC VAGINITIS: ICD-10-CM

## 2024-08-08 DIAGNOSIS — M51.24 DISPLACEMENT OF THORACIC INTERVERTEBRAL DISC WITHOUT MYELOPATHY: ICD-10-CM

## 2024-08-08 DIAGNOSIS — M79.642 BILATERAL HAND PAIN: ICD-10-CM

## 2024-08-08 DIAGNOSIS — G56.01 CARPAL TUNNEL SYNDROME OF RIGHT WRIST: ICD-10-CM

## 2024-08-08 DIAGNOSIS — M62.462 GASTROCNEMIUS EQUINUS, LEFT: ICD-10-CM

## 2024-08-08 DIAGNOSIS — G89.29 CHRONIC MIDLINE LOW BACK PAIN WITH BILATERAL SCIATICA: ICD-10-CM

## 2024-08-08 DIAGNOSIS — L40.50 PSA (PSORIATIC ARTHRITIS): ICD-10-CM

## 2024-08-08 DIAGNOSIS — M35.00 SICCA, UNSPECIFIED TYPE: ICD-10-CM

## 2024-08-08 DIAGNOSIS — I10 PRIMARY HYPERTENSION: ICD-10-CM

## 2024-08-08 DIAGNOSIS — M15.4 EROSIVE OSTEOARTHRITIS OF BOTH HANDS: ICD-10-CM

## 2024-08-08 DIAGNOSIS — R74.8 ELEVATED CPK: ICD-10-CM

## 2024-08-08 PROBLEM — R52 PAIN: Status: RESOLVED | Noted: 2021-06-22 | Resolved: 2024-08-08

## 2024-08-08 PROBLEM — R51.9 INTRACTABLE HEADACHE: Status: ACTIVE | Noted: 2024-08-08

## 2024-08-08 PROBLEM — R21 RASH OF BACK: Status: RESOLVED | Noted: 2019-08-02 | Resolved: 2024-08-08

## 2024-08-08 PROBLEM — R53.1 DECREASED STRENGTH: Status: RESOLVED | Noted: 2021-06-22 | Resolved: 2024-08-08

## 2024-08-08 PROCEDURE — 99205 OFFICE O/P NEW HI 60 MIN: CPT | Mod: S$PBB,,, | Performed by: PSYCHIATRY & NEUROLOGY

## 2024-08-08 PROCEDURE — 99999 PR PBB SHADOW E&M-EST. PATIENT-LVL V: CPT | Mod: PBBFAC,,, | Performed by: PSYCHIATRY & NEUROLOGY

## 2024-08-08 PROCEDURE — 99215 OFFICE O/P EST HI 40 MIN: CPT | Mod: PBBFAC | Performed by: PSYCHIATRY & NEUROLOGY

## 2024-08-08 RX ORDER — AMITRIPTYLINE HYDROCHLORIDE 10 MG/1
10 TABLET, FILM COATED ORAL NIGHTLY
Qty: 90 TABLET | Refills: 3 | Status: SHIPPED | OUTPATIENT
Start: 2024-08-08 | End: 2025-08-08

## 2024-08-16 ENCOUNTER — TELEPHONE (OUTPATIENT)
Dept: PAIN MEDICINE | Facility: CLINIC | Age: 69
End: 2024-08-16
Payer: MEDICARE

## 2024-08-16 ENCOUNTER — OFFICE VISIT (OUTPATIENT)
Dept: PAIN MEDICINE | Facility: CLINIC | Age: 69
End: 2024-08-16
Payer: MEDICARE

## 2024-08-16 DIAGNOSIS — M46.92 CERVICAL SPONDYLITIS: ICD-10-CM

## 2024-08-16 DIAGNOSIS — M50.30 DDD (DEGENERATIVE DISC DISEASE), CERVICAL: ICD-10-CM

## 2024-08-16 DIAGNOSIS — G89.4 CHRONIC PAIN SYNDROME: ICD-10-CM

## 2024-08-16 DIAGNOSIS — M54.12 CERVICAL RADICULOPATHY, CHRONIC: Primary | ICD-10-CM

## 2024-08-16 DIAGNOSIS — M43.12 SPONDYLOLISTHESIS OF CERVICAL REGION: ICD-10-CM

## 2024-08-16 PROCEDURE — 99214 OFFICE O/P EST MOD 30 MIN: CPT | Mod: 95,,,

## 2024-08-16 NOTE — PROGRESS NOTES
Chronic Pain-Tele-Medicine-Established Note (Follow up visit)        The patient location is: Home  The chief complaint leading to consultation is: headaches  Visit type: Virtual visit with synchronous audio and video  Total time spent with patient: 11 min  Each patient to whom he or she provides medical services by telemedicine is:  (1) informed of the relationship between the physician and patient and the respective role of any other health care provider with respect to management of the patient; and (2) notified that he or she may decline to receive medical services by telemedicine and may withdraw from such care at any time.     Referring Physician: No ref. provider found    Chief Complaint:   No chief complaint on file.       SUBJECTIVE:    Interval History 8/16/2024:  Jill Marquez returns to clinic virtually for follow-up after C7/T1 ILESI on 8/1/2024. She reports 70% pain relief. She stats her headaches are decreased in intensity. She does continue with mild left-sided headaches. She went to neurology for the first time on 8/8/2024 where she was diagnosed with occipital neuralgia. She was started on amitriptyline which she discontinued as she was unable to tolerate side effects. She states the medication gave her nightmares and bad dreams. She does see PM&R in [place] who administers TPIs for myofascial pain.  Pain is 2/10.     Original HPI 6/25/2024:  Jill Marquez presents to the clinic for the evaluation of cervical pain. The pain started a year ago and noticed that her ear felt funny and had some pain in her jaw. Afterward her neck pain began and started to radiate up the back of her head. Symptoms have been worsening.The pain is located in the middle of the neck area and radiates to the top of the scalp and into the jaw.  The pain is described as aching and dull and is rated as 4/10. The pain is rated with a score of  3/10 on the BEST day and a score of 8/10 on the WORST day.  Symptoms interfere  with sleeping. The pain is exacerbated by Extension and Flexing.  The pain is mitigated by heat and ice. The patient reports 8 hours of uninterrupted sleep per night. Pt currently attends physical therapy for her cervical and lumbar spine.    Patient denies night fever/night sweats, urinary incontinence, bowel incontinence, significant weight loss, significant motor weakness, and loss of sensations.    Physical Therapy/Home Exercise: yes      Pain Disability Index Review:      6/25/2024    11:01 AM   Last 3 PDI Scores   Pain Disability Index (PDI) 28       Pain Medications:    - Adjuvant Medications: Neurontin (Gabapentin) and Zanaflex ( Tizanidine)     report:  Not applicable    Pain Procedures: TFESI     Imaging:     MRI CERVICAL SPINE WITHOUT CONTRAST     CLINICAL HISTORY:  Neck pain, chronic, degenerative changes on xray; Spondylolisthesis, cervical region     TECHNIQUE:  Multiplanar, multisequence MR images of the cervical spine were performed without the administration of contrast.     COMPARISON:  Cervical spine radiograph 05/16/2024, 05/17/2024; MRI thoracic spine 05/02/2012     FINDINGS:  Skull base and craniocervical junction: T2/STIR signal hyperintensity at the inferior aspect of the right cerebellar hemisphere with superimposed T1 signal hyperintensity.  Mild volume loss of the visualized cerebral hemispheres.  Normal vertebral artery flow voids are present.     Odontoid process: Heterogeneous pannus formation posterior to the odontoid process with mass effect on the ventral thecal sac.  No significant spinal canal stenosis.  No significant erosive changes of the odontoid process.  No widening of the atlantodens interval noting limited static exam.     Alignment: Straightening of the normal cervical lordosis with 3 mm retrolisthesis C5 on C6 in 3 mm of anterolisthesis C7 on T1.     Vertebrae: Vertebral body heights are adequately maintained without evidence of compression fracture.  Heterogeneous  bone marrow signal diffusely suggestive of red marrow reconversion.  Chronic degenerative changes with questionable multifocal endplate edema/Modic type 1 change.     Discs: Multilevel advanced degenerative disc space narrowing and desiccation most pronounced C2-C3 through C6-7.     Cord: Normal signal.     Degenerative findings:     C2-C3: Posterior disc osteophyte complex with extension into the left extraforaminal zone.  Mild bilateral facet arthropathy.  No spinal canal stenosis. Mild left neural foraminal narrowing.     C3-C4: Posterior disc osteophyte complex.  Bilateral facet arthropathy and uncovertebral hypertrophy.  No spinal canal stenosis. Mild left neural foraminal narrowing.     C4-C5: Posterior disc osteophyte complex.  Bilateral facet arthropathy and uncovertebral hypertrophy.  Mild spinal canal stenosis.  Moderate left neural foraminal narrowing, mild to moderate right neural foraminal narrowing.     C5-C6: Posterior disc osteophyte complex.  Bilateral facet arthropathy and uncovertebral hypertrophy.  Mild spinal canal stenosis.  Moderate bilateral neural foraminal narrowing most severe on the left.     C6-C7: Posterior disc osteophyte complex.  Facet arthropathy and uncovertebral hypertrophy.  No spinal canal stenosis. No neural foraminal narrowing.     C7-T1: Mild bilateral facet arthropathy.  No spinal canal stenosis. No neural foraminal narrowing.     Paraspinal muscles & soft tissues: Salivary glands are unremarkable.  Small thyroid gland relating to patient's history of hypothyroidism.  No laryngeal or tonsillar mass.     Impression:     Multilevel cervical spondylosis most pronounced at C4-C5 and C5-C6 as detailed above.     Pannus formation posterior to the odontoid process with mass effect on the ventral thecal sac.  No significant erosive changes of the odontoid.  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  Consider further characterization with brain MRI if clinically warranted    XR CERVICAL SPINE FLEXION  AND EXTENSION ONLY     CLINICAL HISTORY:  Spondylolisthesis, cervical region     TECHNIQUE:  Flexion and extension lateral views of the cervical spine.  Comparison is made to May 16, 2024.     COMPARISON:  05/16/2024.     FINDINGS:  Reversal the normal cervical lordosis with associated degenerative disc disease.  On extension there is 2 mm of retrolisthesis C5 on C6 which improves on flexion.  On flexion there is 2 mm anterolisthesis C4 on C5 which improves on extension.  Vertebral heights are maintained.       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    HTN (hypertension) 07/23/2012    Hyperlipidemia     Hypothyroid 07/23/2012     Past Surgical History:   Procedure Laterality Date    COLONOSCOPY N/A 01/25/2016    Procedure: COLONOSCOPY;  Surgeon: DAYNA Simmons MD;  Location: [facility] ENDO ([address] [place] [address]);  Service: Endoscopy;  Laterality: N/A;    COLONOSCOPY N/A 11/08/2022    Procedure: COLONOSCOPY;  Surgeon: DAYNA Simmons MD;  [facility]O [facility]  Service: Endoscopy;  Laterality: N/A;  vacc-inst portal-vargus only-tb  precall done.arrival time of 10:00 confirmed/mleone    COSMETIC SURGERY      EPIDURAL STEROID INJECTION N/A 8/1/2024    Procedure: CERVICAL C7/T1 TOMASA;  Surgeon: Popeye Ca MD;  Location: [facility] PAIN MGT;  Service: Pain Management;  Laterality: N/A;  [phone]  2 WK F/U NEDRA    EYE SURGERY      eyelid surgery      HYSTERECTOMY  2004    prolapse    OOPHORECTOMY Bilateral 2015    Tonsillectomy      TONSILLECTOMY      TRANSFORAMINAL EPIDURAL INJECTION OF STEROID Left 05/26/2023    Procedure: INJECTION, STEROID, EPIDURAL, TRANSFORAMINAL APPROACH, LEFT L3/L4 & L4/L5 DIRECT REF;   Surgeon: Popeye Ca MD;  Location: [facility];  Service: Pain Management;  Laterality: Left;     Social History     Socioeconomic History    Marital status:     Number of children: 2   Tobacco Use    Smoking status: Former     Current packs/day: 0.00     Types: Cigarettes     Start date: 1970     Quit date: 1982     Years since quittin.3     Passive exposure: Past    Smokeless tobacco: Former    Tobacco comments:     , homemaker, 2 children   Substance and Sexual Activity    Alcohol use: Yes     Comment: 1-2 servings per week    Drug use: No    Sexual activity: Yes     Partners: Male     Birth control/protection: Surgical, None     Social Determinants of Health     Financial Resource Strain: Low Risk  (2024)    Overall Financial Resource Strain (CARDIA)     Difficulty of Paying Living Expenses: Not hard at all   Food Insecurity: No Food Insecurity (2024)    Hunger Vital Sign     Worried About Running Out of Food in the Last Year: Never true     Ran Out of Food in the Last Year: Never true   Transportation Needs: No Transportation Needs (2024)    PRAPARE - Transportation     Lack of Transportation (Medical): No     Lack of Transportation (Non-Medical): No   Physical Activity: Sufficiently Active (2024)    Exercise Vital Sign     Days of Exercise per Week: 5 days     Minutes of Exercise per Session: 60 min   Stress: Stress Concern Present (2024)    [employer] [place] [employer] - Occupational Stress Questionnaire     Feeling of Stress : To some extent   Housing Stability: Low Risk  (2024)    Housing Stability Vital Sign     Unable to Pay for Housing in the Last Year: No     Number of Places Lived in the Last Year: 1     Unstable Housing in the Last Year: No     Family History   Problem Relation Name Age of Onset    Diabetes Brother      Retinal detachment Brother      Cancer Brother          kidney    Cataracts Brother      Diabetes Brother       Cancer Brother          throat    Cataracts Brother      Diabetes Brother      Cataracts Brother      No Known Problems Mother      No Known Problems Father      Lupus Other          niece    No Known Problems Sister      No Known Problems Maternal Aunt      No Known Problems Maternal Uncle      No Known Problems Paternal Aunt      No Known Problems Paternal Uncle      No Known Problems Maternal Grandmother      No Known Problems Maternal Grandfather      No Known Problems Paternal Grandmother      No Known Problems Paternal Grandfather      Breast cancer Neg Hx      Colon cancer Neg Hx      Ovarian cancer Neg Hx      Blindness Neg Hx      Glaucoma Neg Hx      Hypertension Neg Hx      Macular degeneration Neg Hx      Strabismus Neg Hx      Stroke Neg Hx      Thyroid disease Neg Hx      Amblyopia Neg Hx      Rheum arthritis Neg Hx      Psoriasis Neg Hx      Inflammatory bowel disease Neg Hx      Cervical cancer Neg Hx      Endometrial cancer Neg Hx      Vaginal cancer Neg Hx      Uterine cancer Neg Hx         Review of patient's allergies indicates:   Allergen Reactions    Atorvastatin      Myalgia, increased ck    Crestor [rosuvastatin] Other (See Comments)     Elevated liver enzymes     Pravastatin      Myalgia, elevated ck    Sulfasalazine Nausea Only     Malaise, nausea,    Leflunomide Rash       Current Outpatient Medications   Medication Sig    albuterol (PROVENTIL/VENTOLIN HFA) 90 mcg/actuation inhaler INHALE 1 PUFF BY MOUTH 3 TIMES A DAY FOR 10 DAYS    amitriptyline (ELAVIL) 10 MG tablet Take 1 tablet (10 mg total) by mouth every evening.    amLODIPine (NORVASC) 10 MG tablet TAKE 1 TABLET (10 MG TOTAL) BY MOUTH ONCE DAILY.    azelastine (ASTELIN) 137 mcg (0.1 %) nasal spray USE 1 SPRAY (137 MCG TOTAL) IN EACH NOSTRIL 2 (TWO) TIMES DAILY.    citalopram (CELEXA) 20 MG tablet Take 1 tablet (20 mg total) by mouth once daily.    ergocalciferol, vitamin D2, (VITAMIN D ORAL)     estradioL (ESTRACE) 0.01 % (0.1  mg/gram) vaginal cream Place 1 g vaginally twice a week.    etanercept (ENBREL SURECLICK) 50 mg/mL (1 mL) Inject 1 mL (50 mg total) into the skin once a week.    ezetimibe (ZETIA) 10 mg tablet TAKE ONE TABLET BY MOUTH EVERY DAY    fish oil-omega-3 fatty acids 300-1,000 mg capsule Take 2 g by mouth once daily.    fluticasone (FLONASE SENSIMIST) 27.5 mcg/actuation nasal spray 2 sprays by Nasal route once daily.    gabapentin (NEURONTIN) 300 MG capsule Take 2 capsules (600 mg total) by mouth 2 (two) times daily.    hydroCHLOROthiazide (HYDRODIURIL) 25 MG tablet TAKE 1 TABLET (25 MG TOTAL) BY MOUTH ONCE DAILY.    levothyroxine (SYNTHROID) 88 MCG tablet TAKE 1 TABLET (88 MCG TOTAL) BY MOUTH BEFORE BREAKFAST.    mupirocin (BACTROBAN) 2 % ointment SMARTSI Application Topical 2-3 Times Daily    PSYLLIUM SEED, WITH SUGAR, (METAMUCIL ORAL) Take by mouth.    sennosides (SENOKOT TO GO ORAL)     tiZANidine (ZANAFLEX) 2 MG tablet Take 1 tablet (2 mg total) by mouth nightly as needed (muscle spasms).    triamcinolone acetonide 0.1% (KENALOG) 0.1 % cream Apply topically 2 (two) times daily. Use up to 2 weeks at a time.     No current facility-administered medications for this visit.       REVIEW OF SYSTEMS:    GENERAL:  No weight loss, malaise or fevers.  HEENT:  Negative for frequent or significant headaches.  NECK:  Negative for lumps, goiter, pain and significant neck swelling.  RESPIRATORY:  Negative for cough, wheezing or shortness of breath.  CARDIOVASCULAR:  Negative for chest pain, leg swelling or palpitations.  GI:  Negative for abdominal discomfort, blood in stools or black stools or change in bowel habits.  MUSCULOSKELETAL:  See HPI.  SKIN:  Negative for lesions, rash, and itching.  PSYCH:  Negative for sleep disturbance, mood disorder and recent psychosocial stressors.  HEMATOLOGY/LYMPHOLOGY:  Negative for prolonged bleeding, bruising easily or swollen nodes.  NEURO:   No history of headaches, syncope, paralysis,  seizures or tremors.  All other reviewed and negative other than HPI.    OBJECTIVE:    There were no vitals taken for this visit.    VIRTUAL PHYSICAL EXAMINATION:    GENERAL APPEARANCE: Well appearing, in no acute distress.  PSYCH:  Mood and affect appropriate.  SKIN: Skin color, texture, turgor normal, no rashes or lesions to visible areas.  HEAD/FACE:  Normocephalic, atraumatic.  PULM: Bilateral chest rise. No apparent respiratory distress.        Prior PHYSICAL EXAMINATION:     General appearance: Well appearing, in no acute distress, alert and oriented x3.  Psych:  Mood and affect appropriate.  Skin: Skin color, texture, turgor normal, no rashes or lesions, in both upper and lower body.  Head/face:  Normocephalic, atraumatic. No palpable lymph nodes.  Neck: Pain to palpation over the cervical paraspinous muscles. Spurling Negative. Pain with neck flexion, extension, or lateral flexion.   Cor: RRR  Pulm: CTA  GI:  Soft and non-tender.  Back: Straight leg raising in the sitting and supine positions is negative to radicular pain. No pain to palpation over the spine or costovertebral angles. Normal range of motion without pain reproduction.  Extremities: Peripheral joint ROM is full and pain free without obvious instability or laxity in all four extremities. No deformities, edema, or skin discoloration. Good capillary refill.  Musculoskeletal: Shoulder, hip, sacroiliac and knee provocative maneuvers are negative. Bilateral upper and lower extremity strength is normal and symmetric.  No atrophy or tone abnormalities are noted.  Neuro: Bilateral upper and lower extremity coordination and muscle stretch reflexes are physiologic and symmetric.  Plantar response are downgoing. No loss of sensation is noted.  Gait: normal.    ASSESSMENT: 68 y.o. year old female with cervical pain, consistent with      1. Cervical radiculopathy, chronic        2. Cervical spondylitis        3. DDD (degenerative disc disease), cervical         4. Chronic pain syndrome        5. Spondylolisthesis of cervical region                PLAN:     - I have stressed the importance of physical activity and a home exercise plan to help with pain and improve health.  - Patient can continue with medications for now since they are providing benefits, using them appropriately, and without side effects.  - She is s/p Cervical Epidural Injection at C7/T1 with 70% relief  - Plan Left occipital nerve block in-office  - RTC 3-4 weeks after above procedure.   - Counseled patient regarding the importance of activity modification, constant sleeping habits, and physical therapy.    The above plan and management options were discussed at length with patient. Patient is in agreement with the above and verbalized understanding.       Neelam Cleveland NP  08/16/2024

## 2024-08-16 NOTE — TELEPHONE ENCOUNTER
----- Message from Neelam Cleveland NP sent at 8/16/2024  2:27 PM CDT -----  Hi Ladies,  Please call this patient and get her scheduled for     LEFT OCCIPITAL NERVE BLOCK WITH STEROIDS with Dr Ca.    Thanks!  Neelam  ----- Message -----  From: Popeye Ca MD  Sent: 8/16/2024   1:41 PM CDT  To: Neelam Cleveland NP    Yes, it will be in the office.  ----- Message -----  From: Neelam Cleveland NP  Sent: 8/16/2024  10:51 AM CDT  To: Popeye Ca MD    I am seeing Ms Jimmy lemus,  She recently saw neurology who diagnosed her with OCCIPITAL, CERVICO-OCCIPITAL NEURALGIA, LEFT SIDE     Can we do Left occipital nerve block with steroids in office?     Still trying to become familiar with these procedures.      Thanks,  Neelam

## 2024-08-20 ENCOUNTER — PROCEDURE VISIT (OUTPATIENT)
Dept: PAIN MEDICINE | Facility: CLINIC | Age: 69
End: 2024-08-20
Payer: MEDICARE

## 2024-08-20 VITALS
HEIGHT: 63 IN | DIASTOLIC BLOOD PRESSURE: 85 MMHG | OXYGEN SATURATION: 100 % | RESPIRATION RATE: 18 BRPM | HEART RATE: 77 BPM | SYSTOLIC BLOOD PRESSURE: 144 MMHG | WEIGHT: 119.06 LBS | BODY MASS INDEX: 21.09 KG/M2 | TEMPERATURE: 98 F

## 2024-08-20 DIAGNOSIS — M54.81 OCCIPITAL NEURALGIA OF LEFT SIDE: Primary | ICD-10-CM

## 2024-08-20 PROCEDURE — 64405 NJX AA&/STRD GR OCPL NRV: CPT | Mod: PBBFAC | Performed by: ANESTHESIOLOGY

## 2024-08-20 PROCEDURE — 76942 ECHO GUIDE FOR BIOPSY: CPT | Mod: PBBFAC | Performed by: ANESTHESIOLOGY

## 2024-08-20 NOTE — PROCEDURES
Patient Name: Jill Marquez  MRN: [id number]    INFORMED CONSENT: The procedure, risks, benefits and options were discussed with patient. There are no contraindications to the procedure. The patient expressed understanding and agreed to proceed. The personnel performing the procedure was discussed. I verify that I personally obtained Jill's consent prior to the start of the procedure and the signed consent can be found on the patient's chart.    Procedure Date: 08/20/2024    Anesthesia: Topical    Pre Procedure diagnosis:   1. Occipital neuralgia of left side        Post-Procedure diagnosis: same      Sedation: None    PROCEDURE: Left OCCIPITAL NERVE BLOCK UNDER U/S   The patient was placed in prone position. The site of pain and procedure were confirmed with the patient prior to starting the procedure. The patient's nuchal ridge of the occipital bone was identified and prepped with chlorhexidine. After performing time out A 27 gauge 1.5 inch was advanced through the skin and subcutaneous tissues lateral to C2 Between the Inferior oblique muscle and semispinalis capitis under ultrasound guidance using in-plane technique.  Aspiration for blood and CSF was negative.  A total of 5 ml of Bupivacaine 0.25% and 4 mg decadron was injected.  There were no signs or symptoms of intravascular injection. No complications were evident. No specimens collected.      Blood Loss: Nill  Specimen: None    Sriram Chacon MD    I reviewed and edited the resident/fellow's note. I conducted my own interview and physical examination. I agree with the findings and documentation. I was present and supervising all critical portions of the procedure.      Popeye Ca MD

## 2024-09-09 ENCOUNTER — OFFICE VISIT (OUTPATIENT)
Dept: PHYSICAL MEDICINE AND REHAB | Facility: CLINIC | Age: 69
End: 2024-09-09
Payer: MEDICARE

## 2024-09-09 VITALS — HEIGHT: 63 IN | RESPIRATION RATE: 14 BRPM | WEIGHT: 118 LBS | BODY MASS INDEX: 20.91 KG/M2

## 2024-09-09 DIAGNOSIS — M53.82 MUSCULOSKELETAL DISORDER OF THE SUBOCCIPITAL: ICD-10-CM

## 2024-09-09 DIAGNOSIS — M54.81 OCCIPITAL NEURALGIA, UNSPECIFIED LATERALITY: Primary | ICD-10-CM

## 2024-09-09 DIAGNOSIS — M79.18 DIFFUSE MYOFASCIAL PAIN SYNDROME: ICD-10-CM

## 2024-09-09 DIAGNOSIS — M46.00 SPINAL ENTHESOPATHY: ICD-10-CM

## 2024-09-09 PROCEDURE — 99214 OFFICE O/P EST MOD 30 MIN: CPT | Mod: 25,S$PBB,, | Performed by: PHYSICAL MEDICINE & REHABILITATION

## 2024-09-09 PROCEDURE — 99214 OFFICE O/P EST MOD 30 MIN: CPT | Mod: PBBFAC | Performed by: PHYSICAL MEDICINE & REHABILITATION

## 2024-09-09 PROCEDURE — 99999 PR PBB SHADOW E&M-EST. PATIENT-LVL IV: CPT | Mod: PBBFAC,,, | Performed by: PHYSICAL MEDICINE & REHABILITATION

## 2024-09-09 PROCEDURE — 20553 NJX 1/MLT TRIGGER POINTS 3/>: CPT | Mod: PBBFAC | Performed by: PHYSICAL MEDICINE & REHABILITATION

## 2024-09-09 PROCEDURE — 20553 NJX 1/MLT TRIGGER POINTS 3/>: CPT | Mod: S$PBB,,, | Performed by: PHYSICAL MEDICINE & REHABILITATION
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
## 2024-09-10 ENCOUNTER — PATIENT MESSAGE (OUTPATIENT)
Dept: INTERNAL MEDICINE | Facility: CLINIC | Age: 69
End: 2024-09-10
Payer: MEDICARE

## 2024-09-10 ENCOUNTER — PATIENT MESSAGE (OUTPATIENT)
Dept: RHEUMATOLOGY | Facility: CLINIC | Age: 69
End: 2024-09-10
Payer: MEDICARE

## 2024-09-24 DIAGNOSIS — M79.18 MYOFASCIAL PAIN: ICD-10-CM

## 2024-09-24 DIAGNOSIS — G57.00 PIRIFORMIS SYNDROME, UNSPECIFIED LATERALITY: ICD-10-CM

## 2024-09-24 RX ORDER — TIZANIDINE 2 MG/1
2 TABLET ORAL NIGHTLY PRN
Qty: 30 TABLET | Refills: 4 | Status: SHIPPED | OUTPATIENT
Start: 2024-09-24

## 2024-10-01 ENCOUNTER — IMMUNIZATION (OUTPATIENT)
Dept: INTERNAL MEDICINE | Facility: CLINIC | Age: 69
End: 2024-10-01
Payer: MEDICARE

## 2024-10-01 DIAGNOSIS — Z23 NEED FOR VACCINATION: Primary | ICD-10-CM

## 2024-10-01 PROCEDURE — G0008 ADMIN INFLUENZA VIRUS VAC: HCPCS | Mod: PBBFAC,PO

## 2024-10-01 PROCEDURE — 99999PBSHW FLU VACCINE - ADJUVANTED: Mod: PBBFAC,,,

## 2024-10-01 PROCEDURE — 90653 IIV ADJUVANT VACCINE IM: CPT | Mod: PBBFAC,PO

## 2024-10-02 ENCOUNTER — PATIENT MESSAGE (OUTPATIENT)
Dept: RHEUMATOLOGY | Facility: CLINIC | Age: 69
End: 2024-10-02
Payer: MEDICARE

## 2024-10-02 ENCOUNTER — TELEPHONE (OUTPATIENT)
Dept: RHEUMATOLOGY | Facility: CLINIC | Age: 69
End: 2024-10-02
Payer: MEDICARE

## 2024-10-02 ENCOUNTER — PATIENT MESSAGE (OUTPATIENT)
Dept: INTERNAL MEDICINE | Facility: CLINIC | Age: 69
End: 2024-10-02
Payer: MEDICARE

## 2024-10-02 DIAGNOSIS — L40.50 PSA (PSORIATIC ARTHRITIS): ICD-10-CM

## 2024-10-02 RX ORDER — ETANERCEPT 50 MG/ML
50 SOLUTION SUBCUTANEOUS WEEKLY
Qty: 12 ML | Refills: 1 | Status: ACTIVE | OUTPATIENT
Start: 2024-10-02

## 2024-10-02 RX ORDER — AMLODIPINE BESYLATE 10 MG/1
10 TABLET ORAL DAILY
Qty: 90 TABLET | Refills: 3 | Status: SHIPPED | OUTPATIENT
Start: 2024-10-02

## 2024-10-02 NOTE — TELEPHONE ENCOUNTER
Refill Routing Note   Medication(s) are not appropriate for processing by [facility] for the following reason(s):        ED/Hospital Visit since last OV with provider  Required vitals abnormal    [facility] action(s):  Defer             Appointments  past 12m or future 3m with PCP    Date Provider   Last Visit   7/11/2024 Dain Smith MD   Next Visit   Visit date not found Dain Smith MD   ED visits in past 90 days: 1        Note composed:1:22 PM 10/02/2024

## 2024-10-02 NOTE — TELEPHONE ENCOUNTER
No care due was identified.  [facility] Embedded Care Due Messages. Reference number: [id number].   10/02/2024 8:31:02 AM CDT

## 2024-10-03 NOTE — TELEPHONE ENCOUNTER
Please schedule way overdue standing labs ordered 5/1/5/24 and please schedule f/u appt Thank you CLAUDIA

## 2024-10-15 ENCOUNTER — OFFICE VISIT (OUTPATIENT)
Dept: PAIN MEDICINE | Facility: CLINIC | Age: 69
End: 2024-10-15
Payer: MEDICARE

## 2024-10-15 VITALS
DIASTOLIC BLOOD PRESSURE: 85 MMHG | RESPIRATION RATE: 16 BRPM | HEIGHT: 63 IN | WEIGHT: 122.81 LBS | SYSTOLIC BLOOD PRESSURE: 134 MMHG | HEART RATE: 80 BPM | BODY MASS INDEX: 21.76 KG/M2

## 2024-10-15 DIAGNOSIS — M54.12 CERVICAL RADICULOPATHY: ICD-10-CM

## 2024-10-15 DIAGNOSIS — M43.12 SPONDYLOLISTHESIS OF CERVICAL REGION: ICD-10-CM

## 2024-10-15 DIAGNOSIS — M54.81 OCCIPITAL NEURALGIA, UNSPECIFIED LATERALITY: ICD-10-CM

## 2024-10-15 DIAGNOSIS — M47.812 CERVICAL SPONDYLOSIS: Primary | ICD-10-CM

## 2024-10-15 DIAGNOSIS — M50.30 DDD (DEGENERATIVE DISC DISEASE), CERVICAL: ICD-10-CM

## 2024-10-15 PROCEDURE — 99215 OFFICE O/P EST HI 40 MIN: CPT | Mod: PBBFAC,PN | Performed by: ANESTHESIOLOGY

## 2024-10-15 PROCEDURE — 99214 OFFICE O/P EST MOD 30 MIN: CPT | Mod: S$PBB,,, | Performed by: ANESTHESIOLOGY

## 2024-10-15 PROCEDURE — G2211 COMPLEX E/M VISIT ADD ON: HCPCS | Mod: S$PBB,,, | Performed by: ANESTHESIOLOGY

## 2024-10-15 PROCEDURE — 99999 PR PBB SHADOW E&M-EST. PATIENT-LVL V: CPT | Mod: PBBFAC,,, | Performed by: ANESTHESIOLOGY
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
## 2024-10-21 ENCOUNTER — PATIENT MESSAGE (OUTPATIENT)
Dept: PHYSICAL MEDICINE AND REHAB | Facility: CLINIC | Age: 69
End: 2024-10-21

## 2024-10-21 ENCOUNTER — OFFICE VISIT (OUTPATIENT)
Dept: PHYSICAL MEDICINE AND REHAB | Facility: CLINIC | Age: 69
End: 2024-10-21
Payer: MEDICARE

## 2024-10-21 VITALS — BODY MASS INDEX: 21.48 KG/M2 | WEIGHT: 121.25 LBS | HEIGHT: 63 IN

## 2024-10-21 DIAGNOSIS — M79.18 DIFFUSE MYOFASCIAL PAIN SYNDROME: Primary | ICD-10-CM

## 2024-10-21 DIAGNOSIS — M53.82 MUSCULOSKELETAL DISORDER OF THE SUBOCCIPITAL: ICD-10-CM

## 2024-10-21 DIAGNOSIS — M46.00 SPINAL ENTHESOPATHY: ICD-10-CM

## 2024-10-21 PROCEDURE — 20553 NJX 1/MLT TRIGGER POINTS 3/>: CPT | Mod: PBBFAC | Performed by: PHYSICAL MEDICINE & REHABILITATION

## 2024-10-21 PROCEDURE — 99999 PR PBB SHADOW E&M-EST. PATIENT-LVL III: CPT | Mod: PBBFAC,,, | Performed by: PHYSICAL MEDICINE & REHABILITATION

## 2024-10-21 PROCEDURE — 20553 NJX 1/MLT TRIGGER POINTS 3/>: CPT | Mod: S$PBB,,, | Performed by: PHYSICAL MEDICINE & REHABILITATION

## 2024-10-21 PROCEDURE — 99213 OFFICE O/P EST LOW 20 MIN: CPT | Mod: PBBFAC | Performed by: PHYSICAL MEDICINE & REHABILITATION

## 2024-10-21 PROCEDURE — 99214 OFFICE O/P EST MOD 30 MIN: CPT | Mod: 25,S$PBB,, | Performed by: PHYSICAL MEDICINE & REHABILITATION

## 2024-10-21 NOTE — PROGRESS NOTES
[facility] CLINIC NOTE    Chief Complaint   Patient presents with    Back Pain    Shoulder Pain    Neck Pain       HPI: This is a 69 y.o.  female being seen in clinic today for repeat TPIs due to achy pain and tightness in her muscles. Her brother had a major fall and she is trying to get his home safer so he doesn't have to be transferred to a nursing home. She will be doing ESIs with IPM soon.    History obtained from patient    Functional History:  Walking: Not limited  Transfers: Independent  Assistive devices: No  Power mobility: No  Falls: None     Needs help with:  Nothing - all ADLS normal    Past family, medical, social, and surgical history reviewed in chart    Review of Systems:     General- denies lethargy, weight change, fever, chills  Head/neck- denies swallowing difficulties  ENT- denies hearing changes  Cardiovascular-denies chest pain  Pulmonary- denies shortness of breath  GI- denies constipation or bowel incontinence  - denies bladder incontinence  Skin- denies wounds or rashes  Musculoskeletal- denies weakness, +pain  Neurologic- denies numbness and tingling  Psychiatric- denies depressive or psychotic features, denies anxiety  Lymphatic-denies swelling  Endocrine- denies hypoglycemic symptoms/DM history  All other pertinent systems negative     Physical Examination:  General: Well developed, well nourished female, NAD  HEENT:NCAT EOMI bilaterally   Pulmonary:Normal respirations    Spinal Examination: CERVICAL  Active ROM is within normal limits.  Inspection: No deformity of spinal alignment.  Palpation:  tight and ttp at trapezius-local twitch at trapezius, tight and ttp at levator scapula    Spinal Examination: LUMBAR or THORACIC  Active ROM is within normal limits.  Inspection: No deformity of spinal alignment.    Ttp and tight at bilateral quadratus lumborum    Bilateral Upper and Lower Extremities:  Pulses are 2+ at radial, bilaterally.  Shoulder/Elbow/Wrist/Hand ROM wnl except limited at  bilateral hands, arthritic deformities noted  Hip/Knee/Ankle ROM wnl, ttp and tight at left piriformis  Bilateral Extremities show normal capillary refill.  No signs of cyanosis, rubor, edema, skin changes, or dysvascular changes of appendages.  Nails appear intact.    Neurological Exam:  Cranial Nerves:  II-XII grossly intact    Manual Muscle Testing: (Motor 5=normal)    RIGHT Upper extremity: Shoulder abduction 5/5, Biceps 5/5, Triceps 5/5, Wrist extension 5/5, Abductor pollicis brevis 4/5, Ulnar hand intrinsics 4/5,  LEFT Upper extremity: Shoulder abduction 5/5, Biceps 5/5, Triceps 5/5, Wrist extension 5/5, Abductor pollicis brevis 4/5, Ulnar hand intrinsics 4/5,  No focal atrophy is noted of either upper extremity.    Bilateral Reflexes:  Choe's response is absent bilaterally.    Sensation: tested to light touch  - intact in arms  Gait: Narrow base and good arm swing.      IMPRESSION/PLAN: This is a 69 y.o.  female with myofascial pain, DJD./DDD, scoliosis     1. TPIs today  2. Cont Ice/heat  3. Cont neck/shoulder, piriformis exercises   4. Cont fu with IPM     Vanessa Millard M.D.  Physical Medicine and Rehab      PROCEDURE NOTE    Diagnosis: Myofascial pain  Procedure: trigger point injections to bilateral trapezius, llevator scapula, quadratus lumborum  Risks and benefits of procedure explained to patient including risks of infection, bleeding, pain, or damage to surrounding tissues. All questions answered. Informed consent obtained prior to proceeding. Areas marked and prepped in sterile fashion. Using a 27g 1.25inch needle,  10 cc of 1% lidocaine  was injected evenly into the above mentioned muscles. None to minimal bleeding noted. ER and post injection instructions given.    Vanessa Millard M.D.

## 2024-10-24 RX ORDER — EZETIMIBE 10 MG/1
10 TABLET ORAL
Qty: 90 TABLET | Refills: 2 | Status: SHIPPED | OUTPATIENT
Start: 2024-10-24

## 2024-10-24 NOTE — TELEPHONE ENCOUNTER
No care due was identified.  [facility] Embedded Care Due Messages. Reference number: [id number].   10/24/2024 7:55:47 AM CDT

## 2024-10-24 NOTE — TELEPHONE ENCOUNTER
Refill Decision Note   Jill Marquez  is requesting a refill authorization.  Brief Assessment and Rationale for Refill:  Approve     Medication Therapy Plan:  Reviewed acute care/admission visit notes; No follow up with PCP recommended by acute care provider; Approved per protocol      Extended chart review required: Yes   Comments:     Note composed:12:41 PM 10/24/2024

## 2024-10-25 NOTE — PRE-PROCEDURE INSTRUCTIONS
Spoke with patient regarding procedure scheduled on 10.30     Arrival time 0945     Has patient been sick with fever or on antibiotics within the last 7 days? No     Does the patient have any open wounds, sores or rashes? No     Does the patient have any recent fractures? no     Has patient received a vaccination within the last 7 days? No     Received the COVID vaccination? yes     Has the patient stopped all medications as directed? na     Does patient have a pacemaker, defibrillator, or implantable stimulator? No     Does the patient have a ride to and from procedure and someone reliable to remain with patient?  sister in law      Is the patient diabetic? no     Does the patient have sleep apnea? Or use O2 at home? no     Is the patient receiving sedation? yes     Is the patient instructed to remain NPO beginning at midnight the night before their procedure? yes     Procedure location confirmed with patient? Yes     Covid- Denies signs/symptoms. Instructed to notify PAT/MD if any changes.

## 2024-10-29 ENCOUNTER — LAB VISIT (OUTPATIENT)
Dept: LAB | Facility: HOSPITAL | Age: 69
End: 2024-10-29
Attending: INTERNAL MEDICINE
Payer: MEDICARE

## 2024-10-29 ENCOUNTER — TELEPHONE (OUTPATIENT)
Dept: RHEUMATOLOGY | Facility: CLINIC | Age: 69
End: 2024-10-29
Payer: MEDICARE

## 2024-10-29 DIAGNOSIS — R74.01 ALT (SGPT) LEVEL RAISED: Primary | ICD-10-CM

## 2024-10-29 DIAGNOSIS — M47.819 SPONDYLARTHRITIS: ICD-10-CM

## 2024-10-29 LAB
ALBUMIN SERPL BCP-MCNC: 4.3 G/DL (ref 3.5–5.2)
ALP SERPL-CCNC: 47 U/L (ref 40–150)
ALT SERPL W/O P-5'-P-CCNC: 66 U/L (ref 10–44)
ANION GAP SERPL CALC-SCNC: 10 MMOL/L (ref 8–16)
AST SERPL-CCNC: 35 U/L (ref 10–40)
BASOPHILS # BLD AUTO: 0.04 K/UL (ref 0–0.2)
BASOPHILS NFR BLD: 1 % (ref 0–1.9)
BILIRUB SERPL-MCNC: 0.8 MG/DL (ref 0.1–1)
BUN SERPL-MCNC: 17 MG/DL (ref 8–23)
CALCIUM SERPL-MCNC: 10.2 MG/DL (ref 8.7–10.5)
CHLORIDE SERPL-SCNC: 101 MMOL/L (ref 95–110)
CO2 SERPL-SCNC: 28 MMOL/L (ref 23–29)
CREAT SERPL-MCNC: 0.8 MG/DL (ref 0.5–1.4)
CRP SERPL-MCNC: <0.3 MG/L (ref 0–8.2)
DIFFERENTIAL METHOD BLD: ABNORMAL
EOSINOPHIL # BLD AUTO: 0.1 K/UL (ref 0–0.5)
EOSINOPHIL NFR BLD: 3 % (ref 0–8)
ERYTHROCYTE [DISTWIDTH] IN BLOOD BY AUTOMATED COUNT: 13.2 % (ref 11.5–14.5)
ERYTHROCYTE [SEDIMENTATION RATE] IN BLOOD BY PHOTOMETRIC METHOD: <2 MM/HR (ref 0–36)
EST. GFR  (NO RACE VARIABLE): >60 ML/MIN/1.73 M^2
GLUCOSE SERPL-MCNC: 96 MG/DL (ref 70–110)
HCT VFR BLD AUTO: 40.6 % (ref 37–48.5)
HGB BLD-MCNC: 13.2 G/DL (ref 12–16)
IMM GRANULOCYTES # BLD AUTO: 0.01 K/UL (ref 0–0.04)
IMM GRANULOCYTES NFR BLD AUTO: 0.3 % (ref 0–0.5)
LYMPHOCYTES # BLD AUTO: 2 K/UL (ref 1–4.8)
LYMPHOCYTES NFR BLD: 51 % (ref 18–48)
MCH RBC QN AUTO: 32.8 PG (ref 27–31)
MCHC RBC AUTO-ENTMCNC: 32.5 G/DL (ref 32–36)
MCV RBC AUTO: 101 FL (ref 82–98)
MONOCYTES # BLD AUTO: 0.4 K/UL (ref 0.3–1)
MONOCYTES NFR BLD: 11.1 % (ref 4–15)
NEUTROPHILS # BLD AUTO: 1.3 K/UL (ref 1.8–7.7)
NEUTROPHILS NFR BLD: 33.6 % (ref 38–73)
NRBC BLD-RTO: 0 /100 WBC
PLATELET # BLD AUTO: 207 K/UL (ref 150–450)
PMV BLD AUTO: 11.5 FL (ref 9.2–12.9)
POTASSIUM SERPL-SCNC: 4.9 MMOL/L (ref 3.5–5.1)
PROT SERPL-MCNC: 7 G/DL (ref 6–8.4)
RBC # BLD AUTO: 4.02 M/UL (ref 4–5.4)
SODIUM SERPL-SCNC: 139 MMOL/L (ref 136–145)
WBC # BLD AUTO: 3.98 K/UL (ref 3.9–12.7)

## 2024-10-29 PROCEDURE — 36415 COLL VENOUS BLD VENIPUNCTURE: CPT | Mod: PN | Performed by: INTERNAL MEDICINE

## 2024-10-29 PROCEDURE — 80053 COMPREHEN METABOLIC PANEL: CPT | Performed by: INTERNAL MEDICINE

## 2024-10-29 PROCEDURE — 85025 COMPLETE CBC W/AUTO DIFF WBC: CPT | Performed by: INTERNAL MEDICINE

## 2024-10-29 PROCEDURE — 86140 C-REACTIVE PROTEIN: CPT | Performed by: INTERNAL MEDICINE

## 2024-10-29 PROCEDURE — 85652 RBC SED RATE AUTOMATED: CPT | Performed by: INTERNAL MEDICINE

## 2024-10-30 ENCOUNTER — HOSPITAL ENCOUNTER (OUTPATIENT)
Facility: HOSPITAL | Age: 69
Discharge: HOME | End: 2024-10-30
Attending: ANESTHESIOLOGY | Admitting: ANESTHESIOLOGY
Payer: MEDICARE

## 2024-10-30 VITALS
RESPIRATION RATE: 16 BRPM | DIASTOLIC BLOOD PRESSURE: 70 MMHG | HEART RATE: 71 BPM | OXYGEN SATURATION: 99 % | SYSTOLIC BLOOD PRESSURE: 129 MMHG | TEMPERATURE: 97 F

## 2024-10-30 DIAGNOSIS — M47.812 CERVICAL SPONDYLOSIS: Primary | ICD-10-CM

## 2024-10-30 PROCEDURE — 64491 INJ PARAVERT F JNT C/T 2 LEV: CPT | Mod: LT | Performed by: ANESTHESIOLOGY

## 2024-10-30 PROCEDURE — 64490 INJ PARAVERT F JNT C/T 1 LEV: CPT | Mod: LT,,, | Performed by: ANESTHESIOLOGY

## 2024-10-30 PROCEDURE — 63600175 PHARM REV CODE 636 W HCPCS: Performed by: ANESTHESIOLOGY

## 2024-10-30 PROCEDURE — 64491 INJ PARAVERT F JNT C/T 2 LEV: CPT | Mod: LT,,, | Performed by: ANESTHESIOLOGY

## 2024-10-30 PROCEDURE — 64490 INJ PARAVERT F JNT C/T 1 LEV: CPT | Mod: LT | Performed by: ANESTHESIOLOGY

## 2024-10-30 RX ORDER — FENTANYL CITRATE 50 UG/ML
INJECTION, SOLUTION INTRAMUSCULAR; INTRAVENOUS
Status: DISCONTINUED | OUTPATIENT
Start: 2024-10-30 | End: 2024-10-30 | Stop reason: HOSPADM

## 2024-10-30 RX ORDER — BUPIVACAINE HYDROCHLORIDE 5 MG/ML
INJECTION, SOLUTION EPIDURAL; INTRACAUDAL
Status: DISCONTINUED | OUTPATIENT
Start: 2024-10-30 | End: 2024-10-30 | Stop reason: HOSPADM

## 2024-10-30 RX ORDER — ONDANSETRON HYDROCHLORIDE 2 MG/ML
4 INJECTION, SOLUTION INTRAVENOUS ONCE AS NEEDED
Status: DISCONTINUED | OUTPATIENT
Start: 2024-10-30 | End: 2024-10-30 | Stop reason: HOSPADM

## 2024-10-30 RX ORDER — MIDAZOLAM HYDROCHLORIDE 1 MG/ML
INJECTION INTRAMUSCULAR; INTRAVENOUS
Status: DISCONTINUED | OUTPATIENT
Start: 2024-10-30 | End: 2024-10-30 | Stop reason: HOSPADM

## 2024-10-30 NOTE — OP NOTE
CERVICAL Medial Branch Block Under Fluoroscopy  Left  C2/C3 and C3/C4    INFORMED CONSENT: The procedure, risks, benefits and options were discussed with patient. There are no contraindications to the procedure. The patient expressed understanding and agreed to proceed. The personnel performing the procedure was discussed.    Date of procedure 10/30/2024    Time-out taken to identify patient and procedure side prior to starting the procedure.                                                                     PROCEDURE:  Left  medial branch block at the transverse processes of  C2, C3, and C4    Pre Procedure diagnosis:    Occipital neuralgia, unspecified laterality [M54.81]  Cervical spondylosis [M47.812]  1. Cervical spondylosis        Post-Procedure diagnosis:   same    PHYSICIAN: Richmond Mills MD    ASSISTANTS: None    MEDICATIONS INJECTED:  1ml 0.5% Bupivicaine PF, at each level  LOCAL ANESTHETIC USED:   1ml Lidocaine PF, at each level    ESTIMATED BLOOD LOSS:  None.    COMPLICATIONS:  None.    Sedation: Conscious sedation provided by M.D    SEDATION MEDICATIONS: local/IV sedation: Versed 1 mg and fentanyl 25 mcg IV.  Conscious sedation ordered by MD.  Patient reevaluated and sedation administered by MD and monitored by RN.  Total sedation time was less than 10 min.    Total sedation time was <10 min      TECHNIQUE:   Laying in a prone position, the patient was prepped and draped in the usual sterile fashion using ChloraPrep and fenestrated drape.  The level was determined under fluoroscopic guidance.  Local anesthetic was given by going down to the hub of the 27-gauge 1.25in needle and raising a wheel.  A 25-gauge 3.5inch needle was introduced to the anatomic local of the medial branch at each of the stated above levels using fluoroscopy. Then after negative aspiration, the medication was injected slowly. The patient tolerated the procedure well.       The patient was monitored for approximately 30 minutes  after the procedure.  Patient was given post procedure and discharge instructions to follow at home.  The patient was discharged in a stable condition

## 2024-10-30 NOTE — DISCHARGE INSTRUCTIONS

## 2024-10-30 NOTE — DISCHARGE SUMMARY
Discharge Note  Short Stay      SUMMARY     Admit Date: 10/30/2024    Attending Physician: Richmond Mills MD        Discharge Physician: Richmond Mills MD        Discharge Date: 10/30/2024 8:46 AM    Procedure(s) (LRB):  Left C2/C3 and C3/C4 MBB with RN IV sedation (Left)    Final Diagnosis: Occipital neuralgia, unspecified laterality [M54.81]  Cervical spondylosis [M47.812]    Disposition: Home or self care    Patient Instructions:   Current Discharge Medication List        CONTINUE these medications which have NOT CHANGED    Details   amLODIPine (NORVASC) 10 MG tablet TAKE 1 TABLET (10 MG TOTAL) BY MOUTH ONCE DAILY.  Qty: 90 tablet, Refills: 3    Comments: .      hydroCHLOROthiazide (HYDRODIURIL) 25 MG tablet TAKE 1 TABLET (25 MG TOTAL) BY MOUTH ONCE DAILY.  Qty: 90 tablet, Refills: 3    Comments: .  Associated Diagnoses: Primary hypertension      albuterol (PROVENTIL/VENTOLIN HFA) 90 mcg/actuation inhaler INHALE 1 PUFF BY MOUTH 3 TIMES A DAY FOR 10 DAYS      amitriptyline (ELAVIL) 10 MG tablet Take 1 tablet (10 mg total) by mouth every evening.  Qty: 90 tablet, Refills: 3    Associated Diagnoses: Occipital neuralgia of left side      azelastine (ASTELIN) 137 mcg (0.1 %) nasal spray USE 1 SPRAY (137 MCG TOTAL) IN EACH NOSTRIL 2 (TWO) TIMES DAILY.  Qty: 90 mL, Refills: 1    Associated Diagnoses: Seasonal and perennial allergic rhinitis      citalopram (CELEXA) 20 MG tablet Take 1 tablet (20 mg total) by mouth once daily.  Qty: 30 tablet, Refills: 12    Comments: 01/30/2023 9:12:52 AM      ergocalciferol, vitamin D2, (VITAMIN D ORAL)       estradioL (ESTRACE) 0.01 % (0.1 mg/gram) vaginal cream Place 1 g vaginally twice a week.  Qty: 42.5 g, Refills: 2    Associated Diagnoses: Atrophic vaginitis      etanercept (ENBREL SURECLICK) 50 mg/mL (1 mL) Inject 1 mL (50 mg total) into the skin once a week.  Qty: 12 mL, Refills: 1    Associated Diagnoses: PSA (psoriatic arthritis)      ezetimibe (ZETIA) 10 mg tablet TAKE  ONE TABLET BY MOUTH EVERY DAY  Qty: 90 tablet, Refills: 2      fish oil-omega-3 fatty acids 300-1,000 mg capsule Take 2 g by mouth once daily.      fluticasone (FLONASE SENSIMIST) 27.5 mcg/actuation nasal spray 2 sprays by Nasal route once daily.  Qty: 9.1 mL, Refills: 0      gabapentin (NEURONTIN) 300 MG capsule Take 2 capsules (600 mg total) by mouth 2 (two) times daily.  Qty: 120 capsule, Refills: 11    Associated Diagnoses: Juvenile idiopathic scoliosis of thoracolumbar region      levothyroxine (SYNTHROID) 88 MCG tablet TAKE 1 TABLET (88 MCG TOTAL) BY MOUTH BEFORE BREAKFAST.  Qty: 90 tablet, Refills: 3      mupirocin (BACTROBAN) 2 % ointment SMARTSI Application Topical 2-3 Times Daily      PSYLLIUM SEED, WITH SUGAR, (METAMUCIL ORAL) Take by mouth.      sennosides (SENOKOT TO GO ORAL)       tiZANidine (ZANAFLEX) 2 MG tablet TAKE 1 TABLET (2 MG TOTAL) BY MOUTH NIGHTLY AS NEEDED (MUSCLE SPASMS).  Qty: 30 tablet, Refills: 4    Associated Diagnoses: Myofascial pain; Piriformis syndrome, unspecified laterality      triamcinolone acetonide 0.1% (KENALOG) 0.1 % cream Apply topically 2 (two) times daily. Use up to 2 weeks at a time.  Qty: 454 g, Refills: 1    Associated Diagnoses: Notalgia paresthetica                 Discharge Diagnosis: Occipital neuralgia, unspecified laterality [M54.81]  Cervical spondylosis [M47.812]  Condition on Discharge: Stable with no complications to procedure   Diet on Discharge: Same as before.  Activity: as per instruction sheet.  Discharge to: Home with a responsible adult.  Follow up: 2-4 weeks       Please call the office at [phone] if you experience any weakness or loss of sensation, fever > 101.5, pain uncontrolled with oral medications, persistent nausea/vomiting/or diarrhea, redness or drainage from the incisions, or any other worrisome concerns. If physician on call was not reached or could not communicate with our office for any reason please go to the nearest emergency  department

## 2024-10-31 ENCOUNTER — TELEPHONE (OUTPATIENT)
Dept: PAIN MEDICINE | Facility: CLINIC | Age: 69
End: 2024-10-31
Payer: MEDICARE

## 2024-11-01 ENCOUNTER — TELEPHONE (OUTPATIENT)
Dept: PAIN MEDICINE | Facility: CLINIC | Age: 69
End: 2024-11-01
Payer: MEDICARE

## 2024-11-01 DIAGNOSIS — M47.812 CERVICAL SPONDYLOSIS: Primary | ICD-10-CM

## 2024-11-04 ENCOUNTER — TELEPHONE (OUTPATIENT)
Dept: PAIN MEDICINE | Facility: CLINIC | Age: 69
End: 2024-11-04
Payer: MEDICARE

## 2024-11-04 NOTE — TELEPHONE ENCOUNTER
----- Message from Richmond Mills MD sent at 11/1/2024  8:17 AM CDT -----  Pain Provider: Lina  Patient Name: Jill Marquez  MRN: [id number]  Case: CERVICAL MBB  Level: C2, C3, and C4  Laterality: left        Pain Provider: Lina  Patient Name: Jill Marquez  MRN: [id number]  Case: CERVICAL RFA  Level: C2, C3, and C4  Laterality: left        Patient can follow up with me 5 weeks after RFA

## 2024-11-05 ENCOUNTER — OFFICE VISIT (OUTPATIENT)
Dept: INTERNAL MEDICINE | Facility: CLINIC | Age: 69
End: 2024-11-05
Payer: MEDICARE

## 2024-11-05 VITALS
HEART RATE: 89 BPM | DIASTOLIC BLOOD PRESSURE: 80 MMHG | WEIGHT: 121.25 LBS | OXYGEN SATURATION: 98 % | HEIGHT: 63 IN | TEMPERATURE: 98 F | BODY MASS INDEX: 21.48 KG/M2 | SYSTOLIC BLOOD PRESSURE: 130 MMHG

## 2024-11-05 DIAGNOSIS — E78.5 HYPERLIPIDEMIA, UNSPECIFIED HYPERLIPIDEMIA TYPE: ICD-10-CM

## 2024-11-05 DIAGNOSIS — E88.01 ALPHA-1-ANTITRYPSIN DEFICIENCY: ICD-10-CM

## 2024-11-05 DIAGNOSIS — L40.50 PSA (PSORIATIC ARTHRITIS): ICD-10-CM

## 2024-11-05 DIAGNOSIS — D75.89 MACROCYTOSIS WITHOUT ANEMIA: ICD-10-CM

## 2024-11-05 DIAGNOSIS — J45.20 MILD INTERMITTENT ASTHMA, UNCOMPLICATED: ICD-10-CM

## 2024-11-05 DIAGNOSIS — I10 PRIMARY HYPERTENSION: Primary | ICD-10-CM

## 2024-11-05 DIAGNOSIS — G72.0 STATIN MYOPATHY: ICD-10-CM

## 2024-11-05 DIAGNOSIS — D84.9 IMMUNOCOMPROMISED, ACQUIRED: ICD-10-CM

## 2024-11-05 DIAGNOSIS — E03.9 HYPOTHYROIDISM, UNSPECIFIED TYPE: ICD-10-CM

## 2024-11-05 DIAGNOSIS — T46.6X5A STATIN MYOPATHY: ICD-10-CM

## 2024-11-05 DIAGNOSIS — I70.0 AORTIC ATHEROSCLEROSIS: ICD-10-CM

## 2024-11-05 PROBLEM — R06.02 SOB (SHORTNESS OF BREATH): Status: RESOLVED | Noted: 2023-05-31 | Resolved: 2024-11-05

## 2024-11-05 PROCEDURE — 99215 OFFICE O/P EST HI 40 MIN: CPT | Mod: PBBFAC | Performed by: FAMILY MEDICINE

## 2024-11-05 PROCEDURE — G2211 COMPLEX E/M VISIT ADD ON: HCPCS | Mod: S$PBB,,, | Performed by: FAMILY MEDICINE

## 2024-11-05 PROCEDURE — 99999 PR PBB SHADOW E&M-EST. PATIENT-LVL V: CPT | Mod: PBBFAC,,, | Performed by: FAMILY MEDICINE

## 2024-11-05 PROCEDURE — 99214 OFFICE O/P EST MOD 30 MIN: CPT | Mod: S$PBB,,, | Performed by: FAMILY MEDICINE

## 2024-11-05 NOTE — PROGRESS NOTES
Subjective:      Patient ID: Jill Marquez is a 69 y.o. female.    Chief Complaint: Establish Care    HPI new to patient to me transferring primary care from Dr. Smith in [place]   Hypertension: blood pressures at home normal. Tolerating medicine.     Elevated LFTs hepatology evaluation mild alpha 1 antitrypsin deficiency normal FibroScan with planned hepatology and FibroScan every couple years will be due again July 2026    Psoriatic arthritis Enbrel Dr. Campos at Rheumatology and plan to change rheumatologist and spring [place] this is scheduled     Hyperlipidemia statin intolerance taking Zetia due cholesterol soon    Hypothyroidism on Synthroid due TSH soon     Intermittent mild macrocytosis without anemia will check B12 folic acid similar evaluation done a couple years ago for mild macrocytosis that resolved    History of asthma sounds mild unsure of formal diagnosis but occasional seasonal seasonal need for albuterol okay this week        Past Medical History:   Diagnosis Date    Alpha-1-antitrypsin deficiency 02/23/2024    Hepatology. Mild . 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    HTN (hypertension) 07/23/2012    Hyperlipidemia     Hypothyroid 07/23/2012    PSA (psoriatic arthritis) 03/10/2020    rheum        Past Surgical History:   Procedure Laterality Date    COLONOSCOPY N/A 01/25/2016    Procedure: COLONOSCOPY;  Surgeon: DAYNA Simmons MD;  Location: [address];  Service: Endoscopy;  Laterality: N/A;    COLONOSCOPY N/A 11/08/2022    Procedure: COLONOSCOPY;  Surgeon: DAYNA Simmons MD;  Location: [facility] [address]);  Service: Endoscopy;  Laterality: N/A;  vacc-inst portal-vargus only-tb  precall done.arrival time of 10:00 confirmed/mleone    COSMETIC SURGERY      EPIDURAL STEROID INJECTION N/A 8/1/2024    Procedure: CERVICAL C7/T1 TOMASA;  Surgeon: Popeye Ca MD;  Location: [facility];  Service:  Pain Management;  Laterality: N/A;  [phone]  2 WK F/U NEDRA    EYE SURGERY      eyelid surgery      HYSTERECTOMY  2004    prolapse    INJECTION OF ANESTHETIC AGENT AROUND MEDIAL BRANCH NERVES INNERVATING CERVICAL FACET JOINT Left 10/30/2024    Procedure: Left C2/C3 and C3/C4 MBB with RN IV sedation;  Surgeon: Richmond Mills MD;  Location: [facility];  Service: Pain Management;  Laterality: Left;    OOPHORECTOMY Bilateral 2015    Tonsillectomy      TONSILLECTOMY      TRANSFORAMINAL EPIDURAL INJECTION OF STEROID Left 2023    Procedure: INJECTION, STEROID, EPIDURAL, TRANSFORAMINAL APPROACH, LEFT L3/L4 & L4/L5 DIRECT REF;  Surgeon: Popeye Ca MD;  Location: [facility];  Service: Pain Management;  Laterality: Left;      Social History     Socioeconomic History    Marital status:     Number of children: 2   Tobacco Use    Smoking status: Former     Current packs/day: 0.00     Types: Cigarettes     Start date: 1970     Quit date: 1982     Years since quittin.5     Passive exposure: Past    Smokeless tobacco: Former    Tobacco comments:     , homemaker, 2 children   Substance and Sexual Activity    Alcohol use: Yes     Comment: 1-2 servings per week    Drug use: No    Sexual activity: Yes     Partners: Male     Birth control/protection: Surgical, None     Social Drivers of Health     Financial Resource Strain: Low Risk  (2024)    Overall Financial Resource Strain (CARDIA)     Difficulty of Paying Living Expenses: Not hard at all   Food Insecurity: No Food Insecurity (2024)    Hunger Vital Sign     Worried About Running Out of Food in the Last Year: Never true     Ran Out of Food in the Last Year: Never true   Transportation Needs: No Transportation Needs (2024)    PRAPARE - Transportation     Lack of Transportation (Medical): No     Lack of Transportation (Non-Medical): No   Physical Activity: Sufficiently Active (2024)    Exercise Vital Sign     Days  of Exercise per Week: 5 days     Minutes of Exercise per Session: 60 min   Stress: Stress Concern Present (2/13/2024)    [employer] - Occupational Stress Questionnaire     Feeling of Stress : To some extent   Housing Stability: Low Risk  (2/13/2024)    Housing Stability Vital Sign     Unable to Pay for Housing in the Last Year: No     Number of Places Lived in the Last Year: 1     Unstable Housing in the Last Year: No      Family History   Problem Relation Name Age of Onset    Diabetes Brother      Retinal detachment Brother      Cancer Brother          kidney    Cataracts Brother      Diabetes Brother      Cancer Brother          throat    Cataracts Brother      Diabetes Brother      Cataracts Brother      No Known Problems Mother      No Known Problems Father      Lupus Other          niece    No Known Problems Sister      No Known Problems Maternal Aunt      No Known Problems Maternal Uncle      No Known Problems Paternal Aunt      No Known Problems Paternal Uncle      No Known Problems Maternal Grandmother      No Known Problems Maternal Grandfather      No Known Problems Paternal Grandmother      No Known Problems Paternal Grandfather      Breast cancer Neg Hx      Colon cancer Neg Hx      Ovarian cancer Neg Hx      Blindness Neg Hx      Glaucoma Neg Hx      Hypertension Neg Hx      Macular degeneration Neg Hx      Strabismus Neg Hx      Stroke Neg Hx      Thyroid disease Neg Hx      Amblyopia Neg Hx      Rheum arthritis Neg Hx      Psoriasis Neg Hx      Inflammatory bowel disease Neg Hx      Cervical cancer Neg Hx      Endometrial cancer Neg Hx      Vaginal cancer Neg Hx      Uterine cancer Neg Hx        Review of Systems  Cardiovascular: no chest pain  Chest: no shortness of breath  Abd: no abd pain  Remainder review of systems negative        Objective:     Physical Exam  Vitals and nursing note reviewed.   Constitutional:       Appearance: She is well-developed.   HENT:       Head: Normocephalic and atraumatic.   Neck:      Vascular: No carotid bruit.   Cardiovascular:      Rate and Rhythm: Normal rate and regular rhythm.   Pulmonary:      Effort: Pulmonary effort is normal.      Breath sounds: Normal breath sounds.   Abdominal:      General: Bowel sounds are normal. There is no distension.      Palpations: Abdomen is soft. There is no mass.      Tenderness: There is no abdominal tenderness. There is no guarding or rebound.   Musculoskeletal:      Cervical back: Normal range of motion and neck supple.   Lymphadenopathy:      Cervical: No cervical adenopathy.   Skin:     General: Skin is warm and dry.   Neurological:      Mental Status: She is alert and oriented to person, place, and time.   Psychiatric:         Behavior: Behavior normal.         Judgment: Judgment normal.       Assessment:         ICD-10-CM ICD-9-CM   1. Primary hypertension  I10 401.9   2. Hypothyroidism, unspecified type  E03.9 244.9   3. Hyperlipidemia, unspecified hyperlipidemia type  E78.5 272.4   4. Alpha-1-antitrypsin deficiency  E88.01 273.4   5. PSA (psoriatic arthritis)  L40.50 696.0   6. Immunocompromised, acquired  D84.9 279.9   7. Aortic atherosclerosis  I70.0 440.0   8. Statin myopathy  G72.0 359.4    T46.6X5A E942.2   9. Macrocytosis without anemia  D75.89 289.89      Plan:        1. Primary hypertension  -     Lipid Panel; Future; Expected date: 11/13/2024    2. Hypothyroidism, unspecified type  -     TSH; Future; Expected date: 11/13/2024    3. H        4. Alpha-1-antitrypsin deficiency  Overview:  Hepatology. Mild . Fibroscan rec q 2 yrs ue July 2026      5. PSA (psoriatic arthritis)  Overview:  rheum      6. Immunocompromised, acquired    7. Aortic atherosclerosis    8. Statin myopathy    9. Macrocytosis without anemia  -     Folate; Future; Expected date: 11/13/2024  -     Vitamin B12; Future; Expected date: 11/13/2024  -     CBC Auto Differential; Future; Expected date: 11/13/2024       Add lab to  [facility]     Follow up in 6 months and if all transfer of care it is going well then plan on annual visits

## 2024-11-08 ENCOUNTER — OFFICE VISIT (OUTPATIENT)
Dept: RHEUMATOLOGY | Facility: CLINIC | Age: 69
End: 2024-11-08
Payer: MEDICARE

## 2024-11-08 VITALS
DIASTOLIC BLOOD PRESSURE: 82 MMHG | HEIGHT: 63 IN | BODY MASS INDEX: 21.61 KG/M2 | SYSTOLIC BLOOD PRESSURE: 134 MMHG | WEIGHT: 121.94 LBS | HEART RATE: 69 BPM

## 2024-11-08 DIAGNOSIS — R74.01 ALT (SGPT) LEVEL RAISED: ICD-10-CM

## 2024-11-08 DIAGNOSIS — L40.50 PSA (PSORIATIC ARTHRITIS): ICD-10-CM

## 2024-11-08 DIAGNOSIS — B19.10 HEPATITIS B INFECTION WITHOUT DELTA AGENT WITHOUT HEPATIC COMA, UNSPECIFIED CHRONICITY: Primary | ICD-10-CM

## 2024-11-08 DIAGNOSIS — R74.8 ELEVATED CK: ICD-10-CM

## 2024-11-08 DIAGNOSIS — Z78.0 MENOPAUSE: ICD-10-CM

## 2024-11-08 PROCEDURE — 99214 OFFICE O/P EST MOD 30 MIN: CPT | Mod: PBBFAC | Performed by: INTERNAL MEDICINE

## 2024-11-08 PROCEDURE — 99999 PR PBB SHADOW E&M-EST. PATIENT-LVL IV: CPT | Mod: PBBFAC,,, | Performed by: INTERNAL MEDICINE

## 2024-11-08 RX ORDER — ETANERCEPT 50 MG/ML
50 SOLUTION SUBCUTANEOUS WEEKLY
Qty: 12 ML | Refills: 1 | Status: SHIPPED | OUTPATIENT
Start: 2024-11-08

## 2024-11-08 ASSESSMENT — ROUTINE ASSESSMENT OF PATIENT INDEX DATA (RAPID3)
PSYCHOLOGICAL DISTRESS SCORE: 3.3
FATIGUE SCORE: 2.5
AM STIFFNESS SCORE: 0, NO
PATIENT GLOBAL ASSESSMENT SCORE: 1.5
TOTAL RAPID3 SCORE: 1.44
PAIN SCORE: 2.5
MDHAQ FUNCTION SCORE: 0.1

## 2024-11-08 NOTE — PROGRESS NOTES
Patient ID:  Jill Marquez    YOB: 1955     MRN:  [id number]     Subjective:     Chief Complaint:  Disease Management     History of Present Illness:  Pt is a 69 y.o. female with peripheral spondyloarthritis v. Pseudo-RA CPPD who presents today for f/u. LCV was 5/15/24. At that time her hand pain was under control and she was doing well from a rheumatologic standpoint and plan was to add methotrexate.  Her main concern at that time was left sided neck pain.    Today: She reports no joint pain or swelling in the hands and reports to be doing well from a rheumatologic standpoint.  Her only issues is the left sided neck pain which radiates up the neck to the scalp and to the left jaw.  She is following with pain management for this pain and had a diagnostic left C2/3 and C3/4 MBB which gave her 80% pain relief for about 14 hours.  She has another block to be performed 11/18/24 before preceding to RFA.   She has been in physical therapy for her neck pain which provides moderate pain relief.  She has been taking her Enbrel and is here today for follow up visit and for Enbrel prescription.  She never took the methotrexate as she states she was on too many medications already and had been doing well from a rheumatologic standpoint.     Denies hair loss, dry eyes, vision changes, dry mouth, oral/nasal ulcers, trouble swallowing, new rashes, joint swelling, morning stiffness, or GI disturbances.      Review of Systems   Review of Systems   Constitutional:  Negative for fever and unexpected weight change.   HENT:  Negative for mouth sores and trouble swallowing.    Eyes:  Negative for redness.   Respiratory:  Negative for cough and shortness of breath.    Cardiovascular:  Negative for chest pain.   Gastrointestinal:  Negative for constipation and diarrhea.   Genitourinary:  Negative for dysuria and genital sores.   Skin:  Negative for rash.   Neurological:  Positive for headaches.   Hematological:  Does not  bruise/bleed easily.        Current Medications:    Current Outpatient Medications:     albuterol (PROVENTIL/VENTOLIN HFA) 90 mcg/actuation inhaler, INHALE 1 PUFF BY MOUTH 3 TIMES A DAY FOR 10 DAYS, Disp: , Rfl:     amLODIPine (NORVASC) 10 MG tablet, TAKE 1 TABLET (10 MG TOTAL) BY MOUTH ONCE DAILY., Disp: 90 tablet, Rfl: 3    azelastine (ASTELIN) 137 mcg (0.1 %) nasal spray, USE 1 SPRAY (137 MCG TOTAL) IN EACH NOSTRIL 2 (TWO) TIMES DAILY., Disp: 90 mL, Rfl: 1    citalopram (CELEXA) 20 MG tablet, Take 1 tablet (20 mg total) by mouth once daily., Disp: 30 tablet, Rfl: 12    ergocalciferol, vitamin D2, (VITAMIN D ORAL), , Disp: , Rfl:     estradioL (ESTRACE) 0.01 % (0.1 mg/gram) vaginal cream, Place 1 g vaginally twice a week., Disp: 42.5 g, Rfl: 2    ezetimibe (ZETIA) 10 mg tablet, TAKE ONE TABLET BY MOUTH EVERY DAY, Disp: 90 tablet, Rfl: 2    fish oil-omega-3 fatty acids 300-1,000 mg capsule, Take 2 g by mouth once daily., Disp: , Rfl:     fluticasone (FLONASE SENSIMIST) 27.5 mcg/actuation nasal spray, 2 sprays by Nasal route once daily., Disp: 9.1 mL, Rfl: 0    gabapentin (NEURONTIN) 300 MG capsule, Take 2 capsules (600 mg total) by mouth 2 (two) times daily., Disp: 120 capsule, Rfl: 11    hydroCHLOROthiazide (HYDRODIURIL) 25 MG tablet, TAKE 1 TABLET (25 MG TOTAL) BY MOUTH ONCE DAILY., Disp: 90 tablet, Rfl: 3    levothyroxine (SYNTHROID) 88 MCG tablet, TAKE 1 TABLET (88 MCG TOTAL) BY MOUTH BEFORE BREAKFAST., Disp: 90 tablet, Rfl: 3    PSYLLIUM SEED, WITH SUGAR, (METAMUCIL ORAL), Take by mouth., Disp: , Rfl:     sennosides (SENOKOT TO GO ORAL), , Disp: , Rfl:     tiZANidine (ZANAFLEX) 2 MG tablet, TAKE 1 TABLET (2 MG TOTAL) BY MOUTH NIGHTLY AS NEEDED (MUSCLE SPASMS)., Disp: 30 tablet, Rfl: 4    triamcinolone acetonide 0.1% (KENALOG) 0.1 % cream, Apply topically 2 (two) times daily. Use up to 2 weeks at a time., Disp: 454 g, Rfl: 1    etanercept (ENBREL SURECLICK) 50 mg/mL (1 mL), Inject 1 mL (50 mg total) into the  skin once a week., Disp: 12 mL, Rfl: 1    mupirocin (BACTROBAN) 2 % ointment, SMARTSI Application Topical 2-3 Times Daily (Patient not taking: Reported on 2024), Disp: , Rfl:      Objective:     Vitals:    24 0804   BP: 134/82   Pulse: 69      Body mass index is 21.6 kg/m².     Physical Exam   Constitutional: normal appearance. She does not appear ill.   HENT:   Head: Normocephalic and atraumatic.   Eyes: Conjunctivae are normal.   Cardiovascular: Normal rate, regular rhythm and normal heart sounds.   Pulmonary/Chest: Effort normal and breath sounds normal. No respiratory distress. She has no wheezes.   Musculoskeletal:         General: No swelling or tenderness. Normal range of motion.   Neurological: She is alert. She displays no weakness.         10/16/2023 2024 5/15/2024 2024   Tender (HUMPHRIES-28) 0  28  0   0   0     Swollen (HUMPHRIES-28)     Provider Global 10  100 5 / 100 10 / 100 10 / 100   Patient Global 10  100 5 / 100 5 / 100 15 /    ESR 0 mm/hr 1 mm/hr 4 mm/hr --   CRP 0.4 mg/L 0.4 mg/L 3 mg/L --   HUMPHRIES-28 (ESR) -- 0.7 (Remission) 1.6 (Remission) --   HUMPHRIES-28 (CRP) 1.85 (Remission) 1.78 (Remission) 2.09 (Remission) --   CDAI Score 7  6  5.5  7.5              Labs/Imaging    Latest Reference Range & Units 10/29/24 08:34    WBC 3.90 - 12.70 K/uL 3.98   RBC 4.00 - 5.40 M/uL 4.02   Hemoglobin 12.0 - 16.0 g/dL 13.2   Hematocrit 37.0 - 48.5 % 40.6   MCV 82 - 98 fL 101 (H)   MCH 27.0 - 31.0 pg 32.8 (H)   MCHC 32.0 - 36.0 g/dL 32.5   RDW 11.5 - 14.5 % 13.2   Platelet Count 150 - 450 K/uL 207   MPV 9.2 - 12.9 fL 11.5   Gran % 38.0 - 73.0 % 33.6 (L)   Lymph % 18.0 - 48.0 % 51.0 (H)   Mono % 4.0 - 15.0 % 11.1   Eos % 0.0 - 8.0 % 3.0   Basophil % 0.0 - 1.9 % 1.0   Immature Granulocytes 0.0 - 0.5 % 0.3   Gran # (ANC) 1.8 - 7.7 K/uL 1.3 (L)   Lymph # 1.0 - 4.8 K/uL 2.0   Mono # 0.3 - 1.0 K/uL 0.4   Eos # 0.0 - 0.5 K/uL 0.1   Baso # 0.00 - 0.20 K/uL 0.04    Immature Grans (Abs) 0.00 - 0.04 K/uL 0.01   nRBC 0 /100 WBC 0   Differential Method   Automated   Sed Rate 0 - 36 mm/Hr <2   Sodium 136 - 145 mmol/L 139   Potassium 3.5 - 5.1 mmol/L 4.9   Chloride 95 - 110 mmol/L 101   CO2 23 - 29 mmol/L 28   Anion Gap 8 - 16 mmol/L 10   BUN 8 - 23 mg/dL 17   Creatinine 0.5 - 1.4 mg/dL 0.8   eGFR >60 mL/min/1.73 m^2 >60.0   Glucose 70 - 110 mg/dL 96   Calcium 8.7 - 10.5 mg/dL 10.2   ALP 40 - 150 U/L 47   PROTEIN TOTAL 6.0 - 8.4 g/dL 7.0   Albumin 3.5 - 5.2 g/dL 4.3   BILIRUBIN TOTAL 0.1 - 1.0 mg/dL 0.8   AST 10 - 40 U/L 35   ALT 10 - 44 U/L 66 (H)   CRP 0.0 - 8.2 mg/L <0.3   (H): Data is abnormally high  (L): Data is abnormally low       EXAMINATION:  XR CERVICAL SPINE FLEXION  AND EXTENSION ONLY     CLINICAL HISTORY:  Spondylolisthesis, cervical region     TECHNIQUE:  Flexion and extension lateral views of the cervical spine.  Comparison is made to May 16, 2024.     COMPARISON:  05/16/2024.     FINDINGS:  Reversal the normal cervical lordosis with associated degenerative disc disease.  On extension there is 2 mm of retrolisthesis C5 on C6 which improves on flexion.  On flexion there is 2 mm anterolisthesis C4 on C5 which improves on extension.  Vertebral heights are maintained.     Impression:     As above.        Electronically signed by:Watson Strong  Date:                                            05/17/2024  Time:                                           13:39     MRI CERVICAL SPINE WITHOUT CONTRAST     CLINICAL HISTORY:  Neck pain, chronic, degenerative changes on xray; Spondylolisthesis, cervical region     TECHNIQUE:  Multiplanar, multisequence MR images of the cervical spine were performed without the administration of contrast.     COMPARISON:  Cervical spine radiograph 05/16/2024, 05/17/2024; MRI thoracic spine 05/02/2012     FINDINGS:  Skull base and craniocervical junction: T2/STIR signal hyperintensity at the inferior aspect of the right cerebellar hemisphere  with superimposed T1 signal hyperintensity.  Mild volume loss of the visualized cerebral hemispheres.  Normal vertebral artery flow voids are present.     Odontoid process: Heterogeneous pannus formation posterior to the odontoid process with mass effect on the ventral thecal sac.  No significant spinal canal stenosis.  No significant erosive changes of the odontoid process.  No widening of the atlantodens interval noting limited static exam.     Alignment: Straightening of the normal cervical lordosis with 3 mm retrolisthesis C5 on C6 in 3 mm of anterolisthesis C7 on T1.     Vertebrae: Vertebral body heights are adequately maintained without evidence of compression fracture.  Heterogeneous bone marrow signal diffusely suggestive of red marrow reconversion.  Chronic degenerative changes with questionable multifocal endplate edema/Modic type 1 change.     Discs: Multilevel advanced degenerative disc space narrowing and desiccation most pronounced C2-C3 through C6-7.     Cord: Normal signal.     Degenerative findings:     C2-C3: Posterior disc osteophyte complex with extension into the left extraforaminal zone.  Mild bilateral facet arthropathy.  No spinal canal stenosis. Mild left neural foraminal narrowing.     C3-C4: Posterior disc osteophyte complex.  Bilateral facet arthropathy and uncovertebral hypertrophy.  No spinal canal stenosis. Mild left neural foraminal narrowing.     C4-C5: Posterior disc osteophyte complex.  Bilateral facet arthropathy and uncovertebral hypertrophy.  Mild spinal canal stenosis.  Moderate left neural foraminal narrowing, mild to moderate right neural foraminal narrowing.     C5-C6: Posterior disc osteophyte complex.  Bilateral facet arthropathy and uncovertebral hypertrophy.  Mild spinal canal stenosis.  Moderate bilateral neural foraminal narrowing most severe on the left.     C6-C7: Posterior disc osteophyte complex.  Facet arthropathy and uncovertebral hypertrophy.  No spinal canal  stenosis. No neural foraminal narrowing.     C7-T1: Mild bilateral facet arthropathy.  No spinal canal stenosis. No neural foraminal narrowing.     Paraspinal muscles & soft tissues: Salivary glands are unremarkable.  Small thyroid gland relating to patient's history of hypothyroidism.  No laryngeal or tonsillar mass.     Impression:     Multilevel cervical spondylosis most pronounced at C4-C5 and C5-C6 as detailed above.     Pannus formation posterior to the odontoid process with mass effect on the ventral thecal sac.  No significant erosive changes of the odontoid.  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  Consider further characterization with brain MRI if clinically warranted.     This report was flagged in Epic as abnormal.     Electronically signed by resident: Jeane Lawson  Date:                                            06/18/2024  Time:                                           08:40     Electronically signed by:Hunter Neumann MD  Date:                                            06/18/2024  Time:                                           10:01     Assessment:     1. Hepatitis B infection without delta agent without hepatic coma, unspecified chronicity    2. Elevated CK    3. ALT (SGPT) level raised    4. Menopause    5. PSA (psoriatic arthritis)       Plan:      Problem List Items Addressed This Visit          Orthopedic    PSA (psoriatic arthritis)    Relevant Medications    etanercept (ENBREL SURECLICK) 50 mg/mL (1 mL)     Other Visit Diagnoses       Hepatitis B infection without delta agent without hepatic coma, unspecified chronicity    -  Primary    Relevant Orders    Hepatitis B Surface Antigen    HEPATITIS B VIRAL DNA, QUANTITATIVE    Elevated CK        Relevant Orders    CK    ALT (SGPT)  level raised        Relevant Orders    HEPATIC FUNCTION PANEL    Menopause        Relevant Orders    DXA Bone Density Axial Skeleton 1 or more sites              HBV DNA, HBsAg, CK HFP today  *new Covid vaccine-patient declines  Cont PT for neck  F/u  Dr. Mills in Pain Management for repeat MBB  Continue Enbrel SureClick 50mg sc q 7 days refill sent to [facility]  CBC, CMP, ESR, CRP CK, HBV DNA HBsAg  CK  monitoring q 3 months  RTC 6 months     Omega Keita M.D.  PGY-1  [employer] [facility]

## 2024-11-08 NOTE — PATIENT INSTRUCTIONS
HBV DNA, HBsAg  CK HFP today   *new Covid vaccine  Cont PT for neck  F/u  Dr. Mills in Pain Management  Continue Enbrel SureClick 50mg sc q 7 days refill sent to [facility]  CBC, CMP, ESR, CRP CK, HBV DNA HBsAg  CK  monitoring q 3 months  RTC 6 months

## 2024-11-08 NOTE — PROGRESS NOTES
11/1/2024    11:14 AM   Rapid3 Question Responses and Scores   MDHAQ Score 0.1   Psychologic Score 3.3   Pain Score 2.5   When you awakened in the morning OVER THE LAST WEEK, did you feel stiff? No   Fatigue Score 2.5   Global Health Score 1.5   RAPID3 Score 1.44        Answers submitted by the patient for this visit:  Rheumatology Questionnaire (Submitted on 11/1/2024)  fever: No  eye redness: No  mouth sores: No  headaches: Yes  shortness of breath: No  chest pain: No  trouble swallowing: No  diarrhea: No  constipation: No  unexpected weight change: No  genital sore: No  dysuria: No  During the last 3 days, have you had a skin rash?: No  Bruises or bleeds easily: No  cough: No

## 2024-11-08 NOTE — PROGRESS NOTES
I have personally taken the history and examined the patient and agree with the resident,s note as stated above      Answers submitted by the patient for this visit:  Rheumatology Questionnaire (Submitted on 11/1/2024)  fever: No  eye redness: No  mouth sores: No  headaches: Yes  shortness of breath: No  chest pain: No  trouble swallowing: No  diarrhea: No  constipation: No  unexpected weight change: No  genital sore: No  dysuria: No  During the last 3 days, have you had a skin rash?: No  Bruises or bleeds easily: No  cough: No     Latest Reference Range & Units 10/29/24 08:34   WBC 3.90 - 12.70 K/uL 3.98   RBC 4.00 - 5.40 M/uL 4.02   Hemoglobin 12.0 - 16.0 g/dL 13.2   Hematocrit 37.0 - 48.5 % 40.6   MCV 82 - 98 fL 101 (H)   MCH 27.0 - 31.0 pg 32.8 (H)   MCHC 32.0 - 36.0 g/dL 32.5   RDW 11.5 - 14.5 % 13.2   Platelet Count 150 - 450 K/uL 207   MPV 9.2 - 12.9 fL 11.5   Gran % 38.0 - 73.0 % 33.6 (L)   Lymph % 18.0 - 48.0 % 51.0 (H)   Mono % 4.0 - 15.0 % 11.1   Eos % 0.0 - 8.0 % 3.0   Basophil % 0.0 - 1.9 % 1.0   Immature Granulocytes 0.0 - 0.5 % 0.3   Gran # (ANC) 1.8 - 7.7 K/uL 1.3 (L)   Lymph # 1.0 - 4.8 K/uL 2.0   Mono # 0.3 - 1.0 K/uL 0.4   Eos # 0.0 - 0.5 K/uL 0.1   Baso # 0.00 - 0.20 K/uL 0.04   Immature Grans (Abs) 0.00 - 0.04 K/uL 0.01   nRBC 0 /100 WBC 0   Differential Method  Automated   Sed Rate 0 - 36 mm/Hr <2   Sodium 136 - 145 mmol/L 139   Potassium 3.5 - 5.1 mmol/L 4.9   Chloride 95 - 110 mmol/L 101   CO2 23 - 29 mmol/L 28   Anion Gap 8 - 16 mmol/L 10   BUN 8 - 23 mg/dL 17   Creatinine 0.5 - 1.4 mg/dL 0.8   eGFR >60 mL/min/1.73 m^2 >60.0   Glucose 70 - 110 mg/dL 96   Calcium 8.7 - 10.5 mg/dL 10.2   ALP 40 - 150 U/L 47   PROTEIN TOTAL 6.0 - 8.4 g/dL 7.0   Albumin 3.5 - 5.2 g/dL 4.3   BILIRUBIN TOTAL 0.1 - 1.0 mg/dL 0.8   AST 10 - 40 U/L 35   ALT 10 - 44 U/L 66 (H)   CRP 0.0 - 8.2 mg/L <0.3   (H): Data is abnormally high  (L): Data is abnormally low       Latest Reference Range & Units 07/09/24 07:31    Cholesterol Total 120 - 199 mg/dL 233 (H)   HDL 40 - 75 mg/dL 78 (H)   HDL/Cholesterol Ratio 20.0 - 50.0 % 33.5   Non-HDL Cholesterol mg/dL 155   Total Cholesterol/HDL Ratio 2.0 - 5.0  3.0   Triglycerides 30 - 150 mg/dL 109   LDL Cholesterol 63.0 - 159.0 mg/dL 133.2   (H): Data is abnormally high      The 10-year ASCVD risk score (Alejandra DK, et al., 2019) is: 13.4%    Values used to calculate the score:      Age: 69 years      Sex: Female      Is Non- : No      Diabetic: No      Tobacco smoker: No      Systolic Blood Pressure: 134 mmHg      Is BP treated: Yes      HDL Cholesterol: 78 mg/dL      Total Cholesterol: 233 mg/dL     EXAMINATION:  XR CERVICAL SPINE FLEXION  AND EXTENSION ONLY     CLINICAL HISTORY:  Spondylolisthesis, cervical region     TECHNIQUE:  Flexion and extension lateral views of the cervical spine.  Comparison is made to May 16, 2024.     COMPARISON:  05/16/2024.     FINDINGS:  Reversal the normal cervical lordosis with associated degenerative disc disease.  On extension there is 2 mm of retrolisthesis C5 on C6 which improves on flexion.  On flexion there is 2 mm anterolisthesis C4 on C5 which improves on extension.  Vertebral heights are maintained.     Impression:     As above.        Electronically signed by:Watson Strong  Date:                                            05/17/2024  Time:                                           13:39        Your neck x-rays show severe multilevel degenerative disc disease as outlined. And Grade 2 anterior spondylolisthesis(slippage forward) of C7 on T1. S[facility] referral [facility].  RJQ            PACS Images for [facility]     Show images for X-Ray Cervical Spine AP And Lateral  All Reviewers List    Nicanor Campos MD on 5/16/2024 15:22     X-Ray Cervical Spine AP And Lateral  Order: [id number]  Status: Final result       Visible to patient: Yes (seen)       Next appt: 11/13/2024 at 11:00 AM in Lab  (Laboratory, [facility])       Dx: Neck pain; Spondylarthritis    1 Result Note       1 Patient Communication  Details    Reading Physician Reading Date Result Priority   Fred Grover MD  [phone] 5/16/2024 Routine     Narrative & Impression  EXAMINATION:  XR CERVICAL SPINE AP LATERAL     CLINICAL HISTORY:  Spondylosis without myelopathy or radiculopathy, site unspecified     COMPARISON:  February 22, 2016     FINDINGS:  There is normal alignment of the 7 cervical vertebra. Progressive disc height reduction with endplate sclerosis and marginal spondylosis at all cervical levels imaged.  Stable degenerative facet arthropathy with grade 1 anterior spondylolisthesis of C4 on C5, grade 1 retro spondylolisthesis of C5 on C6 and grade 2 anterior spondylolisthesis of C7 on T1. The vertebral body heights and intervertebral disc heights are otherwise well maintained. The posterior elements are intact and the prevertebral soft tissues are normal thickness. The orientation of the dens and the lateral masses are normal. Convex left curvature of the cervical spine.     The lung apices are clear.     Impression:     1.  Negative for acute process involving the cervical spine.     2.  Progressive degenerative disc changes as detailed above.        Electronically signed by:Fred Grover MD  Date:                                            05/16/2024  Time:                                           10:20             ASSESSMENT:      Peripheral spondyloarthritis v. Pseudo-RA CPPD : TJ 0  SJ 4  Pt global 15  ESR <2 CRP <0.3 HUMPHRIES 28 0.84   LYP43-SQA3/86 (both remission) CDAI 7.5(LDA)  DAPSA  TJ 0 SJ  5 Pt global 1.5 Pt pain 2.5 CRP 0.02= 9.02(LDA)   declined starting methotrexate after last visit      Neck pain, myofascial follows with Dr. Mills in Pain Management   OA hands   Left trochanteric burstis/gluteus medius tendinitis resolved  S/p Right trochanteric bursitis, resolved post injection 6/14/22 resolved  Left pes  planovalgus, posterior tibial tendinitis/probable tear, plantar fasciitis/heel spur, and calcific Achilles tendinitis. All resolved with orthosis  Hyperlipidemia LDL  133.2 only on ezetimibe 10mg daily   EH  134/82  CPK intermittently elevated. On pravastatin, atorvastatin and rosuvastatin; now on ezetimibe.  Chronic intermittent  mild hyperbilirubinemia, likely Gilbert's resolved.   Burning pain left post thigh only with sittingiimproved with piriformis injection  increased ck on atorvastatin, previously same with pravastatin and rosuvastatin  Elevated ALT  DXA 7/14/22 normal      PLAN:      HBV DNA, HBsAg  CK HFP today   *new Covid vaccine  Cont PT for neck  F/u  Dr. Mills in Pain Management  Continue Enbrel SureClick 50mg sc q 7 days refill sent to [facility]  CBC, CMP, ESR, CRP CK, HBV DNA HBsAg  CK  monitoring q 3 months  RTC 6 months

## 2024-11-13 ENCOUNTER — LAB VISIT (OUTPATIENT)
Dept: LAB | Facility: HOSPITAL | Age: 69
End: 2024-11-13
Attending: INTERNAL MEDICINE
Payer: MEDICARE

## 2024-11-13 DIAGNOSIS — R74.8 ELEVATED CK: ICD-10-CM

## 2024-11-13 DIAGNOSIS — B19.10 HEPATITIS B INFECTION WITHOUT DELTA AGENT WITHOUT HEPATIC COMA, UNSPECIFIED CHRONICITY: ICD-10-CM

## 2024-11-13 DIAGNOSIS — R74.01 ALT (SGPT) LEVEL RAISED: ICD-10-CM

## 2024-11-13 DIAGNOSIS — I10 PRIMARY HYPERTENSION: ICD-10-CM

## 2024-11-13 DIAGNOSIS — E03.9 HYPOTHYROIDISM, UNSPECIFIED TYPE: ICD-10-CM

## 2024-11-13 DIAGNOSIS — D75.89 MACROCYTOSIS WITHOUT ANEMIA: ICD-10-CM

## 2024-11-13 LAB
ALBUMIN SERPL BCP-MCNC: 4.4 G/DL (ref 3.5–5.2)
ALBUMIN SERPL BCP-MCNC: 4.4 G/DL (ref 3.5–5.2)
ALP SERPL-CCNC: 41 U/L (ref 40–150)
ALP SERPL-CCNC: 41 U/L (ref 40–150)
ALT SERPL W/O P-5'-P-CCNC: 22 U/L (ref 10–44)
ALT SERPL W/O P-5'-P-CCNC: 22 U/L (ref 10–44)
AST SERPL-CCNC: 26 U/L (ref 10–40)
AST SERPL-CCNC: 26 U/L (ref 10–40)
BASOPHILS # BLD AUTO: 0.04 K/UL (ref 0–0.2)
BASOPHILS NFR BLD: 1.1 % (ref 0–1.9)
BILIRUB DIRECT SERPL-MCNC: 0.2 MG/DL (ref 0.1–0.3)
BILIRUB DIRECT SERPL-MCNC: 0.2 MG/DL (ref 0.1–0.3)
BILIRUB SERPL-MCNC: 0.9 MG/DL (ref 0.1–1)
BILIRUB SERPL-MCNC: 0.9 MG/DL (ref 0.1–1)
CHOLEST SERPL-MCNC: 242 MG/DL (ref 120–199)
CHOLEST/HDLC SERPL: 3.6 {RATIO} (ref 2–5)
CK SERPL-CCNC: 174 U/L (ref 20–180)
CK SERPL-CCNC: 174 U/L (ref 20–180)
DIFFERENTIAL METHOD BLD: ABNORMAL
EOSINOPHIL # BLD AUTO: 0.1 K/UL (ref 0–0.5)
EOSINOPHIL NFR BLD: 3.4 % (ref 0–8)
ERYTHROCYTE [DISTWIDTH] IN BLOOD BY AUTOMATED COUNT: 13.1 % (ref 11.5–14.5)
FOLATE SERPL-MCNC: 13.6 NG/ML (ref 4–24)
GGT SERPL-CCNC: 54 U/L (ref 8–55)
HBV SURFACE AG SERPL QL IA: NORMAL
HCT VFR BLD AUTO: 42.1 % (ref 37–48.5)
HDLC SERPL-MCNC: 68 MG/DL (ref 40–75)
HDLC SERPL: 28.1 % (ref 20–50)
HGB BLD-MCNC: 13.6 G/DL (ref 12–16)
IMM GRANULOCYTES # BLD AUTO: 0 K/UL (ref 0–0.04)
IMM GRANULOCYTES NFR BLD AUTO: 0 % (ref 0–0.5)
LDLC SERPL CALC-MCNC: 157.8 MG/DL (ref 63–159)
LYMPHOCYTES # BLD AUTO: 1.8 K/UL (ref 1–4.8)
LYMPHOCYTES NFR BLD: 47 % (ref 18–48)
MCH RBC QN AUTO: 32.1 PG (ref 27–31)
MCHC RBC AUTO-ENTMCNC: 32.3 G/DL (ref 32–36)
MCV RBC AUTO: 99 FL (ref 82–98)
MONOCYTES # BLD AUTO: 0.5 K/UL (ref 0.3–1)
MONOCYTES NFR BLD: 12.1 % (ref 4–15)
NEUTROPHILS # BLD AUTO: 1.4 K/UL (ref 1.8–7.7)
NEUTROPHILS NFR BLD: 36.4 % (ref 38–73)
NONHDLC SERPL-MCNC: 174 MG/DL
NRBC BLD-RTO: 0 /100 WBC
PLATELET # BLD AUTO: 202 K/UL (ref 150–450)
PMV BLD AUTO: 11.6 FL (ref 9.2–12.9)
PROT SERPL-MCNC: 7.1 G/DL (ref 6–8.4)
PROT SERPL-MCNC: 7.1 G/DL (ref 6–8.4)
RBC # BLD AUTO: 4.24 M/UL (ref 4–5.4)
TRIGL SERPL-MCNC: 81 MG/DL (ref 30–150)
TSH SERPL DL<=0.005 MIU/L-ACNC: 1.45 UIU/ML (ref 0.4–4)
VIT B12 SERPL-MCNC: 684 PG/ML (ref 210–950)
WBC # BLD AUTO: 3.79 K/UL (ref 3.9–12.7)

## 2024-11-13 PROCEDURE — 87340 HEPATITIS B SURFACE AG IA: CPT | Performed by: INTERNAL MEDICINE

## 2024-11-13 PROCEDURE — 82746 ASSAY OF FOLIC ACID SERUM: CPT | Mod: GA | Performed by: FAMILY MEDICINE

## 2024-11-13 PROCEDURE — 80076 HEPATIC FUNCTION PANEL: CPT | Performed by: INTERNAL MEDICINE

## 2024-11-13 PROCEDURE — 84443 ASSAY THYROID STIM HORMONE: CPT | Performed by: FAMILY MEDICINE

## 2024-11-13 PROCEDURE — 87517 HEPATITIS B DNA QUANT: CPT | Performed by: INTERNAL MEDICINE

## 2024-11-13 PROCEDURE — 82550 ASSAY OF CK (CPK): CPT | Performed by: INTERNAL MEDICINE

## 2024-11-13 PROCEDURE — 82977 ASSAY OF GGT: CPT | Performed by: INTERNAL MEDICINE

## 2024-11-13 PROCEDURE — 82607 VITAMIN B-12: CPT | Mod: GA | Performed by: FAMILY MEDICINE

## 2024-11-13 PROCEDURE — 80061 LIPID PANEL: CPT | Performed by: FAMILY MEDICINE

## 2024-11-13 PROCEDURE — 36415 COLL VENOUS BLD VENIPUNCTURE: CPT | Mod: PN | Performed by: FAMILY MEDICINE

## 2024-11-13 PROCEDURE — 85025 COMPLETE CBC W/AUTO DIFF WBC: CPT | Performed by: FAMILY MEDICINE

## 2024-11-14 LAB
HBV DNA SERPL NAA+PROBE-ACNC: NOT DETECTED IU/ML
HEPATITIS B VIRUS DNA: NORMAL

## 2024-11-15 ENCOUNTER — PATIENT MESSAGE (OUTPATIENT)
Dept: PAIN MEDICINE | Facility: HOSPITAL | Age: 69
End: 2024-11-15
Payer: MEDICARE

## 2024-11-15 NOTE — PRE-PROCEDURE INSTRUCTIONS
Spoke with patient regarding procedure scheduled on 11.18.      Arrival time 1200     Has patient been sick with fever or on antibiotics within the last 7 days? No     Does the patient have any open wounds, sores or rashes? No     Does the patient have any recent fractures? no     Has patient received a vaccination within the last 7 days? No     Received the COVID vaccination? yes     Has the patient stopped all medications as directed? na     Does patient have a pacemaker, defibrillator, or implantable stimulator? No     Does the patient have a ride to and from procedure and someone reliable to remain with patient?  brother     Is the patient diabetic? no     Does the patient have sleep apnea? Or use O2 at home? no     Is the patient receiving sedation? yes     Is the patient instructed to remain NPO beginning at midnight the night before their procedure? yes     Procedure location confirmed with patient? Yes     Covid- Denies signs/symptoms. Instructed to notify PAT/MD if any changes.

## 2024-11-18 ENCOUNTER — HOSPITAL ENCOUNTER (OUTPATIENT)
Facility: HOSPITAL | Age: 69
Discharge: HOME | End: 2024-11-18
Attending: ANESTHESIOLOGY | Admitting: ANESTHESIOLOGY
Payer: MEDICARE

## 2024-11-18 VITALS
TEMPERATURE: 97 F | HEIGHT: 63 IN | HEART RATE: 74 BPM | SYSTOLIC BLOOD PRESSURE: 130 MMHG | BODY MASS INDEX: 21.41 KG/M2 | OXYGEN SATURATION: 96 % | DIASTOLIC BLOOD PRESSURE: 80 MMHG | WEIGHT: 120.81 LBS | RESPIRATION RATE: 16 BRPM

## 2024-11-18 DIAGNOSIS — M47.812 CERVICAL SPONDYLOSIS: Primary | ICD-10-CM

## 2024-11-18 PROCEDURE — 64490 INJ PARAVERT F JNT C/T 1 LEV: CPT | Mod: LT | Performed by: ANESTHESIOLOGY

## 2024-11-18 PROCEDURE — 64491 INJ PARAVERT F JNT C/T 2 LEV: CPT | Mod: LT | Performed by: ANESTHESIOLOGY

## 2024-11-18 PROCEDURE — 64490 INJ PARAVERT F JNT C/T 1 LEV: CPT | Mod: LT,,, | Performed by: ANESTHESIOLOGY

## 2024-11-18 PROCEDURE — 63600175 PHARM REV CODE 636 W HCPCS: Performed by: ANESTHESIOLOGY

## 2024-11-18 PROCEDURE — 64491 INJ PARAVERT F JNT C/T 2 LEV: CPT | Mod: LT,,, | Performed by: ANESTHESIOLOGY

## 2024-11-18 RX ORDER — FENTANYL CITRATE 50 UG/ML
INJECTION, SOLUTION INTRAMUSCULAR; INTRAVENOUS
Status: DISCONTINUED | OUTPATIENT
Start: 2024-11-18 | End: 2024-11-18 | Stop reason: HOSPADM

## 2024-11-18 RX ORDER — BUPIVACAINE HYDROCHLORIDE 5 MG/ML
INJECTION, SOLUTION EPIDURAL; INTRACAUDAL
Status: DISCONTINUED | OUTPATIENT
Start: 2024-11-18 | End: 2024-11-18 | Stop reason: HOSPADM

## 2024-11-18 RX ORDER — ONDANSETRON HYDROCHLORIDE 2 MG/ML
4 INJECTION, SOLUTION INTRAVENOUS ONCE AS NEEDED
Status: DISCONTINUED | OUTPATIENT
Start: 2024-11-18 | End: 2024-11-18 | Stop reason: HOSPADM

## 2024-11-18 RX ORDER — MIDAZOLAM HYDROCHLORIDE 1 MG/ML
INJECTION, SOLUTION INTRAMUSCULAR; INTRAVENOUS
Status: DISCONTINUED | OUTPATIENT
Start: 2024-11-18 | End: 2024-11-18 | Stop reason: HOSPADM

## 2024-11-18 NOTE — OP NOTE
CERVICAL Medial Branch Block Under Fluoroscopy  Left  C2/C3 and C3/C4    INFORMED CONSENT: The procedure, risks, benefits and options were discussed with patient. There are no contraindications to the procedure. The patient expressed understanding and agreed to proceed. The personnel performing the procedure was discussed.    Date of procedure 11/18/2024    Time-out taken to identify patient and procedure side prior to starting the procedure.                                                                     PROCEDURE:  Left  medial branch block at the transverse processes of  C2, C3, and C4    Pre Procedure diagnosis:    Cervical spondylosis [M47.812]  1. Cervical spondylosis        Post-Procedure diagnosis:   same    PHYSICIAN: Richmond Mills MD    ASSISTANTS: None    MEDICATIONS INJECTED:  1ml 0.5% Bupivicaine PF, at each level  LOCAL ANESTHETIC USED:   1ml Lidocaine PF, at each level    ESTIMATED BLOOD LOSS:  None.    COMPLICATIONS:  None.    Sedation: Conscious sedation provided by M.NANDO    SEDATION MEDICATIONS: local/IV sedation: Versed 1 mg and fentanyl 25 mcg IV.  Conscious sedation ordered by MD.  Patient reevaluated and sedation administered by MD and monitored by RN.  Total sedation time was less than 10 min.    Total sedation time was <10 min      TECHNIQUE:   Laying in a prone position, the patient was prepped and draped in the usual sterile fashion using ChloraPrep and fenestrated drape.  The level was determined under fluoroscopic guidance.  Local anesthetic was given by going down to the hub of the 27-gauge 1.25in needle and raising a wheel.  A 25-gauge 3.5inch needle was introduced to the anatomic local of the medial branch at each of the stated above levels using fluoroscopy. Then after negative aspiration, the medication was injected slowly. The patient tolerated the procedure well.       The patient was monitored for approximately 30 minutes after the procedure.  Patient was given post  procedure and discharge instructions to follow at home.  The patient was discharged in a stable condition

## 2024-11-18 NOTE — DISCHARGE INSTRUCTIONS

## 2024-11-18 NOTE — H&P
HPI  Patient presenting for Procedure(s) (LRB):  Left C2/C3 and C3/C4 MBB with RN IV sedation (Left)     Patient on Anti-coagulation No    No health changes since previous encounter    Past Medical History:   Diagnosis Date    Alpha-1-antitrypsin deficiency 02/23/2024    Hepatology. Mild . 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    HTN (hypertension) 07/23/2012    Hyperlipidemia     Hypothyroid 07/23/2012    Mild intermittent asthma, uncomplicated     PSA (psoriatic arthritis) 03/10/2020    rheum       Past Surgical History:   Procedure Laterality Date    COLONOSCOPY N/A 01/25/2016    Procedure: COLONOSCOPY;  Surgeon: DAYNA Simmons MD;  Location: [facility] ENDO (4TH FLR);  Service: Endoscopy;  Laterality: N/A;    COLONOSCOPY N/A 11/08/2022    Procedure: COLONOSCOPY;  Surgeon: DAYNA Simmons MD;  Location: [facility] ENDO (4TH FLR);  Service: Endoscopy;  Laterality: N/A;  vacc-inst portal-vargus only-tb  precall done.arrival time of 10:00 confirmed/mleone    COSMETIC SURGERY      EPIDURAL STEROID INJECTION N/A 8/1/2024    Procedure: CERVICAL C7/T1 TOMASA;  Surgeon: Popeye Ca MD;  Location: [facility] PAIN MGT;  Service: Pain Management;  Laterality: N/A;  [phone]  2 WK F/U NEDRA    EYE SURGERY      eyelid surgery      HYSTERECTOMY  2004    prolapse    INJECTION OF ANESTHETIC AGENT AROUND MEDIAL BRANCH NERVES INNERVATING CERVICAL FACET JOINT Left 10/30/2024    Procedure: Left C2/C3 and C3/C4 MBB with RN IV sedation;  Surgeon: Richmond Mills MD;  Location: [facility] PAIN MGT;  Service: Pain Management;  Laterality: Left;    OOPHORECTOMY Bilateral 2015    Tonsillectomy      TONSILLECTOMY      TRANSFORAMINAL EPIDURAL INJECTION OF STEROID Left 05/26/2023    Procedure: INJECTION, STEROID, EPIDURAL, TRANSFORAMINAL APPROACH, LEFT L3/L4 & L4/L5 DIRECT REF;  Surgeon: Popeye Ca MD;  Location: [facility] PAIN MGT;  Service: Pain Management;  Laterality: Left;      Review of patient's allergies indicates:   Allergen Reactions    Atorvastatin      Myalgia, increased ck    Crestor [rosuvastatin] Other (See Comments)     Elevated liver enzymes     Pravastatin      Myalgia, elevated ck    Sulfasalazine Nausea Only     Malaise, nausea,    Leflunomide Rash        No current facility-administered medications on file prior to encounter.     Current Outpatient Medications on File Prior to Encounter   Medication Sig Dispense Refill    albuterol (PROVENTIL/VENTOLIN HFA) 90 mcg/actuation inhaler INHALE 1 PUFF BY MOUTH 3 TIMES A DAY FOR 10 DAYS      amLODIPine (NORVASC) 10 MG tablet TAKE 1 TABLET (10 MG TOTAL) BY MOUTH ONCE DAILY. 90 tablet 3    azelastine (ASTELIN) 137 mcg (0.1 %) nasal spray USE 1 SPRAY (137 MCG TOTAL) IN EACH NOSTRIL 2 (TWO) TIMES DAILY. 90 mL 1    citalopram (CELEXA) 20 MG tablet Take 1 tablet (20 mg total) by mouth once daily. 30 tablet 12    ergocalciferol, vitamin D2, (VITAMIN D ORAL)       ezetimibe (ZETIA) 10 mg tablet TAKE ONE TABLET BY MOUTH EVERY DAY 90 tablet 2    fish oil-omega-3 fatty acids 300-1,000 mg capsule Take 2 g by mouth once daily.      fluticasone (FLONASE SENSIMIST) 27.5 mcg/actuation nasal spray 2 sprays by Nasal route once daily. 9.1 mL 0    gabapentin (NEURONTIN) 300 MG capsule Take 2 capsules (600 mg total) by mouth 2 (two) times daily. 120 capsule 11    hydroCHLOROthiazide (HYDRODIURIL) 25 MG tablet TAKE 1 TABLET (25 MG TOTAL) BY MOUTH ONCE DAILY. 90 tablet 3    levothyroxine (SYNTHROID) 88 MCG tablet TAKE 1 TABLET (88 MCG TOTAL) BY MOUTH BEFORE BREAKFAST. 90 tablet 3    tiZANidine (ZANAFLEX) 2 MG tablet TAKE 1 TABLET (2 MG TOTAL) BY MOUTH NIGHTLY AS NEEDED (MUSCLE SPASMS). 30 tablet 4    estradioL (ESTRACE) 0.01 % (0.1 mg/gram) vaginal cream Place 1 g vaginally twice a week. 42.5 g 2    mupirocin (BACTROBAN) 2 % ointment SMARTSI Application Topical 2-3 Times Daily (Patient not taking: Reported on 2024)      PSYLLIUM SEED, WITH  "SUGAR, (METAMUCIL ORAL) Take by mouth.      sennosides (SENOKOT TO GO ORAL)       triamcinolone acetonide 0.1% (KENALOG) 0.1 % cream Apply topically 2 (two) times daily. Use up to 2 weeks at a time. 454 g 1        PMHx, PSHx, Allergies, Medications reviewed in epic    ROS negative except pain complaints in HPI    OBJECTIVE:    BP (!) 156/85 (BP Location: Right arm, Patient Position: Sitting)   Pulse 71   Temp 97.6 °F (36.4 °C) (Temporal)   Resp 16   Ht 5' 3" (1.6 m)   Wt 54.8 kg (120 lb 13 oz)   SpO2 96%   Breastfeeding No   BMI 21.40 kg/m²     PHYSICAL EXAMINATION:    GENERAL: Well appearing, in no acute distress, alert and oriented x3.  PSYCH:  Mood and affect appropriate.  SKIN: Skin color, texture, turgor normal, no rashes or lesions which will impact the procedure.  CV: RRR with palpation of the radial artery.  PULM: No evidence of respiratory difficulty, symmetric chest rise. Clear to auscultation.  NEURO: Cranial nerves grossly intact.    Plan:    Proceed with procedure as planned Procedure(s) (LRB):  Left C2/C3 and C3/C4 MBB with RN IV sedation (Left)    Richmond Mills MD  11/18/2024            "

## 2024-11-18 NOTE — DISCHARGE SUMMARY
Discharge Note  Short Stay      SUMMARY     Admit Date: 11/18/2024    Attending Physician: Richmond Mills MD        Discharge Physician: Richmond Mills MD        Discharge Date: 11/18/2024 12:07 PM    Procedure(s) (LRB):  Left C2/C3 and C3/C4 MBB with RN IV sedation (Left)    Final Diagnosis: Cervical spondylosis [M47.812]    Disposition: Home or self care    Patient Instructions:   Current Discharge Medication List        CONTINUE these medications which have NOT CHANGED    Details   albuterol (PROVENTIL/VENTOLIN HFA) 90 mcg/actuation inhaler INHALE 1 PUFF BY MOUTH 3 TIMES A DAY FOR 10 DAYS      amLODIPine (NORVASC) 10 MG tablet TAKE 1 TABLET (10 MG TOTAL) BY MOUTH ONCE DAILY.  Qty: 90 tablet, Refills: 3    Comments: .      azelastine (ASTELIN) 137 mcg (0.1 %) nasal spray USE 1 SPRAY (137 MCG TOTAL) IN EACH NOSTRIL 2 (TWO) TIMES DAILY.  Qty: 90 mL, Refills: 1    Associated Diagnoses: Seasonal and perennial allergic rhinitis      citalopram (CELEXA) 20 MG tablet Take 1 tablet (20 mg total) by mouth once daily.  Qty: 30 tablet, Refills: 12    Comments: 01/30/2023 9:12:52 AM      ergocalciferol, vitamin D2, (VITAMIN D ORAL)       etanercept (ENBREL SURECLICK) 50 mg/mL (1 mL) Inject 1 mL (50 mg total) into the skin once a week.  Qty: 12 mL, Refills: 1    Associated Diagnoses: PSA (psoriatic arthritis)      ezetimibe (ZETIA) 10 mg tablet TAKE ONE TABLET BY MOUTH EVERY DAY  Qty: 90 tablet, Refills: 2      fish oil-omega-3 fatty acids 300-1,000 mg capsule Take 2 g by mouth once daily.      fluticasone (FLONASE SENSIMIST) 27.5 mcg/actuation nasal spray 2 sprays by Nasal route once daily.  Qty: 9.1 mL, Refills: 0      gabapentin (NEURONTIN) 300 MG capsule Take 2 capsules (600 mg total) by mouth 2 (two) times daily.  Qty: 120 capsule, Refills: 11    Associated Diagnoses: Juvenile idiopathic scoliosis of thoracolumbar region      hydroCHLOROthiazide (HYDRODIURIL) 25 MG tablet TAKE 1 TABLET (25 MG TOTAL) BY MOUTH ONCE  DAILY.  Qty: 90 tablet, Refills: 3    Comments: .  Associated Diagnoses: Primary hypertension      levothyroxine (SYNTHROID) 88 MCG tablet TAKE 1 TABLET (88 MCG TOTAL) BY MOUTH BEFORE BREAKFAST.  Qty: 90 tablet, Refills: 3      tiZANidine (ZANAFLEX) 2 MG tablet TAKE 1 TABLET (2 MG TOTAL) BY MOUTH NIGHTLY AS NEEDED (MUSCLE SPASMS).  Qty: 30 tablet, Refills: 4    Associated Diagnoses: Myofascial pain; Piriformis syndrome, unspecified laterality      estradioL (ESTRACE) 0.01 % (0.1 mg/gram) vaginal cream Place 1 g vaginally twice a week.  Qty: 42.5 g, Refills: 2    Associated Diagnoses: Atrophic vaginitis      mupirocin (BACTROBAN) 2 % ointment SMARTSI Application Topical 2-3 Times Daily      PSYLLIUM SEED, WITH SUGAR, (METAMUCIL ORAL) Take by mouth.      sennosides (SENOKOT TO GO ORAL)       triamcinolone acetonide 0.1% (KENALOG) 0.1 % cream Apply topically 2 (two) times daily. Use up to 2 weeks at a time.  Qty: 454 g, Refills: 1    Associated Diagnoses: Notalgia paresthetica                 Discharge Diagnosis: Cervical spondylosis [M47.812]  Condition on Discharge: Stable with no complications to procedure   Diet on Discharge: Same as before.  Activity: as per instruction sheet.  Discharge to: Home with a responsible adult.  Follow up: 2-4 weeks       Please call the office at [phone] if you experience any weakness or loss of sensation, fever > 101.5, pain uncontrolled with oral medications, persistent nausea/vomiting/or diarrhea, redness or drainage from the incisions, or any other worrisome concerns. If physician on call was not reached or could not communicate with our office for any reason please go to the nearest emergency department

## 2024-11-19 ENCOUNTER — TELEPHONE (OUTPATIENT)
Dept: PAIN MEDICINE | Facility: CLINIC | Age: 69
End: 2024-11-19
Payer: MEDICARE

## 2024-11-19 NOTE — TELEPHONE ENCOUNTER
RFA IS SCHEDULED.     1. What percentage of pain relief did you receive following the block, from 0-100%?   80    2. What was pain score the day before your procedure on a scale from 0-10?  4    3. What was pain score immediately after your procedure (up to 6 hours)  on a scale from 0-10?  0    4. When your pain returned, what was your pain score on a scale from 0-10?  0     5. How many hours did pain relief last following the block?    Over 12 hours     6. During this time, please describe in detail the activities you were able to do?  Relaxed and laid down.       Pain Disability Index (PDI) Score Review:      10/15/2024     2:29 PM 8/20/2024     1:11 PM 6/25/2024    11:01 AM   Last 3 PDI Scores   Pain Disability Index (PDI) 35 25 28

## 2024-12-03 ENCOUNTER — TELEPHONE (OUTPATIENT)
Dept: PAIN MEDICINE | Facility: CLINIC | Age: 69
End: 2024-12-03
Payer: MEDICARE

## 2024-12-03 NOTE — TELEPHONE ENCOUNTER
----- Message from Janet sent at 12/3/2024  8:07 AM CST -----  Contact: Katherine, [phone]  Calling to inquire what she needs to be at the facility for the procedure. Please call her. Thanks.

## 2024-12-03 NOTE — PRE-PROCEDURE INSTRUCTIONS
Spoke with patient regarding procedure scheduled on 12.4     Arrival time 0845     Has patient been sick with fever or on antibiotics within the last 7 days? No     Does the patient have any open wounds, sores or rashes? No     Does the patient have any recent fractures? no     Has patient received a vaccination within the last 7 days? No     Received the COVID vaccination? yes     Has the patient stopped all medications as directed? na     Does patient have a pacemaker, defibrillator, or implantable stimulator? No     Does the patient have a ride to and from procedure and someone reliable to remain with patient?  BROTHER     Is the patient diabetic? no      Does the patient have sleep apnea? Or use O2 at home? no     Is the patient receiving sedation? Yes      Is the patient instructed to remain NPO beginning at midnight the night before their procedure? yes     Procedure location confirmed with patient? Yes     Covid- Denies signs/symptoms. Instructed to notify PAT/MD if any changes.

## 2024-12-03 NOTE — TELEPHONE ENCOUNTER
Returned call to patient.  Patient is needing arrival time for procedure.  Gave patient surgery center telephone

## 2024-12-04 ENCOUNTER — HOSPITAL ENCOUNTER (OUTPATIENT)
Facility: HOSPITAL | Age: 69
Discharge: HOME | End: 2024-12-04
Attending: ANESTHESIOLOGY | Admitting: ANESTHESIOLOGY
Payer: MEDICARE

## 2024-12-04 VITALS
SYSTOLIC BLOOD PRESSURE: 136 MMHG | HEART RATE: 80 BPM | HEIGHT: 63 IN | WEIGHT: 122 LBS | BODY MASS INDEX: 21.62 KG/M2 | DIASTOLIC BLOOD PRESSURE: 78 MMHG | TEMPERATURE: 97 F | RESPIRATION RATE: 16 BRPM | OXYGEN SATURATION: 96 %

## 2024-12-04 DIAGNOSIS — M47.812 CERVICAL SPONDYLOSIS: ICD-10-CM

## 2024-12-04 PROCEDURE — 63600175 PHARM REV CODE 636 W HCPCS: Performed by: ANESTHESIOLOGY

## 2024-12-04 PROCEDURE — 99152 MOD SED SAME PHYS/QHP 5/>YRS: CPT | Performed by: ANESTHESIOLOGY

## 2024-12-04 PROCEDURE — 64633 DESTROY CERV/THOR FACET JNT: CPT | Mod: LT | Performed by: ANESTHESIOLOGY

## 2024-12-04 PROCEDURE — 64633 DESTROY CERV/THOR FACET JNT: CPT | Mod: LT,,, | Performed by: ANESTHESIOLOGY

## 2024-12-04 PROCEDURE — 64634 DESTROY C/TH FACET JNT ADDL: CPT | Mod: LT,,, | Performed by: ANESTHESIOLOGY

## 2024-12-04 PROCEDURE — 64634 DESTROY C/TH FACET JNT ADDL: CPT | Mod: LT | Performed by: ANESTHESIOLOGY

## 2024-12-04 RX ORDER — DEXAMETHASONE SODIUM PHOSPHATE 10 MG/ML
INJECTION INTRAMUSCULAR; INTRAVENOUS
Status: DISCONTINUED | OUTPATIENT
Start: 2024-12-04 | End: 2024-12-04 | Stop reason: HOSPADM

## 2024-12-04 RX ORDER — BUPIVACAINE HYDROCHLORIDE 2.5 MG/ML
INJECTION, SOLUTION EPIDURAL; INFILTRATION; INTRACAUDAL
Status: DISCONTINUED | OUTPATIENT
Start: 2024-12-04 | End: 2024-12-04 | Stop reason: HOSPADM

## 2024-12-04 RX ORDER — ONDANSETRON HYDROCHLORIDE 2 MG/ML
4 INJECTION, SOLUTION INTRAVENOUS ONCE AS NEEDED
Status: DISCONTINUED | OUTPATIENT
Start: 2024-12-04 | End: 2024-12-04 | Stop reason: HOSPADM

## 2024-12-04 RX ORDER — MIDAZOLAM HYDROCHLORIDE 1 MG/ML
INJECTION, SOLUTION INTRAMUSCULAR; INTRAVENOUS
Status: DISCONTINUED | OUTPATIENT
Start: 2024-12-04 | End: 2024-12-04 | Stop reason: HOSPADM

## 2024-12-04 RX ORDER — LIDOCAINE HYDROCHLORIDE 10 MG/ML
INJECTION, SOLUTION EPIDURAL; INFILTRATION; INTRACAUDAL; PERINEURAL
Status: DISCONTINUED | OUTPATIENT
Start: 2024-12-04 | End: 2024-12-04 | Stop reason: HOSPADM

## 2024-12-04 RX ORDER — FENTANYL CITRATE 50 UG/ML
INJECTION, SOLUTION INTRAMUSCULAR; INTRAVENOUS
Status: DISCONTINUED | OUTPATIENT
Start: 2024-12-04 | End: 2024-12-04 | Stop reason: HOSPADM

## 2024-12-04 NOTE — H&P
HPI  Patient presenting for Procedure(s) (LRB):  Left C2/C3 and C3/C4 RFA with RN IV sedation (Left)     Patient on Anti-coagulation No    No health changes since previous encounter    Past Medical History:   Diagnosis Date    Alpha-1-antitrypsin deficiency 02/23/2024    Hepatology. Mild . 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    HTN (hypertension) 07/23/2012    Hyperlipidemia     Hypothyroid 07/23/2012    Mild intermittent asthma, uncomplicated     PSA (psoriatic arthritis) 03/10/2020    rheum       Past Surgical History:   Procedure Laterality Date    COLONOSCOPY N/A 01/25/2016    Procedure: COLONOSCOPY;  Surgeon: DAYNA Simmons MD;  Location: [facility] ENDO (4TH FLR);  Service: Endoscopy;  Laterality: N/A;    COLONOSCOPY N/A 11/08/2022    Procedure: COLONOSCOPY;  Surgeon: DAYNA Simmons MD;  Location: [facility] ENDO (4TH FLR);  Service: Endoscopy;  Laterality: N/A;  vacc-inst portal-vargus only-tb  precall done.arrival time of 10:00 confirmed/mleone    COSMETIC SURGERY      EPIDURAL STEROID INJECTION N/A 8/1/2024    Procedure: CERVICAL C7/T1 TOMASA;  Surgeon: Popeye Ca MD;  Location: [facility] PAIN MGT;  Service: Pain Management;  Laterality: N/A;  [phone]  2 WK F/U NEDRA    EYE SURGERY      eyelid surgery      HYSTERECTOMY  2004    prolapse    INJECTION OF ANESTHETIC AGENT AROUND MEDIAL BRANCH NERVES INNERVATING CERVICAL FACET JOINT Left 10/30/2024    Procedure: Left C2/C3 and C3/C4 MBB with RN IV sedation;  Surgeon: Richmond Mills MD;  Location: [facility] PAIN MGT;  Service: Pain Management;  Laterality: Left;    INJECTION OF ANESTHETIC AGENT AROUND MEDIAL BRANCH NERVES INNERVATING CERVICAL FACET JOINT Left 11/18/2024    Procedure: Left C2/C3 and C3/C4 MBB with RN IV sedation;  Surgeon: Richmond Mills MD;  Location: [facility] PAIN MGT;  Service: Pain Management;  Laterality: Left;    OOPHORECTOMY Bilateral 2015    Tonsillectomy       TONSILLECTOMY      TRANSFORAMINAL EPIDURAL INJECTION OF STEROID Left 05/26/2023    Procedure: INJECTION, STEROID, EPIDURAL, TRANSFORAMINAL APPROACH, LEFT L3/L4 & L4/L5 DIRECT REF;  Surgeon: Popeye Ca MD;  Location: [facility];  Service: Pain Management;  Laterality: Left;     Review of patient's allergies indicates:   Allergen Reactions    Atorvastatin      Myalgia, increased ck    Crestor [rosuvastatin] Other (See Comments)     Elevated liver enzymes     Pravastatin      Myalgia, elevated ck    Sulfasalazine Nausea Only     Malaise, nausea,    Leflunomide Rash        No current facility-administered medications on file prior to encounter.     Current Outpatient Medications on File Prior to Encounter   Medication Sig Dispense Refill    amLODIPine (NORVASC) 10 MG tablet TAKE 1 TABLET (10 MG TOTAL) BY MOUTH ONCE DAILY. 90 tablet 3    hydroCHLOROthiazide (HYDRODIURIL) 25 MG tablet TAKE 1 TABLET (25 MG TOTAL) BY MOUTH ONCE DAILY. 90 tablet 3    levothyroxine (SYNTHROID) 88 MCG tablet TAKE 1 TABLET (88 MCG TOTAL) BY MOUTH BEFORE BREAKFAST. 90 tablet 3    albuterol (PROVENTIL/VENTOLIN HFA) 90 mcg/actuation inhaler INHALE 1 PUFF BY MOUTH 3 TIMES A DAY FOR 10 DAYS      azelastine (ASTELIN) 137 mcg (0.1 %) nasal spray USE 1 SPRAY (137 MCG TOTAL) IN EACH NOSTRIL 2 (TWO) TIMES DAILY. 90 mL 1    citalopram (CELEXA) 20 MG tablet Take 1 tablet (20 mg total) by mouth once daily. 30 tablet 12    ergocalciferol, vitamin D2, (VITAMIN D ORAL)       estradioL (ESTRACE) 0.01 % (0.1 mg/gram) vaginal cream Place 1 g vaginally twice a week. 42.5 g 2    ezetimibe (ZETIA) 10 mg tablet TAKE ONE TABLET BY MOUTH EVERY DAY 90 tablet 2    fish oil-omega-3 fatty acids 300-1,000 mg capsule Take 2 g by mouth once daily.      fluticasone (FLONASE SENSIMIST) 27.5 mcg/actuation nasal spray 2 sprays by Nasal route once daily. 9.1 mL 0    gabapentin (NEURONTIN) 300 MG capsule Take 2 capsules (600 mg total) by mouth 2 (two) times daily. 120  "capsule 11    mupirocin (BACTROBAN) 2 % ointment SMARTSI Application Topical 2-3 Times Daily (Patient not taking: Reported on 2024)      PSYLLIUM SEED, WITH SUGAR, (METAMUCIL ORAL) Take by mouth.      sennosides (SENOKOT TO GO ORAL)       tiZANidine (ZANAFLEX) 2 MG tablet TAKE 1 TABLET (2 MG TOTAL) BY MOUTH NIGHTLY AS NEEDED (MUSCLE SPASMS). 30 tablet 4    triamcinolone acetonide 0.1% (KENALOG) 0.1 % cream Apply topically 2 (two) times daily. Use up to 2 weeks at a time. 454 g 1        PMHx, PSHx, Allergies, Medications reviewed in epic    ROS negative except pain complaints in HPI    OBJECTIVE:    BP (!) 159/83 (BP Location: Right arm, Patient Position: Sitting)   Pulse 74   Temp 96.4 °F (35.8 °C) (Temporal)   Resp 16   Ht 5' 3" (1.6 m)   Wt 55.3 kg (122 lb 0.4 oz)   SpO2 95%   Breastfeeding No   BMI 21.62 kg/m²     PHYSICAL EXAMINATION:    GENERAL: Well appearing, in no acute distress, alert and oriented x3.  PSYCH:  Mood and affect appropriate.  SKIN: Skin color, texture, turgor normal, no rashes or lesions which will impact the procedure.  CV: RRR with palpation of the radial artery.  PULM: No evidence of respiratory difficulty, symmetric chest rise. Clear to auscultation.  NEURO: Cranial nerves grossly intact.    Plan:    Proceed with procedure as planned Procedure(s) (LRB):  Left C2/C3 and C3/C4 RFA with RN IV sedation (Left)    Richmond Mills MD  2024            "

## 2024-12-04 NOTE — OP NOTE
Cervical Medial nerve branch block radiofrequency ablation Under Fluoroscopy  Left    C2/C3 and C3/C4    INFORMED CONSENT: The procedure, risks, benefits and options were discussed with patient. There are no contraindications to the procedure. The patient expressed understanding and agreed to proceed. The personnel performing the procedure was discussed.    Date of procedure 12/04/2024    Time-out taken to identify patient and procedure side prior to starting the procedure.                     PROCEDURE: Left radiofrequency ablation of the the medial branch nerves at the   transverse process of  C2, C3, and C4    Pre Procedure diagnosis:    Cervical spondylosis [M47.812]  1. Cervical spondylosis        Post-Procedure diagnosis:   same        PHYSICIAN: Richmond Mills MD    ASSISTANTS: None     LOCAL ANESTHETIC USED: Lidocaine 1% 1mL / site     ESTIMATED BLOOD LOSS: None.     COMPLICATIONS: None.     Sedation: Conscious sedation provided by M.D    SEDATION MEDICATIONS: local/IV sedation: Versed 2 mg and fentanyl 50 mcg IV.  Conscious sedation ordered by MD.  Patient reevaluated and sedation administered by MD and monitored by RN.  Total sedation time was less than 20 min.    Total sedation time was >10 but <20 min      TECHNIQUE: Laying in a prone position, the patient was prepped and draped in the usual sterile fashion using ChloraPrep and fenestrated drape. The level was determined under fluoroscopic guidance. Local anesthetic was given by going down to the hub of the 27-gauge 1.25in needle and raising a wheel. A 18-gauge 10mm curved active tip needle was introduced to the anatomic local of the medial branch at each of the stated above levels using fluoroscopy. Then sensory and motor testing was performed to confirm that the needle tips were in the correct location. Then after negative aspiration, 1 mL of Lidocaine PF 1% was injected into each level. This was followed by thermal lesioning at 80 degrees celsius  for 90 seconds. After lesioning was complete, 0.5ml of bupivacaine PF 25% and 3mg of decadron PF was injected at each level. The patient tolerated the procedure well.    The patient tolerated the procedure well. Did not have any procedure related motor deficit at the conclusion of the procedure    The patient was monitored for approximately 30 minutes after the procedure.  Patient was given post procedure and discharge instructions to follow at home.  The patient was discharged in a stable condition

## 2024-12-04 NOTE — DISCHARGE SUMMARY
Discharge Note  Short Stay      SUMMARY     Admit Date: 12/4/2024    Attending Physician: Richmond Mills MD        Discharge Physician: Richmond Mills MD        Discharge Date: 12/4/2024 8:54 AM    Procedure(s) (LRB):  Left C2/C3 and C3/C4 RFA with RN IV sedation (Left)    Final Diagnosis: Cervical spondylosis [M47.812]    Disposition: Home or self care    Patient Instructions:   Current Discharge Medication List        CONTINUE these medications which have NOT CHANGED    Details   amLODIPine (NORVASC) 10 MG tablet TAKE 1 TABLET (10 MG TOTAL) BY MOUTH ONCE DAILY.  Qty: 90 tablet, Refills: 3    Comments: .      hydroCHLOROthiazide (HYDRODIURIL) 25 MG tablet TAKE 1 TABLET (25 MG TOTAL) BY MOUTH ONCE DAILY.  Qty: 90 tablet, Refills: 3    Comments: .  Associated Diagnoses: Primary hypertension      levothyroxine (SYNTHROID) 88 MCG tablet TAKE 1 TABLET (88 MCG TOTAL) BY MOUTH BEFORE BREAKFAST.  Qty: 90 tablet, Refills: 3      albuterol (PROVENTIL/VENTOLIN HFA) 90 mcg/actuation inhaler INHALE 1 PUFF BY MOUTH 3 TIMES A DAY FOR 10 DAYS      azelastine (ASTELIN) 137 mcg (0.1 %) nasal spray USE 1 SPRAY (137 MCG TOTAL) IN EACH NOSTRIL 2 (TWO) TIMES DAILY.  Qty: 90 mL, Refills: 1    Associated Diagnoses: Seasonal and perennial allergic rhinitis      citalopram (CELEXA) 20 MG tablet Take 1 tablet (20 mg total) by mouth once daily.  Qty: 30 tablet, Refills: 12    Comments: 01/30/2023 9:12:52 AM      ergocalciferol, vitamin D2, (VITAMIN D ORAL)       estradioL (ESTRACE) 0.01 % (0.1 mg/gram) vaginal cream Place 1 g vaginally twice a week.  Qty: 42.5 g, Refills: 2    Associated Diagnoses: Atrophic vaginitis      etanercept (ENBREL SURECLICK) 50 mg/mL (1 mL) Inject 1 mL (50 mg total) into the skin once a week.  Qty: 12 mL, Refills: 1    Associated Diagnoses: PSA (psoriatic arthritis)      ezetimibe (ZETIA) 10 mg tablet TAKE ONE TABLET BY MOUTH EVERY DAY  Qty: 90 tablet, Refills: 2      fish oil-omega-3 fatty acids 300-1,000 mg  capsule Take 2 g by mouth once daily.      fluticasone (FLONASE SENSIMIST) 27.5 mcg/actuation nasal spray 2 sprays by Nasal route once daily.  Qty: 9.1 mL, Refills: 0      gabapentin (NEURONTIN) 300 MG capsule Take 2 capsules (600 mg total) by mouth 2 (two) times daily.  Qty: 120 capsule, Refills: 11    Associated Diagnoses: Juvenile idiopathic scoliosis of thoracolumbar region      mupirocin (BACTROBAN) 2 % ointment SMARTSI Application Topical 2-3 Times Daily      PSYLLIUM SEED, WITH SUGAR, (METAMUCIL ORAL) Take by mouth.      sennosides (SENOKOT TO GO ORAL)       tiZANidine (ZANAFLEX) 2 MG tablet TAKE 1 TABLET (2 MG TOTAL) BY MOUTH NIGHTLY AS NEEDED (MUSCLE SPASMS).  Qty: 30 tablet, Refills: 4    Associated Diagnoses: Myofascial pain; Piriformis syndrome, unspecified laterality      triamcinolone acetonide 0.1% (KENALOG) 0.1 % cream Apply topically 2 (two) times daily. Use up to 2 weeks at a time.  Qty: 454 g, Refills: 1    Associated Diagnoses: Notalgia paresthetica                 Discharge Diagnosis: Cervical spondylosis [M47.812]  Condition on Discharge: Stable with no complications to procedure   Diet on Discharge: Same as before.  Activity: as per instruction sheet.  Discharge to: Home with a responsible adult.  Follow up: 2-4 weeks       Please call the office at [phone] if you experience any weakness or loss of sensation, fever > 101.5, pain uncontrolled with oral medications, persistent nausea/vomiting/or diarrhea, redness or drainage from the incisions, or any other worrisome concerns. If physician on call was not reached or could not communicate with our office for any reason please go to the nearest emergency department

## 2024-12-04 NOTE — DISCHARGE INSTRUCTIONS

## 2024-12-11 ENCOUNTER — APPOINTMENT (OUTPATIENT)
Dept: RADIOLOGY | Facility: HOSPITAL | Age: 69
End: 2024-12-11
Attending: INTERNAL MEDICINE
Payer: MEDICARE

## 2024-12-11 DIAGNOSIS — Z78.0 MENOPAUSE: ICD-10-CM

## 2024-12-11 PROCEDURE — 77080 DXA BONE DENSITY AXIAL: CPT | Mod: 26,,, | Performed by: RADIOLOGY

## 2024-12-11 PROCEDURE — 77080 DXA BONE DENSITY AXIAL: CPT | Mod: TC

## 2025-01-08 ENCOUNTER — PATIENT MESSAGE (OUTPATIENT)
Dept: RHEUMATOLOGY | Facility: CLINIC | Age: 70
End: 2025-01-08
Payer: MEDICARE

## 2025-01-08 DIAGNOSIS — L40.50 PSA (PSORIATIC ARTHRITIS): ICD-10-CM

## 2025-01-09 ENCOUNTER — PATIENT MESSAGE (OUTPATIENT)
Dept: PHYSICAL MEDICINE AND REHAB | Facility: CLINIC | Age: 70
End: 2025-01-09
Payer: MEDICARE

## 2025-01-09 RX ORDER — ETANERCEPT 50 MG/ML
50 SOLUTION SUBCUTANEOUS WEEKLY
Qty: 4 ML | Refills: 11 | Status: SHIPPED | OUTPATIENT
Start: 2025-01-09

## 2025-01-13 DIAGNOSIS — Z00.00 ENCOUNTER FOR MEDICARE ANNUAL WELLNESS EXAM: ICD-10-CM

## 2025-01-13 NOTE — TELEPHONE ENCOUNTER
Called [employer] and spoke with rep Ramsay. Patient has been approved from 2/13/22-12/31/22. CASE ID [id number]  Patient has already received shipment 2-22-22    ars   13-Jan-2025 16:48 13-Jan-2025 17:45

## 2025-01-14 ENCOUNTER — OFFICE VISIT (OUTPATIENT)
Dept: PAIN MEDICINE | Facility: CLINIC | Age: 70
End: 2025-01-14
Payer: MEDICARE

## 2025-01-14 ENCOUNTER — OFFICE VISIT (OUTPATIENT)
Dept: PHYSICAL MEDICINE AND REHAB | Facility: CLINIC | Age: 70
End: 2025-01-14
Payer: MEDICARE

## 2025-01-14 VITALS
SYSTOLIC BLOOD PRESSURE: 147 MMHG | DIASTOLIC BLOOD PRESSURE: 88 MMHG | WEIGHT: 120.13 LBS | HEIGHT: 63 IN | BODY MASS INDEX: 21.29 KG/M2 | HEART RATE: 75 BPM

## 2025-01-14 VITALS — RESPIRATION RATE: 14 BRPM | HEIGHT: 63 IN | WEIGHT: 119.06 LBS | BODY MASS INDEX: 21.09 KG/M2

## 2025-01-14 DIAGNOSIS — M79.18 DIFFUSE MYOFASCIAL PAIN SYNDROME: Primary | ICD-10-CM

## 2025-01-14 DIAGNOSIS — M53.82 MUSCULOSKELETAL DISORDER OF THE SUBOCCIPITAL: ICD-10-CM

## 2025-01-14 DIAGNOSIS — S03.00XS DISLOCATION OF TEMPOROMANDIBULAR JOINT, SEQUELA: ICD-10-CM

## 2025-01-14 DIAGNOSIS — M54.81 OCCIPITAL NEURALGIA, UNSPECIFIED LATERALITY: ICD-10-CM

## 2025-01-14 DIAGNOSIS — M46.00 SPINAL ENTHESOPATHY: ICD-10-CM

## 2025-01-14 DIAGNOSIS — M47.812 CERVICAL SPONDYLOSIS: Primary | ICD-10-CM

## 2025-01-14 DIAGNOSIS — M70.71 BURSITIS OF OTHER BURSA OF RIGHT HIP: ICD-10-CM

## 2025-01-14 PROCEDURE — 20553 NJX 1/MLT TRIGGER POINTS 3/>: CPT | Mod: PBBFAC | Performed by: PHYSICAL MEDICINE & REHABILITATION

## 2025-01-14 PROCEDURE — 99213 OFFICE O/P EST LOW 20 MIN: CPT | Mod: PBBFAC,PN | Performed by: ANESTHESIOLOGY

## 2025-01-14 PROCEDURE — 99213 OFFICE O/P EST LOW 20 MIN: CPT | Mod: PBBFAC,27 | Performed by: PHYSICAL MEDICINE & REHABILITATION

## 2025-01-14 PROCEDURE — 99999 PR PBB SHADOW E&M-EST. PATIENT-LVL III: CPT | Mod: PBBFAC,,, | Performed by: PHYSICAL MEDICINE & REHABILITATION

## 2025-01-14 PROCEDURE — 20553 NJX 1/MLT TRIGGER POINTS 3/>: CPT | Mod: S$PBB,,, | Performed by: PHYSICAL MEDICINE & REHABILITATION

## 2025-01-14 PROCEDURE — 99212 OFFICE O/P EST SF 10 MIN: CPT | Mod: S$PBB,,, | Performed by: ANESTHESIOLOGY

## 2025-01-14 PROCEDURE — 99999 PR PBB SHADOW E&M-EST. PATIENT-LVL III: CPT | Mod: PBBFAC,,, | Performed by: ANESTHESIOLOGY

## 2025-01-14 PROCEDURE — 99214 OFFICE O/P EST MOD 30 MIN: CPT | Mod: 25,S$PBB,, | Performed by: PHYSICAL MEDICINE & REHABILITATION
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
## 2025-01-14 NOTE — PROGRESS NOTES
PM&R CLINIC NOTE    Chief Complaint   Patient presents with    Back Pain    Shoulder Pain       HPI: This is a 69 y.o.  female being seen in clinic today for repeat TPIs due to achy pain and tightness in her muscles. She has increased pain in her right low back and still across her shoulders.  She got her brother setup for NH placement after his latest fall.    History obtained from patient    Functional History:  Walking: Not limited  Transfers: Independent  Assistive devices: No  Power mobility: No  Falls: None     Needs help with:  Nothing - all ADLS normal    Past family, medical, social, and surgical history reviewed in chart    Review of Systems:     General- denies lethargy, weight change, fever, chills  Head/neck- denies swallowing difficulties  ENT- denies hearing changes  Cardiovascular-denies chest pain  Pulmonary- denies shortness of breath  GI- denies constipation or bowel incontinence  - denies bladder incontinence  Skin- denies wounds or rashes  Musculoskeletal- denies weakness, +pain  Neurologic- denies numbness and tingling  Psychiatric- denies depressive or psychotic features, denies anxiety  Lymphatic-denies swelling  Endocrine- denies hypoglycemic symptoms/DM history  All other pertinent systems negative     Physical Examination:  General: Well developed, well nourished female, NAD  HEENT:NCAT EOMI bilaterally   Pulmonary:Normal respirations    Spinal Examination: CERVICAL  Active ROM is within normal limits.  Inspection: No deformity of spinal alignment.  Palpation:  tight and ttp at trapezius-local twitch at trapezius, tight and ttp at levator scapula    Spinal Examination: LUMBAR or THORACIC  Active ROM is within normal limits.  Inspection: No deformity of spinal alignment.    Ttp and tight at right quadratus lumborum and gluteus medius    Bilateral Upper and Lower Extremities:  Pulses are 2+ at radial, bilaterally.  Shoulder/Elbow/Wrist/Hand ROM wnl except limited at bilateral hands,  arthritic deformities noted  Hip/Knee/Ankle ROM wnl, ttp and tight at left piriformis  Bilateral Extremities show normal capillary refill.  No signs of cyanosis, rubor, edema, skin changes, or dysvascular changes of appendages.  Nails appear intact.    Neurological Exam:  Cranial Nerves:  II-XII grossly intact    Manual Muscle Testing: (Motor 5=normal)    RIGHT Upper extremity: Shoulder abduction 5/5, Biceps 5/5, Triceps 5/5, Wrist extension 5/5, Abductor pollicis brevis 4/5, Ulnar hand intrinsics 4/5,  LEFT Upper extremity: Shoulder abduction 5/5, Biceps 5/5, Triceps 5/5, Wrist extension 5/5, Abductor pollicis brevis 4/5, Ulnar hand intrinsics 4/5,  No focal atrophy is noted of either upper extremity.    Bilateral Reflexes:  Choe's response is absent bilaterally.    Sensation: tested to light touch  - intact in arms  Gait: Narrow base and good arm swing.      IMPRESSION/PLAN: This is a 69 y.o.  female with myofascial pain, DJD./DDD, scoliosis     1. TPIs today  2. Cont Ice/heat  3. Cont neck/shoulder, piriformis exercises   4. Cont fu with IPM     Vanessa Millard M.D.  Physical Medicine and Rehab      PROCEDURE NOTE    Diagnosis: Myofascial pain  Procedure: trigger point injections to bilateral trapezius, levator scapula, right quadratus lumborum and gluteus medius  Risks and benefits of procedure explained to patient including risks of infection, bleeding, pain, or damage to surrounding tissues. All questions answered. Informed consent obtained prior to proceeding. Areas marked and prepped in sterile fashion. Using a 27g 1.25inch needle,  12 cc of 1% lidocaine  was injected evenly into the above mentioned muscles. None to minimal bleeding noted. ER and post injection instructions given.    Vanessa Millard M.D.

## 2025-01-15 ENCOUNTER — TELEPHONE (OUTPATIENT)
Dept: RHEUMATOLOGY | Facility: CLINIC | Age: 70
End: 2025-01-15
Payer: MEDICARE

## 2025-01-15 DIAGNOSIS — M06.9 RHEUMATOID ARTHRITIS, INVOLVING UNSPECIFIED SITE, UNSPECIFIED WHETHER RHEUMATOID FACTOR PRESENT: Primary | ICD-10-CM

## 2025-01-15 DIAGNOSIS — L40.50 PSA (PSORIATIC ARTHRITIS): ICD-10-CM

## 2025-01-15 RX ORDER — ETANERCEPT 50 MG/ML
50 SOLUTION SUBCUTANEOUS WEEKLY
Qty: 4 ML | Refills: 11 | Status: SHIPPED | OUTPATIENT
Start: 2025-01-15 | End: 2025-01-17 | Stop reason: SDUPTHER

## 2025-01-17 DIAGNOSIS — L40.50 PSA (PSORIATIC ARTHRITIS): ICD-10-CM

## 2025-01-17 RX ORDER — ETANERCEPT 50 MG/ML
50 SOLUTION SUBCUTANEOUS WEEKLY
Qty: 12 ML | Refills: 1 | Status: SHIPPED | OUTPATIENT
Start: 2025-01-17 | End: 2025-01-27 | Stop reason: SDUPTHER

## 2025-01-27 DIAGNOSIS — L40.50 PSA (PSORIATIC ARTHRITIS): ICD-10-CM

## 2025-01-27 RX ORDER — ETANERCEPT 50 MG/ML
50 SOLUTION SUBCUTANEOUS WEEKLY
Qty: 4 ML | Refills: 5 | Status: SHIPPED | OUTPATIENT
Start: 2025-01-27

## 2025-01-27 RX ORDER — ETANERCEPT 50 MG/ML
50 SOLUTION SUBCUTANEOUS WEEKLY
Qty: 12 ML | Refills: 1 | Status: SHIPPED | OUTPATIENT
Start: 2025-01-27 | End: 2025-01-27 | Stop reason: SDUPTHER

## 2025-01-27 RX ORDER — ETANERCEPT 50 MG/ML
50 SOLUTION SUBCUTANEOUS WEEKLY
Qty: 4 ML | Refills: 5 | Status: SHIPPED | OUTPATIENT
Start: 2025-01-27 | End: 2025-01-27 | Stop reason: SDUPTHER

## 2025-02-10 ENCOUNTER — PATIENT MESSAGE (OUTPATIENT)
Dept: RHEUMATOLOGY | Facility: CLINIC | Age: 70
End: 2025-02-10
Payer: MEDICARE

## 2025-02-14 ENCOUNTER — LAB VISIT (OUTPATIENT)
Dept: LAB | Facility: HOSPITAL | Age: 70
End: 2025-02-14
Attending: INTERNAL MEDICINE
Payer: MEDICARE

## 2025-02-14 DIAGNOSIS — M06.9 RHEUMATOID ARTHRITIS, INVOLVING UNSPECIFIED SITE, UNSPECIFIED WHETHER RHEUMATOID FACTOR PRESENT: ICD-10-CM

## 2025-02-14 DIAGNOSIS — B19.10 HEPATITIS B INFECTION WITHOUT DELTA AGENT WITHOUT HEPATIC COMA, UNSPECIFIED CHRONICITY: ICD-10-CM

## 2025-02-14 DIAGNOSIS — M47.819 SPONDYLARTHRITIS: ICD-10-CM

## 2025-02-14 LAB
ALBUMIN SERPL BCP-MCNC: 4.2 G/DL (ref 3.5–5.2)
ALP SERPL-CCNC: 51 U/L (ref 40–150)
ALT SERPL W/O P-5'-P-CCNC: 41 U/L (ref 10–44)
ANION GAP SERPL CALC-SCNC: 9 MMOL/L (ref 8–16)
AST SERPL-CCNC: 44 U/L (ref 10–40)
BILIRUB SERPL-MCNC: 1.1 MG/DL (ref 0.1–1)
BUN SERPL-MCNC: 16 MG/DL (ref 8–23)
CALCIUM SERPL-MCNC: 10 MG/DL (ref 8.7–10.5)
CHLORIDE SERPL-SCNC: 101 MMOL/L (ref 95–110)
CO2 SERPL-SCNC: 28 MMOL/L (ref 23–29)
CREAT SERPL-MCNC: 0.6 MG/DL (ref 0.5–1.4)
CRP SERPL-MCNC: 0.3 MG/L (ref 0–8.2)
EST. GFR  (NO RACE VARIABLE): >60 ML/MIN/1.73 M^2
GLUCOSE SERPL-MCNC: 84 MG/DL (ref 70–110)
POTASSIUM SERPL-SCNC: 3.6 MMOL/L (ref 3.5–5.1)
PROT SERPL-MCNC: 7.4 G/DL (ref 6–8.4)
SODIUM SERPL-SCNC: 138 MMOL/L (ref 136–145)

## 2025-02-14 PROCEDURE — 87517 HEPATITIS B DNA QUANT: CPT | Performed by: INTERNAL MEDICINE

## 2025-02-14 PROCEDURE — 36415 COLL VENOUS BLD VENIPUNCTURE: CPT | Mod: PN | Performed by: INTERNAL MEDICINE

## 2025-02-14 PROCEDURE — 80053 COMPREHEN METABOLIC PANEL: CPT | Performed by: INTERNAL MEDICINE

## 2025-02-14 PROCEDURE — 85025 COMPLETE CBC W/AUTO DIFF WBC: CPT | Performed by: INTERNAL MEDICINE

## 2025-02-14 PROCEDURE — 87340 HEPATITIS B SURFACE AG IA: CPT | Performed by: INTERNAL MEDICINE

## 2025-02-14 PROCEDURE — 86140 C-REACTIVE PROTEIN: CPT | Performed by: INTERNAL MEDICINE

## 2025-02-14 PROCEDURE — 86480 TB TEST CELL IMMUN MEASURE: CPT | Performed by: INTERNAL MEDICINE

## 2025-02-14 PROCEDURE — 85652 RBC SED RATE AUTOMATED: CPT | Performed by: INTERNAL MEDICINE

## 2025-02-15 LAB
BASOPHILS # BLD AUTO: 0.05 K/UL (ref 0–0.2)
BASOPHILS NFR BLD: 1.1 % (ref 0–1.9)
DIFFERENTIAL METHOD BLD: ABNORMAL
EOSINOPHIL # BLD AUTO: 0.2 K/UL (ref 0–0.5)
EOSINOPHIL NFR BLD: 4.6 % (ref 0–8)
ERYTHROCYTE [DISTWIDTH] IN BLOOD BY AUTOMATED COUNT: 13.3 % (ref 11.5–14.5)
ERYTHROCYTE [SEDIMENTATION RATE] IN BLOOD BY PHOTOMETRIC METHOD: 2 MM/HR (ref 0–36)
GAMMA INTERFERON BACKGROUND BLD IA-ACNC: 0 IU/ML
HBV SURFACE AG SERPL QL IA: NORMAL
HCT VFR BLD AUTO: 42.9 % (ref 37–48.5)
HGB BLD-MCNC: 13.9 G/DL (ref 12–16)
IMM GRANULOCYTES # BLD AUTO: 0.01 K/UL (ref 0–0.04)
IMM GRANULOCYTES NFR BLD AUTO: 0.2 % (ref 0–0.5)
LYMPHOCYTES # BLD AUTO: 2.2 K/UL (ref 1–4.8)
LYMPHOCYTES NFR BLD: 47.7 % (ref 18–48)
M TB IFN-G CD4+ BCKGRND COR BLD-ACNC: 0.01 IU/ML
M TB IFN-G CD4+ BCKGRND COR BLD-ACNC: 0.01 IU/ML
MCH RBC QN AUTO: 32 PG (ref 27–31)
MCHC RBC AUTO-ENTMCNC: 32.4 G/DL (ref 32–36)
MCV RBC AUTO: 99 FL (ref 82–98)
MITOGEN IGNF BCKGRD COR BLD-ACNC: 8.31 IU/ML
MONOCYTES # BLD AUTO: 0.5 K/UL (ref 0.3–1)
MONOCYTES NFR BLD: 9.8 % (ref 4–15)
NEUTROPHILS # BLD AUTO: 1.7 K/UL (ref 1.8–7.7)
NEUTROPHILS NFR BLD: 36.6 % (ref 38–73)
NRBC BLD-RTO: 0 /100 WBC
PLATELET # BLD AUTO: 237 K/UL (ref 150–450)
PMV BLD AUTO: 11.7 FL (ref 9.2–12.9)
RBC # BLD AUTO: 4.35 M/UL (ref 4–5.4)
TB GOLD PLUS: NEGATIVE
WBC # BLD AUTO: 4.61 K/UL (ref 3.9–12.7)

## 2025-02-16 ENCOUNTER — TELEPHONE (OUTPATIENT)
Dept: RHEUMATOLOGY | Facility: CLINIC | Age: 70
End: 2025-02-16
Payer: MEDICARE

## 2025-02-16 ENCOUNTER — RESULTS FOLLOW-UP (OUTPATIENT)
Dept: RHEUMATOLOGY | Facility: CLINIC | Age: 70
End: 2025-02-16

## 2025-02-16 DIAGNOSIS — R74.01 ELEVATED SGOT (AST): Primary | ICD-10-CM

## 2025-02-17 LAB
HBV DNA SERPL NAA+PROBE-ACNC: NOT DETECTED IU/ML
HEPATITIS B VIRUS DNA: NORMAL

## 2025-02-18 ENCOUNTER — PATIENT MESSAGE (OUTPATIENT)
Dept: PAIN MEDICINE | Facility: CLINIC | Age: 70
End: 2025-02-18
Payer: MEDICARE

## 2025-02-25 ENCOUNTER — OFFICE VISIT (OUTPATIENT)
Dept: PHYSICAL MEDICINE AND REHAB | Facility: CLINIC | Age: 70
End: 2025-02-25
Payer: MEDICARE

## 2025-02-25 VITALS — RESPIRATION RATE: 14 BRPM | HEIGHT: 63 IN | BODY MASS INDEX: 21.09 KG/M2 | WEIGHT: 119.06 LBS

## 2025-02-25 DIAGNOSIS — M53.82 MUSCULOSKELETAL DISORDER OF THE SUBOCCIPITAL: ICD-10-CM

## 2025-02-25 DIAGNOSIS — M79.18 DIFFUSE MYOFASCIAL PAIN SYNDROME: Primary | ICD-10-CM

## 2025-02-25 DIAGNOSIS — M46.00 SPINAL ENTHESOPATHY: ICD-10-CM

## 2025-02-25 PROCEDURE — 99213 OFFICE O/P EST LOW 20 MIN: CPT | Mod: PBBFAC | Performed by: PHYSICAL MEDICINE & REHABILITATION

## 2025-02-25 PROCEDURE — 99999 PR PBB SHADOW E&M-EST. PATIENT-LVL III: CPT | Mod: PBBFAC,,, | Performed by: PHYSICAL MEDICINE & REHABILITATION

## 2025-02-25 PROCEDURE — 20553 NJX 1/MLT TRIGGER POINTS 3/>: CPT | Mod: PBBFAC | Performed by: PHYSICAL MEDICINE & REHABILITATION

## 2025-02-25 PROCEDURE — 99214 OFFICE O/P EST MOD 30 MIN: CPT | Mod: 25,S$PBB,, | Performed by: PHYSICAL MEDICINE & REHABILITATION

## 2025-02-25 PROCEDURE — 20553 NJX 1/MLT TRIGGER POINTS 3/>: CPT | Mod: S$PBB,,, | Performed by: PHYSICAL MEDICINE & REHABILITATION

## 2025-02-25 NOTE — PROGRESS NOTES
PM&R CLINIC NOTE    Chief Complaint   Patient presents with    Neck Pain    Shoulder Pain    Back Pain       HPI: This is a 69 y.o.  female being seen in clinic today for repeat TPIs due to achy pain and tightness in her muscles. She has increased achy pain due to the weather as well. Her brother has another infection right now and has been hallucinating at the NH.       History obtained from patient    Functional History:  Walking: Not limited  Transfers: Independent  Assistive devices: No  Power mobility: No  Falls: None     Needs help with:  Nothing - all ADLS normal    Past family, medical, social, and surgical history reviewed in chart    Review of Systems:     General- denies lethargy, weight change, fever, chills  Head/neck- denies swallowing difficulties  ENT- denies hearing changes  Cardiovascular-denies chest pain  Pulmonary- denies shortness of breath  GI- denies constipation or bowel incontinence  - denies bladder incontinence  Skin- denies wounds or rashes  Musculoskeletal- denies weakness, +pain  Neurologic- denies numbness and tingling  Psychiatric- denies depressive or psychotic features, denies anxiety  Lymphatic-denies swelling  Endocrine- denies hypoglycemic symptoms/DM history  All other pertinent systems negative     Physical Examination:  General: Well developed, well nourished female, NAD  HEENT:NCAT EOMI bilaterally   Pulmonary:Normal respirations    Spinal Examination: CERVICAL  Active ROM is within normal limits.  Inspection: No deformity of spinal alignment.  Palpation:  tight and ttp at trapezius-local twitch at trapezius, tight and ttp at levator scapula, thoracic paraspinals     Spinal Examination: LUMBAR or THORACIC  Active ROM is within normal limits.  Inspection: No deformity of spinal alignment.    Ttp and tight at bilateral quadratus lumborum   '  Bilateral Upper and Lower Extremities:  Pulses are 2+ at radial, bilaterally.  Shoulder/Elbow/Wrist/Hand ROM wnl except limited at  bilateral hands, arthritic deformities noted  Hip/Knee/Ankle ROM wnl, ttp and tight at left piriformis  Bilateral Extremities show normal capillary refill.  No signs of cyanosis, rubor, edema, skin changes, or dysvascular changes of appendages.  Nails appear intact.    Neurological Exam:  Cranial Nerves:  II-XII grossly intact    Manual Muscle Testing: (Motor 5=normal)    RIGHT Upper extremity: Shoulder abduction 5/5, Biceps 5/5, Triceps 5/5, Wrist extension 5/5, Abductor pollicis brevis 4/5, Ulnar hand intrinsics 4/5,  LEFT Upper extremity: Shoulder abduction 5/5, Biceps 5/5, Triceps 5/5, Wrist extension 5/5, Abductor pollicis brevis 4/5, Ulnar hand intrinsics 4/5,  No focal atrophy is noted of either upper extremity.    Bilateral Reflexes:  Choe's response is absent bilaterally.    Sensation: tested to light touch  - intact in arms  Gait: Narrow base and good arm swing.      IMPRESSION/PLAN: This is a 69 y.o.  female with myofascial pain, DJD./DDD, scoliosis     1. TPIs today  2. Cont Ice/heat  3. Cont neck/shoulder, piriformis exercises   4. Cont fu with IPM     Vanessa Millard M.D.  Physical Medicine and Rehab      PROCEDURE NOTE    Diagnosis: Myofascial pain  Procedure: trigger point injections to bilateral trapezius, levator scapula, quadratus lumborum, thoracic paraspinals  Risks and benefits of procedure explained to patient including risks of infection, bleeding, pain, or damage to surrounding tissues. All questions answered. Informed consent obtained prior to proceeding. Areas marked and prepped in sterile fashion. Using a 27g 1.25inch needle,  12 cc of 1% lidocaine  was injected evenly into the above mentioned muscles. None to minimal bleeding noted. ER and post injection instructions given.    Vanessa Millard M.D.

## 2025-03-03 DIAGNOSIS — G57.00 PIRIFORMIS SYNDROME, UNSPECIFIED LATERALITY: ICD-10-CM

## 2025-03-03 DIAGNOSIS — M79.18 MYOFASCIAL PAIN: ICD-10-CM

## 2025-03-04 ENCOUNTER — LAB VISIT (OUTPATIENT)
Dept: LAB | Facility: HOSPITAL | Age: 70
End: 2025-03-04
Attending: INTERNAL MEDICINE
Payer: MEDICARE

## 2025-03-04 ENCOUNTER — OFFICE VISIT (OUTPATIENT)
Dept: OBSTETRICS AND GYNECOLOGY | Facility: CLINIC | Age: 70
End: 2025-03-04
Payer: MEDICARE

## 2025-03-04 VITALS
SYSTOLIC BLOOD PRESSURE: 120 MMHG | HEIGHT: 63 IN | DIASTOLIC BLOOD PRESSURE: 78 MMHG | WEIGHT: 121.94 LBS | BODY MASS INDEX: 21.61 KG/M2

## 2025-03-04 DIAGNOSIS — Z78.0 POSTMENOPAUSAL: ICD-10-CM

## 2025-03-04 DIAGNOSIS — R74.01 ELEVATED SGOT (AST): ICD-10-CM

## 2025-03-04 DIAGNOSIS — Z90.710 S/P HYSTERECTOMY: ICD-10-CM

## 2025-03-04 DIAGNOSIS — Z12.31 ENCOUNTER FOR SCREENING MAMMOGRAM FOR BREAST CANCER: ICD-10-CM

## 2025-03-04 DIAGNOSIS — Z01.419 ENCOUNTER FOR GYNECOLOGICAL EXAMINATION WITHOUT ABNORMAL FINDING: Primary | ICD-10-CM

## 2025-03-04 LAB
ALBUMIN SERPL BCP-MCNC: 4.2 G/DL (ref 3.5–5.2)
ALP SERPL-CCNC: 55 U/L (ref 40–150)
ALT SERPL W/O P-5'-P-CCNC: 48 U/L (ref 10–44)
AST SERPL-CCNC: 51 U/L (ref 10–40)
BILIRUB DIRECT SERPL-MCNC: 0.3 MG/DL (ref 0.1–0.3)
BILIRUB SERPL-MCNC: 1 MG/DL (ref 0.1–1)
CK SERPL-CCNC: 196 U/L (ref 20–180)
GGT SERPL-CCNC: 40 U/L (ref 8–55)
PROT SERPL-MCNC: 7.2 G/DL (ref 6–8.4)

## 2025-03-04 PROCEDURE — G0101 CA SCREEN;PELVIC/BREAST EXAM: HCPCS | Mod: S$PBB,,, | Performed by: NURSE PRACTITIONER

## 2025-03-04 PROCEDURE — 99214 OFFICE O/P EST MOD 30 MIN: CPT | Mod: PBBFAC | Performed by: NURSE PRACTITIONER

## 2025-03-04 PROCEDURE — 36415 COLL VENOUS BLD VENIPUNCTURE: CPT | Performed by: INTERNAL MEDICINE

## 2025-03-04 PROCEDURE — 80076 HEPATIC FUNCTION PANEL: CPT | Performed by: INTERNAL MEDICINE

## 2025-03-04 PROCEDURE — G0101 CA SCREEN;PELVIC/BREAST EXAM: HCPCS | Mod: PBBFAC | Performed by: NURSE PRACTITIONER

## 2025-03-04 PROCEDURE — 82977 ASSAY OF GGT: CPT | Performed by: INTERNAL MEDICINE

## 2025-03-04 PROCEDURE — 82550 ASSAY OF CK (CPK): CPT | Performed by: INTERNAL MEDICINE

## 2025-03-04 PROCEDURE — 99999 PR PBB SHADOW E&M-EST. PATIENT-LVL IV: CPT | Mod: PBBFAC,,, | Performed by: NURSE PRACTITIONER

## 2025-03-04 NOTE — PROGRESS NOTES
CC: Well woman exam    Jill Marquez is a 69 y.o. female  presents for a well woman exam.        Health Maintenance Topics with due status: Not Due       Topic Last Completion Date    TETANUS VACCINE 2020    Colorectal Cancer Screening 2022    Mammogram 2024    Lipid Panel 2024    DEXA Scan 2024      Past Medical History:   Diagnosis Date    Alpha-1-antitrypsin deficiency 2024    Hepatology. Mild . 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/10/2014    HTN (hypertension) 2012    Hyperlipidemia     Hypothyroid 2012    Mild intermittent asthma, uncomplicated     PSA (psoriatic arthritis) 03/10/2020    rheum       Past Surgical History:   Procedure Laterality Date    COLONOSCOPY N/A 2016    Procedure: COLONOSCOPY;  Surgeon: DAYNA Simmons MD;  Location: [facility] ENDO ([address] [place] [address]);  Service: Endoscopy;  Laterality: N/A;    COLONOSCOPY N/A 2022    Procedure: COLONOSCOPY;  Surgeon: DAYNA Simmons MD;  Location: [facility] ([address] [place] [address]);  Service: Endoscopy;  Laterality: N/A;  vacc-inst portal-vargus only-tb  precall done.arrival time of 10:00 confirmed/mleone    COSMETIC SURGERY      EPIDURAL STEROID INJECTION N/A 2024    Procedure: CERVICAL C7/T1 TOMASA;  Surgeon: Popeye Ca MD;  Location: [facility] PAIN MGT;  Service: Pain Management;  Laterality: N/A;  [phone]  2 WK F/U NEDRA    EYE SURGERY      eyelid surgery      HYSTERECTOMY  2004    prolapse    INJECTION OF ANESTHETIC AGENT AROUND MEDIAL BRANCH NERVES INNERVATING CERVICAL FACET JOINT Left 10/30/2024    Procedure: Left C2/C3 and C3/C4 MBB with RN IV sedation;  Surgeon: Richmond Mills MD;  Location: [facility] PAIN MGT;  Service: Pain Management;  Laterality: Left;    INJECTION OF ANESTHETIC AGENT AROUND MEDIAL BRANCH NERVES INNERVATING CERVICAL FACET JOINT Left 2024    Procedure: Left C2/C3 and C3/C4 MBB with RN IV sedation;  Surgeon:  Richmond Mills MD;  Location: [facility] PAIN MGT;  Service: Pain Management;  Laterality: Left;    OOPHORECTOMY Bilateral     RADIOFREQUENCY THERMOCOAGULATION Left 2024    Procedure: Left C2/C3 and C3/C4 RFA with RN IV sedation;  Surgeon: Richmond Mills MD;  Location: [facility] PAIN MGT;  Service: Pain Management;  Laterality: Left;    Tonsillectomy      TONSILLECTOMY      TRANSFORAMINAL EPIDURAL INJECTION OF STEROID Left 2023    Procedure: INJECTION, STEROID, EPIDURAL, TRANSFORAMINAL APPROACH, LEFT L3/L4 & L4/L5 DIRECT REF;  Surgeon: Popeye Ca MD;  Location: [facility] PAIN MGT;  Service: Pain Management;  Laterality: Left;     Family History   Problem Relation Name Age of Onset    No Known Problems Paternal Grandfather      No Known Problems Paternal Grandmother      No Known Problems Maternal Grandmother      No Known Problems Maternal Grandfather      No Known Problems Father      No Known Problems Mother      Diabetes Brother      Retinal detachment Brother      Cancer Brother          kidney    Cataracts Brother      Other (lewy body dementia) Brother      Diabetes Brother      Cancer Brother          throat    Cataracts Brother      Diabetes Brother      Cataracts Brother      No Known Problems Sister      No Known Problems Maternal Aunt      No Known Problems Maternal Uncle      No Known Problems Paternal Aunt      No Known Problems Paternal Uncle      Lupus Other          niece    Breast cancer Neg Hx      Colon cancer Neg Hx      Ovarian cancer Neg Hx      Blindness Neg Hx      Glaucoma Neg Hx      Hypertension Neg Hx      Macular degeneration Neg Hx      Strabismus Neg Hx      Stroke Neg Hx      Thyroid disease Neg Hx      Amblyopia Neg Hx      Rheum arthritis Neg Hx      Psoriasis Neg Hx      Inflammatory bowel disease Neg Hx      Cervical cancer Neg Hx      Endometrial cancer Neg Hx      Vaginal cancer Neg Hx      Uterine cancer Neg Hx       Social History[1]  OB History          4  "   Para   4    Term   2            AB        Living   2         SAB        IAB        Ectopic        Multiple        Live Births   2                 /78 (BP Location: Left arm, Patient Position: Sitting)   Ht 5' 3" (1.6 m)   Wt 55.3 kg (121 lb 14.6 oz)   BMI 21.60 kg/m²     ROS:  Per hpi    PE:   APPEARANCE: Well nourished, well developed, in no acute distress.  AFFECT: WNL, alert and oriented x 3.  SKIN: No acne or hirsutism.  NECK: Neck symmetric without masses or thyromegaly.  NODES: No inguinal, cervical, axillary or femoral lymph node enlargement.  CHEST: Good respiratory effort.   ABDOMEN: Soft. No tenderness or masses. No hepatosplenomegaly. No hernias.  BREASTS: Symmetrical, no skin changes or visible lesions. No palpable masses, nipple discharge bilaterally.  PELVIC: Normal external female genitalia without lesions. Normal hair distribution. Adequate perineal body, normal urethral meatus. Vagina atrophic without lesions or discharge. No significant cystocele or rectocele. Bimanual exam shows uterus and cervix to be surgically absent. Adnexa without masses or tenderness.    Physical Exam     1. Encounter for gynecological examination without abnormal finding        2. Postmenopausal        3. S/P hysterectomy         and PLAN:    Jill was seen today for annual exam.    Diagnoses and all orders for this visit:    Encounter for gynecological examination without abnormal finding    Postmenopausal    S/P hysterectomy         Patient was counseled today on A.C.S. Pap guidelines and recommendations for yearly pelvic exams, mammograms and monthly self breast exams; to see her PCP for other health maintenance.                            [1]   Social History  Tobacco Use    Smoking status: Former     Current packs/day: 0.00     Types: Cigarettes     Start date: 1970     Quit date: 1982     Years since quittin.8     Passive exposure: Past    Smokeless tobacco: Former    Tobacco " comments:     , homemaker, 2 children   Substance Use Topics    Alcohol use: Yes     Comment: 1-2 servings per week    Drug use: No

## 2025-03-05 ENCOUNTER — TELEPHONE (OUTPATIENT)
Dept: RHEUMATOLOGY | Facility: CLINIC | Age: 70
End: 2025-03-05
Payer: MEDICARE

## 2025-03-05 ENCOUNTER — RESULTS FOLLOW-UP (OUTPATIENT)
Dept: RHEUMATOLOGY | Facility: CLINIC | Age: 70
End: 2025-03-05

## 2025-03-05 DIAGNOSIS — R74.8 ELEVATED CK: ICD-10-CM

## 2025-03-05 DIAGNOSIS — R74.01 HIGH TRANSAMINASE LEVELS: Primary | ICD-10-CM

## 2025-03-05 RX ORDER — TIZANIDINE 2 MG/1
2 TABLET ORAL NIGHTLY PRN
Qty: 30 TABLET | Refills: 4 | Status: SHIPPED | OUTPATIENT
Start: 2025-03-05

## 2025-03-05 NOTE — PROGRESS NOTES
Your ck muscle enzyme is mildly elevated as are the AST and ALT. GGT is normal. The AST and ALT elevation likely related to your fatty liver but could also be related to ezetimibe affecting the muscle. You also have A1 antitrypsin deficiency. Will copy Lavonne Scroggs your Hepatologist.  Will recheck labs again in 2 wks

## 2025-03-19 ENCOUNTER — PATIENT MESSAGE (OUTPATIENT)
Dept: INTERNAL MEDICINE | Facility: CLINIC | Age: 70
End: 2025-03-19
Payer: MEDICARE

## 2025-03-19 ENCOUNTER — LAB VISIT (OUTPATIENT)
Dept: LAB | Facility: HOSPITAL | Age: 70
End: 2025-03-19
Attending: INTERNAL MEDICINE
Payer: MEDICARE

## 2025-03-19 ENCOUNTER — RESULTS FOLLOW-UP (OUTPATIENT)
Dept: RHEUMATOLOGY | Facility: CLINIC | Age: 70
End: 2025-03-19

## 2025-03-19 DIAGNOSIS — R74.01 HIGH TRANSAMINASE LEVELS: ICD-10-CM

## 2025-03-19 DIAGNOSIS — R74.8 ELEVATED CK: ICD-10-CM

## 2025-03-19 LAB
ALBUMIN SERPL BCP-MCNC: 4.2 G/DL (ref 3.5–5.2)
ALP SERPL-CCNC: 57 U/L (ref 40–150)
ALT SERPL W/O P-5'-P-CCNC: 53 U/L (ref 10–44)
AST SERPL-CCNC: 41 U/L (ref 10–40)
BILIRUB DIRECT SERPL-MCNC: 0.2 MG/DL (ref 0.1–0.3)
BILIRUB SERPL-MCNC: 0.9 MG/DL (ref 0.1–1)
CK SERPL-CCNC: 198 U/L (ref 20–180)
LDH SERPL L TO P-CCNC: 209 U/L (ref 110–260)
PROT SERPL-MCNC: 7.4 G/DL (ref 6–8.4)

## 2025-03-19 PROCEDURE — 82085 ASSAY OF ALDOLASE: CPT | Performed by: INTERNAL MEDICINE

## 2025-03-19 PROCEDURE — 36415 COLL VENOUS BLD VENIPUNCTURE: CPT | Performed by: INTERNAL MEDICINE

## 2025-03-19 PROCEDURE — 80076 HEPATIC FUNCTION PANEL: CPT | Performed by: INTERNAL MEDICINE

## 2025-03-19 PROCEDURE — 82550 ASSAY OF CK (CPK): CPT | Performed by: INTERNAL MEDICINE

## 2025-03-19 PROCEDURE — 83615 LACTATE (LD) (LDH) ENZYME: CPT | Performed by: INTERNAL MEDICINE

## 2025-03-19 NOTE — PROGRESS NOTES
Your transaminases and ck muscle enzyme remain mildly  elevated. Would contact Dr. Chinchilla your PCP about stopping  ezetimibe(Zetia) and will copy both he and Lavonne Scroggs in Hepatology  and recheck the labs in 2 wks after stopping ezetimibe.Let me know when you stoop and will schedule labs 2 wks therafter.

## 2025-03-20 ENCOUNTER — PATIENT MESSAGE (OUTPATIENT)
Dept: INTERNAL MEDICINE | Facility: CLINIC | Age: 70
End: 2025-03-20
Payer: MEDICARE

## 2025-03-20 ENCOUNTER — TELEPHONE (OUTPATIENT)
Dept: RHEUMATOLOGY | Facility: CLINIC | Age: 70
End: 2025-03-20
Payer: MEDICARE

## 2025-03-20 DIAGNOSIS — R74.8 ABNORMAL TRANSAMINASES: Primary | ICD-10-CM

## 2025-03-21 LAB — ALDOLASE SERPL-CCNC: 4.8 U/L (ref 1.2–7.6)

## 2025-03-21 RX ORDER — CITALOPRAM 20 MG/1
20 TABLET, FILM COATED ORAL DAILY
Qty: 90 TABLET | Refills: 2 | Status: CANCELLED | OUTPATIENT
Start: 2025-03-21

## 2025-03-21 NOTE — TELEPHONE ENCOUNTER
Refill Routing Note   Medication(s) are not appropriate for processing by [facility] for the following reason(s):        No active prescription written by provider    [facility] action(s):  Defer        Medication Therapy Plan: Med written under previous PCP.      Appointments  past 12m or future 3m with PCP    Date Provider   Last Visit   11/5/2024 Fred Chinchilla MD   Next Visit   5/13/2025 Fred Chinchilla MD   ED visits in past 90 days: 0        Note composed:9:58 AM 03/21/2025

## 2025-03-21 NOTE — TELEPHONE ENCOUNTER
No care due was identified.  [facility] Embedded Care Due Messages. Reference number: [id number].   3/21/2025 2:28:12 PM CDT

## 2025-03-21 NOTE — TELEPHONE ENCOUNTER
No care due was identified.  [facility] Embedded Care Due Messages. Reference number: [id number].   3/21/2025 8:45:04 AM CDT

## 2025-03-22 NOTE — TELEPHONE ENCOUNTER
Refill Routing Note   Medication(s) are not appropriate for processing by [facility] for the following reason(s):        No active prescription written by provider    [facility] action(s):  Defer               Appointments  past 12m or future 3m with PCP    Date Provider   Last Visit   11/5/2024 Fred Chinchilla MD   Next Visit   5/13/2025 Fred Chinchilla MD   ED visits in past 90 days: 0        Note composed:7:10 PM 03/21/2025

## 2025-03-25 ENCOUNTER — PATIENT MESSAGE (OUTPATIENT)
Dept: INTERNAL MEDICINE | Facility: CLINIC | Age: 70
End: 2025-03-25
Payer: MEDICARE

## 2025-03-25 ENCOUNTER — TELEPHONE (OUTPATIENT)
Dept: INTERNAL MEDICINE | Facility: CLINIC | Age: 70
End: 2025-03-25
Payer: MEDICARE

## 2025-03-25 DIAGNOSIS — F39 MOOD DISORDER: ICD-10-CM

## 2025-03-25 DIAGNOSIS — F39 MOOD DISORDER: Primary | ICD-10-CM

## 2025-03-25 RX ORDER — CITALOPRAM 20 MG/1
20 TABLET, FILM COATED ORAL DAILY
Qty: 30 TABLET | Refills: 12 | OUTPATIENT
Start: 2025-03-25

## 2025-03-25 RX ORDER — CITALOPRAM 20 MG/1
20 TABLET, FILM COATED ORAL DAILY
Qty: 30 TABLET | Refills: 12 | Status: SHIPPED | OUTPATIENT
Start: 2025-03-25

## 2025-03-25 RX ORDER — CITALOPRAM 20 MG/1
20 TABLET, FILM COATED ORAL DAILY
Qty: 30 TABLET | Refills: 12 | Status: CANCELLED | OUTPATIENT
Start: 2025-03-25

## 2025-03-25 NOTE — TELEPHONE ENCOUNTER
No care due was identified.  [facility] Embedded Care Due Messages. Reference number: [id number].   3/25/2025 11:26:42 AM CDT

## 2025-03-25 NOTE — TELEPHONE ENCOUNTER
----- Message from Lester Millan sent at 3/25/2025 10:09 AM CDT -----  Contact: Emil @ [facility] [phone]    ----- Message -----  From: Becka Boyle  Sent: 3/25/2025   9:34 AM CDT  To: Sarah LOYOLA Staff    Pt needs a refill on citalopram (CELEXA) 20 MG tablet called into [facility] - [place]  [id number] [facility] [address]Igqlr: [phone] Fax: [phone] can be reached at [phone] from [facility] pharmacy is calling to request a refill on the pt's RX. Emil states they have been sending orders but never heard back or receive anything. Please call Emil back for advice

## 2025-03-25 NOTE — TELEPHONE ENCOUNTER
Spoke with the patient concerning her refill request was filled today.      citalopram (CELEXA) 20 MG tablet 30 tablet 12 3/25/2025 -- No   Sig - Route: Take 1 tablet (20 mg total) by mouth once daily. - Oral   Sent to pharmacy as: citalopram (CELEXA) 20 MG tablet   Class: Normal   Notes to Pharmacy: 01/30/2023 9:12:52 AM   Order: [id number]   Date/Time Signed: 3/25/2025 13:18       E-Prescribing Status: Receipt confirmed by pharmacy (3/25/2025  1:18 PM CDT)   Prior authorization: Closed - Prior Authorization not required for patient/medication     Pharmacy    [facility] [place] [id number]    Associated Diagnoses       The patient stated understanding

## 2025-03-25 NOTE — TELEPHONE ENCOUNTER
----- Message from Deonna sent at 3/25/2025  1:08 PM CDT -----  Contact: [phone]  Type:  RX Refill RequestWho Called: José Manuel Refill or New Rx:refill RX Name and Strength:citalopram 20mgHow is the patient currently taking it? (ex. 1XDay):Take 1 tablet (20 mg total) by mouth once daily. - OralIs this a 30 day or 90 day RX:n/aPreferred Pharmacy with phone number:[facility] - [place] - [id number] [address] [address]Dhxck: [phone] Fax: [phone] or Mail Order:local Ordering Provider:Dr Chinchilla Would the patient rather a call back or a response via [facility]? Call Back Best Call Back Number:[phone] Additional Information: pt stated that several attempts has been made for refill but medications has not been sent to the pharmacy. And pt is currently out. Thanks KB

## 2025-04-04 ENCOUNTER — TELEPHONE (OUTPATIENT)
Dept: RHEUMATOLOGY | Facility: CLINIC | Age: 70
End: 2025-04-04
Payer: MEDICARE

## 2025-04-04 ENCOUNTER — LAB VISIT (OUTPATIENT)
Dept: LAB | Facility: HOSPITAL | Age: 70
End: 2025-04-04
Attending: INTERNAL MEDICINE
Payer: MEDICARE

## 2025-04-04 DIAGNOSIS — R74.8 ELEVATED CK: ICD-10-CM

## 2025-04-04 DIAGNOSIS — R74.01 HIGH TRANSAMINASE LEVELS: ICD-10-CM

## 2025-04-04 DIAGNOSIS — R74.8 ABNORMAL TRANSAMINASES: ICD-10-CM

## 2025-04-04 LAB
ALBUMIN SERPL BCP-MCNC: 3.9 G/DL (ref 3.5–5.2)
ALP SERPL-CCNC: 58 UNIT/L (ref 40–150)
ALT SERPL W/O P-5'-P-CCNC: 42 UNIT/L (ref 10–44)
AST SERPL-CCNC: 34 UNIT/L (ref 11–45)
BILIRUB DIRECT SERPL-MCNC: 0.3 MG/DL (ref 0.1–0.3)
BILIRUB SERPL-MCNC: 1 MG/DL (ref 0.1–1)
CK SERPL-CCNC: 194 U/L (ref 20–180)
GGT SERPL-CCNC: 35 U/L (ref 8–55)
PROT SERPL-MCNC: 7 GM/DL (ref 6–8.4)

## 2025-04-04 PROCEDURE — 36415 COLL VENOUS BLD VENIPUNCTURE: CPT | Mod: PN

## 2025-04-04 PROCEDURE — 82550 ASSAY OF CK (CPK): CPT

## 2025-04-04 PROCEDURE — 82977 ASSAY OF GGT: CPT

## 2025-04-04 PROCEDURE — 80076 HEPATIC FUNCTION PANEL: CPT

## 2025-04-06 NOTE — PROGRESS NOTES
RHEUMATOLOGY CLINIC NEW PATIENT VISIT  Chief complaints, HPI, ROS, EXAM, Assessment & Plans:-  Jill Gonzalez a 69 y.o. 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  Seen by our rheumatologist  in [place].  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  Reports doing well today.  No significant flare of arthritis.  Intermittent occipital neuralgia treated by neurologist.  Severe worsening left TMJ arthritis.  TMJ specialist is planning to do a washout of the joint.  Stable deformities of bilateral hands.  Had statin induced myalgia which improved after discontinuation of statin.  Denies any muscle pain or weakness today.  Rheumatological review of system negative otherwise.  Physical examination shows chronic deformities of bilateral hands but no active synovitis.  No dactylitis.  No enthesitis.  No effusion..    1. PSA (psoriatic arthritis)    2. Drug-induced immunodeficiency    3. Encounter for medication monitoring    4. Elevated CK    5. Arthralgia of left temporomandibular joint    6. Statin myopathy      Problem List Items Addressed This Visit       Arthralgia of left temporomandibular joint    Drug-induced immunodeficiency    Relevant Orders    CBC Auto Differential    Comprehensive Metabolic Panel    C-Reactive Protein    Sedimentation rate    Elevated CK    Encounter for medication monitoring    Relevant Orders    CBC Auto Differential    Comprehensive Metabolic Panel    C-Reactive Protein    Sedimentation rate    PSA (psoriatic arthritis) - Primary    Overview   rheum         Relevant Orders    CBC Auto Differential    Comprehensive Metabolic Panel    C-Reactive Protein    Sedimentation rate    Statin myopathy       Labs reviewed today:-   Latest Reference Range & Units 02/14/25 09:16   WBC 3.90 - 12.70 K/uL 4.61   RBC 4.00 - 5.40 M/uL 4.35    Hemoglobin 12.0 - 16.0 g/dL 13.9   Hematocrit 37.0 - 48.5 % 42.9   MCV 82 - 98 fL 99 (H)   MCH 27.0 - 31.0 pg 32.0 (H)   MCHC 32.0 - 36.0 g/dL 32.4   RDW 11.5 - 14.5 % 13.3   Platelet Count 150 - 450 K/uL 237   MPV 9.2 - 12.9 fL 11.7   Gran % 38.0 - 73.0 % 36.6 (L)   Lymph % 18.0 - 48.0 % 47.7   Mono % 4.0 - 15.0 % 9.8   Eos % 0.0 - 8.0 % 4.6   Basophil % 0.0 - 1.9 % 1.1   Immature Granulocytes 0.0 - 0.5 % 0.2   Gran # (ANC) 1.8 - 7.7 K/uL 1.7 (L)   Lymph # 1.0 - 4.8 K/uL 2.2   Mono # 0.3 - 1.0 K/uL 0.5   Eos # 0.0 - 0.5 K/uL 0.2   Baso # 0.00 - 0.20 K/uL 0.05   Immature Grans (Abs) 0.00 - 0.04 K/uL 0.01   nRBC 0 /100 WBC 0   Differential Method  Automated   Sed Rate 0 - 36 mm/Hr 2   Sodium 136 - 145 mmol/L 138   Potassium 3.5 - 5.1 mmol/L 3.6   Chloride 95 - 110 mmol/L 101   CO2 23 - 29 mmol/L 28   Anion Gap 8 - 16 mmol/L 9   BUN 8 - 23 mg/dL 16   Creatinine 0.5 - 1.4 mg/dL 0.6   eGFR >60 mL/min/1.73 m^2 >60.0   Glucose 70 - 110 mg/dL 84   Calcium 8.7 - 10.5 mg/dL 10.0   ALP 40 - 150 U/L 51   PROTEIN TOTAL 6.0 - 8.4 g/dL 7.4   Albumin 3.5 - 5.2 g/dL 4.2   BILIRUBIN TOTAL 0.1 - 1.0 mg/dL 1.1 (H)   AST 10 - 40 U/L 44 (H)   ALT 10 - 44 U/L 41   CRP 0.0 - 8.2 mg/L 0.3   Hepatitis B Surface Ag Non-reactive  Non-reactive   Hepatitis B Virus DNA Not Detected  HBV DNA not detected   TB Gold Plus Negative  Negative   NIL IU/mL 0.06873   TB1 - Nil IU/mL 0.008   TB2 - Nil IU/mL 0.011   Mitogen - Nil IU/mL 8.309   Hepatitis B Virus PCR, Quant <12 IU/mL Not Detected   (H): Data is abnormally high  (L): Data is abnormally low   Latest Reference Range & Units 04/04/25 08:38   ALP 40 - 150 unit/L 58   PROTEIN TOTAL 6.0 - 8.4 gm/dL 7.0   Albumin 3.5 - 5.2 g/dL 3.9   BILIRUBIN TOTAL 0.1 - 1.0 mg/dL 1.0   Bilirubin Direct 0.1 - 0.3 mg/dL 0.3   AST 11 - 45 unit/L 34   ALT 10 - 44 unit/L 42   GGT 8 - 55 U/L 35   CPK 20 - 180 U/L 194 (H)   (H): Data is abnormally high        Severe psoriatic arthritis with spondyloarthritis  complicated by history of chondrocalcinosis on Enbrel monotherapy.  No active synovitis, dactylitis, enthesitis or effusion.  No inflammatory back pain.  Continue Enbrel with precautions as advised.  Statin induced myalgia and myopathy improved after discontinuation of statin.  Muscle strength 5 x 5 upper and lower proximal and distal including neck flexors today.  Exercises regularly -walks every day unless wait at least twice a week.  Mild elevation of CK without muscle weakness and resolved liver enzyme abnormality suggestive of activity-related baseline.  Continue physical activity.  Advised to contact me if any new onset worsening muscle pain or weakness to repeat CK and further workup.  Negative HMG Co a reductase antibody.  I have explained all of the above in detail and the patient understands all of the current recommendation(s). I have answered all questions to the best of my ability and to their complete satisfaction.       # Follow up in about 6 months (around 10/7/2025).      Disclaimer: This note was prepared using voice recognition system and is likely to have sound alike errors and is not proof read.  Please call me with any questions.      Total time spent 40 minutes. This includes face to face time and non-face to face time preparing to see the patient (eg, review of tests), obtaining and/or reviewing separately obtained history, documenting clinical information in the electronic or other health record, independently interpreting results and communicating results to the patient/family/caregiver, or care coordinator.    Answers submitted by the patient for this visit:  Rheumatology Questionnaire (Submitted on 3/31/2025)  fever: No  eye redness: No  mouth sores: No  headaches: Yes  shortness of breath: No  chest pain: No  trouble swallowing: No  diarrhea: No  constipation: No  unexpected weight change: No  genital sore: No  dysuria: No  During the last 3 days, have you had a skin rash?: No  Bruises  or bleeds easily: No  cough: No

## 2025-04-07 ENCOUNTER — OFFICE VISIT (OUTPATIENT)
Dept: RHEUMATOLOGY | Facility: CLINIC | Age: 70
End: 2025-04-07
Payer: MEDICARE

## 2025-04-07 VITALS
SYSTOLIC BLOOD PRESSURE: 128 MMHG | DIASTOLIC BLOOD PRESSURE: 83 MMHG | BODY MASS INDEX: 21.36 KG/M2 | HEART RATE: 69 BPM | HEIGHT: 63 IN | WEIGHT: 120.56 LBS

## 2025-04-07 DIAGNOSIS — D84.821 DRUG-INDUCED IMMUNODEFICIENCY: ICD-10-CM

## 2025-04-07 DIAGNOSIS — G72.0 STATIN MYOPATHY: ICD-10-CM

## 2025-04-07 DIAGNOSIS — R74.8 ELEVATED CK: ICD-10-CM

## 2025-04-07 DIAGNOSIS — Z79.899 DRUG-INDUCED IMMUNODEFICIENCY: ICD-10-CM

## 2025-04-07 DIAGNOSIS — M26.622 ARTHRALGIA OF LEFT TEMPOROMANDIBULAR JOINT: ICD-10-CM

## 2025-04-07 DIAGNOSIS — T46.6X5A STATIN MYOPATHY: ICD-10-CM

## 2025-04-07 DIAGNOSIS — L40.50 PSA (PSORIATIC ARTHRITIS): Primary | ICD-10-CM

## 2025-04-07 DIAGNOSIS — Z51.81 ENCOUNTER FOR MEDICATION MONITORING: ICD-10-CM

## 2025-04-07 PROCEDURE — 99999 PR PBB SHADOW E&M-EST. PATIENT-LVL IV: CPT | Mod: PBBFAC,,, | Performed by: INTERNAL MEDICINE

## 2025-04-07 PROCEDURE — 99214 OFFICE O/P EST MOD 30 MIN: CPT | Mod: PBBFAC,PO | Performed by: INTERNAL MEDICINE

## 2025-04-08 ENCOUNTER — OFFICE VISIT (OUTPATIENT)
Dept: PHYSICAL MEDICINE AND REHAB | Facility: CLINIC | Age: 70
End: 2025-04-08
Payer: MEDICARE

## 2025-04-08 VITALS — HEIGHT: 63 IN | WEIGHT: 120.56 LBS | BODY MASS INDEX: 21.36 KG/M2 | RESPIRATION RATE: 12 BRPM

## 2025-04-08 DIAGNOSIS — M53.82 MUSCULOSKELETAL DISORDER OF THE SUBOCCIPITAL: Primary | ICD-10-CM

## 2025-04-08 DIAGNOSIS — M75.80 ROTATOR CUFF TENDINITIS, UNSPECIFIED LATERALITY: ICD-10-CM

## 2025-04-08 DIAGNOSIS — M46.00 SPINAL ENTHESOPATHY: ICD-10-CM

## 2025-04-08 PROCEDURE — 99999 PR PBB SHADOW E&M-EST. PATIENT-LVL III: CPT | Mod: PBBFAC,,, | Performed by: PHYSICAL MEDICINE & REHABILITATION

## 2025-04-08 PROCEDURE — 99213 OFFICE O/P EST LOW 20 MIN: CPT | Mod: PBBFAC | Performed by: PHYSICAL MEDICINE & REHABILITATION

## 2025-04-08 PROCEDURE — 20553 NJX 1/MLT TRIGGER POINTS 3/>: CPT | Mod: PBBFAC | Performed by: PHYSICAL MEDICINE & REHABILITATION

## 2025-04-08 NOTE — PROGRESS NOTES
PM&R CLINIC NOTE    Chief Complaint   Patient presents with    Muscle Pain         HPI: This is a 69 y.o.  female being seen in clinic today for repeat TPIs due to achy pain and tightness in her muscles,. She has increased pain in her right low back/gluteus. She is doing better overall and will be going to take care of her granddaughter for 12 days.      History obtained from patient    Functional History:  Walking: Not limited  Transfers: Independent  Assistive devices: No  Power mobility: No  Falls: None     Needs help with:  Nothing - all ADLS normal    Past family, medical, social, and surgical history reviewed in chart    Review of Systems:     General- denies lethargy, weight change, fever, chills  Head/neck- denies swallowing difficulties  ENT- denies hearing changes  Cardiovascular-denies chest pain  Pulmonary- denies shortness of breath  GI- denies constipation or bowel incontinence  - denies bladder incontinence  Skin- denies wounds or rashes  Musculoskeletal- denies weakness, +pain  Neurologic- denies numbness and tingling  Psychiatric- denies depressive or psychotic features, denies anxiety  Lymphatic-denies swelling  Endocrine- denies hypoglycemic symptoms/DM history  All other pertinent systems negative     Physical Examination:  General: Well developed, well nourished female, NAD  HEENT:NCAT EOMI bilaterally   Pulmonary:Normal respirations    Spinal Examination: CERVICAL  Active ROM is within normal limits.  Inspection: No deformity of spinal alignment.  Palpation:  tight and ttp at trapezius-local twitch at trapezius on right, tight and ttp at levator scapula    Spinal Examination: LUMBAR or THORACIC  Active ROM is within normal limits.  Inspection: No deformity of spinal alignment.    Ttp and tight at bilateral quadratus lumborum, right gluteus medius    Bilateral Upper and Lower Extremities:  Pulses are 2+ at radial, bilaterally.  Shoulder/Elbow/Wrist/Hand ROM wnl except limited at bilateral  hands, arthritic deformities noted  Hip/Knee/Ankle ROM wnl  Bilateral Extremities show normal capillary refill.  No signs of cyanosis, rubor, edema, skin changes, or dysvascular changes of appendages.  Nails appear intact.    Neurological Exam:  Cranial Nerves:  II-XII grossly intact    Manual Muscle Testing: (Motor 5=normal)    RIGHT Upper extremity: Shoulder abduction 5/5, Biceps 5/5, Triceps 5/5, Wrist extension 5/5, Abductor pollicis brevis 4/5, Ulnar hand intrinsics 4/5,  LEFT Upper extremity: Shoulder abduction 5/5, Biceps 5/5, Triceps 5/5, Wrist extension 5/5, Abductor pollicis brevis 4/5, Ulnar hand intrinsics 4/5,  No focal atrophy is noted of either upper extremity.    Bilateral Reflexes:  Choe's response is absent bilaterally.    Sensation: tested to light touch  - intact in arms  Gait: Narrow base and good arm swing.      IMPRESSION/PLAN: This is a 69 y.o.  female with myofascial pain, DJD./DDD, scoliosis     1. TPIs today  2. Cont Ice/heat  3. Cont neck/shoulder, piriformis exercises   4. Cont fu with IPM     Vanessa Millard M.D.  Physical Medicine and Rehab      PROCEDURE NOTE    Diagnosis: Myofascial pain  Procedure: trigger point injections to bilateral trapezius, levator scapula, quadratus lumborum, right gluteus medius  Risks and benefits of procedure explained to patient including risks of infection, bleeding, pain, or damage to surrounding tissues. All questions answered. Informed consent obtained prior to proceeding. Areas marked and prepped in sterile fashion. Using a 27g 1.25inch needle,  12 cc of 1% lidocaine  was injected evenly into the above mentioned muscles. None to minimal bleeding noted. ER and post injection instructions given.    Vanessa Millard M.D.

## 2025-04-09 ENCOUNTER — TELEPHONE (OUTPATIENT)
Dept: INTERNAL MEDICINE | Facility: CLINIC | Age: 70
End: 2025-04-09
Payer: MEDICARE

## 2025-04-09 NOTE — TELEPHONE ENCOUNTER
Spoke with the patient concerning her appointment with Dr Chinchilla in May. The patient stated that she would like to follow up with Dr Chinchilla. The patient appointment was rescheduled to Dr Chinchilla on 5/19/25 at 1:40 pm.

## 2025-04-09 NOTE — TELEPHONE ENCOUNTER
----- Message from Shala sent at 4/9/2025 10:42 AM CDT -----  Contact: PANKAJ CARTER [[id number]]  ...Type:  Patient Rescheduling Appointment Who Called: PANKAJ CARTER [[id number]]Date of appointment?: 5/13Why they can't make it:  pt was reschedule with NP due to doctor being out of office, but pt would like to see the doctor. Would the patient rather a call back or a response via [facility]?  [employer] Call Back Number: [phone] (home) Additional Information:

## 2025-04-18 DIAGNOSIS — I10 PRIMARY HYPERTENSION: ICD-10-CM

## 2025-04-18 NOTE — TELEPHONE ENCOUNTER
No care due was identified.  [facility] Embedded Care Due Messages. Reference number: [id number].   4/18/2025 11:39:11 AM CDT

## 2025-04-19 ENCOUNTER — PATIENT MESSAGE (OUTPATIENT)
Dept: INTERNAL MEDICINE | Facility: CLINIC | Age: 70
End: 2025-04-19
Payer: MEDICARE

## 2025-04-19 NOTE — TELEPHONE ENCOUNTER
Refill Routing Note   Medication(s) are not appropriate for processing by [facility] for the following reason(s):        No active prescription written by provider    [facility] action(s):  Defer             Appointments  past 12m or future 3m with PCP    Date Provider   Last Visit   11/5/2024 Fred Chinchilla MD   Next Visit   5/19/2025 Fred Chinchilla MD   ED visits in past 90 days: 0        Note composed:4:29 PM 04/19/2025

## 2025-04-21 ENCOUNTER — PATIENT MESSAGE (OUTPATIENT)
Dept: PHYSICAL MEDICINE AND REHAB | Facility: CLINIC | Age: 70
End: 2025-04-21
Payer: MEDICARE

## 2025-04-21 DIAGNOSIS — M41.115 JUVENILE IDIOPATHIC SCOLIOSIS OF THORACOLUMBAR REGION: ICD-10-CM

## 2025-04-21 RX ORDER — LEVOTHYROXINE SODIUM 88 UG/1
88 TABLET ORAL
Qty: 90 TABLET | Refills: 4 | Status: SHIPPED | OUTPATIENT
Start: 2025-04-21

## 2025-04-21 RX ORDER — HYDROCHLOROTHIAZIDE 25 MG/1
25 TABLET ORAL DAILY
Qty: 90 TABLET | Refills: 4 | Status: SHIPPED | OUTPATIENT
Start: 2025-04-21

## 2025-04-22 RX ORDER — GABAPENTIN 300 MG/1
600 CAPSULE ORAL 2 TIMES DAILY
Qty: 120 CAPSULE | Refills: 11 | Status: SHIPPED | OUTPATIENT
Start: 2025-04-22 | End: 2026-04-22

## 2025-05-19 ENCOUNTER — OFFICE VISIT (OUTPATIENT)
Dept: INTERNAL MEDICINE | Facility: CLINIC | Age: 70
End: 2025-05-19
Payer: MEDICARE

## 2025-05-19 VITALS
BODY MASS INDEX: 21.36 KG/M2 | OXYGEN SATURATION: 95 % | HEART RATE: 83 BPM | HEIGHT: 63 IN | TEMPERATURE: 98 F | DIASTOLIC BLOOD PRESSURE: 82 MMHG | WEIGHT: 120.56 LBS | SYSTOLIC BLOOD PRESSURE: 130 MMHG

## 2025-05-19 DIAGNOSIS — T46.6X5A STATIN MYOPATHY: ICD-10-CM

## 2025-05-19 DIAGNOSIS — J30.2 SEASONAL AND PERENNIAL ALLERGIC RHINITIS: ICD-10-CM

## 2025-05-19 DIAGNOSIS — I10 PRIMARY HYPERTENSION: Primary | ICD-10-CM

## 2025-05-19 DIAGNOSIS — G72.0 STATIN MYOPATHY: ICD-10-CM

## 2025-05-19 DIAGNOSIS — M54.81 OCCIPITAL NEURALGIA, UNSPECIFIED LATERALITY: ICD-10-CM

## 2025-05-19 DIAGNOSIS — J30.89 SEASONAL AND PERENNIAL ALLERGIC RHINITIS: ICD-10-CM

## 2025-05-19 DIAGNOSIS — F32.A DEPRESSION, UNSPECIFIED DEPRESSION TYPE: ICD-10-CM

## 2025-05-19 DIAGNOSIS — D84.9 IMMUNOCOMPROMISED, ACQUIRED: ICD-10-CM

## 2025-05-19 DIAGNOSIS — L40.50 PSA (PSORIATIC ARTHRITIS): ICD-10-CM

## 2025-05-19 DIAGNOSIS — F43.21 GRIEF: ICD-10-CM

## 2025-05-19 DIAGNOSIS — E78.5 HYPERLIPIDEMIA, UNSPECIFIED HYPERLIPIDEMIA TYPE: ICD-10-CM

## 2025-05-19 DIAGNOSIS — E03.9 HYPOTHYROIDISM, UNSPECIFIED TYPE: ICD-10-CM

## 2025-05-19 DIAGNOSIS — I70.0 AORTIC ATHEROSCLEROSIS: ICD-10-CM

## 2025-05-19 PROCEDURE — 99214 OFFICE O/P EST MOD 30 MIN: CPT | Mod: PBBFAC | Performed by: FAMILY MEDICINE

## 2025-05-19 PROCEDURE — G2211 COMPLEX E/M VISIT ADD ON: HCPCS | Mod: S$PBB,,, | Performed by: FAMILY MEDICINE

## 2025-05-19 PROCEDURE — 99999 PR PBB SHADOW E&M-EST. PATIENT-LVL IV: CPT | Mod: PBBFAC,,, | Performed by: FAMILY MEDICINE

## 2025-05-19 PROCEDURE — 99214 OFFICE O/P EST MOD 30 MIN: CPT | Mod: S$PBB,,, | Performed by: FAMILY MEDICINE

## 2025-05-19 RX ORDER — EVOLOCUMAB 140 MG/ML
140 INJECTION, SOLUTION SUBCUTANEOUS
Qty: 2 EACH | Refills: 11 | Status: ACTIVE | OUTPATIENT
Start: 2025-05-19

## 2025-05-19 RX ORDER — AZELASTINE 1 MG/ML
1 SPRAY, METERED NASAL 2 TIMES DAILY
Qty: 90 ML | Refills: 4 | Status: SHIPPED | OUTPATIENT
Start: 2025-05-19

## 2025-05-19 RX ORDER — FLUTICASONE FUROATE 27.5 UG/1
2 SPRAY, METERED NASAL DAILY
Qty: 27 ML | Refills: 4 | Status: SHIPPED | OUTPATIENT
Start: 2025-05-19

## 2025-05-19 RX ORDER — FLUTICASONE FUROATE 27.5 UG/1
2 SPRAY, METERED NASAL DAILY
Qty: 9.1 ML | Refills: 0 | Status: SHIPPED | OUTPATIENT
Start: 2025-05-19 | End: 2025-05-19 | Stop reason: SDUPTHER

## 2025-05-19 NOTE — PROGRESS NOTES
Subjective:      Patient ID: Jill Marquez is a . female.    Chief Complaint: Multiple issues see below     HPI    Hypertension: blood pressures at home normal. Tolerating medicine.     Elevated LFTs hepatology evaluation mild alpha 1 antitrypsin deficiency normal FibroScan with planned hepatology and FibroScan every couple years will be due again July 2026    Psoriatic arthritis Enbrel Dr. Campos now Dr DALY; at Rheumatology a    Hyperlipidemia statin intolerance and since last visit intol to  Zetia ; family history of CAD August in score 0 2017 but has a or athero sclerosis and CV risk score 12% discussed Repatha    Hypothyroidism on Synthroid normal TSH soon     Intermittent mild macrocytosis without anemia will check B12 folic acid similar evaluation done a couple years ago for mild macrocytosis that resolved    History of asthma sounds mild unsure of formal diagnosis but occasional seasonal seasona doing okay now but needs refill on allergy nasal sprays     3 years anniversary soon 's death she becomes very tearful this time of year and also during various holidays on citalopram she is interested in counseling no suicidality expressed    Chronic neck pain occipital neuralgia intolerant to low-dose Elavil has seen pain management and plans to follow up with them    Review of Systems  Cardiovascular: no chest pain  Chest: no shortness of breath  Abd: no abd pain  Remainder review of systems negative        Objective:     Physical Exam  Vitals and nursing note reviewed.   Constitutional:       Appearance: She is well-developed.   HENT:      Head: Normocephalic and atraumatic.   Neck:      Vascular: No carotid bruit.   Cardiovascular:      Rate and Rhythm: Normal rate and regular rhythm.   Pulmonary:      Effort: Pulmonary effort is normal.      Breath sounds: Normal breath sounds.   Abdominal:      General: Bowel sounds are normal. There is no distension.      Palpations: Abdomen is soft. There is no mass.       Tenderness: There is no abdominal tenderness. There is no guarding or rebound.   Musculoskeletal:      Cervical back: Normal range of motion and neck supple.   Lymphadenopathy:      Cervical: No cervical adenopathy.   Skin:     General: Skin is warm and dry.   Neurological:      Mental Status: She is alert and oriented to person, place, and time.   Psychiatric:         Behavior: Behavior normal.         Judgment: Judgment normal.       Assessment:         ICD-10-CM ICD-9-CM   1. Primary hypertension  I10 401.9   2. Hypothyroidism, unspecified type  E03.9 244.9   3. Hyperlipidemia, unspecified hyperlipidemia type  E78.5 272.4   4. Alpha-1-antitrypsin deficiency  E88.01 273.4   5. PSA (psoriatic arthritis)  L40.50 696.0   6. Immunocompromised, acquired  D84.9 279.9   7. Aortic atherosclerosis  I70.0 440.0   8. Statin myopathy /zetia intol G72.0 359.4    T46.6X5A E942.2   9. Macrocytosis without anemia  D75.89 289.89    Depression/grief  Occipital neuralgia  Plan:      FibroScan every couple years will be due again July 2026    Follow up pain management    4. Alpha-1-antitrypsin deficiency  Overview:  Hepatology. Mild . Fibroscan rec q 2 yrs ue July 2026      5. PSA (psoriatic arthritis)  Overview:  rheum      6. Immunocompromised, acquired    7. Aortic atherosclerosis-repatha. Side effects and dosing discussed      Lab and follow up after in 6 months Faiza'      Primary hypertension  -     Comprehensive Metabolic Panel; Future; Expected date: 11/15/2025  -     Lipid Panel; Future; Expected date: 11/15/2025    Hypothyroidism, unspecified type  -     TSH; Future; Expected date: 11/15/2025    Hyperlipidemia, unspecified hyperlipidemia type  -     evolocumab (REPATHA SURECLICK) 140 mg/mL PnIj; Inject 1 mL (140 mg total) into the skin every 14 (fourteen) days.  Dispense: 2 each; Refill: 11    PSA (psoriatic arthritis)    Immunocompromised, acquired    Statin myopathy  -     evolocumab (REPATHA SURECLICK) 140 mg/mL  PnIj; Inject 1 mL (140 mg total) into the skin every 14 (fourteen) days.  Dispense: 2 each; Refill: 11    Aortic atherosclerosis    Depression, unspecified depression type  -     Ambulatory referral/consult to Behavioral Health; Future; Expected date: 05/26/2025    Grief  -     Ambulatory referral/consult to Behavioral Health; Future; Expected date: 05/26/2025    Seasonal and perennial allergic rhinitis  -     azelastine (ASTELIN) 137 mcg (0.1 %) nasal spray; 1 spray (137 mcg total) by Nasal route 2 (two) times daily.  Dispense: 90 mL; Refill: 4    Occipital neuralgia, unspecified laterality    Other orders  -     Discontinue: fluticasone (FLONASE SENSIMIST) 27.5 mcg/actuation nasal spray; 2 sprays by Nasal route once daily.  Dispense: 9.1 mL; Refill: 0  -     fluticasone (FLONASE SENSIMIST) 27.5 mcg/actuation nasal spray; 2 sprays by Nasal route once daily.  Dispense: 27 mL; Refill: 4

## 2025-05-20 ENCOUNTER — TELEPHONE (OUTPATIENT)
Dept: INTERNAL MEDICINE | Facility: CLINIC | Age: 70
End: 2025-05-20
Payer: MEDICARE

## 2025-05-20 RX ORDER — FLUTICASONE PROPIONATE 50 MCG
2 SPRAY, SUSPENSION (ML) NASAL DAILY
COMMUNITY

## 2025-05-20 NOTE — TELEPHONE ENCOUNTER
Spoke with the patient pharmacy concerning the patient Flonase prescription. The patient insurance cover Flonase propionate 50 mcg with 4 refills.

## 2025-05-20 NOTE — TELEPHONE ENCOUNTER
----- Message from Deonna sent at 5/20/2025  4:42 PM CDT -----  Contact: [phone]  Type:  Pharmacy Calling to Clarify an RXName of Caller:[facility] Prescription Name:ChumanojeWhat do they need to clarify?:medication Best Call Back Number:[phone] Information: pharmacy is calling in regards to seeing if patient can get Flonase changed from over the counter to a prescription that will be covered under insurance.   Thanks KB

## 2025-05-27 ENCOUNTER — OFFICE VISIT (OUTPATIENT)
Dept: PHYSICAL MEDICINE AND REHAB | Facility: CLINIC | Age: 70
End: 2025-05-27
Payer: MEDICARE

## 2025-05-27 VITALS — WEIGHT: 120.56 LBS | BODY MASS INDEX: 21.36 KG/M2 | HEIGHT: 63 IN | RESPIRATION RATE: 12 BRPM

## 2025-05-27 DIAGNOSIS — M53.82 MUSCULOSKELETAL DISORDER OF THE SUBOCCIPITAL: ICD-10-CM

## 2025-05-27 DIAGNOSIS — M46.00 SPINAL ENTHESOPATHY: ICD-10-CM

## 2025-05-27 DIAGNOSIS — M79.18 DIFFUSE MYOFASCIAL PAIN SYNDROME: Primary | ICD-10-CM

## 2025-05-27 PROCEDURE — 99214 OFFICE O/P EST MOD 30 MIN: CPT | Mod: 25,S$PBB,, | Performed by: PHYSICAL MEDICINE & REHABILITATION

## 2025-05-27 PROCEDURE — 99213 OFFICE O/P EST LOW 20 MIN: CPT | Mod: PBBFAC | Performed by: PHYSICAL MEDICINE & REHABILITATION

## 2025-05-27 PROCEDURE — 99999 PR PBB SHADOW E&M-EST. PATIENT-LVL III: CPT | Mod: PBBFAC,,, | Performed by: PHYSICAL MEDICINE & REHABILITATION

## 2025-05-27 PROCEDURE — 20553 NJX 1/MLT TRIGGER POINTS 3/>: CPT | Mod: PBBFAC | Performed by: PHYSICAL MEDICINE & REHABILITATION

## 2025-05-27 PROCEDURE — 20553 NJX 1/MLT TRIGGER POINTS 3/>: CPT | Mod: S$PBB,,, | Performed by: PHYSICAL MEDICINE & REHABILITATION

## 2025-05-27 NOTE — PROGRESS NOTES
PM&R CLINIC NOTE    Chief Complaint   Patient presents with    Muscle Pain    Back Pain    Neck Pain         HPI: This is a 69 y.o.  female being seen in clinic today for repeat TPIs due to achy pain and tightness in her muscles.  She had a recent procedure with her dentist for her left jaw.       History obtained from patient    Functional History:  Walking: Not limited  Transfers: Independent  Assistive devices: No  Power mobility: No  Falls: None     Needs help with:  Nothing - all ADLS normal    Past family, medical, social, and surgical history reviewed in chart    Review of Systems:     General- denies lethargy, weight change, fever, chills  Head/neck- denies swallowing difficulties  ENT- denies hearing changes  Cardiovascular-denies chest pain  Pulmonary- denies shortness of breath  GI- denies constipation or bowel incontinence  - denies bladder incontinence  Skin- denies wounds or rashes  Musculoskeletal- denies weakness, +pain  Neurologic- denies numbness and tingling  Psychiatric- denies depressive or psychotic features, denies anxiety  Lymphatic-denies swelling  Endocrine- denies hypoglycemic symptoms/DM history  All other pertinent systems negative     Physical Examination:  General: Well developed, well nourished female, NAD  HEENT:NCAT EOMI bilaterally   Pulmonary:Normal respirations    Spinal Examination: CERVICAL  Active ROM is within normal limits.  Inspection: No deformity of spinal alignment.  Palpation:  tight and ttp at trapezius-local twitch at trapezius on right, tight and ttp at levator scapula    Spinal Examination: LUMBAR or THORACIC  Active ROM is within normal limits.  Inspection: No deformity of spinal alignment.    Ttp and tight at bilateral quadratus lumborum, right gluteus medius    Bilateral Upper and Lower Extremities:  Pulses are 2+ at radial, bilaterally.  Shoulder/Elbow/Wrist/Hand ROM wnl except limited at bilateral hands, arthritic deformities noted  Hip/Knee/Ankle ROM  wnl  Bilateral Extremities show normal capillary refill.  No signs of cyanosis, rubor, edema, skin changes, or dysvascular changes of appendages.  Nails appear intact.    Neurological Exam:  Cranial Nerves:  II-XII grossly intact    Manual Muscle Testing: (Motor 5=normal)    RIGHT Upper extremity: Shoulder abduction 5/5, Biceps 5/5, Triceps 5/5, Wrist extension 5/5, Abductor pollicis brevis 4/5, Ulnar hand intrinsics 4/5,  LEFT Upper extremity: Shoulder abduction 5/5, Biceps 5/5, Triceps 5/5, Wrist extension 5/5, Abductor pollicis brevis 4/5, Ulnar hand intrinsics 4/5,  No focal atrophy is noted of either upper extremity.    Bilateral Reflexes:  Choe's response is absent bilaterally.    Sensation: tested to light touch  - intact in arms  Gait: Narrow base and good arm swing.      IMPRESSION/PLAN: This is a 69 y.o.  female with myofascial pain, DJD./DDD, scoliosis     1. TPIs today  2. Cont Ice/heat  3. Cont neck/shoulder, piriformis exercises   4. Cont fu with IPM     Vanessa Millard M.D.  Physical Medicine and Rehab      PROCEDURE NOTE    Diagnosis: Myofascial pain  Procedure: trigger point injections to bilateral trapezius, levator scapula, quadratus lumborum  Risks and benefits of procedure explained to patient including risks of infection, bleeding, pain, or damage to surrounding tissues. All questions answered. Informed consent obtained prior to proceeding. Areas marked and prepped in sterile fashion. Using a 27g 1.25inch needle,  12 cc of 1% lidocaine  was injected evenly into the above mentioned muscles. None to minimal bleeding noted. ER and post injection instructions given.    Vanessa Millard M.D.

## 2025-07-08 ENCOUNTER — OFFICE VISIT (OUTPATIENT)
Dept: PHYSICAL MEDICINE AND REHAB | Facility: CLINIC | Age: 70
End: 2025-07-08
Payer: MEDICARE

## 2025-07-08 VITALS — RESPIRATION RATE: 13 BRPM | HEIGHT: 63 IN | BODY MASS INDEX: 21.36 KG/M2 | WEIGHT: 120.56 LBS

## 2025-07-08 DIAGNOSIS — M53.82 MUSCULOSKELETAL DISORDER OF THE SUBOCCIPITAL: ICD-10-CM

## 2025-07-08 DIAGNOSIS — M46.00 SPINAL ENTHESOPATHY: Primary | ICD-10-CM

## 2025-07-08 PROCEDURE — 99999PBSHW PR PBB SHADOW TECHNICAL ONLY FILED TO HB: Mod: JZ,PBBFAC,,

## 2025-07-08 PROCEDURE — 99213 OFFICE O/P EST LOW 20 MIN: CPT | Mod: PBBFAC,25 | Performed by: PHYSICAL MEDICINE & REHABILITATION

## 2025-07-08 PROCEDURE — 20553 NJX 1/MLT TRIGGER POINTS 3/>: CPT | Mod: PBBFAC | Performed by: PHYSICAL MEDICINE & REHABILITATION

## 2025-07-08 PROCEDURE — 99999 PR PBB SHADOW E&M-EST. PATIENT-LVL III: CPT | Mod: PBBFAC,,, | Performed by: PHYSICAL MEDICINE & REHABILITATION

## 2025-07-08 RX ORDER — METHYLPREDNISOLONE ACETATE 40 MG/ML
40 INJECTION, SUSPENSION INTRA-ARTICULAR; INTRALESIONAL; INTRAMUSCULAR; SOFT TISSUE
Status: COMPLETED | OUTPATIENT
Start: 2025-07-08 | End: 2025-07-08

## 2025-07-08 RX ADMIN — METHYLPREDNISOLONE ACETATE 40 MG: 40 INJECTION, SUSPENSION INTRA-ARTICULAR; INTRALESIONAL; INTRAMUSCULAR; INTRASYNOVIAL; SOFT TISSUE at 11:07

## 2025-07-08 NOTE — PROGRESS NOTES
PM&R CLINIC NOTE    Chief Complaint   Patient presents with    Back Pain    Muscle Pain         HPI: This is a 69 y.o.  female being seen in clinic today for repeat TPIs due to achy pain and tightness in her muscles.  Today is the anniversary of her 's death.        History obtained from patient    Functional History:  Walking: Not limited  Transfers: Independent  Assistive devices: No  Power mobility: No  Falls: None     Needs help with:  Nothing - all ADLS normal    Past family, medical, social, and surgical history reviewed in chart    Review of Systems:     General- denies lethargy, weight change, fever, chills  Head/neck- denies swallowing difficulties  ENT- denies hearing changes  Cardiovascular-denies chest pain  Pulmonary- denies shortness of breath  GI- denies constipation or bowel incontinence  - denies bladder incontinence  Skin- denies wounds or rashes  Musculoskeletal- denies weakness, +pain  Neurologic- denies numbness and tingling  Psychiatric- denies depressive or psychotic features, denies anxiety  Lymphatic-denies swelling  Endocrine- denies hypoglycemic symptoms/DM history  All other pertinent systems negative     Physical Examination:  General: Well developed, well nourished female, NAD  HEENT:NCAT EOMI bilaterally   Pulmonary:Normal respirations    Spinal Examination: CERVICAL  Active ROM is within normal limits.  Inspection: No deformity of spinal alignment.  Palpation:  tight and ttp at trapezius-local twitch at trapezius on right, tight and ttp at levator scapula    Spinal Examination: LUMBAR or THORACIC  Active ROM is within normal limits.  Inspection: No deformity of spinal alignment.    Ttp and tight at bilateral quadratus lumborum, right gluteus medius    Bilateral Upper and Lower Extremities:  Pulses are 2+ at radial, bilaterally.  Shoulder/Elbow/Wrist/Hand ROM wnl except limited at bilateral hands, arthritic deformities noted  Hip/Knee/Ankle ROM wnl  Bilateral Extremities show  normal capillary refill.  No signs of cyanosis, rubor, edema, skin changes, or dysvascular changes of appendages.  Nails appear intact.    Neurological Exam:  Cranial Nerves:  II-XII grossly intact    Manual Muscle Testing: (Motor 5=normal)    RIGHT Upper extremity: Shoulder abduction 5/5, Biceps 5/5, Triceps 5/5, Wrist extension 5/5, Abductor pollicis brevis 4/5, Ulnar hand intrinsics 4/5,  LEFT Upper extremity: Shoulder abduction 5/5, Biceps 5/5, Triceps 5/5, Wrist extension 5/5, Abductor pollicis brevis 4/5, Ulnar hand intrinsics 4/5,  No focal atrophy is noted of either upper extremity.    Bilateral Reflexes:  Choe's response is absent bilaterally.    Sensation: tested to light touch  - intact in arms  Gait: Narrow base and good arm swing.      IMPRESSION/PLAN: This is a 69 y.o.  female with myofascial pain, DJD./DDD, scoliosis     1. TPIs today  2. Cont Ice/heat  3. Cont neck/shoulder, piriformis exercises   4. Cont fu with IPM     Vanessa Millard M.D.  Physical Medicine and Rehab      PROCEDURE NOTE    Diagnosis: Myofascial pain  Procedure: trigger point injections to bilateral trapezius, levator scapula, quadratus lumborum  Risks and benefits of procedure explained to patient including risks of infection, bleeding, pain, or damage to surrounding tissues. All questions answered. Informed consent obtained prior to proceeding. Areas marked and prepped in sterile fashion. Using a 27g 1.25inch needle,  12 cc of depomedrol 40mg 1cc and  1% lidocaine  was injected evenly into the above mentioned muscles. None to minimal bleeding noted. ER and post injection instructions given.    Vanessa Millard M.D.

## 2025-07-15 ENCOUNTER — OFFICE VISIT (OUTPATIENT)
Dept: PAIN MEDICINE | Facility: CLINIC | Age: 70
End: 2025-07-15
Payer: MEDICARE

## 2025-07-15 ENCOUNTER — HOSPITAL ENCOUNTER (OUTPATIENT)
Dept: RADIOLOGY | Facility: HOSPITAL | Age: 70
Discharge: HOME | End: 2025-07-15
Attending: ANESTHESIOLOGY
Payer: MEDICARE

## 2025-07-15 VITALS
RESPIRATION RATE: 17 BRPM | SYSTOLIC BLOOD PRESSURE: 152 MMHG | HEART RATE: 75 BPM | WEIGHT: 123.25 LBS | HEIGHT: 63 IN | DIASTOLIC BLOOD PRESSURE: 79 MMHG | BODY MASS INDEX: 21.84 KG/M2

## 2025-07-15 DIAGNOSIS — M47.812 CERVICAL SPONDYLOSIS: ICD-10-CM

## 2025-07-15 DIAGNOSIS — M50.30 DDD (DEGENERATIVE DISC DISEASE), CERVICAL: ICD-10-CM

## 2025-07-15 DIAGNOSIS — M47.812 CERVICAL SPONDYLOSIS: Primary | ICD-10-CM

## 2025-07-15 DIAGNOSIS — M54.81 OCCIPITAL NEURALGIA, UNSPECIFIED LATERALITY: ICD-10-CM

## 2025-07-15 DIAGNOSIS — S03.00XS DISLOCATION OF TEMPOROMANDIBULAR JOINT, SEQUELA: ICD-10-CM

## 2025-07-15 PROBLEM — F45.8 BRUXISM: Status: ACTIVE | Noted: 2025-03-12

## 2025-07-15 PROCEDURE — 99999 PR PBB SHADOW E&M-EST. PATIENT-LVL IV: CPT | Mod: PBBFAC,,, | Performed by: ANESTHESIOLOGY

## 2025-07-15 PROCEDURE — 99214 OFFICE O/P EST MOD 30 MIN: CPT | Mod: PBBFAC,25,PN | Performed by: ANESTHESIOLOGY

## 2025-07-15 PROCEDURE — 99215 OFFICE O/P EST HI 40 MIN: CPT | Mod: S$PBB,,, | Performed by: ANESTHESIOLOGY

## 2025-07-15 PROCEDURE — 72052 X-RAY EXAM NECK SPINE 6/>VWS: CPT | Mod: 26,,, | Performed by: RADIOLOGY

## 2025-07-15 PROCEDURE — 72052 X-RAY EXAM NECK SPINE 6/>VWS: CPT | Mod: TC,PN

## 2025-07-15 PROCEDURE — G2211 COMPLEX E/M VISIT ADD ON: HCPCS | Mod: ,,, | Performed by: ANESTHESIOLOGY
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
## 2025-07-18 NOTE — PRE-PROCEDURE INSTRUCTIONS
Spoke with patient regarding procedure scheduled on 7.24     Arrival time 1130     Has patient been sick with fever or on antibiotics within the last 7 days? No     Does the patient have any open wounds, sores or rashes? No     Does the patient have any recent fractures? no     Has patient received a vaccination within the last 7 days? No     Received the COVID vaccination? yes     Has the patient stopped all medications as directed? na     Does patient have a pacemaker, defibrillator, or implantable stimulator? No     Does the patient have a ride to and from procedure and someone reliable to remain with patient?  god daughter     Is the patient diabetic? no     Does the patient have sleep apnea? Or use O2 at home? no     Is the patient receiving sedation? Yes      Is the patient instructed to remain NPO beginning at midnight the night before their procedure? yes     Procedure location confirmed with patient? Yes     Covid- Denies signs/symptoms. Instructed to notify PAT/MD if any changes.

## 2025-07-24 ENCOUNTER — HOSPITAL ENCOUNTER (OUTPATIENT)
Facility: HOSPITAL | Age: 70
Discharge: HOME | End: 2025-07-24
Attending: ANESTHESIOLOGY | Admitting: ANESTHESIOLOGY
Payer: MEDICARE

## 2025-07-24 VITALS
TEMPERATURE: 98 F | SYSTOLIC BLOOD PRESSURE: 143 MMHG | HEART RATE: 82 BPM | BODY MASS INDEX: 22.12 KG/M2 | RESPIRATION RATE: 16 BRPM | DIASTOLIC BLOOD PRESSURE: 63 MMHG | WEIGHT: 124.88 LBS | OXYGEN SATURATION: 95 % | HEIGHT: 63 IN

## 2025-07-24 DIAGNOSIS — M47.812 CERVICAL SPONDYLOSIS: Primary | ICD-10-CM

## 2025-07-24 PROCEDURE — 64633 DESTROY CERV/THOR FACET JNT: CPT | Mod: LT | Performed by: ANESTHESIOLOGY

## 2025-07-24 PROCEDURE — 63600175 PHARM REV CODE 636 W HCPCS: Performed by: ANESTHESIOLOGY

## 2025-07-24 PROCEDURE — 64634 DESTROY C/TH FACET JNT ADDL: CPT | Mod: LT | Performed by: ANESTHESIOLOGY

## 2025-07-24 PROCEDURE — 64634 DESTROY C/TH FACET JNT ADDL: CPT | Mod: LT,,, | Performed by: ANESTHESIOLOGY

## 2025-07-24 PROCEDURE — 64633 DESTROY CERV/THOR FACET JNT: CPT | Mod: LT,,, | Performed by: ANESTHESIOLOGY

## 2025-07-24 PROCEDURE — 99152 MOD SED SAME PHYS/QHP 5/>YRS: CPT | Performed by: ANESTHESIOLOGY

## 2025-07-24 RX ORDER — ONDANSETRON HYDROCHLORIDE 2 MG/ML
4 INJECTION, SOLUTION INTRAVENOUS ONCE AS NEEDED
Status: DISCONTINUED | OUTPATIENT
Start: 2025-07-24 | End: 2025-07-24 | Stop reason: HOSPADM

## 2025-07-24 RX ORDER — FENTANYL CITRATE 50 UG/ML
INJECTION, SOLUTION INTRAMUSCULAR; INTRAVENOUS
Status: DISCONTINUED | OUTPATIENT
Start: 2025-07-24 | End: 2025-07-24 | Stop reason: HOSPADM

## 2025-07-24 RX ORDER — BUPIVACAINE HYDROCHLORIDE 2.5 MG/ML
INJECTION, SOLUTION EPIDURAL; INFILTRATION; INTRACAUDAL; PERINEURAL
Status: DISCONTINUED | OUTPATIENT
Start: 2025-07-24 | End: 2025-07-24 | Stop reason: HOSPADM

## 2025-07-24 RX ORDER — DEXAMETHASONE SODIUM PHOSPHATE 10 MG/ML
INJECTION, SOLUTION INTRA-ARTICULAR; INTRALESIONAL; INTRAMUSCULAR; INTRAVENOUS; SOFT TISSUE
Status: DISCONTINUED | OUTPATIENT
Start: 2025-07-24 | End: 2025-07-24 | Stop reason: HOSPADM

## 2025-07-24 RX ORDER — MIDAZOLAM HYDROCHLORIDE 1 MG/ML
INJECTION INTRAMUSCULAR; INTRAVENOUS
Status: DISCONTINUED | OUTPATIENT
Start: 2025-07-24 | End: 2025-07-24 | Stop reason: HOSPADM

## 2025-07-24 RX ORDER — LIDOCAINE HYDROCHLORIDE 10 MG/ML
INJECTION, SOLUTION EPIDURAL; INFILTRATION; INTRACAUDAL; PERINEURAL
Status: DISCONTINUED | OUTPATIENT
Start: 2025-07-24 | End: 2025-07-24 | Stop reason: HOSPADM

## 2025-07-24 NOTE — DISCHARGE SUMMARY
Discharge Note  Short Stay      SUMMARY     Admit Date: 7/24/2025    Attending Physician: Richmond Mills MD        Discharge Physician: Richmond Mills MD        Discharge Date: 7/24/2025 1:05 PM    Procedure(s) (LRB):  Left C2-C3 and C3-C4 RFA (Left)    Final Diagnosis: Cervical spondylosis [M47.812]    Disposition: Home or self care    Patient Instructions:   Current Discharge Medication List        CONTINUE these medications which have NOT CHANGED    Details   amLODIPine (NORVASC) 10 MG tablet TAKE 1 TABLET (10 MG TOTAL) BY MOUTH ONCE DAILY.  Qty: 90 tablet, Refills: 3    Comments: .      citalopram (CELEXA) 20 MG tablet Take 1 tablet (20 mg total) by mouth once daily.  Qty: 30 tablet, Refills: 12    Comments: 01/30/2023 9:12:52 AM  Associated Diagnoses: Mood disorder      ergocalciferol, vitamin D2, (VITAMIN D ORAL)       etanercept (ENBREL SURECLICK) 50 mg/mL (1 mL) Inject 1 mL (50 mg total) into the skin once a week.  Qty: 4 mL, Refills: 5    Comments: PAP 2025  Associated Diagnoses: PSA (psoriatic arthritis)      evolocumab (REPATHA SURECLICK) 140 mg/mL PnIj Inject 1 mL (140 mg total) into the skin every 14 (fourteen) days.  Qty: 2 each, Refills: 11    Associated Diagnoses: Hyperlipidemia, unspecified hyperlipidemia type; Statin myopathy      gabapentin (NEURONTIN) 300 MG capsule TAKE 2 CAPSULES (600 MG TOTAL) BY MOUTH 2 (TWO) TIMES DAILY.  Qty: 120 capsule, Refills: 11    Associated Diagnoses: Juvenile idiopathic scoliosis of thoracolumbar region      hydroCHLOROthiazide (HYDRODIURIL) 25 MG tablet Take 1 tablet (25 mg total) by mouth once daily.  Qty: 90 tablet, Refills: 4    Comments: .  Associated Diagnoses: Primary hypertension      levothyroxine (SYNTHROID) 88 MCG tablet TAKE 1 TABLET BY MOUTH BEFORE BREAKFAST.  Qty: 90 tablet, Refills: 4      tiZANidine (ZANAFLEX) 2 MG tablet TAKE 1 TABLET BY MOUTH NIGHTLY AS NEEDED FOR MUSCLE SPASMS  Qty: 30 tablet, Refills: 4    Associated Diagnoses: Myofascial  pain; Piriformis syndrome, unspecified laterality      albuterol (PROVENTIL/VENTOLIN HFA) 90 mcg/actuation inhaler INHALE 1 PUFF BY MOUTH 3 TIMES A DAY FOR 10 DAYS      azelastine (ASTELIN) 137 mcg (0.1 %) nasal spray 1 spray (137 mcg total) by Nasal route 2 (two) times daily.  Qty: 90 mL, Refills: 4    Associated Diagnoses: Seasonal and perennial allergic rhinitis      estradioL (ESTRACE) 0.01 % (0.1 mg/gram) vaginal cream Place 1 g vaginally twice a week.  Qty: 42.5 g, Refills: 2    Associated Diagnoses: Atrophic vaginitis      fish oil-omega-3 fatty acids 300-1,000 mg capsule Take 2 g by mouth once daily.      fluticasone (FLONASE SENSIMIST) 27.5 mcg/actuation nasal spray 2 sprays by Nasal route once daily.  Qty: 27 mL, Refills: 4      fluticasone propionate (FLONASE) 50 mcg/actuation nasal spray 2 sprays by Each Nostril route once daily.      mupirocin (BACTROBAN) 2 % ointment       PSYLLIUM SEED, WITH SUGAR, (METAMUCIL ORAL) Take by mouth.      sennosides (SENOKOT TO GO ORAL)       triamcinolone acetonide 0.1% (KENALOG) 0.1 % cream Apply topically 2 (two) times daily. Use up to 2 weeks at a time.  Qty: 454 g, Refills: 1    Associated Diagnoses: Notalgia paresthetica                 Discharge Diagnosis: Cervical spondylosis [M47.812]  Condition on Discharge: Stable with no complications to procedure   Diet on Discharge: Same as before.  Activity: as per instruction sheet.  Discharge to: Home with a responsible adult.  Follow up: 2-4 weeks       Please call the office at [phone] if you experience any weakness or loss of sensation, fever > 101.5, pain uncontrolled with oral medications, persistent nausea/vomiting/or diarrhea, redness or drainage from the incisions, or any other worrisome concerns. If physician on call was not reached or could not communicate with our office for any reason please go to the nearest emergency department

## 2025-07-24 NOTE — OP NOTE
Cervical Medial nerve branch block radiofrequency ablation Under Fluoroscopy  Left   C2/C3 and C3/C4    INFORMED CONSENT: The procedure, risks, benefits and options were discussed with patient. There are no contraindications to the procedure. The patient expressed understanding and agreed to proceed. The personnel performing the procedure was discussed.    Date of procedure 07/24/2025    Time-out taken to identify patient and procedure side prior to starting the procedure.                     PROCEDURE: Left radiofrequency ablation of the the medial branch nerves at the   transverse process of  C2, C3, and C4    Pre Procedure diagnosis:    Cervical spondylosis [M47.812]  1. Cervical spondylosis        Post-Procedure diagnosis:   same      PHYSICIAN: Richmond Mills MD      ESTIMATED BLOOD LOSS: None.     COMPLICATIONS: None.     Medication Mixture: 1ml of Decadron PF 10mg/ml and 3ml of Bupivacaine 0.25%, split evenly between the sites      Sedation Type: Conscious sedation per MD    SEDATION MEDICATIONS: local/IV sedation: Versed 2 mg and fentanyl 50 mcg IV.  Conscious sedation ordered by MD.  Patient reevaluated and sedation administered by RN and patient monitored by RN and MD.  Total sedation time was less than 10 min.        TECHNIQUE:   Laying in a prone position, the patient was prepped and draped in the usual sterile fashion using ChloraPrep and fenestrated drape. The level was determined under fluoroscopic guidance in the AP and lateral views. 1% lidocaine was given through a 27-gauge 1.25in needle and raising a skin wheel. A 18-gauge 10mm curved active tip needle was introduced to the anatomic location of the medial branch at each of the stated above levels using fluoroscopy. Then sensory and motor testing were performed to confirm that the needle tips were in the correct location. Then after negative aspiration, 1 mL of Lidocaine PF 1% was injected into each level. This was followed by thermal lesioning  at 80 degrees celsius for 90 seconds. After lesioning was complete, the medication mixture above was injected at the targeted sites, split evenly between the sites. The patient tolerated the procedure well.      The patient was monitored for approximately 30 minutes after the procedure.  Patient was given post procedure and discharge instructions to follow at home.  The patient was discharged in a stable condition

## 2025-07-24 NOTE — DISCHARGE INSTRUCTIONS

## 2025-08-05 ENCOUNTER — PATIENT MESSAGE (OUTPATIENT)
Dept: PHYSICAL MEDICINE AND REHAB | Facility: CLINIC | Age: 70
End: 2025-08-05
Payer: MEDICARE

## 2025-08-05 DIAGNOSIS — G57.00 PIRIFORMIS SYNDROME, UNSPECIFIED LATERALITY: ICD-10-CM

## 2025-08-05 DIAGNOSIS — M79.18 MYOFASCIAL PAIN: ICD-10-CM

## 2025-08-05 RX ORDER — TIZANIDINE 2 MG/1
2 TABLET ORAL NIGHTLY PRN
Qty: 30 TABLET | Refills: 4 | OUTPATIENT
Start: 2025-08-05

## 2025-08-05 RX ORDER — TIZANIDINE 2 MG/1
2 TABLET ORAL NIGHTLY PRN
Qty: 30 TABLET | Refills: 5 | Status: SHIPPED | OUTPATIENT
Start: 2025-08-05

## 2025-08-06 ENCOUNTER — HOSPITAL ENCOUNTER (OUTPATIENT)
Dept: RADIOLOGY | Facility: HOSPITAL | Age: 70
Discharge: HOME | End: 2025-08-06
Attending: NURSE PRACTITIONER
Payer: MEDICARE

## 2025-08-06 DIAGNOSIS — Z12.31 ENCOUNTER FOR SCREENING MAMMOGRAM FOR BREAST CANCER: ICD-10-CM

## 2025-08-06 PROCEDURE — 77063 BREAST TOMOSYNTHESIS BI: CPT | Mod: 26,,, | Performed by: RADIOLOGY

## 2025-08-06 PROCEDURE — 77067 SCR MAMMO BI INCL CAD: CPT | Mod: 26,,, | Performed by: RADIOLOGY

## 2025-08-06 PROCEDURE — 77063 BREAST TOMOSYNTHESIS BI: CPT | Mod: TC,PN

## 2025-08-24 ENCOUNTER — PATIENT MESSAGE (OUTPATIENT)
Dept: PHYSICAL MEDICINE AND REHAB | Facility: CLINIC | Age: 70
End: 2025-08-24
Payer: MEDICARE

## 2025-08-25 ENCOUNTER — OFFICE VISIT (OUTPATIENT)
Dept: PHYSICAL MEDICINE AND REHAB | Facility: CLINIC | Age: 70
End: 2025-08-25
Payer: MEDICARE

## 2025-08-25 VITALS — WEIGHT: 125 LBS | BODY MASS INDEX: 22.15 KG/M2 | RESPIRATION RATE: 12 BRPM | HEIGHT: 63 IN

## 2025-08-25 DIAGNOSIS — M53.82 MUSCULOSKELETAL DISORDER OF THE SUBOCCIPITAL: ICD-10-CM

## 2025-08-25 DIAGNOSIS — M46.00 SPINAL ENTHESOPATHY: ICD-10-CM

## 2025-08-25 DIAGNOSIS — M79.18 DIFFUSE MYOFASCIAL PAIN SYNDROME: Primary | ICD-10-CM

## 2025-08-25 PROCEDURE — 99214 OFFICE O/P EST MOD 30 MIN: CPT | Mod: 25,S$PBB,, | Performed by: PHYSICAL MEDICINE & REHABILITATION

## 2025-08-25 PROCEDURE — 99212 OFFICE O/P EST SF 10 MIN: CPT | Mod: PBBFAC | Performed by: PHYSICAL MEDICINE & REHABILITATION

## 2025-08-25 PROCEDURE — 20553 NJX 1/MLT TRIGGER POINTS 3/>: CPT | Mod: S$PBB,,, | Performed by: PHYSICAL MEDICINE & REHABILITATION

## 2025-08-25 PROCEDURE — 99999 PR PBB SHADOW E&M-EST. PATIENT-LVL II: CPT | Mod: PBBFAC,,, | Performed by: PHYSICAL MEDICINE & REHABILITATION

## 2025-08-25 PROCEDURE — 20553 NJX 1/MLT TRIGGER POINTS 3/>: CPT | Mod: PBBFAC | Performed by: PHYSICAL MEDICINE & REHABILITATION

## (undated) DEVICE — DRESSING LEUKOPLAST FLEX 1X3IN